# Patient Record
Sex: MALE | Race: WHITE | NOT HISPANIC OR LATINO | Employment: OTHER | URBAN - METROPOLITAN AREA
[De-identification: names, ages, dates, MRNs, and addresses within clinical notes are randomized per-mention and may not be internally consistent; named-entity substitution may affect disease eponyms.]

---

## 2017-04-04 ENCOUNTER — ALLSCRIPTS OFFICE VISIT (OUTPATIENT)
Dept: OTHER | Facility: OTHER | Age: 77
End: 2017-04-04

## 2017-04-05 ENCOUNTER — ALLSCRIPTS OFFICE VISIT (OUTPATIENT)
Dept: OTHER | Facility: OTHER | Age: 77
End: 2017-04-05

## 2017-04-10 ENCOUNTER — LAB (OUTPATIENT)
Dept: LAB | Facility: CLINIC | Age: 77
End: 2017-04-10
Payer: COMMERCIAL

## 2017-04-10 ENCOUNTER — TRANSCRIBE ORDERS (OUTPATIENT)
Dept: LAB | Facility: CLINIC | Age: 77
End: 2017-04-10

## 2017-04-10 DIAGNOSIS — J44.9 OBSTRUCTIVE CHRONIC BRONCHITIS WITHOUT EXACERBATION (HCC): ICD-10-CM

## 2017-04-10 DIAGNOSIS — I25.10 UNSPECIFIED CARDIOVASCULAR DISEASE: ICD-10-CM

## 2017-04-10 DIAGNOSIS — Z79.01 LONG TERM (CURRENT) USE OF ANTICOAGULANTS: Primary | ICD-10-CM

## 2017-04-10 DIAGNOSIS — Z87.891 PERSONAL HISTORY OF TOBACCO USE, PRESENTING HAZARDS TO HEALTH: ICD-10-CM

## 2017-04-10 DIAGNOSIS — E09.9 DRUG OR CHEMICAL INDUCED DIABETES MELLITUS: ICD-10-CM

## 2017-04-10 DIAGNOSIS — I48.91 ATRIAL FIBRILLATION, UNSPECIFIED TYPE (HCC): ICD-10-CM

## 2017-04-10 DIAGNOSIS — I10 ESSENTIAL HYPERTENSION, BENIGN: ICD-10-CM

## 2017-04-10 DIAGNOSIS — J43.9 EMPHYSEMATOUS BLEB (HCC): ICD-10-CM

## 2017-04-10 LAB
ALBUMIN SERPL BCP-MCNC: 3.5 G/DL (ref 3.5–5)
ALP SERPL-CCNC: 63 U/L (ref 46–116)
ALT SERPL W P-5'-P-CCNC: 18 U/L (ref 12–78)
ANION GAP SERPL CALCULATED.3IONS-SCNC: 5 MMOL/L (ref 4–13)
AST SERPL W P-5'-P-CCNC: 11 U/L (ref 5–45)
BASOPHILS # BLD AUTO: 0.05 THOUSANDS/ΜL (ref 0–0.1)
BASOPHILS NFR BLD AUTO: 1 % (ref 0–1)
BILIRUB SERPL-MCNC: 0.6 MG/DL (ref 0.2–1)
BUN SERPL-MCNC: 19 MG/DL (ref 5–25)
CALCIUM SERPL-MCNC: 8.2 MG/DL (ref 8.3–10.1)
CHLORIDE SERPL-SCNC: 100 MMOL/L (ref 100–108)
CHOLEST SERPL-MCNC: 100 MG/DL (ref 50–200)
CO2 SERPL-SCNC: 32 MMOL/L (ref 21–32)
CREAT SERPL-MCNC: 0.9 MG/DL (ref 0.6–1.3)
CREAT UR-MCNC: 119 MG/DL
EOSINOPHIL # BLD AUTO: 0.13 THOUSAND/ΜL (ref 0–0.61)
EOSINOPHIL NFR BLD AUTO: 2 % (ref 0–6)
ERYTHROCYTE [DISTWIDTH] IN BLOOD BY AUTOMATED COUNT: 13.8 % (ref 11.6–15.1)
EST. AVERAGE GLUCOSE BLD GHB EST-MCNC: 134 MG/DL
GFR SERPL CREATININE-BSD FRML MDRD: >60 ML/MIN/1.73SQ M
GLUCOSE P FAST SERPL-MCNC: 108 MG/DL (ref 65–99)
HBA1C MFR BLD: 6.3 % (ref 4.2–6.3)
HCT VFR BLD AUTO: 42.7 % (ref 36.5–49.3)
HDLC SERPL-MCNC: 48 MG/DL (ref 40–60)
HGB BLD-MCNC: 13.8 G/DL (ref 12–17)
LDLC SERPL CALC-MCNC: 32 MG/DL (ref 0–100)
LYMPHOCYTES # BLD AUTO: 2.29 THOUSANDS/ΜL (ref 0.6–4.47)
LYMPHOCYTES NFR BLD AUTO: 29 % (ref 14–44)
MCH RBC QN AUTO: 30.6 PG (ref 26.8–34.3)
MCHC RBC AUTO-ENTMCNC: 32.3 G/DL (ref 31.4–37.4)
MCV RBC AUTO: 95 FL (ref 82–98)
MICROALBUMIN UR-MCNC: 16.2 MG/L (ref 0–20)
MICROALBUMIN/CREAT 24H UR: 14 MG/G CREATININE (ref 0–30)
MONOCYTES # BLD AUTO: 0.67 THOUSAND/ΜL (ref 0.17–1.22)
MONOCYTES NFR BLD AUTO: 9 % (ref 4–12)
NEUTROPHILS # BLD AUTO: 4.71 THOUSANDS/ΜL (ref 1.85–7.62)
NEUTS SEG NFR BLD AUTO: 59 % (ref 43–75)
NRBC BLD AUTO-RTO: 0 /100 WBCS
PLATELET # BLD AUTO: 240 THOUSANDS/UL (ref 149–390)
PMV BLD AUTO: 11.4 FL (ref 8.9–12.7)
POTASSIUM SERPL-SCNC: 3.7 MMOL/L (ref 3.5–5.3)
PROT SERPL-MCNC: 6.9 G/DL (ref 6.4–8.2)
PSA SERPL-MCNC: 1.7 NG/ML (ref 0–4)
RBC # BLD AUTO: 4.51 MILLION/UL (ref 3.88–5.62)
SODIUM SERPL-SCNC: 137 MMOL/L (ref 136–145)
TRIGL SERPL-MCNC: 99 MG/DL
TSH SERPL DL<=0.05 MIU/L-ACNC: 1.22 UIU/ML (ref 0.36–3.74)
WBC # BLD AUTO: 7.87 THOUSAND/UL (ref 4.31–10.16)

## 2017-04-10 PROCEDURE — 82570 ASSAY OF URINE CREATININE: CPT

## 2017-04-10 PROCEDURE — 82043 UR ALBUMIN QUANTITATIVE: CPT

## 2017-04-10 PROCEDURE — 80061 LIPID PANEL: CPT

## 2017-04-10 PROCEDURE — G0103 PSA SCREENING: HCPCS

## 2017-04-10 PROCEDURE — 80053 COMPREHEN METABOLIC PANEL: CPT

## 2017-04-10 PROCEDURE — 85025 COMPLETE CBC W/AUTO DIFF WBC: CPT

## 2017-04-10 PROCEDURE — 84443 ASSAY THYROID STIM HORMONE: CPT

## 2017-04-10 PROCEDURE — 83036 HEMOGLOBIN GLYCOSYLATED A1C: CPT

## 2017-04-10 PROCEDURE — 36415 COLL VENOUS BLD VENIPUNCTURE: CPT

## 2017-04-11 ENCOUNTER — GENERIC CONVERSION - ENCOUNTER (OUTPATIENT)
Dept: OTHER | Facility: OTHER | Age: 77
End: 2017-04-11

## 2017-04-20 ENCOUNTER — ALLSCRIPTS OFFICE VISIT (OUTPATIENT)
Dept: OTHER | Facility: OTHER | Age: 77
End: 2017-04-20

## 2017-04-24 ENCOUNTER — ALLSCRIPTS OFFICE VISIT (OUTPATIENT)
Dept: OTHER | Facility: OTHER | Age: 77
End: 2017-04-24

## 2017-05-01 ENCOUNTER — ALLSCRIPTS OFFICE VISIT (OUTPATIENT)
Dept: OTHER | Facility: OTHER | Age: 77
End: 2017-05-01

## 2017-05-16 ENCOUNTER — GENERIC CONVERSION - ENCOUNTER (OUTPATIENT)
Dept: OTHER | Facility: OTHER | Age: 77
End: 2017-05-16

## 2017-05-30 ENCOUNTER — GENERIC CONVERSION - ENCOUNTER (OUTPATIENT)
Dept: OTHER | Facility: OTHER | Age: 77
End: 2017-05-30

## 2017-05-31 DIAGNOSIS — J18.1 LOBAR PNEUMONIA (HCC): ICD-10-CM

## 2017-05-31 DIAGNOSIS — R91.1 SOLITARY PULMONARY NODULE: ICD-10-CM

## 2017-06-07 ENCOUNTER — GENERIC CONVERSION - ENCOUNTER (OUTPATIENT)
Dept: OTHER | Facility: OTHER | Age: 77
End: 2017-06-07

## 2017-06-08 ENCOUNTER — HOSPITAL ENCOUNTER (INPATIENT)
Facility: HOSPITAL | Age: 77
LOS: 6 days | Discharge: HOME | DRG: 194 | End: 2017-06-14
Attending: EMERGENCY MEDICINE | Admitting: INTERNAL MEDICINE
Payer: COMMERCIAL

## 2017-06-08 ENCOUNTER — HOSPITAL ENCOUNTER (OUTPATIENT)
Dept: RADIOLOGY | Facility: HOSPITAL | Age: 77
Discharge: HOME | DRG: 194 | End: 2017-06-08
Attending: INTERNAL MEDICINE
Payer: COMMERCIAL

## 2017-06-08 ENCOUNTER — HOSPITAL ENCOUNTER (OUTPATIENT)
Dept: RADIOLOGY | Facility: HOSPITAL | Age: 77
DRG: 194 | End: 2017-06-08
Attending: INTERNAL MEDICINE
Payer: COMMERCIAL

## 2017-06-08 DIAGNOSIS — J18.9 PNEUMONIA: Primary | ICD-10-CM

## 2017-06-08 DIAGNOSIS — R91.1 LUNG NODULE: ICD-10-CM

## 2017-06-08 PROBLEM — I50.22 CHRONIC SYSTOLIC CONGESTIVE HEART FAILURE (HCC): Status: ACTIVE | Noted: 2017-06-08

## 2017-06-08 LAB
ALBUMIN SERPL BCP-MCNC: 3.3 G/DL (ref 3.5–5)
ALP SERPL-CCNC: 70 U/L (ref 46–116)
ALT SERPL W P-5'-P-CCNC: 23 U/L (ref 12–78)
ANION GAP SERPL CALCULATED.3IONS-SCNC: 8 MMOL/L (ref 4–13)
APTT PPP: 62 SECONDS (ref 23–35)
AST SERPL W P-5'-P-CCNC: 14 U/L (ref 5–45)
BACTERIA UR QL AUTO: ABNORMAL /HPF
BASOPHILS # BLD AUTO: 0 THOUSANDS/ΜL (ref 0–0.1)
BASOPHILS NFR BLD AUTO: 0 % (ref 0–1)
BILIRUB SERPL-MCNC: 0.5 MG/DL (ref 0.2–1)
BILIRUB UR QL STRIP: NEGATIVE
BUN SERPL-MCNC: 18 MG/DL (ref 5–25)
CALCIUM SERPL-MCNC: 9.3 MG/DL (ref 8.3–10.1)
CHLORIDE SERPL-SCNC: 99 MMOL/L (ref 100–108)
CLARITY UR: CLEAR
CO2 SERPL-SCNC: 31 MMOL/L (ref 21–32)
COLOR UR: ABNORMAL
CREAT SERPL-MCNC: 1.12 MG/DL (ref 0.6–1.3)
DOHLE BOD BLD QL SMEAR: PRESENT
EOSINOPHIL # BLD AUTO: 0.1 THOUSAND/ΜL (ref 0–0.61)
EOSINOPHIL NFR BLD AUTO: 0 % (ref 0–6)
ERYTHROCYTE [DISTWIDTH] IN BLOOD BY AUTOMATED COUNT: 14.4 % (ref 11.6–15.1)
GFR SERPL CREATININE-BSD FRML MDRD: >60 ML/MIN/1.73SQ M
GLUCOSE SERPL-MCNC: 160 MG/DL (ref 65–140)
GLUCOSE UR STRIP-MCNC: NEGATIVE MG/DL
HCT VFR BLD AUTO: 45.3 % (ref 42–52)
HGB BLD-MCNC: 14.6 G/DL (ref 14–18)
HGB UR QL STRIP.AUTO: ABNORMAL
HYALINE CASTS #/AREA URNS LPF: ABNORMAL /LPF
INR PPP: 3.06 (ref 0.86–1.16)
KETONES UR STRIP-MCNC: NEGATIVE MG/DL
L PNEUMO1 AG UR QL IA.RAPID: NEGATIVE
LACTATE SERPL-SCNC: 1.5 MMOL/L (ref 0.5–2)
LEUKOCYTE ESTERASE UR QL STRIP: NEGATIVE
LYMPHOCYTES # BLD AUTO: 0.8 THOUSANDS/ΜL (ref 0.6–4.47)
LYMPHOCYTES NFR BLD AUTO: 5 % (ref 14–44)
MACROCYTES BLD QL AUTO: PRESENT
MCH RBC QN AUTO: 30.3 PG (ref 27–31)
MCHC RBC AUTO-ENTMCNC: 32.1 G/DL (ref 31.4–37.4)
MCV RBC AUTO: 94 FL (ref 82–98)
MONOCYTES # BLD AUTO: 1.7 THOUSAND/ΜL (ref 0.17–1.22)
MONOCYTES NFR BLD AUTO: 9 % (ref 4–12)
NEUTROPHILS # BLD AUTO: 15.5 THOUSANDS/ΜL (ref 1.85–7.62)
NEUTS SEG NFR BLD AUTO: 86 % (ref 43–75)
NITRITE UR QL STRIP: NEGATIVE
NON-SQ EPI CELLS URNS QL MICRO: ABNORMAL /HPF
NRBC BLD AUTO-RTO: 0 /100 WBCS
PH UR STRIP.AUTO: 6 [PH] (ref 5–9)
PLATELET # BLD AUTO: 205 THOUSANDS/UL (ref 130–400)
PLATELET BLD QL SMEAR: ADEQUATE
PMV BLD AUTO: 8.7 FL (ref 8.9–12.7)
POTASSIUM SERPL-SCNC: 3.7 MMOL/L (ref 3.5–5.3)
PROT SERPL-MCNC: 7.6 G/DL (ref 6.4–8.2)
PROT UR STRIP-MCNC: ABNORMAL MG/DL
PROTHROMBIN TIME: 33.3 SECONDS (ref 9.4–11.7)
RBC # BLD AUTO: 4.8 MILLION/UL (ref 4.7–6.1)
RBC #/AREA URNS AUTO: ABNORMAL /HPF
S PNEUM AG UR QL: NEGATIVE
SODIUM SERPL-SCNC: 138 MMOL/L (ref 136–145)
SP GR UR STRIP.AUTO: 1.01 (ref 1–1.03)
UROBILINOGEN UR QL STRIP.AUTO: 0.2 E.U./DL
WBC # BLD AUTO: 18.1 THOUSAND/UL (ref 4.8–10.8)
WBC #/AREA URNS AUTO: ABNORMAL /HPF

## 2017-06-08 PROCEDURE — 94640 AIRWAY INHALATION TREATMENT: CPT

## 2017-06-08 PROCEDURE — 71250 CT THORAX DX C-: CPT

## 2017-06-08 PROCEDURE — 87081 CULTURE SCREEN ONLY: CPT | Performed by: FAMILY MEDICINE

## 2017-06-08 PROCEDURE — 80053 COMPREHEN METABOLIC PANEL: CPT | Performed by: EMERGENCY MEDICINE

## 2017-06-08 PROCEDURE — 83605 ASSAY OF LACTIC ACID: CPT | Performed by: EMERGENCY MEDICINE

## 2017-06-08 PROCEDURE — 85610 PROTHROMBIN TIME: CPT | Performed by: EMERGENCY MEDICINE

## 2017-06-08 PROCEDURE — 81001 URINALYSIS AUTO W/SCOPE: CPT | Performed by: EMERGENCY MEDICINE

## 2017-06-08 PROCEDURE — 85025 COMPLETE CBC W/AUTO DIFF WBC: CPT | Performed by: EMERGENCY MEDICINE

## 2017-06-08 PROCEDURE — 96365 THER/PROPH/DIAG IV INF INIT: CPT

## 2017-06-08 PROCEDURE — 94664 DEMO&/EVAL PT USE INHALER: CPT

## 2017-06-08 PROCEDURE — 87070 CULTURE OTHR SPECIMN AEROBIC: CPT | Performed by: FAMILY MEDICINE

## 2017-06-08 PROCEDURE — 85730 THROMBOPLASTIN TIME PARTIAL: CPT | Performed by: EMERGENCY MEDICINE

## 2017-06-08 PROCEDURE — 36415 COLL VENOUS BLD VENIPUNCTURE: CPT | Performed by: EMERGENCY MEDICINE

## 2017-06-08 PROCEDURE — 94760 N-INVAS EAR/PLS OXIMETRY 1: CPT

## 2017-06-08 PROCEDURE — 87449 NOS EACH ORGANISM AG IA: CPT | Performed by: FAMILY MEDICINE

## 2017-06-08 PROCEDURE — 96375 TX/PRO/DX INJ NEW DRUG ADDON: CPT

## 2017-06-08 PROCEDURE — 87040 BLOOD CULTURE FOR BACTERIA: CPT | Performed by: EMERGENCY MEDICINE

## 2017-06-08 PROCEDURE — 93005 ELECTROCARDIOGRAM TRACING: CPT | Performed by: EMERGENCY MEDICINE

## 2017-06-08 PROCEDURE — 99285 EMERGENCY DEPT VISIT HI MDM: CPT

## 2017-06-08 PROCEDURE — 87205 SMEAR GRAM STAIN: CPT | Performed by: FAMILY MEDICINE

## 2017-06-08 RX ORDER — WARFARIN SODIUM 1 MG/1
1 TABLET ORAL DAILY
COMMUNITY
Start: 2013-10-21 | End: 2018-04-27 | Stop reason: SDUPTHER

## 2017-06-08 RX ORDER — FOSINOPRIL SODIUM 20 MG/1
20 TABLET ORAL DAILY
COMMUNITY
End: 2018-04-02 | Stop reason: SDUPTHER

## 2017-06-08 RX ORDER — CARVEDILOL 6.25 MG/1
6.25 TABLET ORAL 2 TIMES DAILY
COMMUNITY
End: 2018-09-13 | Stop reason: SDUPTHER

## 2017-06-08 RX ORDER — ATORVASTATIN CALCIUM 10 MG/1
10 TABLET, FILM COATED ORAL
COMMUNITY
Start: 2017-04-04 | End: 2018-07-16

## 2017-06-08 RX ORDER — FUROSEMIDE 40 MG/1
4 TABLET ORAL DAILY
COMMUNITY
End: 2018-04-02 | Stop reason: SDUPTHER

## 2017-06-08 RX ORDER — ATORVASTATIN CALCIUM 10 MG/1
10 TABLET, FILM COATED ORAL
Status: DISCONTINUED | OUTPATIENT
Start: 2017-06-08 | End: 2017-06-14 | Stop reason: HOSPADM

## 2017-06-08 RX ORDER — IPRATROPIUM BROMIDE AND ALBUTEROL SULFATE 2.5; .5 MG/3ML; MG/3ML
3 SOLUTION RESPIRATORY (INHALATION)
Status: DISCONTINUED | OUTPATIENT
Start: 2017-06-08 | End: 2017-06-09

## 2017-06-08 RX ORDER — ACETAMINOPHEN 325 MG/1
650 TABLET ORAL EVERY 6 HOURS PRN
Status: DISCONTINUED | OUTPATIENT
Start: 2017-06-08 | End: 2017-06-14 | Stop reason: HOSPADM

## 2017-06-08 RX ORDER — IPRATROPIUM BROMIDE AND ALBUTEROL SULFATE 2.5; .5 MG/3ML; MG/3ML
3 SOLUTION RESPIRATORY (INHALATION) ONCE
Status: COMPLETED | OUTPATIENT
Start: 2017-06-08 | End: 2017-06-08

## 2017-06-08 RX ORDER — ASPIRIN 81 MG/1
81 TABLET, CHEWABLE ORAL DAILY
Status: DISCONTINUED | OUTPATIENT
Start: 2017-06-08 | End: 2017-06-14 | Stop reason: HOSPADM

## 2017-06-08 RX ORDER — WARFARIN SODIUM 5 MG/1
5 TABLET ORAL DAILY
COMMUNITY
End: 2018-03-17 | Stop reason: SDUPTHER

## 2017-06-08 RX ORDER — FOSINOPRIL SODIUM 20 MG/1
20 TABLET ORAL DAILY
Status: DISCONTINUED | OUTPATIENT
Start: 2017-06-08 | End: 2017-06-14 | Stop reason: HOSPADM

## 2017-06-08 RX ORDER — CARVEDILOL 6.25 MG/1
6.25 TABLET ORAL 2 TIMES DAILY
Status: DISCONTINUED | OUTPATIENT
Start: 2017-06-08 | End: 2017-06-14 | Stop reason: HOSPADM

## 2017-06-08 RX ORDER — IPRATROPIUM BROMIDE AND ALBUTEROL SULFATE 2.5; .5 MG/3ML; MG/3ML
3 SOLUTION RESPIRATORY (INHALATION) EVERY 4 HOURS PRN
Status: DISCONTINUED | OUTPATIENT
Start: 2017-06-08 | End: 2017-06-13

## 2017-06-08 RX ORDER — METHYLPREDNISOLONE SODIUM SUCCINATE 125 MG/2ML
125 INJECTION, POWDER, LYOPHILIZED, FOR SOLUTION INTRAMUSCULAR; INTRAVENOUS ONCE
Status: COMPLETED | OUTPATIENT
Start: 2017-06-08 | End: 2017-06-08

## 2017-06-08 RX ADMIN — IPRATROPIUM BROMIDE AND ALBUTEROL SULFATE 3 ML: .5; 3 SOLUTION RESPIRATORY (INHALATION) at 12:44

## 2017-06-08 RX ADMIN — IPRATROPIUM BROMIDE AND ALBUTEROL SULFATE 3 ML: .5; 3 SOLUTION RESPIRATORY (INHALATION) at 23:31

## 2017-06-08 RX ADMIN — IPRATROPIUM BROMIDE AND ALBUTEROL SULFATE 3 ML: .5; 3 SOLUTION RESPIRATORY (INHALATION) at 19:58

## 2017-06-08 RX ADMIN — METFORMIN HYDROCHLORIDE 1000 MG: 500 TABLET, FILM COATED ORAL at 17:24

## 2017-06-08 RX ADMIN — IPRATROPIUM BROMIDE AND ALBUTEROL SULFATE 3 ML: .5; 3 SOLUTION RESPIRATORY (INHALATION) at 09:03

## 2017-06-08 RX ADMIN — CARVEDILOL 6.25 MG: 6.25 TABLET, FILM COATED ORAL at 17:24

## 2017-06-08 RX ADMIN — ATORVASTATIN CALCIUM 10 MG: 10 TABLET, FILM COATED ORAL at 21:45

## 2017-06-08 RX ADMIN — IPRATROPIUM BROMIDE AND ALBUTEROL SULFATE 3 ML: .5; 3 SOLUTION RESPIRATORY (INHALATION) at 15:47

## 2017-06-08 RX ADMIN — METHYLPREDNISOLONE SODIUM SUCCINATE 125 MG: 125 INJECTION, POWDER, FOR SOLUTION INTRAMUSCULAR; INTRAVENOUS at 09:02

## 2017-06-08 RX ADMIN — AZITHROMYCIN MONOHYDRATE 500 MG: 500 INJECTION, POWDER, LYOPHILIZED, FOR SOLUTION INTRAVENOUS at 09:48

## 2017-06-08 RX ADMIN — CEFTRIAXONE 1000 MG: 1 INJECTION, SOLUTION INTRAVENOUS at 09:02

## 2017-06-09 ENCOUNTER — GENERIC CONVERSION - ENCOUNTER (OUTPATIENT)
Dept: OTHER | Facility: OTHER | Age: 77
End: 2017-06-09

## 2017-06-09 LAB
ANION GAP SERPL CALCULATED.3IONS-SCNC: 8 MMOL/L (ref 4–13)
BUN SERPL-MCNC: 21 MG/DL (ref 5–25)
CALCIUM SERPL-MCNC: 8.5 MG/DL (ref 8.3–10.1)
CHLORIDE SERPL-SCNC: 101 MMOL/L (ref 100–108)
CO2 SERPL-SCNC: 30 MMOL/L (ref 21–32)
CREAT SERPL-MCNC: 0.93 MG/DL (ref 0.6–1.3)
ERYTHROCYTE [DISTWIDTH] IN BLOOD BY AUTOMATED COUNT: 14.3 % (ref 11.6–15.1)
GFR SERPL CREATININE-BSD FRML MDRD: >60 ML/MIN/1.73SQ M
GLUCOSE SERPL-MCNC: 160 MG/DL (ref 65–140)
HCT VFR BLD AUTO: 38.2 % (ref 42–52)
HGB BLD-MCNC: 12.4 G/DL (ref 14–18)
INR PPP: 3.15 (ref 0.86–1.16)
MAGNESIUM SERPL-MCNC: 1.9 MG/DL (ref 1.6–2.6)
MCH RBC QN AUTO: 30.5 PG (ref 27–31)
MCHC RBC AUTO-ENTMCNC: 32.5 G/DL (ref 31.4–37.4)
MCV RBC AUTO: 94 FL (ref 82–98)
PHOSPHATE SERPL-MCNC: 3.2 MG/DL (ref 2.3–4.1)
PLATELET # BLD AUTO: 203 THOUSANDS/UL (ref 130–400)
PMV BLD AUTO: 9 FL (ref 8.9–12.7)
POTASSIUM SERPL-SCNC: 3.9 MMOL/L (ref 3.5–5.3)
PROTHROMBIN TIME: 34.3 SECONDS (ref 9.4–11.7)
RBC # BLD AUTO: 4.07 MILLION/UL (ref 4.7–6.1)
SODIUM SERPL-SCNC: 139 MMOL/L (ref 136–145)
WBC # BLD AUTO: 13.2 THOUSAND/UL (ref 4.8–10.8)

## 2017-06-09 PROCEDURE — 85027 COMPLETE CBC AUTOMATED: CPT | Performed by: FAMILY MEDICINE

## 2017-06-09 PROCEDURE — 84100 ASSAY OF PHOSPHORUS: CPT | Performed by: FAMILY MEDICINE

## 2017-06-09 PROCEDURE — G8979 MOBILITY GOAL STATUS: HCPCS

## 2017-06-09 PROCEDURE — 97165 OT EVAL LOW COMPLEX 30 MIN: CPT

## 2017-06-09 PROCEDURE — G8987 SELF CARE CURRENT STATUS: HCPCS

## 2017-06-09 PROCEDURE — 94760 N-INVAS EAR/PLS OXIMETRY 1: CPT

## 2017-06-09 PROCEDURE — 85610 PROTHROMBIN TIME: CPT | Performed by: FAMILY MEDICINE

## 2017-06-09 PROCEDURE — 94668 MNPJ CHEST WALL SBSQ: CPT

## 2017-06-09 PROCEDURE — 80048 BASIC METABOLIC PNL TOTAL CA: CPT | Performed by: FAMILY MEDICINE

## 2017-06-09 PROCEDURE — 83735 ASSAY OF MAGNESIUM: CPT | Performed by: FAMILY MEDICINE

## 2017-06-09 PROCEDURE — G8978 MOBILITY CURRENT STATUS: HCPCS

## 2017-06-09 PROCEDURE — 94640 AIRWAY INHALATION TREATMENT: CPT

## 2017-06-09 PROCEDURE — 97161 PT EVAL LOW COMPLEX 20 MIN: CPT

## 2017-06-09 PROCEDURE — G8988 SELF CARE GOAL STATUS: HCPCS

## 2017-06-09 RX ORDER — FUROSEMIDE 40 MG/1
40 TABLET ORAL DAILY
Status: DISCONTINUED | OUTPATIENT
Start: 2017-06-10 | End: 2017-06-14 | Stop reason: HOSPADM

## 2017-06-09 RX ORDER — GUAIFENESIN 600 MG
600 TABLET, EXTENDED RELEASE 12 HR ORAL EVERY 12 HOURS SCHEDULED
Status: DISCONTINUED | OUTPATIENT
Start: 2017-06-09 | End: 2017-06-14 | Stop reason: HOSPADM

## 2017-06-09 RX ORDER — ACETYLCYSTEINE 200 MG/ML
2 SOLUTION ORAL; RESPIRATORY (INHALATION)
Status: DISCONTINUED | OUTPATIENT
Start: 2017-06-09 | End: 2017-06-10

## 2017-06-09 RX ORDER — IPRATROPIUM BROMIDE AND ALBUTEROL SULFATE 2.5; .5 MG/3ML; MG/3ML
3 SOLUTION RESPIRATORY (INHALATION)
Status: DISCONTINUED | OUTPATIENT
Start: 2017-06-09 | End: 2017-06-13

## 2017-06-09 RX ADMIN — METFORMIN HYDROCHLORIDE 1000 MG: 500 TABLET, FILM COATED ORAL at 08:44

## 2017-06-09 RX ADMIN — CARVEDILOL 6.25 MG: 6.25 TABLET, FILM COATED ORAL at 08:44

## 2017-06-09 RX ADMIN — ACETYLCYSTEINE 400 MG: 200 SOLUTION ORAL; RESPIRATORY (INHALATION) at 20:12

## 2017-06-09 RX ADMIN — GUAIFENESIN 600 MG: 600 TABLET, EXTENDED RELEASE ORAL at 20:23

## 2017-06-09 RX ADMIN — CARVEDILOL 6.25 MG: 6.25 TABLET, FILM COATED ORAL at 17:19

## 2017-06-09 RX ADMIN — IPRATROPIUM BROMIDE AND ALBUTEROL SULFATE 3 ML: .5; 3 SOLUTION RESPIRATORY (INHALATION) at 03:20

## 2017-06-09 RX ADMIN — IPRATROPIUM BROMIDE AND ALBUTEROL SULFATE 3 ML: .5; 3 SOLUTION RESPIRATORY (INHALATION) at 20:11

## 2017-06-09 RX ADMIN — ATORVASTATIN CALCIUM 10 MG: 10 TABLET, FILM COATED ORAL at 22:14

## 2017-06-09 RX ADMIN — METFORMIN HYDROCHLORIDE 1000 MG: 500 TABLET, FILM COATED ORAL at 17:19

## 2017-06-09 RX ADMIN — CEFTRIAXONE 1000 MG: 1 INJECTION, POWDER, FOR SOLUTION INTRAMUSCULAR; INTRAVENOUS at 08:47

## 2017-06-09 RX ADMIN — IPRATROPIUM BROMIDE AND ALBUTEROL SULFATE 3 ML: .5; 3 SOLUTION RESPIRATORY (INHALATION) at 08:54

## 2017-06-09 RX ADMIN — IPRATROPIUM BROMIDE AND ALBUTEROL SULFATE 3 ML: .5; 3 SOLUTION RESPIRATORY (INHALATION) at 15:39

## 2017-06-09 RX ADMIN — IPRATROPIUM BROMIDE AND ALBUTEROL SULFATE 3 ML: .5; 3 SOLUTION RESPIRATORY (INHALATION) at 11:41

## 2017-06-09 RX ADMIN — FOSINOPRIL 20 MG: 20 TABLET ORAL at 08:44

## 2017-06-09 RX ADMIN — ASPIRIN 81 MG 81 MG: 81 TABLET ORAL at 08:44

## 2017-06-09 RX ADMIN — AZITHROMYCIN MONOHYDRATE 500 MG: 500 INJECTION, POWDER, LYOPHILIZED, FOR SOLUTION INTRAVENOUS at 10:10

## 2017-06-10 LAB
ANION GAP SERPL CALCULATED.3IONS-SCNC: 7 MMOL/L (ref 4–13)
BACTERIA SPT RESP CULT: NORMAL
BUN SERPL-MCNC: 20 MG/DL (ref 5–25)
CALCIUM SERPL-MCNC: 8.4 MG/DL (ref 8.3–10.1)
CHLORIDE SERPL-SCNC: 104 MMOL/L (ref 100–108)
CO2 SERPL-SCNC: 28 MMOL/L (ref 21–32)
CREAT SERPL-MCNC: 0.86 MG/DL (ref 0.6–1.3)
ERYTHROCYTE [DISTWIDTH] IN BLOOD BY AUTOMATED COUNT: 14.6 % (ref 11.6–15.1)
GFR SERPL CREATININE-BSD FRML MDRD: >60 ML/MIN/1.73SQ M
GLUCOSE SERPL-MCNC: 117 MG/DL (ref 65–140)
GRAM STN SPEC: NORMAL
HCT VFR BLD AUTO: 37.9 % (ref 42–52)
HGB BLD-MCNC: 12.4 G/DL (ref 14–18)
INR PPP: 2.66 (ref 0.86–1.16)
MCH RBC QN AUTO: 30.6 PG (ref 27–31)
MCHC RBC AUTO-ENTMCNC: 32.8 G/DL (ref 31.4–37.4)
MCV RBC AUTO: 94 FL (ref 82–98)
MRSA NOSE QL CULT: NORMAL
PLATELET # BLD AUTO: 207 THOUSANDS/UL (ref 130–400)
PMV BLD AUTO: 8.6 FL (ref 8.9–12.7)
POTASSIUM SERPL-SCNC: 4.1 MMOL/L (ref 3.5–5.3)
PROTHROMBIN TIME: 28.8 SECONDS (ref 9.4–11.7)
RBC # BLD AUTO: 4.05 MILLION/UL (ref 4.7–6.1)
SODIUM SERPL-SCNC: 139 MMOL/L (ref 136–145)
WBC # BLD AUTO: 12.5 THOUSAND/UL (ref 4.8–10.8)

## 2017-06-10 PROCEDURE — 85610 PROTHROMBIN TIME: CPT | Performed by: FAMILY MEDICINE

## 2017-06-10 PROCEDURE — 85027 COMPLETE CBC AUTOMATED: CPT | Performed by: FAMILY MEDICINE

## 2017-06-10 PROCEDURE — 94640 AIRWAY INHALATION TREATMENT: CPT

## 2017-06-10 PROCEDURE — 80048 BASIC METABOLIC PNL TOTAL CA: CPT | Performed by: FAMILY MEDICINE

## 2017-06-10 PROCEDURE — 94760 N-INVAS EAR/PLS OXIMETRY 1: CPT

## 2017-06-10 PROCEDURE — 94668 MNPJ CHEST WALL SBSQ: CPT

## 2017-06-10 RX ORDER — WARFARIN SODIUM 4 MG/1
4 TABLET ORAL
Status: COMPLETED | OUTPATIENT
Start: 2017-06-10 | End: 2017-06-10

## 2017-06-10 RX ORDER — GUAIFENESIN 600 MG
600 TABLET, EXTENDED RELEASE 12 HR ORAL EVERY 12 HOURS SCHEDULED
Status: DISCONTINUED | OUTPATIENT
Start: 2017-06-10 | End: 2017-06-10 | Stop reason: SDUPTHER

## 2017-06-10 RX ADMIN — CARVEDILOL 6.25 MG: 6.25 TABLET, FILM COATED ORAL at 17:34

## 2017-06-10 RX ADMIN — CEFTRIAXONE 1000 MG: 1 INJECTION, POWDER, FOR SOLUTION INTRAMUSCULAR; INTRAVENOUS at 09:46

## 2017-06-10 RX ADMIN — IPRATROPIUM BROMIDE AND ALBUTEROL SULFATE 3 ML: .5; 3 SOLUTION RESPIRATORY (INHALATION) at 02:37

## 2017-06-10 RX ADMIN — ACETYLCYSTEINE 400 MG: 200 SOLUTION ORAL; RESPIRATORY (INHALATION) at 02:37

## 2017-06-10 RX ADMIN — ASPIRIN 81 MG 81 MG: 81 TABLET ORAL at 08:18

## 2017-06-10 RX ADMIN — IPRATROPIUM BROMIDE AND ALBUTEROL SULFATE 3 ML: .5; 3 SOLUTION RESPIRATORY (INHALATION) at 13:50

## 2017-06-10 RX ADMIN — IPRATROPIUM BROMIDE AND ALBUTEROL SULFATE 3 ML: .5; 3 SOLUTION RESPIRATORY (INHALATION) at 09:09

## 2017-06-10 RX ADMIN — WARFARIN SODIUM 4 MG: 4 TABLET ORAL at 17:34

## 2017-06-10 RX ADMIN — GUAIFENESIN 600 MG: 600 TABLET, EXTENDED RELEASE ORAL at 08:19

## 2017-06-10 RX ADMIN — METFORMIN HYDROCHLORIDE 1000 MG: 500 TABLET, FILM COATED ORAL at 17:34

## 2017-06-10 RX ADMIN — CARVEDILOL 6.25 MG: 6.25 TABLET, FILM COATED ORAL at 08:19

## 2017-06-10 RX ADMIN — AZITHROMYCIN MONOHYDRATE 500 MG: 500 INJECTION, POWDER, LYOPHILIZED, FOR SOLUTION INTRAVENOUS at 11:30

## 2017-06-10 RX ADMIN — METFORMIN HYDROCHLORIDE 1000 MG: 500 TABLET, FILM COATED ORAL at 08:19

## 2017-06-10 RX ADMIN — ATORVASTATIN CALCIUM 10 MG: 10 TABLET, FILM COATED ORAL at 21:19

## 2017-06-10 RX ADMIN — GUAIFENESIN 600 MG: 600 TABLET, EXTENDED RELEASE ORAL at 21:19

## 2017-06-10 RX ADMIN — FOSINOPRIL 20 MG: 20 TABLET ORAL at 08:18

## 2017-06-10 RX ADMIN — IPRATROPIUM BROMIDE AND ALBUTEROL SULFATE 3 ML: .5; 3 SOLUTION RESPIRATORY (INHALATION) at 20:22

## 2017-06-10 RX ADMIN — FUROSEMIDE 40 MG: 40 TABLET ORAL at 08:21

## 2017-06-11 LAB
ERYTHROCYTE [DISTWIDTH] IN BLOOD BY AUTOMATED COUNT: 14.3 % (ref 11.6–15.1)
HCT VFR BLD AUTO: 39.3 % (ref 42–52)
HGB BLD-MCNC: 12.8 G/DL (ref 14–18)
INR PPP: 2.22 (ref 0.86–1.16)
MCH RBC QN AUTO: 30.4 PG (ref 27–31)
MCHC RBC AUTO-ENTMCNC: 32.6 G/DL (ref 31.4–37.4)
MCV RBC AUTO: 93 FL (ref 82–98)
PLATELET # BLD AUTO: 226 THOUSANDS/UL (ref 130–400)
PMV BLD AUTO: 8.7 FL (ref 8.9–12.7)
PROTHROMBIN TIME: 23.9 SECONDS (ref 9.4–11.7)
RBC # BLD AUTO: 4.21 MILLION/UL (ref 4.7–6.1)
WBC # BLD AUTO: 10.8 THOUSAND/UL (ref 4.8–10.8)

## 2017-06-11 PROCEDURE — 94760 N-INVAS EAR/PLS OXIMETRY 1: CPT

## 2017-06-11 PROCEDURE — 94668 MNPJ CHEST WALL SBSQ: CPT

## 2017-06-11 PROCEDURE — 94640 AIRWAY INHALATION TREATMENT: CPT

## 2017-06-11 PROCEDURE — 85610 PROTHROMBIN TIME: CPT | Performed by: FAMILY MEDICINE

## 2017-06-11 PROCEDURE — 85027 COMPLETE CBC AUTOMATED: CPT | Performed by: FAMILY MEDICINE

## 2017-06-11 RX ADMIN — CEFTRIAXONE 1000 MG: 1 INJECTION, POWDER, FOR SOLUTION INTRAMUSCULAR; INTRAVENOUS at 09:20

## 2017-06-11 RX ADMIN — METFORMIN HYDROCHLORIDE 1000 MG: 500 TABLET, FILM COATED ORAL at 07:39

## 2017-06-11 RX ADMIN — IPRATROPIUM BROMIDE AND ALBUTEROL SULFATE 3 ML: .5; 3 SOLUTION RESPIRATORY (INHALATION) at 08:28

## 2017-06-11 RX ADMIN — ATORVASTATIN CALCIUM 10 MG: 10 TABLET, FILM COATED ORAL at 21:07

## 2017-06-11 RX ADMIN — GUAIFENESIN 600 MG: 600 TABLET, EXTENDED RELEASE ORAL at 21:07

## 2017-06-11 RX ADMIN — IPRATROPIUM BROMIDE AND ALBUTEROL SULFATE 3 ML: .5; 3 SOLUTION RESPIRATORY (INHALATION) at 14:45

## 2017-06-11 RX ADMIN — GUAIFENESIN 600 MG: 600 TABLET, EXTENDED RELEASE ORAL at 09:21

## 2017-06-11 RX ADMIN — CARVEDILOL 6.25 MG: 6.25 TABLET, FILM COATED ORAL at 09:20

## 2017-06-11 RX ADMIN — AZITHROMYCIN MONOHYDRATE 500 MG: 500 INJECTION, POWDER, LYOPHILIZED, FOR SOLUTION INTRAVENOUS at 10:01

## 2017-06-11 RX ADMIN — FOSINOPRIL 20 MG: 20 TABLET ORAL at 09:21

## 2017-06-11 RX ADMIN — METFORMIN HYDROCHLORIDE 1000 MG: 500 TABLET, FILM COATED ORAL at 17:24

## 2017-06-11 RX ADMIN — CARVEDILOL 6.25 MG: 6.25 TABLET, FILM COATED ORAL at 17:24

## 2017-06-11 RX ADMIN — WARFARIN SODIUM 5.5 MG: 3 TABLET ORAL at 17:24

## 2017-06-11 RX ADMIN — FUROSEMIDE 40 MG: 40 TABLET ORAL at 09:21

## 2017-06-11 RX ADMIN — IPRATROPIUM BROMIDE AND ALBUTEROL SULFATE 3 ML: .5; 3 SOLUTION RESPIRATORY (INHALATION) at 02:38

## 2017-06-11 RX ADMIN — IPRATROPIUM BROMIDE AND ALBUTEROL SULFATE 3 ML: .5; 3 SOLUTION RESPIRATORY (INHALATION) at 20:51

## 2017-06-11 RX ADMIN — ASPIRIN 81 MG 81 MG: 81 TABLET ORAL at 09:22

## 2017-06-12 LAB
ANION GAP SERPL CALCULATED.3IONS-SCNC: 10 MMOL/L (ref 4–13)
BUN SERPL-MCNC: 12 MG/DL (ref 5–25)
CALCIUM SERPL-MCNC: 8.9 MG/DL (ref 8.3–10.1)
CHLORIDE SERPL-SCNC: 99 MMOL/L (ref 100–108)
CO2 SERPL-SCNC: 32 MMOL/L (ref 21–32)
CREAT SERPL-MCNC: 0.92 MG/DL (ref 0.6–1.3)
ERYTHROCYTE [DISTWIDTH] IN BLOOD BY AUTOMATED COUNT: 14.4 % (ref 11.6–15.1)
GFR SERPL CREATININE-BSD FRML MDRD: >60 ML/MIN/1.73SQ M
GLUCOSE SERPL-MCNC: 122 MG/DL (ref 65–140)
HCT VFR BLD AUTO: 42.2 % (ref 42–52)
HGB BLD-MCNC: 13.7 G/DL (ref 14–18)
INR PPP: 2.37 (ref 0.86–1.16)
MCH RBC QN AUTO: 30.3 PG (ref 27–31)
MCHC RBC AUTO-ENTMCNC: 32.4 G/DL (ref 31.4–37.4)
MCV RBC AUTO: 93 FL (ref 82–98)
PLATELET # BLD AUTO: 255 THOUSANDS/UL (ref 130–400)
PMV BLD AUTO: 8.3 FL (ref 8.9–12.7)
POTASSIUM SERPL-SCNC: 3.4 MMOL/L (ref 3.5–5.3)
PROTHROMBIN TIME: 25.6 SECONDS (ref 9.4–11.7)
RBC # BLD AUTO: 4.52 MILLION/UL (ref 4.7–6.1)
SODIUM SERPL-SCNC: 141 MMOL/L (ref 136–145)
WBC # BLD AUTO: 11.3 THOUSAND/UL (ref 4.8–10.8)

## 2017-06-12 PROCEDURE — 94668 MNPJ CHEST WALL SBSQ: CPT

## 2017-06-12 PROCEDURE — 80048 BASIC METABOLIC PNL TOTAL CA: CPT | Performed by: FAMILY MEDICINE

## 2017-06-12 PROCEDURE — 85610 PROTHROMBIN TIME: CPT | Performed by: FAMILY MEDICINE

## 2017-06-12 PROCEDURE — 85027 COMPLETE CBC AUTOMATED: CPT | Performed by: FAMILY MEDICINE

## 2017-06-12 PROCEDURE — 94640 AIRWAY INHALATION TREATMENT: CPT

## 2017-06-12 PROCEDURE — 94760 N-INVAS EAR/PLS OXIMETRY 1: CPT

## 2017-06-12 RX ORDER — POTASSIUM CHLORIDE 20 MEQ/1
40 TABLET, EXTENDED RELEASE ORAL ONCE
Status: COMPLETED | OUTPATIENT
Start: 2017-06-12 | End: 2017-06-12

## 2017-06-12 RX ORDER — FLUTICASONE PROPIONATE 110 UG/1
1 AEROSOL, METERED RESPIRATORY (INHALATION) EVERY 12 HOURS SCHEDULED
Status: DISCONTINUED | OUTPATIENT
Start: 2017-06-12 | End: 2017-06-14 | Stop reason: HOSPADM

## 2017-06-12 RX ADMIN — ASPIRIN 81 MG 81 MG: 81 TABLET ORAL at 09:00

## 2017-06-12 RX ADMIN — CARVEDILOL 6.25 MG: 6.25 TABLET, FILM COATED ORAL at 08:59

## 2017-06-12 RX ADMIN — METFORMIN HYDROCHLORIDE 1000 MG: 500 TABLET, FILM COATED ORAL at 17:33

## 2017-06-12 RX ADMIN — WARFARIN SODIUM 5.5 MG: 3 TABLET ORAL at 17:32

## 2017-06-12 RX ADMIN — FUROSEMIDE 40 MG: 40 TABLET ORAL at 09:00

## 2017-06-12 RX ADMIN — AZITHROMYCIN MONOHYDRATE 500 MG: 500 INJECTION, POWDER, LYOPHILIZED, FOR SOLUTION INTRAVENOUS at 10:12

## 2017-06-12 RX ADMIN — IPRATROPIUM BROMIDE AND ALBUTEROL SULFATE 3 ML: .5; 3 SOLUTION RESPIRATORY (INHALATION) at 19:49

## 2017-06-12 RX ADMIN — IPRATROPIUM BROMIDE AND ALBUTEROL SULFATE 3 ML: .5; 3 SOLUTION RESPIRATORY (INHALATION) at 07:48

## 2017-06-12 RX ADMIN — CEFTRIAXONE 1000 MG: 1 INJECTION, POWDER, FOR SOLUTION INTRAMUSCULAR; INTRAVENOUS at 09:10

## 2017-06-12 RX ADMIN — IPRATROPIUM BROMIDE AND ALBUTEROL SULFATE 3 ML: .5; 3 SOLUTION RESPIRATORY (INHALATION) at 14:04

## 2017-06-12 RX ADMIN — GUAIFENESIN 600 MG: 600 TABLET, EXTENDED RELEASE ORAL at 21:24

## 2017-06-12 RX ADMIN — CARVEDILOL 6.25 MG: 6.25 TABLET, FILM COATED ORAL at 17:33

## 2017-06-12 RX ADMIN — ATORVASTATIN CALCIUM 10 MG: 10 TABLET, FILM COATED ORAL at 21:24

## 2017-06-12 RX ADMIN — FOSINOPRIL 20 MG: 20 TABLET ORAL at 08:59

## 2017-06-12 RX ADMIN — METFORMIN HYDROCHLORIDE 1000 MG: 500 TABLET, FILM COATED ORAL at 08:59

## 2017-06-12 RX ADMIN — GUAIFENESIN 600 MG: 600 TABLET, EXTENDED RELEASE ORAL at 08:59

## 2017-06-12 RX ADMIN — POTASSIUM CHLORIDE 40 MEQ: 1500 TABLET, EXTENDED RELEASE ORAL at 10:21

## 2017-06-13 ENCOUNTER — APPOINTMENT (INPATIENT)
Dept: RADIOLOGY | Facility: HOSPITAL | Age: 77
DRG: 194 | End: 2017-06-13
Payer: COMMERCIAL

## 2017-06-13 LAB
ANION GAP SERPL CALCULATED.3IONS-SCNC: 9 MMOL/L (ref 4–13)
BACTERIA BLD CULT: NORMAL
BACTERIA BLD CULT: NORMAL
BUN SERPL-MCNC: 16 MG/DL (ref 5–25)
CALCIUM SERPL-MCNC: 8.7 MG/DL (ref 8.3–10.1)
CHLORIDE SERPL-SCNC: 101 MMOL/L (ref 100–108)
CO2 SERPL-SCNC: 33 MMOL/L (ref 21–32)
CREAT SERPL-MCNC: 0.88 MG/DL (ref 0.6–1.3)
ERYTHROCYTE [DISTWIDTH] IN BLOOD BY AUTOMATED COUNT: 14.3 % (ref 11.6–15.1)
GFR SERPL CREATININE-BSD FRML MDRD: >60 ML/MIN/1.73SQ M
GLUCOSE SERPL-MCNC: 117 MG/DL (ref 65–140)
HCT VFR BLD AUTO: 39.3 % (ref 42–52)
HGB BLD-MCNC: 12.7 G/DL (ref 14–18)
INR PPP: 2.55 (ref 0.86–1.16)
MCH RBC QN AUTO: 30.7 PG (ref 27–31)
MCHC RBC AUTO-ENTMCNC: 32.3 G/DL (ref 31.4–37.4)
MCV RBC AUTO: 95 FL (ref 82–98)
PLATELET # BLD AUTO: 260 THOUSANDS/UL (ref 130–400)
PMV BLD AUTO: 8.2 FL (ref 8.9–12.7)
POTASSIUM SERPL-SCNC: 3.7 MMOL/L (ref 3.5–5.3)
PROTHROMBIN TIME: 27.6 SECONDS (ref 9.4–11.7)
RBC # BLD AUTO: 4.13 MILLION/UL (ref 4.7–6.1)
SODIUM SERPL-SCNC: 143 MMOL/L (ref 136–145)
WBC # BLD AUTO: 9.6 THOUSAND/UL (ref 4.8–10.8)

## 2017-06-13 PROCEDURE — 80048 BASIC METABOLIC PNL TOTAL CA: CPT | Performed by: FAMILY MEDICINE

## 2017-06-13 PROCEDURE — 71020 HB CHEST X-RAY 2VW FRONTAL&LATL: CPT

## 2017-06-13 PROCEDURE — 85610 PROTHROMBIN TIME: CPT | Performed by: FAMILY MEDICINE

## 2017-06-13 PROCEDURE — 94640 AIRWAY INHALATION TREATMENT: CPT

## 2017-06-13 PROCEDURE — 94668 MNPJ CHEST WALL SBSQ: CPT

## 2017-06-13 PROCEDURE — 85027 COMPLETE CBC AUTOMATED: CPT | Performed by: FAMILY MEDICINE

## 2017-06-13 PROCEDURE — 94760 N-INVAS EAR/PLS OXIMETRY 1: CPT

## 2017-06-13 RX ORDER — IPRATROPIUM BROMIDE AND ALBUTEROL SULFATE 2.5; .5 MG/3ML; MG/3ML
3 SOLUTION RESPIRATORY (INHALATION) EVERY 2 HOUR PRN
Status: DISCONTINUED | OUTPATIENT
Start: 2017-06-13 | End: 2017-06-14 | Stop reason: HOSPADM

## 2017-06-13 RX ORDER — IPRATROPIUM BROMIDE AND ALBUTEROL SULFATE 2.5; .5 MG/3ML; MG/3ML
3 SOLUTION RESPIRATORY (INHALATION)
Status: DISCONTINUED | OUTPATIENT
Start: 2017-06-13 | End: 2017-06-14 | Stop reason: HOSPADM

## 2017-06-13 RX ADMIN — METFORMIN HYDROCHLORIDE 1000 MG: 500 TABLET, FILM COATED ORAL at 08:27

## 2017-06-13 RX ADMIN — IPRATROPIUM BROMIDE AND ALBUTEROL SULFATE 3 ML: .5; 3 SOLUTION RESPIRATORY (INHALATION) at 19:18

## 2017-06-13 RX ADMIN — ATORVASTATIN CALCIUM 10 MG: 10 TABLET, FILM COATED ORAL at 21:46

## 2017-06-13 RX ADMIN — CEFTRIAXONE 1000 MG: 1 INJECTION, POWDER, FOR SOLUTION INTRAMUSCULAR; INTRAVENOUS at 09:36

## 2017-06-13 RX ADMIN — WARFARIN SODIUM 5.5 MG: 3 TABLET ORAL at 17:36

## 2017-06-13 RX ADMIN — GUAIFENESIN 600 MG: 600 TABLET, EXTENDED RELEASE ORAL at 20:33

## 2017-06-13 RX ADMIN — IPRATROPIUM BROMIDE AND ALBUTEROL SULFATE 3 ML: .5; 3 SOLUTION RESPIRATORY (INHALATION) at 04:06

## 2017-06-13 RX ADMIN — GUAIFENESIN 600 MG: 600 TABLET, EXTENDED RELEASE ORAL at 08:27

## 2017-06-13 RX ADMIN — FUROSEMIDE 40 MG: 40 TABLET ORAL at 08:27

## 2017-06-13 RX ADMIN — IPRATROPIUM BROMIDE AND ALBUTEROL SULFATE 3 ML: .5; 3 SOLUTION RESPIRATORY (INHALATION) at 16:40

## 2017-06-13 RX ADMIN — IPRATROPIUM BROMIDE AND ALBUTEROL SULFATE 3 ML: .5; 3 SOLUTION RESPIRATORY (INHALATION) at 11:59

## 2017-06-13 RX ADMIN — IPRATROPIUM BROMIDE AND ALBUTEROL SULFATE 3 ML: .5; 3 SOLUTION RESPIRATORY (INHALATION) at 00:06

## 2017-06-13 RX ADMIN — IPRATROPIUM BROMIDE AND ALBUTEROL SULFATE 3 ML: .5; 3 SOLUTION RESPIRATORY (INHALATION) at 08:35

## 2017-06-13 RX ADMIN — FOSINOPRIL 20 MG: 20 TABLET ORAL at 08:27

## 2017-06-13 RX ADMIN — AZITHROMYCIN MONOHYDRATE 500 MG: 500 INJECTION, POWDER, LYOPHILIZED, FOR SOLUTION INTRAVENOUS at 10:42

## 2017-06-13 RX ADMIN — FLUTICASONE PROPIONATE 1 PUFF: 110 AEROSOL, METERED RESPIRATORY (INHALATION) at 08:27

## 2017-06-13 RX ADMIN — CARVEDILOL 6.25 MG: 6.25 TABLET, FILM COATED ORAL at 17:38

## 2017-06-13 RX ADMIN — FLUTICASONE PROPIONATE 1 PUFF: 110 AEROSOL, METERED RESPIRATORY (INHALATION) at 20:34

## 2017-06-13 RX ADMIN — ASPIRIN 81 MG 81 MG: 81 TABLET ORAL at 08:27

## 2017-06-13 RX ADMIN — CARVEDILOL 6.25 MG: 6.25 TABLET, FILM COATED ORAL at 08:27

## 2017-06-14 ENCOUNTER — GENERIC CONVERSION - ENCOUNTER (OUTPATIENT)
Dept: OTHER | Facility: OTHER | Age: 77
End: 2017-06-14

## 2017-06-14 VITALS
SYSTOLIC BLOOD PRESSURE: 134 MMHG | WEIGHT: 148.6 LBS | BODY MASS INDEX: 20.8 KG/M2 | RESPIRATION RATE: 18 BRPM | DIASTOLIC BLOOD PRESSURE: 69 MMHG | HEART RATE: 113 BPM | TEMPERATURE: 97.6 F | OXYGEN SATURATION: 94 % | HEIGHT: 71 IN

## 2017-06-14 LAB
INR PPP: 2.95 (ref 0.86–1.16)
PROTHROMBIN TIME: 32 SECONDS (ref 9.4–11.7)

## 2017-06-14 PROCEDURE — 94760 N-INVAS EAR/PLS OXIMETRY 1: CPT

## 2017-06-14 PROCEDURE — 94668 MNPJ CHEST WALL SBSQ: CPT

## 2017-06-14 PROCEDURE — 94620 HB PULMONARY STRESS TEST/SIMPLE: CPT

## 2017-06-14 PROCEDURE — 94640 AIRWAY INHALATION TREATMENT: CPT

## 2017-06-14 PROCEDURE — 85610 PROTHROMBIN TIME: CPT | Performed by: FAMILY MEDICINE

## 2017-06-14 RX ORDER — GUAIFENESIN 600 MG
600 TABLET, EXTENDED RELEASE 12 HR ORAL EVERY 12 HOURS SCHEDULED
Qty: 30 TABLET | Refills: 0 | Status: SHIPPED | OUTPATIENT
Start: 2017-06-14 | End: 2018-08-30 | Stop reason: ALTCHOICE

## 2017-06-14 RX ORDER — CEFPODOXIME PROXETIL 200 MG/1
200 TABLET, FILM COATED ORAL 2 TIMES DAILY
Qty: 8 TABLET | Refills: 0 | Status: SHIPPED | OUTPATIENT
Start: 2017-06-15 | End: 2017-06-19

## 2017-06-14 RX ORDER — IPRATROPIUM BROMIDE AND ALBUTEROL SULFATE 2.5; .5 MG/3ML; MG/3ML
3 SOLUTION RESPIRATORY (INHALATION)
Qty: 3 ML | Refills: 0 | Status: SHIPPED | OUTPATIENT
Start: 2017-06-14 | End: 2019-10-08 | Stop reason: ALTCHOICE

## 2017-06-14 RX ADMIN — FUROSEMIDE 40 MG: 40 TABLET ORAL at 09:09

## 2017-06-14 RX ADMIN — IPRATROPIUM BROMIDE AND ALBUTEROL SULFATE 3 ML: .5; 3 SOLUTION RESPIRATORY (INHALATION) at 08:08

## 2017-06-14 RX ADMIN — CARVEDILOL 6.25 MG: 6.25 TABLET, FILM COATED ORAL at 09:10

## 2017-06-14 RX ADMIN — FLUTICASONE PROPIONATE 1 PUFF: 110 AEROSOL, METERED RESPIRATORY (INHALATION) at 09:10

## 2017-06-14 RX ADMIN — AZITHROMYCIN MONOHYDRATE 500 MG: 500 INJECTION, POWDER, LYOPHILIZED, FOR SOLUTION INTRAVENOUS at 09:54

## 2017-06-14 RX ADMIN — IPRATROPIUM BROMIDE AND ALBUTEROL SULFATE 3 ML: .5; 3 SOLUTION RESPIRATORY (INHALATION) at 04:09

## 2017-06-14 RX ADMIN — METFORMIN HYDROCHLORIDE 1000 MG: 500 TABLET, FILM COATED ORAL at 09:09

## 2017-06-14 RX ADMIN — GUAIFENESIN 600 MG: 600 TABLET, EXTENDED RELEASE ORAL at 09:10

## 2017-06-14 RX ADMIN — ASPIRIN 81 MG 81 MG: 81 TABLET ORAL at 09:09

## 2017-06-14 RX ADMIN — FOSINOPRIL 20 MG: 20 TABLET ORAL at 09:09

## 2017-06-14 RX ADMIN — CEFTRIAXONE 1000 MG: 1 INJECTION, POWDER, FOR SOLUTION INTRAMUSCULAR; INTRAVENOUS at 09:08

## 2017-06-14 RX ADMIN — IPRATROPIUM BROMIDE AND ALBUTEROL SULFATE 3 ML: .5; 3 SOLUTION RESPIRATORY (INHALATION) at 11:25

## 2017-06-14 RX ADMIN — IPRATROPIUM BROMIDE AND ALBUTEROL SULFATE 3 ML: .5; 3 SOLUTION RESPIRATORY (INHALATION) at 00:26

## 2017-06-15 LAB
ATRIAL RATE: 109 BPM
P AXIS: 70 DEGREES
PR INTERVAL: 160 MS
QRS AXIS: -6 DEGREES
QRSD INTERVAL: 74 MS
QT INTERVAL: 330 MS
QTC INTERVAL: 444 MS
T WAVE AXIS: 60 DEGREES
VENTRICULAR RATE: 109 BPM

## 2017-06-16 ENCOUNTER — GENERIC CONVERSION - ENCOUNTER (OUTPATIENT)
Dept: OTHER | Facility: OTHER | Age: 77
End: 2017-06-16

## 2017-06-21 ENCOUNTER — GENERIC CONVERSION - ENCOUNTER (OUTPATIENT)
Dept: OTHER | Facility: OTHER | Age: 77
End: 2017-06-21

## 2017-06-23 ENCOUNTER — ALLSCRIPTS OFFICE VISIT (OUTPATIENT)
Dept: OTHER | Facility: OTHER | Age: 77
End: 2017-06-23

## 2017-07-05 ENCOUNTER — GENERIC CONVERSION - ENCOUNTER (OUTPATIENT)
Dept: OTHER | Facility: OTHER | Age: 77
End: 2017-07-05

## 2017-07-18 ENCOUNTER — GENERIC CONVERSION - ENCOUNTER (OUTPATIENT)
Dept: OTHER | Facility: OTHER | Age: 77
End: 2017-07-18

## 2017-07-24 ENCOUNTER — ALLSCRIPTS OFFICE VISIT (OUTPATIENT)
Dept: OTHER | Facility: OTHER | Age: 77
End: 2017-07-24

## 2017-08-01 ENCOUNTER — ALLSCRIPTS OFFICE VISIT (OUTPATIENT)
Dept: OTHER | Facility: OTHER | Age: 77
End: 2017-08-01

## 2017-08-01 ENCOUNTER — TRANSCRIBE ORDERS (OUTPATIENT)
Dept: ADMINISTRATIVE | Facility: HOSPITAL | Age: 77
End: 2017-08-01

## 2017-08-01 DIAGNOSIS — R06.02 SHORTNESS OF BREATH: Primary | ICD-10-CM

## 2017-08-01 DIAGNOSIS — I70.209 ATHEROSCLEROSIS OF NATIVE ARTERY OF EXTREMITY (HCC): ICD-10-CM

## 2017-08-01 DIAGNOSIS — I25.10 ATHEROSCLEROTIC HEART DISEASE OF NATIVE CORONARY ARTERY WITHOUT ANGINA PECTORIS: ICD-10-CM

## 2017-08-03 ENCOUNTER — GENERIC CONVERSION - ENCOUNTER (OUTPATIENT)
Dept: OTHER | Facility: OTHER | Age: 77
End: 2017-08-03

## 2017-08-04 LAB
LEFT EYE DIABETIC RETINOPATHY: NORMAL
RIGHT EYE DIABETIC RETINOPATHY: NORMAL

## 2017-08-07 ENCOUNTER — GENERIC CONVERSION - ENCOUNTER (OUTPATIENT)
Dept: OTHER | Facility: OTHER | Age: 77
End: 2017-08-07

## 2017-08-10 ENCOUNTER — GENERIC CONVERSION - ENCOUNTER (OUTPATIENT)
Dept: OTHER | Facility: OTHER | Age: 77
End: 2017-08-10

## 2017-08-15 ENCOUNTER — GENERIC CONVERSION - ENCOUNTER (OUTPATIENT)
Dept: OTHER | Facility: OTHER | Age: 77
End: 2017-08-15

## 2017-08-28 ENCOUNTER — ALLSCRIPTS OFFICE VISIT (OUTPATIENT)
Dept: OTHER | Facility: OTHER | Age: 77
End: 2017-08-28

## 2017-08-29 ENCOUNTER — GENERIC CONVERSION - ENCOUNTER (OUTPATIENT)
Dept: OTHER | Facility: OTHER | Age: 77
End: 2017-08-29

## 2017-09-01 ENCOUNTER — APPOINTMENT (OUTPATIENT)
Dept: LAB | Facility: HOSPITAL | Age: 77
End: 2017-09-01
Attending: FAMILY MEDICINE
Payer: COMMERCIAL

## 2017-09-01 ENCOUNTER — TRANSCRIBE ORDERS (OUTPATIENT)
Dept: ADMINISTRATIVE | Facility: HOSPITAL | Age: 77
End: 2017-09-01

## 2017-09-01 DIAGNOSIS — Z79.01 LONG TERM (CURRENT) USE OF ANTICOAGULANTS: Primary | ICD-10-CM

## 2017-09-01 DIAGNOSIS — I25.119 ATHEROSCLEROSIS OF NATIVE CORONARY ARTERY WITH ANGINA PECTORIS, UNSPECIFIED WHETHER NATIVE OR TRANSPLANTED HEART (HCC): ICD-10-CM

## 2017-09-01 DIAGNOSIS — I48.91 ATRIAL FIBRILLATION, UNSPECIFIED TYPE (HCC): ICD-10-CM

## 2017-09-01 DIAGNOSIS — I10 ESSENTIAL HYPERTENSION, MALIGNANT: ICD-10-CM

## 2017-09-01 DIAGNOSIS — E10.8 TYPE 1 DIABETES MELLITUS WITH COMPLICATION (HCC): ICD-10-CM

## 2017-09-01 DIAGNOSIS — Z79.01 LONG TERM (CURRENT) USE OF ANTICOAGULANTS: ICD-10-CM

## 2017-09-01 LAB
ALBUMIN SERPL BCP-MCNC: 3.7 G/DL (ref 3.5–5)
ALP SERPL-CCNC: 71 U/L (ref 46–116)
ALT SERPL W P-5'-P-CCNC: 21 U/L (ref 12–78)
ANION GAP SERPL CALCULATED.3IONS-SCNC: 6 MMOL/L (ref 4–13)
AST SERPL W P-5'-P-CCNC: 16 U/L (ref 5–45)
BILIRUB SERPL-MCNC: 0.3 MG/DL (ref 0.2–1)
BUN SERPL-MCNC: 24 MG/DL (ref 5–25)
CALCIUM SERPL-MCNC: 9 MG/DL (ref 8.3–10.1)
CHLORIDE SERPL-SCNC: 102 MMOL/L (ref 100–108)
CO2 SERPL-SCNC: 32 MMOL/L (ref 21–32)
CREAT SERPL-MCNC: 0.97 MG/DL (ref 0.6–1.3)
EST. AVERAGE GLUCOSE BLD GHB EST-MCNC: 123 MG/DL
GFR SERPL CREATININE-BSD FRML MDRD: 74 ML/MIN/1.73SQ M
GLUCOSE P FAST SERPL-MCNC: 114 MG/DL (ref 65–99)
HBA1C MFR BLD: 5.9 % (ref 4.2–6.3)
POTASSIUM SERPL-SCNC: 4.1 MMOL/L (ref 3.5–5.3)
PROT SERPL-MCNC: 6.9 G/DL (ref 6.4–8.2)
SODIUM SERPL-SCNC: 140 MMOL/L (ref 136–145)

## 2017-09-01 PROCEDURE — 83036 HEMOGLOBIN GLYCOSYLATED A1C: CPT

## 2017-09-01 PROCEDURE — 80053 COMPREHEN METABOLIC PANEL: CPT

## 2017-09-01 PROCEDURE — 36415 COLL VENOUS BLD VENIPUNCTURE: CPT

## 2017-09-05 ENCOUNTER — GENERIC CONVERSION - ENCOUNTER (OUTPATIENT)
Dept: OTHER | Facility: OTHER | Age: 77
End: 2017-09-05

## 2017-09-07 ENCOUNTER — ALLSCRIPTS OFFICE VISIT (OUTPATIENT)
Dept: OTHER | Facility: OTHER | Age: 77
End: 2017-09-07

## 2017-09-12 ENCOUNTER — GENERIC CONVERSION - ENCOUNTER (OUTPATIENT)
Dept: OTHER | Facility: OTHER | Age: 77
End: 2017-09-12

## 2017-09-18 ENCOUNTER — GENERIC CONVERSION - ENCOUNTER (OUTPATIENT)
Dept: OTHER | Facility: OTHER | Age: 77
End: 2017-09-18

## 2017-10-03 ENCOUNTER — GENERIC CONVERSION - ENCOUNTER (OUTPATIENT)
Dept: OTHER | Facility: OTHER | Age: 77
End: 2017-10-03

## 2017-10-17 ENCOUNTER — GENERIC CONVERSION - ENCOUNTER (OUTPATIENT)
Dept: OTHER | Facility: OTHER | Age: 77
End: 2017-10-17

## 2017-10-20 ENCOUNTER — GENERIC CONVERSION - ENCOUNTER (OUTPATIENT)
Dept: OTHER | Facility: OTHER | Age: 77
End: 2017-10-20

## 2017-11-03 ENCOUNTER — GENERIC CONVERSION - ENCOUNTER (OUTPATIENT)
Dept: OTHER | Facility: OTHER | Age: 77
End: 2017-11-03

## 2017-11-07 ENCOUNTER — GENERIC CONVERSION - ENCOUNTER (OUTPATIENT)
Dept: OTHER | Facility: OTHER | Age: 77
End: 2017-11-07

## 2017-11-09 ENCOUNTER — GENERIC CONVERSION - ENCOUNTER (OUTPATIENT)
Dept: OTHER | Facility: OTHER | Age: 77
End: 2017-11-09

## 2017-11-10 ENCOUNTER — HOSPITAL ENCOUNTER (OUTPATIENT)
Dept: RADIOLOGY | Facility: HOSPITAL | Age: 77
Discharge: HOME | End: 2017-11-10
Attending: INTERNAL MEDICINE
Payer: COMMERCIAL

## 2017-11-10 ENCOUNTER — ALLSCRIPTS OFFICE VISIT (OUTPATIENT)
Dept: OTHER | Facility: OTHER | Age: 77
End: 2017-11-10

## 2017-11-10 ENCOUNTER — TRANSCRIBE ORDERS (OUTPATIENT)
Dept: ADMINISTRATIVE | Facility: HOSPITAL | Age: 77
End: 2017-11-10

## 2017-11-10 DIAGNOSIS — R91.1 SOLITARY PULMONARY NODULE: ICD-10-CM

## 2017-11-10 DIAGNOSIS — R91.1 LUNG NODULE: ICD-10-CM

## 2017-11-10 DIAGNOSIS — R06.09 OTHER FORMS OF DYSPNEA: ICD-10-CM

## 2017-11-10 DIAGNOSIS — R06.02 SHORTNESS OF BREATH: ICD-10-CM

## 2017-11-10 DIAGNOSIS — R09.02 HYPOXEMIA: ICD-10-CM

## 2017-11-10 DIAGNOSIS — R91.1 LUNG NODULE: Primary | ICD-10-CM

## 2017-11-10 PROCEDURE — 71250 CT THORAX DX C-: CPT

## 2017-11-11 NOTE — PROGRESS NOTES
Assessment    1  Dyspnea on exertion (786 09) (R06 09)   2  Lung nodule (793 11) (R91 1)   3  Centrilobular emphysema (492 8) (J43 2)   4  COPD with exacerbation (491 21) (J44 1)   5  Chronic hypoxemic respiratory failure (518 83,799 02) (J96 11)    Plan  Dyspnea on exertion, Hypoxia, Lung nodule    · * CT CHEST WO CONTRAST; Status:Need Information - Financial Authorization; Requested for:10Nov2017;    Perform:[facility]; Order Comments:DO STATPATIENT WITH INCREASED DYSPNEA AND PRIOR CT SCAN SHOWED A EXTENSIVE RIGHT LOWER LOBE INFILTRATE AND SMALL LEFT LOWER LOBE NODULE; Due:10Nov2018; Ordered; For:Dyspnea on exertion, Hypoxia, Lung nodule; Ordered By:Gutierrez Wheeler;  Lung nodule    · PredniSONE 20 MG Oral Tablet; start with 3 tabs daily and taper as directed   Rx By: Jodie Singh; Dispense: 10 Days ; #:30 Tablet; Refill: 0;Lung nodule; GISELLE = N; Verified Transmission to [facility] #816; Last Updated By: System, SureScripts; 11/10/2017 9:40:32 AM    Results/Data  Results Free Text Form [facility]:   Results  Other Oxygen saturation on 2 L at rest is 94% and after ambulating 250 feet it decreased down to 82%  On 4 L of oxygen O2 saturation was 98% and after ambulating 100 feet the lowest O2 saturation was 92%  Discussion/Summary  Discussion Summary:   Patient with increased exertional dyspnea and also oxygen desaturation with ambulation on his 2 L of oxygen  He states he has had worsening shortness of breath for least 2 weeks  He had no significant improvement with a course of prednisone therapy prescribed October 22nd starting at 40 mg per day  Examination today reveals decreased breath sounds bilaterally without any wheezing and there is no leg edema  He has very severe emphysema and baseline FEV1 is 0 57 L or only 19% predicted  He also has a severe cardiomyopathy and left ventricular ejection fraction is only 20-25%  This is likely also contributing factors exertional dyspnea      I had ordered a CT scan of the chest to be done stat today to see if there are any infiltrates or other abnormalities that could be attributing to his significant oxygen desaturation with walking even with 2 L of oxygen  Prior CT scan June 8, 2017 showed extensive infiltrate throughout the right lower lobe but at that time he did have evidence of pneumonia and was treated as an inpatient for this  he also has a small left upper lobe lung nodule that measures about 7 mm and is in the anterior segment of the left lower lobe abutting the oblique fissure  At the time of this dictation his CT scan of the chest was ready completed and I reviewed CT images and discussed with the patient by telephone  He does have severe bullous emphysema  The left lower lobe nodule remains unchanged about 7 mm in size  Also there has been was complete resolution of the right lower lobe infiltrate consolidation seen June 8, 2017  No new abnormalities  Final report from radiologist is pending  I advised Willy Gonzales to increase his oxygen to 4-5 L with activity and he can continue 2 L at rest  He will continue to use Advair 115 mcg 2 puffs twice a day and Combivent at least 4 times a day  I also will empirically try him back on prednisone 60 mg for 4 days with a gradual taper  He will call my office on Monday November 13 for further tapering instructions  He is on chronic warfarin therapy and his INR has been therapeutic  Hemoglobin done in June of this year was 12 7  Counseling Documentation With Imm: The was counseled regarding diagnostic results,-- instructions for management,-- impressions  total time of encounter was 28 minutes-- and-- 18 minutes was spent counseling  Active Problems    1  Acquired ankle/foot deformity (736 70) (M21 969)   2  Anticoagulant long-term use (V58 61) (Z79 01)   3  Arteriosclerosis of arteries of extremities (440 20) (I70 209)   4  Arteriosclerosis of coronary artery (414 00) (I25 10)   5   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)   6  Atrial fibrillation (427 31) (I48 91)   7  Bilateral carotid bruits (785 9) (R09 89)   8  Chronic obstructive pulmonary disease (496) (J44 9)   9  Contusion of right lung (861 21) (S27 321A)   10  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)   11  COPD with exacerbation (491 21) (J44 1)   12  Diabetes mellitus type 2, noninsulin dependent (250 00) (E11 9)   13  Dyspnea on exertion (786 09) (R06 09)   14  Eczema (692 9) (L30 9)   15  Fatigue (780 79) (R53 83)   16  Follow up (V67 9) (Z09)   17  Foot pain, bilateral (729 5) (M79 671,M79 672)   18  Heart failure, left, 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% (428 1) (I50 1)   19  History of tobacco use (V15 82) (Z87 891)   20  Hypertension (401 9) (I10)   21  Hypoxia (799 02) (R09 02)   22  Lung nodule (793 11) (R91 1)   23  Multiple fractures of ribs of right side (807 09) (S22 41XA)   24  Need for influenza vaccination (V04 81) (Z23)   25  Need for vaccination (V05 9) (Z23)   26  On angiotensin-converting enzyme (ACE) inhibitors (V07 52) (Z79 899)   27  Onychomycosis (110 1) (B35 1)   28  Pain, chest wall (786 52) (R07 89)   29  Pes planus, congenital (754 61) (Q66 50)   30  Pneumonia of right lower lobe due to infectious organism (486) (J18 1)   31  Pulmonary emphysema (492 8) (J43 9)   32  Rib fractures (807 00) (S22 39XA)   33  Screening for cardiovascular condition (V81 2) (Z13 6)   34  Screening for genitourinary condition (V81 6) (Z13 89)   35  Screening for thyroid disorder (V77 0) (Z13 29)   36  Screening PSA (prostate specific antigen) (V76 44) (Z12 5)   37  Severe left ventricular systolic dysfunction (585 4) (I51 9)   38  Shortness of breath (786 05) (R06 02)   39  Tinea pedis (110 4) (B35 3)   40  Type 2 diabetes mellitus with diabetic polyneuropathy, without long-term current use of  insulin (250 60,357 2) (E11 42)    Chief Complaint  Chief Complaint Free Text Note Form: James Cooper is here today with c/o breathing difficulty   he states he is sob on slight exertion, increasing over the last 2 weeks      History of Present Illness  HPI: Ho Pompa complains of increased dyspnea with walking or any activity over the last 2 weeks  He is not have any chest pain, fever or chills  He also does not have any leg edema  He states he only has periodic nonproductive cough  I had prescribed prednisone 40 mg for 5 days followed by 20 mg for 5 days October 12th for his dyspnea but this did not help  He is not have any chest pain or hemoptysis  He does have severe centrilobular emphysema with an FEV1 of only 0 57 L or 19% of predicted by spirometry testing done April of 2017  He is using Advair  mcg 2 puffs twice a day and using his Combivent 1 puff at least 4 times a day  He uses oxygen 2 liters/minute with his concentrator and when he leaves the house he uses his new pulse dose portable oxygen concentrator 2 liters/minute  He does get out of breath walking only  feet  Ho Pompa also has severe cardiomyopathy and his left ventricular ejection fraction is 20-25%  Recent interrogation of his AICD device in September did not show any significant arrhythmia  He does occasionally have some shortness of breath at night  Review of Systems  Complete-Male Pulm:  Constitutional: No fever or chills, feels well, no tiredness, no recent weight gain or weight loss  Eyes: no complaints of vision problems  ENT: no rhinitis, no PND, no epistaxis  Cardiovascular: no palpitations, no chest pain  Respiratory: as noted in HPI  Gastrointestinal: no complaints of esophageal reflux, no abdominal pain  Genitourinary: no urinary retention  Musculoskeletal: no arthralgias, no joint swelling, no myalgias  Integumentary: no rash, no lesions  Neurological: no headache, no fainting, no weakness  Psychiatric: no anxiety, no depression  Hematologic/Lymphatic: no complaints of swollen glands  ROS Reviewed:   ROS reviewed  Past Medical History  1   History of Acute bronchitis (466 0) (J20 9)   2  History of Acute Non-Q-wave Myocardial Infarction (410 70)   3  History of Acute respiratory distress (518 82) (R06 03)   4  History of Cardiac arrhythmia (427 9) (I49 9)   5  History of Cardiac Catheterization  (Diagnostic)   6  History of Congestive heart failure (428 0) (I50 9)   7  History of Cough (786 2) (R05)   8  History of CXR Solitary Pulmonary Nodule Left Lung Lower Lobe   9  History of Diverticulosis (562 10) (K57 90)   10  History of emphysema (V12 69) (Z87 09)   11  History of stroke (V12 54) (Z86 73)   12  History of Hyperinflation of lungs (786 9) (R09 89)   13  History of Ischemic cardiomyopathy (414 8) (I25 5)   14  Personal history of cardiomyopathy (V12 59) (Z86 79)   15  Personal history of ventricular tachycardia (V12 59) (Z86 79)   16  History of Pneumonia (V12 61)   17  History of Spontaneous Pneumothorax (512 89)    Surgical History  1  History of Cardioverter-defibrillator Pulse Generator Replacement   2  History of Chest Tube Insertion   3  History of Colonoscopy   4  History of Femoral Hernia Repair Unilateral   5  History of Implantable Cardioverter-Defibrillator  Surgical History Reviewed: The surgical history was reviewed and updated today  Family History  Mother    1  Family history of cerebrovascular accident (CVA) (V17 1) (Z82 3)   2  Family history of Stroke Syndrome (V17 1)  Son    3  Family history of diabetes mellitus (V18 0) (Z83 3)  Brother    4  Family history of malignant neoplasm (V16 9) (Z80 9)  Family History Reviewed: The family history was reviewed and updated today  Social History     · Former smoker (V15 82) (M23 988)   · SMOKED MOST OF HIS ADULT LIFE SINCE AGE 13 OR 13 ONE PACK PER DAY QUIT IN    DECEMBER 2002   · Former smoker (A71 94) (M92 208)   · History of tobacco use (V15 82) (Z87 891)   ·    · Occupation:   · SECURITY  Social History Reviewed: The social history was reviewed and updated today   The social history was reviewed and is unchanged  Current Meds   1  Advair -21 MCG/ACT Inhalation Aerosol; INHALE 2 PUFFS,  BY MOUTH, TWICE DAILY; Therapy: 62Esz0679 to (Lilian Lubin)  Requested for: 89WGH6663; Last Rx:05Oct2017; Status: ACTIVE - Retrospective By Protocol Authorization Ordered   2  Aspirin Low Dose 81 MG TABS; TAKE 1 TABLET DAILY; Therapy: (Recorded:14Dcr6902) to Recorded   3  Carvedilol 6 25 MG Oral Tablet; 1 two times a day  Requested for: 43Bjm7548; Last BR:98YQK4534 Ordered   4  Clotrimazole-Betamethasone 1-0 05 % External Cream; APPLY SPARINGLY TO THE AFFECTED AREA(S) TWICE DAILY; Therapy: 94Bnw3272 to (Evaluate:55Ffi2844)  Requested for: 23Pfd2816; Last Rx:63Cmy3094 Ordered   5  Combivent Respimat  MCG/ACT Inhalation Aerosol Solution; ONE INHALATION 4 TIMES DAILY (MAX OF 6 INHALATIONS PER 24 HOURS); Therapy: 25AAT6252 to ([phone])  Requested for: 25XBJ7657; Last Rx:76Rqm8036 Ordered   6  Eplerenone 25 MG Oral Tablet; TAKE 1 TABLET DAILY  Requested for: 16TQD8954; Last HI:86SSZ3203 Ordered   7  Eucrisa 2 % External Ointment; Applied to bilateral leg twice daily for 2 weeks; Therapy: 62Usa1799 to (Evaluate:91Pwm6968)  Requested for: 56Smu4985; Last Rx:69Kxp7000 Ordered   8  Fosinopril Sodium 20 MG Oral Tablet; TAKE 1 TABLET DAILY  Requested for: 84Omy4366; Last JN:29WZV0455 Ordered   9  Furosemide 40 MG Oral Tablet; take 1 tablet every day; Therapy: 59OOR0194 to (HRIRYRHN:75MVH5517)  Requested for: 23Oct2017; Last VO:44DIC3129 Ordered   10  Ipratropium-Albuterol 0 5-2 5 (3) MG/3ML Inhalation Solution; USE 1 UNIT DOSE IN  NEBULIZER 4 TIMES DAILY; Therapy: 96NHC5882 to (Evaluate:17Xmu1049)  Requested for: 27Pye4585; Last  Rx:28Nov2014 Ordered   11  Ketoconazole 2 % External Cream; APPLY A THIN LAYER TO AFFECTED AREA(S) TWICE  DAILY; Therapy: 27PFP5425 to [phone]  Requested for: 39TTM9003; Last  Rx:16Nzm1705 Ordered   12  MetFORMIN HCl - 1000 MG Oral Tablet;  Take 1 tablet twice daily; Therapy: 57LJW7821 to (Evaluate:00Bcu2334)  Requested for: 40Pdo1207; Last  Rx:94Tyk0213 Ordered   13  PredniSONE 20 MG Oral Tablet; 2 TABLETS DAILY FOR 5 DAYS, THEN 1 TABLET DAILY  FOR 5 DAYS  CALL OFFICE WHEN COMPLETE; Therapy: 78QMS5863 to [phone])  Requested for: 69APG5620; Last  Rx:48Lfj3394; Status: ACTIVE - Retrospective By Protocol Authorization Ordered   14  Prodigy No Coding Blood Gluc In Vitro Strip; USE  1  STRIP TWICE DAILY; Therapy: 17VRJ9829 to (Evaluate:40Lpi3940)  Requested for: 26Ecx3212; Last  Rx:72Dev5320 Ordered   15  Prodigy Twist Top Lancets 28G Miscellaneous; Therapy: 48ZRE7040 to Recorded   16  Simvastatin 10 MG Oral Tablet; take 1 tablet every day; Therapy: 67Gsr4704 to (Evaluate:93Syg8015)  Requested for: 85Udt5327; Last  Rx:72Omg1391 Ordered   17  Terbinafine HCl - 250 MG Oral Tablet; one tab po qod; Therapy: 11KMQ1320 to (Evaluate:28Cll1585)  Requested for: 17BLQ8344; Last  Rx:64Ctt9073 Ordered   18  Vitamin C 1000 MG Oral Tablet; Therapy: (Catrina Billy) to Recorded   19  Warfarin Sodium 1 MG Oral Tablet; TAKE 1 TABLET Daily OR AS DIRECTED BY  PHYSICIAN AFTER INR BLOOD DRAW;  Therapy: 61HBU2912 to (Evaluate:21Eum9410)  Requested for: 27Bjs4299; Last  Rx:53Zjr2601 Ordered   20  Warfarin Sodium 5 MG Oral Tablet; TAKE 1 TABLET DAILY OR AS DIRECTED BY  PHYSICIAN AFTER INR BLOOD DRAW  Requested for: 21Kbl7235; Last CB:89HCT7653  Ordered  Medication List Reviewed: The medication list was reviewed and updated today  Allergies  1   No Known Drug Allergies    Vitals  Vital Signs    Recorded: 36CKT9735 08:58AM   Temperature 97 7 F, Oral   Heart Rate 78   Pulse Quality Normal   Respiration Quality Normal   Respiration 18   Systolic 722, RUE, Sitting   Diastolic 82, RUE, Sitting   Height 5 ft 11 in   Weight 149 lb    BMI Calculated 20 78   BSA Calculated 1 86   O2 Saturation 79, RA   FiO2 2L/min, RA       Physical Exam   Constitutional  General appearance: No acute distress, well appearing and well nourished  Eyes  Examination of pupil and irises: Anicteric, pupils reactive  Ears, Nose, Mouth, and Throat  External inspection of ears and nose: Normal    Nasal mucosa, septum, and turbinates: Normal without edema or erythema  Lips, teeth, and gums: Normal, good dentition  Oropharynx: Normal with no erythema, edema, exudate or lesions  Neck  Neck: Supple, symmetric, trachea midline, no masses  Jugular veins: Normal    Pulmonary  Chest: Normal    Respiratory effort: No increased work of breathing or signs of respiratory distress  Auscultation of lungs: Abnormal   Auscultation of the lungs revealed decreased breath sounds diffusely  no rales or crackles were heard bilaterally  no rhonchi  no wheezing  no bronchial breath sounds  Cardiovascular  Auscultation of heart: Normal rate and rhythm, normal S1 and S2, no murmurs  Examination of extremities for edema and/or varicosities: Normal    Abdomen  Abdomen: Soft, non-tender  Lymphatic  Palpation of lymph nodes in neck: No lymphadenopathy  Musculoskeletal  Gait and station: Normal    Digits and nails: Normal without clubbing or cyanosis  Neurologic  Mental Status: Normal  Not confused, no evidence of dementia, good comprehension, good concentration  Skin  Skin and subcutaneous tissue: Limited exam shows no rash  Psychiatric  Orientation to person, place and time: Normal    Mood and affect: Normal        Future Appointments    Date/Time Provider Specialty Site   12/15/2017 09:30 AM [facility], [address]   11/14/2017 09:45 AM Jose Antonio Virk DPM Podiatry [facility]   03/20/2018 09:00 AM [address]   11/21/2017 08:30 AM RAMIRO Hilario  Pulmonary Medicine [facility] [place]   01/02/2018 01:00 PM RAMIRO Hilario   Pulmonary [address] [place] PULMONARY [facility]OC [facility] 04/03/2018 08:30 AM RAMIRO Mcguire   Pulmonary Medicine [address] [facility] PULMONARY [facility] DIAGNOSTIC [facility]       Signatures   Electronically signed by : RAMIRO Puga ; Nov 10 2017 11:06AM EST                       (Author)    Electronically signed by : RAMIRO Puga ; Nov 13 2017 11:50AM EST                       (Author)

## 2017-11-21 ENCOUNTER — ALLSCRIPTS OFFICE VISIT (OUTPATIENT)
Dept: OTHER | Facility: OTHER | Age: 77
End: 2017-11-21

## 2017-11-21 ENCOUNTER — GENERIC CONVERSION - ENCOUNTER (OUTPATIENT)
Dept: OTHER | Facility: OTHER | Age: 77
End: 2017-11-21

## 2017-12-01 ENCOUNTER — HOSPITAL ENCOUNTER (OUTPATIENT)
Dept: RADIOLOGY | Facility: HOSPITAL | Age: 77
Discharge: HOME | End: 2017-12-01
Attending: INTERNAL MEDICINE
Payer: COMMERCIAL

## 2017-12-01 ENCOUNTER — HOSPITAL ENCOUNTER (OUTPATIENT)
Dept: NON INVASIVE DIAGNOSTICS | Facility: HOSPITAL | Age: 77
Discharge: HOME | End: 2017-12-01
Attending: INTERNAL MEDICINE
Payer: COMMERCIAL

## 2017-12-01 ENCOUNTER — GENERIC CONVERSION - ENCOUNTER (OUTPATIENT)
Dept: CARDIOLOGY CLINIC | Facility: CLINIC | Age: 77
End: 2017-12-01

## 2017-12-01 ENCOUNTER — GENERIC CONVERSION - ENCOUNTER (OUTPATIENT)
Dept: OTHER | Facility: OTHER | Age: 77
End: 2017-12-01

## 2017-12-01 DIAGNOSIS — I25.10 ATHEROSCLEROTIC HEART DISEASE OF NATIVE CORONARY ARTERY WITHOUT ANGINA PECTORIS: ICD-10-CM

## 2017-12-01 DIAGNOSIS — R06.02 SHORTNESS OF BREATH: ICD-10-CM

## 2017-12-01 DIAGNOSIS — I70.209 ATHEROSCLEROSIS OF NATIVE ARTERY OF EXTREMITY (HCC): ICD-10-CM

## 2017-12-01 PROCEDURE — 93306 TTE W/DOPPLER COMPLETE: CPT

## 2017-12-01 PROCEDURE — 93880 EXTRACRANIAL BILAT STUDY: CPT

## 2017-12-06 ENCOUNTER — LAB CONVERSION - ENCOUNTER (OUTPATIENT)
Dept: OTHER | Facility: OTHER | Age: 77
End: 2017-12-06

## 2017-12-07 ENCOUNTER — GENERIC CONVERSION - ENCOUNTER (OUTPATIENT)
Dept: OTHER | Facility: OTHER | Age: 77
End: 2017-12-07

## 2017-12-07 ENCOUNTER — LAB CONVERSION - ENCOUNTER (OUTPATIENT)
Dept: OTHER | Facility: OTHER | Age: 77
End: 2017-12-07

## 2017-12-12 ENCOUNTER — GENERIC CONVERSION - ENCOUNTER (OUTPATIENT)
Dept: OTHER | Facility: OTHER | Age: 77
End: 2017-12-12

## 2017-12-12 ENCOUNTER — LAB CONVERSION - ENCOUNTER (OUTPATIENT)
Dept: OTHER | Facility: OTHER | Age: 77
End: 2017-12-12

## 2017-12-15 ENCOUNTER — GENERIC CONVERSION - ENCOUNTER (OUTPATIENT)
Dept: OTHER | Facility: OTHER | Age: 77
End: 2017-12-15

## 2017-12-15 ENCOUNTER — ALLSCRIPTS OFFICE VISIT (OUTPATIENT)
Dept: OTHER | Facility: OTHER | Age: 77
End: 2017-12-15

## 2017-12-26 ENCOUNTER — LAB CONVERSION - ENCOUNTER (OUTPATIENT)
Dept: OTHER | Facility: OTHER | Age: 77
End: 2017-12-26

## 2017-12-26 ENCOUNTER — GENERIC CONVERSION - ENCOUNTER (OUTPATIENT)
Dept: OTHER | Facility: OTHER | Age: 77
End: 2017-12-26

## 2018-01-05 ENCOUNTER — GENERIC CONVERSION - ENCOUNTER (OUTPATIENT)
Dept: OTHER | Facility: OTHER | Age: 78
End: 2018-01-05

## 2018-01-09 NOTE — RESULT NOTES
Verified Results  Coumadin Flow Sheet 43CGH5596 10:56AM Susi Adkins     Test Name Result Flag Reference   Diagnosis A-fib     Managing Provider INR Goal Range 2-3     INR 2 5     Current Dose 5 5 mg daily

## 2018-01-09 NOTE — RESULT NOTES
Message   continue current dose and repeat INR in 1 month  rl     Verified Results  Coumadin Flow Sheet 27Qvz7530 12:00AM Ravenswood Lipscomb     Test Name Result Flag Reference   Diagnosis A-Fib     Managing Provider INR Goal Range 2 0-3 0     INR 2 2     Current Dose 5mg daily

## 2018-01-10 NOTE — RESULT NOTES
Verified Results  Coumadin Flow Sheet [id number] 10:35AM Mine Mercado     Test Name Result Flag Reference   Diagnosis A-fib     Managing Provider INR Goal Range 2-3     Current Dose      5 mg alt with 6 mg daily   Comments      on prednisone decreasing dose

## 2018-01-10 NOTE — RESULT NOTES
Verified Results  Coumadin Flow Sheet [id number] 11:39AM Jose Enrique Anthony     Test Name Result Flag Reference   Diagnosis A-fib     Managing Provider INR Goal Range 2-3     INR 2 0     Current Dose      5 5mg alt with 6mg daily  sp/cma

## 2018-01-10 NOTE — RESULT NOTES
Verified Results  Coumadin Flow Sheet [id number] 03:48PM Francie Click     Test Name Result Flag Reference   Diagnosis A-fib     Managing Provider INR Goal Range 2-3     INR 2 3

## 2018-01-10 NOTE — RESULT NOTES
Verified Results  Coumadin Flow Sheet [id number] 11:21AM Nataly Slider     Test Name Result Flag Reference   Diagnosis A-fib     Managing Provider INR Goal Range 2-3     INR 2 2     Current Dose      5 5mg alt with 6mg daily  sp/cma

## 2018-01-11 NOTE — RESULT NOTES
Verified Results  Coumadin Flow Sheet [id number] 01:37PM Mitra      Test Name Result Flag Reference   Diagnosis A-Fib     Managing Provider INR Goal Range 2 0-3 0     INR 1 9 A    Current Dose      6mg (Mon,Tue,Wed,Fri,Sun) 5 5mg (Thurs,Sat)

## 2018-01-11 NOTE — RESULT NOTES
Verified Results  Coumadin Flow Sheet [id number] 09:45AM Alexandra Morfin     Test Name Result Flag Reference   Diagnosis A-fib     Managing Provider INR Goal Range 2-3     INR 2 2     Current Dose      5mg alter with 6 mg daily

## 2018-01-11 NOTE — RESULT NOTES
Verified Results  Coumadin Flow Sheet [id number] 06:09PM Tiff Amaral     Test Name Result Flag Reference   Diagnosis A-FIB     Managing Provider INR Goal Range 2-3     INR 2 3     Current Dose      5 mg alt with 6 mg daily   ac/cma

## 2018-01-11 NOTE — MISCELLANEOUS
Message   Recorded as Task   Date: 06/14/2017 04:04 PM, Created By: System   Task Name: Hospital [facility]NA   Assigned To: Gee Lui   Regarding Patient: Liv Culver, Status: Active   Comment:    System - 14 Jun 2017 4:04 PM     Patient discharged from hospital   Patient Name: Betty Kirk  Patient YOB: 1940  Discharge Date: 6/14/2017  Facility: [facility] - 14 Jun 2017 4:16 PM     TASK REASSIGNED: Previously Assigned To Nicolás Munoz - 14 Jun 2017 4:16 PM     TASK REASSIGNED: Previously Assigned To Ismael Aguilar - 15 Dion 2017 10:24 AM     TASK EDITED  LM TO CB JMoyle NATALIIAN   Gee Lui - 16 Jun 2017 10:54 AM     TASK EDITED  I left another message for him to call me back Shaniqua Valentine 73 - 19 Jun 2017 10:42 AM     TASK EDITED  Lucero Raffyjason [facility] call    [phone]   Spoke with patient on 6/19/17 after his Hospital discharge on 6/14/17  Patient was upset that I called him to go over his Hospital stay, review medications and schedule a TCM appt   states four people from [address] [place] [address] called him today and he can't be doing the same thing over and over with all of us  He said he will change PCP to another NOT [facility]'s network  Patient did said he was feeling better and his breathing has improved since he left the Hospital  he refused to schedule a Hospital follow up appt and asked please NOT to call him again  I did advised him to call us any time if he has any questions or concerns and to go to the ER if he gets severe cough, fever, dyspnea, dizziness, vomiting    etc  er/cma  Active Problems    1  Anticoagulant long-term use (V58 61) (Z79 01)   2  Arteriosclerosis of coronary artery (414 00) (I25 10)   3  Atrial fibrillation (427 31) (I48 91)   4  Chronic obstructive pulmonary disease (496) (J44 9)   5  Contusion of right lung (861 21) (S27 321A)   6  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)   7   COPD with exacerbation (491 21) (J44 1) 8  Diabetes mellitus type 2, noninsulin dependent (250 00) (E11 9)   9  Dyspnea on exertion (786 09) (R06 09)   10  Fatigue (780 79) (R53 83)   11  Follow up (V67 9) (Z09)   12  Hypertension (401 9) (I10)   13  Hypoxia (799 02) (R09 02)   14  Lung nodule (793 11) (R91 1)   15  Multiple fractures of ribs of right side (807 09) (S22 41XA)   16  Need for influenza vaccination (V04 81) (Z23)   17  Pain, chest wall (786 52) (R07 89)   18  Pulmonary emphysema (492 8) (J43 9)   19  Rib fractures (807 00) (S22 39XA)   20  Screening for cardiovascular condition (V81 2) (Z13 6)   21  Screening for genitourinary condition (V81 6) (Z13 89)   22  Screening for thyroid disorder (V77 0) (Z13 29)   23  Screening PSA (prostate specific antigen) (V76 44) (Z12 5)   24  Severe left ventricular systolic dysfunction (554 1) (I51 9)   25  Shortness of breath (786 05) (R06 02)    Current Meds   1  Advair -21 MCG/ACT Inhalation Aerosol; INHALE 2 PUFFS,  BY MOUTH, TWICE   DAILY; Therapy: 11Ege9354 to (Evaluate:2017)  Requested for: 84UHQ9961; Last   Rx:2016 Ordered   2  Aspirin Low Dose 81 MG TABS; TAKE 1 TABLET DAILY; Therapy: (Recorded:26Vhg7857) to Recorded   3  Carvedilol 6 25 MG Oral Tablet; 1 two times a day  Requested for: 62CGB1526; Last   I86LTQ0058 Ordered   4  Combivent Respimat  MCG/ACT Inhalation Aerosol Solution; ONE INHALATION   4 TIMES DAILY (MAX OF 6 INHALATIONS PER 24 HOURS); Therapy: 44WIW2694 to (Natalie Gens)  Requested for: 76JUQ3423; Last   Rx:45Xym3516 Ordered   5  Eplerenone 25 MG Oral Tablet; TAKE 1 TABLET DAILY  Requested for: 99IHG6253; Last   IU:42VKZ3808 Ordered   6  Fosinopril Sodium 20 MG Oral Tablet; TAKE 1 TABLET DAILY  Requested for:   64BBI5432; Last UB:36CKJ8102 Ordered   7  Furosemide 40 MG Oral Tablet; TAKE 1 TABLET DAILY  Requested for: 32RMR2023; Last   WN:32WAQ3968 Ordered   8   Ipratropium-Albuterol 0 5-2 5 (3) MG/3ML Inhalation Solution; USE 1 UNIT DOSE IN NEBULIZER 4 TIMES DAILY; Therapy: 82QTL6973 to (Evaluate:26Brb2912)  Requested for: 39Fsv2761; Last   Rx:60Dqq8426 Ordered   9  MetFORMIN HCl - 1000 MG Oral Tablet; Take 1 tablet twice daily; Therapy: 17JMR2798 to (Evaluate:01Aug2017)  Requested for: 51ZGG0617; Last   Rx:03May2017 Ordered   10  Prodigy No Coding Blood Gluc In Vitro Strip; USE  1  STRIP TWICE DAILY; Therapy: 77XQO2952 to (Evaluate:66Sgq4258)  Requested for: 80Vuc9808; Last    Rx:20Iln3037 Ordered   11  Simvastatin 10 MG Oral Tablet; take one tablet by mouth one time daily; Therapy: 98Jsf2356 to (Evaluate:02Jun2017)  Requested for: 26YLI4267; Last    Rx:03May2017 Ordered   12  Vitamin C 1000 MG Oral Tablet; Therapy: (Letty Zarate to Recorded   13  Warfarin Sodium 1 MG Oral Tablet; TAKE 1 TABLET Daily OR AS DIRECTED BY    PHYSICIAN AFTER INR BLOOD DRAW;    Therapy: 82OJT7938 to (Evaluate:01Aug2017)  Requested for: 63IDI4531; Last    Rx:03Xbk4860 Ordered   14  Warfarin Sodium 5 MG Oral Tablet; TAKE 1 TABLET DAILY OR AS DIRECTED BY    PHYSICIAN AFTER INR BLOOD DRAW  Requested for: 29NAO4916; Last    NH:14POC0864 Ordered    Allergies    1   No Known Drug Allergies    Signatures   Electronically signed by : Festus Heart DO; Jun 22 2017  7:17AM EST                       (Author)

## 2018-01-12 ENCOUNTER — GENERIC CONVERSION - ENCOUNTER (OUTPATIENT)
Dept: OTHER | Facility: OTHER | Age: 78
End: 2018-01-12

## 2018-01-12 ENCOUNTER — LAB CONVERSION - ENCOUNTER (OUTPATIENT)
Dept: OTHER | Facility: OTHER | Age: 78
End: 2018-01-12

## 2018-01-12 VITALS
HEART RATE: 87 BPM | HEIGHT: 71 IN | TEMPERATURE: 97.5 F | OXYGEN SATURATION: 98 % | BODY MASS INDEX: 21.84 KG/M2 | DIASTOLIC BLOOD PRESSURE: 88 MMHG | RESPIRATION RATE: 18 BRPM | SYSTOLIC BLOOD PRESSURE: 128 MMHG | WEIGHT: 156 LBS

## 2018-01-12 VITALS
HEART RATE: 84 BPM | WEIGHT: 145 LBS | BODY MASS INDEX: 20.22 KG/M2 | SYSTOLIC BLOOD PRESSURE: 126 MMHG | DIASTOLIC BLOOD PRESSURE: 80 MMHG | RESPIRATION RATE: 20 BRPM | TEMPERATURE: 97.5 F

## 2018-01-12 VITALS
HEIGHT: 71 IN | BODY MASS INDEX: 21.57 KG/M2 | HEART RATE: 90 BPM | WEIGHT: 154.06 LBS | DIASTOLIC BLOOD PRESSURE: 90 MMHG | OXYGEN SATURATION: 90 % | SYSTOLIC BLOOD PRESSURE: 135 MMHG

## 2018-01-12 NOTE — MISCELLANEOUS
Message  PET/CT will be done in 3 weeks  Dr Mariam Monahan reviewed CT and thinks this could be inflammatory  We spoke to Shy Desai, he is agreeable  Plan  Lung nodule    · * NM PET CT SKULL BASE TO MID THIGH; Status:Need Information - Financial  Authorization;  Requested for:39Ahs4419;     Signatures   Electronically signed by : ROSALINE Pereira; Jul 7 2016  5:50PM EST                       (Author)

## 2018-01-12 NOTE — RESULT NOTES
Verified Results  Coumadin Flow Sheet [id number] 02:46PM Charles Maldonado     Test Name Result Flag Reference   Diagnosis A-Fib     Managing Provider INR Goal Range 2-3     INR 2 2     Current Dose      5 mg alt with 6 mg daily   ac/cma

## 2018-01-12 NOTE — RESULT NOTES
Verified Results  Coumadin Flow Sheet [id number] 01:43PM Ronit Mandujano     Test Name Result Flag Reference   Diagnosis A-Fib     Managing Provider INR Goal Range 2-3     INR 2 0     Current Dose      5 mg alt with 6 mg daily   ac/cma

## 2018-01-12 NOTE — RESULT NOTES
Verified Results  Coumadin Flow Sheet [id number] 10:51AM Lui Adkins     Test Name Result Flag Reference   Diagnosis A-fib     Managing Provider INR Goal Range 2-3     INR 2 2     Current Dose      5 5mg alt with 6mg daily  sp/cma

## 2018-01-12 NOTE — RESULT NOTES
Verified Results  Coumadin Flow Sheet [id number] 06:04PM Bailee Chatterjee     Test Name Result Flag Reference   Diagnosis A-Fib     Managing Provider INR Goal Range 2-3     INR 2 4     Current Dose      5 5 mg alt with 6 mg daily   ac/cma

## 2018-01-13 VITALS
HEIGHT: 71 IN | BODY MASS INDEX: 20.37 KG/M2 | OXYGEN SATURATION: 96 % | SYSTOLIC BLOOD PRESSURE: 130 MMHG | WEIGHT: 145.5 LBS | HEART RATE: 79 BPM | DIASTOLIC BLOOD PRESSURE: 80 MMHG

## 2018-01-13 NOTE — RESULT NOTES
Verified Results  Coumadin Flow Sheet [id number] 10:39AM Maxwell Segovia     Test Name Result Flag Reference   Diagnosis A-fib     Managing Provider INR Goal Range 2-3     INR 2 0     Current Dose      5 5mg alt with 6mg daily  sp/cma

## 2018-01-13 NOTE — RESULT NOTES
Verified Results  Coumadin Flow Sheet [id number] 10:36AM Suzette Panchalo     Test Name Result Flag Reference   Diagnosis A-fib     Managing Provider INR Goal Range 2-3     INR 2 5     Current Dose      5 5mg alt with 6mg daily  sp/cma

## 2018-01-13 NOTE — RESULT NOTES
Verified Results  Coumadin Flow Sheet [id number] 12:00AM Du Lyn     Test Name Result Flag Reference   Diagnosis A-Fib     Managing Provider INR Goal Range 2 0-3 0     INR 1 7 A    Current Dose 5mg daily

## 2018-01-13 NOTE — MISCELLANEOUS
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)   7  COPD with exacerbation (491 21) (J44 1)   8  Diabetes mellitus type 2, noninsulin dependent (250 00) (E11 9)   9  Dyspnea on exertion (786 09) (R06 09)   10  Fatigue (780 79) (R53 83)   11  Follow up (V67 9) (Z09)   12  Hypertension (401 9) (I10)   13  Hypoxia (799 02) (R09 02)   14  Lung nodule (793 11) (R91 1)   15  Multiple fractures of ribs of right side (807 09) (S22 41XA)   16  Need for influenza vaccination (V04 81) (Z23)   17  Pain, chest wall (786 52) (R07 89)   18  Pulmonary emphysema (492 8) (J43 9)   19  Rib fractures (807 00) (S22 39XA)   20  Screening for cardiovascular condition (V81 2) (Z13 6)   21  Screening for genitourinary condition (V81 6) (Z13 89)   22  Screening for thyroid disorder (V77 0) (Z13 29)   23  Screening PSA (prostate specific antigen) (V76 44) (Z12 5)   24  Severe left ventricular systolic dysfunction (228 4) (I51 9)   25  Shortness of breath (786 05) (R06 02)    Current Meds   1  Advair -21 MCG/ACT Inhalation Aerosol; INHALE 2 PUFFS,  BY MOUTH, TWICE   DAILY; Therapy: 57Gdh2706 to (Evaluate:08Jun2017)  Requested for: 33LMI5581; Last   Rx:13Jun2016 Ordered   2  Aspirin Low Dose 81 MG TABS; TAKE 1 TABLET DAILY; Therapy: (Recorded:01May2017) to Recorded   3  Carvedilol 6 25 MG Oral Tablet; 1 two times a day  Requested for: 66OJU1505; Last   TT:83IWR6718 Ordered   4   Combivent Respimat  MCG/ACT Inhalation Aerosol Solution; ONE INHALATION   4 TIMES DAILY (MAX OF 6 INHALATIONS PER 24 HOURS); Therapy: 91NVJ8234 to (Pecola Pipe)  Requested for: 94ZSB3131; Last   Rx:69Vjb6444 Ordered   5  Eplerenone 25 MG Oral Tablet; TAKE 1 TABLET DAILY  Requested for: 59TKC5944; Last   ZV:43JFM1844 Ordered   6  Fosinopril Sodium 20 MG Oral Tablet; TAKE 1 TABLET DAILY  Requested for:   69ECX2204; Last VX:91XCP7217 Ordered   7  Furosemide 40 MG Oral Tablet; TAKE 1 TABLET DAILY  Requested for: 57GAR4700; Last   KRISTI:42QLU2218 Ordered   8  Ipratropium-Albuterol 0 5-2 5 (3) MG/3ML Inhalation Solution; USE 1 UNIT DOSE IN   NEBULIZER 4 TIMES DAILY; Therapy: 62CZY8242 to (Evaluate:93Ylo5214)  Requested for: 32Soq7758; Last   Rx:82Mhg8822 Ordered   9  MetFORMIN HCl - 1000 MG Oral Tablet; Take 1 tablet twice daily; Therapy: 01KOJ0920 to (Evaluate:70Hkn0462)  Requested for: 45REZ8830; Last   Rx:98Mej2886 Ordered   10  Prodigy No Coding Blood Gluc In Vitro Strip; USE  1  STRIP TWICE DAILY; Therapy: 95ZUF0559 to (Evaluate:97Wbn4734)  Requested for: 62Aur2354; Last    Rx:14Knb2730 Ordered   11  Simvastatin 10 MG Oral Tablet; take one tablet by mouth one time daily; Therapy: 86Swi5539 to (Evaluate:02Jun2017)  Requested for: 71IFS0798; Last    Rx:70Bmc0257 Ordered   12  Vitamin C 1000 MG Oral Tablet; Therapy: (Gandara Burner) to Recorded   13  Warfarin Sodium 1 MG Oral Tablet; TAKE 1 TABLET Daily OR AS DIRECTED BY    PHYSICIAN AFTER INR BLOOD DRAW;    Therapy: 65ONA8994 to (Evaluate:88Ggw5996)  Requested for: 23MZD7190; Last    Rx:54Kwt6307 Ordered   14  Warfarin Sodium 5 MG Oral Tablet; TAKE 1 TABLET DAILY OR AS DIRECTED BY    PHYSICIAN AFTER INR BLOOD DRAW  Requested for: 51GJV1605; Last    IW:05UUX3733 Ordered    Allergies    1  No Known Drug Allergies    Signatures   Electronically signed by :  Darrin Goldmann, ; Jun 7 2017  3:20PM EST                       (Author)

## 2018-01-14 VITALS
WEIGHT: 149 LBS | TEMPERATURE: 97.7 F | HEART RATE: 78 BPM | HEIGHT: 71 IN | OXYGEN SATURATION: 79 % | RESPIRATION RATE: 18 BRPM | SYSTOLIC BLOOD PRESSURE: 118 MMHG | DIASTOLIC BLOOD PRESSURE: 82 MMHG | BODY MASS INDEX: 20.86 KG/M2

## 2018-01-14 VITALS
BODY MASS INDEX: 21.14 KG/M2 | RESPIRATION RATE: 12 BRPM | HEART RATE: 92 BPM | WEIGHT: 151 LBS | SYSTOLIC BLOOD PRESSURE: 122 MMHG | TEMPERATURE: 97.8 F | HEIGHT: 71 IN | OXYGEN SATURATION: 90 % | DIASTOLIC BLOOD PRESSURE: 82 MMHG

## 2018-01-14 VITALS
HEART RATE: 80 BPM | TEMPERATURE: 97.1 F | DIASTOLIC BLOOD PRESSURE: 80 MMHG | SYSTOLIC BLOOD PRESSURE: 122 MMHG | RESPIRATION RATE: 18 BRPM | BODY MASS INDEX: 21.84 KG/M2 | WEIGHT: 156 LBS | HEIGHT: 71 IN

## 2018-01-14 VITALS
HEIGHT: 71 IN | WEIGHT: 145 LBS | DIASTOLIC BLOOD PRESSURE: 81 MMHG | SYSTOLIC BLOOD PRESSURE: 138 MMHG | BODY MASS INDEX: 20.3 KG/M2 | RESPIRATION RATE: 16 BRPM | HEART RATE: 78 BPM

## 2018-01-14 VITALS
SYSTOLIC BLOOD PRESSURE: 128 MMHG | BODY MASS INDEX: 21.14 KG/M2 | DIASTOLIC BLOOD PRESSURE: 88 MMHG | HEART RATE: 76 BPM | WEIGHT: 151 LBS | HEIGHT: 71 IN | RESPIRATION RATE: 15 BRPM

## 2018-01-15 VITALS
TEMPERATURE: 97.2 F | DIASTOLIC BLOOD PRESSURE: 80 MMHG | HEIGHT: 71 IN | HEART RATE: 76 BPM | BODY MASS INDEX: 21.84 KG/M2 | RESPIRATION RATE: 18 BRPM | WEIGHT: 156 LBS | SYSTOLIC BLOOD PRESSURE: 132 MMHG

## 2018-01-15 NOTE — MISCELLANEOUS
History of Present Illness  COPD Hospital Discharge Initial Follow-Up Call: The patient is being contacted for follow-up after hospitalization  The date of discharge is 6/141/7  I spoke with patient  Patient was identified as medium risk for readmission per risk stratification  The patient was discharged to home  The FEV1 is 19 %  The patient is experiencing the following symptoms: no wheezing, no cough, no dyspnea, no fever and not coughing up sputum  Zone tool status is yellow  The following topics were reviewed:  physician follow-ups and medication compliance  Narrative Summary:    Spoke briefly with patient after discharge from [facility]  He has appt with Pulmonologist next week and taking medication as prescribed  He does not want further calls, and will call his PCP if needed  Current Meds    1  Warfarin Sodium 5 MG Oral Tablet; TAKE 1 TABLET DAILY OR AS DIRECTED BY   PHYSICIAN AFTER INR BLOOD DRAW  Requested for: 57BYC1276; Last UF:98XPG7978   Ordered    2  Warfarin Sodium 1 MG Oral Tablet; TAKE 1 TABLET Daily OR AS DIRECTED BY   PHYSICIAN AFTER INR BLOOD DRAW;   Therapy: 82XRH6380 to (Evaluate:01Aug2017)  Requested for: 68ZUH3652; Last   QJ:46HEM7163 Ordered    3  Eplerenone 25 MG Oral Tablet; TAKE 1 TABLET DAILY  Requested for: 12DSL3099; Last   CK:85UAF7737 Ordered   4  Simvastatin 10 MG Oral Tablet; take one tablet by mouth one time daily; Therapy: 91Zin8180 to (Evaluate:02Jun2017)  Requested for: 60OVJ4332; Last   Rx:39Uqm1453 Ordered    5  Carvedilol 6 25 MG Oral Tablet; 1 two times a day  Requested for: 93LFQ2575; Last   GQ:00JHK9410 Ordered    6  Advair -21 MCG/ACT Inhalation Aerosol; INHALE 2 PUFFS,  BY MOUTH, TWICE   DAILY; Therapy: 70Zfm7343 to (Evaluate:08Jun2017)  Requested for: 10JJQ5272; Last   Rx:13Jun2016 Ordered   7  Combivent Respimat  MCG/ACT Inhalation Aerosol Solution; ONE INHALATION 4   TIMES DAILY (MAX OF 6 INHALATIONS PER 24 HOURS);    Therapy: 63GTP2077 to (Radhamaria eugenia Ortega)  Requested for: 93MCF4435; Last   Rx:49Rmc1369 Ordered   8  Ipratropium-Albuterol 0 5-2 5 (3) MG/3ML Inhalation Solution; USE 1 UNIT DOSE IN   NEBULIZER 4 TIMES DAILY; Therapy: 47JRL4667 to (Evaluate:90Ehy1628)  Requested for: 04Rhz6888; Last   Rx:38Cno6586 Ordered    9  MetFORMIN HCl - 1000 MG Oral Tablet; Take 1 tablet twice daily; Therapy: 09RZL1737 to (Evaluate:36Zuc8181)  Requested for: 73XCQ6018; Last   Rx:53Aqq9157 Ordered    10  Fosinopril Sodium 20 MG Oral Tablet; TAKE 1 TABLET DAILY  Requested for: 50YQO9877;    Last D70AYE2820 Ordered   11  Furosemide 40 MG Oral Tablet; TAKE 1 TABLET DAILY  Requested for: 28DDD6161; Last    ZL:79MZN7454 Ordered    12  Prodigy No Coding Blood Gluc In Vitro Strip; USE  1  STRIP TWICE DAILY; Therapy: 08KRN2599 to (Evaluate:70Dso5173)  Requested for: 52Wei3332; Last    Rx:20Xhb2850 Ordered    13  Aspirin Low Dose 81 MG TABS; TAKE 1 TABLET DAILY; Therapy: (Recorded:56Qri1210) to Recorded   14  Vitamin C 1000 MG Oral Tablet; Therapy: (Recorded:55Oxi8958) to Recorded    Allergies    1  No Known Drug Allergies    Future Appointments    Date/Time Provider Specialty Site   2017 10:00 AM [facility] [address] [facility] [place]   2017 09:30 AM [facility], [address]   2018 09:30 AM [facility], [address]   2017 08:45 AM Satish Juan[address]   2018 08:30 AM RAMIRO Watkins  Pulmonary Medicine [facility] [place]   2017 08:40 AM OUSMANE Stevenson Junior  Cardiology [facility] [place]   2017 08:15 AM OUSMANE Figueredo  Pulmonary Medicine [facility] PULMON[facility] Lele Marr   Electronically signed by :  Luis Manuel Keller RT; 2017  4:35PM EST                       (Author)

## 2018-01-15 NOTE — RESULT NOTES
Verified Results  Coumadin Flow Sheet [id number] 04:09PM Ida Klein     Test Name Result Flag Reference   Diagnosis A-Fib     Managing Provider INR Goal Range 2 0-3 0     INR 2 0     Current Dose 5mg daily

## 2018-01-15 NOTE — MISCELLANEOUS
Message  chest xray  Two nodules seen; severe bullous emphysema  2 nodular densities seen in left lung CT of chest suggested  Mr May agreeable  Plan  Lung nodule    · (1) BASIC METABOLIC PROFILE; Status:Active; Requested for:22Jun2016;    · CT CHEST W CONTRAST; Status:Need Information - Financial Authorization;  Requested  for:22Jun2016;     Signatures   Electronically signed by : ROSALINE Avalos; Jun 22 2016  9:06AM EST                       (Author)

## 2018-01-15 NOTE — RESULT NOTES
Discussion/Summary   labs stable     Verified Results  (1) MICROALBUMIN CREATININE RATIO, RANDOM URINE 10Apr2017 07:28AM Noel Blind     Test Name Result Flag Reference   MICROALBUMIN/ CREAT R 14 mg/g creatinine  0-30   MICROALBUMIN,URINE 16 2 mg/L  0 0-20 0   CREATININE URINE 119 0 mg/dL       (1) CBC/PLT/DIFF 10Apr2017 07:23AM Noel Blind     Test Name Result Flag Reference   WBC COUNT 7 87 Thousand/uL  4 31-10 16   RBC COUNT 4 51 Million/uL  3 88-5 62   HEMOGLOBIN 13 8 g/dL  12 0-17 0   HEMATOCRIT 42 7 %  36 5-49 3   MCV 95 fL  82-98   MCH 30 6 pg  26 8-34 3   MCHC 32 3 g/dL  31 4-37 4   RDW 13 8 %  11 6-15 1   MPV 11 4 fL  8 9-12 7   PLATELET COUNT 191 Thousands/uL  149-390   nRBC AUTOMATED 0 /100 WBCs     NEUTROPHILS RELATIVE PERCENT 59 %  43-75   LYMPHOCYTES RELATIVE PERCENT 29 %  14-44   MONOCYTES RELATIVE PERCENT 9 %  4-12   EOSINOPHILS RELATIVE PERCENT 2 %  0-6   BASOPHILS RELATIVE PERCENT 1 %  0-1   NEUTROPHILS ABSOLUTE COUNT 4 71 Thousands/? ??L  1 85-7 62   LYMPHOCYTES ABSOLUTE COUNT 2 29 Thousands/? ??L  0 60-4 47   MONOCYTES ABSOLUTE COUNT 0 67 Thousand/? ??L  0 17-1 22   EOSINOPHILS ABSOLUTE COUNT 0 13 Thousand/? ??L  0 00-0 61   BASOPHILS ABSOLUTE COUNT 0 05 Thousands/? ??L  0 00-0 10   This bloodwork is non-fasting  Please drink two glasses of water morning of  bloodwork  This bloodwork is non-fasting  Please drink two glasses of water morning ofbloodwork  (1) LIPID PANEL, FASTING 10Apr2017 07:23AM Noel Blind     Test Name Result Flag Reference   CHOLESTEROL 100 mg/dL     HDL,DIRECT 48 mg/dL  40-60   Specimen collection should occur prior to Metamizole administration due to the potential for falsely depressed results     LDL CHOLESTEROL CALCULATED 32 mg/dL  0-100   This is a fasting blood test  Water,black tea or black  coffee only after 9:00pm the night before test  Drink 2 glasses of water the morning of test       This is a fasting blood test  Water,black tea or black coffee only after 9:00pm the night before test  Drink 2 glasses of water the morning of test This bloodwork is non-fasting  Please drink two glasses of water morning ofbloodwork  Triglyceride:         Normal              <150 mg/dl       Borderline High    150-199 mg/dl       High               200-499 mg/dl       Very High          >499 mg/dl  Cholesterol:         Desirable        <200 mg/dl      Borderline High  200-239 mg/dl      High             >239 mg/dl  HDL Cholesterol:        High    >59 mg/dL      Low     <41 mg/dL  LDL CALCULATED:    This screening LDL is a calculated result  It does not have the accuracy of the Direct Measured LDL in the monitoring of patients with hyperlipidemia and/or statin therapy  Direct Measure LDL (UOZ450) must be ordered separately in these patients  TRIGLYCERIDES 99 mg/dL  <=150   Specimen collection should occur prior to N-Acetylcysteine or Metamizole administration due to the potential for falsely depressed results  (1) COMPREHENSIVE METABOLIC PANEL 83NAL5455 22:81AA Sonam Bone     Test Name Result Flag Reference   SODIUM 137 mmol/L  136-145   POTASSIUM 3 7 mmol/L  3 5-5 3   CHLORIDE 100 mmol/L  100-108   CARBON DIOXIDE 32 mmol/L  21-32   ANION GAP (CALC) 5 mmol/L  4-13   BLOOD UREA NITROGEN 19 mg/dL  5-25   CREATININE 0 90 mg/dL  0 60-1 30   Standardized to IDMS reference method   CALCIUM 8 2 mg/dL L 8 3-10 1   BILI, TOTAL 0 60 mg/dL  0 20-1 00   ALK PHOSPHATAS 63 U/L     ALT (SGPT) 18 U/L  12-78   AST(SGOT) 11 U/L  5-45   ALBUMIN 3 5 g/dL  3 5-5 0   TOTAL PROTEIN 6 9 g/dL  6 4-8 2   eGFR Non-African American      >60 0 ml/min/1 73sq m   This is a fasting blood test  Water,black tea or black  coffee only after 9:00pm the night before test  Drink 2 glasses of water the morning of test This bloodwork is non-fasting  Please drink two glasses of water morning ofbloodwork    National Kidney Disease Education Program recommendations are as follows:  GFR calculation is accurate only with a steady state creatinine  Chronic Kidney disease less than 60 ml/min/1 73 sq  meters  Kidney failure less than 15 ml/min/1 73 sq  meters  GLUCOSE FASTING 108 mg/dL H 65-99     (1) TSH 10Apr2017 07:23AM Nathan Villafana     Test Name Result Flag Reference   TSH 1 220 uIU/mL  0 358-3 740   This bloodwork is non-fasting  Please drink two glasses of water morning of  bloodwork  This is a fasting blood test  Water,black tea or black  coffee only after 9:00pm the night before test  Drink 2 glasses of water the morning of test This bloodwork is non-fasting  Please drink two glasses of water morning ofbloodwork  Patients undergoing fluorescein dye angiography may retain small amounts of fluorescein in the body for 48-72 hours post procedure  Samples containing fluorescein can produce falsely depressed TSH values  If the patient had this procedure,a specimen should be resubmitted post fluorescein clearance  (1) PSA (SCREEN) (Dx V76 44 Screen for Prostate Cancer) 10Apr2017 07:23AM Nathan Villafana     Test Name Result Flag Reference   PROSTATE SPECIFIC ANTIGEN 1 7 ng/mL  0 0-4 0   American Urological Association Guidelines define biochemical recurrence of prostate cancer as a detectable or rising PSA value post-radical prostatectomy that is greater than or equal to 0 2 ng/mL with a second confirmatory level of greater than or equal to 0 2 ng/mL  This bloodwork is non-fasting  Please drink two glasses of water morning of  bloodwork  This bloodwork is non-fasting  Please drink two glasses of water morning ofbloodwork  (1) HEMOGLOBIN A1C 10Apr2017 07:23AM Nathan Villafana     Test Name Result Flag Reference   HEMOGLOBIN A1C 6 3 %  4 2-6 3   EST  AVG   GLUCOSE 134 mg/dl

## 2018-01-15 NOTE — RESULT NOTES
Verified Results  Coumadin Flow Sheet [id number] 04:55PM Lenard Valiente     Test Name Result Flag Reference   Diagnosis A FIB     Managing Provider INR Goal Range 2-3     INR 2 0     Current Dose      5 5mg alt with 6 mg daily

## 2018-01-16 NOTE — MISCELLANEOUS
Message   Recorded as Task   Date: 06/07/2017 03:49 PM, Created By: Elgin Weathers   Task Name: Follow Up   Assigned To: Edu Pratt   Regarding Patient: Celia Vanessa, Status: Active   CommentFranco Spark - 07 Jun 2017 3:49 PM     TASK CREATED  Caller: Self; [phone] (Home)  A message was sent to the Dr Aravind Luke having a CT scan on 6/8  Patient is not feeling well and is having trouble breathing  He wanted to make you aware  His wife wanted him to go to the emergency room but he refused  [facility] came to the office Thursday 6/8/17, let us know that patient was admitted  Active Problems    1  Anticoagulant long-term use (V58 61) (Z79 01)   2  Arteriosclerosis of coronary artery (414 00) (I25 10)   3  Atrial fibrillation (427 31) (I48 91)   4  Chronic obstructive pulmonary disease (496) (J44 9)   5  Contusion of right lung (861 21) (S27 321A)   6  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)   7  COPD with exacerbation (491 21) (J44 1)   8  Diabetes mellitus type 2, noninsulin dependent (250 00) (E11 9)   9  Dyspnea on exertion (786 09) (R06 09)   10  Fatigue (780 79) (R53 83)   11  Follow up (V67 9) (Z09)   12  Hypertension (401 9) (I10)   13  Hypoxia (799 02) (R09 02)   14  Lung nodule (793 11) (R91 1)   15  Multiple fractures of ribs of right side (807 09) (S22 41XA)   16  Need for influenza vaccination (V04 81) (Z23)   17  Pain, chest wall (786 52) (R07 89)   18  Pulmonary emphysema (492 8) (J43 9)   19  Rib fractures (807 00) (S22 39XA)   20  Screening for cardiovascular condition (V81 2) (Z13 6)   21  Screening for genitourinary condition (V81 6) (Z13 89)   22  Screening for thyroid disorder (V77 0) (Z13 29)   23  Screening PSA (prostate specific antigen) (V76 44) (Z12 5)   24  Severe left ventricular systolic dysfunction (391 9) (I51 9)   25  Shortness of breath (786 05) (R06 02)    Current Meds   1   Advair -21 MCG/ACT Inhalation Aerosol; INHALE 2 PUFFS,  BY MOUTH, TWICE DAILY; Therapy: 32Knx5521 to (Evaluate:2017)  Requested for: 57NQP7580; Last   Rx:2016 Ordered   2  Aspirin Low Dose 81 MG TABS; TAKE 1 TABLET DAILY; Therapy: (Recorded:2017) to Recorded   3  Carvedilol 6 25 MG Oral Tablet; 1 two times a day  Requested for: 60VQC3624; Last   AR:75LDS8211 Ordered   4  Combivent Respimat  MCG/ACT Inhalation Aerosol Solution; ONE INHALATION   4 TIMES DAILY (MAX OF 6 INHALATIONS PER 24 HOURS); Therapy: 31KNK6210 to (115 256 616)  Requested for: 33DOJ3255; Last   Rx:45Wak4535 Ordered   5  Eplerenone 25 MG Oral Tablet; TAKE 1 TABLET DAILY  Requested for: 06EHS7795; Last   ZS:60NVG7542 Ordered   6  Fosinopril Sodium 20 MG Oral Tablet; TAKE 1 TABLET DAILY  Requested for:   79QPQ2153; Last HALEY:51NUO3864 Ordered   7  Furosemide 40 MG Oral Tablet; TAKE 1 TABLET DAILY  Requested for: 82NWY7702; Last   H79MHA0607 Ordered   8  Ipratropium-Albuterol 0 5-2 5 (3) MG/3ML Inhalation Solution; USE 1 UNIT DOSE IN   NEBULIZER 4 TIMES DAILY; Therapy: 06NVW8877 to (Evaluate:78Anb0675)  Requested for: 29Mmg3046; Last   Rx:2014 Ordered   9  MetFORMIN HCl - 1000 MG Oral Tablet; Take 1 tablet twice daily; Therapy: 11JYC4134 to (Evaluate:2017)  Requested for: 88LVV2241; Last   Rx:2017 Ordered   10  Prodigy No Coding Blood Gluc In Vitro Strip; USE  1  STRIP TWICE DAILY; Therapy: 99SBV3679 to (Evaluate:12Wbe4132)  Requested for: 81Itl2282; Last    Rx:2015 Ordered   11  Simvastatin 10 MG Oral Tablet; take one tablet by mouth one time daily; Therapy: 46Vhk7176 to (Evaluate:2017)  Requested for: 99FWD1668; Last    Rx:2017 Ordered   12  Vitamin C 1000 MG Oral Tablet; Therapy: (Roya Larson) to Recorded   13  Warfarin Sodium 1 MG Oral Tablet; TAKE 1 TABLET Daily OR AS DIRECTED BY    PHYSICIAN AFTER INR BLOOD DRAW;    Therapy: 65DFA0472 to (Evaluate:64Yiy0519)  Requested for: 79UFU8955; Last    Rx:2017 Ordered   14   Warfarin Sodium 5 MG Oral Tablet; TAKE 1 TABLET DAILY OR AS DIRECTED BY    PHYSICIAN AFTER INR BLOOD DRAW  Requested for: 56OMZ9579; Last    KO:81QSA1048 Ordered    Allergies    1  No Known Drug Allergies    Signatures   Electronically signed by :  August Juarez, ; Jun 12 2017  9:33AM EST                       (Author)

## 2018-01-16 NOTE — RESULT NOTES
Verified Results  Coumadin Flow Sheet [id number] 02:05PM Ida Klein     Test Name Result Flag Reference   Diagnosis A-Fib     Managing Provider INR Goal Range 2 0-3 0     INR 2 2     Current Dose 5mg daily

## 2018-01-17 NOTE — RESULT NOTES
Verified Results  Coumadin Flow Sheet [id number] 11:54AM Edger Poor     Test Name Result Flag Reference   Diagnosis A Fib     Managing Provider INR Goal Range 2-3     INR 3 2     Current Dose      5 mg alternating with 6 mg

## 2018-01-18 NOTE — RESULT NOTES
Discussion/Summary   will discuss labs at follow up appt     Verified Results  (1) COMPREHENSIVE METABOLIC PANEL 10TOC5166 75:52AK Sherly Llanos     Test Name Result Flag Reference   SODIUM 140 mmol/L  136-145   POTASSIUM 4 1 mmol/L  3 5-5 3   CHLORIDE 102 mmol/L  100-108   CARBON DIOXIDE 32 mmol/L  21-32   ANION GAP (CALC) 6 mmol/L  4-13   BLOOD UREA NITROGEN 24 mg/dL  5-25   CREATININE 0 97 mg/dL  0 60-1 30   Standardized to IDMS reference method   CALCIUM 9 0 mg/dL  8 3-10 1   BILI, TOTAL 0 30 mg/dL  0 20-1 00   ALK PHOSPHATAS 71 U/L     ALT (SGPT) 21 U/L  12-78   Specimen collection should occur prior to Sulfasalazine administration due to the potential for falsely depressed results  AST(SGOT) 16 U/L  5-45   Specimen collection should occur prior to Sulfasalazine administration due to the potential for falsely depressed results  ALBUMIN 3 7 g/dL  3 5-5 0   TOTAL PROTEIN 6 9 g/dL  6 4-8 2   eGFR 74 ml/min/1 73sq m     [facility] Disease Education Program recommendations are as follows:  GFR calculation is accurate only with a steady state creatinine  Chronic Kidney disease less than 60 ml/min/1 73 sq  meters  Kidney failure less than 15 ml/min/1 73 sq  meters  GLUCOSE FASTING 114 mg/dL H 65-99   Specimen collection should occur prior to Sulfasalazine administration due to the potential for falsely depressed results  Specimen collection should occur prior to Sulfapyridine administration due to the potential for falsely elevated results  (1) HEMOGLOBIN A1C 79Kie2267 07:33AM Sherly Llanos     Test Name Result Flag Reference   HEMOGLOBIN A1C 5 9 %  4 2-6 3   EST  AVG   GLUCOSE 123 mg/dl

## 2018-01-18 NOTE — RESULT NOTES
Verified Results  Coumadin Flow Sheet [id number] 10:17AM David Pandya     Test Name Result Flag Reference   Diagnosis A-fib     Managing Provider INR Goal Range 2-3     INR 2 2     Current Dose      5 5mg alt with 6mg daily  sp/cma

## 2018-01-22 VITALS
HEART RATE: 84 BPM | RESPIRATION RATE: 20 BRPM | OXYGEN SATURATION: 84 % | BODY MASS INDEX: 21.28 KG/M2 | TEMPERATURE: 98 F | HEIGHT: 71 IN | WEIGHT: 152 LBS

## 2018-01-22 VITALS
WEIGHT: 145 LBS | SYSTOLIC BLOOD PRESSURE: 122 MMHG | RESPIRATION RATE: 17 BRPM | BODY MASS INDEX: 20.3 KG/M2 | HEART RATE: 76 BPM | HEIGHT: 71 IN | DIASTOLIC BLOOD PRESSURE: 80 MMHG

## 2018-01-23 ENCOUNTER — GENERIC CONVERSION - ENCOUNTER (OUTPATIENT)
Dept: OTHER | Facility: OTHER | Age: 78
End: 2018-01-23

## 2018-01-23 ENCOUNTER — ALLSCRIPTS OFFICE VISIT (OUTPATIENT)
Dept: OTHER | Facility: OTHER | Age: 78
End: 2018-01-23

## 2018-01-23 LAB — INR PPP: 2.4 (ref 0.86–1.16)

## 2018-01-23 NOTE — RESULT NOTES
Verified Results  Coumadin Flow Sheet [id number] 12:41PM Elvin Medrano     Test Name Result Flag Reference   Diagnosis A-Fib     Managing Provider INR Goal Range 2-3     INR 2 6     Current Dose 5 mg daily

## 2018-01-23 NOTE — RESULT NOTES
Verified Results  Coumadin Flow Sheet [id number] 01:15PM Baylee Canavan     Test Name Result Flag Reference   Diagnosis A-Fib     Managing Provider INR Goal Range 2-3     INR 2 8     Current Dose holding   ac/cma

## 2018-01-23 NOTE — RESULT NOTES
Verified Results  Coumadin Flow Sheet [id number] 03:16PM Gianlucakatarina Caota     Test Name Result Flag Reference   Diagnosis A-fib     Managing Provider INR Goal Range 2-3     INR 2 1

## 2018-01-23 NOTE — MISCELLANEOUS
Message   Recorded as Task   Date: 12/26/2017 12:43 PM, Created By: Napoleon Aaron   Task Name: Call Patient with results   Assigned To: Jeremie Valerio   Regarding Patient: Beth Schmidt, Status: Active   Comment:    Jeremie Vaelrio - 26 Dec 2017 12:43 PM     Patient Phone: [phone]    same meds, INR 1w   JayroThais - 26 Dec 2017 1:32 PM     TASK EDITED  Calling now  Elaine Madrid - 26 Dec 2017 1:41 PM     TASK EDITED  Pt was very loud and aggitated  wanting to tell this Office and Dr Cotl Russo he will only check his INR every 2 weeks and the dose has always been 5 5mg daily  Pt did review he takes 1 tab of 5mg and a 1/2 tab of 1mg daily    Dr Colt Russo this is an Sammi Field - 26 Dec 2017 1:44 PM     TASK REASSIGNED: Previously Assigned To Isabella Stovall - 26 Dec 2017 1:45 PM     TASK EDITED  noted        Signatures   Electronically signed by : Carley Alonzo DO; Dec 26 2017  1:45PM EST                       (Author)

## 2018-01-24 NOTE — RESULT NOTES
Verified Results  Coumadin Flow Sheet 84GPM6113 11:55AM Deedee Workman     Test Name Result Flag Reference   Diagnosis A-fib     Managing Provider INR Goal Range 2-3     INR 2 4

## 2018-01-24 NOTE — PROGRESS NOTES
Assessment    1  Arteriosclerosis of arteries of extremities (440 20) (I70 209)   2  Foot pain, bilateral (729 5) (M79 671,M79 672)   3  Diabetes mellitus type 2, noninsulin dependent (250 00) (E11 9)   4  Onychomycosis (110 1) (B35 1)    Plan    · *VB - Foot Exam; Status:Complete;   Done: 50GSO1166 09:30AM   · Follow-up visit in 3 months Evaluation and Treatment  Follow-up  Status: Hold For -  Scheduling  Requested for: 07ZOF9947   · Diabetes Foot Exam Performed; Status:Complete;   Done: 23KUV2603 09:30AM   · Inspect your feet daily ; Status:Complete;   Done: 02VQL9471 09:30AM   · It is important to take good care of your feet if you have diabetes ; Status:Complete;    Done: 07WXX0869 09:30AM    Discussion/Summary  The patient was counseled regarding diagnostic results, instructions for management, prognosis, patient and family education, risks and benefits of treatment options  Patient is able to Self-Care  Possible side effects of new medications were reviewed with the patient/guardian today  The treatment plan was reviewed with the patient/guardian  The patient/guardian understands and agrees with the treatment plan      Chief Complaint  2 week follow up      History of Present Illness  HPI: Patient was unable to take Eucrisa as directed  His insurance will not pay for medication  He took the prescribe medication  He is doing much better  Review of Systems    Constitutional: no fever, not feeling poorly, no recent weight gain and no chills  Eyes: eyesight problems and wears glasses  ENT: no sore throat, no nasal discharge and no hoarseness  Cardiovascular: no chest pain, no palpitations and no extremity edema  Respiratory: as noted in HPI and no wheezing  Gastrointestinal: no abdominal pain, no nausea, no vomiting and no diarrhea  Genitourinary: no dysuria  Musculoskeletal: no arthralgias  Integumentary: no rashes and no skin lesions     Neurological: no headache, no numbness and no dizziness  Psychiatric: no anxiety and no depression  Endocrine: no muscle weakness  Hematologic/Lymphatic: no swollen glands  Active Problems    1  Acquired ankle/foot deformity (736 70) (M21 969)   2  Anticoagulant long-term use (V58 61) (Z79 01)   3  Arteriosclerosis of arteries of extremities (440 20) (I70 209)   4  Arteriosclerosis of coronary artery (414 00) (I25 10)   5  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)   6  Atrial fibrillation (427 31) (I48 91)   7  Bilateral carotid bruits (785 9) (R09 89)   8  Centrilobular emphysema (492 8) (J43 2)   9  Chronic hypoxemic respiratory failure (518 83,799 02) (J96 11)   10  Chronic obstructive pulmonary disease (496) (J44 9)   11  Contusion of right lung (861 21) (S27 321A)   12  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)   13  COPD with exacerbation (491 21) (J44 1)   14  Diabetes mellitus type 2, noninsulin dependent (250 00) (E11 9)   15  Dyspnea on exertion (786 09) (R06 09)   16  Eczema (692 9) (L30 9)   17  Fatigue (780 79) (R53 83)   18  Follow up (V67 9) (Z09)   19  Foot pain, bilateral (729 5) (M79 671,M79 672)   20  Heart failure, left, 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% (428 1) (I50 1)   21  History of tobacco use (V15 82) (Z87 891)   22  Hypertension (401 9) (I10)   23  Hypoxia (799 02) (R09 02)   24  Lung nodule (793 11) (R91 1)   25  Multiple fractures of ribs of right side (807 09) (S22 41XA)   26  Need for influenza vaccination (V04 81) (Z23)   27  Need for vaccination (V05 9) (Z23)   28  On angiotensin-converting enzyme (ACE) inhibitors (V07 52) (Z79 899)   29  Onychomycosis (110 1) (B35 1)   30  Pain, chest wall (786 52) (R07 89)   31  Pes planus, congenital (754 61) (Q66 50)   32  Pneumonia of right lower lobe due to infectious organism (486) (J18 1)   33  Pulmonary emphysema (492 8) (J43 9)   34  Rib fractures (807 00) (S22 39XA)   35  Screening for cardiovascular condition (V81 2) (Z13 6)   36  Screening for genitourinary condition (V81 6) (Z13 89)   37  Screening for thyroid disorder (V77 0) (Z13 29)   38  Screening PSA (prostate specific antigen) (V76 44) (Z12 5)   39  Severe left ventricular systolic dysfunction (710 9) (I51 9)   40  Shortness of breath (786 05) (R06 02)   41  Tinea pedis (110 4) (B35 3)   42  Type 2 diabetes mellitus with diabetic polyneuropathy, without long-term current use of    insulin (250 60,357 2) (E11 42)    Past Medical History    · History of Acute bronchitis (466 0) (J20 9)   · History of Acute Non-Q-wave Myocardial Infarction (410 70)   · History of Acute respiratory distress (518 82) (R06 03)   · History of Cardiac arrhythmia (427 9) (I49 9)   · History of Cardiac Catheterization  (Diagnostic)   · History of Congestive heart failure (428 0) (I50 9)   · History of Cough (786 2) (R05)   · History of CXR Solitary Pulmonary Nodule Left Lung Lower Lobe   · History of Diverticulosis (562 10) (K57 90)   · History of emphysema (V12 69) (Z87 09)   · History of stroke (V12 54) (Z86 73)   · History of Hyperinflation of lungs (786 9) (R09 89)   · History of Ischemic cardiomyopathy (414 8) (I25 5)   · Personal history of cardiomyopathy (V12 59) (Z86 79)   · Personal history of ventricular tachycardia (V12 59) (Z86 79)   · History of Pneumonia (V12 61)   · History of Spontaneous Pneumothorax (512 89)    Surgical History    · History of Cardioverter-defibrillator Pulse Generator Replacement   · History of Chest Tube Insertion   · History of Colonoscopy   · History of Femoral Hernia Repair Unilateral   · History of Implantable Cardioverter-Defibrillator    The surgical history was reviewed and updated today  Family History  Mother    · Family history of cerebrovascular accident (CVA) (V17 1) (Z82 3)   · Family history of Stroke Syndrome (V17 1)  Son    · Family history of diabetes mellitus (V18 0) (Z83 3)  Brother    · Family history of malignant neoplasm (V16 9) (Z80 9)    The family history was reviewed and updated today         Social History    · Former smoker (V1 82) (D14 042)   · SMOKED MOST OF HIS ADULT LIFE SINCE AGE 13 OR 13 ONE PACK PER DAY QUIT IN      2002   · Former smoker (R55 36) (V67 986)   · History of tobacco use (V1 82) (Z87 891)   ·    · Occupation:   · SECURITY  The social history was reviewed and updated today  The social history was reviewed and is unchanged  Current Meds   1  Advair -21 MCG/ACT Inhalation Aerosol; INHALE 2 PUFFS,  BY MOUTH, TWICE   DAILY; Therapy: 15Kna6405 to (Moni Escort)  Requested for: 76LHQ3075; Last   Rx:2017; Status: ACTIVE - Retrospective By Protocol Authorization Ordered   2  Aspirin Low Dose 81 MG TABS; TAKE 1 TABLET DAILY; Therapy: (Recorded:2017) to Recorded   3  Carvedilol 6 25 MG Oral Tablet; 1 two times a day  Requested for: 06Exj7973; Last   U64MBD8840 Ordered   4  Clotrimazole-Betamethasone 1-0 05 % External Cream; APPLY SPARINGLY TO THE   AFFECTED AREA(S) TWICE DAILY; Therapy: 74Vcr3624 to (Evaluate:55Iqp8016)  Requested for: 63Hic0721; Last   Rx:66Hau9334 Ordered   5  Combivent Respimat  MCG/ACT Inhalation Aerosol Solution; ONE INHALATION   4 TIMES DAILY (MAX OF 6 INHALATIONS PER 24 HOURS); Therapy: 97SXV4964 to (Evaluate:77Ahe6065)  Requested for: 83KJU9611; Last   KT:25BRI9130 Ordered   6  Eucrisa 2 % External Ointment; Applied to bilateral leg twice daily for 2 weeks; Therapy: 63Fti8263 to (Evaluate:53Gbu2906)  Requested for: 13Ime4740; Last   Rx:69Wyp3901 Ordered   7  Fosinopril Sodium 20 MG Oral Tablet; TAKE 1 TABLET DAILY  Requested for:   07Bfz8588; Last DO:34GVO0099 Ordered   8  Furosemide 40 MG Oral Tablet; take 1 tablet every day; Therapy: 92DKP0034 to (Mohit Bidding)  Requested for: 03QKP8978; Last   Rx:2018 Ordered   9  Ipratropium-Albuterol 0 5-2 5 (3) MG/3ML Inhalation Solution; USE 1 UNIT DOSE IN   NEBULIZER 4 TIMES DAILY;    Therapy: 94EFK9387 to (Evaluate:22Ija1327)  Requested for: 15Pmb2467; Last Rx:28Nov2014 Ordered   10  Ketoconazole 2 % External Cream; APPLY A THIN LAYER TO AFFECTED AREA(S)    TWICE DAILY; Therapy: 01EBZ6044 to (96 [id number])  Requested for: 98SWZ2481; Last    Rx:32Qef8654 Ordered   11  MetFORMIN HCl - 1000 MG Oral Tablet; Take 1 tablet twice daily; Therapy: 09XMJ3876 to (Evaluate:77Xvd2861)  Requested for: 07PUH3079; Last    Rx:27Nov2017 Ordered   12  PredniSONE 20 MG Oral Tablet; 2 TABLETS DAILY FOR 5 DAYS, THEN 1 TABLET    DAILY FOR 5 DAYS  CALL OFFICE WHEN COMPLETE; Therapy: 97FNT1936 to (96 [id number])  Requested for: 82AHF4809; Last    Rx:12Oct2017; Status: ACTIVE - Retrospective By Protocol Authorization Ordered   13  PredniSONE 20 MG Oral Tablet; start with 3 tabs daily and taper as directed; Therapy: 45SVF7514 to (Evaluate:20Nov2017)  Requested for: 18MTQ4241; Last    Rx:10Nov2017 Ordered   14  Prodigy No Coding Blood Gluc In Vitro Strip; USE  1  STRIP TWICE DAILY; Therapy: 29LQU1149 to (Evaluate:71Osf8993)  Requested for: 52Wdc7384; Last    Rx:27Apr2015 Ordered   15  Prodigy Twist Top Lancets 28G Miscellaneous; Therapy: 64QCL9604 to Recorded   16  Simvastatin 10 MG Oral Tablet; take 1 tablet every day; Therapy: 20Apr2017 to (Evaluate:97Dqh6793)  Requested for: 89TIH2806; Last    Rx:10Nov2017 Ordered   17  Spironolactone 25 MG Oral Tablet; TAKE 1 TABLET DAILY; Therapy: 76HUB2803 to (Evaluate:40Bgd8273)  Requested for: 10BPB5005; Last    Rx:85Yet1386 Ordered   18  Terbinafine HCl - 250 MG Oral Tablet; one tab po qod; Therapy: 03UOS2245 to (Evaluate:41Epa6400)  Requested for: 17QMR7280; Last    Rx:54Ddk2514 Ordered   19  Vitamin C 1000 MG Oral Tablet; Therapy: (Saúl Rousseau) to Recorded   20  Warfarin Sodium 1 MG Oral Tablet; TAKE 1 TABLET Daily OR AS DIRECTED BY    PHYSICIAN AFTER INR BLOOD DRAW;    Therapy: 89QAU6846 to (Evaluate:02Apr2018)  Requested for: 30VOI8594; Last    Rx:02Jan2018 Ordered   21   Warfarin Sodium 5 MG Oral Tablet; TAKE 1 TABLET DAILY OR AS DIRECTED BY    PHYSICIAN AFTER INR BLOOD DRAW;    Therapy: 19MDV8312 to (Evaluate:22Nns8221)  Requested for: 27Nov2017; Last    Rx:27Nov2017 Ordered    The medication list was reviewed and updated today  Allergies    1  No Known Drug Allergies    Vitals   Recorded: 51OZB4083 09:21AM   Heart Rate 84   Respiration 18   Systolic 202   Diastolic 82   Height 5 ft 11 in   Weight 152 lb    BMI Calculated 21 2   BSA Calculated 1 88     Physical Exam  Left Foot: Appearance: Normal except as noted: excessive pronation and pes planus  Great toe deformities include a bunion  Right Foot: Appearance: Normal except as noted: excessive pronation and pes planus  Left Ankle: ROM: limited ROM in all planes Motor: Normal    Right Ankle: ROM: limited ROM in all planes Motor: Normal    Neurological Exam: performed  Light touch was intact bilaterally  Vibratory sensation was absent bilaterally  Deep tendon reflexes: achilles reflex absent bilateraly  Vascular Exam: performed Dorsalis pedis pulses were diminished bilaterally  Posterior tibial pulses were diminished bilaterally  Elevation Pallor: present bilaterally  Dependence rubor was present bilaterally  Capillary refill time was Q9 findings bilateral  Negative digital hair noted  Positive abnormal cooling bilateral, but between 1-3 seconds bilaterally  Edema: mild bilaterally  Toenails: All of the toenails were elongated, hypertrophied, discolored and Dystrophic with mycosis  Both first toenails were Mycotic with early onychogryphosis  Socks and shoes removed, Right Foot Findings: erythematous and dry  The sensory exam showed diminished vibratory sensation at the level of the toes  Diminished tactile sensation with monofilament testing throughout the right foot  Socks and shoes removed, Left Foot Findings: erythematous and dry  The sensory exam showed diminished vibratory sensation at the level of the toes   Diminished tactile sensation with monofilament testing throughout the left foot  Shoe Gear Evaluation: performed ()  Recommendation(s): SAS style  Feet      Procedure    All mycotic nails debrided without pain or complication      Future Appointments    Date/Time Provider Specialty Site   03/20/2018 09:00 AM Octavio Layton, [address] [place] [address]   02/27/2018 08:15 AM RAMIRO Ellis  Pulmonary Medicine [facility] [place]   04/16/2018 08:30 AM RAMIRO Ellis  Pulmonary Medicine [facility]   01/31/2018 01:00 PM OUSMANE Hardin   Cardiology  [facility]     Signatures   Electronically signed by : Norris Francisco DPM; Jan 23 2018  9:31AM EST                       (Author)

## 2018-01-25 ENCOUNTER — ANTICOAG VISIT (OUTPATIENT)
Dept: FAMILY MEDICINE CLINIC | Facility: CLINIC | Age: 78
End: 2018-01-25

## 2018-01-26 RX ORDER — EPLERENONE 25 MG/1
1 TABLET, FILM COATED ORAL DAILY
COMMUNITY
End: 2018-01-31

## 2018-01-26 RX ORDER — MULTIVIT WITH MINERALS/LUTEIN
1000 TABLET ORAL DAILY
COMMUNITY

## 2018-01-26 RX ORDER — CLOTRIMAZOLE AND BETAMETHASONE DIPROPIONATE 10; .64 MG/G; MG/G
CREAM TOPICAL 2 TIMES DAILY
COMMUNITY
Start: 2017-08-28 | End: 2018-08-30 | Stop reason: ALTCHOICE

## 2018-01-26 RX ORDER — KETOCONAZOLE 20 MG/G
1 CREAM TOPICAL 2 TIMES DAILY
COMMUNITY
Start: 2017-07-24 | End: 2018-08-23

## 2018-01-26 RX ORDER — SIMVASTATIN 10 MG
1 TABLET ORAL DAILY
COMMUNITY
Start: 2017-04-20 | End: 2018-03-26 | Stop reason: SDUPTHER

## 2018-01-26 RX ORDER — SPIRONOLACTONE 25 MG/1
1 TABLET ORAL DAILY
COMMUNITY
Start: 2017-12-08 | End: 2018-08-20 | Stop reason: SDUPTHER

## 2018-01-26 RX ORDER — TERBINAFINE HYDROCHLORIDE 250 MG/1
1 TABLET ORAL EVERY OTHER DAY
COMMUNITY
Start: 2017-07-24 | End: 2018-08-30 | Stop reason: ALTCHOICE

## 2018-01-26 RX ORDER — LANCETS 28 GAUGE
EACH MISCELLANEOUS
COMMUNITY
Start: 2017-02-07

## 2018-01-31 ENCOUNTER — OFFICE VISIT (OUTPATIENT)
Dept: CARDIOLOGY CLINIC | Facility: CLINIC | Age: 78
End: 2018-01-31
Payer: COMMERCIAL

## 2018-01-31 VITALS
BODY MASS INDEX: 20.3 KG/M2 | SYSTOLIC BLOOD PRESSURE: 104 MMHG | OXYGEN SATURATION: 95 % | WEIGHT: 145 LBS | DIASTOLIC BLOOD PRESSURE: 62 MMHG | HEIGHT: 71 IN | HEART RATE: 87 BPM

## 2018-01-31 DIAGNOSIS — J44.9 CHRONIC OBSTRUCTIVE PULMONARY DISEASE, UNSPECIFIED COPD TYPE (HCC): ICD-10-CM

## 2018-01-31 DIAGNOSIS — E11.59 TYPE 2 DIABETES MELLITUS WITH OTHER CIRCULATORY COMPLICATION, WITHOUT LONG-TERM CURRENT USE OF INSULIN (HCC): ICD-10-CM

## 2018-01-31 DIAGNOSIS — R91.1 NODULE OF LEFT LUNG: ICD-10-CM

## 2018-01-31 DIAGNOSIS — I50.22 CHRONIC SYSTOLIC CONGESTIVE HEART FAILURE (HCC): ICD-10-CM

## 2018-01-31 DIAGNOSIS — I25.10 ARTERIOSCLEROSIS OF CORONARY ARTERY: Primary | ICD-10-CM

## 2018-01-31 PROCEDURE — 99214 OFFICE O/P EST MOD 30 MIN: CPT | Performed by: INTERNAL MEDICINE
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
## 2018-02-06 LAB — INR PPP: 2.7 (ref 0.86–1.16)

## 2018-02-16 DIAGNOSIS — E11.59 TYPE 2 DIABETES MELLITUS WITH OTHER CIRCULATORY COMPLICATION, WITHOUT LONG-TERM CURRENT USE OF INSULIN (HCC): Primary | ICD-10-CM

## 2018-02-20 ENCOUNTER — ANTICOAG VISIT (OUTPATIENT)
Dept: FAMILY MEDICINE CLINIC | Facility: CLINIC | Age: 78
End: 2018-02-20

## 2018-02-20 LAB — INR PPP: 2.2 (ref 0.86–1.16)

## 2018-02-20 NOTE — PROGRESS NOTES
Spoke with pt aware to take  Same dose and repeat in one month    Pt insist on repeating every 2 weeks  af/rma

## 2018-02-23 ENCOUNTER — TELEPHONE (OUTPATIENT)
Dept: FAMILY MEDICINE CLINIC | Facility: CLINIC | Age: 78
End: 2018-02-23

## 2018-02-23 DIAGNOSIS — E11.59 TYPE 2 DIABETES MELLITUS WITH OTHER CIRCULATORY COMPLICATION, WITHOUT LONG-TERM CURRENT USE OF INSULIN (HCC): ICD-10-CM

## 2018-02-23 NOTE — TELEPHONE ENCOUNTER
METFORMIN 3 REFILLS he needs  He doesn't want to keep calling and ordering medication  Can we give him refills  He wanted to speak with a nurse but I told him I would let Dr Guzman Confer know

## 2018-02-26 NOTE — RESULT NOTES
Verified Results  Coumadin Flow Sheet [id number] 12:00AM Derick Stuart     Test Name Result Flag Reference   Diagnosis A-Fib     Managing Provider INR Goal Range 2-3     INR 2 6     Current Dose 5 5 mg daily   ac/cma

## 2018-03-06 ENCOUNTER — LAB (OUTPATIENT)
Dept: LAB | Facility: CLINIC | Age: 78
End: 2018-03-06
Payer: COMMERCIAL

## 2018-03-06 DIAGNOSIS — I70.209 ATHEROSCLEROSIS OF NATIVE ARTERY OF EXTREMITY (HCC): ICD-10-CM

## 2018-03-06 DIAGNOSIS — I25.10 ATHEROSCLEROTIC HEART DISEASE OF NATIVE CORONARY ARTERY WITHOUT ANGINA PECTORIS: ICD-10-CM

## 2018-03-06 DIAGNOSIS — Z79.01 LONG TERM CURRENT USE OF ANTICOAGULANT: ICD-10-CM

## 2018-03-06 DIAGNOSIS — I10 ESSENTIAL (PRIMARY) HYPERTENSION: ICD-10-CM

## 2018-03-06 DIAGNOSIS — E11.9 TYPE 2 DIABETES MELLITUS WITHOUT COMPLICATIONS (HCC): ICD-10-CM

## 2018-03-06 DIAGNOSIS — Z23 ENCOUNTER FOR IMMUNIZATION: ICD-10-CM

## 2018-03-06 DIAGNOSIS — I48.91 ATRIAL FIBRILLATION (HCC): ICD-10-CM

## 2018-03-06 DIAGNOSIS — Z79.899 OTHER LONG TERM (CURRENT) DRUG THERAPY: ICD-10-CM

## 2018-03-06 DIAGNOSIS — I50.1 LEFT VENTRICULAR FAILURE (HCC): ICD-10-CM

## 2018-03-06 DIAGNOSIS — J44.9 CHRONIC OBSTRUCTIVE PULMONARY DISEASE (HCC): ICD-10-CM

## 2018-03-06 LAB
ALBUMIN SERPL BCP-MCNC: 3.8 G/DL (ref 3.5–5)
ALP SERPL-CCNC: 71 U/L (ref 46–116)
ALT SERPL W P-5'-P-CCNC: 18 U/L (ref 12–78)
ANION GAP SERPL CALCULATED.3IONS-SCNC: 5 MMOL/L (ref 4–13)
AST SERPL W P-5'-P-CCNC: 15 U/L (ref 5–45)
BASOPHILS # BLD AUTO: 0.05 THOUSANDS/ΜL (ref 0–0.1)
BASOPHILS NFR BLD AUTO: 1 % (ref 0–1)
BILIRUB SERPL-MCNC: 0.39 MG/DL (ref 0.2–1)
BUN SERPL-MCNC: 25 MG/DL (ref 5–25)
CALCIUM SERPL-MCNC: 8.2 MG/DL (ref 8.3–10.1)
CHLORIDE SERPL-SCNC: 102 MMOL/L (ref 100–108)
CHOLEST SERPL-MCNC: 111 MG/DL (ref 50–200)
CO2 SERPL-SCNC: 31 MMOL/L (ref 21–32)
CREAT SERPL-MCNC: 1 MG/DL (ref 0.6–1.3)
EOSINOPHIL # BLD AUTO: 0.1 THOUSAND/ΜL (ref 0–0.61)
EOSINOPHIL NFR BLD AUTO: 1 % (ref 0–6)
ERYTHROCYTE [DISTWIDTH] IN BLOOD BY AUTOMATED COUNT: 13.9 % (ref 11.6–15.1)
EST. AVERAGE GLUCOSE BLD GHB EST-MCNC: 131 MG/DL
GFR SERPL CREATININE-BSD FRML MDRD: 71 ML/MIN/1.73SQ M
GLUCOSE P FAST SERPL-MCNC: 108 MG/DL (ref 65–99)
HBA1C MFR BLD: 6.2 % (ref 4.2–6.3)
HCT VFR BLD AUTO: 42.7 % (ref 36.5–49.3)
HDLC SERPL-MCNC: 43 MG/DL (ref 40–60)
HGB BLD-MCNC: 14.2 G/DL (ref 12–17)
LDLC SERPL CALC-MCNC: 48 MG/DL (ref 0–100)
LYMPHOCYTES # BLD AUTO: 3.45 THOUSANDS/ΜL (ref 0.6–4.47)
LYMPHOCYTES NFR BLD AUTO: 39 % (ref 14–44)
MCH RBC QN AUTO: 31.2 PG (ref 26.8–34.3)
MCHC RBC AUTO-ENTMCNC: 33.3 G/DL (ref 31.4–37.4)
MCV RBC AUTO: 94 FL (ref 82–98)
MONOCYTES # BLD AUTO: 0.86 THOUSAND/ΜL (ref 0.17–1.22)
MONOCYTES NFR BLD AUTO: 10 % (ref 4–12)
NEUTROPHILS # BLD AUTO: 4.36 THOUSANDS/ΜL (ref 1.85–7.62)
NEUTS SEG NFR BLD AUTO: 49 % (ref 43–75)
NRBC BLD AUTO-RTO: 0 /100 WBCS
PLATELET # BLD AUTO: 229 THOUSANDS/UL (ref 149–390)
PMV BLD AUTO: 11.2 FL (ref 8.9–12.7)
POTASSIUM SERPL-SCNC: 4 MMOL/L (ref 3.5–5.3)
PROT SERPL-MCNC: 7.1 G/DL (ref 6.4–8.2)
RBC # BLD AUTO: 4.55 MILLION/UL (ref 3.88–5.62)
SODIUM SERPL-SCNC: 138 MMOL/L (ref 136–145)
TRIGL SERPL-MCNC: 99 MG/DL
TSH SERPL DL<=0.05 MIU/L-ACNC: 1.42 UIU/ML (ref 0.36–3.74)
WBC # BLD AUTO: 8.84 THOUSAND/UL (ref 4.31–10.16)

## 2018-03-06 PROCEDURE — 83036 HEMOGLOBIN GLYCOSYLATED A1C: CPT

## 2018-03-06 PROCEDURE — 80061 LIPID PANEL: CPT

## 2018-03-06 PROCEDURE — 85025 COMPLETE CBC W/AUTO DIFF WBC: CPT

## 2018-03-06 PROCEDURE — 80053 COMPREHEN METABOLIC PANEL: CPT

## 2018-03-06 PROCEDURE — 36415 COLL VENOUS BLD VENIPUNCTURE: CPT

## 2018-03-06 PROCEDURE — 84443 ASSAY THYROID STIM HORMONE: CPT

## 2018-03-07 DIAGNOSIS — Z79.01 LONG TERM CURRENT USE OF ANTICOAGULANT: ICD-10-CM

## 2018-03-07 DIAGNOSIS — I70.209 ATHEROSCLEROSIS OF NATIVE ARTERY OF EXTREMITY (HCC): ICD-10-CM

## 2018-03-07 DIAGNOSIS — Z23 ENCOUNTER FOR IMMUNIZATION: ICD-10-CM

## 2018-03-07 DIAGNOSIS — E11.9 TYPE 2 DIABETES MELLITUS WITHOUT COMPLICATIONS (HCC): ICD-10-CM

## 2018-03-07 DIAGNOSIS — J44.9 CHRONIC OBSTRUCTIVE PULMONARY DISEASE (HCC): ICD-10-CM

## 2018-03-07 DIAGNOSIS — I48.91 ATRIAL FIBRILLATION (HCC): ICD-10-CM

## 2018-03-07 DIAGNOSIS — Z79.899 OTHER LONG TERM (CURRENT) DRUG THERAPY: ICD-10-CM

## 2018-03-07 DIAGNOSIS — I50.1 LEFT VENTRICULAR FAILURE (HCC): ICD-10-CM

## 2018-03-07 DIAGNOSIS — I10 ESSENTIAL (PRIMARY) HYPERTENSION: ICD-10-CM

## 2018-03-07 DIAGNOSIS — I25.10 ATHEROSCLEROTIC HEART DISEASE OF NATIVE CORONARY ARTERY WITHOUT ANGINA PECTORIS: ICD-10-CM

## 2018-03-07 NOTE — PROGRESS NOTES
Discussion/Summary  Normal device function      Signatures   Electronically signed by : Andrea Sarah, ; Apr 24 2017  2:38PM EST                       (Author)    Electronically signed by : OUSMANE Aquino ; Jul 26 2017  6:55PM EST                       (Author)

## 2018-03-07 NOTE — PROGRESS NOTES
Discussion/Summary  Normal device function      Results/Data   15 Dec 2017 6:23 AM    Cardiac Device Remote        MISCELLANEOUS COMMENT         Cardiac Electrophysiology Report  Faisal Bradford [employer] [id number]        DEVICE TYPE ICD   15 Dec 2017 6:23 AM    Cardiac Electrophysiology Report        Cardiac Electrophysiology Report  Faisal Bradford [employer] [id number]    Signatures   Electronically signed by : Lashay Salvador, ; Dec 15 2017  3:10PM EST                       (Author)    Electronically signed by : OUSMANE Rodrigez ; Jan 22 2018 10:54PM EST                       (Author)

## 2018-03-09 ENCOUNTER — ANTICOAG VISIT (OUTPATIENT)
Dept: FAMILY MEDICINE CLINIC | Facility: CLINIC | Age: 78
End: 2018-03-09

## 2018-03-09 LAB — INR PPP: 2.5 (ref 0.86–1.16)

## 2018-03-13 ENCOUNTER — TRANSCRIBE ORDERS (OUTPATIENT)
Dept: PULMONOLOGY | Facility: MEDICAL CENTER | Age: 78
End: 2018-03-13

## 2018-03-13 DIAGNOSIS — J44.9 CHRONIC OBSTRUCTIVE PULMONARY DISEASE, UNSPECIFIED COPD TYPE (HCC): Primary | ICD-10-CM

## 2018-03-17 DIAGNOSIS — I48.20 CHRONIC ATRIAL FIBRILLATION (HCC): Primary | ICD-10-CM

## 2018-03-18 RX ORDER — WARFARIN SODIUM 5 MG/1
TABLET ORAL
Qty: 90 TABLET | Refills: 0 | Status: SHIPPED | OUTPATIENT
Start: 2018-03-18 | End: 2018-03-26 | Stop reason: SDUPTHER

## 2018-03-20 ENCOUNTER — OFFICE VISIT (OUTPATIENT)
Dept: FAMILY MEDICINE CLINIC | Facility: CLINIC | Age: 78
End: 2018-03-20
Payer: COMMERCIAL

## 2018-03-20 VITALS
WEIGHT: 146 LBS | HEIGHT: 71 IN | RESPIRATION RATE: 22 BRPM | BODY MASS INDEX: 20.44 KG/M2 | DIASTOLIC BLOOD PRESSURE: 74 MMHG | TEMPERATURE: 98.2 F | HEART RATE: 86 BPM | SYSTOLIC BLOOD PRESSURE: 124 MMHG

## 2018-03-20 DIAGNOSIS — I10 ESSENTIAL HYPERTENSION: ICD-10-CM

## 2018-03-20 DIAGNOSIS — Z12.5 SCREENING FOR PROSTATE CANCER: ICD-10-CM

## 2018-03-20 DIAGNOSIS — I50.22 CHRONIC SYSTOLIC CONGESTIVE HEART FAILURE (HCC): ICD-10-CM

## 2018-03-20 DIAGNOSIS — E11.42 TYPE 2 DIABETES MELLITUS WITH DIABETIC POLYNEUROPATHY, WITHOUT LONG-TERM CURRENT USE OF INSULIN (HCC): Primary | ICD-10-CM

## 2018-03-20 DIAGNOSIS — I25.10 ARTERIOSCLEROSIS OF CORONARY ARTERY: ICD-10-CM

## 2018-03-20 DIAGNOSIS — I50.1 HEART FAILURE, LEFT, WITH LVEF <=30% (HCC): ICD-10-CM

## 2018-03-20 PROBLEM — J18.9 RIGHT LOWER LOBE PNEUMONIA: Status: RESOLVED | Noted: 2017-06-08 | Resolved: 2018-03-20

## 2018-03-20 PROBLEM — J96.11 CHRONIC HYPOXEMIC RESPIRATORY FAILURE (HCC): Status: ACTIVE | Noted: 2017-11-10

## 2018-03-20 PROBLEM — I51.9 SEVERE LEFT VENTRICULAR SYSTOLIC DYSFUNCTION: Status: ACTIVE | Noted: 2017-04-20

## 2018-03-20 PROBLEM — J43.2 CENTRILOBULAR EMPHYSEMA (HCC): Status: ACTIVE | Noted: 2017-11-10

## 2018-03-20 PROBLEM — R09.89 BILATERAL CAROTID BRUITS: Status: ACTIVE | Noted: 2017-08-01

## 2018-03-20 PROCEDURE — 1101F PT FALLS ASSESS-DOCD LE1/YR: CPT | Performed by: FAMILY MEDICINE

## 2018-03-20 PROCEDURE — 99214 OFFICE O/P EST MOD 30 MIN: CPT | Performed by: FAMILY MEDICINE

## 2018-03-20 PROCEDURE — 3725F SCREEN DEPRESSION PERFORMED: CPT | Performed by: FAMILY MEDICINE

## 2018-03-20 NOTE — PROGRESS NOTES
Assessment/Plan:    Problem List Items Addressed This Visit     Type 2 diabetes mellitus with diabetic polyneuropathy, without long-term current use of insulin ([facility] [address] ) - Primary    Relevant Orders    CBC and differential    Comprehensive metabolic panel    HEMOGLOBIN A1C W/ EAG ESTIMATION    Chronic systolic congestive heart failure (HCC)    Relevant Orders    CBC and differential    Comprehensive metabolic panel    HEMOGLOBIN A1C W/ EAG ESTIMATION    Arteriosclerosis of coronary artery     Pt states if he had an issuew ith his carotid he would not let us operate on them anyway, maybe a stent though         Relevant Orders    CBC and differential    Comprehensive metabolic panel    HEMOGLOBIN A1C W/ EAG ESTIMATION    Heart failure, left, 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)    Relevant Orders    CBC and differential    Comprehensive metabolic panel    HEMOGLOBIN A1C W/ EAG ESTIMATION    Essential hypertension    Relevant Orders    CBC and differential    Comprehensive metabolic panel    HEMOGLOBIN A1C W/ EAG ESTIMATION      Other Visit Diagnoses     Screening for prostate cancer        Relevant Orders    CBC and differential    Comprehensive metabolic panel    HEMOGLOBIN A1C W/ EAG ESTIMATION    PSA, ultrasensitive          Patient Instructions     Chronic Hypertension   WHAT YOU NEED TO KNOW:   Hypertension is high blood pressure (BP)  Your BP is the force of your blood moving against the walls of your arteries  Normal BP is less than 120/80  Prehypertension is between 120/80 and 139/89  Hypertension is 140/90 or higher  Hypertension causes your BP to get so high that your heart has to work much harder than normal  This can damage your heart  Chronic hypertension is a long-term condition that you can control with a healthy lifestyle or medicines  A controlled blood pressure helps protect your organs, such as your heart, lungs, brain, and kidneys     DISCHARGE INSTRUCTIONS:   Call 911 for any of the following:   · You have discomfort in your chest that feels like squeezing, pressure, fullness, or pain  · You become confused or have difficulty speaking  · You suddenly feel lightheaded or have trouble breathing  · You have pain or discomfort in your back, neck, jaw, stomach, or arm  Return to the emergency department if:   · You have a severe headache or vision loss  · You have weakness in an arm or leg  Contact your healthcare provider if:   · You feel faint, dizzy, confused, or drowsy  · You have been taking your BP medicine and your BP is still higher than your healthcare provider says it should be  · You have questions or concerns about your condition or care  Medicines: You may need any of the following:  · Medicine  may be used to help lower your BP  You may need more than one type of medicine  Take the medicine exactly as directed  · Diuretics  help decrease extra fluid that collects in your body  This will help lower your BP  You may urinate more often while you take this medicine  · Cholesterol medicine  helps lower your cholesterol level  A low cholesterol level helps prevent heart disease and makes it easier to control your blood pressure  · Take your medicine as directed  Contact your healthcare provider if you think your medicine is not helping or if you have side effects  Tell him or her if you are allergic to any medicine  Keep a list of the medicines, vitamins, and herbs you take  Include the amounts, and when and why you take them  Bring the list or the pill bottles to follow-up visits  Carry your medicine list with you in case of an emergency  Follow up with your healthcare provider as directed: You will need to return to have your blood pressure checked and to have other lab tests done  Write down your questions so you remember to ask them during your visits    Manage chronic hypertension:  Talk with your healthcare provider about these and other ways to manage hypertension:  · Take your BP at home   Sit and rest for 5 minutes before you take your BP  Extend your arm and support it on a flat surface  Your arm should be at the same level as your heart  Follow the directions that came with your BP monitor  If possible, take at least 2 BP readings each time  Take your BP at least twice a day at the same times each day, such as morning and evening  Keep a record of your BP readings and bring it to your follow-up visits  Ask your healthcare provider what your blood pressure should be  · Limit sodium (salt) as directed  Too much sodium can affect your fluid balance  Check labels to find low-sodium or no-salt-added foods  Some low-sodium foods use potassium salts for flavor  Too much potassium can also cause health problems  Your healthcare provider will tell you how much sodium and potassium are safe for you to have in a day  He or she may recommend that you limit sodium to 2,300 mg a day  · Follow the meal plan recommended by your healthcare provider  A dietitian or your provider can give you more information on low-sodium plans or the DASH (Dietary Approaches to Stop Hypertension) eating plan  The DASH plan is low in sodium, unhealthy fats, and total fat  It is high in potassium, calcium, and fiber  · Exercise to maintain a healthy weight  Exercise at least 30 minutes per day, on most days of the week  This will help decrease your blood pressure  Ask about the best exercise plan for you  · Decrease stress  This may help lower your BP  Learn ways to relax, such as deep breathing or listening to music  · Limit alcohol  Women should limit alcohol to 1 drink a day  Men should limit alcohol to 2 drinks a day  A drink of alcohol is 12 ounces of beer, 5 ounces of wine, or 1½ ounces of liquor  · Do not smoke  Nicotine and other chemicals in cigarettes and cigars can increase your BP and also cause lung damage   Ask your healthcare provider for information if you currently smoke and need help to quit  E-cigarettes or smokeless tobacco still contain nicotine  Talk to your healthcare provider before you use these products  © 2017 [address] [employer] Information is for End User's use only and may not be sold, redistributed or otherwise used for commercial purposes  All illustrations and images included in CareNotes® are the copyrighted property of [employer]  or [employer]  The above information is an  only  It is not intended as medical advice for individual conditions or treatments  Talk to your doctor, nurse or pharmacist before following any medical regimen to see if it is safe and effective for you  Return in about 6 months (around 9/20/2018) for Recheck  Subjective:      Patient ID: Aldo Shannon May is a 76 y o  male  Chief Complaint   Patient presents with    Follow-up     lab work  and medication refill  kl       Pt is here for follow up of chronic conditions  Pt is asking why he has to have studies done -   Pt is complaining that he had a carotid study done and it cost 6000 dollars and now he has to pay 150 dollars a day if he is in the hospital    Pt denies Cp, no sob per say but he does have emphysema and is on oxygen  Does monitor his O2 sat at home daily          The following portions of the patient's history were reviewed and updated as appropriate: allergies, current medications, past family history, past medical history, past social history, past surgical history and problem list     Review of Systems   Constitutional: Negative for activity change, appetite change, chills, diaphoresis, fatigue, fever and unexpected weight change  HENT: Negative for congestion, dental problem, ear pain, sinus pain and tinnitus  Eyes: Negative for photophobia, pain, discharge, redness, itching and visual disturbance  Respiratory: Negative for cough, chest tightness and shortness of breath      Cardiovascular: Negative for chest pain, palpitations and leg swelling  Gastrointestinal: Negative for abdominal pain  Endocrine: Negative for cold intolerance and heat intolerance  Neurological: Negative for dizziness, syncope, light-headedness and headaches  Psychiatric/Behavioral: Negative for behavioral problems           Current Outpatient Prescriptions   Medication Sig Dispense Refill    fosinopril (MONOPRIL) 20 mg tablet Take 20 mg by mouth daily      furosemide (LASIX) 40 mg tablet Take 4 mg by mouth daily      metFORMIN (GLUCOPHAGE) 1000 MG tablet TAKE 1 TABLET TWICE DAILY 180 tablet 0    simvastatin (ZOCOR) 10 mg tablet Take 1 tablet by mouth daily      spironolactone (ALDACTONE) 25 mg tablet Take 1 tablet by mouth daily      Ascorbic Acid (VITAMIN C) 1000 MG tablet Take 1,000 mg by mouth daily      aspirin (ASPIRIN LOW DOSE) 81 MG tablet Take 81 mg by mouth daily      atorvastatin (LIPITOR) 10 mg tablet Take 10 mg by mouth daily at bedtime      carvedilol (COREG) 6 25 mg tablet Take 6 25 mg by mouth 2 (two) times a day      clotrimazole-betamethasone (LOTRISONE) 1-0 05 % cream Apply topically 2 (two) times a day      Crisaborole (EUCRISA) 2 % OINT Apply topically      fluticasone-salmeterol (ADVAIR HFA) 115-21 MCG/ACT inhaler Inhale 2 puffs 2 (two) times a day      glucose blood (PRODIGY NO CODING BLOOD GLUC) test strip by In Vitro route      guaiFENesin (MUCINEX) 600 mg 12 hr tablet Take 1 tablet by mouth every 12 (twelve) hours 30 tablet 0    ipratropium-albuterol (COMBIVENT RESPIMAT) inhaler Inhale 1 puff 4 (four) times a day as needed      ipratropium-albuterol (DUO-NEB) 0 5-2 5 mg/mL Take 3 mL by nebulization every 4 (four) hours while awake 3 mL 0    ketoconazole (NIZORAL) 2 % cream Apply 1 application topically 2 (two) times a day      PRODIGY TWIST TOP LANCETS 28G MISC by Does not apply route      terbinafine (LamISIL) 250 mg tablet Take 1 tablet by mouth every other day      warfarin (COUMADIN) 1 mg tablet Take 1 mg by mouth daily      warfarin (COUMADIN) 5 mg tablet TAKE 1 TABLET DAILY OR AS DIRECTED BY PHYSICIAN AFTER INR BLOOD DRAW  90 tablet 0     No current facility-administered medications for this visit  Objective:    /74   Pulse 86   Temp 98 2 °F (36 8 °C)   Resp 22   Ht 5' 11" (1 803 m)   Wt 66 2 kg (146 lb)   BMI 20 36 kg/m²        Physical Exam   Constitutional: He appears well-developed and well-nourished  HENT:   Head: Normocephalic and atraumatic  Eyes: Conjunctivae and EOM are normal  Pupils are equal, round, and reactive to light  Neck: Normal range of motion  Cardiovascular: Normal rate, regular rhythm, normal heart sounds and intact distal pulses  No murmur heard  Slight B/L carotid murmer   Pulmonary/Chest: Effort normal and breath sounds normal    Abdominal: Soft  Bowel sounds are normal    Musculoskeletal: Normal range of motion  Neurological: He is alert  Nursing note and vitals reviewed  Study Result        [facility] REPORT  CLINICAL:  Indications:  Bruit [R09 89]  I70 209 Unspecified atherosclerosis of native  arteries of extremities, Patient presents for a general health evaluation  secondary to other cardiovascular symptoms  Patient is asymptomatic from a  cerebral vascular standpoint  Risk Factors  The patient has history of HTN, Diabetes (Yes), CAD and previous smoking (quit  >10yrs ago)  Clinical  Right Pressure:  104/70 mm Hg, Left Pressure:  110/70 mm Hg  FINDINGS:     Right        Impression  PSV  EDV (cm/s)  Direction of Flow  Ratio    Dist  ICA                 64          20                      0 70    Mid  ICA                  64          24                      0 70    Prox   ICA    1 - 49%      59          24                      0 64    Dist CCA                  87                                          Mid CCA                   92                                  0 78    Prox CCA                 118 2 04    Ext Carotid              101                                  1 10    Prox Vert                 48              Antegrade                   Subclavian                68                                          Innominate                58                                             Left         Impression  PSV  EDV (cm/s)  Direction of Flow  Ratio    Dist  ICA                 81          33                      1 17    Mid  ICA                  78          30                      1 13    Prox  ICA    1 - 49%      52          25                      0 75    Dist CCA                  60                                          Mid CCA                   69                                  0 87    Prox CCA                  79                                          Ext Carotid               94                                  1 36    Prox Vert                 40              Antegrade                   Subclavian                80                                                   CONCLUSION:     Impression  RIGHT:  There is <50% stenosis noted in the internal carotid artery  Plaque is  heterogenous and smooth  Vertebral artery flow is antegrade  There is no significant subclavian artery  disease  LEFT:  There is <50% stenosis noted in the internal carotid artery  Plaque is  heterogenous and smooth  Vertebral artery flow is antegrade  There is no significant subclavian artery  disease  No prior studies available for comparison  Internal carotid artery stenosis determination by consensus criteria from:  Ilene Kothari , et al  Carotid Artery Stenosis: Gray-Scale and Doppler US Diagnosis  - Society of [employer]ologists in [address] [place] [address], Radiology 2003;  [phone]       SIGNATURE:  Electronically Signed by: Wiley Kasper on 2017-12-01 04:40:10 PM        Recent Results (from the past 504 hour(s))   Comprehensive metabolic panel    Collection Time: 03/06/18  7:24 AM Result Value Ref Range    Sodium 138 136 - 145 mmol/L    Potassium 4 0 3 5 - 5 3 mmol/L    Chloride 102 100 - 108 mmol/L    CO2 31 21 - 32 mmol/L    Anion Gap 5 4 - 13 mmol/L    BUN 25 5 - 25 mg/dL    Creatinine 1 00 0 60 - 1 30 mg/dL    Glucose, Fasting 108 (H) 65 - 99 mg/dL    Calcium 8 2 (L) 8 3 - 10 1 mg/dL    AST 15 5 - 45 U/L    ALT 18 12 - 78 U/L    Alkaline Phosphatase 71 46 - 116 U/L    Total Protein 7 1 6 4 - 8 2 g/dL    Albumin 3 8 3 5 - 5 0 g/dL    Total Bilirubin 0 39 0 20 - 1 00 mg/dL    eGFR 71 ml/min/1 73sq m   Lipid panel    Collection Time: 03/06/18  7:24 AM   Result Value Ref Range    Cholesterol 111 50 - 200 mg/dL    Triglycerides 99 <=150 mg/dL    HDL, Direct 43 40 - 60 mg/dL    LDL Calculated 48 0 - 100 mg/dL   CBC and differential    Collection Time: 03/06/18  7:24 AM   Result Value Ref Range    WBC 8 84 4 31 - 10 16 Thousand/uL    RBC 4 55 3 88 - 5 62 Million/uL    Hemoglobin 14 2 12 0 - 17 0 g/dL    Hematocrit 42 7 36 5 - 49 3 %    MCV 94 82 - 98 fL    MCH 31 2 26 8 - 34 3 pg    MCHC 33 3 31 4 - 37 4 g/dL    RDW 13 9 11 6 - 15 1 %    MPV 11 2 8 9 - 12 7 fL    Platelets 798 977 - 262 Thousands/uL    nRBC 0 /100 WBCs    Neutrophils Relative 49 43 - 75 %    Lymphocytes Relative 39 14 - 44 %    Monocytes Relative 10 4 - 12 %    Eosinophils Relative 1 0 - 6 %    Basophils Relative 1 0 - 1 %    Neutrophils Absolute 4 36 1 85 - 7 62 Thousands/µL    Lymphocytes Absolute 3 45 0 60 - 4 47 Thousands/µL    Monocytes Absolute 0 86 0 17 - 1 22 Thousand/µL    Eosinophils Absolute 0 10 0 00 - 0 61 Thousand/µL    Basophils Absolute 0 05 0 00 - 0 10 Thousands/µL   TSH, 3rd generation    Collection Time: 03/06/18  7:24 AM   Result Value Ref Range    TSH 3RD GENERATON 1 420 0 358 - 3 740 uIU/mL   Hemoglobin A1c    Collection Time: 03/06/18  7:24 AM   Result Value Ref Range    Hemoglobin A1C 6 2 4 2 - 6 3 %     mg/dl   Protime-INR    Collection Time: 03/09/18 12:00 AM   Result Value Ref Range    INR 2 50 (A) 0 86 - 1 16         Hannah Alyost, DO

## 2018-03-20 NOTE — PATIENT INSTRUCTIONS

## 2018-03-20 NOTE — ASSESSMENT & PLAN NOTE
Pt states if he had an issuew ith his carotid he would not let us operate on them anyway, maybe a stent though

## 2018-03-23 ENCOUNTER — ANTICOAG VISIT (OUTPATIENT)
Dept: FAMILY MEDICINE CLINIC | Facility: CLINIC | Age: 78
End: 2018-03-23

## 2018-03-23 LAB
INR PPP: 2.6 (ref 0.86–1.16)
INR PPP: 2.6 (ref 0.86–1.16)

## 2018-03-26 DIAGNOSIS — I25.10 ARTERIOSCLEROSIS OF CORONARY ARTERY: ICD-10-CM

## 2018-03-26 DIAGNOSIS — R09.89 BILATERAL CAROTID BRUITS: ICD-10-CM

## 2018-03-26 DIAGNOSIS — I48.20 CHRONIC ATRIAL FIBRILLATION (HCC): ICD-10-CM

## 2018-03-26 DIAGNOSIS — E11.42 TYPE 2 DIABETES MELLITUS WITH DIABETIC POLYNEUROPATHY, WITHOUT LONG-TERM CURRENT USE OF INSULIN (HCC): Primary | ICD-10-CM

## 2018-03-26 RX ORDER — FOSINOPRIL SODIUM 20 MG/1
TABLET ORAL
Qty: 90 TABLET | Refills: 2 | Status: CANCELLED | OUTPATIENT
Start: 2018-03-26

## 2018-03-26 RX ORDER — SIMVASTATIN 10 MG
TABLET ORAL
Qty: 90 TABLET | Refills: 0 | Status: SHIPPED | OUTPATIENT
Start: 2018-03-26 | End: 2018-06-22 | Stop reason: SDUPTHER

## 2018-03-26 RX ORDER — WARFARIN SODIUM 5 MG/1
TABLET ORAL
Qty: 90 TABLET | Refills: 0 | Status: SHIPPED | OUTPATIENT
Start: 2018-03-26 | End: 2018-08-01 | Stop reason: SDUPTHER

## 2018-04-02 DIAGNOSIS — J44.1 CHRONIC OBSTRUCTIVE PULMONARY DISEASE WITH ACUTE EXACERBATION (HCC): Primary | ICD-10-CM

## 2018-04-02 DIAGNOSIS — I50.9 HEART FAILURE, UNSPECIFIED HEART FAILURE CHRONICITY, UNSPECIFIED HEART FAILURE TYPE: ICD-10-CM

## 2018-04-02 DIAGNOSIS — I25.10 ARTERIOSCLEROSIS OF CORONARY ARTERY: Primary | ICD-10-CM

## 2018-04-02 RX ORDER — FUROSEMIDE 40 MG/1
40 TABLET ORAL DAILY
Qty: 90 TABLET | Refills: 3 | Status: SHIPPED | OUTPATIENT
Start: 2018-04-02 | End: 2019-04-17 | Stop reason: SDUPTHER

## 2018-04-02 RX ORDER — FOSINOPRIL SODIUM 20 MG/1
20 TABLET ORAL DAILY
Qty: 90 TABLET | Refills: 3 | Status: SHIPPED | OUTPATIENT
Start: 2018-04-02 | End: 2019-04-17 | Stop reason: SDUPTHER

## 2018-04-06 ENCOUNTER — ANTICOAG VISIT (OUTPATIENT)
Dept: FAMILY MEDICINE CLINIC | Facility: CLINIC | Age: 78
End: 2018-04-06

## 2018-04-06 LAB — INR PPP: 2.1 (ref 0.86–1.16)

## 2018-04-16 ENCOUNTER — OFFICE VISIT (OUTPATIENT)
Dept: PULMONOLOGY | Facility: MEDICAL CENTER | Age: 78
End: 2018-04-16
Payer: COMMERCIAL

## 2018-04-16 VITALS
DIASTOLIC BLOOD PRESSURE: 76 MMHG | HEIGHT: 71 IN | TEMPERATURE: 97.5 F | BODY MASS INDEX: 21 KG/M2 | OXYGEN SATURATION: 97 % | RESPIRATION RATE: 16 BRPM | HEART RATE: 96 BPM | WEIGHT: 150 LBS | SYSTOLIC BLOOD PRESSURE: 126 MMHG

## 2018-04-16 DIAGNOSIS — I50.22 CHRONIC SYSTOLIC CONGESTIVE HEART FAILURE (HCC): ICD-10-CM

## 2018-04-16 DIAGNOSIS — R91.1 NODULE OF LEFT LUNG: ICD-10-CM

## 2018-04-16 DIAGNOSIS — J96.11 CHRONIC HYPOXEMIC RESPIRATORY FAILURE (HCC): ICD-10-CM

## 2018-04-16 DIAGNOSIS — J43.2 CENTRILOBULAR EMPHYSEMA (HCC): Primary | ICD-10-CM

## 2018-04-16 DIAGNOSIS — I48.20 CHRONIC ATRIAL FIBRILLATION (HCC): ICD-10-CM

## 2018-04-16 PROCEDURE — 99213 OFFICE O/P EST LOW 20 MIN: CPT | Performed by: INTERNAL MEDICINE

## 2018-04-16 NOTE — PROGRESS NOTES
Assessment/Plan:     Problem List Items Addressed This Visit        Respiratory    Centrilobular emphysema ([address] ) - Primary     Ho Pompa has severe emphysema  He is doing fairly well considering his lung disease  Does have some chronic exertional dyspnea but this is stable  He will continue on his regimen of Advair 115 mcg 2 puffs twice a day and uses Combivent inhaler 1 puff 4 times a day    Spirometry done November 2017 showed an FEV1 of 0 6 L or 20% of predicted         Chronic hypoxemic respiratory failure (HCC)     Chronic hypoxemic respiratory failure stable  Ho Pompa is on 4 L pulse dose oxygen with a battery operated concentrator and uses this with activity  It is medically necessary for him to have a small portable battery operated concentrator a due to the fact that portable oxygen tanks are causing additional adverse medical outcomes in the patient's breathing  Tanks also do not allow for adequate ambulation and are restricting the patient's ambulation and activity level  The patient is benefitting and using the the portable oxygen concentrator  He will continue with portable battery operated concentrator at a flow rate of 4 l/m pulsed dose with activity  Cardiovascular and Mediastinum    Chronic systolic congestive heart failure ([address] )     Clay Ruff had echocardiogram done December 2017 which showed his left ventricular ejection fraction to be approximately 40%  He is not having any leg edema or nocturnal dyspnea         Chronic atrial fibrillation ([address] )     Patient is on chronic warfarin therapy for his atrial fibrillation and last INR was therapeutic at 2 1            Other    Nodule of left lung     Patient had follow-up CT scan of his chest done November 10, 2017  This showed a stable 7 mm left lower lobe nodule shows not change in several years and presumed benign    There is severe emphysematous changes bilaterally specially on the left side                 Return in about 1 year (around 4/16/2019)  All questions are answered to the patient's satisfaction and understanding  He verbalizes understanding  He is encouraged to call with any further questions or concerns  Portions of the record may have been created with voice recognition software  Occasional wrong word or "sound a like" substitutions may have occurred due to the inherent limitations of voice recognition software  Read the chart carefully and recognize, using context, where substitutions have occurred  ______________________________________________________________________    Chief Complaint:   Chief Complaint   Patient presents with    Follow-up     pt has no cough    Shortness of Breath     has been okay       Patient ID: Shy Desai is a 76 y o  y o  male has a past medical history of Arteriosclerosis of arteries of extremities ([facility] [address] ); Arteriosclerosis of coronary artery; Atrial fibrillation ([address] ); Bilateral carotid bruits; Cardiac arrhythmia; Cardiac disease; Cardiomyopathy ([address] ); Congestive heart failure (CHF) ([address] ); COPD (chronic obstructive pulmonary disease) ([address] ); Diabetes mellitus ([facility] [address] ); Diverticulosis; History of emphysema; History of pneumonia; Left heart failure with left ejection fraction less than or equal to 30 percent ([facility] [address] ); Non-Q wave myocardial infarction [facility]); Pneumothorax (2003); Rib fractures (03/2015); Solitary pulmonary nodule; Stroke [facility]); and Ventricular tachycardia ([facility] [address] )  4/16/2018  REGINA Mata has severe emphysema and his FEV1 is only 0 6 L or 20% predicted  Overall despite this he has been doing fairly well  His exertional shortness of breath is stable  He has not had any recent respiratory tract infections  He denies any cough or hemoptysis  He does use a portable battery operated concentrator during the day and keeps it usually set at 4 liters/minute  This does maintain his O2 saturation greater than 90%    At rest he does not need the oxygen and nor does he use it at bedtime as he has no nocturnal dyspnea  He does have a history of chronic atrial fibrillation and is on warfarin therapy  He also has history cardiomyopathy and echocardiogram done December 2017 showed his left ventricular ejection fraction to be about 40%  He is not having leg edema or chest pain  Review of Systems   Constitutional: Negative for diaphoresis and fever  Overall doing well  No recent infections  No wheezing, and no cough or nocturnal dyspnea   HENT: Negative for congestion  Eyes: Negative for redness and itching  Respiratory: Negative for chest tightness  He does have shortness of breath with activity but this is stable   Cardiovascular: Negative for chest pain and leg swelling  Gastrointestinal: Negative for abdominal pain and diarrhea  Endocrine: Negative for polydipsia and polyphagia  Genitourinary: Negative for dysuria  Musculoskeletal: Negative for myalgias  Neurological: Negative for light-headedness  Psychiatric/Behavioral: Negative for confusion  Smoking history: He reports that he quit smoking about 16 years ago  His smoking use included Cigarettes  He has a 60 00 pack-year smoking history   He has never used smokeless tobacco     The following portions of the patient's history were reviewed and updated as appropriate: allergies, current medications, past family history, past medical history, past social history, past surgical history and problem list     Immunization History   Administered Date(s) Administered    Influenza Split High Dose Preservative Free IM 09/04/2014, 09/07/2017    Influenza TIV (IM) 10/16/2003    Pneumococcal Polysaccharide PPV23 01/01/2001, 06/15/2006     Current Outpatient Prescriptions   Medication Sig Dispense Refill    Ascorbic Acid (VITAMIN C) 1000 MG tablet Take 1,000 mg by mouth daily      aspirin (ASPIRIN LOW DOSE) 81 MG tablet Take 81 mg by mouth daily      atorvastatin (LIPITOR) 10 mg tablet Take 10 mg by mouth daily at bedtime      carvedilol (COREG) 6 25 mg tablet Take 6 25 mg by mouth 2 (two) times a day      clotrimazole-betamethasone (LOTRISONE) 1-0 05 % cream Apply topically 2 (two) times a day      Crisaborole (EUCRISA) 2 % OINT Apply topically      fluticasone-salmeterol (ADVAIR HFA) 115-21 MCG/ACT inhaler Inhale 2 puffs 2 (two) times a day      fosinopril (MONOPRIL) 20 mg tablet Take 1 tablet (20 mg total) by mouth daily 90 tablet 3    furosemide (LASIX) 40 mg tablet Take 1 tablet (40 mg total) by mouth daily 90 tablet 3    glucose blood (PRODIGY NO CODING BLOOD GLUC) test strip by In Vitro route      guaiFENesin (MUCINEX) 600 mg 12 hr tablet Take 1 tablet by mouth every 12 (twelve) hours 30 tablet 0    ipratropium-albuterol (COMBIVENT RESPIMAT) inhaler Inhale 1 puff 4 (four) times a day 3 Inhaler 3    ipratropium-albuterol (DUO-NEB) 0 5-2 5 mg/mL Take 3 mL by nebulization every 4 (four) hours while awake 3 mL 0    ketoconazole (NIZORAL) 2 % cream Apply 1 application topically 2 (two) times a day      metFORMIN (GLUCOPHAGE) 1000 MG tablet TAKE 1 TABLET TWICE DAILY 180 tablet 0    PRODIGY TWIST TOP LANCETS 28G MISC by Does not apply route      simvastatin (ZOCOR) 10 mg tablet TAKE 1 TABLET EVERY DAY 90 tablet 0    spironolactone (ALDACTONE) 25 mg tablet Take 1 tablet by mouth daily      terbinafine (LamISIL) 250 mg tablet Take 1 tablet by mouth every other day      warfarin (COUMADIN) 1 mg tablet Take 1 mg by mouth daily      warfarin (COUMADIN) 5 mg tablet TAKE 1 TABLET DAILY OR AS DIRECTED BY PHYSICIAN AFTER INR BLOOD DRAW  90 tablet 0     No current facility-administered medications for this visit  Allergies: Patient has no known allergies      Objective:  Vitals:    04/16/18 0817 04/16/18 0820   BP: 126/76    BP Location: Right arm    Patient Position: Sitting    Cuff Size: Adult    Pulse: 96    Resp: 16    Temp: 97 5 °F (36 4 °C)    TempSrc: Oral    SpO2: 97% 97%   Weight: 68 kg (150 lb) Height: 5' 11" (1 803 m)    Oxygen Therapy  SpO2: 97 %  Oxygen Therapy: Supplemental oxygen  O2 Delivery Method: Nasal cannula  O2 Flow Rate (L/min): 3 L/min    Wt Readings from Last 3 Encounters:   04/16/18 68 kg (150 lb)   03/20/18 66 2 kg (146 lb)   01/31/18 65 8 kg (145 lb)     Body mass index is 20 92 kg/m²  Physical Exam   Constitutional: He is oriented to person, place, and time  He appears well-developed  No distress  Awake, alert, no distress  No conversational dyspnea  On 3 L pulse dose oxygen oxygen saturation is 97% and on room air at rest was 93-94%   HENT:   Head: Normocephalic  Nose: Nose normal    Mouth/Throat: Oropharynx is clear and moist  No oropharyngeal exudate  Eyes: Conjunctivae are normal    Neck: Neck supple  No JVD present  Cardiovascular: Normal rate, regular rhythm and normal heart sounds  Pulmonary/Chest: Effort normal    Lung sounds are decreased but clear bilaterally   Abdominal: Soft  He exhibits no distension  There is no tenderness  There is no guarding  Musculoskeletal: He exhibits no edema  Lymphadenopathy:     He has no cervical adenopathy  Neurological: He is alert and oriented to person, place, and time  Skin: Skin is warm  He is not diaphoretic  No erythema  Psychiatric: He has a normal mood and affect   His behavior is normal  Thought content normal

## 2018-04-16 NOTE — ASSESSMENT & PLAN NOTE
Jorge Montenegro had echocardiogram done December 2017 which showed his left ventricular ejection fraction to be approximately 40%    He is not having any leg edema or nocturnal dyspnea

## 2018-04-16 NOTE — ASSESSMENT & PLAN NOTE
Patsy Rod has severe emphysema  He is doing fairly well considering his lung disease  Does have some chronic exertional dyspnea but this is stable    He will continue on his regimen of Advair 115 mcg 2 puffs twice a day and uses Combivent inhaler 1 puff 4 times a day    Spirometry done November 2017 showed an FEV1 of 0 6 L or 20% of predicted

## 2018-04-16 NOTE — ASSESSMENT & PLAN NOTE
Patient is on chronic warfarin therapy for his atrial fibrillation and last INR was therapeutic at 2 1

## 2018-04-16 NOTE — ASSESSMENT & PLAN NOTE
Patient had follow-up CT scan of his chest done November 10, 2017  This showed a stable 7 mm left lower lobe nodule shows not change in several years and presumed benign    There is severe emphysematous changes bilaterally specially on the left side

## 2018-04-16 NOTE — ASSESSMENT & PLAN NOTE
Chronic hypoxemic respiratory failure stable  Suzanne Telles is on 4 L pulse dose oxygen with a battery operated concentrator and uses this with activity  It is medically necessary for him to have a small portable battery operated concentrator a due to the fact that portable oxygen tanks are causing additional adverse medical outcomes in the patient's breathing  Tanks also do not allow for adequate ambulation and are restricting the patient's ambulation and activity level  The patient is benefitting and using the the portable oxygen concentrator  He will continue with portable battery operated concentrator at a flow rate of 4 l/m pulsed dose with activity

## 2018-04-17 ENCOUNTER — OFFICE VISIT (OUTPATIENT)
Dept: PODIATRY | Facility: CLINIC | Age: 78
End: 2018-04-17
Payer: COMMERCIAL

## 2018-04-17 VITALS
BODY MASS INDEX: 21 KG/M2 | HEIGHT: 71 IN | HEART RATE: 86 BPM | DIASTOLIC BLOOD PRESSURE: 76 MMHG | RESPIRATION RATE: 17 BRPM | WEIGHT: 150 LBS | SYSTOLIC BLOOD PRESSURE: 126 MMHG

## 2018-04-17 DIAGNOSIS — M79.672 PAIN IN BOTH FEET: ICD-10-CM

## 2018-04-17 DIAGNOSIS — I70.209 PERIPHERAL ARTERIOSCLEROSIS (HCC): ICD-10-CM

## 2018-04-17 DIAGNOSIS — E11.42 TYPE 2 DIABETES MELLITUS WITH DIABETIC POLYNEUROPATHY, WITHOUT LONG-TERM CURRENT USE OF INSULIN (HCC): Primary | ICD-10-CM

## 2018-04-17 DIAGNOSIS — B35.1 ONYCHOMYCOSIS: ICD-10-CM

## 2018-04-17 DIAGNOSIS — M79.671 PAIN IN BOTH FEET: ICD-10-CM

## 2018-04-17 PROCEDURE — 11721 DEBRIDE NAIL 6 OR MORE: CPT | Performed by: PODIATRIST

## 2018-04-17 NOTE — PROGRESS NOTES
Assessment/Plan:    No problem-specific Assessment & Plan notes found for this encounter  Assessment    1  Arteriosclerosis of arteries of extremities (440 20) (I70 209)   2  Foot pain, bilateral (729 5) (M79 671,M79 672)   3  Diabetes mellitus type 2, noninsulin dependent (250 00) (E11 9)   4  Onychomycosis (110 1) (B35 1)     Plan    · *VB - Foot Exam; Status:Complete;   Done: 28RGH9350 09:30AM   · Follow-up visit in 3 months Evaluation and Treatment  Follow-up  Status: Hold For -  Scheduling  Requested for: 19YVD2556   · Diabetes Foot Exam Performed; Status:Complete;   Done: 87XFD1446 09:30AM   · Inspect your feet daily ; Status:Complete;   Done: 08JTH6093 09:30AM   · It is important to take good care of your feet if you have diabetes ; Status:Complete;    Done: 14FYK7064 09:30AM     Discussion/Summary  The patient was counseled regarding diagnostic results, instructions for management, prognosis, patient and family education, risks and benefits of treatment options  Patient is able to Self-Care  Possible side effects of new medications were reviewed with the patient/guardian today  The treatment plan was reviewed with the patient/guardian  The patient/guardian understands and agrees with the treatment plan      Chief Complaint  2 week follow up      History of Present Illness  HPI: Patient was unable to take Eucrisa as directed  His insurance will not pay for medication  He took the prescribe medication  He is doing much better  Review of Systems     Constitutional: no fever, not feeling poorly, no recent weight gain and no chills  Eyes: eyesight problems and wears glasses  ENT: no sore throat, no nasal discharge and no hoarseness  Cardiovascular: no chest pain, no palpitations and no extremity edema  Respiratory: as noted in HPI and no wheezing  Gastrointestinal: no abdominal pain, no nausea, no vomiting and no diarrhea  Genitourinary: no dysuria  Musculoskeletal: no arthralgias  Integumentary: no rashes and no skin lesions  Neurological: no headache, no numbness and no dizziness  Psychiatric: no anxiety and no depression  Endocrine: no muscle weakness  Hematologic/Lymphatic: no swollen glands  Active Problems    1  Acquired ankle/foot deformity (736 70) (M21 969)   2  Anticoagulant long-term use (V58 61) (Z79 01)   3  Arteriosclerosis of arteries of extremities (440 20) (I70 209)   4  Arteriosclerosis of coronary artery (414 00) (I25 10)   5  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)   6  Atrial fibrillation (427 31) (I48 91)   7  Bilateral carotid bruits (785 9) (R09 89)   8  Centrilobular emphysema (492 8) (J43 2)   9  Chronic hypoxemic respiratory failure (518 83,799 02) (J96 11)   10  Chronic obstructive pulmonary disease (496) (J44 9)   11  Contusion of right lung (861 21) (S27 321A)   12  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)   13  COPD with exacerbation (491 21) (J44 1)   14  Diabetes mellitus type 2, noninsulin dependent (250 00) (E11 9)   15  Dyspnea on exertion (786 09) (R06 09)   16  Eczema (692 9) (L30 9)   17  Fatigue (780 79) (R53 83)   18  Follow up (V67 9) (Z09)   19  Foot pain, bilateral (729 5) (M79 671,M79 672)   20  Heart failure, left, 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% (428 1) (I50 1)   21  History of tobacco use (V15 82) (Z87 891)   22  Hypertension (401 9) (I10)   23  Hypoxia (799 02) (R09 02)   24  Lung nodule (793 11) (R91 1)   25  Multiple fractures of ribs of right side (807 09) (S22 41XA)   26  Need for influenza vaccination (V04 81) (Z23)   27  Need for vaccination (V05 9) (Z23)   28  On angiotensin-converting enzyme (ACE) inhibitors (V07 52) (Z79 899)   29  Onychomycosis (110 1) (B35 1)   30  Pain, chest wall (786 52) (R07 89)   31  Pes planus, congenital (754 61) (Q66 50)   32  Pneumonia of right lower lobe due to infectious organism (486) (J18 1)   33  Pulmonary emphysema (492 8) (J43 9)   34  Rib fractures (807 00) (S22 39XA)   35   Screening for cardiovascular condition (V81 2) (Z13 6)   36  Screening for genitourinary condition (V81 6) (Z13 89)   37  Screening for thyroid disorder (V77 0) (Z13 29)   38  Screening PSA (prostate specific antigen) (V76 44) (Z12 5)   39  Severe left ventricular systolic dysfunction (557 8) (I51 9)   40  Shortness of breath (786 05) (R06 02)   41  Tinea pedis (110 4) (B35 3)   42  Type 2 diabetes mellitus with diabetic polyneuropathy, without long-term current use of    insulin (250 60,357 2) (E11 42)     Past Medical History    · History of Acute bronchitis (466 0) (J20 9)   · History of Acute Non-Q-wave Myocardial Infarction (410 70)   · History of Acute respiratory distress (518 82) (R06 03)   · History of Cardiac arrhythmia (427 9) (I49 9)   · History of Cardiac Catheterization  (Diagnostic)   · History of Congestive heart failure (428 0) (I50 9)   · History of Cough (786 2) (R05)   · History of CXR Solitary Pulmonary Nodule Left Lung Lower Lobe   · History of Diverticulosis (562 10) (K57 90)   · History of emphysema (V12 69) (Z87 09)   · History of stroke (V12 54) (Z86 73)   · History of Hyperinflation of lungs (786 9) (R09 89)   · History of Ischemic cardiomyopathy (414 8) (I25 5)   · Personal history of cardiomyopathy (V12 59) (Z86 79)   · Personal history of ventricular tachycardia (V12 59) (Z86 79)   · History of Pneumonia (V12 61)   · History of Spontaneous Pneumothorax (512 89)     Surgical History    · History of Cardioverter-defibrillator Pulse Generator Replacement   · History of Chest Tube Insertion   · History of Colonoscopy   · History of Femoral Hernia Repair Unilateral   · History of Implantable Cardioverter-Defibrillator     The surgical history was reviewed and updated today         Family History  Mother    · Family history of cerebrovascular accident (CVA) (V17 1) (Z82 3)   · Family history of Stroke Syndrome (V17 1)  Son    · Family history of diabetes mellitus (V18 0) (Z83 3)  Brother    · Family history of malignant neoplasm (V16 9) (Z80 9)     The family history was reviewed and updated today  Social History    · Former smoker (V15 82) (H66 383)   · SMOKED MOST OF HIS ADULT LIFE SINCE AGE 13 OR 13 ONE PACK PER DAY QUIT IN      DECEMBER 2002   · Former smoker (K09 52) (I74 879)   · History of tobacco use (V15 82) (Z87 891)   ·    · Occupation:   · SECURITY  The social history was reviewed and updated today  The social history was reviewed and is unchanged  Current Meds   1  Advair -21 MCG/ACT Inhalation Aerosol; INHALE 2 PUFFS,  BY MOUTH, TWICE   DAILY; Therapy: 93Uoy2751 to (Mayito Barajas)  Requested for: 83KZZ2061; Last   Rx:05Oct2017; Status: ACTIVE - Retrospective By Protocol Authorization Ordered   2  Aspirin Low Dose 81 MG TABS; TAKE 1 TABLET DAILY; Therapy: (Recorded:07Sep2017) to Recorded   3  Carvedilol 6 25 MG Oral Tablet; 1 two times a day  Requested for: 07Sep2017; Last   SQ:42GDL3002 Ordered   4  Clotrimazole-Betamethasone 1-0 05 % External Cream; APPLY SPARINGLY TO THE   AFFECTED AREA(S) TWICE DAILY; Therapy: 33Fau5878 to (Evaluate:54Yyj1075)  Requested for: 30Kur8127; Last   Rx:94Swh2978 Ordered   5  Combivent Respimat  MCG/ACT Inhalation Aerosol Solution; ONE INHALATION   4 TIMES DAILY (MAX OF 6 INHALATIONS PER 24 HOURS); Therapy: 53GIY0461 to (Evaluate:38Lod8674)  Requested for: 23KAO4979; Last   BS:49MQY5848 Ordered   6  Eucrisa 2 % External Ointment; Applied to bilateral leg twice daily for 2 weeks; Therapy: 86Zvs7737 to (Evaluate:77Hfy3899)  Requested for: 60Qip4582; Last   Rx:35Ykb1542 Ordered   7  Fosinopril Sodium 20 MG Oral Tablet; TAKE 1 TABLET DAILY  Requested for:   51Xwv6546; Last RU:64FZS7414 Ordered   8  Furosemide 40 MG Oral Tablet; take 1 tablet every day; Therapy: 39FEH0883 to (Saurav Martin)  Requested for: 17RXS4162; Last   Rx:02Jan2018 Ordered   9   Ipratropium-Albuterol 0 5-2 5 (3) MG/3ML Inhalation Solution; USE 1 UNIT DOSE IN   NEBULIZER 4 TIMES DAILY; Therapy: 17LYR0933 to (Evaluate:68Hgz2967)  Requested for: 48Pxa4688; Last   Rx:28Nov2014 Ordered   10  Ketoconazole 2 % External Cream; APPLY A THIN LAYER TO AFFECTED AREA(S)    TWICE DAILY; Therapy: 95PDG6127 to ([phone] [id number])  Requested for: 22SQR2181; Last    Rx:89Yrm0088 Ordered   11  MetFORMIN HCl - 1000 MG Oral Tablet; Take 1 tablet twice daily; Therapy: 83QJA1013 to (Evaluate:36Gla9266)  Requested for: 88TLE9675; Last    Rx:27Nov2017 Ordered   12  PredniSONE 20 MG Oral Tablet; 2 TABLETS DAILY FOR 5 DAYS, THEN 1 TABLET    DAILY FOR 5 DAYS  CALL OFFICE WHEN COMPLETE; Therapy: 82EIY3091 to ([phone])  Requested for: 52HZD6926; Last    Rx:12Oct2017; Status: ACTIVE - Retrospective By Protocol Authorization Ordered   13  PredniSONE 20 MG Oral Tablet; start with 3 tabs daily and taper as directed; Therapy: 95PKB0835 to (Evaluate:20Nov2017)  Requested for: 36WKZ2760; Last    Rx:10Nov2017 Ordered   14  Prodigy No Coding Blood Gluc In Vitro Strip; USE  1  STRIP TWICE DAILY; Therapy: 83USC8182 to (Evaluate:23Nuc9374)  Requested for: 18Fis0053; Last    Rx:27Apr2015 Ordered   15  Prodigy Twist Top Lancets 28G Miscellaneous; Therapy: 31FPG6493 to Recorded   16  Simvastatin 10 MG Oral Tablet; take 1 tablet every day; Therapy: 67Feu4567 to (Evaluate:25Hkp3880)  Requested for: 87PJP7418; Last    Rx:10Nov2017 Ordered   17  Spironolactone 25 MG Oral Tablet; TAKE 1 TABLET DAILY; Therapy: 31ABP0631 to (Evaluate:98Kyo1197)  Requested for: 63UDK6196; Last    Rx:25Kvm2942 Ordered   18  Terbinafine HCl - 250 MG Oral Tablet; one tab po qod; Therapy: 70MRP2166 to (Evaluate:47Ttx1339)  Requested for: 22IPN9765; Last    Rx:84Nha0775 Ordered   19  Vitamin C 1000 MG Oral Tablet; Therapy: (Kaiivan Guero) to Recorded   20   Warfarin Sodium 1 MG Oral Tablet; TAKE 1 TABLET Daily OR AS DIRECTED BY    PHYSICIAN AFTER INR BLOOD DRAW;    Therapy: 23BEF2494 to (Evaluate:02Apr2018) Requested for: 02Jan2018; Last    Rx:02Jan2018 Ordered   21  Warfarin Sodium 5 MG Oral Tablet; TAKE 1 TABLET DAILY OR AS DIRECTED BY    PHYSICIAN AFTER INR BLOOD DRAW;    Therapy: 45RSQ6170 to (Evaluate:57Ktv5697)  Requested for: 27Nov2017; Last    Rx:27Nov2017 Ordered     The medication list was reviewed and updated today  Allergies    1  No Known Drug Allergies     Vitals    Recorded: 26OTK1178 09:21AM   Heart Rate 84   Respiration 18   Systolic 463   Diastolic 82   Height 5 ft 11 in   Weight 152 lb    BMI Calculated 21 2   BSA Calculated 1 88      Physical Exam  Left Foot: Appearance: Normal except as noted: excessive pronation and pes planus  Great toe deformities include a bunion  Right Foot: Appearance: Normal except as noted: excessive pronation and pes planus  Left Ankle: ROM: limited ROM in all planes Motor: Normal    Right Ankle: ROM: limited ROM in all planes Motor: Normal    Neurological Exam: performed  Light touch was intact bilaterally  Vibratory sensation was absent bilaterally  Deep tendon reflexes: achilles reflex absent bilateraly  Vascular Exam: performed Dorsalis pedis pulses were diminished bilaterally  Posterior tibial pulses were diminished bilaterally  Elevation Pallor: present bilaterally  Dependence rubor was present bilaterally  Capillary refill time was Q9 findings bilateral  Negative digital hair noted  Positive abnormal cooling bilateral, but between 1-3 seconds bilaterally  Edema: mild bilaterally  Toenails: All of the toenails were elongated, hypertrophied, discolored and Dystrophic with mycosis  Both first toenails were Mycotic with early onychogryphosis       Socks and shoes removed, Right Foot Findings: erythematous and dry  The sensory exam showed diminished vibratory sensation at the level of the toes  Diminished tactile sensation with monofilament testing throughout the right foot  Socks and shoes removed, Left Foot Findings: erythematous and dry     The sensory exam showed diminished vibratory sensation at the level of the toes  Diminished tactile sensation with monofilament testing throughout the left foot  There are no diagnoses linked to this encounter  Subjective:      Patient ID: Micah Nicholson is a 76 y o  male  HPI    The following portions of the patient's history were reviewed and updated as appropriate: allergies, current medications, past family history, past medical history, past social history, past surgical history and problem list     Review of Systems      Objective: Foot Exam    Right Foot/Ankle     Inspection and Palpation  Skin Exam: callus and dry skin;     Neurovascular  Dorsalis pedis: 0  Posterior tibial: 1+      Left Foot/Ankle      Inspection and Palpation  Skin Exam: callus and dry skin;     Neurovascular  Dorsalis pedis: 0  Posterior tibial: 1+        Physical Exam   Cardiovascular: Pulses are weak pulses  Pulses:       Dorsalis pedis pulses are 0 on the right side, and 0 on the left side  Posterior tibial pulses are 1+ on the right side, and 1+ on the left side  Feet:   Right Foot:   Skin Integrity: Positive for callus and dry skin  Left Foot:   Skin Integrity: Positive for callus and dry skin  Patient's shoes and socks removed  Right Foot/Ankle   Right Foot Inspection  Skin Exam: dry skin, callus and callus                            Sensory   Vibration: diminished  Proprioception: diminished   Monofilament testing: diminished  Vascular  Capillary refills: elevated  The right DP pulse is 0  The right PT pulse is 1+  Left Foot/Ankle  Left Foot Inspection  Skin Exam: dry skin and callus                                         Sensory   Vibration: diminished  Proprioception: diminished  Monofilament: diminished  Vascular  Capillary refills: elevated  The left DP pulse is 0  The left PT pulse is 1+  Assign Risk Category:  Deformity present;  Loss of protective sensation; Weak pulses       Risk: 2

## 2018-04-20 LAB — INR PPP: 2 (ref 0.86–1.16)

## 2018-04-21 ENCOUNTER — ANTICOAG VISIT (OUTPATIENT)
Dept: FAMILY MEDICINE CLINIC | Facility: CLINIC | Age: 78
End: 2018-04-21

## 2018-04-22 ENCOUNTER — TELEPHONE (OUTPATIENT)
Dept: FAMILY MEDICINE CLINIC | Facility: CLINIC | Age: 78
End: 2018-04-22

## 2018-04-27 ENCOUNTER — ANTICOAG VISIT (OUTPATIENT)
Dept: FAMILY MEDICINE CLINIC | Facility: CLINIC | Age: 78
End: 2018-04-27

## 2018-04-27 DIAGNOSIS — I48.20 CHRONIC ATRIAL FIBRILLATION (HCC): Primary | ICD-10-CM

## 2018-04-27 LAB — INR PPP: 2 (ref 0.86–1.16)

## 2018-04-30 RX ORDER — WARFARIN SODIUM 1 MG/1
TABLET ORAL
Qty: 90 TABLET | Refills: 0 | Status: SHIPPED | OUTPATIENT
Start: 2018-04-30 | End: 2018-08-01 | Stop reason: SDUPTHER

## 2018-05-04 ENCOUNTER — ANTICOAG VISIT (OUTPATIENT)
Dept: FAMILY MEDICINE CLINIC | Facility: CLINIC | Age: 78
End: 2018-05-04

## 2018-05-04 LAB — INR PPP: 2.2 (ref 0.86–1.16)

## 2018-05-04 NOTE — PROGRESS NOTES
Talked with pt  No questions  Will recheck next week as per policy of home INR, even though Dr Sherren Hey stated 1 month check  Same dose    Pt does not need any refills  rmklpn

## 2018-05-18 ENCOUNTER — TELEPHONE (OUTPATIENT)
Dept: FAMILY MEDICINE CLINIC | Facility: CLINIC | Age: 78
End: 2018-05-18

## 2018-05-18 ENCOUNTER — ANTICOAG VISIT (OUTPATIENT)
Dept: FAMILY MEDICINE CLINIC | Facility: CLINIC | Age: 78
End: 2018-05-18

## 2018-05-18 LAB — INR PPP: 1.9 (ref 0.86–1.17)

## 2018-05-18 NOTE — TELEPHONE ENCOUNTER
Called pt asked where the change of dose came from due to our records showing something else  pt response was no one  advised him to change dose he adjust the dose himself , has been doing this for over ten years  af/rma

## 2018-05-18 NOTE — TELEPHONE ENCOUNTER
Spoke with pt states  Taking 6mg Tuesday and Friday and 5 5mg all other days  Spoke with Dr Lita Morocho made aware  This dose was different than the dose annotated in the [address] [facility] calendar  Received verbal to continue with dose change to 6mg and redraw on Monday  Made pt aware to take 6mg daily and recheck Monday  af/rma

## 2018-05-21 ENCOUNTER — TELEPHONE (OUTPATIENT)
Dept: FAMILY MEDICINE CLINIC | Facility: CLINIC | Age: 78
End: 2018-05-21

## 2018-05-21 ENCOUNTER — ANTICOAG VISIT (OUTPATIENT)
Dept: FAMILY MEDICINE CLINIC | Facility: CLINIC | Age: 78
End: 2018-05-21

## 2018-05-21 LAB
INR PPP: 2.1 (ref 0.86–1.17)
INR PPP: 2.1 (ref 0.86–1.17)

## 2018-05-21 NOTE — TELEPHONE ENCOUNTER
Spoke to pt-told to take 6mg once  A day for two weeks  & have inr recked in two weeks--pt refused to take this dose -when questioned he was not sure what he was going to take --will have inr recked in two weeks-he was very adement about not taking prescribed dose--lj

## 2018-05-28 LAB — INR PPP: 2.3 (ref 0.86–1.17)

## 2018-05-29 ENCOUNTER — ANTICOAG VISIT (OUTPATIENT)
Dept: FAMILY MEDICINE CLINIC | Facility: CLINIC | Age: 78
End: 2018-05-29

## 2018-06-11 ENCOUNTER — ANTICOAG VISIT (OUTPATIENT)
Dept: FAMILY MEDICINE CLINIC | Facility: CLINIC | Age: 78
End: 2018-06-11

## 2018-06-11 ENCOUNTER — TELEPHONE (OUTPATIENT)
Dept: FAMILY MEDICINE CLINIC | Facility: CLINIC | Age: 78
End: 2018-06-11

## 2018-06-11 LAB — INR PPP: 2.4 (ref 0.86–1.17)

## 2018-06-11 NOTE — TELEPHONE ENCOUNTER
Talked with pt  No questions No refill needed  Will recheck INR in 2 weeks  Pt aware Dr Juanell Mortimer stated 1 month    According to Home INR he stated 2 weeks is the longest he can wait   rmklpn

## 2018-06-18 ENCOUNTER — OFFICE VISIT (OUTPATIENT)
Dept: PODIATRY | Facility: CLINIC | Age: 78
End: 2018-06-18
Payer: COMMERCIAL

## 2018-06-18 VITALS — BODY MASS INDEX: 21 KG/M2 | HEIGHT: 71 IN | WEIGHT: 150 LBS

## 2018-06-18 DIAGNOSIS — B35.1 ONYCHOMYCOSIS: ICD-10-CM

## 2018-06-18 DIAGNOSIS — E11.42 TYPE 2 DIABETES MELLITUS WITH DIABETIC POLYNEUROPATHY, WITHOUT LONG-TERM CURRENT USE OF INSULIN (HCC): ICD-10-CM

## 2018-06-18 DIAGNOSIS — I70.209 PERIPHERAL ARTERIOSCLEROSIS (HCC): ICD-10-CM

## 2018-06-18 DIAGNOSIS — M79.672 PAIN IN BOTH FEET: Primary | ICD-10-CM

## 2018-06-18 DIAGNOSIS — M79.671 PAIN IN BOTH FEET: Primary | ICD-10-CM

## 2018-06-18 DIAGNOSIS — B35.3 TINEA PEDIS OF BOTH FEET: ICD-10-CM

## 2018-06-18 PROCEDURE — 99212 OFFICE O/P EST SF 10 MIN: CPT | Performed by: PODIATRIST

## 2018-06-18 RX ORDER — TERBINAFINE HYDROCHLORIDE 250 MG/1
TABLET ORAL
Qty: 15 TABLET | Refills: 0 | Status: SHIPPED | OUTPATIENT
Start: 2018-06-18 | End: 2018-07-18

## 2018-06-18 RX ORDER — KETOCONAZOLE 20 MG/G
CREAM TOPICAL DAILY
Qty: 60 G | Refills: 0 | Status: SHIPPED | OUTPATIENT
Start: 2018-06-18 | End: 2018-07-05 | Stop reason: SDUPTHER

## 2018-06-18 NOTE — PROGRESS NOTES
Procedures patient has itchy feet  He has been diagnosed with vesicular tinea pedis  We will add topical and oral antifungals  Foot Exam          Review of Systems     Constitutional: no fever, not feeling poorly, no recent weight gain and no chills  Eyes: eyesight problems and wears glasses  ENT: no sore throat, no nasal discharge and no hoarseness  Cardiovascular: no chest pain, no palpitations and no extremity edema  Respiratory: as noted in HPI and no wheezing  Gastrointestinal: no abdominal pain, no nausea, no vomiting and no diarrhea  Genitourinary: no dysuria  Musculoskeletal: no arthralgias  Integumentary: no rashes and no skin lesions  Neurological: no headache, no numbness and no dizziness  Psychiatric: no anxiety and no depression  Endocrine: no muscle weakness     Hematologic/Lymphatic: no swollen glands       Active Problems    1  Acquired ankle/foot deformity (736 70) (M21 969)   2  Anticoagulant long-term use (V58 61) (Z79 01)   3  Arteriosclerosis of arteries of extremities (440 20) (I70 209)   4  Arteriosclerosis of coronary artery (414 00) (I25 10)   5  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)   6  Atrial fibrillation (427 31) (I48 91)   7  Bilateral carotid bruits (785 9) (R09 89)   8  Centrilobular emphysema (492 8) (J43 2)   9  Chronic hypoxemic respiratory failure (440 08,630 64) (J96 11)   10  Chronic obstructive pulmonary disease (496) (J44 9)   11  Contusion of right lung (861 21) (S27 321A)   12  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)   13  COPD with exacerbation (491 21) (J44 1)   14  Diabetes mellitus type 2, noninsulin dependent (250 00) (E11 9)   15  Dyspnea on exertion (786 09) (R06 09)   16  Eczema (692 9) (L30 9)   17  Fatigue (780 79) (R53 83)   18  Follow up (V67 9) (Z09)   19  Foot pain, bilateral (729 5) (M79 671,M79 672)   20  Heart failure, left, 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% (428 1) (I50 1)   21  History of tobacco use (V15 82) (Z87 891)   22  Hypertension (401 9) (I10)   23  Hypoxia (799 02) (R09 02)   24  Lung nodule (793 11) (R91 1)   25  Multiple fractures of ribs of right side (807 09) (S22 41XA)   26  Need for influenza vaccination (V04 81) (Z23)   27  Need for vaccination (V05 9) (Z23)   28  On angiotensin-converting enzyme (ACE) inhibitors (V07 52) (Z79 899)   29  Onychomycosis (110 1) (B35 1)   30  Pain, chest wall (786 52) (R07 89)   31  Pes planus, congenital (754 61) (Q66 50)   32  Pneumonia of right lower lobe due to infectious organism (486) (J18 1)   33  Pulmonary emphysema (492 8) (J43 9)   34  Rib fractures (807 00) (S22 39XA)   35  Screening for cardiovascular condition (V81 2) (Z13 6)   36  Screening for genitourinary condition (V81 6) (Z13 89)   37  Screening for thyroid disorder (V77 0) (Z13 29)   38  Screening PSA (prostate specific antigen) (V76 44) (Z12 5)   39  Severe left ventricular systolic dysfunction (825 2) (I51 9)   40  Shortness of breath (786 05) (R06 02)   41  Tinea pedis (110 4) (B35 3)   42  Type 2 diabetes mellitus with diabetic polyneuropathy, without long-term current use of    insulin (250 60,357 2) (E11 42)     Past Medical History    · History of Acute bronchitis (466 0) (J20 9)   · History of Acute Non-Q-wave Myocardial Infarction (410 70)   · History of Acute respiratory distress (518 82) (R06 03)   · History of Cardiac arrhythmia (427 9) (I49 9)   · History of Cardiac Catheterization  (Diagnostic)   · History of Congestive heart failure (428 0) (I50 9)   · History of Cough (786 2) (R05)   · History of CXR Solitary Pulmonary Nodule Left Lung Lower Lobe   · History of Diverticulosis (562 10) (K57 90)   · History of emphysema (V12 69) (Z87 09)   · History of stroke (V12 54) (Z86 73)   · History of Hyperinflation of lungs (786 9) (R09 89)   · History of Ischemic cardiomyopathy (414 8) (I25 5)   · Personal history of cardiomyopathy (V12 59) (Z86 79)   · Personal history of ventricular tachycardia (V12 59) (Z86 79)   · History of Pneumonia (V12 61)   · History of Spontaneous Pneumothorax (512 89)     Surgical History    · History of Cardioverter-defibrillator Pulse Generator Replacement   · History of Chest Tube Insertion   · History of Colonoscopy   · History of Femoral Hernia Repair Unilateral   · History of Implantable Cardioverter-Defibrillator     The surgical history was reviewed and updated today        Family History  Mother    · Family history of cerebrovascular accident (CVA) (V17 1) (Z82 3)   · Family history of Stroke Syndrome (V17 1)  Son    · Family history of diabetes mellitus (V18 0) (Z83 3)  Brother    · Family history of malignant neoplasm (V16 9) (Z80 9)     The family history was reviewed and updated today        Social History    · Former smoker (V15 82) (Z87 891)   · SMOKED MOST OF HIS ADULT LIFE SINCE AGE 12 OR 13 ONE PACK PER DAY QUIT IN      DECEMBER 2002   · Former smoker (V15 82) (Z87 891)   · History of tobacco use (V15 82) (Z87 891)   ·    · Occupation:   · SECURITY  The social history was reviewed and updated today   The social history was reviewed and is unchanged       Current Meds   1  Advair -21 MCG/ACT Inhalation Aerosol; INHALE 2 PUFFS,  BY MOUTH, TWICE   DAILY;   Therapy: 67WVM7138 to (Lou Martínez)  Requested for: 12ECR3781; Last   Rx:05Oct2017; Status: ACTIVE - Retrospective By Protocol Authorization Ordered   2  Aspirin Low Dose 81 MG TABS; TAKE 1 TABLET DAILY;   Therapy: (Recorded:38Yba7008) to Recorded   3  Carvedilol 6 25 MG Oral Tablet; 1 two times a day  Requested for: 51Elu5348; Last   Rx:81Vxw4830 Ordered   4  Clotrimazole-Betamethasone 1-0 05 % External Cream; APPLY SPARINGLY TO THE   AFFECTED AREA(S) TWICE DAILY;   Therapy: 44ZLI0278 to (Evaluate:12Mob1308)  Requested for: 09Zry3798; Last   Rx:67Tad0583 Ordered   5  Combivent Respimat  MCG/ACT Inhalation Aerosol Solution; ONE INHALATION   4 TIMES DAILY (MAX OF 6 INHALATIONS PER 24 HOURS);   Therapy: 09GUB9288 to (Evaluate:49Twv6955)  Requested for: 86EUH3973; Last   Rx:2018 Ordered   6  Eucrisa 2 % External Ointment; Applied to bilateral leg twice daily for 2 weeks;   Therapy: 00Gvp4023 to (Evaluate:79Tju0649)  Requested for: 39Vvn7220; Last   Rx:28Ath1283 Ordered   7  Fosinopril Sodium 20 MG Oral Tablet; TAKE 1 TABLET DAILY  Requested for:   37Yag4003; Last Rx:2017 Ordered   8  Furosemide 40 MG Oral Tablet; take 1 tablet every day;  Goff Kameron: 26RQO2377 to (Hunter Morales)  Requested for: 34IRE4654; Last   Rx:2018 Ordered   9  Ipratropium-Albuterol 0 5-2 5 (3) MG/3ML Inhalation Solution; USE 1 UNIT DOSE IN   NEBULIZER 4 TIMES DAILY;   Therapy: 57WKM6913 to (Evaluate:85Wpw8667)  Requested for: 88Utu6833; Last   Rx:2014 Ordered   10  Ketoconazole 2 % External Cream; APPLY A THIN LAYER TO AFFECTED AREA(S)    TWICE DAILY;    Therapy: 98JSS0333 to (Evaluate:2017)  Requested for: 37GTU6406; Last    Rx:16Yfh6815 Ordered   11  MetFORMIN HCl - 1000 MG Oral Tablet;  Take 1 tablet twice daily;    Therapy: 83HKJ9583 to (Prema Sandra)  Requested for: 26MYM0775; Last    Rx:2017 Ordered   12  PredniSONE 20 MG Oral Tablet; 2 TABLETS DAILY FOR 5 DAYS, THEN 1 TABLET    DAILY FOR 5 DAYS  CALL OFFICE WHEN COMPLETE;    Therapy: 15SQZ7155 to (Evaluate:2017)  Requested for: 17EBE0027; Last    Rx:2017; Status: ACTIVE - Retrospective By Protocol Authorization Ordered   13  PredniSONE 20 MG Oral Tablet; start with 3 tabs daily and taper as directed;    Therapy: 66GVE9143 to (Evaluate:2017)  Requested for: 66PMM4460; Last    Rx:2017 Ordered   14  Prodigy No Coding Blood Gluc In Vitro Strip; USE  1  STRIP TWICE DAILY;    Therapy: 05JAL1161 to (Evaluate:13Wvh2330)  Requested for: 18Xli6924; Last    Rx:2015 Ordered   15  Prodigy Twist Top Lancets 28G Miscellaneous;    Therapy: 07VYI6293 to Recorded   16  Simvastatin 10 MG Oral Tablet; take 1 tablet every day;    Therapy: 2017 to (Evaluate:50Npb4133)  Requested for: 42NWR7749; Last    Rx:10Nov2017 Ordered   17  Spironolactone 25 MG Oral Tablet; TAKE 1 TABLET DAILY;    Therapy: 61ACI5794 to (Evaluate:05Ybm8202)  Requested for: 78UNV2950; Last    Rx:24Dws2460 Ordered   18  Terbinafine HCl - 250 MG Oral Tablet; one tab po qod;    Therapy: 68PAY9629 to (Sweetie Castelan)  Requested for: 23Nvw2940; Last    Rx:74Mcw0635 Ordered   19  Vitamin C 1000 MG Oral Tablet;    Therapy: (Recorded:29Xoo1871) to Recorded   20  Warfarin Sodium 1 MG Oral Tablet; TAKE 1 TABLET Daily OR AS DIRECTED BY    PHYSICIAN AFTER INR BLOOD DRAW;    Therapy: 31UAQ1424 to (Evaluate:02Apr2018)  Requested for: 91COV7311; Last    Rx:02Jan2018 Ordered   21  Warfarin Sodium 5 MG Oral Tablet; TAKE 1 TABLET DAILY OR AS DIRECTED BY    PHYSICIAN AFTER INR BLOOD DRAW;  Marcia Jimenez: 60LPD2200 to (Evaluate:32Mbf2161)  Requested for: 15DSU1973; Last    Rx:27Nov2017 Ordered     The medication list was reviewed and updated today        Allergies    1  No Known Drug Allergies     Vitals       Heart Rate 84   Respiration 18   Systolic 684   Diastolic 82   Height 5 ft 11 in   Weight 152 lb    BMI Calculated 21 2   BSA Calculated 1 88      Physical Exam  Left Foot: Appearance: Normal except as noted: excessive pronation and pes planus  Great toe deformities include a bunion  Right Foot: Appearance: Normal except as noted: excessive pronation and pes planus  Left Ankle: ROM: limited ROM in all planes Motor: Normal    Right Ankle: ROM: limited ROM in all planes Motor: Normal    Neurological Exam: performed  Light touch was intact bilaterally  Vibratory sensation was absent bilaterally  Deep tendon reflexes: achilles reflex absent bilateraly  Vascular Exam: performed Dorsalis pedis pulses were diminished bilaterally  Posterior tibial pulses were diminished bilaterally  Elevation Pallor: present bilaterally  Dependence rubor was present bilaterally   Capillary refill time was Q9 findings bilateral  Negative digital hair noted  Positive abnormal cooling bilateral, but between 1-3 seconds bilaterally  Edema: mild bilaterally  Toenails: All of the toenails were elongated, hypertrophied, discolored and Dystrophic with mycosis  Both first toenails were Mycotic with early onychogryphosis       Socks and shoes removed, Right Foot Findings: erythematous and dry  The sensory exam showed diminished vibratory sensation at the level of the toes  Diminished tactile sensation with monofilament testing throughout the right foot    Socks and shoes removed, Left Foot Findings: erythematous and dry  The sensory exam showed diminished vibratory sensation at the level of the toes   Diminished tactile sensation with monofilament testing throughout the left foot        Plantar aspect foot bilateral demonstrates vesicular tinea pedis bilateral  Positive mycotic toenail as well

## 2018-06-22 ENCOUNTER — TELEPHONE (OUTPATIENT)
Dept: FAMILY MEDICINE CLINIC | Facility: CLINIC | Age: 78
End: 2018-06-22

## 2018-06-22 DIAGNOSIS — I25.10 ARTERIOSCLEROSIS OF CORONARY ARTERY: ICD-10-CM

## 2018-06-22 DIAGNOSIS — R09.89 BILATERAL CAROTID BRUITS: ICD-10-CM

## 2018-06-22 DIAGNOSIS — E11.42 TYPE 2 DIABETES MELLITUS WITH DIABETIC POLYNEUROPATHY, WITHOUT LONG-TERM CURRENT USE OF INSULIN (HCC): Primary | ICD-10-CM

## 2018-06-22 RX ORDER — SIMVASTATIN 10 MG
10 TABLET ORAL DAILY
Qty: 90 TABLET | Refills: 0 | Status: SHIPPED | OUTPATIENT
Start: 2018-06-22 | End: 2018-06-26 | Stop reason: SDUPTHER

## 2018-06-25 LAB — INR PPP: 1.8 (ref 0.86–1.17)

## 2018-06-26 RX ORDER — SIMVASTATIN 10 MG
10 TABLET ORAL DAILY
Qty: 90 TABLET | Refills: 3 | Status: SHIPPED | OUTPATIENT
Start: 2018-06-26 | End: 2019-06-21 | Stop reason: SDUPTHER

## 2018-06-26 NOTE — TELEPHONE ENCOUNTER
[facility]  90 DAYS WITH 3 REFILLS  HE CALLED TO SAY THAT IT WAS SENT TO [facility]

## 2018-06-27 ENCOUNTER — ANTICOAG VISIT (OUTPATIENT)
Dept: FAMILY MEDICINE CLINIC | Facility: CLINIC | Age: 78
End: 2018-06-27

## 2018-07-02 ENCOUNTER — TELEPHONE (OUTPATIENT)
Dept: FAMILY MEDICINE CLINIC | Facility: CLINIC | Age: 78
End: 2018-07-02

## 2018-07-02 ENCOUNTER — ANTICOAG VISIT (OUTPATIENT)
Dept: FAMILY MEDICINE CLINIC | Facility: CLINIC | Age: 78
End: 2018-07-02

## 2018-07-02 LAB
INR PPP: 2 (ref 0.86–1.17)
INR PPP: 2 (ref 0.86–1.17)

## 2018-07-05 DIAGNOSIS — B35.3 TINEA PEDIS OF BOTH FEET: ICD-10-CM

## 2018-07-05 DIAGNOSIS — B35.1 ONYCHOMYCOSIS: ICD-10-CM

## 2018-07-05 RX ORDER — KETOCONAZOLE 20 MG/G
CREAM TOPICAL DAILY
Qty: 60 G | Refills: 0 | Status: SHIPPED | OUTPATIENT
Start: 2018-07-05 | End: 2018-08-23

## 2018-07-15 LAB — INR PPP: 2.4 (ref 0.86–1.17)

## 2018-07-16 ENCOUNTER — OFFICE VISIT (OUTPATIENT)
Dept: CARDIOLOGY CLINIC | Facility: CLINIC | Age: 78
End: 2018-07-16
Payer: COMMERCIAL

## 2018-07-16 VITALS
WEIGHT: 149 LBS | BODY MASS INDEX: 20.86 KG/M2 | HEART RATE: 87 BPM | OXYGEN SATURATION: 92 % | HEIGHT: 71 IN | DIASTOLIC BLOOD PRESSURE: 70 MMHG | SYSTOLIC BLOOD PRESSURE: 110 MMHG

## 2018-07-16 DIAGNOSIS — I50.22 CHRONIC SYSTOLIC (CONGESTIVE) HEART FAILURE (HCC): Primary | ICD-10-CM

## 2018-07-16 DIAGNOSIS — I25.10 ARTERIOSCLEROSIS OF CORONARY ARTERY: ICD-10-CM

## 2018-07-16 PROCEDURE — 93000 ELECTROCARDIOGRAM COMPLETE: CPT | Performed by: INTERNAL MEDICINE

## 2018-07-16 PROCEDURE — 99214 OFFICE O/P EST MOD 30 MIN: CPT | Performed by: INTERNAL MEDICINE
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
## 2018-07-17 ENCOUNTER — OFFICE VISIT (OUTPATIENT)
Dept: PODIATRY | Facility: CLINIC | Age: 78
End: 2018-07-17
Payer: COMMERCIAL

## 2018-07-17 VITALS
RESPIRATION RATE: 16 BRPM | HEIGHT: 71 IN | BODY MASS INDEX: 20.86 KG/M2 | HEART RATE: 87 BPM | WEIGHT: 149 LBS | SYSTOLIC BLOOD PRESSURE: 129 MMHG | DIASTOLIC BLOOD PRESSURE: 64 MMHG

## 2018-07-17 DIAGNOSIS — B35.1 ONYCHOMYCOSIS: ICD-10-CM

## 2018-07-17 DIAGNOSIS — I25.10 ARTERIOSCLEROSIS OF CORONARY ARTERY: ICD-10-CM

## 2018-07-17 DIAGNOSIS — E11.42 TYPE 2 DIABETES MELLITUS WITH DIABETIC POLYNEUROPATHY, WITHOUT LONG-TERM CURRENT USE OF INSULIN (HCC): Primary | ICD-10-CM

## 2018-07-17 DIAGNOSIS — M79.672 PAIN IN BOTH FEET: ICD-10-CM

## 2018-07-17 DIAGNOSIS — M79.671 PAIN IN BOTH FEET: ICD-10-CM

## 2018-07-17 PROCEDURE — 11721 DEBRIDE NAIL 6 OR MORE: CPT | Performed by: PODIATRIST

## 2018-07-17 NOTE — PROGRESS NOTES
Procedures   Foot Exam     Patient complains of pain in his feet with ambulation  He has pain with shoe wear  No history of trauma        Review of Systems     Constitutional: no fever, not feeling poorly, no recent weight gain and no chills  Eyes: eyesight problems and wears glasses  ENT: no sore throat, no nasal discharge and no hoarseness  Cardiovascular: no chest pain, no palpitations and no extremity edema  Respiratory: as noted in HPI and no wheezing  Gastrointestinal: no abdominal pain, no nausea, no vomiting and no diarrhea  Genitourinary: no dysuria  Musculoskeletal: no arthralgias  Integumentary: no rashes and no skin lesions  Neurological: no headache, no numbness and no dizziness  Psychiatric: no anxiety and no depression  Endocrine: no muscle weakness     Hematologic/Lymphatic: no swollen glands       Active Problems    1  Acquired ankle/foot deformity (736 70) (M21 969)   2  Anticoagulant long-term use (V58 61) (Z79 01)   3  Arteriosclerosis of arteries of extremities (440 20) (I70 209)   4  Arteriosclerosis of coronary artery (414 00) (I25 10)   5  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)   6  Atrial fibrillation (427 31) (I48 91)   7  Bilateral carotid bruits (785 9) (R09 89)   8  Centrilobular emphysema (492 8) (J43 2)   9  Chronic hypoxemic respiratory failure (825 76,064 27) (J96 11)   10  Chronic obstructive pulmonary disease (496) (J44 9)   11  Contusion of right lung (861 21) (S27 321A)   12  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)   13  COPD with exacerbation (491 21) (J44 1)   14  Diabetes mellitus type 2, noninsulin dependent (250 00) (E11 9)   15  Dyspnea on exertion (786 09) (R06 09)   16  Eczema (692 9) (L30 9)   17  Fatigue (780 79) (R53 83)   18  Follow up (V67 9) (Z09)   19  Foot pain, bilateral (729 5) (M79 671,M79 672)   20  Heart failure, left, 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% (428 1) (I50 1)   21  History of tobacco use (V15 82) (Z87 891)   22  Hypertension (401 9) Thyroid is normal , continue current synthroid dose (I10)   23  Hypoxia (799 02) (R09 02)   24  Lung nodule (793 11) (R91 1)   25  Multiple fractures of ribs of right side (807 09) (S22 41XA)   26  Need for influenza vaccination (V04 81) (Z23)   27  Need for vaccination (V05 9) (Z23)   28  On angiotensin-converting enzyme (ACE) inhibitors (V07 52) (Z79 899)   29  Onychomycosis (110 1) (B35 1)   30  Pain, chest wall (786 52) (R07 89)   31  Pes planus, congenital (754 61) (Q66 50)   32  Pneumonia of right lower lobe due to infectious organism (486) (J18 1)   33  Pulmonary emphysema (492 8) (J43 9)   34  Rib fractures (807 00) (S22 39XA)   35  Screening for cardiovascular condition (V81 2) (Z13 6)   36  Screening for genitourinary condition (V81 6) (Z13 89)   37  Screening for thyroid disorder (V77 0) (Z13 29)   38  Screening PSA (prostate specific antigen) (V76 44) (Z12 5)   39  Severe left ventricular systolic dysfunction (836 3) (I51 9)   40  Shortness of breath (786 05) (R06 02)   41  Tinea pedis (110 4) (B35 3)   42  Type 2 diabetes mellitus with diabetic polyneuropathy, without long-term current use of    insulin (250 60,357 2) (E11 42)     Past Medical History    · History of Acute bronchitis (466 0) (J20 9)   · History of Acute Non-Q-wave Myocardial Infarction (410 70)   · History of Acute respiratory distress (518 82) (R06 03)   · History of Cardiac arrhythmia (427 9) (I49 9)   · History of Cardiac Catheterization  (Diagnostic)   · History of Congestive heart failure (428 0) (I50 9)   · History of Cough (786 2) (R05)   · History of CXR Solitary Pulmonary Nodule Left Lung Lower Lobe   · History of Diverticulosis (562 10) (K57 90)   · History of emphysema (V12 69) (Z87 09)   · History of stroke (V12 54) (Z86 73)   · History of Hyperinflation of lungs (786 9) (R09 89)   · History of Ischemic cardiomyopathy (414 8) (I25 5)   · Personal history of cardiomyopathy (V12 59) (Z86 79)   · Personal history of ventricular tachycardia (V12 59) (Z86 79)   · History of Pneumonia (V12 61)   · History of Spontaneous Pneumothorax (512 89)     Surgical History    · History of Cardioverter-defibrillator Pulse Generator Replacement   · History of Chest Tube Insertion   · History of Colonoscopy   · History of Femoral Hernia Repair Unilateral   · History of Implantable Cardioverter-Defibrillator     The surgical history was reviewed and updated today        Family History  Mother    · Family history of cerebrovascular accident (CVA) (V17 1) (Z82 3)   · Family history of Stroke Syndrome (V17 1)  Son    · Family history of diabetes mellitus (V18 0) (Z83 3)  Brother    · Family history of malignant neoplasm (V16 9) (Z80 9)     The family history was reviewed and updated today        Social History    · Former smoker (V15 82) (Z87 891)   · SMOKED MOST OF HIS ADULT LIFE SINCE AGE 12 OR 13 ONE PACK PER DAY QUIT IN      DECEMBER 2002   · Former smoker (V15 82) (Z87 891)   · History of tobacco use (V15 82) (Z87 891)   ·    · Occupation:   · SECURITY  The social history was reviewed and updated today   The social history was reviewed and is unchanged       Current Meds   1  Advair -21 MCG/ACT Inhalation Aerosol; INHALE 2 PUFFS,  BY MOUTH, TWICE   DAILY;   Therapy: 08YKL6971 to (Humza [place])  Requested for: 91XVW8229; Last   Rx:05Oct2017; Status: ACTIVE - Retrospective By Protocol Authorization Ordered   2  Aspirin Low Dose 81 MG TABS; TAKE 1 TABLET DAILY;   Therapy: (Recorded:89Cja4328) to Recorded   3  Carvedilol 6 25 MG Oral Tablet; 1 two times a day  Requested for: 90Wkn6781; Last   Rx:49Dfb8133 Ordered   4  Clotrimazole-Betamethasone 1-0 05 % External Cream; APPLY SPARINGLY TO THE   AFFECTED AREA(S) TWICE DAILY;   Therapy: 58WFS7374 to (Evaluate:60Zvq8375)  Requested for: 71Ilc5013; Last   Rx:99Aae5815 Ordered   5  Combivent Respimat  MCG/ACT Inhalation Aerosol Solution; ONE INHALATION   4 TIMES DAILY (MAX OF 6 INHALATIONS PER 24 HOURS);   Therapy: 69BNU5423 to (Evaluate:25Mlk3858)  Requested for: 39XSQ1724; Last   Rx:03Jan2018 Ordered   6  Eucrisa 2 % External Ointment; Applied to bilateral leg twice daily for 2 weeks;   Therapy: 64Xjw4538 to (Evaluate:26Aoz3451)  Requested for: 59Dtt1195; Last   Rx:98Ory6434 Ordered   7  Fosinopril Sodium 20 MG Oral Tablet; TAKE 1 TABLET DAILY  Requested for:   58Yrc7966; Last Rx:18Oiv9445 Ordered   8  Furosemide 40 MG Oral Tablet; take 1 tablet every day;  Ferd Plank: 35ZYJ1437 to (Syble Sleeper)  Requested for: 11PHY9616; Last   Rx:02Jan2018 Ordered   9  Ipratropium-Albuterol 0 5-2 5 (3) MG/3ML Inhalation Solution; USE 1 UNIT DOSE IN   NEBULIZER 4 TIMES DAILY;   Therapy: 56CGI0053 to (Evaluate:82Wkd0559)  Requested for: 15Mhu0727; Last   Rx:28Nov2014 Ordered   10  Ketoconazole 2 % External Cream; APPLY A THIN LAYER TO AFFECTED AREA(S)    TWICE DAILY;    Therapy: 11QYT1363 to (Evaluate:22Oct2017)  Requested for: 71HLU5866; Last    Rx:38Fmn2956 Ordered   11  MetFORMIN HCl - 1000 MG Oral Tablet;  Take 1 tablet twice daily;    Therapy: 37YWJ2795 to (Laura Ramon)  Requested for: 83JVX5819; Last    Rx:27Nov2017 Ordered   12  PredniSONE 20 MG Oral Tablet; 2 TABLETS DAILY FOR 5 DAYS, THEN 1 TABLET    DAILY FOR 5 DAYS  CALL OFFICE WHEN COMPLETE;    Therapy: 62EBB9586 to (Evaluate:22Oct2017)  Requested for: 49AAE5575; Last    Rx:12Oct2017; Status: ACTIVE - Retrospective By Protocol Authorization Ordered   13  PredniSONE 20 MG Oral Tablet; start with 3 tabs daily and taper as directed;    Therapy: 55XBH1409 to (Evaluate:20Nov2017)  Requested for: 00COS7742; Last    Rx:10Nov2017 Ordered   14  Prodigy No Coding Blood Gluc In Vitro Strip; USE  1  STRIP TWICE DAILY;    Therapy: 52FXZ0568 to (Evaluate:76Bql5896)  Requested for: 21Agg7890; Last    Rx:27Apr2015 Ordered   15  Prodigy Twist Top Lancets 28G Miscellaneous;    Therapy: 91EGR7048 to Recorded   16  Simvastatin 10 MG Oral Tablet; take 1 tablet every day;    Therapy: 20Apr2017 to (Evaluate:58Uva8990)  Requested for: 69HSN1108; Last    Rx:10Nov2017 Ordered   17  Spironolactone 25 MG Oral Tablet; TAKE 1 TABLET DAILY;    Therapy: 76NMG0184 to (Evaluate:02Qnh8867)  Requested for: 14GIO7167; Last    Rx:84Rbi0377 Ordered   18  Terbinafine HCl - 250 MG Oral Tablet; one tab po qod;    Therapy: 90KEG4629 to (Satish Severe)  Requested for: 47Fyb5040; Last    Rx:02Gwo5689 Ordered   19  Vitamin C 1000 MG Oral Tablet;    Therapy: (Recorded:39Pdo0358) to Recorded   20  Warfarin Sodium 1 MG Oral Tablet; TAKE 1 TABLET Daily OR AS DIRECTED BY    PHYSICIAN AFTER INR BLOOD DRAW;    Therapy: 27UWZ8800 to (Evaluate:02Apr2018)  Requested for: 83EXV6196; Last    Rx:02Jan2018 Ordered   21  Warfarin Sodium 5 MG Oral Tablet; TAKE 1 TABLET DAILY OR AS DIRECTED BY    PHYSICIAN AFTER INR BLOOD DRAW;  Mahamed Suzie: 84RGA7719 to (Evaluate:30Lsd3365)  Requested for: 76AGI6213; Last    Rx:27Nov2017 Ordered     The medication list was reviewed and updated today        Allergies    1  No Known Drug Allergies     Vitals        Heart Rate 84   Respiration 18   Systolic 393   Diastolic 82   Height 5 ft 11 in   Weight 152 lb    BMI Calculated 21 2   BSA Calculated 1 88      Physical Exam  Left Foot: Appearance: Normal except as noted: excessive pronation and pes planus  Great toe deformities include a bunion  Right Foot: Appearance: Normal except as noted: excessive pronation and pes planus  Left Ankle: ROM: limited ROM in all planes Motor: Normal    Right Ankle: ROM: limited ROM in all planes Motor: Normal    Neurological Exam: performed  Light touch was intact bilaterally  Vibratory sensation was absent bilaterally  Deep tendon reflexes: achilles reflex absent bilateraly  Vascular Exam: performed Dorsalis pedis pulses were diminished bilaterally  Posterior tibial pulses were diminished bilaterally  Elevation Pallor: present bilaterally  Dependence rubor was present bilaterally   Capillary refill time was Q9 findings bilateral  Negative digital hair noted  Positive abnormal cooling bilateral, but between 1-3 seconds bilaterally  Edema: mild bilaterally  Toenails: All of the toenails were elongated, hypertrophied, discolored and Dystrophic with mycosis  Both first toenails were Mycotic with early onychogryphosis       Socks and shoes removed, Right Foot Findings: erythematous and dry  The sensory exam showed diminished vibratory sensation at the level of the toes  Diminished tactile sensation with monofilament testing throughout the right foot    Socks and shoes removed, Left Foot Findings: erythematous and dry  The sensory exam showed diminished vibratory sensation at the level of the toes  Diminished tactile sensation with monofilament testing throughout the left foot            Foot Exam     Right Foot/Ankle      Inspection and Palpation  Skin Exam: callus and dry skin;      Neurovascular  Dorsalis pedis: 0  Posterior tibial: 1+        Left Foot/Ankle       Inspection and Palpation  Skin Exam: callus and dry skin;      Neurovascular  Dorsalis pedis: 0  Posterior tibial: 1+        Physical Exam   Cardiovascular: Pulses are weak pulses  Pulses:       Dorsalis pedis pulses are 0 on the right side, and 0 on the left side  Posterior tibial pulses are 1+ on the right side, and 1+ on the left side  Feet:   Right Foot:   Skin Integrity: Positive for callus and dry skin  Left Foot:   Skin Integrity: Positive for callus and dry skin  Patient's shoes and socks removed  Right Foot/Ankle   Right Foot Inspection  Skin Exam: dry skin, callus and callus                              Sensory   Vibration: diminished  Proprioception: diminished   Monofilament testing: diminished  Vascular  Capillary refills: elevated  The right DP pulse is 0   The right PT pulse is 1+       Left Foot/Ankle  Left Foot Inspection  Skin Exam: dry skin and callus                                          Sensory   Vibration: diminished  Proprioception: diminished  Monofilament: diminished  Vascular  Capillary refills: elevated  The left DP pulse is 0  The left PT pulse is 1+  Assign Risk Category:  Deformity present;  Loss of protective sensation; Weak pulses       Risk: 2

## 2018-07-18 ENCOUNTER — TELEPHONE (OUTPATIENT)
Dept: FAMILY MEDICINE CLINIC | Facility: CLINIC | Age: 78
End: 2018-07-18

## 2018-07-18 ENCOUNTER — ANTICOAG VISIT (OUTPATIENT)
Dept: FAMILY MEDICINE CLINIC | Facility: CLINIC | Age: 78
End: 2018-07-18

## 2018-07-30 LAB — INR PPP: 2.4 (ref 0.86–1.17)

## 2018-07-31 ENCOUNTER — TELEPHONE (OUTPATIENT)
Dept: FAMILY MEDICINE CLINIC | Facility: CLINIC | Age: 78
End: 2018-07-31

## 2018-07-31 ENCOUNTER — ANTICOAG VISIT (OUTPATIENT)
Dept: FAMILY MEDICINE CLINIC | Facility: CLINIC | Age: 78
End: 2018-07-31

## 2018-07-31 NOTE — PROGRESS NOTES
Spoke with patient, current dosage reviewed gave instructions from Dr PARSONS same dose redraw in one month  Patient states he is required through the company he is using he needs to redraw every two weeks   kong

## 2018-08-01 DIAGNOSIS — E11.59 TYPE 2 DIABETES MELLITUS WITH OTHER CIRCULATORY COMPLICATION, WITHOUT LONG-TERM CURRENT USE OF INSULIN (HCC): ICD-10-CM

## 2018-08-01 DIAGNOSIS — I48.20 CHRONIC ATRIAL FIBRILLATION (HCC): ICD-10-CM

## 2018-08-01 RX ORDER — WARFARIN SODIUM 1 MG/1
TABLET ORAL
Qty: 90 TABLET | Refills: 3 | Status: SHIPPED | OUTPATIENT
Start: 2018-08-01 | End: 2020-01-23 | Stop reason: SDUPTHER

## 2018-08-01 RX ORDER — WARFARIN SODIUM 5 MG/1
TABLET ORAL
Qty: 90 TABLET | Refills: 3 | Status: SHIPPED | OUTPATIENT
Start: 2018-08-01 | End: 2019-01-07 | Stop reason: SDUPTHER

## 2018-08-01 NOTE — TELEPHONE ENCOUNTER
Pt  Called very upset wants you to know that  [facility] faxed a refill request on 7/23/18

## 2018-08-06 ENCOUNTER — IN-CLINIC DEVICE VISIT (OUTPATIENT)
Dept: CARDIOLOGY CLINIC | Facility: CLINIC | Age: 78
End: 2018-08-06
Payer: COMMERCIAL

## 2018-08-06 DIAGNOSIS — I50.22 CHRONIC SYSTOLIC CONGESTIVE HEART FAILURE (HCC): ICD-10-CM

## 2018-08-06 DIAGNOSIS — I48.20 CHRONIC ATRIAL FIBRILLATION (HCC): Primary | ICD-10-CM

## 2018-08-06 DIAGNOSIS — Z95.810 PRESENCE OF IMPLANTABLE CARDIOVERTER-DEFIBRILLATOR (ICD): ICD-10-CM

## 2018-08-06 PROCEDURE — 93282 PRGRMG EVAL IMPLANTABLE DFB: CPT | Performed by: INTERNAL MEDICINE

## 2018-08-06 NOTE — PROGRESS NOTES
MDT SINGLE ICD  DEVICE INTERROGATED IN THE [facility] OFFICE:  BATTERY VOLTAGE ADEQUATE (3 03V/RRT=2 63V)   ALL LEAD PARAMETERS WITHIN NORMAL LIMITS   2 NEW VT -NS EPISODES WITH EGMS SHOWING NSVT (9 @ 214 BPM, 7 @ 261 BPM)   NO PROGRAMMING CHANGES MADE TO DEVICE PARAMETERS   OPTI-VOL WITHIN NORMAL LIMITS   NORMAL DEVICE FUNCTION   RG

## 2018-08-09 ENCOUNTER — OFFICE VISIT (OUTPATIENT)
Dept: FAMILY MEDICINE CLINIC | Facility: CLINIC | Age: 78
End: 2018-08-09
Payer: COMMERCIAL

## 2018-08-09 VITALS
HEIGHT: 71 IN | DIASTOLIC BLOOD PRESSURE: 62 MMHG | RESPIRATION RATE: 16 BRPM | SYSTOLIC BLOOD PRESSURE: 106 MMHG | TEMPERATURE: 97.2 F | HEART RATE: 72 BPM | WEIGHT: 146 LBS | BODY MASS INDEX: 20.44 KG/M2

## 2018-08-09 DIAGNOSIS — R49.0 HOARSENESS OF VOICE: Primary | ICD-10-CM

## 2018-08-09 PROCEDURE — 99213 OFFICE O/P EST LOW 20 MIN: CPT | Performed by: NURSE PRACTITIONER

## 2018-08-09 NOTE — PATIENT INSTRUCTIONS
Gargle with warm salt water for 5 minutes every 4 hours  Drink plenty of fluids at least 6 glasses of water a day  Can use some honey lemon tea  Call or follow up if symptoms are not better in 7 days  Supportive care discussed and advised  Follow up for no improvement and worsening of conditions  Patient advised and educated when to see immediate medical care

## 2018-08-09 NOTE — PROGRESS NOTES
Assessment/Plan:         Diagnoses and all orders for this visit:    Hoarseness of voice  Gargle with warm salt water for 5 minutes every 4 hours  Drink plenty of fluids at least 6 glasses of water a day  Can use some honey lemon tea  Call or follow up if symptoms are not better in 7 days  Supportive care discussed and advised  Follow up for no improvement and worsening of conditions  Patient advised and educated when to see immediate medical care  BMI 20 0-20 9, adult          Return if symptoms worsen or fail to improve  Subjective:      Patient ID: Dada Varela May is a 76 y o  male  Chief Complaint   Patient presents with    Voice  change      pt states  has been gopign for a day and a half, beleves he has soemthing in his throat  making his voice change  af/rma        HPI   Patient stated that started with voice change yesterday  Denies fever, chills, sob, sore throat, cough and congestion  Not taking any OTC  On chronic oxygen    The following portions of the patient's history were reviewed and updated as appropriate: allergies, current medications, past family history, past medical history, past social history, past surgical history and problem list       Review of Systems   Constitutional: Negative for chills, fatigue and fever  HENT: Positive for voice change  Negative for congestion, ear discharge, ear pain, facial swelling, hearing loss, mouth sores, nosebleeds, postnasal drip, rhinorrhea, sinus pain, sinus pressure, sneezing, sore throat and trouble swallowing  Respiratory: Negative for cough, chest tightness, shortness of breath and wheezing  Cardiovascular: Negative  Gastrointestinal: Negative for abdominal pain, constipation, diarrhea and nausea  Neurological: Negative for dizziness, weakness, light-headedness and headaches           Objective:    History   Smoking Status    Former Smoker    Packs/day: 1 00    Years: 60 00    Types: Cigarettes    Quit date: 1/8/2002 Smokeless Tobacco    Never Used     Comment: smoked since age 13 or 11       Allergies: No Known Allergies    Vitals:  /62   Pulse 72   Temp (!) 97 2 °F (36 2 °C)   Resp 16   Ht 5' 11" (1 803 m)   Wt 66 2 kg (146 lb)   BMI 20 36 kg/m²     Current Outpatient Prescriptions   Medication Sig Dispense Refill    Ascorbic Acid (VITAMIN C) 1000 MG tablet Take 1,000 mg by mouth daily      aspirin (ASPIRIN LOW DOSE) 81 MG tablet Take 81 mg by mouth daily      carvedilol (COREG) 6 25 mg tablet Take 6 25 mg by mouth 2 (two) times a day      clotrimazole-betamethasone (LOTRISONE) 1-0 05 % cream Apply topically 2 (two) times a day      Crisaborole (EUCRISA) 2 % OINT Apply topically      fluticasone-salmeterol (ADVAIR HFA) 115-21 MCG/ACT inhaler Inhale 2 puffs 2 (two) times a day      fosinopril (MONOPRIL) 20 mg tablet Take 1 tablet (20 mg total) by mouth daily 90 tablet 3    furosemide (LASIX) 40 mg tablet Take 1 tablet (40 mg total) by mouth daily 90 tablet 3    glucose blood (PRODIGY NO CODING BLOOD GLUC) test strip by In Vitro route      guaiFENesin (MUCINEX) 600 mg 12 hr tablet Take 1 tablet by mouth every 12 (twelve) hours 30 tablet 0    ipratropium-albuterol (COMBIVENT RESPIMAT) inhaler Inhale 1 puff 4 (four) times a day 3 Inhaler 3    ipratropium-albuterol (DUO-NEB) 0 5-2 5 mg/mL Take 3 mL by nebulization every 4 (four) hours while awake 3 mL 0    ketoconazole (NIZORAL) 2 % cream Apply 1 application topically 2 (two) times a day      ketoconazole (NIZORAL) 2 % cream Apply topically daily for 30 days 60 g 0    metFORMIN (GLUCOPHAGE) 1000 MG tablet Take 1 tablet (1,000 mg total) by mouth 2 (two) times a day 180 tablet 3    PRODIGY TWIST TOP LANCETS 28G MISC by Does not apply route      simvastatin (ZOCOR) 10 mg tablet Take 1 tablet (10 mg total) by mouth daily 90 tablet 3    spironolactone (ALDACTONE) 25 mg tablet Take 1 tablet by mouth daily      terbinafine (LamISIL) 250 mg tablet Take 1 tablet by mouth every other day      warfarin (COUMADIN) 1 mg tablet Take as directed by provider 90 tablet 3    warfarin (COUMADIN) 5 mg tablet Take as directed by provider 90 tablet 3     No current facility-administered medications for this visit  Physical Exam   Constitutional: He is oriented to person, place, and time  He appears well-developed and well-nourished  HENT:   Head: Normocephalic  Right Ear: Tympanic membrane, external ear and ear canal normal    Left Ear: Tympanic membrane, external ear and ear canal normal    Nose: Nose normal  Right sinus exhibits no maxillary sinus tenderness and no frontal sinus tenderness  Left sinus exhibits no maxillary sinus tenderness and no frontal sinus tenderness  Mouth/Throat: Mucous membranes are normal    Oropharynx with post nasal drip  Neck: Neck supple  Cardiovascular: Normal rate, regular rhythm and normal heart sounds  Pulmonary/Chest: Effort normal and breath sounds normal    Abdominal: Normal appearance and bowel sounds are normal  There is no hepatosplenomegaly  There is no tenderness  There is no rebound  Musculoskeletal: Normal range of motion  Lymphadenopathy:        Right cervical: No superficial cervical and no posterior cervical adenopathy present  Left cervical: No superficial cervical and no posterior cervical adenopathy present  Neurological: He is alert and oriented to person, place, and time  Skin: Skin is warm and dry  Psychiatric: He has a normal mood and affect  His behavior is normal  Judgment and thought content normal    Vitals reviewed          RD Mcclelland

## 2018-08-13 ENCOUNTER — TELEPHONE (OUTPATIENT)
Dept: FAMILY MEDICINE CLINIC | Facility: CLINIC | Age: 78
End: 2018-08-13

## 2018-08-13 ENCOUNTER — ANTICOAG VISIT (OUTPATIENT)
Dept: FAMILY MEDICINE CLINIC | Facility: CLINIC | Age: 78
End: 2018-08-13

## 2018-08-13 LAB
INR PPP: 2.1 (ref 0.86–1.17)
INR PPP: 3.1 (ref 0.86–1.17)

## 2018-08-13 NOTE — TELEPHONE ENCOUNTER
Pt called stating his INR was 2 1  He is taking 5 1/2 mg Tu and Th and 6mg all other days   Please advise  apoorvapn

## 2018-08-13 NOTE — TELEPHONE ENCOUNTER
Called pt back    No questions  Will continue with 5 1/2mg Tu and Th and 6mg daily   INR will be redrawn in 1 month  klpn

## 2018-08-14 ENCOUNTER — TELEPHONE (OUTPATIENT)
Dept: FAMILY MEDICINE CLINIC | Facility: CLINIC | Age: 78
End: 2018-08-14

## 2018-08-14 ENCOUNTER — ANTICOAG VISIT (OUTPATIENT)
Dept: FAMILY MEDICINE CLINIC | Facility: CLINIC | Age: 78
End: 2018-08-14

## 2018-08-14 LAB — INTERNATIONAL NORMALIZATION RATIO, POC: 3.1

## 2018-08-14 NOTE — PROGRESS NOTES
Spoke with patient after previous messages dose verified, gave patient instructions same dose and redraw in one week   kong

## 2018-08-14 NOTE — TELEPHONE ENCOUNTER
Spoke with patient verified current dosage, gave instructions same dose and to redraw in one week  No further actions required   drantoniapn

## 2018-08-14 NOTE — TELEPHONE ENCOUNTER
Same dose redraw in a week    ----- Message from [address] sent at 8/14/2018  9:25 AM EDT -----  Pt called yesterday with a verbal INR of 2 1 pt told to continue dose for one month and redraw in 1 month     Today called pt to clarify INR results  Pt and wife had disagreement with results  Lab stated 3 1 from yesterday's draw 08/13/2018  jorge

## 2018-08-16 DIAGNOSIS — J44.1 CHRONIC OBSTRUCTIVE PULMONARY DISEASE WITH ACUTE EXACERBATION (HCC): Primary | ICD-10-CM

## 2018-08-20 DIAGNOSIS — I10 HTN (HYPERTENSION), MALIGNANT: Primary | ICD-10-CM

## 2018-08-21 ENCOUNTER — TELEPHONE (OUTPATIENT)
Dept: FAMILY MEDICINE CLINIC | Facility: CLINIC | Age: 78
End: 2018-08-21

## 2018-08-21 ENCOUNTER — ANTICOAG VISIT (OUTPATIENT)
Dept: FAMILY MEDICINE CLINIC | Facility: CLINIC | Age: 78
End: 2018-08-21

## 2018-08-21 LAB — INR PPP: 2.7 (ref 0.86–1.17)

## 2018-08-21 NOTE — TELEPHONE ENCOUNTER
Patient aware, verified current dose gave pt instructions same dose redraw in one month    Task complete daliapn

## 2018-08-23 ENCOUNTER — OFFICE VISIT (OUTPATIENT)
Dept: FAMILY MEDICINE CLINIC | Facility: CLINIC | Age: 78
End: 2018-08-23
Payer: COMMERCIAL

## 2018-08-23 VITALS
RESPIRATION RATE: 22 BRPM | HEART RATE: 88 BPM | HEIGHT: 71 IN | DIASTOLIC BLOOD PRESSURE: 66 MMHG | BODY MASS INDEX: 20.72 KG/M2 | TEMPERATURE: 97.6 F | WEIGHT: 148 LBS | SYSTOLIC BLOOD PRESSURE: 106 MMHG

## 2018-08-23 DIAGNOSIS — I10 ESSENTIAL HYPERTENSION: ICD-10-CM

## 2018-08-23 DIAGNOSIS — K40.90 NON-RECURRENT UNILATERAL INGUINAL HERNIA WITHOUT OBSTRUCTION OR GANGRENE: Primary | ICD-10-CM

## 2018-08-23 PROCEDURE — 99213 OFFICE O/P EST LOW 20 MIN: CPT | Performed by: FAMILY MEDICINE

## 2018-08-23 PROCEDURE — 3074F SYST BP LT 130 MM HG: CPT | Performed by: FAMILY MEDICINE

## 2018-08-23 PROCEDURE — 3078F DIAST BP <80 MM HG: CPT | Performed by: FAMILY MEDICINE

## 2018-08-23 NOTE — PROGRESS NOTES
Assessment/Plan:    Problem List Items Addressed This Visit     Essential hypertension    Non-recurrent unilateral inguinal hernia without obstruction or gangrene - Primary    Relevant Orders    Ambulatory referral to General Surgery          There are no Patient Instructions on file for this visit  No Follow-up on file  Subjective:      Patient ID: Ho Nicholson is a 76 y o  male  Chief Complaint   Patient presents with    possible L groin hernia     seen today  [id number]       Pt thinks he has a hernia  Popped up this am   No pain , left groin    Pt has pending labs at home that he will have drawn    No diarrhea no constipation        The following portions of the patient's history were reviewed and updated as appropriate: allergies, current medications, past family history, past medical history, past social history, past surgical history and problem list     Review of Systems   Constitutional: Negative for activity change, appetite change, chills, diaphoresis, fatigue, fever and unexpected weight change  HENT: Negative for congestion, dental problem, ear pain, mouth sores, sinus pain, sinus pressure, sore throat and trouble swallowing  Eyes: Negative for photophobia, discharge and itching  Respiratory: Negative for apnea, chest tightness and shortness of breath  Cardiovascular: Negative for chest pain, palpitations and leg swelling  Gastrointestinal: Negative for abdominal distention, abdominal pain, blood in stool, nausea and vomiting  Endocrine: Negative for cold intolerance, heat intolerance, polydipsia, polyphagia and polyuria  Genitourinary: Negative for difficulty urinating  Bulge in left inguinal area   Musculoskeletal: Negative for arthralgias  Skin: Negative for color change and wound  Neurological: Negative for dizziness, syncope, speech difficulty and headaches  Hematological: Negative for adenopathy     Psychiatric/Behavioral: Negative for agitation and behavioral problems  Current Outpatient Prescriptions   Medication Sig Dispense Refill    Ascorbic Acid (VITAMIN C) 1000 MG tablet Take 1,000 mg by mouth daily      aspirin (ASPIRIN LOW DOSE) 81 MG tablet Take 81 mg by mouth daily      carvedilol (COREG) 6 25 mg tablet Take 6 25 mg by mouth 2 (two) times a day      fluticasone-salmeterol (ADVAIR HFA) 115-21 MCG/ACT inhaler Inhale 2 puffs 2 (two) times a day 1 Inhaler 0    fosinopril (MONOPRIL) 20 mg tablet Take 1 tablet (20 mg total) by mouth daily 90 tablet 3    furosemide (LASIX) 40 mg tablet Take 1 tablet (40 mg total) by mouth daily 90 tablet 3    glucose blood (PRODIGY NO CODING BLOOD GLUC) test strip by In Vitro route      guaiFENesin (MUCINEX) 600 mg 12 hr tablet Take 1 tablet by mouth every 12 (twelve) hours 30 tablet 0    ipratropium-albuterol (COMBIVENT RESPIMAT) inhaler Inhale 1 puff 4 (four) times a day 3 Inhaler 3    ipratropium-albuterol (DUO-NEB) 0 5-2 5 mg/mL Take 3 mL by nebulization every 4 (four) hours while awake 3 mL 0    metFORMIN (GLUCOPHAGE) 1000 MG tablet Take 1 tablet (1,000 mg total) by mouth 2 (two) times a day 180 tablet 3    PRODIGY TWIST TOP LANCETS 28G MISC by Does not apply route      simvastatin (ZOCOR) 10 mg tablet Take 1 tablet (10 mg total) by mouth daily 90 tablet 3    spironolactone (ALDACTONE) 25 mg tablet Take 1 tablet by mouth daily      warfarin (COUMADIN) 1 mg tablet Take as directed by provider 90 tablet 3    warfarin (COUMADIN) 5 mg tablet Take as directed by provider 90 tablet 3    clotrimazole-betamethasone (LOTRISONE) 1-0 05 % cream Apply topically 2 (two) times a day      Crisaborole (EUCRISA) 2 % OINT Apply topically      terbinafine (LamISIL) 250 mg tablet Take 1 tablet by mouth every other day       No current facility-administered medications for this visit          Objective:    /66   Pulse 88   Temp 97 6 °F (36 4 °C)   Resp 22   Ht 5' 11" (1 803 m)   Wt 67 1 kg (148 lb)   BMI 20 64 kg/m²        Physical Exam   Constitutional: He appears well-developed and well-nourished  No distress  HENT:   Head: Normocephalic and atraumatic  Right Ear: External ear normal    Left Ear: External ear normal    Nose: Nose normal    Mouth/Throat: Oropharynx is clear and moist  No oropharyngeal exudate  Eyes: EOM are normal  Pupils are equal, round, and reactive to light  Right eye exhibits no discharge  Left eye exhibits no discharge  No scleral icterus  Neck: No thyromegaly present  Cardiovascular: Normal rate and normal heart sounds  No murmur heard  Pulmonary/Chest: Effort normal and breath sounds normal  No respiratory distress  He has no wheezes  Abdominal: Soft  Bowel sounds are normal  He exhibits no distension and no mass  There is no tenderness  There is no rebound and no guarding  A hernia is present  Hernia confirmed positive in the left inguinal area  Musculoskeletal: Normal range of motion  Neurological: He is alert  He displays normal reflexes  Coordination normal    Skin: Skin is warm and dry  No rash noted  He is not diaphoretic  No erythema  Psychiatric: He has a normal mood and affect  His behavior is normal    Nursing note and vitals reviewed               Shavon Galvan DO

## 2018-08-27 ENCOUNTER — ANTICOAG VISIT (OUTPATIENT)
Dept: FAMILY MEDICINE CLINIC | Facility: CLINIC | Age: 78
End: 2018-08-27

## 2018-08-27 ENCOUNTER — TELEPHONE (OUTPATIENT)
Dept: FAMILY MEDICINE CLINIC | Facility: CLINIC | Age: 78
End: 2018-08-27

## 2018-08-27 LAB — INR PPP: 2.6 (ref 0.86–1.17)

## 2018-08-27 RX ORDER — SPIRONOLACTONE 25 MG/1
TABLET ORAL
Qty: 90 TABLET | Refills: 3 | Status: SHIPPED | OUTPATIENT
Start: 2018-08-27 | End: 2019-09-21 | Stop reason: SDUPTHER

## 2018-08-29 ENCOUNTER — TELEPHONE (OUTPATIENT)
Dept: CARDIOLOGY CLINIC | Facility: CLINIC | Age: 78
End: 2018-08-29

## 2018-08-29 NOTE — LETTER
Cardiology Pre Operative Clearance      PRE OPERATIVE CARDIAC RISK ASSESSMENT    08/30/18    Renetta Erps [birth year]  1940  [id number]    Date of Surgery: September 7, 2018    Type of Surgery: Left Inguinal Hernia    Surgeon: Dr Sana Staley    No Cardiac Contraindication for Planned Surgical Procedures   Patient understands he is high cardiac risk for anesthesia/GI surgery  He is at risk post-op for heart failure and given afib with RVR  Recommend close hemo-dynamic monitoring post-op    Anticoagulation: Yes: Warfarin   Plan to hold five days prior    Physician Comment:     Electronically Signed:

## 2018-08-30 ENCOUNTER — OFFICE VISIT (OUTPATIENT)
Dept: SURGERY | Facility: CLINIC | Age: 78
End: 2018-08-30
Payer: COMMERCIAL

## 2018-08-30 VITALS
SYSTOLIC BLOOD PRESSURE: 130 MMHG | HEIGHT: 71 IN | DIASTOLIC BLOOD PRESSURE: 78 MMHG | TEMPERATURE: 98.2 F | BODY MASS INDEX: 20.16 KG/M2 | WEIGHT: 144 LBS | HEART RATE: 93 BPM

## 2018-08-30 DIAGNOSIS — K40.90 NON-RECURRENT UNILATERAL INGUINAL HERNIA WITHOUT OBSTRUCTION OR GANGRENE: ICD-10-CM

## 2018-08-30 DIAGNOSIS — K40.90 LEFT INGUINAL HERNIA: Primary | ICD-10-CM

## 2018-08-30 PROCEDURE — 99204 OFFICE O/P NEW MOD 45 MIN: CPT | Performed by: SPECIALIST
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
## 2018-08-31 PROBLEM — K40.90 LEFT INGUINAL HERNIA: Status: ACTIVE | Noted: 2018-08-31

## 2018-09-10 ENCOUNTER — OFFICE VISIT (OUTPATIENT)
Dept: PULMONOLOGY | Facility: MEDICAL CENTER | Age: 78
End: 2018-09-10
Payer: COMMERCIAL

## 2018-09-10 ENCOUNTER — ANTICOAG VISIT (OUTPATIENT)
Dept: FAMILY MEDICINE CLINIC | Facility: CLINIC | Age: 78
End: 2018-09-10

## 2018-09-10 VITALS
DIASTOLIC BLOOD PRESSURE: 68 MMHG | TEMPERATURE: 97.8 F | HEART RATE: 87 BPM | OXYGEN SATURATION: 96 % | BODY MASS INDEX: 20.16 KG/M2 | SYSTOLIC BLOOD PRESSURE: 110 MMHG | RESPIRATION RATE: 16 BRPM | HEIGHT: 71 IN | WEIGHT: 144 LBS

## 2018-09-10 DIAGNOSIS — J96.11 CHRONIC HYPOXEMIC RESPIRATORY FAILURE (HCC): ICD-10-CM

## 2018-09-10 DIAGNOSIS — R91.1 NODULE OF LEFT LUNG: ICD-10-CM

## 2018-09-10 DIAGNOSIS — I50.22 CHRONIC SYSTOLIC CONGESTIVE HEART FAILURE (HCC): ICD-10-CM

## 2018-09-10 DIAGNOSIS — J43.2 CENTRILOBULAR EMPHYSEMA (HCC): Primary | ICD-10-CM

## 2018-09-10 LAB
INR PPP: 1.7 (ref 0.86–1.17)
INR PPP: 1.7 (ref 0.86–1.17)

## 2018-09-10 PROCEDURE — 99214 OFFICE O/P EST MOD 30 MIN: CPT | Performed by: INTERNAL MEDICINE

## 2018-09-11 ENCOUNTER — ANTICOAG VISIT (OUTPATIENT)
Dept: FAMILY MEDICINE CLINIC | Facility: CLINIC | Age: 78
End: 2018-09-11

## 2018-09-12 ENCOUNTER — APPOINTMENT (OUTPATIENT)
Dept: LAB | Facility: CLINIC | Age: 78
End: 2018-09-12
Payer: COMMERCIAL

## 2018-09-12 ENCOUNTER — TRANSCRIBE ORDERS (OUTPATIENT)
Dept: LAB | Facility: CLINIC | Age: 78
End: 2018-09-12

## 2018-09-12 DIAGNOSIS — I25.10 ARTERIOSCLEROSIS OF CORONARY ARTERY: ICD-10-CM

## 2018-09-12 DIAGNOSIS — Z12.5 SCREENING FOR PROSTATE CANCER: ICD-10-CM

## 2018-09-12 DIAGNOSIS — I50.22 CHRONIC SYSTOLIC CONGESTIVE HEART FAILURE (HCC): ICD-10-CM

## 2018-09-12 DIAGNOSIS — E11.42 TYPE 2 DIABETES MELLITUS WITH DIABETIC POLYNEUROPATHY, WITHOUT LONG-TERM CURRENT USE OF INSULIN (HCC): ICD-10-CM

## 2018-09-12 DIAGNOSIS — I10 ESSENTIAL HYPERTENSION: ICD-10-CM

## 2018-09-12 DIAGNOSIS — I50.1 HEART FAILURE, LEFT, WITH LVEF <=30% (HCC): ICD-10-CM

## 2018-09-12 LAB
ALBUMIN SERPL BCP-MCNC: 3.7 G/DL (ref 3.5–5)
ALP SERPL-CCNC: 58 U/L (ref 46–116)
ALT SERPL W P-5'-P-CCNC: 19 U/L (ref 12–78)
ANION GAP SERPL CALCULATED.3IONS-SCNC: 4 MMOL/L (ref 4–13)
AST SERPL W P-5'-P-CCNC: 12 U/L (ref 5–45)
BASOPHILS # BLD AUTO: 0.06 THOUSANDS/ΜL (ref 0–0.1)
BASOPHILS NFR BLD AUTO: 1 % (ref 0–1)
BILIRUB SERPL-MCNC: 0.57 MG/DL (ref 0.2–1)
BUN SERPL-MCNC: 16 MG/DL (ref 5–25)
CALCIUM SERPL-MCNC: 9.1 MG/DL (ref 8.3–10.1)
CHLORIDE SERPL-SCNC: 100 MMOL/L (ref 100–108)
CO2 SERPL-SCNC: 33 MMOL/L (ref 21–32)
CREAT SERPL-MCNC: 0.99 MG/DL (ref 0.6–1.3)
EOSINOPHIL # BLD AUTO: 0.1 THOUSAND/ΜL (ref 0–0.61)
EOSINOPHIL NFR BLD AUTO: 2 % (ref 0–6)
ERYTHROCYTE [DISTWIDTH] IN BLOOD BY AUTOMATED COUNT: 13.2 % (ref 11.6–15.1)
EST. AVERAGE GLUCOSE BLD GHB EST-MCNC: 126 MG/DL
GFR SERPL CREATININE-BSD FRML MDRD: 72 ML/MIN/1.73SQ M
GLUCOSE P FAST SERPL-MCNC: 95 MG/DL (ref 65–99)
HBA1C MFR BLD: 6 % (ref 4.2–6.3)
HCT VFR BLD AUTO: 40.5 % (ref 36.5–49.3)
HGB BLD-MCNC: 12.9 G/DL (ref 12–17)
IMM GRANULOCYTES # BLD AUTO: 0.03 THOUSAND/UL (ref 0–0.2)
IMM GRANULOCYTES NFR BLD AUTO: 0 % (ref 0–2)
LYMPHOCYTES # BLD AUTO: 1.84 THOUSANDS/ΜL (ref 0.6–4.47)
LYMPHOCYTES NFR BLD AUTO: 28 % (ref 14–44)
MCH RBC QN AUTO: 30.6 PG (ref 26.8–34.3)
MCHC RBC AUTO-ENTMCNC: 31.9 G/DL (ref 31.4–37.4)
MCV RBC AUTO: 96 FL (ref 82–98)
MONOCYTES # BLD AUTO: 0.66 THOUSAND/ΜL (ref 0.17–1.22)
MONOCYTES NFR BLD AUTO: 10 % (ref 4–12)
NEUTROPHILS # BLD AUTO: 4 THOUSANDS/ΜL (ref 1.85–7.62)
NEUTS SEG NFR BLD AUTO: 59 % (ref 43–75)
NRBC BLD AUTO-RTO: 0 /100 WBCS
PLATELET # BLD AUTO: 233 THOUSANDS/UL (ref 149–390)
PMV BLD AUTO: 10.8 FL (ref 8.9–12.7)
POTASSIUM SERPL-SCNC: 4 MMOL/L (ref 3.5–5.3)
PROT SERPL-MCNC: 7.4 G/DL (ref 6.4–8.2)
RBC # BLD AUTO: 4.21 MILLION/UL (ref 3.88–5.62)
SODIUM SERPL-SCNC: 137 MMOL/L (ref 136–145)
WBC # BLD AUTO: 6.69 THOUSAND/UL (ref 4.31–10.16)

## 2018-09-12 PROCEDURE — 36415 COLL VENOUS BLD VENIPUNCTURE: CPT

## 2018-09-12 PROCEDURE — 83036 HEMOGLOBIN GLYCOSYLATED A1C: CPT

## 2018-09-12 PROCEDURE — 84153 ASSAY OF PSA TOTAL: CPT

## 2018-09-12 PROCEDURE — 85025 COMPLETE CBC W/AUTO DIFF WBC: CPT

## 2018-09-12 PROCEDURE — 80053 COMPREHEN METABOLIC PANEL: CPT

## 2018-09-13 DIAGNOSIS — I42.9 CARDIOMYOPATHY, UNSPECIFIED TYPE (HCC): Primary | ICD-10-CM

## 2018-09-13 LAB — PSA SERPL DL<=0.01 NG/ML-MCNC: 2.44 NG/ML (ref 0–4)

## 2018-09-13 RX ORDER — CARVEDILOL 6.25 MG/1
TABLET ORAL
Qty: 180 TABLET | Refills: 3 | Status: SHIPPED | OUTPATIENT
Start: 2018-09-13 | End: 2019-08-09 | Stop reason: SDUPTHER

## 2018-09-18 ENCOUNTER — TELEPHONE (OUTPATIENT)
Dept: PULMONOLOGY | Facility: MEDICAL CENTER | Age: 78
End: 2018-09-18

## 2018-09-18 NOTE — ASSESSMENT & PLAN NOTE
Dada Varela has severe bullous emphysema  Largest amount of bullous disease in his left lower lobe  Has measured FEV1 is only 0 62 L or 20% of predicted as per spirometry done in November  He will continue on his regimen of Advair 115 mcg MDI 2 puffs twice a day  He will uses Combivent Respimat inhaler 1 puff 4 times a day or his nebulizer with DuoNeb as needed  Despite his severe COPD I think Dada Varela should be able to tolerate the proposed left inguinal herniorrhaphy surgery planned by Dr Bethany Kenney

## 2018-09-18 NOTE — PROGRESS NOTES
Assessment/Plan:     Problem List Items Addressed This Visit        Respiratory    Centrilobular emphysema ([address] ) - Primary     Ho Pompa has severe bullous emphysema  Largest amount of bullous disease in his left lower lobe  Has measured FEV1 is only 0 62 L or 20% of predicted as per spirometry done in November  He will continue on his regimen of Advair 115 mcg MDI 2 puffs twice a day  He will uses Combivent Respimat inhaler 1 puff 4 times a day or his nebulizer with DuoNeb as needed  Chronic hypoxemic respiratory failure (HCC)     He uses oxygen throughout the day  Will continue at a flow rate of 3-4 l/m of oxygen            Cardiovascular and Mediastinum    Chronic systolic congestive heart failure ([address] )     He does have cardiomyopathy and left ventricular ejection fraction is 35-40%  He does have an AICD device  Other    Nodule of left lung     Ho Pompa does have a calcified granuloma at 7 mm an oblong in shape and is in anterior segment of left lower lobe near the oblique fissure  Last CT scan of the chest November 2017 showed no change in his nodule  There appears to be an area of scarring in the posterior aspect of the right upper lobe  Previously was here a patchy infiltrate there and now there is a 1 3 x 0 4 cm irregular density lesion in that area  No Follow-up on file  All questions are answered to the patient's satisfaction and understanding  He verbalizes understanding  He is encouraged to call with any further questions or concerns  Portions of the record may have been created with voice recognition software  Occasional wrong word or "sound a like" substitutions may have occurred due to the inherent limitations of voice recognition software  Read the chart carefully and recognize, using context, where substitutions have occurred      Electronically Signed by Angelito Bradley DO    ______________________________________________________________________    Chief Complaint:   Chief Complaint   Patient presents with    Follow-up       Patient ID: Rebeca Blanton is a 76 y o  y o  male has a past medical history of Arteriosclerosis of arteries of extremities ([place] [address] ); Arteriosclerosis of coronary artery; Atrial fibrillation ([place] [address] ); Bilateral carotid bruits; Cardiac arrhythmia; Cardiac disease; Cardiomyopathy ([place] [address] ); Congestive heart failure (CHF) ([place] [address] ); COPD (chronic obstructive pulmonary disease) ([place] [address] ); Diabetes mellitus ([place] [address] ); Diverticulosis; History of emphysema; History of pneumonia; Left heart failure with left ejection fraction less than or equal to 30 percent ([place] [address] ); Non-Q wave myocardial infarction [facility]); Pneumothorax (2003); Rib fractures (03/2015); Solitary pulmonary nodule; Stroke [facility]); and Ventricular tachycardia ([place] [address] )  9/10/2018  HPI     Rebeca Blanton presents for a follow-up visit for his severe bullous emphysema  Last spirometry done in November 2017 showed his FEV1 to be 0 62 L or 20% of predicted  He does use Advair 115 mcg MDI 2 puffs twice a day and Combivent Respimat inhaler 1 puff 4 times a day  He is on oxygen continuously at a rate of 3-4 l/m  He does have a battery operated concentrator he keeps at 4 liters/minute pulse dose when he leaves the house  He is not have any hemoptysis  He does get short of breath with exertion but this has remained stable  Rebeca Blanton also has a history of cardiomyopathy with echocardiogram done December 2017 left ventricular ejection fraction of 35-40% and estimated pulmonary artery pressure of 38 mm Hg  He also has history of stroke  He does have an AICD device  He sees cardiologist Dr Zach Harden  He is on chronic anticoagulation therapy with warfarin  He does have diabetes mellitus type 2  Rebeca Blanton  was seen recently by surgeon Dr Ely Griffiths for a left inguinal hernia    Rebeca Blanton should be able to tolerate the proposed hernia surgery from a pulmonary perspective  Of course because of his cardiomyopathy and severe COPD he would have some increased risk with the anesthesia but should be able to tolerate the procedure        Review of Systems   Constitutional: Negative for chills, fatigue, fever and unexpected weight change  HENT: Negative for congestion, rhinorrhea and sore throat  Eyes: Negative for discharge and redness  Respiratory: Positive for shortness of breath  Negative for chest tightness  Cardiovascular: Negative for chest pain, palpitations and leg swelling  Gastrointestinal: Negative for abdominal distention, abdominal pain and nausea  Endocrine: Negative for polydipsia and polyphagia  Genitourinary:        Has a left inguinal hernia but is not having any pain from it   Musculoskeletal: Negative for joint swelling and myalgias  Skin: Negative for rash  Neurological: Negative for light-headedness  Smoking history: He reports that he quit smoking about 16 years ago  His smoking use included Cigarettes  He has a 60 00 pack-year smoking history   He has never used smokeless tobacco     The following portions of the patient's history were reviewed and updated as appropriate: allergies, current medications, past family history, past medical history, past social history, past surgical history and problem list     Immunization History   Administered Date(s) Administered    Influenza Split High Dose Preservative Free IM 09/04/2014, 09/07/2017    Influenza TIV (IM) 10/16/2003    Pneumococcal Polysaccharide PPV23 01/01/2001, 06/15/2006     Current Outpatient Prescriptions   Medication Sig Dispense Refill    Ascorbic Acid (VITAMIN C) 1000 MG tablet Take 1,000 mg by mouth daily      aspirin (ASPIRIN LOW DOSE) 81 MG tablet Take 81 mg by mouth daily      fluticasone-salmeterol (ADVAIR HFA) 115-21 MCG/ACT inhaler Inhale 2 puffs 2 (two) times a day 1 Inhaler 0    furosemide (LASIX) 40 mg tablet Take 1 tablet (40 mg total) by mouth daily 90 tablet 3    glucose blood (PRODIGY NO CODING BLOOD GLUC) test strip by In Vitro route      ipratropium-albuterol (COMBIVENT RESPIMAT) inhaler Inhale 1 puff 4 (four) times a day 3 Inhaler 3    ipratropium-albuterol (DUO-NEB) 0 5-2 5 mg/mL Take 3 mL by nebulization every 4 (four) hours while awake 3 mL 0    metFORMIN (GLUCOPHAGE) 1000 MG tablet Take 1 tablet (1,000 mg total) by mouth 2 (two) times a day 180 tablet 3    PRODIGY TWIST TOP LANCETS 28G MISC by Does not apply route      simvastatin (ZOCOR) 10 mg tablet Take 1 tablet (10 mg total) by mouth daily 90 tablet 3    spironolactone (ALDACTONE) 25 mg tablet TAKE 1 TABLET EVERY DAY 90 tablet 3    warfarin (COUMADIN) 1 mg tablet Take as directed by provider 90 tablet 3    warfarin (COUMADIN) 5 mg tablet Take as directed by provider 90 tablet 3    carvedilol (COREG) 6 25 mg tablet TAKE 1 TABLET TWICE DAILY 180 tablet 3    fosinopril (MONOPRIL) 20 mg tablet Take 1 tablet (20 mg total) by mouth daily (Patient not taking: Reported on 9/10/2018 ) 90 tablet 3     No current facility-administered medications for this visit  Allergies: Patient has no known allergies  Objective:  Vitals:    09/10/18 1612   BP: 110/68   BP Location: Left arm   Patient Position: Sitting   Cuff Size: Standard   Pulse: 87   Resp: 16   Temp: 97 8 °F (36 6 °C)   TempSrc: Tympanic   SpO2: 96%   Weight: 65 3 kg (144 lb)   Height: 5' 11" (1 803 m)   Oxygen Therapy  SpO2: 96 %    Wt Readings from Last 3 Encounters:   09/10/18 65 3 kg (144 lb)   08/30/18 65 3 kg (144 lb)   08/23/18 67 1 kg (148 lb)     Body mass index is 20 08 kg/m²  Physical Exam   Constitutional: He is oriented to person, place, and time  He appears well-developed and well-nourished  No distress  No conversational dyspnea  He is thin   HENT:   Head: Normocephalic  Nose: Nose normal    Mouth/Throat: Oropharynx is clear and moist  No oropharyngeal exudate     Eyes: Conjunctivae are normal  Pupils are equal, round, and reactive to light  Neck: Neck supple  No JVD present  No tracheal deviation present  Cardiovascular: Normal rate, regular rhythm and normal heart sounds  Pulmonary/Chest: Effort normal  He has no wheezes  He has no rales  Lung sounds are decreased but clear   Abdominal: Soft  He exhibits no distension  There is no tenderness  There is no guarding  Musculoskeletal: He exhibits no edema  Lymphadenopathy:     He has no cervical adenopathy  Neurological: He is alert and oriented to person, place, and time  Skin: Skin is warm and dry  No rash noted  Psychiatric: He has a normal mood and affect  His behavior is normal  Thought content normal        Lab Review:   Lab Results   Component Value Date     09/12/2018    K 4 0 09/12/2018     09/12/2018    CO2 33 (H) 09/12/2018    BUN 16 09/12/2018    CREATININE 0 99 09/12/2018    CALCIUM 9 1 09/12/2018     Lab Results   Component Value Date    WBC 6 69 09/12/2018    HGB 12 9 09/12/2018    HCT 40 5 09/12/2018    MCV 96 09/12/2018     09/12/2018       Diagnostics:  I have personally reviewed pertinent films in PACS  CT of chest performed on 11/10/17 without contrast revealed a stable 1 3 by 0 4 cm irregular density lesion in the posterior aspect of the right upper lobe which appears to be scarring and a stable calcified granuloma in the left lower lobe  Overall no significant changes compared to CT scan done June 2017  Patient has bullous emphysema and largest bullous disease in left lower lobe  There is a stable 7 mm of lung calcified granuloma in the anterior segment of the left lower lobe near the oblique fissure

## 2018-09-18 NOTE — ASSESSMENT & PLAN NOTE
Marshal Martinez does have a calcified granuloma at 7 mm an oblong in shape and is in anterior segment of left lower lobe near the oblique fissure  Last CT scan of the chest November 2017 showed no change in his nodule  There appears to be an area of scarring in the posterior aspect of the right upper lobe  Previously was here a patchy infiltrate there and now there is a 1 3 x 0 4 cm irregular density lesion in that area

## 2018-09-20 NOTE — PRE-PROCEDURE INSTRUCTIONS
My Surgical Experience    The following information was developed to assist you to prepare for your operation  What do I need to do before coming to the hospital?   Arrange for a responsible person to drive you to and from the hospital    Arrange care for your children at home  Children are not allowed in the recovery areas of the hospital   Plan to wear clothing that is easy to put on and take off  If you are having shoulder surgery, wear a shirt that buttons or zippers in the front  Bathing  o Shower the evening before and the morning of your surgery with an antibacterial soap  Please refer to the Pre Op Showering Instructions for Surgery Patients Sheet   o Remove nail polish and all body piercing jewelry  o Do not shave any body part for at least 24 hours before surgery-this includes face, arms, legs and upper body  Food  o Nothing to eat or drink after midnight the night before your surgery  This includes candy and chewing gum  o Exception: If your surgery is after 12:00pm (noon), you may have clear liquids such as 7-Up®, ginger ale, apple or cranberry juice, Jell-O®, water, or clear broth until 8:00 am  o Do not drink milk or juice with pulp on the morning before surgery  o Do not drink alcohol 24 hours before surgery  Medicine  o Follow instructions you received from your surgeon about which medicines you may take on the day of surgery  o If instructed to take medicine on the morning of surgery, take pills with just a small sip of water  Call your prescribing doctor for specific infroamtion on what to do if you take insulin    What should I bring to the hospital?    Bring:  Nonnie Senters or a walker, if you have them, for foot or knee surgery   A list of the daily medicines, vitamins, minerals, herbals and nutritional supplements you take   Include the dosages of medicines and the time you take them each day   Glasses, dentures or hearing aids   Minimal clothing; you will be wearing hospital sleepwear   Photo ID; required to verify your identity   If you have a Living Will or Power of , bring a copy of the documents   If you have an ostomy, bring an extra pouch and any supplies you use    Do not bring   Medicines or inhalers   Money, valuables or jewelry    What other information should I know about the day of surgery?  Notify your surgeons if you develop a cold, sore throat, cough, fever, rash or any other illness   Report to the Ambulatory Surgical/Same Day Surgery Unit   You will be instructed to stop at Registration only if you have not been pre-registered   Inform your  fi they do not stay that they will be asked by the staff to leave a phone number where they can be reached   Be available to be reached before surgery  In the event the operating room schedule changes, you may be asked to come in earlier or later than expected    *It is important to tell your doctor and others involved in your health care if you are taking or have been taking any non-prescription drugs, vitamins, minerals, herbals or other nutritional supplements  Any of these may interact with some food or medicines and cause a reaction      Pre-Surgery Instructions:   Medication Instructions    Ascorbic Acid (VITAMIN C) 1000 MG tablet Instructed patient per Anesthesia Guidelines   aspirin (ASPIRIN LOW DOSE) 81 MG tablet Instructed patient per Anesthesia Guidelines   carvedilol (COREG) 6 25 mg tablet Instructed patient per Anesthesia Guidelines   fluticasone-salmeterol (ADVAIR HFA) 115-21 MCG/ACT inhaler Instructed patient per Anesthesia Guidelines   fosinopril (MONOPRIL) 20 mg tablet Instructed patient per Anesthesia Guidelines   furosemide (LASIX) 40 mg tablet Instructed patient per Anesthesia Guidelines   glucose blood (PRODIGY NO CODING BLOOD GLUC) test strip Instructed patient per Anesthesia Guidelines      ipratropium-albuterol (COMBIVENT RESPIMAT) inhaler Instructed patient per Anesthesia Guidelines   ipratropium-albuterol (DUO-NEB) 0 5-2 5 mg/mL Instructed patient per Anesthesia Guidelines   metFORMIN (GLUCOPHAGE) 1000 MG tablet Instructed patient per Anesthesia Guidelines   OXYGEN-HELIUM IN Instructed patient per Anesthesia Guidelines   PRODIGY TWIST TOP LANCETS 28G MISC Instructed patient per Anesthesia Guidelines   simvastatin (ZOCOR) 10 mg tablet Instructed patient per Anesthesia Guidelines   spironolactone (ALDACTONE) 25 mg tablet Instructed patient per Anesthesia Guidelines   warfarin (COUMADIN) 1 mg tablet Instructed patient per Anesthesia Guidelines   warfarin (COUMADIN) 5 mg tablet Instructed patient per Anesthesia Guidelines  To take creg, fosinopril, advair and combivent inhaler a m   Of surgery

## 2018-09-25 ENCOUNTER — ANESTHESIA EVENT (OUTPATIENT)
Dept: PERIOP | Facility: HOSPITAL | Age: 78
End: 2018-09-25
Payer: COMMERCIAL

## 2018-09-25 NOTE — ANESTHESIA PREPROCEDURE EVALUATION
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 ,        GI/Hepatic            Endo/Other  Diabetes ,      GYN       Hematology    Coagulation disorder (warfarin therapy, last dose was last thursday) currently taking oral anticoagulants,    Musculoskeletal       Neurology    CVA , no residual symptoms,    Psychology           Physical Exam    Airway    Mallampati score: II  TM Distance: >3 FB  Neck ROM: full     Dental       Cardiovascular  Rhythm: regular, Rate: normal,     Pulmonary  Comment: Distant breath sounds,     Other Findings        Anesthesia Plan  ASA Score- 4     Anesthesia Type- general with ASA Monitors  Additional Monitors:   Airway Plan: LMA  Plan Factors-    Induction- intravenous  Postoperative Plan- Plan for postoperative opioid use  Planned trial extubation    Informed Consent- Anesthetic plan and risks discussed with patient

## 2018-09-26 ENCOUNTER — ANESTHESIA (OUTPATIENT)
Dept: PERIOP | Facility: HOSPITAL | Age: 78
End: 2018-09-26
Payer: COMMERCIAL

## 2018-09-26 ENCOUNTER — HOSPITAL ENCOUNTER (OUTPATIENT)
Facility: HOSPITAL | Age: 78
Setting detail: OUTPATIENT SURGERY
Discharge: HOME | End: 2018-09-26
Attending: SPECIALIST | Admitting: SPECIALIST
Payer: COMMERCIAL

## 2018-09-26 VITALS
OXYGEN SATURATION: 98 % | TEMPERATURE: 98.4 F | RESPIRATION RATE: 18 BRPM | HEART RATE: 88 BPM | DIASTOLIC BLOOD PRESSURE: 87 MMHG | SYSTOLIC BLOOD PRESSURE: 122 MMHG

## 2018-09-26 DIAGNOSIS — K40.90 LEFT INGUINAL HERNIA: Primary | ICD-10-CM

## 2018-09-26 LAB
ATRIAL RATE: 95 BPM
GLUCOSE SERPL-MCNC: 134 MG/DL (ref 65–140)
P AXIS: 96 DEGREES
PR INTERVAL: 184 MS
QRS AXIS: 221 DEGREES
QRSD INTERVAL: 70 MS
QT INTERVAL: 338 MS
QTC INTERVAL: 424 MS
T WAVE AXIS: 115 DEGREES
VENTRICULAR RATE: 95 BPM

## 2018-09-26 PROCEDURE — 93010 ELECTROCARDIOGRAM REPORT: CPT | Performed by: INTERNAL MEDICINE

## 2018-09-26 PROCEDURE — 82948 REAGENT STRIP/BLOOD GLUCOSE: CPT

## 2018-09-26 PROCEDURE — 93005 ELECTROCARDIOGRAM TRACING: CPT

## 2018-09-26 PROCEDURE — 49505 PRP I/HERN INIT REDUC >5 YR: CPT | Performed by: SPECIALIST

## 2018-09-26 PROCEDURE — C1781 MESH (IMPLANTABLE): HCPCS | Performed by: SPECIALIST

## 2018-09-26 DEVICE — POLYPROPYLENE NONABSORBABLE SYNTHETIC SURGICAL MESH
Type: IMPLANTABLE DEVICE | Site: INGUINAL | Status: FUNCTIONAL
Brand: PROLENE

## 2018-09-26 RX ORDER — BUPIVACAINE HYDROCHLORIDE 5 MG/ML
INJECTION, SOLUTION PERINEURAL AS NEEDED
Status: DISCONTINUED | OUTPATIENT
Start: 2018-09-26 | End: 2018-09-26 | Stop reason: HOSPADM

## 2018-09-26 RX ORDER — FENTANYL CITRATE 50 UG/ML
INJECTION, SOLUTION INTRAMUSCULAR; INTRAVENOUS AS NEEDED
Status: DISCONTINUED | OUTPATIENT
Start: 2018-09-26 | End: 2018-09-26 | Stop reason: SURG

## 2018-09-26 RX ORDER — HEPARIN SODIUM 5000 [USP'U]/ML
5000 INJECTION, SOLUTION INTRAVENOUS; SUBCUTANEOUS
Status: DISCONTINUED | OUTPATIENT
Start: 2018-09-26 | End: 2018-09-26 | Stop reason: HOSPADM

## 2018-09-26 RX ORDER — MAGNESIUM HYDROXIDE 1200 MG/15ML
LIQUID ORAL AS NEEDED
Status: DISCONTINUED | OUTPATIENT
Start: 2018-09-26 | End: 2018-09-26 | Stop reason: HOSPADM

## 2018-09-26 RX ORDER — PROPOFOL 10 MG/ML
INJECTION, EMULSION INTRAVENOUS AS NEEDED
Status: DISCONTINUED | OUTPATIENT
Start: 2018-09-26 | End: 2018-09-26 | Stop reason: SURG

## 2018-09-26 RX ORDER — OXYCODONE HYDROCHLORIDE AND ACETAMINOPHEN 5; 325 MG/1; MG/1
1 TABLET ORAL EVERY 4 HOURS PRN
Qty: 25 TABLET | Refills: 0 | Status: SHIPPED | OUTPATIENT
Start: 2018-09-26 | End: 2018-10-01

## 2018-09-26 RX ORDER — SODIUM CHLORIDE 9 MG/ML
75 INJECTION, SOLUTION INTRAVENOUS CONTINUOUS
Status: DISCONTINUED | OUTPATIENT
Start: 2018-09-26 | End: 2018-09-26 | Stop reason: HOSPADM

## 2018-09-26 RX ORDER — ONDANSETRON 2 MG/ML
4 INJECTION INTRAMUSCULAR; INTRAVENOUS ONCE AS NEEDED
Status: DISCONTINUED | OUTPATIENT
Start: 2018-09-26 | End: 2018-09-26 | Stop reason: HOSPADM

## 2018-09-26 RX ORDER — LIDOCAINE HYDROCHLORIDE 10 MG/ML
INJECTION, SOLUTION INFILTRATION; PERINEURAL AS NEEDED
Status: DISCONTINUED | OUTPATIENT
Start: 2018-09-26 | End: 2018-09-26 | Stop reason: SURG

## 2018-09-26 RX ORDER — ONDANSETRON 2 MG/ML
INJECTION INTRAMUSCULAR; INTRAVENOUS AS NEEDED
Status: DISCONTINUED | OUTPATIENT
Start: 2018-09-26 | End: 2018-09-26 | Stop reason: SURG

## 2018-09-26 RX ORDER — FENTANYL CITRATE/PF 50 MCG/ML
25 SYRINGE (ML) INJECTION
Status: DISCONTINUED | OUTPATIENT
Start: 2018-09-26 | End: 2018-09-26 | Stop reason: HOSPADM

## 2018-09-26 RX ADMIN — PROPOFOL 50 MG: 10 INJECTION, EMULSION INTRAVENOUS at 09:24

## 2018-09-26 RX ADMIN — CEFAZOLIN SODIUM 1000 MG: 1 SOLUTION INTRAVENOUS at 09:23

## 2018-09-26 RX ADMIN — LIDOCAINE HYDROCHLORIDE 50 MG: 10 INJECTION, SOLUTION INFILTRATION; PERINEURAL at 09:23

## 2018-09-26 RX ADMIN — FENTANYL CITRATE 25 MCG: 50 INJECTION, SOLUTION INTRAMUSCULAR; INTRAVENOUS at 09:23

## 2018-09-26 RX ADMIN — FENTANYL CITRATE 25 MCG: 50 INJECTION, SOLUTION INTRAMUSCULAR; INTRAVENOUS at 09:41

## 2018-09-26 RX ADMIN — FENTANYL CITRATE 25 MCG: 50 INJECTION, SOLUTION INTRAMUSCULAR; INTRAVENOUS at 09:56

## 2018-09-26 RX ADMIN — ONDANSETRON 4 MG: 2 INJECTION INTRAMUSCULAR; INTRAVENOUS at 09:50

## 2018-09-26 RX ADMIN — HEPARIN SODIUM 5000 UNITS: 5000 INJECTION, SOLUTION INTRAVENOUS; SUBCUTANEOUS at 09:04

## 2018-09-26 RX ADMIN — SODIUM CHLORIDE 75 ML/HR: 0.9 INJECTION, SOLUTION INTRAVENOUS at 08:41

## 2018-09-26 RX ADMIN — FENTANYL CITRATE 25 MCG: 50 INJECTION, SOLUTION INTRAMUSCULAR; INTRAVENOUS at 10:08

## 2018-09-26 RX ADMIN — PROPOFOL 50 MG: 10 INJECTION, EMULSION INTRAVENOUS at 09:23

## 2018-09-26 NOTE — OP NOTE
OPERATIVE REPORT  PATIENT NAME: Beata Nicholson    :  1940  MRN: [id number]  Pt Location: [facility] OR ROOM 01    SURGERY DATE: 2018    Surgeon(s) and Role:     * Daniel Iraheta MD - Primary    Preop Diagnosis:  Left inguinal hernia [K40 90]    Post-Op Diagnosis Codes:     * Left inguinal hernia [K40 90]    Procedure(s) (LRB):  REPAIR LEFT INGUINAL HERNIA WITH MESH (Left)    Specimen(s):  * No specimens in log *    Estimated Blood Loss:   Minimal    Drains:       Anesthesia Type:   General    Operative Indications:  Left inguinal hernia [K40 90]      Operative Findings:  Large left direct inguinal hernia large left direct inguinal hernia  Small left indirect hernia    Complications:   None    Procedure and Technique:  The patient was taken to the operating minus they were time-out was called identifying Beata Nicholson repair of left inguinal hernia  A yes was marked in the holding area  The time-out included the patient's name, date of birth, procedure to be done, site of procedure, area marked, prepped and a dry, consent obtained, Ancef given, heparin given, recent beta-blocker care taken, allergies none, anesthesia risk of four and fire risk of three  This was agreed to by the anesthesiologist, scrub and circulating nurses  Patient had clearance by pulmonary and cardiac for this procedure  Once adequate general anesthesia was obtained the areas prepped and draped in normal fashion  Incision was made in the left groin  Bleeders were clamped and coagulated  Others were clamped and tied with ties of two 0 chromic  The external oblique aponeurosis was opened course of its fibers extending through the external ring after 0 5% Sensorcaine was infiltrated  Hernia contents and cord structures were dissected in region of the pubic tubercle and surrounded with use of a Penrose drain  Cord structures were explored  The ileoinguinal nerve was identified and retracted    A large direct hernia was found occupying the of the entire floor  from the cord structures and reduced  A small indirect hernia was found freed from its attachment to the cord structures and reduced at this time  A Ray-Silva sponge was inserted in the 4th agreed space for the Prolene hernia system  The large [id number]  USE BY 07/31/2022 The transversalis fascia was reapproximated IN  itsSTRENGTH IN PORTION  Once the underlay of the mesh had been deployed  The overlay of the mesh was next applied  Sutures of two 0 Vicryl were used  The 1st combine the mesh to the pubic tubercle  Interrupted sutures combine the mesh to the conjoined tendon  The indirect hernia had been reduced as well on the superior mesh had been deployed  A slit was made in the mesh at the cord structures by recreating the internal ring by suturing the mesh to the shelving edge of Poupart's ligament back to the mesh at this time  A clamp was inserted to ensure space for the cord structures to ensure viability of the cord structures which was accomplished  Final sutures combined the mesh to the shelving edge of Poupart's ligament intermediate between the pubic tubercle and internal ring and superior to the internal ring  Cord structures and nerves were placed in the normal position once the hernia had been repaired  The external oblique aponeurosis was closed with a continuous suture of two 0 PDS once cord structures and nerves had been placed in the normal position  Betadine was applied to the subcutaneous tissue and irrigated with saline solution  Yoli's fascia and subcutaneous tissue was closed with interrupted sutures of three 0 plain in layers  The skin was closed with a subcuticular suture of 4-0 PDS  Steri-Strips were applied 0 5% Sensorcaine was infiltrated one fingerbreadth medial to the anterior superior iliac spine followed next by the course of the incision  Cyst sterile dressings were applied  Sponge and count were correct    Patient tolerated procedure well returned to PACU   I was present for the entire procedure    Patient Disposition:  PACU     SIGNATURE: Christopher Nassar MD  DATE: September 26, 2018  TIME: 10:14 AM

## 2018-09-26 NOTE — DISCHARGE INSTRUCTIONS
Dr Kirstin Kiser  Discharge Instructions      · No driving or operating machinery for 24 hours or while taking pain medication  No driving until seen by Dr Kirstin Kiser following any abdominal surgery  · No alcoholic beverages for 24 hours or while taking pain medication  · DO NOT make any important personal or business decisions for 24 hours  Diet:  · Begin with liquids and light food and progress to your normal diet unless you are nauseated or otherwise instructed by your physician  Medication:  · Begin taking pain medication as directed and remember that narcotic medications may be constipating  Consider starting over the counter stool softeners  If constipation persists begin taking over the counter laxative  Dressing:  · Remove dressing in 2 days and may shower  · After first shower you may leave incision open to air or reapply a small dressing or Band-Aid       · May apply ice to incision unless otherwise directed      Activity  · No sex, strenuous exercise, or heavy lifting after any abdominal surgery    Make an appointment to see Dr Kirstin Kiser in 7 days
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
## 2018-10-04 ENCOUNTER — OFFICE VISIT (OUTPATIENT)
Dept: SURGERY | Facility: CLINIC | Age: 78
End: 2018-10-04

## 2018-10-04 VITALS
WEIGHT: 144 LBS | DIASTOLIC BLOOD PRESSURE: 60 MMHG | BODY MASS INDEX: 20.16 KG/M2 | HEART RATE: 105 BPM | HEIGHT: 71 IN | TEMPERATURE: 98 F | SYSTOLIC BLOOD PRESSURE: 106 MMHG

## 2018-10-04 DIAGNOSIS — Z09 STATUS POST INGUINAL HERNIA REPAIR, FOLLOW-UP EXAM: ICD-10-CM

## 2018-10-04 DIAGNOSIS — Z48.02 ENCOUNTER FOR REMOVAL OF SUTURES: Primary | ICD-10-CM

## 2018-10-04 PROCEDURE — 99024 POSTOP FOLLOW-UP VISIT: CPT | Performed by: SPECIALIST

## 2018-10-04 NOTE — PROGRESS NOTES
Assessment/Plan:   Aldo Shannon comes today for suture removal   He had a large left inguinal hernia repaired with mesh  There is moderate swelling and ecchymosis  He is doing well  I will check him in one month  I want to put some warm compresses on this during the month  I do not one on the left more than 20 lb until I see him on his next visit   Diagnoses and all orders for this visit:    Encounter for removal of sutures    Status post inguinal hernia repair, follow-up exam          Subjective:     Patient ID: Aldo Shannon May is a 76 y o  male  Aldo Shannon comes today for suture removal he had a large left inguinal hernia repaired with mesh  He is doing well  He had some pain in the beginning but is stopped taking pain medication  He noted some significant bruising which seems to be improving  He appears to be doing very well he remains on his oxygen        Review of Systems    status post repair left inguinal hernia    Objective:     Physical Exam   Abdominal:   Moderate swelling and ecchymosis  Wound clean healing well    Sutures removed with hemostats and scissors as this was a subcuticular suture

## 2018-10-09 ENCOUNTER — TELEPHONE (OUTPATIENT)
Dept: FAMILY MEDICINE CLINIC | Facility: CLINIC | Age: 78
End: 2018-10-09

## 2018-10-09 ENCOUNTER — ANTICOAG VISIT (OUTPATIENT)
Dept: FAMILY MEDICINE CLINIC | Facility: CLINIC | Age: 78
End: 2018-10-09

## 2018-10-09 LAB — INR PPP: 2.4 (ref 0.86–1.17)

## 2018-10-17 DIAGNOSIS — J44.1 CHRONIC OBSTRUCTIVE PULMONARY DISEASE WITH ACUTE EXACERBATION (HCC): ICD-10-CM

## 2018-10-23 ENCOUNTER — ANTICOAG VISIT (OUTPATIENT)
Dept: FAMILY MEDICINE CLINIC | Facility: CLINIC | Age: 78
End: 2018-10-23

## 2018-10-23 ENCOUNTER — TELEPHONE (OUTPATIENT)
Dept: FAMILY MEDICINE CLINIC | Facility: CLINIC | Age: 78
End: 2018-10-23

## 2018-10-23 LAB — INR PPP: 3 (ref 0.86–1.17)

## 2018-11-01 ENCOUNTER — OFFICE VISIT (OUTPATIENT)
Dept: SURGERY | Facility: CLINIC | Age: 78
End: 2018-11-01

## 2018-11-01 VITALS
HEIGHT: 71 IN | BODY MASS INDEX: 20.16 KG/M2 | WEIGHT: 144 LBS | TEMPERATURE: 98.1 F | DIASTOLIC BLOOD PRESSURE: 70 MMHG | HEART RATE: 80 BPM | SYSTOLIC BLOOD PRESSURE: 122 MMHG

## 2018-11-01 DIAGNOSIS — Z09 STATUS POST LEFT INGUINAL HERNIA REPAIR, FOLLOW-UP EXAM: Primary | ICD-10-CM

## 2018-11-01 PROCEDURE — 99024 POSTOP FOLLOW-UP VISIT: CPT | Performed by: SPECIALIST

## 2018-11-01 NOTE — PROGRESS NOTES
Assessment/Plan:  Demond Dupont comes today for recheck of his left inguinal hernia surgery  This was done on September 26  The hernia was very large  He is doing very well  The ecchymosis is gone  The swelling is much improved  I will check him in six weeks for final check  He is no longer taking pain medicine  He is eating okay and the bowels are okay  He will call sooner for any problems  Diagnoses and all orders for this visit:    Status post left inguinal hernia repair, follow-up exam          Subjective:     Patient ID: Demond Nicholson is a 76 y o  male  Demond Dupont comes today to recheck his large left inguinal hernia repair  He was seen after the surgery and had a large amount of swelling and ecchymosis  He feels he is doing perfect at this time he remains on oxygen for 24 hours a day  He is eating okay  Bowels are okay        Review of Systems   All other systems reviewed and are negative  Objective:     Physical Exam   Abdominal:   Abdomen soft nontender  Ecchymosis gone  Swelling much improved    No evidence of recurrent hernia

## 2018-11-07 ENCOUNTER — ANTICOAG VISIT (OUTPATIENT)
Dept: FAMILY MEDICINE CLINIC | Facility: CLINIC | Age: 78
End: 2018-11-07

## 2018-11-07 ENCOUNTER — TELEPHONE (OUTPATIENT)
Dept: FAMILY MEDICINE CLINIC | Facility: CLINIC | Age: 78
End: 2018-11-07

## 2018-11-07 LAB — INR PPP: 3 (ref 0.86–1.17)

## 2018-11-15 ENCOUNTER — ANTICOAG VISIT (OUTPATIENT)
Dept: FAMILY MEDICINE CLINIC | Facility: CLINIC | Age: 78
End: 2018-11-15

## 2018-11-15 ENCOUNTER — TELEPHONE (OUTPATIENT)
Dept: FAMILY MEDICINE CLINIC | Facility: CLINIC | Age: 78
End: 2018-11-15

## 2018-11-15 LAB — INR PPP: 2.9 (ref 0.86–1.17)

## 2018-11-15 NOTE — TELEPHONE ENCOUNTER
Spoke with patient, reviewed current dosage updated anticoag anher gave instructions to continue same dosage and recheck INR I 1 month     Patient had no questions verbalized understanding  No further actions required  Coralee Councilman, LPN

## 2018-11-15 NOTE — TELEPHONE ENCOUNTER
Pt  Cell Voice mail is full   House phone is no longer in service  Will try again later  INR 2 9  Per Dr Liya Nolan same dose redraw in one month    Roxanne Grissom MA

## 2018-11-30 ENCOUNTER — ANTICOAG VISIT (OUTPATIENT)
Dept: FAMILY MEDICINE CLINIC | Facility: CLINIC | Age: 78
End: 2018-11-30

## 2018-11-30 ENCOUNTER — TELEPHONE (OUTPATIENT)
Dept: FAMILY MEDICINE CLINIC | Facility: CLINIC | Age: 78
End: 2018-11-30

## 2018-11-30 LAB — INR PPP: 2.7 (ref 0.86–1.17)

## 2018-12-13 ENCOUNTER — OFFICE VISIT (OUTPATIENT)
Dept: SURGERY | Facility: CLINIC | Age: 78
End: 2018-12-13

## 2018-12-13 VITALS
BODY MASS INDEX: 20.16 KG/M2 | HEART RATE: 50 BPM | HEIGHT: 71 IN | TEMPERATURE: 97.7 F | DIASTOLIC BLOOD PRESSURE: 62 MMHG | WEIGHT: 144 LBS | SYSTOLIC BLOOD PRESSURE: 122 MMHG

## 2018-12-13 DIAGNOSIS — Z09 STATUS POST LEFT INGUINAL HERNIA REPAIR, FOLLOW-UP EXAM: Primary | ICD-10-CM

## 2018-12-13 PROCEDURE — 4040F PNEUMOC VAC/ADMIN/RCVD: CPT | Performed by: SPECIALIST

## 2018-12-13 PROCEDURE — 99024 POSTOP FOLLOW-UP VISIT: CPT | Performed by: SPECIALIST

## 2018-12-13 NOTE — PROGRESS NOTES
Assessment/Plan:  Chivo Valenzuela comes today for postoperative check  He had his left inguinal hernia fixed in September 26, 2018  His examination is totally normal   He is back to normal   He has had an excellent postoperative cover E  He looks terrific  He is discharged  He will call for any problems  Diagnoses and all orders for this visit:    Status post left inguinal hernia repair, follow-up exam          Subjective:     Patient ID: Chivo Nicholson is a 76 y o  male  Chivo Valenzuela comes today for final check  Had hernia surgery on the left side on September 26  The hernia was very large  He is doing very well  He has no complaints  He is back to normal   He comes today for final check  The swelling is gone  Everything is fine  He is eating okay and the bowels are okay  Review of Systems   All other systems reviewed and are negative  Objective:     Physical Exam   Abdominal:   Wound clean  Well healed  No evidence of recurrent hernia

## 2018-12-21 ENCOUNTER — ANTICOAG VISIT (OUTPATIENT)
Dept: FAMILY MEDICINE CLINIC | Facility: CLINIC | Age: 78
End: 2018-12-21

## 2018-12-21 ENCOUNTER — TELEPHONE (OUTPATIENT)
Dept: FAMILY MEDICINE CLINIC | Facility: CLINIC | Age: 78
End: 2018-12-21

## 2018-12-21 LAB — INR PPP: 2.7 (ref 0.86–1.17)

## 2019-01-07 DIAGNOSIS — I48.20 CHRONIC ATRIAL FIBRILLATION (HCC): ICD-10-CM

## 2019-01-07 RX ORDER — WARFARIN SODIUM 5 MG/1
TABLET ORAL
Qty: 90 TABLET | Refills: 1 | Status: SHIPPED | OUTPATIENT
Start: 2019-01-07 | End: 2019-07-05 | Stop reason: SDUPTHER

## 2019-01-22 ENCOUNTER — ANTICOAG VISIT (OUTPATIENT)
Dept: FAMILY MEDICINE CLINIC | Facility: CLINIC | Age: 79
End: 2019-01-22

## 2019-01-22 ENCOUNTER — TELEPHONE (OUTPATIENT)
Dept: FAMILY MEDICINE CLINIC | Facility: CLINIC | Age: 79
End: 2019-01-22

## 2019-01-22 LAB — INR PPP: 4.4 (ref 0.86–1.17)

## 2019-01-22 NOTE — TELEPHONE ENCOUNTER
Pt informed of INR 4 4   Will take 5 5mg today and 5 5mg tomorrow and redraw Thursday  [facility] lpn

## 2019-01-22 NOTE — TELEPHONE ENCOUNTER
He can redraw it if he likes and see where he stands  Dehydration does not play a large role in where he stands with his INR    AT 4 4 his risk of bleeding is quite high and if he fell he would have a major problem

## 2019-01-22 NOTE — TELEPHONE ENCOUNTER
Spoke with patient, he states he had a lot of trouble with getting his INR this week, he was dehydrated and had the do his blood twice  He does not believe that is a true reading and does not want to make any changes at this time    Please advise  [facility] efrain

## 2019-01-23 ENCOUNTER — OFFICE VISIT (OUTPATIENT)
Dept: PULMONOLOGY | Facility: MEDICAL CENTER | Age: 79
End: 2019-01-23
Payer: COMMERCIAL

## 2019-01-23 VITALS
RESPIRATION RATE: 18 BRPM | HEIGHT: 70 IN | OXYGEN SATURATION: 94 % | WEIGHT: 142 LBS | BODY MASS INDEX: 20.33 KG/M2 | DIASTOLIC BLOOD PRESSURE: 60 MMHG | HEART RATE: 106 BPM | TEMPERATURE: 99.2 F | SYSTOLIC BLOOD PRESSURE: 110 MMHG

## 2019-01-23 DIAGNOSIS — J44.1 COPD WITH ACUTE EXACERBATION (HCC): Primary | ICD-10-CM

## 2019-01-23 DIAGNOSIS — R09.02 HYPOXIA: ICD-10-CM

## 2019-01-23 DIAGNOSIS — R09.82 POST-NASAL DRIP: ICD-10-CM

## 2019-01-23 PROCEDURE — 99214 OFFICE O/P EST MOD 30 MIN: CPT | Performed by: INTERNAL MEDICINE

## 2019-01-23 PROCEDURE — 94640 AIRWAY INHALATION TREATMENT: CPT | Performed by: INTERNAL MEDICINE

## 2019-01-23 RX ORDER — IPRATROPIUM BROMIDE AND ALBUTEROL SULFATE 2.5; .5 MG/3ML; MG/3ML
3 SOLUTION RESPIRATORY (INHALATION) 3 TIMES DAILY
Qty: 100 VIAL | Refills: 0 | Status: SHIPPED | OUTPATIENT
Start: 2019-01-23 | End: 2019-10-08 | Stop reason: ALTCHOICE

## 2019-01-23 RX ORDER — IPRATROPIUM BROMIDE AND ALBUTEROL SULFATE 2.5; .5 MG/3ML; MG/3ML
3 SOLUTION RESPIRATORY (INHALATION) ONCE
Status: COMPLETED | OUTPATIENT
Start: 2019-01-23 | End: 2019-01-23

## 2019-01-23 RX ORDER — PREDNISONE 20 MG/1
TABLET ORAL
Qty: 30 TABLET | Refills: 0 | Status: SHIPPED | OUTPATIENT
Start: 2019-01-23 | End: 2019-02-28

## 2019-01-23 RX ORDER — AZELASTINE 1 MG/ML
1 SPRAY, METERED NASAL 2 TIMES DAILY
Qty: 1 BOTTLE | Refills: 0 | Status: SHIPPED | OUTPATIENT
Start: 2019-01-23 | End: 2019-10-08 | Stop reason: ALTCHOICE

## 2019-01-23 RX ADMIN — IPRATROPIUM BROMIDE AND ALBUTEROL SULFATE 3 ML: 2.5; .5 SOLUTION RESPIRATORY (INHALATION) at 11:21

## 2019-01-23 NOTE — LETTER
January 23, 2019     Karina Kwong DO  [address]  [facility] [id number]    Patient: Tim Mckoy   YOB: 1940   Date of Visit: 1/23/2019       Dear Dr Chely Covarrubias:    Thank you for referring Som Nicholson to me for evaluation  Below are my notes for this consultation  If you have questions, please do not hesitate to call me  I look forward to following your patient along with you  Sincerely,        Valentina Becerra MD        CC: No Recipients  Valentina Becerra MD  1/23/2019 12:51 PM  Sign at close encounter  Assessment/Plan:     Problem List Items Addressed This Visit        Respiratory    Hypoxia    COPD with acute exacerbation ([facility] [address] ) - Primary     Patient appears to have acute COPD exacerbation with shortness of breath  No clear bacterial etiology for exacerbation  Possibly viral/environmental    Discussed prednisone use and patient is agreeable to low dose taper  He is started on 20 mg daily and tapered  He is recommended to use his nebulizer 3-4 times per day to help with bronchospasm  No antibiotics are prescribed today  Increase use of Advair to twice daily         Relevant Medications    ipratropium-albuterol (DUO-NEB) 0 5-2 5 mg/3 mL inhalation solution 3 mL (Completed)    azelastine (ASTELIN) 0 1 % nasal spray    ipratropium-albuterol (DUO-NEB) 0 5-2 5 mg/3 mL nebulizer solution    predniSONE 20 mg tablet       Other    Post-nasal drip     Patient has constant post nasal drip  Start Astelin nasal spray         Relevant Medications    azelastine (ASTELIN) 0 1 % nasal spray          Patient Instructions   Increase Advair to 2 puffs twice daily  Rinse and spit after use  Start prednisone as directed  Use nebulizer 3-4 times daily  Do not use Combivent if you are using the nebulizer  Call if your symptoms get worse or you develop a fever  Use nasal spray twice daily  Follow-up as scheduled or sooner if needed    All questions are answered to the patient's satisfaction and understanding  He verbalizes understanding  He is encouraged to call with any further questions or concerns  Portions of the record may have been created with voice recognition software  Occasional wrong word or "sound a like" substitutions may have occurred due to the inherent limitations of voice recognition software  Read the chart carefully and recognize, using context, where substitutions have occurred  Electronically Signed by Dillon Sawyer MD    ______________________________________________________________________    Chief Complaint:   Chief Complaint   Patient presents with    Follow-up     Having difficulty breathing       Patient ID: Kasie Woo is a [de-identified] y o  y o  male has a past medical history of Arteriosclerosis of arteries of extremities ([facility] [address] ); Arteriosclerosis of coronary artery; Atrial fibrillation ([facility] [address] ); Bilateral carotid bruits; Cardiac arrhythmia; Cardiac disease; Cardiomyopathy ([facility] [address] ); Congestive heart failure (CHF) ([facility] [address] ); COPD (chronic obstructive pulmonary disease) ([facility] ); Diabetes mellitus ([facility] ); Diverticulosis; History of emphysema; History of pneumonia; Left heart failure with left ejection fraction less than or equal to 30 percent ([facility] ); Non-Q wave myocardial infarction [facility]); Pneumothorax (2003); Rib fractures (03/2015); Solitary pulmonary nodule; Stroke [facility]); and Ventricular tachycardia ([facility] )  1/23/2019  Patient presents today for follow-up visit  Has been having worsening shortness of breath  Feels he is sneezing a lot  No cough or wheezing  No chest tightness  Hypoxemic now- this started 2 days ago  Noticed it  He uses 4 lt POC  No appetite- this has been going on for a year  No fever  No sore throat  He feels like his nose runs  - this has been going on for a long time  He does not use any nasal sprays- he is afraid to use anything that Dr Rosalva Hoffman does not tell him to use  Clear drainage  No sinus pain or tenderness     He is using Advair- using it twice day one puff  Combivent he is using 1 puff 4 times a day  He does not like prednisone- it does not help and causes out of sync  He is on Coumadin for atrial fibrillation- it was 4 4 yesterday  This is monitored by his GP  And he has held the coumadin  No new medications  He is not using his nebulizer  HPI    Review of Systems   All other systems reviewed and are negative  Smoking history: He reports that he quit smoking about 17 years ago  His smoking use included Cigarettes  He has a 60 00 pack-year smoking history   He has never used smokeless tobacco       Immunization History   Administered Date(s) Administered    Influenza Split High Dose Preservative Free IM 09/04/2014, 09/07/2017    Influenza TIV (IM) 10/16/2003    Pneumococcal Polysaccharide PPV23 01/01/2001, 06/15/2006     Current Outpatient Prescriptions   Medication Sig Dispense Refill    Ascorbic Acid (VITAMIN C) 1000 MG tablet Take 1,000 mg by mouth daily      aspirin (ASPIRIN LOW DOSE) 81 MG tablet Take 81 mg by mouth daily      carvedilol (COREG) 6 25 mg tablet TAKE 1 TABLET TWICE DAILY 180 tablet 3    fluticasone-salmeterol (ADVAIR HFA) 115-21 MCG/ACT inhaler Inhale 2 puffs 2 (two) times a day 3 Inhaler 3    fosinopril (MONOPRIL) 20 mg tablet Take 1 tablet (20 mg total) by mouth daily 90 tablet 3    furosemide (LASIX) 40 mg tablet Take 1 tablet (40 mg total) by mouth daily 90 tablet 3    glucose blood (PRODIGY NO CODING BLOOD GLUC) test strip by In Vitro route      metFORMIN (GLUCOPHAGE) 1000 MG tablet Take 1 tablet (1,000 mg total) by mouth 2 (two) times a day 180 tablet 3    OXYGEN-HELIUM IN Inhale 3 L as needed      PRODIGY TWIST TOP LANCETS 28G MISC by Does not apply route      simvastatin (ZOCOR) 10 mg tablet Take 1 tablet (10 mg total) by mouth daily (Patient taking differently: Take 10 mg by mouth daily at bedtime  ) 90 tablet 3    spironolactone (ALDACTONE) 25 mg tablet TAKE 1 TABLET EVERY DAY 90 tablet 3    warfarin (COUMADIN) 1 mg tablet Take as directed by provider 90 tablet 3    warfarin (COUMADIN) 5 mg tablet TAKE 1 TABLET DAILY OR AS DIRECTED BY PHYSICIAN AFTER INR BLOOD DRAW  90 tablet 1    ipratropium-albuterol (COMBIVENT RESPIMAT) inhaler Inhale 1 puff 4 (four) times a day (Patient not taking: Reported on 1/23/2019 ) 3 Inhaler 3    ipratropium-albuterol (DUO-NEB) 0 5-2 5 mg/mL Take 3 mL by nebulization every 4 (four) hours while awake (Patient not taking: Reported on 1/23/2019 ) 3 mL 0     No current facility-administered medications for this visit  Allergies: Patient has no known allergies  Objective:  Vitals:    01/23/19 1013 01/23/19 1015 01/23/19 1016   BP: 110/60     BP Location: Right arm     Patient Position: Sitting     Cuff Size: Standard     Pulse: (!) 106     Resp: 18     Temp: 99 2 °F (37 3 °C)     TempSrc: Tympanic     SpO2: (!) 69% (!) 74% 94%   Weight: 64 4 kg (142 lb)     Height: 5' 10" (1 778 m)     Oxygen Therapy  SpO2: 94 % (4L continuous- at rest)    Wt Readings from Last 3 Encounters:   01/23/19 64 4 kg (142 lb)   12/13/18 65 3 kg (144 lb)   11/01/18 65 3 kg (144 lb)     Body mass index is 20 37 kg/m²  Physical Exam   Constitutional: He is oriented to person, place, and time  He appears well-developed and well-nourished  No distress  HENT:   Head: Normocephalic and atraumatic  Mouth/Throat: Oropharynx is clear and moist  No oropharyngeal exudate  Eyes: Pupils are equal, round, and reactive to light  EOM are normal    Neck: Normal range of motion  Neck supple  Cardiovascular: Normal rate and regular rhythm  No murmur heard  Pulmonary/Chest: Effort normal  No respiratory distress  He has no wheezes  He has no rales  He exhibits no tenderness  Very diminished air entry bilaterally  Abdominal: Soft  Bowel sounds are normal  He exhibits no distension  There is no tenderness  Musculoskeletal: Normal range of motion  He exhibits no edema  Neurological: He is alert and oriented to person, place, and time  No cranial nerve deficit  Skin: Skin is warm and dry  He is not diaphoretic  Psychiatric: He has a normal mood and affect  His behavior is normal    Vitals reviewed  Lab Review:   Reviewed     Diagnostics:  I have personally reviewed pertinent reports          Mini neb  Performed by: Amy Gomez  Authorized by: Amy Gomez     Number of treatments:  1  Treatment 1:   Pre-Procedure     Symptoms:  Labored breathing, difficulty breathing and shortness of breath    Medication Administered:  Duoneb - Albuterol 2 5 mg/Atrovent 0 5 mg  Post-Procedure     Symptoms:  Wheezing    Lung sounds:  Upper lobe wheezing now appreciated on end expiration  Nebulizer Comments:  Patient with improvement in shortness of breath post nebulizer

## 2019-01-23 NOTE — PROGRESS NOTES
Assessment/Plan:     Problem List Items Addressed This Visit        Respiratory    Hypoxia    COPD with acute exacerbation ([facility] [address] ) - Primary     Patient appears to have acute COPD exacerbation with shortness of breath  No clear bacterial etiology for exacerbation  Possibly viral/environmental    Discussed prednisone use and patient is agreeable to low dose taper  He is started on 20 mg daily and tapered  He is recommended to use his nebulizer 3-4 times per day to help with bronchospasm  No antibiotics are prescribed today  Increase use of Advair to twice daily         Relevant Medications    ipratropium-albuterol (DUO-NEB) 0 5-2 5 mg/3 mL inhalation solution 3 mL (Completed)    azelastine (ASTELIN) 0 1 % nasal spray    ipratropium-albuterol (DUO-NEB) 0 5-2 5 mg/3 mL nebulizer solution    predniSONE 20 mg tablet       Other    Post-nasal drip     Patient has constant post nasal drip  Start Astelin nasal spray         Relevant Medications    azelastine (ASTELIN) 0 1 % nasal spray          Patient Instructions   Increase Advair to 2 puffs twice daily  Rinse and spit after use  Start prednisone as directed  Use nebulizer 3-4 times daily  Do not use Combivent if you are using the nebulizer  Call if your symptoms get worse or you develop a fever  Use nasal spray twice daily  Follow-up as scheduled or sooner if needed  All questions are answered to the patient's satisfaction and understanding  He verbalizes understanding  He is encouraged to call with any further questions or concerns  Portions of the record may have been created with voice recognition software  Occasional wrong word or "sound a like" substitutions may have occurred due to the inherent limitations of voice recognition software  Read the chart carefully and recognize, using context, where substitutions have occurred      Electronically Signed by Jerri Herron MD    ______________________________________________________________________    Chief Complaint:   Chief Complaint   Patient presents with    Follow-up     Having difficulty breathing       Patient ID: Aldo Shannon is a [de-identified] y o  y o  male has a past medical history of Arteriosclerosis of arteries of extremities ([facility] ); Arteriosclerosis of coronary artery; Atrial fibrillation ([place] [address] ); Bilateral carotid bruits; Cardiac arrhythmia; Cardiac disease; Cardiomyopathy ([place] [address] ); Congestive heart failure (CHF) ([place] [address] ); COPD (chronic obstructive pulmonary disease) ([place] [address] ); Diabetes mellitus ([place] [address] ); Diverticulosis; History of emphysema; History of pneumonia; Left heart failure with left ejection fraction less than or equal to 30 percent ([place] [address] ); Non-Q wave myocardial infarction [facility]); Pneumothorax (2003); Rib fractures (03/2015); Solitary pulmonary nodule; Stroke [facility]); and Ventricular tachycardia ([place] [address] )  1/23/2019  Patient presents today for follow-up visit  Has been having worsening shortness of breath  Feels he is sneezing a lot  No cough or wheezing  No chest tightness  Hypoxemic now- this started 2 days ago  Noticed it  He uses 4 lt POC  No appetite- this has been going on for a year  No fever  No sore throat  He feels like his nose runs  - this has been going on for a long time  He does not use any nasal sprays- he is afraid to use anything that Dr Nallely Persaud does not tell him to use  Clear drainage  No sinus pain or tenderness  He is using Advair- using it twice day one puff  Combivent he is using 1 puff 4 times a day  He does not like prednisone- it does not help and causes out of sync  He is on Coumadin for atrial fibrillation- it was 4 4 yesterday  This is monitored by his GP  And he has held the coumadin  No new medications  He is not using his nebulizer  HPI    Review of Systems   All other systems reviewed and are negative  Smoking history: He reports that he quit smoking about 17 years ago   His smoking use included Cigarettes  He has a 60 00 pack-year smoking history  He has never used smokeless tobacco       Immunization History   Administered Date(s) Administered    Influenza Split High Dose Preservative Free IM 09/04/2014, 09/07/2017    Influenza TIV (IM) 10/16/2003    Pneumococcal Polysaccharide PPV23 01/01/2001, 06/15/2006     Current Outpatient Prescriptions   Medication Sig Dispense Refill    Ascorbic Acid (VITAMIN C) 1000 MG tablet Take 1,000 mg by mouth daily      aspirin (ASPIRIN LOW DOSE) 81 MG tablet Take 81 mg by mouth daily      carvedilol (COREG) 6 25 mg tablet TAKE 1 TABLET TWICE DAILY 180 tablet 3    fluticasone-salmeterol (ADVAIR HFA) 115-21 MCG/ACT inhaler Inhale 2 puffs 2 (two) times a day 3 Inhaler 3    fosinopril (MONOPRIL) 20 mg tablet Take 1 tablet (20 mg total) by mouth daily 90 tablet 3    furosemide (LASIX) 40 mg tablet Take 1 tablet (40 mg total) by mouth daily 90 tablet 3    glucose blood (PRODIGY NO CODING BLOOD GLUC) test strip by In Vitro route      metFORMIN (GLUCOPHAGE) 1000 MG tablet Take 1 tablet (1,000 mg total) by mouth 2 (two) times a day 180 tablet 3    OXYGEN-HELIUM IN Inhale 3 L as needed      PRODIGY TWIST TOP LANCETS 28G MISC by Does not apply route      simvastatin (ZOCOR) 10 mg tablet Take 1 tablet (10 mg total) by mouth daily (Patient taking differently: Take 10 mg by mouth daily at bedtime  ) 90 tablet 3    spironolactone (ALDACTONE) 25 mg tablet TAKE 1 TABLET EVERY DAY 90 tablet 3    warfarin (COUMADIN) 1 mg tablet Take as directed by provider 90 tablet 3    warfarin (COUMADIN) 5 mg tablet TAKE 1 TABLET DAILY OR AS DIRECTED BY PHYSICIAN AFTER INR BLOOD DRAW   90 tablet 1    ipratropium-albuterol (COMBIVENT RESPIMAT) inhaler Inhale 1 puff 4 (four) times a day (Patient not taking: Reported on 1/23/2019 ) 3 Inhaler 3    ipratropium-albuterol (DUO-NEB) 0 5-2 5 mg/mL Take 3 mL by nebulization every 4 (four) hours while awake (Patient not taking: Reported on 1/23/2019 ) 3 mL 0     No current facility-administered medications for this visit  Allergies: Patient has no known allergies  Objective:  Vitals:    01/23/19 1013 01/23/19 1015 01/23/19 1016   BP: 110/60     BP Location: Right arm     Patient Position: Sitting     Cuff Size: Standard     Pulse: (!) 106     Resp: 18     Temp: 99 2 °F (37 3 °C)     TempSrc: Tympanic     SpO2: (!) 69% (!) 74% 94%   Weight: 64 4 kg (142 lb)     Height: 5' 10" (1 778 m)     Oxygen Therapy  SpO2: 94 % (4L continuous- at rest)    Wt Readings from Last 3 Encounters:   01/23/19 64 4 kg (142 lb)   12/13/18 65 3 kg (144 lb)   11/01/18 65 3 kg (144 lb)     Body mass index is 20 37 kg/m²  Physical Exam   Constitutional: He is oriented to person, place, and time  He appears well-developed and well-nourished  No distress  HENT:   Head: Normocephalic and atraumatic  Mouth/Throat: Oropharynx is clear and moist  No oropharyngeal exudate  Eyes: Pupils are equal, round, and reactive to light  EOM are normal    Neck: Normal range of motion  Neck supple  Cardiovascular: Normal rate and regular rhythm  No murmur heard  Pulmonary/Chest: Effort normal  No respiratory distress  He has no wheezes  He has no rales  He exhibits no tenderness  Very diminished air entry bilaterally  Abdominal: Soft  Bowel sounds are normal  He exhibits no distension  There is no tenderness  Musculoskeletal: Normal range of motion  He exhibits no edema  Neurological: He is alert and oriented to person, place, and time  No cranial nerve deficit  Skin: Skin is warm and dry  He is not diaphoretic  Psychiatric: He has a normal mood and affect  His behavior is normal    Vitals reviewed  Lab Review:   Reviewed     Diagnostics:  I have personally reviewed pertinent reports          Mini neb  Performed by: Maria Guadalupe Pfeiffer by: Elaine      Number of treatments:  1  Treatment 1:   Pre-Procedure     Symptoms:  Labored breathing, difficulty breathing and shortness of breath    Medication Administered:  Duoneb - Albuterol 2 5 mg/Atrovent 0 5 mg  Post-Procedure     Symptoms:  Wheezing    Lung sounds:  Upper lobe wheezing now appreciated on end expiration  Nebulizer Comments:  Patient with improvement in shortness of breath post nebulizer

## 2019-01-23 NOTE — PATIENT INSTRUCTIONS
Increase Advair to 2 puffs twice daily  Rinse and spit after use  Start prednisone as directed  Use nebulizer 3-4 times daily  Do not use Combivent if you are using the nebulizer  Call if your symptoms get worse or you develop a fever  Use nasal spray twice daily

## 2019-01-23 NOTE — LETTER
January 23, 2019     Pop Powell DO  [address] [address]    Patient: Jay Baca May   YOB: 1940   Date of Visit: 1/23/2019       Dear Dr Dos Santos Barrier:    Thank you for referring Ángel Garcia to me for evaluation  Below are my notes for this consultation  If you have questions, please do not hesitate to call me  I look forward to following your patient along with you  Sincerely,        Betzaida Smith MD        CC: No Recipients  Betzaida Smith MD  1/23/2019 12:51 PM  Sign at close encounter  Assessment/Plan:     Problem List Items Addressed This Visit        Respiratory    Hypoxia    COPD with acute exacerbation ([facility] [address] ) - Primary     Patient appears to have acute COPD exacerbation with shortness of breath  No clear bacterial etiology for exacerbation  Possibly viral/environmental    Discussed prednisone use and patient is agreeable to low dose taper  He is started on 20 mg daily and tapered  He is recommended to use his nebulizer 3-4 times per day to help with bronchospasm  No antibiotics are prescribed today  Increase use of Advair to twice daily         Relevant Medications    ipratropium-albuterol (DUO-NEB) 0 5-2 5 mg/3 mL inhalation solution 3 mL (Completed)    azelastine (ASTELIN) 0 1 % nasal spray    ipratropium-albuterol (DUO-NEB) 0 5-2 5 mg/3 mL nebulizer solution    predniSONE 20 mg tablet       Other    Post-nasal drip     Patient has constant post nasal drip  Start Astelin nasal spray         Relevant Medications    azelastine (ASTELIN) 0 1 % nasal spray          Patient Instructions   Increase Advair to 2 puffs twice daily  Rinse and spit after use  Start prednisone as directed  Use nebulizer 3-4 times daily  Do not use Combivent if you are using the nebulizer  Call if your symptoms get worse or you develop a fever  Use nasal spray twice daily  Follow-up as scheduled or sooner if needed    All questions are answered to the patient's satisfaction and understanding  He verbalizes understanding  He is encouraged to call with any further questions or concerns  Portions of the record may have been created with voice recognition software  Occasional wrong word or "sound a like" substitutions may have occurred due to the inherent limitations of voice recognition software  Read the chart carefully and recognize, using context, where substitutions have occurred  Electronically Signed by Brooke Oh MD    ______________________________________________________________________    Chief Complaint:   Chief Complaint   Patient presents with    Follow-up     Having difficulty breathing       Patient ID: León Deshpande is a [de-identified] y o  y o  male has a past medical history of Arteriosclerosis of arteries of extremities ([facility] [address] ); Arteriosclerosis of coronary artery; Atrial fibrillation ([facility] [address] ); Bilateral carotid bruits; Cardiac arrhythmia; Cardiac disease; Cardiomyopathy ([facility] [address] ); Congestive heart failure (CHF) ([facility] [address] ); COPD (chronic obstructive pulmonary disease) ([facility] ); Diabetes mellitus ([facility] ); Diverticulosis; History of emphysema; History of pneumonia; Left heart failure with left ejection fraction less than or equal to 30 percent ([facility] ); Non-Q wave myocardial infarction [facility]); Pneumothorax (2003); Rib fractures (03/2015); Solitary pulmonary nodule; Stroke [facility]); and Ventricular tachycardia ([facility] )  1/23/2019  Patient presents today for follow-up visit  Has been having worsening shortness of breath  Feels he is sneezing a lot  No cough or wheezing  No chest tightness  Hypoxemic now- this started 2 days ago  Noticed it  He uses 4 lt POC  No appetite- this has been going on for a year  No fever  No sore throat  He feels like his nose runs  - this has been going on for a long time  He does not use any nasal sprays- he is afraid to use anything that Dr Daniel Riley does not tell him to use  Clear drainage  No sinus pain or tenderness     He is using Advair- using it twice day one puff    Combivent he is using 1 puff 4 times a day  He does not like prednisone- it does not help and causes out of sync  He is on Coumadin for atrial fibrillation- it was 4 4 yesterday  This is monitored by his GP  And he has held the coumadin  No new medications  He is not using his nebulizer  HPI    Review of Systems   All other systems reviewed and are negative  Smoking history: He reports that he quit smoking about 17 years ago  His smoking use included Cigarettes  He has a 60 00 pack-year smoking history   He has never used smokeless tobacco       Immunization History   Administered Date(s) Administered    Influenza Split High Dose Preservative Free IM 09/04/2014, 09/07/2017    Influenza TIV (IM) 10/16/2003    Pneumococcal Polysaccharide PPV23 01/01/2001, 06/15/2006     Current Outpatient Prescriptions   Medication Sig Dispense Refill    Ascorbic Acid (VITAMIN C) 1000 MG tablet Take 1,000 mg by mouth daily      aspirin (ASPIRIN LOW DOSE) 81 MG tablet Take 81 mg by mouth daily      carvedilol (COREG) 6 25 mg tablet TAKE 1 TABLET TWICE DAILY 180 tablet 3    fluticasone-salmeterol (ADVAIR HFA) 115-21 MCG/ACT inhaler Inhale 2 puffs 2 (two) times a day 3 Inhaler 3    fosinopril (MONOPRIL) 20 mg tablet Take 1 tablet (20 mg total) by mouth daily 90 tablet 3    furosemide (LASIX) 40 mg tablet Take 1 tablet (40 mg total) by mouth daily 90 tablet 3    glucose blood (PRODIGY NO CODING BLOOD GLUC) test strip by In Vitro route      metFORMIN (GLUCOPHAGE) 1000 MG tablet Take 1 tablet (1,000 mg total) by mouth 2 (two) times a day 180 tablet 3    OXYGEN-HELIUM IN Inhale 3 L as needed      PRODIGY TWIST TOP LANCETS 28G MISC by Does not apply route      simvastatin (ZOCOR) 10 mg tablet Take 1 tablet (10 mg total) by mouth daily (Patient taking differently: Take 10 mg by mouth daily at bedtime  ) 90 tablet 3    spironolactone (ALDACTONE) 25 mg tablet TAKE 1 TABLET EVERY DAY 90 tablet 3    warfarin (COUMADIN) 1 mg tablet Take as directed by provider 90 tablet 3    warfarin (COUMADIN) 5 mg tablet TAKE 1 TABLET DAILY OR AS DIRECTED BY PHYSICIAN AFTER INR BLOOD DRAW  90 tablet 1    ipratropium-albuterol (COMBIVENT RESPIMAT) inhaler Inhale 1 puff 4 (four) times a day (Patient not taking: Reported on 1/23/2019 ) 3 Inhaler 3    ipratropium-albuterol (DUO-NEB) 0 5-2 5 mg/mL Take 3 mL by nebulization every 4 (four) hours while awake (Patient not taking: Reported on 1/23/2019 ) 3 mL 0     No current facility-administered medications for this visit  Allergies: Patient has no known allergies  Objective:  Vitals:    01/23/19 1013 01/23/19 1015 01/23/19 1016   BP: 110/60     BP Location: Right arm     Patient Position: Sitting     Cuff Size: Standard     Pulse: (!) 106     Resp: 18     Temp: 99 2 °F (37 3 °C)     TempSrc: Tympanic     SpO2: (!) 69% (!) 74% 94%   Weight: 64 4 kg (142 lb)     Height: 5' 10" (1 778 m)     Oxygen Therapy  SpO2: 94 % (4L continuous- at rest)    Wt Readings from Last 3 Encounters:   01/23/19 64 4 kg (142 lb)   12/13/18 65 3 kg (144 lb)   11/01/18 65 3 kg (144 lb)     Body mass index is 20 37 kg/m²  Physical Exam   Constitutional: He is oriented to person, place, and time  He appears well-developed and well-nourished  No distress  HENT:   Head: Normocephalic and atraumatic  Mouth/Throat: Oropharynx is clear and moist  No oropharyngeal exudate  Eyes: Pupils are equal, round, and reactive to light  EOM are normal    Neck: Normal range of motion  Neck supple  Cardiovascular: Normal rate and regular rhythm  No murmur heard  Pulmonary/Chest: Effort normal  No respiratory distress  He has no wheezes  He has no rales  He exhibits no tenderness  Very diminished air entry bilaterally  Abdominal: Soft  Bowel sounds are normal  He exhibits no distension  There is no tenderness  Musculoskeletal: Normal range of motion  He exhibits no edema     Neurological: He is alert and oriented to person, place, and time  No cranial nerve deficit  Skin: Skin is warm and dry  He is not diaphoretic  Psychiatric: He has a normal mood and affect  His behavior is normal    Vitals reviewed  Lab Review:   Reviewed     Diagnostics:  I have personally reviewed pertinent reports          Mini neb  Performed by: Liseth Velasquez  Authorized by: Liseth Velasquez     Number of treatments:  1  Treatment 1:   Pre-Procedure     Symptoms:  Labored breathing, difficulty breathing and shortness of breath    Medication Administered:  Duoneb - Albuterol 2 5 mg/Atrovent 0 5 mg  Post-Procedure     Symptoms:  Wheezing    Lung sounds:  Upper lobe wheezing now appreciated on end expiration  Nebulizer Comments:  Patient with improvement in shortness of breath post nebulizer

## 2019-01-23 NOTE — ASSESSMENT & PLAN NOTE
Patient appears to have acute COPD exacerbation with shortness of breath  No clear bacterial etiology for exacerbation  Possibly viral/environmental    Discussed prednisone use and patient is agreeable to low dose taper  He is started on 20 mg daily and tapered  He is recommended to use his nebulizer 3-4 times per day to help with bronchospasm  No antibiotics are prescribed today     Increase use of Advair to twice daily

## 2019-01-25 ENCOUNTER — ANTICOAG VISIT (OUTPATIENT)
Dept: FAMILY MEDICINE CLINIC | Facility: CLINIC | Age: 79
End: 2019-01-25

## 2019-01-25 ENCOUNTER — TELEPHONE (OUTPATIENT)
Dept: FAMILY MEDICINE CLINIC | Facility: CLINIC | Age: 79
End: 2019-01-25

## 2019-01-25 LAB — INR PPP: 5.5 (ref 0.86–1.17)

## 2019-01-25 NOTE — TELEPHONE ENCOUNTER
S/w patient reviewed current dosage, 5 5mg daily instructed to decrease to 4 5mg daily and recheck INR Monday 1/28 AM  Repeated twice dosage instructions  (Wife?) could be herd in back ground asking what he will be taking  Updated calender     No further actions required  Shu Duenas LPN

## 2019-01-25 NOTE — TELEPHONE ENCOUNTER
So his INR is higher than last draw  My instructions were to be on 5 5 daily last time but looking at the calender that does not look like what is on the calender  So the number is higher so I suppose the question is what is he taking so I can adjust his INR is he taking the 5 5 as directed  And it was just recorded incorrectly?

## 2019-01-28 LAB — INR PPP: 2.8 (ref 0.86–1.17)

## 2019-01-29 ENCOUNTER — ANTICOAG VISIT (OUTPATIENT)
Dept: FAMILY MEDICINE CLINIC | Facility: CLINIC | Age: 79
End: 2019-01-29

## 2019-01-29 ENCOUNTER — TELEPHONE (OUTPATIENT)
Dept: FAMILY MEDICINE CLINIC | Facility: CLINIC | Age: 79
End: 2019-01-29

## 2019-01-29 NOTE — TELEPHONE ENCOUNTER
S/w patient  Reviewed current dosage, instructed to continue same dosage recheck INR in two weeks  Patient agreed no questions  Updated calender  No further actions required  John Holt LPN

## 2019-02-11 ENCOUNTER — TELEPHONE (OUTPATIENT)
Dept: FAMILY MEDICINE CLINIC | Facility: CLINIC | Age: 79
End: 2019-02-11

## 2019-02-11 ENCOUNTER — ANTICOAG VISIT (OUTPATIENT)
Dept: FAMILY MEDICINE CLINIC | Facility: CLINIC | Age: 79
End: 2019-02-11

## 2019-02-11 LAB — INR PPP: 3.7 (ref 0.86–1.17)

## 2019-02-14 ENCOUNTER — OFFICE VISIT (OUTPATIENT)
Dept: CARDIOLOGY CLINIC | Facility: CLINIC | Age: 79
End: 2019-02-14
Payer: COMMERCIAL

## 2019-02-14 VITALS
BODY MASS INDEX: 20.47 KG/M2 | OXYGEN SATURATION: 91 % | HEIGHT: 70 IN | DIASTOLIC BLOOD PRESSURE: 70 MMHG | HEART RATE: 88 BPM | WEIGHT: 143 LBS | SYSTOLIC BLOOD PRESSURE: 108 MMHG

## 2019-02-14 DIAGNOSIS — I48.20 CHRONIC ATRIAL FIBRILLATION (HCC): Primary | ICD-10-CM

## 2019-02-14 DIAGNOSIS — I10 ESSENTIAL HYPERTENSION: ICD-10-CM

## 2019-02-14 DIAGNOSIS — I42.8 NONISCHEMIC CARDIOMYOPATHY (HCC): ICD-10-CM

## 2019-02-14 PROCEDURE — 99214 OFFICE O/P EST MOD 30 MIN: CPT | Performed by: INTERNAL MEDICINE

## 2019-02-14 PROCEDURE — 93000 ELECTROCARDIOGRAM COMPLETE: CPT | Performed by: INTERNAL MEDICINE
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
## 2019-02-17 LAB — INR PPP: 2.3 (ref 0.86–1.17)

## 2019-02-28 ENCOUNTER — OFFICE VISIT (OUTPATIENT)
Dept: FAMILY MEDICINE CLINIC | Facility: CLINIC | Age: 79
End: 2019-02-28
Payer: COMMERCIAL

## 2019-02-28 VITALS
WEIGHT: 141.8 LBS | TEMPERATURE: 97.7 F | DIASTOLIC BLOOD PRESSURE: 87 MMHG | HEART RATE: 82 BPM | BODY MASS INDEX: 20.3 KG/M2 | HEIGHT: 70 IN | RESPIRATION RATE: 17 BRPM | SYSTOLIC BLOOD PRESSURE: 148 MMHG

## 2019-02-28 DIAGNOSIS — I10 ESSENTIAL HYPERTENSION: ICD-10-CM

## 2019-02-28 DIAGNOSIS — R41.89 COGNITIVE DECLINE: Primary | ICD-10-CM

## 2019-02-28 DIAGNOSIS — J96.11 CHRONIC HYPOXEMIC RESPIRATORY FAILURE (HCC): ICD-10-CM

## 2019-02-28 DIAGNOSIS — J43.2 CENTRILOBULAR EMPHYSEMA (HCC): ICD-10-CM

## 2019-02-28 DIAGNOSIS — J44.1 COPD WITH ACUTE EXACERBATION (HCC): ICD-10-CM

## 2019-02-28 DIAGNOSIS — I48.20 CHRONIC ATRIAL FIBRILLATION (HCC): ICD-10-CM

## 2019-02-28 PROBLEM — R09.82 POST-NASAL DRIP: Status: RESOLVED | Noted: 2019-01-23 | Resolved: 2019-02-28

## 2019-02-28 PROBLEM — B35.3 TINEA PEDIS OF BOTH FEET: Status: RESOLVED | Noted: 2018-06-18 | Resolved: 2019-02-28

## 2019-02-28 PROBLEM — B35.1 ONYCHOMYCOSIS: Status: RESOLVED | Noted: 2018-04-17 | Resolved: 2019-02-28

## 2019-02-28 PROCEDURE — 99214 OFFICE O/P EST MOD 30 MIN: CPT | Performed by: FAMILY MEDICINE

## 2019-02-28 PROCEDURE — 1036F TOBACCO NON-USER: CPT | Performed by: FAMILY MEDICINE

## 2019-02-28 PROCEDURE — 1160F RVW MEDS BY RX/DR IN RCRD: CPT | Performed by: FAMILY MEDICINE

## 2019-02-28 RX ORDER — DONEPEZIL HYDROCHLORIDE 10 MG/1
10 TABLET, FILM COATED ORAL
Qty: 90 TABLET | Refills: 1 | Status: SHIPPED | OUTPATIENT
Start: 2019-02-28 | End: 2019-07-25

## 2019-02-28 NOTE — PROGRESS NOTES
Assessment/Plan:    Problem List Items Addressed This Visit        Respiratory    Centrilobular emphysema ([address] )    Chronic hypoxemic respiratory failure (HCC)    COPD with acute exacerbation (HCC)       Cardiovascular and Mediastinum    Chronic atrial fibrillation ([address] )    Essential hypertension       Other    Cognitive decline - Primary    Relevant Medications    donepezil (ARICEPT) 10 mg tablet    Other Relevant Orders    Ambulatory referral to Neurology      pt does not move a lot of air with his COPD, does see pulmonary,   Pt advised on the etiology and physiology of COPD also described dementia  Which I think pt is starting to suffer from  Advised on exercise 30 min 5 days a week as well as brain exercise  Call in a month to evaluate response to the medication    BMI Counseling: Body mass index is 20 35 kg/m²  Discussed the patient's BMI with him  The BMI is in the acceptable range  There are no Patient Instructions on file for this visit  No follow-ups on file  Subjective:      Patient ID: Sharri Nicholson is a [de-identified] y o  male  Chief Complaint   Patient presents with    Memory Loss     wife states pt is losing short term memory  jmcma    raspy voice     wife thinks due to medication    Coagulation Disorder     wife has questions about INR results       Pt is here with his wife who states he is getting very forgetful of things  She also feels his voice disappears he is raspy and goes to nothing      Memory has lately worse than it has been  Pt states he cannot remember anything, asks wife the same thing over and over  Cant make new memories, new memories are an issues    Pt states he needs a refil of his strips for his INR      The following portions of the patient's history were reviewed and updated as appropriate: allergies, current medications, past family history, past medical history, past social history, past surgical history and problem list     Review of Systems   Constitutional: Negative for activity change, appetite change, chills, diaphoresis, fatigue, fever and unexpected weight change  HENT: Negative for congestion, dental problem, ear pain, mouth sores, sinus pressure, sinus pain, sore throat and trouble swallowing  Eyes: Negative for photophobia, discharge and itching  Respiratory: Negative for apnea, chest tightness and shortness of breath  Cardiovascular: Negative for chest pain, palpitations and leg swelling  Gastrointestinal: Negative for abdominal distention, abdominal pain, blood in stool, nausea and vomiting  Endocrine: Negative for cold intolerance, heat intolerance, polydipsia, polyphagia and polyuria  Genitourinary: Negative for difficulty urinating  Musculoskeletal: Negative for arthralgias  Skin: Negative for color change and wound  Neurological: Negative for dizziness, syncope, speech difficulty and headaches  Poor memory   Hematological: Negative for adenopathy  Psychiatric/Behavioral: Negative for agitation, behavioral problems, confusion, decreased concentration, dysphoric mood, hallucinations and suicidal ideas  The patient is not hyperactive            Current Outpatient Medications   Medication Sig Dispense Refill    Ascorbic Acid (VITAMIN C) 1000 MG tablet Take 1,000 mg by mouth daily      aspirin (ASPIRIN LOW DOSE) 81 MG tablet Take 81 mg by mouth daily      azelastine (ASTELIN) 0 1 % nasal spray 1 spray into each nostril 2 (two) times a day Use in each nostril as directed 1 Bottle 0    carvedilol (COREG) 6 25 mg tablet TAKE 1 TABLET TWICE DAILY 180 tablet 3    fluticasone-salmeterol (ADVAIR HFA) 115-21 MCG/ACT inhaler Inhale 2 puffs 2 (two) times a day 3 Inhaler 3    fosinopril (MONOPRIL) 20 mg tablet Take 1 tablet (20 mg total) by mouth daily 90 tablet 3    furosemide (LASIX) 40 mg tablet Take 1 tablet (40 mg total) by mouth daily 90 tablet 3    glucose blood (PRODIGY NO CODING BLOOD GLUC) test strip by In Vitro route      ipratropium-albuterol (COMBIVENT RESPIMAT) inhaler Inhale 1 puff 4 (four) times a day 3 Inhaler 3    ipratropium-albuterol (DUO-NEB) 0 5-2 5 mg/3 mL nebulizer solution Take 1 vial (3 mL total) by nebulization 3 (three) times a day 100 vial 0    metFORMIN (GLUCOPHAGE) 1000 MG tablet Take 1 tablet (1,000 mg total) by mouth 2 (two) times a day 180 tablet 3    OXYGEN-HELIUM IN Inhale 3 L as needed      PRODIGY TWIST TOP LANCETS 28G MISC by Does not apply route      simvastatin (ZOCOR) 10 mg tablet Take 1 tablet (10 mg total) by mouth daily (Patient taking differently: Take 10 mg by mouth daily at bedtime  ) 90 tablet 3    spironolactone (ALDACTONE) 25 mg tablet TAKE 1 TABLET EVERY DAY 90 tablet 3    warfarin (COUMADIN) 1 mg tablet Take as directed by provider 90 tablet 3    warfarin (COUMADIN) 5 mg tablet TAKE 1 TABLET DAILY OR AS DIRECTED BY PHYSICIAN AFTER INR BLOOD DRAW  90 tablet 1    donepezil (ARICEPT) 10 mg tablet Take 1 tablet (10 mg total) by mouth daily at bedtime 90 tablet 1    ipratropium-albuterol (DUO-NEB) 0 5-2 5 mg/mL Take 3 mL by nebulization every 4 (four) hours while awake (Patient not taking: Reported on 2/28/2019) 3 mL 0     No current facility-administered medications for this visit  Objective:    /87   Pulse 82   Temp 97 7 °F (36 5 °C)   Resp 17   Ht 5' 10" (1 778 m)   Wt 64 3 kg (141 lb 12 8 oz)   BMI 20 35 kg/m²        Physical Exam   Constitutional: He appears well-developed and well-nourished  No distress  HENT:   Head: Normocephalic and atraumatic  Right Ear: External ear normal    Left Ear: External ear normal    Nose: Nose normal    Mouth/Throat: Oropharynx is clear and moist  No oropharyngeal exudate  Eyes: Pupils are equal, round, and reactive to light  EOM are normal  Right eye exhibits no discharge  Left eye exhibits no discharge  No scleral icterus  Neck: No thyromegaly present  Cardiovascular: Normal rate and normal heart sounds     No murmur heard   Pulmonary/Chest: Effort normal and breath sounds normal  No respiratory distress  He has no wheezes  Abdominal: Soft  Bowel sounds are normal  He exhibits no distension and no mass  There is no tenderness  There is no rebound and no guarding  Musculoskeletal: Normal range of motion  Neurological: He is alert  He displays normal reflexes  Coordination normal    Skin: Skin is warm and dry  No rash noted  He is not diaphoretic  No erythema  Psychiatric: He has a normal mood and affect  His mood appears not anxious  His affect is not angry, not blunt, not labile and not inappropriate  His speech is not rapid and/or pressured, not delayed, not tangential and not slurred  He is withdrawn  He is not agitated, not aggressive, not hyperactive, not slowed and not combative  Thought content is not paranoid and not delusional  He does not exhibit a depressed mood  He expresses no homicidal and no suicidal ideation  He expresses no suicidal plans and no homicidal plans  He is communicative  He exhibits abnormal recent memory  Nursing note and vitals reviewed               Leodan Cutler DO

## 2019-03-01 ENCOUNTER — ANTICOAG VISIT (OUTPATIENT)
Dept: FAMILY MEDICINE CLINIC | Facility: CLINIC | Age: 79
End: 2019-03-01

## 2019-03-01 ENCOUNTER — TELEPHONE (OUTPATIENT)
Dept: FAMILY MEDICINE CLINIC | Facility: CLINIC | Age: 79
End: 2019-03-01

## 2019-03-05 ENCOUNTER — TELEPHONE (OUTPATIENT)
Dept: FAMILY MEDICINE CLINIC | Facility: CLINIC | Age: 79
End: 2019-03-05

## 2019-03-05 ENCOUNTER — ANTICOAG VISIT (OUTPATIENT)
Dept: FAMILY MEDICINE CLINIC | Facility: CLINIC | Age: 79
End: 2019-03-05

## 2019-03-05 LAB — INR PPP: 2.8 (ref 0.86–1.17)

## 2019-03-25 ENCOUNTER — TELEPHONE (OUTPATIENT)
Dept: FAMILY MEDICINE CLINIC | Facility: CLINIC | Age: 79
End: 2019-03-25

## 2019-03-25 ENCOUNTER — ANTICOAG VISIT (OUTPATIENT)
Dept: FAMILY MEDICINE CLINIC | Facility: CLINIC | Age: 79
End: 2019-03-25

## 2019-03-25 LAB — INR PPP: 2.5 (ref 0.86–1.17)

## 2019-04-10 ENCOUNTER — OFFICE VISIT (OUTPATIENT)
Dept: PULMONOLOGY | Facility: MEDICAL CENTER | Age: 79
End: 2019-04-10
Payer: COMMERCIAL

## 2019-04-10 VITALS
RESPIRATION RATE: 16 BRPM | SYSTOLIC BLOOD PRESSURE: 96 MMHG | HEART RATE: 100 BPM | WEIGHT: 137 LBS | TEMPERATURE: 98.6 F | DIASTOLIC BLOOD PRESSURE: 60 MMHG | OXYGEN SATURATION: 97 % | BODY MASS INDEX: 19.18 KG/M2 | HEIGHT: 71 IN

## 2019-04-10 DIAGNOSIS — J43.2 CENTRILOBULAR EMPHYSEMA (HCC): Primary | ICD-10-CM

## 2019-04-10 DIAGNOSIS — R06.02 SHORTNESS OF BREATH: ICD-10-CM

## 2019-04-10 DIAGNOSIS — J96.11 CHRONIC HYPOXEMIC RESPIRATORY FAILURE (HCC): ICD-10-CM

## 2019-04-10 DIAGNOSIS — I42.0 DILATED CARDIOMYOPATHY (HCC): ICD-10-CM

## 2019-04-10 DIAGNOSIS — I48.20 CHRONIC ATRIAL FIBRILLATION (HCC): ICD-10-CM

## 2019-04-10 PROBLEM — J44.1 COPD WITH ACUTE EXACERBATION (HCC): Status: RESOLVED | Noted: 2019-01-23 | Resolved: 2019-04-10

## 2019-04-10 PROCEDURE — 99213 OFFICE O/P EST LOW 20 MIN: CPT | Performed by: INTERNAL MEDICINE

## 2019-04-10 PROCEDURE — 94010 BREATHING CAPACITY TEST: CPT | Performed by: INTERNAL MEDICINE

## 2019-04-15 DIAGNOSIS — I50.9 HEART FAILURE (HCC): ICD-10-CM

## 2019-04-15 DIAGNOSIS — I25.10 ARTERIOSCLEROSIS OF CORONARY ARTERY: ICD-10-CM

## 2019-04-16 RX ORDER — FUROSEMIDE 40 MG/1
TABLET ORAL
Qty: 90 TABLET | Refills: 3 | OUTPATIENT
Start: 2019-04-16

## 2019-04-16 RX ORDER — FOSINOPRIL SODIUM 20 MG/1
TABLET ORAL
Qty: 90 TABLET | Refills: 3 | OUTPATIENT
Start: 2019-04-16

## 2019-04-17 RX ORDER — FOSINOPRIL SODIUM 20 MG/1
20 TABLET ORAL DAILY
Qty: 90 TABLET | Refills: 3 | Status: SHIPPED | OUTPATIENT
Start: 2019-04-17 | End: 2019-12-23 | Stop reason: SDUPTHER

## 2019-04-17 RX ORDER — FUROSEMIDE 40 MG/1
40 TABLET ORAL DAILY
Qty: 90 TABLET | Refills: 3 | Status: SHIPPED | OUTPATIENT
Start: 2019-04-17 | End: 2019-12-23 | Stop reason: SDUPTHER

## 2019-04-18 ENCOUNTER — TELEPHONE (OUTPATIENT)
Dept: FAMILY MEDICINE CLINIC | Facility: CLINIC | Age: 79
End: 2019-04-18

## 2019-04-18 ENCOUNTER — ANTICOAG VISIT (OUTPATIENT)
Dept: FAMILY MEDICINE CLINIC | Facility: CLINIC | Age: 79
End: 2019-04-18

## 2019-04-18 LAB — INR PPP: 2.9 (ref 0.86–1.17)

## 2019-05-02 ENCOUNTER — OFFICE VISIT (OUTPATIENT)
Dept: NEUROLOGY | Facility: CLINIC | Age: 79
End: 2019-05-02
Payer: COMMERCIAL

## 2019-05-02 VITALS
HEIGHT: 71 IN | HEART RATE: 78 BPM | SYSTOLIC BLOOD PRESSURE: 138 MMHG | WEIGHT: 137 LBS | BODY MASS INDEX: 19.18 KG/M2 | DIASTOLIC BLOOD PRESSURE: 78 MMHG

## 2019-05-02 DIAGNOSIS — G31.84 MILD COGNITIVE IMPAIRMENT: Primary | ICD-10-CM

## 2019-05-02 DIAGNOSIS — R41.89 COGNITIVE DECLINE: ICD-10-CM

## 2019-05-02 PROCEDURE — 99204 OFFICE O/P NEW MOD 45 MIN: CPT | Performed by: PSYCHIATRY & NEUROLOGY

## 2019-05-06 ENCOUNTER — ANTICOAG VISIT (OUTPATIENT)
Dept: FAMILY MEDICINE CLINIC | Facility: CLINIC | Age: 79
End: 2019-05-06

## 2019-05-06 ENCOUNTER — TELEPHONE (OUTPATIENT)
Dept: FAMILY MEDICINE CLINIC | Facility: CLINIC | Age: 79
End: 2019-05-06

## 2019-05-06 LAB — INR PPP: 2.5 (ref 0.86–1.17)

## 2019-05-07 ENCOUNTER — HOSPITAL ENCOUNTER (OUTPATIENT)
Dept: RADIOLOGY | Facility: HOSPITAL | Age: 79
Discharge: HOME | End: 2019-05-07
Attending: PSYCHIATRY & NEUROLOGY
Payer: COMMERCIAL

## 2019-05-07 DIAGNOSIS — G31.84 MILD COGNITIVE IMPAIRMENT: ICD-10-CM

## 2019-05-07 PROCEDURE — 70450 CT HEAD/BRAIN W/O DYE: CPT

## 2019-05-08 ENCOUNTER — TELEPHONE (OUTPATIENT)
Dept: NEUROLOGY | Facility: CLINIC | Age: 79
End: 2019-05-08

## 2019-05-22 ENCOUNTER — TELEPHONE (OUTPATIENT)
Dept: FAMILY MEDICINE CLINIC | Facility: CLINIC | Age: 79
End: 2019-05-22

## 2019-05-22 ENCOUNTER — ANTICOAG VISIT (OUTPATIENT)
Dept: FAMILY MEDICINE CLINIC | Facility: CLINIC | Age: 79
End: 2019-05-22

## 2019-05-22 LAB — INR PPP: 2.8 (ref 0.86–1.17)

## 2019-06-07 LAB — INR PPP: 2.7 (ref 0.86–1.17)

## 2019-06-08 ENCOUNTER — TELEPHONE (OUTPATIENT)
Dept: OTHER | Facility: OTHER | Age: 79
End: 2019-06-08

## 2019-06-08 ENCOUNTER — ANTICOAG VISIT (OUTPATIENT)
Dept: FAMILY MEDICINE CLINIC | Facility: CLINIC | Age: 79
End: 2019-06-08

## 2019-06-13 ENCOUNTER — REMOTE DEVICE CLINIC VISIT (OUTPATIENT)
Dept: CARDIOLOGY CLINIC | Facility: CLINIC | Age: 79
End: 2019-06-13
Payer: COMMERCIAL

## 2019-06-13 DIAGNOSIS — Z95.810 PRESENCE OF AUTOMATIC CARDIOVERTER/DEFIBRILLATOR (AICD): Primary | ICD-10-CM

## 2019-06-13 PROCEDURE — 93296 REM INTERROG EVL PM/IDS: CPT | Performed by: INTERNAL MEDICINE

## 2019-06-13 PROCEDURE — 93297 REM INTERROG DEV EVAL ICPMS: CPT | Performed by: INTERNAL MEDICINE

## 2019-06-13 PROCEDURE — 93295 DEV INTERROG REMOTE 1/2/MLT: CPT | Performed by: INTERNAL MEDICINE

## 2019-06-21 DIAGNOSIS — I25.10 ARTERIOSCLEROSIS OF CORONARY ARTERY: ICD-10-CM

## 2019-06-23 LAB — INR PPP: 2.7 (ref 0.84–1.19)

## 2019-06-24 ENCOUNTER — TELEPHONE (OUTPATIENT)
Dept: FAMILY MEDICINE CLINIC | Facility: CLINIC | Age: 79
End: 2019-06-24

## 2019-06-24 ENCOUNTER — ANTICOAG VISIT (OUTPATIENT)
Dept: FAMILY MEDICINE CLINIC | Facility: CLINIC | Age: 79
End: 2019-06-24

## 2019-06-24 RX ORDER — SIMVASTATIN 10 MG
10 TABLET ORAL
Qty: 90 TABLET | Refills: 3 | Status: SHIPPED | OUTPATIENT
Start: 2019-06-24 | End: 2020-07-13

## 2019-07-05 ENCOUNTER — TELEPHONE (OUTPATIENT)
Dept: FAMILY MEDICINE CLINIC | Facility: CLINIC | Age: 79
End: 2019-07-05

## 2019-07-05 DIAGNOSIS — I48.20 CHRONIC ATRIAL FIBRILLATION (HCC): ICD-10-CM

## 2019-07-05 RX ORDER — WARFARIN SODIUM 5 MG/1
5 TABLET ORAL
Qty: 90 TABLET | Refills: 1 | Status: SHIPPED | OUTPATIENT
Start: 2019-07-05 | End: 2020-06-19 | Stop reason: HOSPADM

## 2019-07-09 ENCOUNTER — TELEPHONE (OUTPATIENT)
Dept: FAMILY MEDICINE CLINIC | Facility: CLINIC | Age: 79
End: 2019-07-09

## 2019-07-09 ENCOUNTER — ANTICOAG VISIT (OUTPATIENT)
Dept: FAMILY MEDICINE CLINIC | Facility: CLINIC | Age: 79
End: 2019-07-09

## 2019-07-09 LAB — INR PPP: 2.3 (ref 0.84–1.19)

## 2019-07-25 ENCOUNTER — TELEPHONE (OUTPATIENT)
Dept: FAMILY MEDICINE CLINIC | Facility: CLINIC | Age: 79
End: 2019-07-25

## 2019-07-25 ENCOUNTER — OFFICE VISIT (OUTPATIENT)
Dept: FAMILY MEDICINE CLINIC | Facility: CLINIC | Age: 79
End: 2019-07-25
Payer: COMMERCIAL

## 2019-07-25 VITALS
SYSTOLIC BLOOD PRESSURE: 110 MMHG | TEMPERATURE: 98 F | WEIGHT: 127 LBS | RESPIRATION RATE: 16 BRPM | BODY MASS INDEX: 17.78 KG/M2 | HEIGHT: 71 IN | DIASTOLIC BLOOD PRESSURE: 70 MMHG | HEART RATE: 64 BPM

## 2019-07-25 DIAGNOSIS — R41.89 COGNITIVE DECLINE: ICD-10-CM

## 2019-07-25 DIAGNOSIS — J43.2 CENTRILOBULAR EMPHYSEMA (HCC): ICD-10-CM

## 2019-07-25 DIAGNOSIS — I10 ESSENTIAL HYPERTENSION: ICD-10-CM

## 2019-07-25 DIAGNOSIS — E11.42 TYPE 2 DIABETES MELLITUS WITH DIABETIC POLYNEUROPATHY, WITHOUT LONG-TERM CURRENT USE OF INSULIN (HCC): Primary | ICD-10-CM

## 2019-07-25 DIAGNOSIS — I25.10 ARTERIOSCLEROSIS OF CORONARY ARTERY: ICD-10-CM

## 2019-07-25 DIAGNOSIS — R63.4 WEIGHT LOSS: ICD-10-CM

## 2019-07-25 LAB
LEFT EYE DIABETIC RETINOPATHY: NORMAL
LEFT EYE IMAGE QUALITY: NORMAL
LEFT EYE MACULAR EDEMA: NORMAL
LEFT EYE OTHER RETINOPATHY: NORMAL
RIGHT EYE DIABETIC RETINOPATHY: NORMAL
RIGHT EYE IMAGE QUALITY: NORMAL
RIGHT EYE MACULAR EDEMA: NORMAL
RIGHT EYE OTHER RETINOPATHY: NORMAL
SEVERITY (EYE EXAM): NORMAL

## 2019-07-25 PROCEDURE — 99214 OFFICE O/P EST MOD 30 MIN: CPT | Performed by: FAMILY MEDICINE

## 2019-07-25 PROCEDURE — 1101F PT FALLS ASSESS-DOCD LE1/YR: CPT | Performed by: FAMILY MEDICINE

## 2019-07-25 PROCEDURE — 3725F SCREEN DEPRESSION PERFORMED: CPT | Performed by: FAMILY MEDICINE

## 2019-07-25 PROCEDURE — 92250 FUNDUS PHOTOGRAPHY W/I&R: CPT | Performed by: FAMILY MEDICINE

## 2019-07-25 PROCEDURE — 1160F RVW MEDS BY RX/DR IN RCRD: CPT | Performed by: FAMILY MEDICINE

## 2019-07-25 PROCEDURE — 3074F SYST BP LT 130 MM HG: CPT | Performed by: FAMILY MEDICINE

## 2019-07-25 RX ORDER — MEMANTINE HYDROCHLORIDE 5 MG/1
5 TABLET ORAL 2 TIMES DAILY
Qty: 180 TABLET | Refills: 1 | Status: SHIPPED | OUTPATIENT
Start: 2019-07-25 | End: 2019-07-25 | Stop reason: SDUPTHER

## 2019-07-25 RX ORDER — MEMANTINE HYDROCHLORIDE 5 MG/1
5 TABLET ORAL 2 TIMES DAILY
Qty: 60 TABLET | Refills: 0 | Status: SHIPPED | OUTPATIENT
Start: 2019-07-25 | End: 2019-10-08 | Stop reason: SDUPTHER

## 2019-07-25 RX ORDER — MEMANTINE HYDROCHLORIDE 5 MG/1
5 TABLET ORAL 2 TIMES DAILY
Qty: 60 TABLET | Refills: 5 | Status: SHIPPED | OUTPATIENT
Start: 2019-07-25 | End: 2019-07-25 | Stop reason: SDUPTHER

## 2019-07-25 NOTE — TELEPHONE ENCOUNTER
Please call pt looks like his eye exam was poor, it was not useable    Please call let him know, he should be seen by optometry for a diabetic eye exam

## 2019-07-25 NOTE — PROGRESS NOTES
Assessment/Plan:    Problem List Items Addressed This Visit        Endocrine    Type 2 diabetes mellitus with diabetic polyneuropathy, without long-term current use of insulin ([facility] [address] ) - Primary    Relevant Orders    Comprehensive metabolic panel    Lipid Panel with Direct LDL reflex    TSH, 3rd generation    CBC    Hemoglobin A1C    Microalbumin / creatinine urine ratio    Sedimentation rate, automated    IRIS Diabetic eye exam       Respiratory    Centrilobular emphysema (HCC)    Relevant Orders    Comprehensive metabolic panel    Lipid Panel with Direct LDL reflex    TSH, 3rd generation    CBC    Hemoglobin A1C    Microalbumin / creatinine urine ratio    Sedimentation rate, automated       Cardiovascular and Mediastinum    Arteriosclerosis of coronary artery    Relevant Orders    Comprehensive metabolic panel    Lipid Panel with Direct LDL reflex    TSH, 3rd generation    CBC    Hemoglobin A1C    Microalbumin / creatinine urine ratio    Sedimentation rate, automated    Essential hypertension    Relevant Orders    Comprehensive metabolic panel    Lipid Panel with Direct LDL reflex    TSH, 3rd generation    CBC    Hemoglobin A1C    Microalbumin / creatinine urine ratio    Sedimentation rate, automated       Other    Cognitive decline    Relevant Medications    memantine (NAMENDA) 5 mg tablet    Other Relevant Orders    Comprehensive metabolic panel    Lipid Panel with Direct LDL reflex    TSH, 3rd generation    CBC    Hemoglobin A1C    Microalbumin / creatinine urine ratio    Sedimentation rate, automated      Other Visit Diagnoses     Weight loss        Relevant Orders    CT abdomen pelvis wo contrast    Comprehensive metabolic panel    Lipid Panel with Direct LDL reflex    TSH, 3rd generation    CBC    Hemoglobin A1C    Microalbumin / creatinine urine ratio    Sedimentation rate, automated          BMI Counseling: Body mass index is 17 71 kg/m²  Discussed the patient's BMI with him   The BMI is acceptable      There are no Patient Instructions on file for this visit  No follow-ups on file  Subjective:      Patient ID: Elio Nicholson is a [de-identified] y o  male  Chief Complaint   Patient presents with    Weight Check     wife states pt is not eating /prcma       Pt is here with wife, states he has lots tremendous weight  She feels its a med that we gave him for dementia  Wife just noticed it a week ago how thin he was  She states he is not eating whic is something they saw on the med that is a problem  Pt does see neurology   They have not been back since noting the weight loss  No blood in stool      The following portions of the patient's history were reviewed and updated as appropriate: allergies, current medications, past family history, past medical history, past social history, past surgical history and problem list     Review of Systems      Current Outpatient Medications   Medication Sig Dispense Refill    Ascorbic Acid (VITAMIN C) 1000 MG tablet Take 1,000 mg by mouth daily      aspirin (ASPIRIN LOW DOSE) 81 MG tablet Take 81 mg by mouth daily      azelastine (ASTELIN) 0 1 % nasal spray 1 spray into each nostril 2 (two) times a day Use in each nostril as directed 1 Bottle 0    carvedilol (COREG) 6 25 mg tablet TAKE 1 TABLET TWICE DAILY 180 tablet 3    fluticasone-salmeterol (ADVAIR HFA) 115-21 MCG/ACT inhaler Inhale 2 puffs 2 (two) times a day 3 Inhaler 3    fosinopril (MONOPRIL) 20 mg tablet Take 1 tablet (20 mg total) by mouth daily 90 tablet 3    furosemide (LASIX) 40 mg tablet Take 1 tablet (40 mg total) by mouth daily 90 tablet 3    glucose blood (PRODIGY NO CODING BLOOD GLUC) test strip by In Vitro route      ipratropium-albuterol (COMBIVENT RESPIMAT) inhaler Inhale 1 puff 4 (four) times a day 3 Inhaler 3    metFORMIN (GLUCOPHAGE) 1000 MG tablet Take 1 tablet (1,000 mg total) by mouth 2 (two) times a day 180 tablet 3    OXYGEN-HELIUM IN Inhale 3 L as needed      PRODIGY TWIST TOP LANCETS 28G MISC by Does not apply route      simvastatin (ZOCOR) 10 mg tablet Take 1 tablet (10 mg total) by mouth daily at bedtime 90 tablet 3    spironolactone (ALDACTONE) 25 mg tablet TAKE 1 TABLET EVERY DAY 90 tablet 3    warfarin (COUMADIN) 1 mg tablet Take as directed by provider 90 tablet 3    warfarin (COUMADIN) 5 mg tablet Take 1 tablet (5 mg total) by mouth once for 1 dose Or as directed 90 tablet 1    ipratropium-albuterol (DUO-NEB) 0 5-2 5 mg/3 mL nebulizer solution Take 1 vial (3 mL total) by nebulization 3 (three) times a day (Patient not taking: Reported on 5/2/2019) 100 vial 0    ipratropium-albuterol (DUO-NEB) 0 5-2 5 mg/mL Take 3 mL by nebulization every 4 (four) hours while awake (Patient not taking: Reported on 2/28/2019) 3 mL 0    memantine (NAMENDA) 5 mg tablet Take 1 tablet (5 mg total) by mouth 2 (two) times a day 60 tablet 0     No current facility-administered medications for this visit  Objective:    /70   Pulse 64   Temp 98 °F (36 7 °C)   Resp 16   Ht 5' 11" (1 803 m)   Wt 57 6 kg (127 lb)   BMI 17 71 kg/m²        Physical Exam   Cardiovascular: Pulses are no weak pulses  Pulses:       Dorsalis pedis pulses are 2+ on the right side, and 2+ on the left side  Posterior tibial pulses are 2+ on the right side, and 2+ on the left side  Feet:   Right Foot:   Skin Integrity: Negative for ulcer, skin breakdown, erythema, warmth, callus or dry skin  Left Foot:   Skin Integrity: Negative for ulcer, skin breakdown, erythema, warmth, callus or dry skin  Diabetic Foot Exam    Patient's shoes and socks removed  Right Foot/Ankle   Right Foot Inspection  Skin Exam: skin normal skin not intact, no dry skin, no warmth, no callus, no erythema, no maceration, no abnormal color, no pre-ulcer, no ulcer and no callus                          Toe Exam: ROM and strength within normal limits  Sensory   Vibration: intact  Proprioception: intact   Monofilament testing: intact  Vascular  Capillary refills: < 3 seconds  The right DP pulse is 2+  The right PT pulse is 2+  Left Foot/Ankle  Left Foot Inspection  Skin Exam: skin normalskin not intact, no dry skin, no warmth, no erythema, no maceration, normal color, no pre-ulcer, no ulcer and no callus                         Toe Exam: ROM and strength within normal limits                   Sensory   Vibration: intact  Proprioception: intact  Monofilament: intact  Vascular  Capillary refills: < 3 seconds  The left DP pulse is 2+  The left PT pulse is 2+  Assign Risk Category:  No deformity present; No loss of protective sensation;  No weak pulses       Risk: 0    Alma Duenas DO

## 2019-07-26 ENCOUNTER — APPOINTMENT (OUTPATIENT)
Dept: LAB | Facility: HOSPITAL | Age: 79
End: 2019-07-26
Attending: FAMILY MEDICINE
Payer: COMMERCIAL

## 2019-07-26 ENCOUNTER — TRANSCRIBE ORDERS (OUTPATIENT)
Dept: ADMINISTRATIVE | Facility: HOSPITAL | Age: 79
End: 2019-07-26

## 2019-07-26 DIAGNOSIS — R41.89 COGNITIVE DECLINE: ICD-10-CM

## 2019-07-26 DIAGNOSIS — I10 ESSENTIAL HYPERTENSION: ICD-10-CM

## 2019-07-26 DIAGNOSIS — R63.4 WEIGHT LOSS: ICD-10-CM

## 2019-07-26 DIAGNOSIS — J43.2 CENTRILOBULAR EMPHYSEMA (HCC): ICD-10-CM

## 2019-07-26 DIAGNOSIS — I25.10 ARTERIOSCLEROSIS OF CORONARY ARTERY: ICD-10-CM

## 2019-07-26 DIAGNOSIS — E11.42 TYPE 2 DIABETES MELLITUS WITH DIABETIC POLYNEUROPATHY, WITHOUT LONG-TERM CURRENT USE OF INSULIN (HCC): ICD-10-CM

## 2019-07-26 DIAGNOSIS — G31.84 MILD COGNITIVE IMPAIRMENT: ICD-10-CM

## 2019-07-26 LAB
ALBUMIN SERPL BCP-MCNC: 3.8 G/DL (ref 3.5–5)
ALP SERPL-CCNC: 61 U/L (ref 46–116)
ALT SERPL W P-5'-P-CCNC: 18 U/L (ref 12–78)
ANION GAP SERPL CALCULATED.3IONS-SCNC: 3 MMOL/L (ref 4–13)
AST SERPL W P-5'-P-CCNC: 13 U/L (ref 5–45)
BILIRUB SERPL-MCNC: 0.5 MG/DL (ref 0.2–1)
BUN SERPL-MCNC: 24 MG/DL (ref 5–25)
CALCIUM SERPL-MCNC: 8.8 MG/DL (ref 8.3–10.1)
CHLORIDE SERPL-SCNC: 98 MMOL/L (ref 100–108)
CHOLEST SERPL-MCNC: 105 MG/DL (ref 50–200)
CO2 SERPL-SCNC: 37 MMOL/L (ref 21–32)
CREAT SERPL-MCNC: 1.05 MG/DL (ref 0.6–1.3)
CREAT UR-MCNC: 92.3 MG/DL
ERYTHROCYTE [DISTWIDTH] IN BLOOD BY AUTOMATED COUNT: 12.6 % (ref 11.6–15.1)
ERYTHROCYTE [SEDIMENTATION RATE] IN BLOOD: 23 MM/HOUR (ref 2–10)
EST. AVERAGE GLUCOSE BLD GHB EST-MCNC: 128 MG/DL
GFR SERPL CREATININE-BSD FRML MDRD: 67 ML/MIN/1.73SQ M
GLUCOSE P FAST SERPL-MCNC: 111 MG/DL (ref 65–99)
HBA1C MFR BLD: 6.1 % (ref 4.2–6.3)
HCT VFR BLD AUTO: 36.9 % (ref 36.5–49.3)
HDLC SERPL-MCNC: 45 MG/DL (ref 40–60)
HGB BLD-MCNC: 11.4 G/DL (ref 12–17)
LDLC SERPL CALC-MCNC: 42 MG/DL (ref 0–100)
MCH RBC QN AUTO: 30.3 PG (ref 26.8–34.3)
MCHC RBC AUTO-ENTMCNC: 30.9 G/DL (ref 31.4–37.4)
MCV RBC AUTO: 98 FL (ref 82–98)
MICROALBUMIN UR-MCNC: 10.2 MG/L (ref 0–20)
MICROALBUMIN/CREAT 24H UR: 11 MG/G CREATININE (ref 0–30)
PLATELET # BLD AUTO: 253 THOUSANDS/UL (ref 149–390)
PMV BLD AUTO: 11.1 FL (ref 8.9–12.7)
POTASSIUM SERPL-SCNC: 3.8 MMOL/L (ref 3.5–5.3)
PROT SERPL-MCNC: 7.3 G/DL (ref 6.4–8.2)
RBC # BLD AUTO: 3.76 MILLION/UL (ref 3.88–5.62)
SODIUM SERPL-SCNC: 138 MMOL/L (ref 136–145)
TRIGL SERPL-MCNC: 88 MG/DL
TSH SERPL DL<=0.05 MIU/L-ACNC: 1.26 UIU/ML (ref 0.36–3.74)
WBC # BLD AUTO: 8.04 THOUSAND/UL (ref 4.31–10.16)

## 2019-07-26 PROCEDURE — 83036 HEMOGLOBIN GLYCOSYLATED A1C: CPT

## 2019-07-26 PROCEDURE — 85027 COMPLETE CBC AUTOMATED: CPT

## 2019-07-26 PROCEDURE — 84443 ASSAY THYROID STIM HORMONE: CPT

## 2019-07-26 PROCEDURE — 80053 COMPREHEN METABOLIC PANEL: CPT

## 2019-07-26 PROCEDURE — 85652 RBC SED RATE AUTOMATED: CPT

## 2019-07-26 PROCEDURE — 82570 ASSAY OF URINE CREATININE: CPT

## 2019-07-26 PROCEDURE — 36415 COLL VENOUS BLD VENIPUNCTURE: CPT

## 2019-07-26 PROCEDURE — 80061 LIPID PANEL: CPT

## 2019-07-26 PROCEDURE — 82043 UR ALBUMIN QUANTITATIVE: CPT

## 2019-07-27 LAB — INR PPP: 2.8 (ref 0.84–1.19)

## 2019-07-29 ENCOUNTER — ANTICOAG VISIT (OUTPATIENT)
Dept: FAMILY MEDICINE CLINIC | Facility: CLINIC | Age: 79
End: 2019-07-29

## 2019-07-31 ENCOUNTER — HOSPITAL ENCOUNTER (OUTPATIENT)
Dept: RADIOLOGY | Facility: HOSPITAL | Age: 79
Discharge: HOME | End: 2019-07-31
Attending: FAMILY MEDICINE
Payer: COMMERCIAL

## 2019-07-31 DIAGNOSIS — R63.4 WEIGHT LOSS: ICD-10-CM

## 2019-07-31 PROCEDURE — 74176 CT ABD & PELVIS W/O CONTRAST: CPT

## 2019-08-08 ENCOUNTER — TELEPHONE (OUTPATIENT)
Dept: FAMILY MEDICINE CLINIC | Facility: CLINIC | Age: 79
End: 2019-08-08

## 2019-08-08 PROBLEM — K80.20 CHOLELITHIASES: Status: ACTIVE | Noted: 2019-08-08

## 2019-08-08 PROBLEM — K57.90 DIVERTICULOSIS: Status: ACTIVE | Noted: 2019-08-08

## 2019-08-08 PROBLEM — N20.0 NEPHROLITHIASIS: Status: ACTIVE | Noted: 2019-08-08

## 2019-08-08 NOTE — TELEPHONE ENCOUNTER
Called pt spoke with him about results of CT scan was done for some weight l;oss    CT scan really did not show acute pathology, pt states his weight loss has stabilized

## 2019-08-09 DIAGNOSIS — E11.59 TYPE 2 DIABETES MELLITUS WITH OTHER CIRCULATORY COMPLICATION, WITHOUT LONG-TERM CURRENT USE OF INSULIN (HCC): ICD-10-CM

## 2019-08-09 DIAGNOSIS — I42.9 CARDIOMYOPATHY, UNSPECIFIED TYPE (HCC): ICD-10-CM

## 2019-08-11 RX ORDER — CARVEDILOL 6.25 MG/1
TABLET ORAL
Qty: 180 TABLET | Refills: 3 | Status: SHIPPED | OUTPATIENT
Start: 2019-08-11 | End: 2019-12-23 | Stop reason: SDUPTHER

## 2019-08-12 ENCOUNTER — ANTICOAG VISIT (OUTPATIENT)
Dept: FAMILY MEDICINE CLINIC | Facility: CLINIC | Age: 79
End: 2019-08-12

## 2019-08-12 ENCOUNTER — TELEPHONE (OUTPATIENT)
Dept: FAMILY MEDICINE CLINIC | Facility: CLINIC | Age: 79
End: 2019-08-12

## 2019-08-12 LAB — INR PPP: 2.4 (ref 0.84–1.19)

## 2019-08-22 ENCOUNTER — OFFICE VISIT (OUTPATIENT)
Dept: CARDIOLOGY CLINIC | Facility: CLINIC | Age: 79
End: 2019-08-22
Payer: COMMERCIAL

## 2019-08-22 VITALS
HEART RATE: 119 BPM | HEIGHT: 71 IN | SYSTOLIC BLOOD PRESSURE: 124 MMHG | WEIGHT: 130 LBS | DIASTOLIC BLOOD PRESSURE: 68 MMHG | BODY MASS INDEX: 18.2 KG/M2 | OXYGEN SATURATION: 81 %

## 2019-08-22 DIAGNOSIS — I10 ESSENTIAL HYPERTENSION: ICD-10-CM

## 2019-08-22 DIAGNOSIS — R09.02 HYPOXIA: ICD-10-CM

## 2019-08-22 DIAGNOSIS — R06.00 EXERTIONAL DYSPNEA: ICD-10-CM

## 2019-08-22 DIAGNOSIS — I42.8 NONISCHEMIC CARDIOMYOPATHY (HCC): ICD-10-CM

## 2019-08-22 DIAGNOSIS — J43.2 CENTRILOBULAR EMPHYSEMA (HCC): ICD-10-CM

## 2019-08-22 DIAGNOSIS — I48.20 CHRONIC ATRIAL FIBRILLATION (HCC): Primary | ICD-10-CM

## 2019-08-22 DIAGNOSIS — J96.11 CHRONIC HYPOXEMIC RESPIRATORY FAILURE (HCC): ICD-10-CM

## 2019-08-22 PROCEDURE — 93000 ELECTROCARDIOGRAM COMPLETE: CPT | Performed by: INTERNAL MEDICINE

## 2019-08-22 PROCEDURE — 99214 OFFICE O/P EST MOD 30 MIN: CPT | Performed by: INTERNAL MEDICINE
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
## 2019-08-27 ENCOUNTER — ANTICOAG VISIT (OUTPATIENT)
Dept: FAMILY MEDICINE CLINIC | Facility: CLINIC | Age: 79
End: 2019-08-27

## 2019-08-27 LAB — INR PPP: 1.9 (ref 0.84–1.19)

## 2019-09-03 ENCOUNTER — ANTICOAG VISIT (OUTPATIENT)
Dept: FAMILY MEDICINE CLINIC | Facility: CLINIC | Age: 79
End: 2019-09-03

## 2019-09-03 LAB — INR PPP: 2.5 (ref 0.84–1.19)

## 2019-09-11 ENCOUNTER — ANTICOAG VISIT (OUTPATIENT)
Dept: FAMILY MEDICINE CLINIC | Facility: CLINIC | Age: 79
End: 2019-09-11

## 2019-09-11 ENCOUNTER — TELEPHONE (OUTPATIENT)
Dept: FAMILY MEDICINE CLINIC | Facility: CLINIC | Age: 79
End: 2019-09-11

## 2019-09-11 LAB — INR PPP: 2.7 (ref 0.84–1.19)

## 2019-09-21 DIAGNOSIS — I10 HTN (HYPERTENSION), MALIGNANT: ICD-10-CM

## 2019-09-22 RX ORDER — SPIRONOLACTONE 25 MG/1
TABLET ORAL
Qty: 90 TABLET | Refills: 3 | Status: SHIPPED | OUTPATIENT
Start: 2019-09-22 | End: 2019-12-23 | Stop reason: SDUPTHER

## 2019-09-23 ENCOUNTER — IN-CLINIC DEVICE VISIT (OUTPATIENT)
Dept: CARDIOLOGY CLINIC | Facility: CLINIC | Age: 79
End: 2019-09-23
Payer: COMMERCIAL

## 2019-09-23 ENCOUNTER — OFFICE VISIT (OUTPATIENT)
Dept: CARDIOLOGY CLINIC | Facility: CLINIC | Age: 79
End: 2019-09-23
Payer: COMMERCIAL

## 2019-09-23 VITALS
HEIGHT: 71 IN | SYSTOLIC BLOOD PRESSURE: 124 MMHG | BODY MASS INDEX: 17.78 KG/M2 | WEIGHT: 127 LBS | OXYGEN SATURATION: 88 % | DIASTOLIC BLOOD PRESSURE: 70 MMHG | HEART RATE: 103 BPM

## 2019-09-23 DIAGNOSIS — I48.20 CHRONIC ATRIAL FIBRILLATION (HCC): Primary | ICD-10-CM

## 2019-09-23 DIAGNOSIS — Z95.810 PRESENCE OF IMPLANTABLE CARDIOVERTER-DEFIBRILLATOR (ICD): ICD-10-CM

## 2019-09-23 DIAGNOSIS — I50.22 CHRONIC SYSTOLIC CONGESTIVE HEART FAILURE (HCC): ICD-10-CM

## 2019-09-23 DIAGNOSIS — I50.1 HEART FAILURE, LEFT, WITH LVEF <=30% (HCC): ICD-10-CM

## 2019-09-23 DIAGNOSIS — I50.1 HEART FAILURE, LEFT, WITH LVEF 31-40% (HCC): ICD-10-CM

## 2019-09-23 PROCEDURE — 99214 OFFICE O/P EST MOD 30 MIN: CPT | Performed by: INTERNAL MEDICINE

## 2019-09-23 PROCEDURE — 93282 PRGRMG EVAL IMPLANTABLE DFB: CPT | Performed by: INTERNAL MEDICINE

## 2019-09-23 PROCEDURE — 93000 ELECTROCARDIOGRAM COMPLETE: CPT | Performed by: INTERNAL MEDICINE

## 2019-09-23 RX ORDER — DIGOXIN 125 MCG
TABLET ORAL
Qty: 90 TABLET | Refills: 1 | Status: SHIPPED | OUTPATIENT
Start: 2019-09-23 | End: 2020-02-20 | Stop reason: SDUPTHER
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
## 2019-09-24 NOTE — PROGRESS NOTES
MDT SINGLE ICD  DEVICE INTERROGATED IN THE [place] OFFICE:  BATTERY VOLTAGE ADEQUATE (2 93V/RRT =2 63V)    3 1%    ALL LEAD PARAMETERS WITHIN NORMAL LIMITS   4 VT-NS, AND 1 SVT-WAVELET EPISODE SINCE 6/13/19, WITH 1 EGM SHOWING NSVT (16 @ 205 BPM), AND REMAINING EGMS SHOWING AF/RVR @ 179 - 188 BPM   HX OF AF & ON WARFARIN, DIGOXIN, AND CARVEDILOL   DR ALCANTARA AWARE   REMOTE FOR HR/EPISODE CHECK IN 2 WEEKS   NO PROGRAMMING CHANGES MADE TO DEVICE PARAMETERS   OPTI-VOL WITHIN NORMAL LIMITS   NORMAL DEVICE FUNCTION   RG

## 2019-10-01 DIAGNOSIS — I25.10 ARTERIOSCLEROSIS OF CORONARY ARTERY: Primary | ICD-10-CM

## 2019-10-07 ENCOUNTER — ANTICOAG VISIT (OUTPATIENT)
Dept: FAMILY MEDICINE CLINIC | Facility: CLINIC | Age: 79
End: 2019-10-07

## 2019-10-07 ENCOUNTER — APPOINTMENT (OUTPATIENT)
Dept: LAB | Facility: CLINIC | Age: 79
End: 2019-10-07
Payer: COMMERCIAL

## 2019-10-07 DIAGNOSIS — I48.20 CHRONIC ATRIAL FIBRILLATION (HCC): ICD-10-CM

## 2019-10-07 LAB
DIGOXIN SERPL-MCNC: 0.9 NG/ML (ref 0.8–2)
INR PPP: 2.39 (ref 0.84–1.19)
INR PPP: 2.39 (ref 0.84–1.19)
PROTHROMBIN TIME: 25.6 SECONDS (ref 11.6–14.5)

## 2019-10-07 PROCEDURE — 85610 PROTHROMBIN TIME: CPT

## 2019-10-07 PROCEDURE — 36415 COLL VENOUS BLD VENIPUNCTURE: CPT

## 2019-10-07 PROCEDURE — 80162 ASSAY OF DIGOXIN TOTAL: CPT

## 2019-10-08 ENCOUNTER — OFFICE VISIT (OUTPATIENT)
Dept: NEUROLOGY | Facility: CLINIC | Age: 79
End: 2019-10-08
Payer: COMMERCIAL

## 2019-10-08 VITALS
HEART RATE: 96 BPM | DIASTOLIC BLOOD PRESSURE: 64 MMHG | BODY MASS INDEX: 17.64 KG/M2 | WEIGHT: 126 LBS | HEIGHT: 71 IN | SYSTOLIC BLOOD PRESSURE: 124 MMHG

## 2019-10-08 DIAGNOSIS — F02.80 ALZHEIMER'S DEMENTIA WITHOUT BEHAVIORAL DISTURBANCE, UNSPECIFIED TIMING OF DEMENTIA ONSET (HCC): Primary | ICD-10-CM

## 2019-10-08 DIAGNOSIS — R41.89 COGNITIVE DECLINE: ICD-10-CM

## 2019-10-08 DIAGNOSIS — G30.9 ALZHEIMER'S DEMENTIA WITHOUT BEHAVIORAL DISTURBANCE, UNSPECIFIED TIMING OF DEMENTIA ONSET (HCC): Primary | ICD-10-CM

## 2019-10-08 PROCEDURE — 99214 OFFICE O/P EST MOD 30 MIN: CPT | Performed by: PSYCHIATRY & NEUROLOGY

## 2019-10-08 RX ORDER — MEMANTINE HYDROCHLORIDE 10 MG/1
10 TABLET ORAL DAILY
Qty: 30 TABLET | Refills: 4 | Status: SHIPPED | OUTPATIENT
Start: 2019-10-08 | End: 2020-04-06 | Stop reason: SDUPTHER
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
## 2019-10-10 ENCOUNTER — OFFICE VISIT (OUTPATIENT)
Dept: PULMONOLOGY | Facility: MEDICAL CENTER | Age: 79
End: 2019-10-10
Payer: COMMERCIAL

## 2019-10-10 VITALS
HEIGHT: 71 IN | HEART RATE: 76 BPM | OXYGEN SATURATION: 99 % | WEIGHT: 130 LBS | RESPIRATION RATE: 12 BRPM | SYSTOLIC BLOOD PRESSURE: 142 MMHG | BODY MASS INDEX: 18.2 KG/M2 | TEMPERATURE: 97.8 F | DIASTOLIC BLOOD PRESSURE: 86 MMHG

## 2019-10-10 DIAGNOSIS — J43.2 CENTRILOBULAR EMPHYSEMA (HCC): Primary | ICD-10-CM

## 2019-10-10 DIAGNOSIS — R41.89 COGNITIVE DECLINE: ICD-10-CM

## 2019-10-10 DIAGNOSIS — J96.11 CHRONIC HYPOXEMIC RESPIRATORY FAILURE (HCC): ICD-10-CM

## 2019-10-10 PROCEDURE — 99214 OFFICE O/P EST MOD 30 MIN: CPT | Performed by: INTERNAL MEDICINE

## 2019-10-10 RX ORDER — WARFARIN SODIUM 5 MG/1
TABLET ORAL
COMMUNITY
Start: 2019-09-23 | End: 2020-01-23 | Stop reason: SDUPTHER

## 2019-10-10 NOTE — ASSESSMENT & PLAN NOTE
I have no nodule for several years  Previously was always a very sharp, strong personality  He is less interactive and is unsure of himself at times  He looks his wife for reassurance when asked questions  He is being seen by Dr Moise Crain and appears to have some Alzheimer's dementia with memory impairment  He has been started on Namenda

## 2019-10-10 NOTE — ASSESSMENT & PLAN NOTE
Continue oxygen 3 liters/minute at rest and 4 L with activity  I did recheck his oxygen saturations with activity restarted 5 from his oxygen  I will request new supplies from his medical supply [address] so    He has not had any service from them in over 2 years    Pulse oximetry testing:    Room air O2 saturation at rest was 91% and ambulating 80 feet was 85%    On 3 L of oxygen at rest O2 saturation was 97% and ambulating 80 feet was 94%

## 2019-10-10 NOTE — ASSESSMENT & PLAN NOTE
Elif Kwon has very severe bullous emphysema particularly affecting left lower lobe  His FEV1 is only 0 66 L or 22% of predicted  He is using Advair 150 mcg 2 puffs twice a day but has not been using his Combivent inhaler  Will thus adding and anticholinergic bronchodilator  I gave him for samples of Spiriva 2 5 mcg Respimat he will use 2 puffs once a day after using his Advair in the morning  In the future I told his wife that it may be easier for him to use an inhaler such as Trelegy 100 mcg 1 puff daily in the morning  This may be true also patient's memory continues to worsen  He did get the influenza vaccine in September
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
## 2019-10-10 NOTE — PATIENT INSTRUCTIONS
Continue Advair 150 mcg 2 puffs twice a day    Try Spiriva Respimat 2 5 mcg 2 puffs once a day in the morning right after using her Advair you      In the future if you want to simplify medication I could write prescription for Trelegy 100 mcg 1 puff daily and this would replace Advair and Spiriva

## 2019-10-11 ENCOUNTER — REMOTE DEVICE CLINIC VISIT (OUTPATIENT)
Dept: CARDIOLOGY CLINIC | Facility: CLINIC | Age: 79
End: 2019-10-11

## 2019-10-11 DIAGNOSIS — Z95.810 PRESENCE OF AUTOMATIC CARDIOVERTER/DEFIBRILLATOR (AICD): Primary | ICD-10-CM

## 2019-10-11 PROCEDURE — RECHECK: Performed by: INTERNAL MEDICINE

## 2019-10-14 ENCOUNTER — TELEPHONE (OUTPATIENT)
Dept: CARDIOLOGY CLINIC | Facility: CLINIC | Age: 79
End: 2019-10-14

## 2019-10-14 NOTE — PROGRESS NOTES
Results for orders placed or performed in visit on 10/11/19   Cardiac EP device report    Narrative    MDT SINGLE ICD  CARELINK TRANSMISSION:NB-----Coty Daly [address] [place]---NO SIGNIFICANT HIGH RATE EPISODES  ---ALMENDAREZ

## 2019-10-14 NOTE — TELEPHONE ENCOUNTER
----- Message from Shaniqua Solis MD sent at 10/14/2019  1:48 PM EDT -----  Please let his know his heart rate is better controlled with the digoxin   He need to follow back on his routine 6 month follow-up

## 2019-10-14 NOTE — TELEPHONE ENCOUNTER
Patient made aware of device interrogation and medication recommendation  Pt agreed to keep follow up visits

## 2019-10-31 ENCOUNTER — APPOINTMENT (OUTPATIENT)
Dept: LAB | Facility: CLINIC | Age: 79
End: 2019-10-31
Payer: COMMERCIAL

## 2019-10-31 ENCOUNTER — ANTICOAG VISIT (OUTPATIENT)
Dept: FAMILY MEDICINE CLINIC | Facility: CLINIC | Age: 79
End: 2019-10-31

## 2019-11-22 ENCOUNTER — APPOINTMENT (OUTPATIENT)
Dept: LAB | Facility: CLINIC | Age: 79
End: 2019-11-22
Payer: COMMERCIAL

## 2019-11-22 ENCOUNTER — ANTICOAG VISIT (OUTPATIENT)
Dept: FAMILY MEDICINE CLINIC | Facility: CLINIC | Age: 79
End: 2019-11-22

## 2019-12-20 ENCOUNTER — APPOINTMENT (OUTPATIENT)
Dept: LAB | Facility: CLINIC | Age: 79
End: 2019-12-20
Payer: COMMERCIAL

## 2019-12-23 ENCOUNTER — ANTICOAG VISIT (OUTPATIENT)
Dept: FAMILY MEDICINE CLINIC | Facility: CLINIC | Age: 79
End: 2019-12-23

## 2019-12-23 ENCOUNTER — OFFICE VISIT (OUTPATIENT)
Dept: CARDIOLOGY CLINIC | Facility: CLINIC | Age: 79
End: 2019-12-23
Payer: COMMERCIAL

## 2019-12-23 VITALS
WEIGHT: 130 LBS | BODY MASS INDEX: 18.2 KG/M2 | HEART RATE: 94 BPM | DIASTOLIC BLOOD PRESSURE: 68 MMHG | SYSTOLIC BLOOD PRESSURE: 130 MMHG | HEIGHT: 71 IN | OXYGEN SATURATION: 93 %

## 2019-12-23 DIAGNOSIS — I50.1 HEART FAILURE, LEFT, WITH LVEF 31-40% (HCC): ICD-10-CM

## 2019-12-23 DIAGNOSIS — J43.2 CENTRILOBULAR EMPHYSEMA (HCC): ICD-10-CM

## 2019-12-23 DIAGNOSIS — Z95.810 PRESENCE OF AUTOMATIC CARDIOVERTER/DEFIBRILLATOR (AICD): ICD-10-CM

## 2019-12-23 DIAGNOSIS — I42.9 CARDIOMYOPATHY, UNSPECIFIED TYPE (HCC): ICD-10-CM

## 2019-12-23 DIAGNOSIS — I50.1 HEART FAILURE, LEFT, WITH LVEF <=30% (HCC): ICD-10-CM

## 2019-12-23 DIAGNOSIS — I48.20 CHRONIC ATRIAL FIBRILLATION (HCC): Primary | ICD-10-CM

## 2019-12-23 DIAGNOSIS — I50.22 CHRONIC SYSTOLIC CONGESTIVE HEART FAILURE (HCC): ICD-10-CM

## 2019-12-23 DIAGNOSIS — I42.8 NONISCHEMIC CARDIOMYOPATHY (HCC): ICD-10-CM

## 2019-12-23 DIAGNOSIS — E11.59 TYPE 2 DIABETES MELLITUS WITH OTHER CIRCULATORY COMPLICATION, WITHOUT LONG-TERM CURRENT USE OF INSULIN (HCC): ICD-10-CM

## 2019-12-23 DIAGNOSIS — I50.9 HEART FAILURE (HCC): ICD-10-CM

## 2019-12-23 DIAGNOSIS — I10 ESSENTIAL HYPERTENSION: ICD-10-CM

## 2019-12-23 DIAGNOSIS — R91.1 NODULE OF LEFT LUNG: ICD-10-CM

## 2019-12-23 DIAGNOSIS — I10 HTN (HYPERTENSION), MALIGNANT: ICD-10-CM

## 2019-12-23 DIAGNOSIS — I25.10 ARTERIOSCLEROSIS OF CORONARY ARTERY: ICD-10-CM

## 2019-12-23 LAB — INR PPP: 2.63 (ref 0.84–1.19)

## 2019-12-23 PROCEDURE — 99214 OFFICE O/P EST MOD 30 MIN: CPT | Performed by: INTERNAL MEDICINE

## 2019-12-23 PROCEDURE — 3078F DIAST BP <80 MM HG: CPT | Performed by: INTERNAL MEDICINE

## 2019-12-23 PROCEDURE — 93000 ELECTROCARDIOGRAM COMPLETE: CPT | Performed by: INTERNAL MEDICINE

## 2019-12-23 PROCEDURE — 3075F SYST BP GE 130 - 139MM HG: CPT | Performed by: INTERNAL MEDICINE

## 2019-12-23 RX ORDER — SPIRONOLACTONE 25 MG/1
25 TABLET ORAL DAILY
Qty: 90 TABLET | Refills: 3 | Status: SHIPPED | OUTPATIENT
Start: 2019-12-23 | End: 2020-07-30 | Stop reason: SDUPTHER

## 2019-12-23 RX ORDER — FUROSEMIDE 40 MG/1
40 TABLET ORAL DAILY
Qty: 90 TABLET | Refills: 3 | Status: SHIPPED | OUTPATIENT
Start: 2019-12-23 | End: 2020-07-30 | Stop reason: SDUPTHER

## 2019-12-23 RX ORDER — FOSINOPRIL SODIUM 20 MG/1
20 TABLET ORAL DAILY
Qty: 90 TABLET | Refills: 3 | Status: ON HOLD | OUTPATIENT
Start: 2019-12-23 | End: 2020-06-19 | Stop reason: SDUPTHER

## 2019-12-23 RX ORDER — CARVEDILOL 6.25 MG/1
6.25 TABLET ORAL 2 TIMES DAILY
Qty: 180 TABLET | Refills: 3 | Status: SHIPPED | OUTPATIENT
Start: 2019-12-23 | End: 2020-07-30 | Stop reason: SDUPTHER
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
## 2019-12-26 ENCOUNTER — REMOTE DEVICE CLINIC VISIT (OUTPATIENT)
Dept: CARDIOLOGY CLINIC | Facility: CLINIC | Age: 79
End: 2019-12-26
Payer: COMMERCIAL

## 2019-12-26 DIAGNOSIS — Z95.810 PRESENCE OF AUTOMATIC CARDIOVERTER/DEFIBRILLATOR (AICD): Primary | ICD-10-CM

## 2019-12-26 PROCEDURE — 93295 DEV INTERROG REMOTE 1/2/MLT: CPT | Performed by: INTERNAL MEDICINE

## 2019-12-26 PROCEDURE — 93297 REM INTERROG DEV EVAL ICPMS: CPT | Performed by: INTERNAL MEDICINE

## 2019-12-26 PROCEDURE — 93296 REM INTERROG EVL PM/IDS: CPT | Performed by: INTERNAL MEDICINE

## 2019-12-26 NOTE — PROGRESS NOTES
Results for orders placed or performed in visit on 12/26/19   Cardiac EP device report    Narrative    MDT SINGLE ICD  CARELINK TRANSMISSION: BATTERY VOLTAGE ADEQUATE   7% ALL AVAILABLE LEAD PARAMETERS WITHIN NORMAL LIMITS  1 NSVT EPISODE DETECTED 6 BEATS @ 270ms  NO THERAPIES GIVEN  OPTI-VOL WITHIN NORMAL LIMITS  NORMAL DEVICE FUNCTION  ---ALMENDAREZ

## 2020-01-23 ENCOUNTER — ANTICOAG VISIT (OUTPATIENT)
Dept: FAMILY MEDICINE CLINIC | Facility: CLINIC | Age: 80
End: 2020-01-23

## 2020-01-23 ENCOUNTER — APPOINTMENT (OUTPATIENT)
Dept: LAB | Facility: CLINIC | Age: 80
End: 2020-01-23
Payer: COMMERCIAL

## 2020-01-23 DIAGNOSIS — I50.1 HEART FAILURE, LEFT, WITH LVEF 31-40% (HCC): ICD-10-CM

## 2020-01-23 DIAGNOSIS — I48.20 CHRONIC ATRIAL FIBRILLATION (HCC): ICD-10-CM

## 2020-01-23 DIAGNOSIS — I10 HTN (HYPERTENSION), MALIGNANT: ICD-10-CM

## 2020-01-23 DIAGNOSIS — I42.8 NONISCHEMIC CARDIOMYOPATHY (HCC): ICD-10-CM

## 2020-01-23 DIAGNOSIS — I48.20 CHRONIC ATRIAL FIBRILLATION (HCC): Primary | ICD-10-CM

## 2020-01-23 DIAGNOSIS — I50.1 HEART FAILURE, LEFT, WITH LVEF <=30% (HCC): ICD-10-CM

## 2020-01-23 RX ORDER — WARFARIN SODIUM 5 MG/1
TABLET ORAL
Qty: 90 TABLET | Refills: 3 | Status: SHIPPED | OUTPATIENT
Start: 2020-01-23 | End: 2020-06-19 | Stop reason: HOSPADM

## 2020-01-23 RX ORDER — WARFARIN SODIUM 5 MG/1
TABLET ORAL
Qty: 90 TABLET | Refills: 3 | Status: ON HOLD | OUTPATIENT
Start: 2020-01-23 | End: 2020-06-19 | Stop reason: SDUPTHER

## 2020-01-23 RX ORDER — WARFARIN SODIUM 1 MG/1
TABLET ORAL
Qty: 90 TABLET | Refills: 3 | Status: SHIPPED | OUTPATIENT
Start: 2020-01-23 | End: 2020-06-19 | Stop reason: HOSPADM

## 2020-02-20 DIAGNOSIS — I48.20 CHRONIC ATRIAL FIBRILLATION (HCC): ICD-10-CM

## 2020-02-20 RX ORDER — DIGOXIN 125 MCG
TABLET ORAL
Qty: 90 TABLET | Refills: 3 | Status: SHIPPED | OUTPATIENT
Start: 2020-02-20 | End: 2020-02-24

## 2020-02-24 DIAGNOSIS — I48.20 CHRONIC ATRIAL FIBRILLATION (HCC): ICD-10-CM

## 2020-02-24 RX ORDER — DIGOXIN 125 MCG
TABLET ORAL
Qty: 90 TABLET | Refills: 1 | Status: SHIPPED | OUTPATIENT
Start: 2020-02-24 | End: 2020-07-30 | Stop reason: SDUPTHER

## 2020-02-27 ENCOUNTER — TELEPHONE (OUTPATIENT)
Dept: FAMILY MEDICINE CLINIC | Facility: CLINIC | Age: 80
End: 2020-02-27

## 2020-02-27 ENCOUNTER — ANTICOAG VISIT (OUTPATIENT)
Dept: FAMILY MEDICINE CLINIC | Facility: CLINIC | Age: 80
End: 2020-02-27

## 2020-02-27 ENCOUNTER — APPOINTMENT (OUTPATIENT)
Dept: LAB | Facility: CLINIC | Age: 80
End: 2020-02-27
Payer: COMMERCIAL

## 2020-02-27 NOTE — TELEPHONE ENCOUNTER
I personally called Mrs Nicholson and apologized  Pt did write down 5 5 every day but nothing more  All instructions given again  No further action needed    Task Complete  [id number]

## 2020-02-27 NOTE — TELEPHONE ENCOUNTER
May left a message on the test result line  She did not get the results but she wants to let the staff know that she wants us to speak with HER regarding his results and not him   Orquidea Gold

## 2020-02-27 NOTE — TELEPHONE ENCOUNTER
Spoke to wife and explained Dr Santos Parent directions  She stated that he  has dementia and She needs to be informed of the INR information   No further action  Meryle Broaden, [place]

## 2020-03-02 ENCOUNTER — APPOINTMENT (OUTPATIENT)
Dept: LAB | Facility: CLINIC | Age: 80
End: 2020-03-02
Payer: COMMERCIAL

## 2020-03-02 ENCOUNTER — ANTICOAG VISIT (OUTPATIENT)
Dept: FAMILY MEDICINE CLINIC | Facility: CLINIC | Age: 80
End: 2020-03-02

## 2020-04-02 ENCOUNTER — TELEPHONE (OUTPATIENT)
Dept: NEUROLOGY | Facility: CLINIC | Age: 80
End: 2020-04-02

## 2020-04-06 ENCOUNTER — TELEMEDICINE (OUTPATIENT)
Dept: NEUROLOGY | Facility: CLINIC | Age: 80
End: 2020-04-06
Payer: COMMERCIAL

## 2020-04-06 DIAGNOSIS — F02.80 ALZHEIMER'S DEMENTIA WITHOUT BEHAVIORAL DISTURBANCE, UNSPECIFIED TIMING OF DEMENTIA ONSET (HCC): ICD-10-CM

## 2020-04-06 DIAGNOSIS — R41.89 COGNITIVE DECLINE: ICD-10-CM

## 2020-04-06 DIAGNOSIS — G30.9 ALZHEIMER'S DEMENTIA WITHOUT BEHAVIORAL DISTURBANCE, UNSPECIFIED TIMING OF DEMENTIA ONSET (HCC): ICD-10-CM

## 2020-04-06 PROCEDURE — 99215 OFFICE O/P EST HI 40 MIN: CPT | Performed by: NURSE PRACTITIONER

## 2020-04-06 PROCEDURE — 4040F PNEUMOC VAC/ADMIN/RCVD: CPT | Performed by: NURSE PRACTITIONER

## 2020-04-06 PROCEDURE — 1160F RVW MEDS BY RX/DR IN RCRD: CPT | Performed by: NURSE PRACTITIONER

## 2020-04-06 RX ORDER — MEMANTINE HYDROCHLORIDE 10 MG/1
10 TABLET ORAL DAILY
Qty: 90 TABLET | Refills: 1 | Status: SHIPPED | OUTPATIENT
Start: 2020-04-06 | End: 2020-08-06 | Stop reason: SDUPTHER

## 2020-04-21 ENCOUNTER — TELEPHONE (OUTPATIENT)
Dept: FAMILY MEDICINE CLINIC | Facility: CLINIC | Age: 80
End: 2020-04-21

## 2020-04-23 ENCOUNTER — TELEPHONE (OUTPATIENT)
Dept: FAMILY MEDICINE CLINIC | Facility: CLINIC | Age: 80
End: 2020-04-23

## 2020-04-23 ENCOUNTER — TRANSCRIBE ORDERS (OUTPATIENT)
Dept: LAB | Facility: CLINIC | Age: 80
End: 2020-04-23

## 2020-04-23 ENCOUNTER — APPOINTMENT (OUTPATIENT)
Dept: LAB | Facility: CLINIC | Age: 80
End: 2020-04-23
Payer: COMMERCIAL

## 2020-04-23 DIAGNOSIS — I25.10 ARTERIOSCLEROSIS OF CORONARY ARTERY: ICD-10-CM

## 2020-04-23 DIAGNOSIS — I48.20 CHRONIC ATRIAL FIBRILLATION (HCC): Primary | ICD-10-CM

## 2020-04-23 DIAGNOSIS — I50.1 LEFT HEART FAILURE (HCC): ICD-10-CM

## 2020-04-23 LAB
ANION GAP SERPL CALCULATED.3IONS-SCNC: 1 MMOL/L (ref 4–13)
BUN SERPL-MCNC: 22 MG/DL (ref 5–25)
CALCIUM SERPL-MCNC: 9.3 MG/DL (ref 8.3–10.1)
CHLORIDE SERPL-SCNC: 100 MMOL/L (ref 100–108)
CO2 SERPL-SCNC: 36 MMOL/L (ref 21–32)
CREAT SERPL-MCNC: 1.26 MG/DL (ref 0.6–1.3)
GFR SERPL CREATININE-BSD FRML MDRD: 53 ML/MIN/1.73SQ M
GLUCOSE P FAST SERPL-MCNC: 201 MG/DL (ref 65–99)
INR PPP: 2.07 (ref 0.84–1.19)
POTASSIUM SERPL-SCNC: 4.6 MMOL/L (ref 3.5–5.3)
PROTHROMBIN TIME: 22.8 SECONDS (ref 11.6–14.5)
SODIUM SERPL-SCNC: 137 MMOL/L (ref 136–145)

## 2020-04-23 PROCEDURE — 36415 COLL VENOUS BLD VENIPUNCTURE: CPT

## 2020-04-23 PROCEDURE — 85610 PROTHROMBIN TIME: CPT

## 2020-04-23 PROCEDURE — 80048 BASIC METABOLIC PNL TOTAL CA: CPT

## 2020-04-24 ENCOUNTER — ANTICOAG VISIT (OUTPATIENT)
Dept: FAMILY MEDICINE CLINIC | Facility: CLINIC | Age: 80
End: 2020-04-24

## 2020-05-28 ENCOUNTER — APPOINTMENT (OUTPATIENT)
Dept: LAB | Facility: CLINIC | Age: 80
DRG: 871 | End: 2020-05-28
Payer: COMMERCIAL

## 2020-05-28 ENCOUNTER — TELEPHONE (OUTPATIENT)
Dept: PULMONOLOGY | Facility: MEDICAL CENTER | Age: 80
End: 2020-05-28

## 2020-05-28 ENCOUNTER — ANTICOAG VISIT (OUTPATIENT)
Dept: FAMILY MEDICINE CLINIC | Facility: CLINIC | Age: 80
End: 2020-05-28

## 2020-05-28 ENCOUNTER — TRANSCRIBE ORDERS (OUTPATIENT)
Dept: LAB | Facility: CLINIC | Age: 80
End: 2020-05-28

## 2020-05-28 DIAGNOSIS — I42.9 PRIMARY CARDIOMYOPATHY (HCC): ICD-10-CM

## 2020-05-28 DIAGNOSIS — I10 ESSENTIAL HYPERTENSION, MALIGNANT: ICD-10-CM

## 2020-05-28 DIAGNOSIS — J43.2 CENTRILOBULAR EMPHYSEMA (HCC): Primary | ICD-10-CM

## 2020-05-28 DIAGNOSIS — J44.1 CHRONIC OBSTRUCTIVE PULMONARY DISEASE WITH ACUTE EXACERBATION (HCC): ICD-10-CM

## 2020-05-28 DIAGNOSIS — I50.1 LEFT HEART FAILURE (HCC): ICD-10-CM

## 2020-05-28 DIAGNOSIS — I50.1 LEFT HEART FAILURE (HCC): Primary | ICD-10-CM

## 2020-05-28 LAB
ALBUMIN SERPL BCP-MCNC: 3.8 G/DL (ref 3.5–5)
ALP SERPL-CCNC: 57 U/L (ref 46–116)
ALT SERPL W P-5'-P-CCNC: 16 U/L (ref 12–78)
ANION GAP SERPL CALCULATED.3IONS-SCNC: 5 MMOL/L (ref 4–13)
AST SERPL W P-5'-P-CCNC: 12 U/L (ref 5–45)
BILIRUB SERPL-MCNC: 0.44 MG/DL (ref 0.2–1)
BUN SERPL-MCNC: 30 MG/DL (ref 5–25)
CALCIUM SERPL-MCNC: 9.1 MG/DL (ref 8.3–10.1)
CHLORIDE SERPL-SCNC: 97 MMOL/L (ref 100–108)
CO2 SERPL-SCNC: 33 MMOL/L (ref 21–32)
CREAT SERPL-MCNC: 1.19 MG/DL (ref 0.6–1.3)
DIGOXIN SERPL-MCNC: 0.7 NG/ML (ref 0.8–2)
GFR SERPL CREATININE-BSD FRML MDRD: 57 ML/MIN/1.73SQ M
GLUCOSE P FAST SERPL-MCNC: 170 MG/DL (ref 65–99)
POTASSIUM SERPL-SCNC: 4.1 MMOL/L (ref 3.5–5.3)
PROT SERPL-MCNC: 7.2 G/DL (ref 6.4–8.2)
SODIUM SERPL-SCNC: 135 MMOL/L (ref 136–145)

## 2020-05-28 PROCEDURE — 80162 ASSAY OF DIGOXIN TOTAL: CPT

## 2020-05-28 PROCEDURE — 80053 COMPREHEN METABOLIC PANEL: CPT

## 2020-05-30 ENCOUNTER — APPOINTMENT (EMERGENCY)
Dept: RADIOLOGY | Facility: HOSPITAL | Age: 80
DRG: 871 | End: 2020-05-30
Payer: COMMERCIAL

## 2020-05-30 ENCOUNTER — HOSPITAL ENCOUNTER (INPATIENT)
Facility: HOSPITAL | Age: 80
LOS: 20 days | Discharge: HOME HEALTH | DRG: 871 | End: 2020-06-19
Attending: EMERGENCY MEDICINE | Admitting: INTERNAL MEDICINE
Payer: COMMERCIAL

## 2020-05-30 DIAGNOSIS — I42.0 DILATED CARDIOMYOPATHY (HCC): ICD-10-CM

## 2020-05-30 DIAGNOSIS — K92.2 GASTROINTESTINAL HEMORRHAGE, UNSPECIFIED GASTROINTESTINAL HEMORRHAGE TYPE: ICD-10-CM

## 2020-05-30 DIAGNOSIS — K59.00 CONSTIPATION: ICD-10-CM

## 2020-05-30 DIAGNOSIS — R04.2 HEMOPTYSIS: ICD-10-CM

## 2020-05-30 DIAGNOSIS — J96.22 ACUTE ON CHRONIC RESPIRATORY FAILURE WITH HYPOXIA AND HYPERCAPNIA (HCC): ICD-10-CM

## 2020-05-30 DIAGNOSIS — I25.10 ARTERIOSCLEROSIS OF CORONARY ARTERY: ICD-10-CM

## 2020-05-30 DIAGNOSIS — M79.671 PAIN IN BOTH FEET: ICD-10-CM

## 2020-05-30 DIAGNOSIS — D64.9 ACUTE ON CHRONIC ANEMIA: ICD-10-CM

## 2020-05-30 DIAGNOSIS — J96.21 ACUTE ON CHRONIC RESPIRATORY FAILURE WITH HYPOXIA AND HYPERCAPNIA (HCC): ICD-10-CM

## 2020-05-30 DIAGNOSIS — M79.672 PAIN IN BOTH FEET: ICD-10-CM

## 2020-05-30 DIAGNOSIS — D64.9 ANEMIA: ICD-10-CM

## 2020-05-30 DIAGNOSIS — I48.20 CHRONIC ATRIAL FIBRILLATION (HCC): ICD-10-CM

## 2020-05-30 DIAGNOSIS — J44.1 COPD EXACERBATION (HCC): ICD-10-CM

## 2020-05-30 DIAGNOSIS — E43 SEVERE PROTEIN-CALORIE MALNUTRITION (HCC): ICD-10-CM

## 2020-05-30 DIAGNOSIS — K29.70 GASTRITIS: ICD-10-CM

## 2020-05-30 DIAGNOSIS — J85.1 ABSCESS OF LOWER LOBE OF LEFT LUNG WITH PNEUMONIA (HCC): Primary | ICD-10-CM

## 2020-05-30 DIAGNOSIS — J18.9 PNEUMONIA: ICD-10-CM

## 2020-05-30 DIAGNOSIS — J18.9 PNEUMONIA OF LEFT LOWER LOBE DUE TO INFECTIOUS ORGANISM: ICD-10-CM

## 2020-05-30 DIAGNOSIS — I50.1 HEART FAILURE, LEFT, WITH LVEF 31-40% (HCC): ICD-10-CM

## 2020-05-30 DIAGNOSIS — I48.0 PAF (PAROXYSMAL ATRIAL FIBRILLATION) (HCC): ICD-10-CM

## 2020-05-30 PROBLEM — A41.9 SEVERE SEPSIS (HCC): Status: ACTIVE | Noted: 2020-05-30

## 2020-05-30 PROBLEM — R65.20 SEVERE SEPSIS (HCC): Status: ACTIVE | Noted: 2020-05-30

## 2020-05-30 LAB
ABO GROUP BLD: NORMAL
ABO GROUP BLD: NORMAL
ALBUMIN SERPL BCP-MCNC: 3.4 G/DL (ref 3.5–5)
ALP SERPL-CCNC: 49 U/L (ref 46–116)
ALT SERPL W P-5'-P-CCNC: 20 U/L (ref 12–78)
ANION GAP SERPL CALCULATED.3IONS-SCNC: 6 MMOL/L (ref 4–13)
APTT PPP: 37 SECONDS (ref 23–37)
ARTERIAL PATENCY WRIST A: YES
AST SERPL W P-5'-P-CCNC: 18 U/L (ref 5–45)
BACTERIA UR QL AUTO: ABNORMAL /HPF
BASE EXCESS BLDA CALC-SCNC: 1.6 MMOL/L
BASOPHILS # BLD AUTO: 0.08 THOUSANDS/ΜL (ref 0–0.1)
BASOPHILS NFR BLD AUTO: 1 % (ref 0–1)
BILIRUB SERPL-MCNC: 0.3 MG/DL (ref 0.2–1)
BILIRUB UR QL STRIP: NEGATIVE
BLD GP AB SCN SERPL QL: NEGATIVE
BODY TEMPERATURE: 97.8 DEGREES FEHRENHEIT
BUN SERPL-MCNC: 27 MG/DL (ref 5–25)
CALCIUM SERPL-MCNC: 8.7 MG/DL (ref 8.3–10.1)
CHLORIDE SERPL-SCNC: 96 MMOL/L (ref 100–108)
CLARITY UR: CLEAR
CO2 SERPL-SCNC: 32 MMOL/L (ref 21–32)
COLOR UR: ABNORMAL
CREAT SERPL-MCNC: 1.46 MG/DL (ref 0.6–1.3)
EOSINOPHIL # BLD AUTO: 0.02 THOUSAND/ΜL (ref 0–0.61)
EOSINOPHIL NFR BLD AUTO: 0 % (ref 0–6)
ERYTHROCYTE [DISTWIDTH] IN BLOOD BY AUTOMATED COUNT: 13 % (ref 11.6–15.1)
GFR SERPL CREATININE-BSD FRML MDRD: 44 ML/MIN/1.73SQ M
GLUCOSE SERPL-MCNC: 117 MG/DL (ref 65–140)
GLUCOSE SERPL-MCNC: 154 MG/DL (ref 65–140)
GLUCOSE SERPL-MCNC: 170 MG/DL (ref 65–140)
GLUCOSE UR STRIP-MCNC: NEGATIVE MG/DL
HCO3 BLDA-SCNC: 27.6 MMOL/L (ref 22–28)
HCT VFR BLD AUTO: 21.5 % (ref 36.5–49.3)
HCT VFR BLD AUTO: 24.3 % (ref 36.5–49.3)
HGB BLD-MCNC: 6.6 G/DL (ref 12–17)
HGB BLD-MCNC: 7 G/DL (ref 12–17)
HGB BLD-MCNC: 7.9 G/DL (ref 12–17)
HGB UR QL STRIP.AUTO: ABNORMAL
HYALINE CASTS #/AREA URNS LPF: ABNORMAL /LPF
IMM GRANULOCYTES # BLD AUTO: 0.12 THOUSAND/UL (ref 0–0.2)
IMM GRANULOCYTES NFR BLD AUTO: 1 % (ref 0–2)
INR PPP: 2.9 (ref 0.84–1.19)
IPAP: 14
KETONES UR STRIP-MCNC: NEGATIVE MG/DL
L PNEUMO1 AG UR QL IA.RAPID: NEGATIVE
LACTATE SERPL-SCNC: 1.8 MMOL/L (ref 0.5–2)
LACTATE SERPL-SCNC: 3 MMOL/L (ref 0.5–2)
LACTATE SERPL-SCNC: 3 MMOL/L (ref 0.5–2)
LEUKOCYTE ESTERASE UR QL STRIP: NEGATIVE
LYMPHOCYTES # BLD AUTO: 3.59 THOUSANDS/ΜL (ref 0.6–4.47)
LYMPHOCYTES NFR BLD AUTO: 21 % (ref 14–44)
MAGNESIUM SERPL-MCNC: 1.8 MG/DL (ref 1.6–2.6)
MCH RBC QN AUTO: 31.9 PG (ref 26.8–34.3)
MCHC RBC AUTO-ENTMCNC: 32.5 G/DL (ref 31.4–37.4)
MCV RBC AUTO: 98 FL (ref 82–98)
MONOCYTES # BLD AUTO: 1.07 THOUSAND/ΜL (ref 0.17–1.22)
MONOCYTES NFR BLD AUTO: 6 % (ref 4–12)
MUCOUS THREADS UR QL AUTO: ABNORMAL
NEUTROPHILS # BLD AUTO: 12.15 THOUSANDS/ΜL (ref 1.85–7.62)
NEUTS SEG NFR BLD AUTO: 71 % (ref 43–75)
NITRITE UR QL STRIP: NEGATIVE
NON VENT- BIPAP: ABNORMAL
NON-SQ EPI CELLS URNS QL MICRO: ABNORMAL /HPF
NRBC BLD AUTO-RTO: 0 /100 WBCS
NT-PROBNP SERPL-MCNC: 266 PG/ML
O2 CT BLDA-SCNC: 11.6 ML/DL (ref 16–23)
OXYHGB MFR BLDA: 98.3 % (ref 94–97)
PCO2 BLDA: 51.3 MM HG (ref 36–44)
PCO2 TEMP ADJ BLDA: 50.4 MM HG (ref 36–44)
PEEP MAX SETTING VENT: 6 CM[H2O]
PH BLD: 7.36 [PH] (ref 7.35–7.45)
PH BLDA: 7.35 [PH] (ref 7.35–7.45)
PH UR STRIP.AUTO: 5.5 [PH]
PLATELET # BLD AUTO: 294 THOUSANDS/UL (ref 149–390)
PMV BLD AUTO: 11.3 FL (ref 8.9–12.7)
PO2 BLD: 132.4 MM HG (ref 75–129)
PO2 BLDA: 134.8 MM HG (ref 75–129)
POTASSIUM SERPL-SCNC: 4.1 MMOL/L (ref 3.5–5.3)
PROCALCITONIN SERPL-MCNC: 0.09 NG/ML
PROT SERPL-MCNC: 6.6 G/DL (ref 6.4–8.2)
PROT UR STRIP-MCNC: NEGATIVE MG/DL
PROTHROMBIN TIME: 31.5 SECONDS (ref 11.6–14.5)
RBC # BLD AUTO: 2.48 MILLION/UL (ref 3.88–5.62)
RBC #/AREA URNS AUTO: ABNORMAL /HPF
RH BLD: POSITIVE
RH BLD: POSITIVE
S PNEUM AG UR QL: NEGATIVE
SARS-COV-2 RNA RESP QL NAA+PROBE: NEGATIVE
SODIUM SERPL-SCNC: 134 MMOL/L (ref 136–145)
SP GR UR STRIP.AUTO: 1.02 (ref 1–1.03)
SPECIMEN EXPIRATION DATE: NORMAL
SPECIMEN SOURCE: ABNORMAL
TROPONIN I SERPL-MCNC: 0.02 NG/ML
TROPONIN I SERPL-MCNC: <0.02 NG/ML
UROBILINOGEN UR QL STRIP.AUTO: 0.2 E.U./DL
VENT BIPAP FIO2: 60 %
WBC # BLD AUTO: 17.03 THOUSAND/UL (ref 4.31–10.16)
WBC #/AREA URNS AUTO: ABNORMAL /HPF

## 2020-05-30 PROCEDURE — 84484 ASSAY OF TROPONIN QUANT: CPT | Performed by: EMERGENCY MEDICINE

## 2020-05-30 PROCEDURE — 83605 ASSAY OF LACTIC ACID: CPT | Performed by: PHYSICIAN ASSISTANT

## 2020-05-30 PROCEDURE — 87449 NOS EACH ORGANISM AG IA: CPT | Performed by: PHYSICIAN ASSISTANT

## 2020-05-30 PROCEDURE — 83036 HEMOGLOBIN GLYCOSYLATED A1C: CPT | Performed by: PHYSICIAN ASSISTANT

## 2020-05-30 PROCEDURE — 85014 HEMATOCRIT: CPT | Performed by: PHYSICIAN ASSISTANT

## 2020-05-30 PROCEDURE — 93005 ELECTROCARDIOGRAM TRACING: CPT

## 2020-05-30 PROCEDURE — 86920 COMPATIBILITY TEST SPIN: CPT

## 2020-05-30 PROCEDURE — 83880 ASSAY OF NATRIURETIC PEPTIDE: CPT | Performed by: EMERGENCY MEDICINE

## 2020-05-30 PROCEDURE — 87040 BLOOD CULTURE FOR BACTERIA: CPT | Performed by: EMERGENCY MEDICINE

## 2020-05-30 PROCEDURE — 87081 CULTURE SCREEN ONLY: CPT | Performed by: PHYSICIAN ASSISTANT

## 2020-05-30 PROCEDURE — 99223 1ST HOSP IP/OBS HIGH 75: CPT | Performed by: INTERNAL MEDICINE

## 2020-05-30 PROCEDURE — 85018 HEMOGLOBIN: CPT | Performed by: PHYSICIAN ASSISTANT

## 2020-05-30 PROCEDURE — 94002 VENT MGMT INPAT INIT DAY: CPT

## 2020-05-30 PROCEDURE — 83605 ASSAY OF LACTIC ACID: CPT | Performed by: EMERGENCY MEDICINE

## 2020-05-30 PROCEDURE — 94760 N-INVAS EAR/PLS OXIMETRY 1: CPT

## 2020-05-30 PROCEDURE — 82948 REAGENT STRIP/BLOOD GLUCOSE: CPT

## 2020-05-30 PROCEDURE — 82805 BLOOD GASES W/O2 SATURATION: CPT | Performed by: EMERGENCY MEDICINE

## 2020-05-30 PROCEDURE — 87147 CULTURE TYPE IMMUNOLOGIC: CPT | Performed by: EMERGENCY MEDICINE

## 2020-05-30 PROCEDURE — 85610 PROTHROMBIN TIME: CPT | Performed by: EMERGENCY MEDICINE

## 2020-05-30 PROCEDURE — 86850 RBC ANTIBODY SCREEN: CPT | Performed by: EMERGENCY MEDICINE

## 2020-05-30 PROCEDURE — 87086 URINE CULTURE/COLONY COUNT: CPT | Performed by: EMERGENCY MEDICINE

## 2020-05-30 PROCEDURE — 85730 THROMBOPLASTIN TIME PARTIAL: CPT | Performed by: EMERGENCY MEDICINE

## 2020-05-30 PROCEDURE — 36415 COLL VENOUS BLD VENIPUNCTURE: CPT | Performed by: EMERGENCY MEDICINE

## 2020-05-30 PROCEDURE — 86901 BLOOD TYPING SEROLOGIC RH(D): CPT | Performed by: EMERGENCY MEDICINE

## 2020-05-30 PROCEDURE — 85025 COMPLETE CBC W/AUTO DIFF WBC: CPT | Performed by: EMERGENCY MEDICINE

## 2020-05-30 PROCEDURE — 96365 THER/PROPH/DIAG IV INF INIT: CPT

## 2020-05-30 PROCEDURE — 96375 TX/PRO/DX INJ NEW DRUG ADDON: CPT

## 2020-05-30 PROCEDURE — 81001 URINALYSIS AUTO W/SCOPE: CPT | Performed by: EMERGENCY MEDICINE

## 2020-05-30 PROCEDURE — 94668 MNPJ CHEST WALL SBSQ: CPT

## 2020-05-30 PROCEDURE — 82728 ASSAY OF FERRITIN: CPT | Performed by: PHYSICIAN ASSISTANT

## 2020-05-30 PROCEDURE — 82607 VITAMIN B-12: CPT | Performed by: PHYSICIAN ASSISTANT

## 2020-05-30 PROCEDURE — 99285 EMERGENCY DEPT VISIT HI MDM: CPT

## 2020-05-30 PROCEDURE — 83550 IRON BINDING TEST: CPT | Performed by: PHYSICIAN ASSISTANT

## 2020-05-30 PROCEDURE — 87635 SARS-COV-2 COVID-19 AMP PRB: CPT | Performed by: EMERGENCY MEDICINE

## 2020-05-30 PROCEDURE — 83540 ASSAY OF IRON: CPT | Performed by: PHYSICIAN ASSISTANT

## 2020-05-30 PROCEDURE — 99291 CRITICAL CARE FIRST HOUR: CPT | Performed by: EMERGENCY MEDICINE

## 2020-05-30 PROCEDURE — 71045 X-RAY EXAM CHEST 1 VIEW: CPT

## 2020-05-30 PROCEDURE — 86900 BLOOD TYPING SEROLOGIC ABO: CPT | Performed by: EMERGENCY MEDICINE

## 2020-05-30 PROCEDURE — 84145 PROCALCITONIN (PCT): CPT | Performed by: PHYSICIAN ASSISTANT

## 2020-05-30 PROCEDURE — 84484 ASSAY OF TROPONIN QUANT: CPT | Performed by: PHYSICIAN ASSISTANT

## 2020-05-30 PROCEDURE — 94664 DEMO&/EVAL PT USE INHALER: CPT

## 2020-05-30 PROCEDURE — 94640 AIRWAY INHALATION TREATMENT: CPT

## 2020-05-30 PROCEDURE — 94660 CPAP INITIATION&MGMT: CPT

## 2020-05-30 PROCEDURE — 83735 ASSAY OF MAGNESIUM: CPT | Performed by: EMERGENCY MEDICINE

## 2020-05-30 PROCEDURE — 80053 COMPREHEN METABOLIC PANEL: CPT | Performed by: EMERGENCY MEDICINE

## 2020-05-30 PROCEDURE — 82746 ASSAY OF FOLIC ACID SERUM: CPT | Performed by: PHYSICIAN ASSISTANT

## 2020-05-30 RX ORDER — CEFTRIAXONE 1 G/50ML
1000 INJECTION, SOLUTION INTRAVENOUS EVERY 24 HOURS
Status: DISCONTINUED | OUTPATIENT
Start: 2020-05-30 | End: 2020-05-30

## 2020-05-30 RX ORDER — METHYLPREDNISOLONE SODIUM SUCCINATE 125 MG/2ML
80 INJECTION, POWDER, LYOPHILIZED, FOR SOLUTION INTRAMUSCULAR; INTRAVENOUS ONCE
Status: COMPLETED | OUTPATIENT
Start: 2020-05-30 | End: 2020-05-30

## 2020-05-30 RX ORDER — MAGNESIUM HYDROXIDE/ALUMINUM HYDROXICE/SIMETHICONE 120; 1200; 1200 MG/30ML; MG/30ML; MG/30ML
30 SUSPENSION ORAL EVERY 6 HOURS PRN
Status: DISCONTINUED | OUTPATIENT
Start: 2020-05-30 | End: 2020-06-19 | Stop reason: HOSPADM

## 2020-05-30 RX ORDER — PRAVASTATIN SODIUM 20 MG
20 TABLET ORAL
Status: DISCONTINUED | OUTPATIENT
Start: 2020-05-30 | End: 2020-06-19 | Stop reason: HOSPADM

## 2020-05-30 RX ORDER — AZITHROMYCIN 250 MG/1
500 TABLET, FILM COATED ORAL EVERY 24 HOURS
Status: DISCONTINUED | OUTPATIENT
Start: 2020-05-30 | End: 2020-05-30

## 2020-05-30 RX ORDER — SPIRONOLACTONE 25 MG/1
25 TABLET ORAL DAILY
Status: DISCONTINUED | OUTPATIENT
Start: 2020-05-30 | End: 2020-05-31

## 2020-05-30 RX ORDER — IPRATROPIUM BROMIDE AND ALBUTEROL SULFATE 2.5; .5 MG/3ML; MG/3ML
3 SOLUTION RESPIRATORY (INHALATION)
Status: DISCONTINUED | OUTPATIENT
Start: 2020-05-30 | End: 2020-06-15

## 2020-05-30 RX ORDER — HEPARIN SODIUM 5000 [USP'U]/ML
5000 INJECTION, SOLUTION INTRAVENOUS; SUBCUTANEOUS EVERY 8 HOURS SCHEDULED
Status: DISCONTINUED | OUTPATIENT
Start: 2020-05-30 | End: 2020-05-30

## 2020-05-30 RX ORDER — ONDANSETRON 2 MG/ML
4 INJECTION INTRAMUSCULAR; INTRAVENOUS EVERY 6 HOURS PRN
Status: DISCONTINUED | OUTPATIENT
Start: 2020-05-30 | End: 2020-06-19 | Stop reason: HOSPADM

## 2020-05-30 RX ORDER — ASCORBIC ACID 500 MG
1000 TABLET ORAL DAILY
Status: DISCONTINUED | OUTPATIENT
Start: 2020-05-30 | End: 2020-06-19 | Stop reason: HOSPADM

## 2020-05-30 RX ORDER — WARFARIN SODIUM 5 MG/1
5 TABLET ORAL
Status: DISCONTINUED | OUTPATIENT
Start: 2020-05-30 | End: 2020-05-31

## 2020-05-30 RX ORDER — CHLORHEXIDINE GLUCONATE 0.12 MG/ML
15 RINSE ORAL EVERY 12 HOURS SCHEDULED
Status: DISCONTINUED | OUTPATIENT
Start: 2020-05-30 | End: 2020-05-30

## 2020-05-30 RX ORDER — AZITHROMYCIN 250 MG/1
500 TABLET, FILM COATED ORAL EVERY 24 HOURS
Status: DISCONTINUED | OUTPATIENT
Start: 2020-05-31 | End: 2020-06-01

## 2020-05-30 RX ORDER — POLYETHYLENE GLYCOL 3350 17 G/17G
17 POWDER, FOR SOLUTION ORAL DAILY PRN
Status: DISCONTINUED | OUTPATIENT
Start: 2020-05-30 | End: 2020-06-19 | Stop reason: HOSPADM

## 2020-05-30 RX ORDER — ASPIRIN 81 MG/1
81 TABLET, CHEWABLE ORAL DAILY
Status: DISCONTINUED | OUTPATIENT
Start: 2020-05-30 | End: 2020-06-03

## 2020-05-30 RX ORDER — CARVEDILOL 6.25 MG/1
6.25 TABLET ORAL 2 TIMES DAILY
Status: DISCONTINUED | OUTPATIENT
Start: 2020-05-30 | End: 2020-06-19 | Stop reason: HOSPADM

## 2020-05-30 RX ORDER — ACETAMINOPHEN 325 MG/1
650 TABLET ORAL EVERY 6 HOURS PRN
Status: DISCONTINUED | OUTPATIENT
Start: 2020-05-30 | End: 2020-06-19 | Stop reason: HOSPADM

## 2020-05-30 RX ORDER — DIGOXIN 125 MCG
125 TABLET ORAL DAILY
Status: DISCONTINUED | OUTPATIENT
Start: 2020-05-30 | End: 2020-06-19 | Stop reason: HOSPADM

## 2020-05-30 RX ORDER — CEFTRIAXONE 1 G/50ML
1000 INJECTION, SOLUTION INTRAVENOUS ONCE
Status: COMPLETED | OUTPATIENT
Start: 2020-05-30 | End: 2020-05-30

## 2020-05-30 RX ORDER — MAGNESIUM SULFATE HEPTAHYDRATE 40 MG/ML
2 INJECTION, SOLUTION INTRAVENOUS ONCE
Status: COMPLETED | OUTPATIENT
Start: 2020-05-30 | End: 2020-05-30

## 2020-05-30 RX ORDER — MEMANTINE HYDROCHLORIDE 10 MG/1
10 TABLET ORAL DAILY
Status: DISCONTINUED | OUTPATIENT
Start: 2020-05-30 | End: 2020-06-19 | Stop reason: HOSPADM

## 2020-05-30 RX ORDER — CEFTRIAXONE 1 G/50ML
1000 INJECTION, SOLUTION INTRAVENOUS EVERY 24 HOURS
Status: DISCONTINUED | OUTPATIENT
Start: 2020-05-31 | End: 2020-06-02

## 2020-05-30 RX ADMIN — ASPIRIN 81 MG 81 MG: 81 TABLET ORAL at 13:43

## 2020-05-30 RX ADMIN — IPRATROPIUM BROMIDE AND ALBUTEROL SULFATE 3 ML: 2.5; .5 SOLUTION RESPIRATORY (INHALATION) at 14:34

## 2020-05-30 RX ADMIN — CHLORHEXIDINE GLUCONATE 0.12% ORAL RINSE 15 ML: 1.2 LIQUID ORAL at 13:42

## 2020-05-30 RX ADMIN — SPIRONOLACTONE 25 MG: 25 TABLET ORAL at 13:43

## 2020-05-30 RX ADMIN — SODIUM CHLORIDE 500 ML: 0.9 INJECTION, SOLUTION INTRAVENOUS at 10:10

## 2020-05-30 RX ADMIN — INSULIN LISPRO 1 UNITS: 100 INJECTION, SOLUTION INTRAVENOUS; SUBCUTANEOUS at 21:23

## 2020-05-30 RX ADMIN — WARFARIN SODIUM 5 MG: 5 TABLET ORAL at 17:51

## 2020-05-30 RX ADMIN — METHYLPREDNISOLONE SODIUM SUCCINATE 80 MG: 125 INJECTION, POWDER, FOR SOLUTION INTRAMUSCULAR; INTRAVENOUS at 09:07

## 2020-05-30 RX ADMIN — INSULIN LISPRO 2 UNITS: 100 INJECTION, SOLUTION INTRAVENOUS; SUBCUTANEOUS at 17:20

## 2020-05-30 RX ADMIN — DIGOXIN 125 MCG: 125 TABLET ORAL at 13:43

## 2020-05-30 RX ADMIN — MEMANTINE 10 MG: 10 TABLET ORAL at 13:43

## 2020-05-30 RX ADMIN — CEFTRIAXONE 1000 MG: 1 INJECTION, SOLUTION INTRAVENOUS at 09:27

## 2020-05-30 RX ADMIN — SODIUM CHLORIDE, SODIUM LACTATE, POTASSIUM CHLORIDE, AND CALCIUM CHLORIDE 500 ML: .6; .31; .03; .02 INJECTION, SOLUTION INTRAVENOUS at 12:47

## 2020-05-30 RX ADMIN — PRAVASTATIN SODIUM 20 MG: 20 TABLET ORAL at 17:29

## 2020-05-30 RX ADMIN — GUAIFENESIN 200 MG: 100 SOLUTION ORAL at 17:51

## 2020-05-30 RX ADMIN — IPRATROPIUM BROMIDE AND ALBUTEROL SULFATE 3 ML: 2.5; .5 SOLUTION RESPIRATORY (INHALATION) at 19:44

## 2020-05-30 RX ADMIN — OXYCODONE HYDROCHLORIDE AND ACETAMINOPHEN 1000 MG: 500 TABLET ORAL at 13:43

## 2020-05-30 RX ADMIN — CARVEDILOL 6.25 MG: 6.25 TABLET, FILM COATED ORAL at 13:43

## 2020-05-30 RX ADMIN — MAGNESIUM SULFATE HEPTAHYDRATE 2 G: 40 INJECTION, SOLUTION INTRAVENOUS at 15:50

## 2020-05-30 RX ADMIN — AZITHROMYCIN MONOHYDRATE 500 MG: 500 INJECTION, POWDER, LYOPHILIZED, FOR SOLUTION INTRAVENOUS at 09:47

## 2020-05-31 ENCOUNTER — APPOINTMENT (INPATIENT)
Dept: RADIOLOGY | Facility: HOSPITAL | Age: 80
DRG: 871 | End: 2020-05-31
Payer: COMMERCIAL

## 2020-05-31 PROBLEM — D64.9 ANEMIA: Status: ACTIVE | Noted: 2020-05-31

## 2020-05-31 LAB
ALBUMIN SERPL BCP-MCNC: 3.2 G/DL (ref 3.5–5)
ALP SERPL-CCNC: 43 U/L (ref 46–116)
ALT SERPL W P-5'-P-CCNC: 25 U/L (ref 12–78)
ANION GAP SERPL CALCULATED.3IONS-SCNC: 8 MMOL/L (ref 4–13)
ANION GAP SERPL CALCULATED.3IONS-SCNC: 8 MMOL/L (ref 4–13)
AST SERPL W P-5'-P-CCNC: 25 U/L (ref 5–45)
BACTERIA UR CULT: ABNORMAL
BASOPHILS # BLD AUTO: 0.04 THOUSANDS/ΜL (ref 0–0.1)
BASOPHILS NFR BLD AUTO: 0 % (ref 0–1)
BILIRUB SERPL-MCNC: 0.3 MG/DL (ref 0.2–1)
BUN SERPL-MCNC: 23 MG/DL (ref 5–25)
BUN SERPL-MCNC: 26 MG/DL (ref 5–25)
CA-I BLD-SCNC: 1.06 MMOL/L (ref 1.12–1.32)
CALCIUM SERPL-MCNC: 8.3 MG/DL (ref 8.3–10.1)
CALCIUM SERPL-MCNC: 8.5 MG/DL (ref 8.3–10.1)
CHLORIDE SERPL-SCNC: 97 MMOL/L (ref 100–108)
CHLORIDE SERPL-SCNC: 98 MMOL/L (ref 100–108)
CO2 SERPL-SCNC: 30 MMOL/L (ref 21–32)
CO2 SERPL-SCNC: 31 MMOL/L (ref 21–32)
CREAT SERPL-MCNC: 1.45 MG/DL (ref 0.6–1.3)
CREAT SERPL-MCNC: 1.45 MG/DL (ref 0.6–1.3)
EOSINOPHIL # BLD AUTO: 0.01 THOUSAND/ΜL (ref 0–0.61)
EOSINOPHIL NFR BLD AUTO: 0 % (ref 0–6)
ERYTHROCYTE [DISTWIDTH] IN BLOOD BY AUTOMATED COUNT: 13 % (ref 11.6–15.1)
EST. AVERAGE GLUCOSE BLD GHB EST-MCNC: 123 MG/DL
FERRITIN SERPL-MCNC: 36 NG/ML (ref 8–388)
FOLATE SERPL-MCNC: >20 NG/ML (ref 3.1–17.5)
GFR SERPL CREATININE-BSD FRML MDRD: 45 ML/MIN/1.73SQ M
GFR SERPL CREATININE-BSD FRML MDRD: 45 ML/MIN/1.73SQ M
GLUCOSE SERPL-MCNC: 105 MG/DL (ref 65–140)
GLUCOSE SERPL-MCNC: 117 MG/DL (ref 65–140)
GLUCOSE SERPL-MCNC: 136 MG/DL (ref 65–140)
GLUCOSE SERPL-MCNC: 167 MG/DL (ref 65–140)
GLUCOSE SERPL-MCNC: 236 MG/DL (ref 65–140)
HBA1C MFR BLD: 5.9 %
HCT VFR BLD AUTO: 19.4 % (ref 36.5–49.3)
HGB BLD-MCNC: 6.4 G/DL (ref 12–17)
HGB BLD-MCNC: 8.1 G/DL (ref 12–17)
IMM GRANULOCYTES # BLD AUTO: 0.09 THOUSAND/UL (ref 0–0.2)
IMM GRANULOCYTES NFR BLD AUTO: 1 % (ref 0–2)
INR PPP: 4.05 (ref 0.84–1.19)
IRON SATN MFR SERPL: 31 %
IRON SERPL-MCNC: 83 UG/DL (ref 65–175)
LYMPHOCYTES # BLD AUTO: 2.08 THOUSANDS/ΜL (ref 0.6–4.47)
LYMPHOCYTES NFR BLD AUTO: 17 % (ref 14–44)
MAGNESIUM SERPL-MCNC: 1.9 MG/DL (ref 1.6–2.6)
MCH RBC QN AUTO: 31.5 PG (ref 26.8–34.3)
MCHC RBC AUTO-ENTMCNC: 33 G/DL (ref 31.4–37.4)
MCV RBC AUTO: 96 FL (ref 82–98)
MONOCYTES # BLD AUTO: 1.39 THOUSAND/ΜL (ref 0.17–1.22)
MONOCYTES NFR BLD AUTO: 11 % (ref 4–12)
MRSA NOSE QL CULT: NORMAL
NEUTROPHILS # BLD AUTO: 8.72 THOUSANDS/ΜL (ref 1.85–7.62)
NEUTS SEG NFR BLD AUTO: 71 % (ref 43–75)
NRBC BLD AUTO-RTO: 0 /100 WBCS
PHOSPHATE SERPL-MCNC: 2.6 MG/DL (ref 2.3–4.1)
PLATELET # BLD AUTO: 236 THOUSANDS/UL (ref 149–390)
PMV BLD AUTO: 10.9 FL (ref 8.9–12.7)
POTASSIUM SERPL-SCNC: 3.7 MMOL/L (ref 3.5–5.3)
POTASSIUM SERPL-SCNC: 4 MMOL/L (ref 3.5–5.3)
PROCALCITONIN SERPL-MCNC: 0.12 NG/ML
PROT SERPL-MCNC: 6.2 G/DL (ref 6.4–8.2)
PROTHROMBIN TIME: 40.9 SECONDS (ref 11.6–14.5)
RBC # BLD AUTO: 2.03 MILLION/UL (ref 3.88–5.62)
SODIUM SERPL-SCNC: 136 MMOL/L (ref 136–145)
SODIUM SERPL-SCNC: 136 MMOL/L (ref 136–145)
TIBC SERPL-MCNC: 272 UG/DL (ref 250–450)
VIT B12 SERPL-MCNC: 125 PG/ML (ref 100–900)
WBC # BLD AUTO: 12.33 THOUSAND/UL (ref 4.31–10.16)

## 2020-05-31 PROCEDURE — 94640 AIRWAY INHALATION TREATMENT: CPT

## 2020-05-31 PROCEDURE — 99233 SBSQ HOSP IP/OBS HIGH 50: CPT | Performed by: PHYSICIAN ASSISTANT

## 2020-05-31 PROCEDURE — 84145 PROCALCITONIN (PCT): CPT | Performed by: PHYSICIAN ASSISTANT

## 2020-05-31 PROCEDURE — 80048 BASIC METABOLIC PNL TOTAL CA: CPT | Performed by: PHYSICIAN ASSISTANT

## 2020-05-31 PROCEDURE — 85610 PROTHROMBIN TIME: CPT | Performed by: PHYSICIAN ASSISTANT

## 2020-05-31 PROCEDURE — 94668 MNPJ CHEST WALL SBSQ: CPT

## 2020-05-31 PROCEDURE — 94002 VENT MGMT INPAT INIT DAY: CPT

## 2020-05-31 PROCEDURE — 84100 ASSAY OF PHOSPHORUS: CPT | Performed by: PHYSICIAN ASSISTANT

## 2020-05-31 PROCEDURE — 71045 X-RAY EXAM CHEST 1 VIEW: CPT

## 2020-05-31 PROCEDURE — 82330 ASSAY OF CALCIUM: CPT | Performed by: PHYSICIAN ASSISTANT

## 2020-05-31 PROCEDURE — 30233N1 TRANSFUSION OF NONAUTOLOGOUS RED BLOOD CELLS INTO PERIPHERAL VEIN, PERCUTANEOUS APPROACH: ICD-10-PCS | Performed by: INTERNAL MEDICINE

## 2020-05-31 PROCEDURE — 82948 REAGENT STRIP/BLOOD GLUCOSE: CPT

## 2020-05-31 PROCEDURE — 80053 COMPREHEN METABOLIC PANEL: CPT | Performed by: PHYSICIAN ASSISTANT

## 2020-05-31 PROCEDURE — 85025 COMPLETE CBC W/AUTO DIFF WBC: CPT | Performed by: PHYSICIAN ASSISTANT

## 2020-05-31 PROCEDURE — 85018 HEMOGLOBIN: CPT | Performed by: PHYSICIAN ASSISTANT

## 2020-05-31 PROCEDURE — 94760 N-INVAS EAR/PLS OXIMETRY 1: CPT

## 2020-05-31 PROCEDURE — 94003 VENT MGMT INPAT SUBQ DAY: CPT

## 2020-05-31 PROCEDURE — P9016 RBC LEUKOCYTES REDUCED: HCPCS

## 2020-05-31 PROCEDURE — 94664 DEMO&/EVAL PT USE INHALER: CPT

## 2020-05-31 PROCEDURE — 83735 ASSAY OF MAGNESIUM: CPT | Performed by: PHYSICIAN ASSISTANT

## 2020-05-31 RX ORDER — FUROSEMIDE 10 MG/ML
20 INJECTION INTRAMUSCULAR; INTRAVENOUS ONCE
Status: COMPLETED | OUTPATIENT
Start: 2020-05-31 | End: 2020-05-31

## 2020-05-31 RX ORDER — SODIUM CHLORIDE, SODIUM GLUCONATE, SODIUM ACETATE, POTASSIUM CHLORIDE, MAGNESIUM CHLORIDE, SODIUM PHOSPHATE, DIBASIC, AND POTASSIUM PHOSPHATE .53; .5; .37; .037; .03; .012; .00082 G/100ML; G/100ML; G/100ML; G/100ML; G/100ML; G/100ML; G/100ML
50 INJECTION, SOLUTION INTRAVENOUS CONTINUOUS
Status: DISCONTINUED | OUTPATIENT
Start: 2020-05-31 | End: 2020-05-31

## 2020-05-31 RX ORDER — PANTOPRAZOLE SODIUM 40 MG/1
40 TABLET, DELAYED RELEASE ORAL
Status: DISCONTINUED | OUTPATIENT
Start: 2020-06-01 | End: 2020-06-02

## 2020-05-31 RX ORDER — FUROSEMIDE 10 MG/ML
40 INJECTION INTRAMUSCULAR; INTRAVENOUS ONCE
Status: COMPLETED | OUTPATIENT
Start: 2020-05-31 | End: 2020-05-31

## 2020-05-31 RX ORDER — PHYTONADIONE 5 MG/1
5 TABLET ORAL ONCE
Status: COMPLETED | OUTPATIENT
Start: 2020-05-31 | End: 2020-05-31

## 2020-05-31 RX ADMIN — CARVEDILOL 6.25 MG: 6.25 TABLET, FILM COATED ORAL at 08:09

## 2020-05-31 RX ADMIN — FUROSEMIDE 40 MG: 10 INJECTION, SOLUTION INTRAMUSCULAR; INTRAVENOUS at 18:24

## 2020-05-31 RX ADMIN — GUAIFENESIN 200 MG: 100 SOLUTION ORAL at 19:19

## 2020-05-31 RX ADMIN — AZITHROMYCIN 500 MG: 250 TABLET, FILM COATED ORAL at 08:21

## 2020-05-31 RX ADMIN — CARVEDILOL 6.25 MG: 6.25 TABLET, FILM COATED ORAL at 18:24

## 2020-05-31 RX ADMIN — SPIRONOLACTONE 25 MG: 25 TABLET ORAL at 08:09

## 2020-05-31 RX ADMIN — IPRATROPIUM BROMIDE AND ALBUTEROL SULFATE 3 ML: 2.5; .5 SOLUTION RESPIRATORY (INHALATION) at 07:48

## 2020-05-31 RX ADMIN — PHYTONADIONE 5 MG: 5 TABLET ORAL at 10:59

## 2020-05-31 RX ADMIN — Medication 2 TABLET: at 18:32

## 2020-05-31 RX ADMIN — FUROSEMIDE 20 MG: 10 INJECTION, SOLUTION INTRAMUSCULAR; INTRAVENOUS at 10:59

## 2020-05-31 RX ADMIN — IPRATROPIUM BROMIDE AND ALBUTEROL SULFATE 3 ML: 2.5; .5 SOLUTION RESPIRATORY (INHALATION) at 14:51

## 2020-05-31 RX ADMIN — OXYCODONE HYDROCHLORIDE AND ACETAMINOPHEN 1000 MG: 500 TABLET ORAL at 08:09

## 2020-05-31 RX ADMIN — Medication 2 TABLET: at 21:41

## 2020-05-31 RX ADMIN — PRAVASTATIN SODIUM 20 MG: 20 TABLET ORAL at 16:43

## 2020-05-31 RX ADMIN — CEFTRIAXONE 1000 MG: 1 INJECTION, SOLUTION INTRAVENOUS at 09:54

## 2020-05-31 RX ADMIN — ASPIRIN 81 MG 81 MG: 81 TABLET ORAL at 08:08

## 2020-05-31 RX ADMIN — DIGOXIN 125 MCG: 125 TABLET ORAL at 08:09

## 2020-05-31 RX ADMIN — IPRATROPIUM BROMIDE AND ALBUTEROL SULFATE 3 ML: 2.5; .5 SOLUTION RESPIRATORY (INHALATION) at 01:09

## 2020-05-31 RX ADMIN — FUROSEMIDE 20 MG: 10 INJECTION, SOLUTION INTRAMUSCULAR; INTRAVENOUS at 13:22

## 2020-05-31 RX ADMIN — GUAIFENESIN 200 MG: 100 SOLUTION ORAL at 05:25

## 2020-05-31 RX ADMIN — MEMANTINE 10 MG: 10 TABLET ORAL at 08:08

## 2020-05-31 RX ADMIN — IPRATROPIUM BROMIDE AND ALBUTEROL SULFATE 3 ML: 2.5; .5 SOLUTION RESPIRATORY (INHALATION) at 19:38

## 2020-06-01 ENCOUNTER — APPOINTMENT (INPATIENT)
Dept: NON INVASIVE DIAGNOSTICS | Facility: HOSPITAL | Age: 80
DRG: 871 | End: 2020-06-01
Payer: COMMERCIAL

## 2020-06-01 ENCOUNTER — APPOINTMENT (INPATIENT)
Dept: RADIOLOGY | Facility: HOSPITAL | Age: 80
DRG: 871 | End: 2020-06-01
Payer: COMMERCIAL

## 2020-06-01 PROBLEM — R65.20 SEVERE SEPSIS (HCC): Status: RESOLVED | Noted: 2020-05-30 | Resolved: 2020-06-01

## 2020-06-01 PROBLEM — A41.9 SEVERE SEPSIS (HCC): Status: RESOLVED | Noted: 2020-05-30 | Resolved: 2020-06-01

## 2020-06-01 LAB
ABO GROUP BLD BPU: NORMAL
ANION GAP SERPL CALCULATED.3IONS-SCNC: 7 MMOL/L (ref 4–13)
BACTERIA SPT RESP CULT: ABNORMAL
BASOPHILS # BLD AUTO: 0.07 THOUSANDS/ΜL (ref 0–0.1)
BASOPHILS NFR BLD AUTO: 1 % (ref 0–1)
BPU ID: NORMAL
BUN SERPL-MCNC: 21 MG/DL (ref 5–25)
CALCIUM SERPL-MCNC: 8 MG/DL (ref 8.3–10.1)
CHLORIDE SERPL-SCNC: 98 MMOL/L (ref 100–108)
CO2 SERPL-SCNC: 35 MMOL/L (ref 21–32)
CREAT SERPL-MCNC: 1.31 MG/DL (ref 0.6–1.3)
CROSSMATCH: NORMAL
EOSINOPHIL # BLD AUTO: 0.14 THOUSAND/ΜL (ref 0–0.61)
EOSINOPHIL NFR BLD AUTO: 1 % (ref 0–6)
ERYTHROCYTE [DISTWIDTH] IN BLOOD BY AUTOMATED COUNT: 15.6 % (ref 11.6–15.1)
GFR SERPL CREATININE-BSD FRML MDRD: 51 ML/MIN/1.73SQ M
GLUCOSE SERPL-MCNC: 108 MG/DL (ref 65–140)
GLUCOSE SERPL-MCNC: 122 MG/DL (ref 65–140)
GLUCOSE SERPL-MCNC: 133 MG/DL (ref 65–140)
GLUCOSE SERPL-MCNC: 136 MG/DL (ref 65–140)
GLUCOSE SERPL-MCNC: 142 MG/DL (ref 65–140)
GLUCOSE SERPL-MCNC: 56 MG/DL (ref 65–140)
GRAM STN SPEC: ABNORMAL
HCT VFR BLD AUTO: 24.3 % (ref 36.5–49.3)
HGB BLD-MCNC: 8 G/DL (ref 12–17)
IMM GRANULOCYTES # BLD AUTO: 0.05 THOUSAND/UL (ref 0–0.2)
IMM GRANULOCYTES NFR BLD AUTO: 1 % (ref 0–2)
INR PPP: 1.33 (ref 0.84–1.19)
LYMPHOCYTES # BLD AUTO: 2.25 THOUSANDS/ΜL (ref 0.6–4.47)
LYMPHOCYTES NFR BLD AUTO: 22 % (ref 14–44)
MAGNESIUM SERPL-MCNC: 1.8 MG/DL (ref 1.6–2.6)
MCH RBC QN AUTO: 30.1 PG (ref 26.8–34.3)
MCHC RBC AUTO-ENTMCNC: 32.9 G/DL (ref 31.4–37.4)
MCV RBC AUTO: 91 FL (ref 82–98)
MONOCYTES # BLD AUTO: 1.24 THOUSAND/ΜL (ref 0.17–1.22)
MONOCYTES NFR BLD AUTO: 12 % (ref 4–12)
NEUTROPHILS # BLD AUTO: 6.65 THOUSANDS/ΜL (ref 1.85–7.62)
NEUTS SEG NFR BLD AUTO: 63 % (ref 43–75)
NRBC BLD AUTO-RTO: 0 /100 WBCS
PHOSPHATE SERPL-MCNC: 5.5 MG/DL (ref 2.3–4.1)
PLATELET # BLD AUTO: 242 THOUSANDS/UL (ref 149–390)
PMV BLD AUTO: 10.3 FL (ref 8.9–12.7)
POTASSIUM SERPL-SCNC: 3.4 MMOL/L (ref 3.5–5.3)
PROTHROMBIN TIME: 17 SECONDS (ref 11.6–14.5)
RBC # BLD AUTO: 2.66 MILLION/UL (ref 3.88–5.62)
SODIUM SERPL-SCNC: 140 MMOL/L (ref 136–145)
UNIT DISPENSE STATUS: NORMAL
UNIT PRODUCT CODE: NORMAL
UNIT RH: NORMAL
WBC # BLD AUTO: 10.4 THOUSAND/UL (ref 4.31–10.16)

## 2020-06-01 PROCEDURE — 94640 AIRWAY INHALATION TREATMENT: CPT

## 2020-06-01 PROCEDURE — 97163 PT EVAL HIGH COMPLEX 45 MIN: CPT

## 2020-06-01 PROCEDURE — 71250 CT THORAX DX C-: CPT

## 2020-06-01 PROCEDURE — 94002 VENT MGMT INPAT INIT DAY: CPT

## 2020-06-01 PROCEDURE — 94668 MNPJ CHEST WALL SBSQ: CPT

## 2020-06-01 PROCEDURE — 94664 DEMO&/EVAL PT USE INHALER: CPT

## 2020-06-01 PROCEDURE — 97110 THERAPEUTIC EXERCISES: CPT

## 2020-06-01 PROCEDURE — 97167 OT EVAL HIGH COMPLEX 60 MIN: CPT

## 2020-06-01 PROCEDURE — 94760 N-INVAS EAR/PLS OXIMETRY 1: CPT

## 2020-06-01 PROCEDURE — 80048 BASIC METABOLIC PNL TOTAL CA: CPT | Performed by: PHYSICIAN ASSISTANT

## 2020-06-01 PROCEDURE — 74176 CT ABD & PELVIS W/O CONTRAST: CPT

## 2020-06-01 PROCEDURE — 99291 CRITICAL CARE FIRST HOUR: CPT | Performed by: ANESTHESIOLOGY

## 2020-06-01 PROCEDURE — 84100 ASSAY OF PHOSPHORUS: CPT | Performed by: PHYSICIAN ASSISTANT

## 2020-06-01 PROCEDURE — 82948 REAGENT STRIP/BLOOD GLUCOSE: CPT

## 2020-06-01 PROCEDURE — 87205 SMEAR GRAM STAIN: CPT | Performed by: PHYSICIAN ASSISTANT

## 2020-06-01 PROCEDURE — 83735 ASSAY OF MAGNESIUM: CPT | Performed by: PHYSICIAN ASSISTANT

## 2020-06-01 PROCEDURE — C8929 TTE W OR WO FOL WCON,DOPPLER: HCPCS

## 2020-06-01 PROCEDURE — 85610 PROTHROMBIN TIME: CPT | Performed by: PHYSICIAN ASSISTANT

## 2020-06-01 PROCEDURE — 85025 COMPLETE CBC W/AUTO DIFF WBC: CPT | Performed by: PHYSICIAN ASSISTANT

## 2020-06-01 RX ORDER — FUROSEMIDE 10 MG/ML
40 INJECTION INTRAMUSCULAR; INTRAVENOUS ONCE
Status: COMPLETED | OUTPATIENT
Start: 2020-06-01 | End: 2020-06-01

## 2020-06-01 RX ORDER — POTASSIUM CHLORIDE 20 MEQ/1
40 TABLET, EXTENDED RELEASE ORAL ONCE
Status: COMPLETED | OUTPATIENT
Start: 2020-06-01 | End: 2020-06-01

## 2020-06-01 RX ORDER — SPIRONOLACTONE 25 MG/1
25 TABLET ORAL DAILY
Status: DISCONTINUED | OUTPATIENT
Start: 2020-06-01 | End: 2020-06-10

## 2020-06-01 RX ORDER — FUROSEMIDE 40 MG/1
40 TABLET ORAL DAILY
Status: DISCONTINUED | OUTPATIENT
Start: 2020-06-01 | End: 2020-06-10

## 2020-06-01 RX ADMIN — CARVEDILOL 6.25 MG: 6.25 TABLET, FILM COATED ORAL at 18:04

## 2020-06-01 RX ADMIN — IPRATROPIUM BROMIDE AND ALBUTEROL SULFATE 3 ML: 2.5; .5 SOLUTION RESPIRATORY (INHALATION) at 01:12

## 2020-06-01 RX ADMIN — POTASSIUM CHLORIDE 40 MEQ: 1500 TABLET, EXTENDED RELEASE ORAL at 11:11

## 2020-06-01 RX ADMIN — IPRATROPIUM BROMIDE AND ALBUTEROL SULFATE 3 ML: 2.5; .5 SOLUTION RESPIRATORY (INHALATION) at 20:53

## 2020-06-01 RX ADMIN — MEMANTINE 10 MG: 10 TABLET ORAL at 08:31

## 2020-06-01 RX ADMIN — AZITHROMYCIN 500 MG: 250 TABLET, FILM COATED ORAL at 08:31

## 2020-06-01 RX ADMIN — POTASSIUM CHLORIDE 40 MEQ: 1500 TABLET, EXTENDED RELEASE ORAL at 08:47

## 2020-06-01 RX ADMIN — IPRATROPIUM BROMIDE AND ALBUTEROL SULFATE 3 ML: 2.5; .5 SOLUTION RESPIRATORY (INHALATION) at 13:13

## 2020-06-01 RX ADMIN — PERFLUTREN 0.3 ML/MIN: 6.52 INJECTION, SUSPENSION INTRAVENOUS at 12:40

## 2020-06-01 RX ADMIN — PRAVASTATIN SODIUM 20 MG: 20 TABLET ORAL at 16:38

## 2020-06-01 RX ADMIN — MAGNESIUM OXIDE TAB 400 MG (241.3 MG ELEMENTAL MG) 800 MG: 400 (241.3 MG) TAB at 08:47

## 2020-06-01 RX ADMIN — DIGOXIN 125 MCG: 125 TABLET ORAL at 08:31

## 2020-06-01 RX ADMIN — OXYCODONE HYDROCHLORIDE AND ACETAMINOPHEN 1000 MG: 500 TABLET ORAL at 08:31

## 2020-06-01 RX ADMIN — CEFTRIAXONE 1000 MG: 1 INJECTION, SOLUTION INTRAVENOUS at 08:44

## 2020-06-01 RX ADMIN — FUROSEMIDE 40 MG: 40 TABLET ORAL at 11:11

## 2020-06-01 RX ADMIN — METRONIDAZOLE 500 MG: 500 INJECTION, SOLUTION INTRAVENOUS at 16:38

## 2020-06-01 RX ADMIN — CARVEDILOL 6.25 MG: 6.25 TABLET, FILM COATED ORAL at 08:31

## 2020-06-01 RX ADMIN — GUAIFENESIN 200 MG: 100 SOLUTION ORAL at 05:45

## 2020-06-01 RX ADMIN — PANTOPRAZOLE SODIUM 40 MG: 40 TABLET, DELAYED RELEASE ORAL at 05:39

## 2020-06-01 RX ADMIN — SPIRONOLACTONE 25 MG: 25 TABLET ORAL at 11:11

## 2020-06-01 RX ADMIN — FUROSEMIDE 40 MG: 10 INJECTION, SOLUTION INTRAMUSCULAR; INTRAVENOUS at 13:00

## 2020-06-01 RX ADMIN — ASPIRIN 81 MG 81 MG: 81 TABLET ORAL at 08:31

## 2020-06-01 RX ADMIN — IPRATROPIUM BROMIDE AND ALBUTEROL SULFATE 3 ML: 2.5; .5 SOLUTION RESPIRATORY (INHALATION) at 07:50

## 2020-06-02 LAB
ANION GAP SERPL CALCULATED.3IONS-SCNC: 5 MMOL/L (ref 4–13)
ATRIAL RATE: 129 BPM
BASOPHILS # BLD AUTO: 0.07 THOUSANDS/ΜL (ref 0–0.1)
BASOPHILS NFR BLD AUTO: 1 % (ref 0–1)
BUN SERPL-MCNC: 20 MG/DL (ref 5–25)
CA-I BLD-SCNC: 1.06 MMOL/L (ref 1.12–1.32)
CALCIUM SERPL-MCNC: 8.5 MG/DL (ref 8.3–10.1)
CHLORIDE SERPL-SCNC: 97 MMOL/L (ref 100–108)
CO2 SERPL-SCNC: 34 MMOL/L (ref 21–32)
CREAT SERPL-MCNC: 1.39 MG/DL (ref 0.6–1.3)
EOSINOPHIL # BLD AUTO: 0.16 THOUSAND/ΜL (ref 0–0.61)
EOSINOPHIL NFR BLD AUTO: 2 % (ref 0–6)
ERYTHROCYTE [DISTWIDTH] IN BLOOD BY AUTOMATED COUNT: 15.4 % (ref 11.6–15.1)
GFR SERPL CREATININE-BSD FRML MDRD: 47 ML/MIN/1.73SQ M
GLUCOSE SERPL-MCNC: 118 MG/DL (ref 65–140)
GLUCOSE SERPL-MCNC: 120 MG/DL (ref 65–140)
GLUCOSE SERPL-MCNC: 133 MG/DL (ref 65–140)
GLUCOSE SERPL-MCNC: 144 MG/DL (ref 65–140)
GLUCOSE SERPL-MCNC: 147 MG/DL (ref 65–140)
HCT VFR BLD AUTO: 24.7 % (ref 36.5–49.3)
HGB BLD-MCNC: 8 G/DL (ref 12–17)
IMM GRANULOCYTES # BLD AUTO: 0.04 THOUSAND/UL (ref 0–0.2)
IMM GRANULOCYTES NFR BLD AUTO: 0 % (ref 0–2)
INR PPP: 1.14 (ref 0.84–1.19)
LYMPHOCYTES # BLD AUTO: 1.94 THOUSANDS/ΜL (ref 0.6–4.47)
LYMPHOCYTES NFR BLD AUTO: 19 % (ref 14–44)
MAGNESIUM SERPL-MCNC: 1.9 MG/DL (ref 1.6–2.6)
MCH RBC QN AUTO: 30.7 PG (ref 26.8–34.3)
MCHC RBC AUTO-ENTMCNC: 32.4 G/DL (ref 31.4–37.4)
MCV RBC AUTO: 95 FL (ref 82–98)
MONOCYTES # BLD AUTO: 1.35 THOUSAND/ΜL (ref 0.17–1.22)
MONOCYTES NFR BLD AUTO: 13 % (ref 4–12)
NEUTROPHILS # BLD AUTO: 6.92 THOUSANDS/ΜL (ref 1.85–7.62)
NEUTS SEG NFR BLD AUTO: 65 % (ref 43–75)
NRBC BLD AUTO-RTO: 0 /100 WBCS
P AXIS: 80 DEGREES
PHOSPHATE SERPL-MCNC: 3.4 MG/DL (ref 2.3–4.1)
PLATELET # BLD AUTO: 267 THOUSANDS/UL (ref 149–390)
PMV BLD AUTO: 10 FL (ref 8.9–12.7)
POTASSIUM SERPL-SCNC: 4 MMOL/L (ref 3.5–5.3)
PR INTERVAL: 158 MS
PROTHROMBIN TIME: 14.9 SECONDS (ref 11.6–14.5)
QRS AXIS: -7 DEGREES
QRSD INTERVAL: 70 MS
QT INTERVAL: 242 MS
QTC INTERVAL: 351 MS
RBC # BLD AUTO: 2.61 MILLION/UL (ref 3.88–5.62)
SODIUM SERPL-SCNC: 136 MMOL/L (ref 136–145)
T WAVE AXIS: 137 DEGREES
VENTRICULAR RATE: 127 BPM
WBC # BLD AUTO: 10.48 THOUSAND/UL (ref 4.31–10.16)

## 2020-06-02 PROCEDURE — 99223 1ST HOSP IP/OBS HIGH 75: CPT | Performed by: INTERNAL MEDICINE

## 2020-06-02 PROCEDURE — 80048 BASIC METABOLIC PNL TOTAL CA: CPT | Performed by: PHYSICIAN ASSISTANT

## 2020-06-02 PROCEDURE — 85025 COMPLETE CBC W/AUTO DIFF WBC: CPT | Performed by: PHYSICIAN ASSISTANT

## 2020-06-02 PROCEDURE — 82330 ASSAY OF CALCIUM: CPT | Performed by: PHYSICIAN ASSISTANT

## 2020-06-02 PROCEDURE — 93306 TTE W/DOPPLER COMPLETE: CPT | Performed by: INTERNAL MEDICINE

## 2020-06-02 PROCEDURE — 94640 AIRWAY INHALATION TREATMENT: CPT

## 2020-06-02 PROCEDURE — 84100 ASSAY OF PHOSPHORUS: CPT | Performed by: PHYSICIAN ASSISTANT

## 2020-06-02 PROCEDURE — 83735 ASSAY OF MAGNESIUM: CPT | Performed by: PHYSICIAN ASSISTANT

## 2020-06-02 PROCEDURE — 99233 SBSQ HOSP IP/OBS HIGH 50: CPT | Performed by: ANESTHESIOLOGY

## 2020-06-02 PROCEDURE — 82948 REAGENT STRIP/BLOOD GLUCOSE: CPT

## 2020-06-02 PROCEDURE — 94660 CPAP INITIATION&MGMT: CPT

## 2020-06-02 PROCEDURE — 87040 BLOOD CULTURE FOR BACTERIA: CPT | Performed by: INTERNAL MEDICINE

## 2020-06-02 PROCEDURE — 85610 PROTHROMBIN TIME: CPT | Performed by: PHYSICIAN ASSISTANT

## 2020-06-02 PROCEDURE — 93010 ELECTROCARDIOGRAM REPORT: CPT | Performed by: INTERNAL MEDICINE

## 2020-06-02 PROCEDURE — 94760 N-INVAS EAR/PLS OXIMETRY 1: CPT

## 2020-06-02 PROCEDURE — 94668 MNPJ CHEST WALL SBSQ: CPT

## 2020-06-02 RX ORDER — AMOXICILLIN 250 MG
1 CAPSULE ORAL 2 TIMES DAILY
Status: DISCONTINUED | OUTPATIENT
Start: 2020-06-02 | End: 2020-06-19 | Stop reason: HOSPADM

## 2020-06-02 RX ORDER — CALCIUM GLUCONATE 20 MG/ML
2 INJECTION, SOLUTION INTRAVENOUS ONCE
Status: COMPLETED | OUTPATIENT
Start: 2020-06-02 | End: 2020-06-02

## 2020-06-02 RX ORDER — SENNOSIDES 8.6 MG
1 TABLET ORAL ONCE
Status: COMPLETED | OUTPATIENT
Start: 2020-06-02 | End: 2020-06-02

## 2020-06-02 RX ORDER — MAGNESIUM SULFATE HEPTAHYDRATE 40 MG/ML
2 INJECTION, SOLUTION INTRAVENOUS ONCE
Status: COMPLETED | OUTPATIENT
Start: 2020-06-02 | End: 2020-06-02

## 2020-06-02 RX ORDER — HEPARIN SODIUM 5000 [USP'U]/ML
5000 INJECTION, SOLUTION INTRAVENOUS; SUBCUTANEOUS EVERY 8 HOURS SCHEDULED
Status: DISCONTINUED | OUTPATIENT
Start: 2020-06-02 | End: 2020-06-03

## 2020-06-02 RX ADMIN — IPRATROPIUM BROMIDE AND ALBUTEROL SULFATE 3 ML: 2.5; .5 SOLUTION RESPIRATORY (INHALATION) at 01:37

## 2020-06-02 RX ADMIN — IPRATROPIUM BROMIDE AND ALBUTEROL SULFATE 3 ML: 2.5; .5 SOLUTION RESPIRATORY (INHALATION) at 07:20

## 2020-06-02 RX ADMIN — IPRATROPIUM BROMIDE AND ALBUTEROL SULFATE 3 ML: 2.5; .5 SOLUTION RESPIRATORY (INHALATION) at 20:02

## 2020-06-02 RX ADMIN — HEPARIN SODIUM 5000 UNITS: 5000 INJECTION INTRAVENOUS; SUBCUTANEOUS at 22:33

## 2020-06-02 RX ADMIN — DIGOXIN 125 MCG: 125 TABLET ORAL at 08:29

## 2020-06-02 RX ADMIN — SENNOSIDES 8.6 MG: 8.6 TABLET, FILM COATED ORAL at 11:42

## 2020-06-02 RX ADMIN — METRONIDAZOLE 500 MG: 500 INJECTION, SOLUTION INTRAVENOUS at 08:17

## 2020-06-02 RX ADMIN — ASPIRIN 81 MG 81 MG: 81 TABLET ORAL at 08:29

## 2020-06-02 RX ADMIN — PANTOPRAZOLE SODIUM 40 MG: 40 TABLET, DELAYED RELEASE ORAL at 06:30

## 2020-06-02 RX ADMIN — CARVEDILOL 6.25 MG: 6.25 TABLET, FILM COATED ORAL at 17:47

## 2020-06-02 RX ADMIN — CARVEDILOL 6.25 MG: 6.25 TABLET, FILM COATED ORAL at 08:29

## 2020-06-02 RX ADMIN — CALCIUM GLUCONATE 2 G: 20 INJECTION, SOLUTION INTRAVENOUS at 11:32

## 2020-06-02 RX ADMIN — MAGNESIUM SULFATE HEPTAHYDRATE 2 G: 40 INJECTION, SOLUTION INTRAVENOUS at 11:34

## 2020-06-02 RX ADMIN — CEFTRIAXONE 1000 MG: 1 INJECTION, SOLUTION INTRAVENOUS at 09:15

## 2020-06-02 RX ADMIN — MEMANTINE 10 MG: 10 TABLET ORAL at 08:29

## 2020-06-02 RX ADMIN — PRAVASTATIN SODIUM 20 MG: 20 TABLET ORAL at 16:04

## 2020-06-02 RX ADMIN — IPRATROPIUM BROMIDE AND ALBUTEROL SULFATE 3 ML: 2.5; .5 SOLUTION RESPIRATORY (INHALATION) at 13:42

## 2020-06-02 RX ADMIN — SPIRONOLACTONE 25 MG: 25 TABLET ORAL at 08:29

## 2020-06-02 RX ADMIN — METRONIDAZOLE 500 MG: 500 INJECTION, SOLUTION INTRAVENOUS at 16:05

## 2020-06-02 RX ADMIN — FUROSEMIDE 40 MG: 40 TABLET ORAL at 08:29

## 2020-06-02 RX ADMIN — METRONIDAZOLE 500 MG: 500 INJECTION, SOLUTION INTRAVENOUS at 00:30

## 2020-06-02 RX ADMIN — OXYCODONE HYDROCHLORIDE AND ACETAMINOPHEN 1000 MG: 500 TABLET ORAL at 08:29

## 2020-06-02 RX ADMIN — SENNOSIDES AND DOCUSATE SODIUM 1 TABLET: 8.6; 5 TABLET ORAL at 17:47

## 2020-06-02 RX ADMIN — AMPICILLIN AND SULBACTAM 3 G: 2; 1 INJECTION, POWDER, FOR SOLUTION INTRAMUSCULAR; INTRAVENOUS at 20:43

## 2020-06-03 LAB
ALBUMIN SERPL BCP-MCNC: 3.2 G/DL (ref 3.5–5)
ALP SERPL-CCNC: 44 U/L (ref 46–116)
ALT SERPL W P-5'-P-CCNC: 23 U/L (ref 12–78)
ANION GAP SERPL CALCULATED.3IONS-SCNC: 8 MMOL/L (ref 4–13)
AST SERPL W P-5'-P-CCNC: 22 U/L (ref 5–45)
BASOPHILS # BLD AUTO: 0.06 THOUSANDS/ΜL (ref 0–0.1)
BASOPHILS NFR BLD AUTO: 1 % (ref 0–1)
BILIRUB SERPL-MCNC: 0.5 MG/DL (ref 0.2–1)
BUN SERPL-MCNC: 23 MG/DL (ref 5–25)
CA-I BLD-SCNC: 1.07 MMOL/L (ref 1.12–1.32)
CALCIUM SERPL-MCNC: 8.4 MG/DL (ref 8.3–10.1)
CHLORIDE SERPL-SCNC: 98 MMOL/L (ref 100–108)
CO2 SERPL-SCNC: 33 MMOL/L (ref 21–32)
CREAT SERPL-MCNC: 1.28 MG/DL (ref 0.6–1.3)
EOSINOPHIL # BLD AUTO: 0.2 THOUSAND/ΜL (ref 0–0.61)
EOSINOPHIL NFR BLD AUTO: 3 % (ref 0–6)
ERYTHROCYTE [DISTWIDTH] IN BLOOD BY AUTOMATED COUNT: 15.4 % (ref 11.6–15.1)
GFR SERPL CREATININE-BSD FRML MDRD: 52 ML/MIN/1.73SQ M
GLUCOSE SERPL-MCNC: 118 MG/DL (ref 65–140)
GLUCOSE SERPL-MCNC: 122 MG/DL (ref 65–140)
GLUCOSE SERPL-MCNC: 159 MG/DL (ref 65–140)
GLUCOSE SERPL-MCNC: 95 MG/DL (ref 65–140)
GLUCOSE SERPL-MCNC: 99 MG/DL (ref 65–140)
HCT VFR BLD AUTO: 25.7 % (ref 36.5–49.3)
HGB BLD-MCNC: 8.2 G/DL (ref 12–17)
IMM GRANULOCYTES # BLD AUTO: 0.02 THOUSAND/UL (ref 0–0.2)
IMM GRANULOCYTES NFR BLD AUTO: 0 % (ref 0–2)
INR PPP: 1.14 (ref 0.84–1.19)
LYMPHOCYTES # BLD AUTO: 1.56 THOUSANDS/ΜL (ref 0.6–4.47)
LYMPHOCYTES NFR BLD AUTO: 20 % (ref 14–44)
MAGNESIUM SERPL-MCNC: 2.1 MG/DL (ref 1.6–2.6)
MCH RBC QN AUTO: 29.9 PG (ref 26.8–34.3)
MCHC RBC AUTO-ENTMCNC: 31.9 G/DL (ref 31.4–37.4)
MCV RBC AUTO: 94 FL (ref 82–98)
MONOCYTES # BLD AUTO: 0.72 THOUSAND/ΜL (ref 0.17–1.22)
MONOCYTES NFR BLD AUTO: 9 % (ref 4–12)
NEUTROPHILS # BLD AUTO: 5.08 THOUSANDS/ΜL (ref 1.85–7.62)
NEUTS SEG NFR BLD AUTO: 67 % (ref 43–75)
NRBC BLD AUTO-RTO: 0 /100 WBCS
PHOSPHATE SERPL-MCNC: 3.4 MG/DL (ref 2.3–4.1)
PLATELET # BLD AUTO: 277 THOUSANDS/UL (ref 149–390)
PMV BLD AUTO: 10.3 FL (ref 8.9–12.7)
POTASSIUM SERPL-SCNC: 3.6 MMOL/L (ref 3.5–5.3)
PROT SERPL-MCNC: 5.9 G/DL (ref 6.4–8.2)
PROTHROMBIN TIME: 15 SECONDS (ref 11.6–14.5)
RBC # BLD AUTO: 2.74 MILLION/UL (ref 3.88–5.62)
SODIUM SERPL-SCNC: 139 MMOL/L (ref 136–145)
WBC # BLD AUTO: 7.64 THOUSAND/UL (ref 4.31–10.16)

## 2020-06-03 PROCEDURE — 99232 SBSQ HOSP IP/OBS MODERATE 35: CPT | Performed by: ANESTHESIOLOGY

## 2020-06-03 PROCEDURE — 94760 N-INVAS EAR/PLS OXIMETRY 1: CPT

## 2020-06-03 PROCEDURE — 82948 REAGENT STRIP/BLOOD GLUCOSE: CPT

## 2020-06-03 PROCEDURE — 94668 MNPJ CHEST WALL SBSQ: CPT

## 2020-06-03 PROCEDURE — 94640 AIRWAY INHALATION TREATMENT: CPT

## 2020-06-03 PROCEDURE — 85025 COMPLETE CBC W/AUTO DIFF WBC: CPT | Performed by: PHYSICIAN ASSISTANT

## 2020-06-03 PROCEDURE — 80053 COMPREHEN METABOLIC PANEL: CPT | Performed by: INTERNAL MEDICINE

## 2020-06-03 PROCEDURE — 83735 ASSAY OF MAGNESIUM: CPT | Performed by: PHYSICIAN ASSISTANT

## 2020-06-03 PROCEDURE — 84100 ASSAY OF PHOSPHORUS: CPT | Performed by: PHYSICIAN ASSISTANT

## 2020-06-03 PROCEDURE — 85610 PROTHROMBIN TIME: CPT | Performed by: PHYSICIAN ASSISTANT

## 2020-06-03 PROCEDURE — 99233 SBSQ HOSP IP/OBS HIGH 50: CPT | Performed by: INTERNAL MEDICINE

## 2020-06-03 PROCEDURE — 82330 ASSAY OF CALCIUM: CPT | Performed by: PHYSICIAN ASSISTANT

## 2020-06-03 RX ORDER — POTASSIUM CHLORIDE 20 MEQ/1
40 TABLET, EXTENDED RELEASE ORAL ONCE
Status: COMPLETED | OUTPATIENT
Start: 2020-06-03 | End: 2020-06-03

## 2020-06-03 RX ADMIN — GUAIFENESIN 200 MG: 100 SOLUTION ORAL at 14:07

## 2020-06-03 RX ADMIN — ASPIRIN 81 MG 81 MG: 81 TABLET ORAL at 08:05

## 2020-06-03 RX ADMIN — IPRATROPIUM BROMIDE AND ALBUTEROL SULFATE 3 ML: 2.5; .5 SOLUTION RESPIRATORY (INHALATION) at 13:45

## 2020-06-03 RX ADMIN — HEPARIN SODIUM 5000 UNITS: 5000 INJECTION INTRAVENOUS; SUBCUTANEOUS at 13:56

## 2020-06-03 RX ADMIN — DIGOXIN 125 MCG: 125 TABLET ORAL at 08:05

## 2020-06-03 RX ADMIN — MEMANTINE 10 MG: 10 TABLET ORAL at 08:05

## 2020-06-03 RX ADMIN — AMPICILLIN AND SULBACTAM 3 G: 2; 1 INJECTION, POWDER, FOR SOLUTION INTRAMUSCULAR; INTRAVENOUS at 07:49

## 2020-06-03 RX ADMIN — POTASSIUM CHLORIDE 40 MEQ: 1500 TABLET, EXTENDED RELEASE ORAL at 07:49

## 2020-06-03 RX ADMIN — CARVEDILOL 6.25 MG: 6.25 TABLET, FILM COATED ORAL at 17:35

## 2020-06-03 RX ADMIN — INSULIN LISPRO 1 UNITS: 100 INJECTION, SOLUTION INTRAVENOUS; SUBCUTANEOUS at 12:12

## 2020-06-03 RX ADMIN — IPRATROPIUM BROMIDE AND ALBUTEROL SULFATE 3 ML: 2.5; .5 SOLUTION RESPIRATORY (INHALATION) at 07:22

## 2020-06-03 RX ADMIN — AMPICILLIN AND SULBACTAM 3 G: 2; 1 INJECTION, POWDER, FOR SOLUTION INTRAMUSCULAR; INTRAVENOUS at 20:22

## 2020-06-03 RX ADMIN — PRAVASTATIN SODIUM 20 MG: 20 TABLET ORAL at 15:39

## 2020-06-03 RX ADMIN — OXYCODONE HYDROCHLORIDE AND ACETAMINOPHEN 1000 MG: 500 TABLET ORAL at 08:05

## 2020-06-03 RX ADMIN — SPIRONOLACTONE 25 MG: 25 TABLET ORAL at 08:05

## 2020-06-03 RX ADMIN — AMPICILLIN AND SULBACTAM 3 G: 2; 1 INJECTION, POWDER, FOR SOLUTION INTRAMUSCULAR; INTRAVENOUS at 02:16

## 2020-06-03 RX ADMIN — AMPICILLIN AND SULBACTAM 3 G: 2; 1 INJECTION, POWDER, FOR SOLUTION INTRAMUSCULAR; INTRAVENOUS at 13:56

## 2020-06-03 RX ADMIN — SENNOSIDES AND DOCUSATE SODIUM 1 TABLET: 8.6; 5 TABLET ORAL at 17:35

## 2020-06-03 RX ADMIN — IPRATROPIUM BROMIDE AND ALBUTEROL SULFATE 3 ML: 2.5; .5 SOLUTION RESPIRATORY (INHALATION) at 01:15

## 2020-06-03 RX ADMIN — CARVEDILOL 6.25 MG: 6.25 TABLET, FILM COATED ORAL at 08:05

## 2020-06-03 RX ADMIN — POLYETHYLENE GLYCOL 3350 17 G: 17 POWDER, FOR SOLUTION ORAL at 15:39

## 2020-06-03 RX ADMIN — FUROSEMIDE 40 MG: 40 TABLET ORAL at 08:05

## 2020-06-03 RX ADMIN — HEPARIN SODIUM 5000 UNITS: 5000 INJECTION INTRAVENOUS; SUBCUTANEOUS at 05:58

## 2020-06-03 RX ADMIN — SENNOSIDES AND DOCUSATE SODIUM 1 TABLET: 8.6; 5 TABLET ORAL at 08:05

## 2020-06-03 RX ADMIN — IPRATROPIUM BROMIDE AND ALBUTEROL SULFATE 3 ML: 2.5; .5 SOLUTION RESPIRATORY (INHALATION) at 19:38

## 2020-06-04 ENCOUNTER — APPOINTMENT (INPATIENT)
Dept: RADIOLOGY | Facility: HOSPITAL | Age: 80
DRG: 871 | End: 2020-06-04
Payer: COMMERCIAL

## 2020-06-04 PROBLEM — R78.81 POSITIVE BLOOD CULTURE: Status: ACTIVE | Noted: 2020-06-04

## 2020-06-04 PROBLEM — K92.2 GI BLEED: Status: ACTIVE | Noted: 2020-06-04

## 2020-06-04 PROBLEM — D68.9 COAGULOPATHY (HCC): Status: ACTIVE | Noted: 2020-06-04

## 2020-06-04 PROBLEM — R53.81 PHYSICAL DECONDITIONING: Status: ACTIVE | Noted: 2020-06-04

## 2020-06-04 LAB
ABO GROUP BLD: NORMAL
ALBUMIN SERPL BCP-MCNC: 2.8 G/DL (ref 3.5–5)
ALP SERPL-CCNC: 39 U/L (ref 46–116)
ALT SERPL W P-5'-P-CCNC: 22 U/L (ref 12–78)
ANION GAP SERPL CALCULATED.3IONS-SCNC: 5 MMOL/L (ref 4–13)
AST SERPL W P-5'-P-CCNC: 20 U/L (ref 5–45)
BACTERIA BLD CULT: ABNORMAL
BACTERIA BLD CULT: NORMAL
BILIRUB SERPL-MCNC: 0.4 MG/DL (ref 0.2–1)
BLD GP AB SCN SERPL QL: NEGATIVE
BUN SERPL-MCNC: 20 MG/DL (ref 5–25)
CALCIUM SERPL-MCNC: 8.1 MG/DL (ref 8.3–10.1)
CHLORIDE SERPL-SCNC: 103 MMOL/L (ref 100–108)
CO2 SERPL-SCNC: 34 MMOL/L (ref 21–32)
CREAT SERPL-MCNC: 1.31 MG/DL (ref 0.6–1.3)
ERYTHROCYTE [DISTWIDTH] IN BLOOD BY AUTOMATED COUNT: 16.1 % (ref 11.6–15.1)
GFR SERPL CREATININE-BSD FRML MDRD: 51 ML/MIN/1.73SQ M
GLUCOSE SERPL-MCNC: 110 MG/DL (ref 65–140)
GLUCOSE SERPL-MCNC: 121 MG/DL (ref 65–140)
GLUCOSE SERPL-MCNC: 134 MG/DL (ref 65–140)
GLUCOSE SERPL-MCNC: 140 MG/DL (ref 65–140)
GLUCOSE SERPL-MCNC: 140 MG/DL (ref 65–140)
GRAM STN SPEC: ABNORMAL
HCT VFR BLD AUTO: 22 % (ref 36.5–49.3)
HCT VFR BLD AUTO: 27.2 % (ref 36.5–49.3)
HCT VFR BLD AUTO: 28.1 % (ref 36.5–49.3)
HGB BLD-MCNC: 6.8 G/DL (ref 12–17)
HGB BLD-MCNC: 8.7 G/DL (ref 12–17)
HGB BLD-MCNC: 9 G/DL (ref 12–17)
MCH RBC QN AUTO: 30 PG (ref 26.8–34.3)
MCHC RBC AUTO-ENTMCNC: 30.9 G/DL (ref 31.4–37.4)
MCV RBC AUTO: 97 FL (ref 82–98)
PLATELET # BLD AUTO: 291 THOUSANDS/UL (ref 149–390)
PMV BLD AUTO: 10.5 FL (ref 8.9–12.7)
POTASSIUM SERPL-SCNC: 3.8 MMOL/L (ref 3.5–5.3)
PROT SERPL-MCNC: 5.5 G/DL (ref 6.4–8.2)
RBC # BLD AUTO: 2.27 MILLION/UL (ref 3.88–5.62)
RH BLD: POSITIVE
SODIUM SERPL-SCNC: 142 MMOL/L (ref 136–145)
SPECIMEN EXPIRATION DATE: NORMAL
WBC # BLD AUTO: 6.62 THOUSAND/UL (ref 4.31–10.16)

## 2020-06-04 PROCEDURE — 94640 AIRWAY INHALATION TREATMENT: CPT

## 2020-06-04 PROCEDURE — 85027 COMPLETE CBC AUTOMATED: CPT | Performed by: INTERNAL MEDICINE

## 2020-06-04 PROCEDURE — 80053 COMPREHEN METABOLIC PANEL: CPT | Performed by: INTERNAL MEDICINE

## 2020-06-04 PROCEDURE — 86901 BLOOD TYPING SEROLOGIC RH(D): CPT | Performed by: NURSE PRACTITIONER

## 2020-06-04 PROCEDURE — 86850 RBC ANTIBODY SCREEN: CPT | Performed by: NURSE PRACTITIONER

## 2020-06-04 PROCEDURE — 97110 THERAPEUTIC EXERCISES: CPT

## 2020-06-04 PROCEDURE — 99232 SBSQ HOSP IP/OBS MODERATE 35: CPT | Performed by: INTERNAL MEDICINE

## 2020-06-04 PROCEDURE — 94668 MNPJ CHEST WALL SBSQ: CPT

## 2020-06-04 PROCEDURE — 97535 SELF CARE MNGMENT TRAINING: CPT

## 2020-06-04 PROCEDURE — 94760 N-INVAS EAR/PLS OXIMETRY 1: CPT

## 2020-06-04 PROCEDURE — 97530 THERAPEUTIC ACTIVITIES: CPT

## 2020-06-04 PROCEDURE — 99233 SBSQ HOSP IP/OBS HIGH 50: CPT | Performed by: INTERNAL MEDICINE

## 2020-06-04 PROCEDURE — 86920 COMPATIBILITY TEST SPIN: CPT

## 2020-06-04 PROCEDURE — P9016 RBC LEUKOCYTES REDUCED: HCPCS

## 2020-06-04 PROCEDURE — 85018 HEMOGLOBIN: CPT | Performed by: INTERNAL MEDICINE

## 2020-06-04 PROCEDURE — 82272 OCCULT BLD FECES 1-3 TESTS: CPT | Performed by: NURSE PRACTITIONER

## 2020-06-04 PROCEDURE — 86900 BLOOD TYPING SEROLOGIC ABO: CPT | Performed by: NURSE PRACTITIONER

## 2020-06-04 PROCEDURE — 85014 HEMATOCRIT: CPT | Performed by: INTERNAL MEDICINE

## 2020-06-04 PROCEDURE — 71045 X-RAY EXAM CHEST 1 VIEW: CPT

## 2020-06-04 PROCEDURE — 99223 1ST HOSP IP/OBS HIGH 75: CPT | Performed by: INTERNAL MEDICINE

## 2020-06-04 PROCEDURE — 97116 GAIT TRAINING THERAPY: CPT

## 2020-06-04 PROCEDURE — 82948 REAGENT STRIP/BLOOD GLUCOSE: CPT

## 2020-06-04 RX ORDER — FLUTICASONE FUROATE AND VILANTEROL 200; 25 UG/1; UG/1
1 POWDER RESPIRATORY (INHALATION) DAILY
Status: DISCONTINUED | OUTPATIENT
Start: 2020-06-04 | End: 2020-06-19 | Stop reason: HOSPADM

## 2020-06-04 RX ORDER — PANTOPRAZOLE SODIUM 40 MG/1
40 TABLET, DELAYED RELEASE ORAL
Status: DISCONTINUED | OUTPATIENT
Start: 2020-06-04 | End: 2020-06-19 | Stop reason: HOSPADM

## 2020-06-04 RX ORDER — DIPHENHYDRAMINE HCL 25 MG
12.5 TABLET ORAL ONCE
Status: COMPLETED | OUTPATIENT
Start: 2020-06-04 | End: 2020-06-04

## 2020-06-04 RX ORDER — PANTOPRAZOLE SODIUM 40 MG/1
40 TABLET, DELAYED RELEASE ORAL
Status: DISCONTINUED | OUTPATIENT
Start: 2020-06-04 | End: 2020-06-04

## 2020-06-04 RX ORDER — GUAIFENESIN 600 MG
600 TABLET, EXTENDED RELEASE 12 HR ORAL EVERY 12 HOURS SCHEDULED
Status: DISCONTINUED | OUTPATIENT
Start: 2020-06-04 | End: 2020-06-19 | Stop reason: HOSPADM

## 2020-06-04 RX ORDER — ACETAMINOPHEN 325 MG/1
650 TABLET ORAL ONCE
Status: COMPLETED | OUTPATIENT
Start: 2020-06-04 | End: 2020-06-04

## 2020-06-04 RX ORDER — AMOXICILLIN AND CLAVULANATE POTASSIUM 875; 125 MG/1; MG/1
1 TABLET, FILM COATED ORAL EVERY 12 HOURS SCHEDULED
Status: DISCONTINUED | OUTPATIENT
Start: 2020-06-04 | End: 2020-06-19 | Stop reason: HOSPADM

## 2020-06-04 RX ADMIN — IPRATROPIUM BROMIDE AND ALBUTEROL SULFATE 3 ML: 2.5; .5 SOLUTION RESPIRATORY (INHALATION) at 19:34

## 2020-06-04 RX ADMIN — PANTOPRAZOLE SODIUM 40 MG: 40 TABLET, DELAYED RELEASE ORAL at 17:35

## 2020-06-04 RX ADMIN — SENNOSIDES AND DOCUSATE SODIUM 1 TABLET: 8.6; 5 TABLET ORAL at 08:46

## 2020-06-04 RX ADMIN — GUAIFENESIN 600 MG: 600 TABLET, EXTENDED RELEASE ORAL at 10:49

## 2020-06-04 RX ADMIN — AMPICILLIN AND SULBACTAM 3 G: 2; 1 INJECTION, POWDER, FOR SOLUTION INTRAMUSCULAR; INTRAVENOUS at 02:06

## 2020-06-04 RX ADMIN — IPRATROPIUM BROMIDE AND ALBUTEROL SULFATE 3 ML: 2.5; .5 SOLUTION RESPIRATORY (INHALATION) at 14:16

## 2020-06-04 RX ADMIN — DIGOXIN 125 MCG: 125 TABLET ORAL at 08:46

## 2020-06-04 RX ADMIN — SPIRONOLACTONE 25 MG: 25 TABLET ORAL at 08:46

## 2020-06-04 RX ADMIN — OXYCODONE HYDROCHLORIDE AND ACETAMINOPHEN 1000 MG: 500 TABLET ORAL at 08:46

## 2020-06-04 RX ADMIN — IPRATROPIUM BROMIDE AND ALBUTEROL SULFATE 3 ML: 2.5; .5 SOLUTION RESPIRATORY (INHALATION) at 07:31

## 2020-06-04 RX ADMIN — CARVEDILOL 6.25 MG: 6.25 TABLET, FILM COATED ORAL at 17:35

## 2020-06-04 RX ADMIN — CARVEDILOL 6.25 MG: 6.25 TABLET, FILM COATED ORAL at 08:46

## 2020-06-04 RX ADMIN — AMOXICILLIN AND CLAVULANATE POTASSIUM 1 TABLET: 875; 125 TABLET, FILM COATED ORAL at 14:12

## 2020-06-04 RX ADMIN — PANTOPRAZOLE SODIUM 40 MG: 40 TABLET, DELAYED RELEASE ORAL at 10:49

## 2020-06-04 RX ADMIN — FLUTICASONE FUROATE AND VILANTEROL TRIFENATATE 1 PUFF: 200; 25 POWDER RESPIRATORY (INHALATION) at 08:47

## 2020-06-04 RX ADMIN — MEMANTINE 10 MG: 10 TABLET ORAL at 08:46

## 2020-06-04 RX ADMIN — DIPHENHYDRAMINE HCL 12.5 MG: 25 TABLET, COATED ORAL at 11:09

## 2020-06-04 RX ADMIN — B-COMPLEX W/ C & FOLIC ACID TAB 1 TABLET: TAB at 08:46

## 2020-06-04 RX ADMIN — FUROSEMIDE 40 MG: 40 TABLET ORAL at 08:46

## 2020-06-04 RX ADMIN — AMPICILLIN AND SULBACTAM 3 G: 2; 1 INJECTION, POWDER, FOR SOLUTION INTRAMUSCULAR; INTRAVENOUS at 07:47

## 2020-06-04 RX ADMIN — ACETAMINOPHEN 650 MG: 325 TABLET ORAL at 11:09

## 2020-06-04 RX ADMIN — GUAIFENESIN 600 MG: 600 TABLET, EXTENDED RELEASE ORAL at 20:45

## 2020-06-04 RX ADMIN — PRAVASTATIN SODIUM 20 MG: 20 TABLET ORAL at 17:35

## 2020-06-05 ENCOUNTER — ANESTHESIA EVENT (INPATIENT)
Dept: PERIOP | Facility: HOSPITAL | Age: 80
DRG: 871 | End: 2020-06-05
Payer: COMMERCIAL

## 2020-06-05 ENCOUNTER — APPOINTMENT (INPATIENT)
Dept: PERIOP | Facility: HOSPITAL | Age: 80
DRG: 871 | End: 2020-06-05
Payer: COMMERCIAL

## 2020-06-05 ENCOUNTER — ANESTHESIA (INPATIENT)
Dept: PERIOP | Facility: HOSPITAL | Age: 80
DRG: 871 | End: 2020-06-05
Payer: COMMERCIAL

## 2020-06-05 DIAGNOSIS — R78.81 POSITIVE BLOOD CULTURE: ICD-10-CM

## 2020-06-05 DIAGNOSIS — R91.1 NODULE OF LEFT LUNG: Primary | ICD-10-CM

## 2020-06-05 LAB
ABO GROUP BLD BPU: NORMAL
ALBUMIN SERPL BCP-MCNC: 3.1 G/DL (ref 3.5–5)
ALP SERPL-CCNC: 44 U/L (ref 46–116)
ALT SERPL W P-5'-P-CCNC: 21 U/L (ref 12–78)
ANION GAP SERPL CALCULATED.3IONS-SCNC: 6 MMOL/L (ref 4–13)
AST SERPL W P-5'-P-CCNC: 20 U/L (ref 5–45)
BILIRUB SERPL-MCNC: 0.6 MG/DL (ref 0.2–1)
BPU ID: NORMAL
BUN SERPL-MCNC: 17 MG/DL (ref 5–25)
CALCIUM SERPL-MCNC: 8.6 MG/DL (ref 8.3–10.1)
CHLORIDE SERPL-SCNC: 102 MMOL/L (ref 100–108)
CO2 SERPL-SCNC: 34 MMOL/L (ref 21–32)
CREAT SERPL-MCNC: 1.15 MG/DL (ref 0.6–1.3)
CROSSMATCH: NORMAL
ERYTHROCYTE [DISTWIDTH] IN BLOOD BY AUTOMATED COUNT: 16.3 % (ref 11.6–15.1)
GFR SERPL CREATININE-BSD FRML MDRD: 59 ML/MIN/1.73SQ M
GLUCOSE SERPL-MCNC: 116 MG/DL (ref 65–140)
GLUCOSE SERPL-MCNC: 117 MG/DL (ref 65–140)
GLUCOSE SERPL-MCNC: 122 MG/DL (ref 65–140)
GLUCOSE SERPL-MCNC: 124 MG/DL (ref 65–140)
GLUCOSE SERPL-MCNC: 194 MG/DL (ref 65–140)
HCT VFR BLD AUTO: 28.6 % (ref 36.5–49.3)
HGB BLD-MCNC: 9.2 G/DL (ref 12–17)
MCH RBC QN AUTO: 30.8 PG (ref 26.8–34.3)
MCHC RBC AUTO-ENTMCNC: 32.2 G/DL (ref 31.4–37.4)
MCV RBC AUTO: 96 FL (ref 82–98)
PLATELET # BLD AUTO: 307 THOUSANDS/UL (ref 149–390)
PMV BLD AUTO: 9.5 FL (ref 8.9–12.7)
POTASSIUM SERPL-SCNC: 3.8 MMOL/L (ref 3.5–5.3)
PROT SERPL-MCNC: 6.1 G/DL (ref 6.4–8.2)
RBC # BLD AUTO: 2.99 MILLION/UL (ref 3.88–5.62)
SODIUM SERPL-SCNC: 142 MMOL/L (ref 136–145)
UNIT DISPENSE STATUS: NORMAL
UNIT PRODUCT CODE: NORMAL
UNIT RH: NORMAL
WBC # BLD AUTO: 8.25 THOUSAND/UL (ref 4.31–10.16)

## 2020-06-05 PROCEDURE — 99232 SBSQ HOSP IP/OBS MODERATE 35: CPT | Performed by: INTERNAL MEDICINE

## 2020-06-05 PROCEDURE — 94640 AIRWAY INHALATION TREATMENT: CPT

## 2020-06-05 PROCEDURE — 43235 EGD DIAGNOSTIC BRUSH WASH: CPT | Performed by: INTERNAL MEDICINE

## 2020-06-05 PROCEDURE — 94668 MNPJ CHEST WALL SBSQ: CPT

## 2020-06-05 PROCEDURE — 94760 N-INVAS EAR/PLS OXIMETRY 1: CPT

## 2020-06-05 PROCEDURE — 82948 REAGENT STRIP/BLOOD GLUCOSE: CPT

## 2020-06-05 PROCEDURE — 80053 COMPREHEN METABOLIC PANEL: CPT | Performed by: INTERNAL MEDICINE

## 2020-06-05 PROCEDURE — 97530 THERAPEUTIC ACTIVITIES: CPT

## 2020-06-05 PROCEDURE — 85027 COMPLETE CBC AUTOMATED: CPT | Performed by: INTERNAL MEDICINE

## 2020-06-05 PROCEDURE — 0DJ08ZZ INSPECTION OF UPPER INTESTINAL TRACT, VIA NATURAL OR ARTIFICIAL OPENING ENDOSCOPIC: ICD-10-PCS | Performed by: INTERNAL MEDICINE

## 2020-06-05 PROCEDURE — 97110 THERAPEUTIC EXERCISES: CPT

## 2020-06-05 RX ORDER — PROPOFOL 10 MG/ML
INJECTION, EMULSION INTRAVENOUS AS NEEDED
Status: DISCONTINUED | OUTPATIENT
Start: 2020-06-05 | End: 2020-06-05 | Stop reason: SURG

## 2020-06-05 RX ORDER — LIDOCAINE HYDROCHLORIDE 10 MG/ML
INJECTION, SOLUTION EPIDURAL; INFILTRATION; INTRACAUDAL; PERINEURAL AS NEEDED
Status: DISCONTINUED | OUTPATIENT
Start: 2020-06-05 | End: 2020-06-05 | Stop reason: SURG

## 2020-06-05 RX ADMIN — FUROSEMIDE 40 MG: 40 TABLET ORAL at 14:50

## 2020-06-05 RX ADMIN — IPRATROPIUM BROMIDE AND ALBUTEROL SULFATE 3 ML: 2.5; .5 SOLUTION RESPIRATORY (INHALATION) at 15:28

## 2020-06-05 RX ADMIN — MEMANTINE 10 MG: 10 TABLET ORAL at 08:20

## 2020-06-05 RX ADMIN — IPRATROPIUM BROMIDE AND ALBUTEROL SULFATE 3 ML: 2.5; .5 SOLUTION RESPIRATORY (INHALATION) at 02:02

## 2020-06-05 RX ADMIN — IPRATROPIUM BROMIDE AND ALBUTEROL SULFATE 3 ML: 2.5; .5 SOLUTION RESPIRATORY (INHALATION) at 07:27

## 2020-06-05 RX ADMIN — SENNOSIDES AND DOCUSATE SODIUM 1 TABLET: 8.6; 5 TABLET ORAL at 17:00

## 2020-06-05 RX ADMIN — AMOXICILLIN AND CLAVULANATE POTASSIUM 1 TABLET: 875; 125 TABLET, FILM COATED ORAL at 14:49

## 2020-06-05 RX ADMIN — FLUTICASONE FUROATE AND VILANTEROL TRIFENATATE 1 PUFF: 200; 25 POWDER RESPIRATORY (INHALATION) at 08:20

## 2020-06-05 RX ADMIN — PANTOPRAZOLE SODIUM 40 MG: 40 TABLET, DELAYED RELEASE ORAL at 06:07

## 2020-06-05 RX ADMIN — PROPOFOL 20 MG: 10 INJECTION, EMULSION INTRAVENOUS at 13:15

## 2020-06-05 RX ADMIN — GUAIFENESIN 600 MG: 600 TABLET, EXTENDED RELEASE ORAL at 22:00

## 2020-06-05 RX ADMIN — AMOXICILLIN AND CLAVULANATE POTASSIUM 1 TABLET: 875; 125 TABLET, FILM COATED ORAL at 00:23

## 2020-06-05 RX ADMIN — PROPOFOL 80 MG: 10 INJECTION, EMULSION INTRAVENOUS at 13:14

## 2020-06-05 RX ADMIN — DIGOXIN 125 MCG: 125 TABLET ORAL at 08:20

## 2020-06-05 RX ADMIN — PRAVASTATIN SODIUM 20 MG: 20 TABLET ORAL at 16:56

## 2020-06-05 RX ADMIN — PROPOFOL 20 MG: 10 INJECTION, EMULSION INTRAVENOUS at 13:18

## 2020-06-05 RX ADMIN — OXYCODONE HYDROCHLORIDE AND ACETAMINOPHEN 1000 MG: 500 TABLET ORAL at 08:20

## 2020-06-05 RX ADMIN — SPIRONOLACTONE 25 MG: 25 TABLET ORAL at 14:50

## 2020-06-05 RX ADMIN — CARVEDILOL 6.25 MG: 6.25 TABLET, FILM COATED ORAL at 08:20

## 2020-06-05 RX ADMIN — IPRATROPIUM BROMIDE AND ALBUTEROL SULFATE 3 ML: 2.5; .5 SOLUTION RESPIRATORY (INHALATION) at 19:16

## 2020-06-05 RX ADMIN — PANTOPRAZOLE SODIUM 40 MG: 40 TABLET, DELAYED RELEASE ORAL at 16:57

## 2020-06-05 RX ADMIN — CARVEDILOL 6.25 MG: 6.25 TABLET, FILM COATED ORAL at 17:00

## 2020-06-05 RX ADMIN — INSULIN LISPRO 1 UNITS: 100 INJECTION, SOLUTION INTRAVENOUS; SUBCUTANEOUS at 16:56

## 2020-06-05 RX ADMIN — GUAIFENESIN 600 MG: 600 TABLET, EXTENDED RELEASE ORAL at 08:20

## 2020-06-05 RX ADMIN — B-COMPLEX W/ C & FOLIC ACID TAB 1 TABLET: TAB at 14:49

## 2020-06-05 RX ADMIN — AMOXICILLIN AND CLAVULANATE POTASSIUM 1 TABLET: 875; 125 TABLET, FILM COATED ORAL at 23:18

## 2020-06-05 RX ADMIN — LIDOCAINE HYDROCHLORIDE 50 MG: 10 INJECTION, SOLUTION EPIDURAL; INFILTRATION; INTRACAUDAL; PERINEURAL at 13:14

## 2020-06-06 PROBLEM — D68.9 COAGULOPATHY (HCC): Status: RESOLVED | Noted: 2020-06-04 | Resolved: 2020-06-06

## 2020-06-06 LAB
ANION GAP SERPL CALCULATED.3IONS-SCNC: 4 MMOL/L (ref 4–13)
BASOPHILS # BLD AUTO: 0.09 THOUSANDS/ΜL (ref 0–0.1)
BASOPHILS NFR BLD AUTO: 1 % (ref 0–1)
BUN SERPL-MCNC: 13 MG/DL (ref 5–25)
CALCIUM SERPL-MCNC: 8.9 MG/DL (ref 8.3–10.1)
CHLORIDE SERPL-SCNC: 102 MMOL/L (ref 100–108)
CO2 SERPL-SCNC: 33 MMOL/L (ref 21–32)
CREAT SERPL-MCNC: 1.34 MG/DL (ref 0.6–1.3)
EOSINOPHIL # BLD AUTO: 0.24 THOUSAND/ΜL (ref 0–0.61)
EOSINOPHIL NFR BLD AUTO: 3 % (ref 0–6)
ERYTHROCYTE [DISTWIDTH] IN BLOOD BY AUTOMATED COUNT: 16.1 % (ref 11.6–15.1)
GFR SERPL CREATININE-BSD FRML MDRD: 49 ML/MIN/1.73SQ M
GLUCOSE SERPL-MCNC: 102 MG/DL (ref 65–140)
GLUCOSE SERPL-MCNC: 116 MG/DL (ref 65–140)
GLUCOSE SERPL-MCNC: 119 MG/DL (ref 65–140)
GLUCOSE SERPL-MCNC: 126 MG/DL (ref 65–140)
GLUCOSE SERPL-MCNC: 158 MG/DL (ref 65–140)
HCT VFR BLD AUTO: 32.2 % (ref 36.5–49.3)
HGB BLD-MCNC: 9.9 G/DL (ref 12–17)
IMM GRANULOCYTES # BLD AUTO: 0.04 THOUSAND/UL (ref 0–0.2)
IMM GRANULOCYTES NFR BLD AUTO: 1 % (ref 0–2)
LYMPHOCYTES # BLD AUTO: 1.82 THOUSANDS/ΜL (ref 0.6–4.47)
LYMPHOCYTES NFR BLD AUTO: 21 % (ref 14–44)
MCH RBC QN AUTO: 29.9 PG (ref 26.8–34.3)
MCHC RBC AUTO-ENTMCNC: 30.7 G/DL (ref 31.4–37.4)
MCV RBC AUTO: 97 FL (ref 82–98)
MONOCYTES # BLD AUTO: 1.09 THOUSAND/ΜL (ref 0.17–1.22)
MONOCYTES NFR BLD AUTO: 13 % (ref 4–12)
NEUTROPHILS # BLD AUTO: 5.23 THOUSANDS/ΜL (ref 1.85–7.62)
NEUTS SEG NFR BLD AUTO: 61 % (ref 43–75)
NRBC BLD AUTO-RTO: 0 /100 WBCS
PLATELET # BLD AUTO: 330 THOUSANDS/UL (ref 149–390)
PMV BLD AUTO: 9.9 FL (ref 8.9–12.7)
POTASSIUM SERPL-SCNC: 3.8 MMOL/L (ref 3.5–5.3)
RBC # BLD AUTO: 3.31 MILLION/UL (ref 3.88–5.62)
SODIUM SERPL-SCNC: 139 MMOL/L (ref 136–145)
WBC # BLD AUTO: 8.51 THOUSAND/UL (ref 4.31–10.16)

## 2020-06-06 PROCEDURE — 85025 COMPLETE CBC W/AUTO DIFF WBC: CPT | Performed by: INTERNAL MEDICINE

## 2020-06-06 PROCEDURE — 80048 BASIC METABOLIC PNL TOTAL CA: CPT | Performed by: INTERNAL MEDICINE

## 2020-06-06 PROCEDURE — 94668 MNPJ CHEST WALL SBSQ: CPT

## 2020-06-06 PROCEDURE — 94640 AIRWAY INHALATION TREATMENT: CPT

## 2020-06-06 PROCEDURE — 94760 N-INVAS EAR/PLS OXIMETRY 1: CPT

## 2020-06-06 PROCEDURE — 99232 SBSQ HOSP IP/OBS MODERATE 35: CPT | Performed by: INTERNAL MEDICINE

## 2020-06-06 PROCEDURE — 82948 REAGENT STRIP/BLOOD GLUCOSE: CPT

## 2020-06-06 RX ORDER — ASPIRIN 81 MG/1
81 TABLET ORAL DAILY
Status: DISCONTINUED | OUTPATIENT
Start: 2020-06-06 | End: 2020-06-11

## 2020-06-06 RX ADMIN — DIGOXIN 125 MCG: 125 TABLET ORAL at 09:29

## 2020-06-06 RX ADMIN — IPRATROPIUM BROMIDE AND ALBUTEROL SULFATE 3 ML: 2.5; .5 SOLUTION RESPIRATORY (INHALATION) at 19:26

## 2020-06-06 RX ADMIN — GUAIFENESIN 600 MG: 600 TABLET, EXTENDED RELEASE ORAL at 09:29

## 2020-06-06 RX ADMIN — PRAVASTATIN SODIUM 20 MG: 20 TABLET ORAL at 16:55

## 2020-06-06 RX ADMIN — IPRATROPIUM BROMIDE AND ALBUTEROL SULFATE 3 ML: 2.5; .5 SOLUTION RESPIRATORY (INHALATION) at 07:40

## 2020-06-06 RX ADMIN — AMOXICILLIN AND CLAVULANATE POTASSIUM 1 TABLET: 875; 125 TABLET, FILM COATED ORAL at 12:27

## 2020-06-06 RX ADMIN — IPRATROPIUM BROMIDE AND ALBUTEROL SULFATE 3 ML: 2.5; .5 SOLUTION RESPIRATORY (INHALATION) at 14:19

## 2020-06-06 RX ADMIN — OXYCODONE HYDROCHLORIDE AND ACETAMINOPHEN 1000 MG: 500 TABLET ORAL at 09:29

## 2020-06-06 RX ADMIN — CARVEDILOL 6.25 MG: 6.25 TABLET, FILM COATED ORAL at 09:29

## 2020-06-06 RX ADMIN — SPIRONOLACTONE 25 MG: 25 TABLET ORAL at 09:29

## 2020-06-06 RX ADMIN — FUROSEMIDE 40 MG: 40 TABLET ORAL at 09:29

## 2020-06-06 RX ADMIN — IPRATROPIUM BROMIDE AND ALBUTEROL SULFATE 3 ML: 2.5; .5 SOLUTION RESPIRATORY (INHALATION) at 01:36

## 2020-06-06 RX ADMIN — FLUTICASONE FUROATE AND VILANTEROL TRIFENATATE 1 PUFF: 200; 25 POWDER RESPIRATORY (INHALATION) at 09:29

## 2020-06-06 RX ADMIN — B-COMPLEX W/ C & FOLIC ACID TAB 1 TABLET: TAB at 09:29

## 2020-06-06 RX ADMIN — AMOXICILLIN AND CLAVULANATE POTASSIUM 1 TABLET: 875; 125 TABLET, FILM COATED ORAL at 21:43

## 2020-06-06 RX ADMIN — GUAIFENESIN 600 MG: 600 TABLET, EXTENDED RELEASE ORAL at 21:43

## 2020-06-06 RX ADMIN — PANTOPRAZOLE SODIUM 40 MG: 40 TABLET, DELAYED RELEASE ORAL at 06:19

## 2020-06-06 RX ADMIN — CARVEDILOL 6.25 MG: 6.25 TABLET, FILM COATED ORAL at 18:20

## 2020-06-06 RX ADMIN — PANTOPRAZOLE SODIUM 40 MG: 40 TABLET, DELAYED RELEASE ORAL at 16:55

## 2020-06-06 RX ADMIN — MEMANTINE 10 MG: 10 TABLET ORAL at 09:29

## 2020-06-06 RX ADMIN — SENNOSIDES AND DOCUSATE SODIUM 1 TABLET: 8.6; 5 TABLET ORAL at 09:29

## 2020-06-06 RX ADMIN — SENNOSIDES AND DOCUSATE SODIUM 1 TABLET: 8.6; 5 TABLET ORAL at 18:20

## 2020-06-06 RX ADMIN — ASPIRIN 81 MG: 81 TABLET, COATED ORAL at 14:40

## 2020-06-06 RX ADMIN — INSULIN LISPRO 1 UNITS: 100 INJECTION, SOLUTION INTRAVENOUS; SUBCUTANEOUS at 16:55

## 2020-06-07 LAB
ANION GAP SERPL CALCULATED.3IONS-SCNC: 6 MMOL/L (ref 4–13)
BACTERIA BLD CULT: NORMAL
BASOPHILS # BLD AUTO: 0.09 THOUSANDS/ΜL (ref 0–0.1)
BASOPHILS NFR BLD AUTO: 1 % (ref 0–1)
BUN SERPL-MCNC: 18 MG/DL (ref 5–25)
CALCIUM SERPL-MCNC: 8.8 MG/DL (ref 8.3–10.1)
CHLORIDE SERPL-SCNC: 102 MMOL/L (ref 100–108)
CO2 SERPL-SCNC: 32 MMOL/L (ref 21–32)
CREAT SERPL-MCNC: 1.35 MG/DL (ref 0.6–1.3)
EOSINOPHIL # BLD AUTO: 0.23 THOUSAND/ΜL (ref 0–0.61)
EOSINOPHIL NFR BLD AUTO: 3 % (ref 0–6)
ERYTHROCYTE [DISTWIDTH] IN BLOOD BY AUTOMATED COUNT: 16.4 % (ref 11.6–15.1)
GFR SERPL CREATININE-BSD FRML MDRD: 49 ML/MIN/1.73SQ M
GLUCOSE SERPL-MCNC: 111 MG/DL (ref 65–140)
GLUCOSE SERPL-MCNC: 120 MG/DL (ref 65–140)
GLUCOSE SERPL-MCNC: 125 MG/DL (ref 65–140)
GLUCOSE SERPL-MCNC: 133 MG/DL (ref 65–140)
GLUCOSE SERPL-MCNC: 143 MG/DL (ref 65–140)
HCT VFR BLD AUTO: 30.8 % (ref 36.5–49.3)
HCT VFR BLD AUTO: 31.5 % (ref 36.5–49.3)
HEMOCCULT STL QL: POSITIVE
HEMOCCULT STL QL: POSITIVE
HGB BLD-MCNC: 9.8 G/DL (ref 12–17)
HGB BLD-MCNC: 9.9 G/DL (ref 12–17)
IMM GRANULOCYTES # BLD AUTO: 0.06 THOUSAND/UL (ref 0–0.2)
IMM GRANULOCYTES NFR BLD AUTO: 1 % (ref 0–2)
LYMPHOCYTES # BLD AUTO: 1.31 THOUSANDS/ΜL (ref 0.6–4.47)
LYMPHOCYTES NFR BLD AUTO: 15 % (ref 14–44)
MCH RBC QN AUTO: 30.3 PG (ref 26.8–34.3)
MCHC RBC AUTO-ENTMCNC: 31.4 G/DL (ref 31.4–37.4)
MCV RBC AUTO: 96 FL (ref 82–98)
MONOCYTES # BLD AUTO: 1.17 THOUSAND/ΜL (ref 0.17–1.22)
MONOCYTES NFR BLD AUTO: 13 % (ref 4–12)
NEUTROPHILS # BLD AUTO: 6.02 THOUSANDS/ΜL (ref 1.85–7.62)
NEUTS SEG NFR BLD AUTO: 67 % (ref 43–75)
NRBC BLD AUTO-RTO: 0 /100 WBCS
PLATELET # BLD AUTO: 323 THOUSANDS/UL (ref 149–390)
PMV BLD AUTO: 9.8 FL (ref 8.9–12.7)
POTASSIUM SERPL-SCNC: 3.9 MMOL/L (ref 3.5–5.3)
RBC # BLD AUTO: 3.27 MILLION/UL (ref 3.88–5.62)
SODIUM SERPL-SCNC: 140 MMOL/L (ref 136–145)
WBC # BLD AUTO: 8.88 THOUSAND/UL (ref 4.31–10.16)

## 2020-06-07 PROCEDURE — 97110 THERAPEUTIC EXERCISES: CPT

## 2020-06-07 PROCEDURE — 80048 BASIC METABOLIC PNL TOTAL CA: CPT | Performed by: INTERNAL MEDICINE

## 2020-06-07 PROCEDURE — 85014 HEMATOCRIT: CPT | Performed by: INTERNAL MEDICINE

## 2020-06-07 PROCEDURE — 94640 AIRWAY INHALATION TREATMENT: CPT

## 2020-06-07 PROCEDURE — 85018 HEMOGLOBIN: CPT | Performed by: INTERNAL MEDICINE

## 2020-06-07 PROCEDURE — 85025 COMPLETE CBC W/AUTO DIFF WBC: CPT | Performed by: INTERNAL MEDICINE

## 2020-06-07 PROCEDURE — 99232 SBSQ HOSP IP/OBS MODERATE 35: CPT | Performed by: INTERNAL MEDICINE

## 2020-06-07 PROCEDURE — 99223 1ST HOSP IP/OBS HIGH 75: CPT | Performed by: INTERNAL MEDICINE

## 2020-06-07 PROCEDURE — 94760 N-INVAS EAR/PLS OXIMETRY 1: CPT

## 2020-06-07 PROCEDURE — 82948 REAGENT STRIP/BLOOD GLUCOSE: CPT

## 2020-06-07 PROCEDURE — 94668 MNPJ CHEST WALL SBSQ: CPT

## 2020-06-07 RX ADMIN — AMOXICILLIN AND CLAVULANATE POTASSIUM 1 TABLET: 875; 125 TABLET, FILM COATED ORAL at 21:25

## 2020-06-07 RX ADMIN — PANTOPRAZOLE SODIUM 40 MG: 40 TABLET, DELAYED RELEASE ORAL at 06:30

## 2020-06-07 RX ADMIN — ASPIRIN 81 MG: 81 TABLET, COATED ORAL at 09:22

## 2020-06-07 RX ADMIN — AMOXICILLIN AND CLAVULANATE POTASSIUM 1 TABLET: 875; 125 TABLET, FILM COATED ORAL at 09:22

## 2020-06-07 RX ADMIN — CARVEDILOL 6.25 MG: 6.25 TABLET, FILM COATED ORAL at 09:22

## 2020-06-07 RX ADMIN — PRAVASTATIN SODIUM 20 MG: 20 TABLET ORAL at 18:02

## 2020-06-07 RX ADMIN — IPRATROPIUM BROMIDE AND ALBUTEROL SULFATE 3 ML: 2.5; .5 SOLUTION RESPIRATORY (INHALATION) at 07:51

## 2020-06-07 RX ADMIN — IPRATROPIUM BROMIDE AND ALBUTEROL SULFATE 3 ML: 2.5; .5 SOLUTION RESPIRATORY (INHALATION) at 02:24

## 2020-06-07 RX ADMIN — GUAIFENESIN 600 MG: 600 TABLET, EXTENDED RELEASE ORAL at 21:25

## 2020-06-07 RX ADMIN — OXYCODONE HYDROCHLORIDE AND ACETAMINOPHEN 1000 MG: 500 TABLET ORAL at 09:22

## 2020-06-07 RX ADMIN — SENNOSIDES AND DOCUSATE SODIUM 1 TABLET: 8.6; 5 TABLET ORAL at 09:22

## 2020-06-07 RX ADMIN — IPRATROPIUM BROMIDE AND ALBUTEROL SULFATE 3 ML: 2.5; .5 SOLUTION RESPIRATORY (INHALATION) at 19:47

## 2020-06-07 RX ADMIN — IPRATROPIUM BROMIDE AND ALBUTEROL SULFATE 3 ML: 2.5; .5 SOLUTION RESPIRATORY (INHALATION) at 13:10

## 2020-06-07 RX ADMIN — FLUTICASONE FUROATE AND VILANTEROL TRIFENATATE 1 PUFF: 200; 25 POWDER RESPIRATORY (INHALATION) at 09:22

## 2020-06-07 RX ADMIN — PANTOPRAZOLE SODIUM 40 MG: 40 TABLET, DELAYED RELEASE ORAL at 18:02

## 2020-06-07 RX ADMIN — FUROSEMIDE 40 MG: 40 TABLET ORAL at 09:22

## 2020-06-07 RX ADMIN — SENNOSIDES AND DOCUSATE SODIUM 1 TABLET: 8.6; 5 TABLET ORAL at 18:02

## 2020-06-07 RX ADMIN — CARVEDILOL 6.25 MG: 6.25 TABLET, FILM COATED ORAL at 18:02

## 2020-06-07 RX ADMIN — B-COMPLEX W/ C & FOLIC ACID TAB 1 TABLET: TAB at 09:22

## 2020-06-07 RX ADMIN — SPIRONOLACTONE 25 MG: 25 TABLET ORAL at 09:22

## 2020-06-07 RX ADMIN — GUAIFENESIN 600 MG: 600 TABLET, EXTENDED RELEASE ORAL at 09:22

## 2020-06-07 RX ADMIN — DIGOXIN 125 MCG: 125 TABLET ORAL at 09:22

## 2020-06-07 RX ADMIN — MEMANTINE 10 MG: 10 TABLET ORAL at 09:22

## 2020-06-08 ENCOUNTER — ANESTHESIA EVENT (INPATIENT)
Dept: PERIOP | Facility: HOSPITAL | Age: 80
DRG: 871 | End: 2020-06-08
Payer: COMMERCIAL

## 2020-06-08 ENCOUNTER — APPOINTMENT (INPATIENT)
Dept: PERIOP | Facility: HOSPITAL | Age: 80
DRG: 871 | End: 2020-06-08
Payer: COMMERCIAL

## 2020-06-08 ENCOUNTER — ANESTHESIA (INPATIENT)
Dept: PERIOP | Facility: HOSPITAL | Age: 80
DRG: 871 | End: 2020-06-08
Payer: COMMERCIAL

## 2020-06-08 PROBLEM — R04.2 HEMOPTYSIS: Status: ACTIVE | Noted: 2020-06-08

## 2020-06-08 LAB
ANION GAP SERPL CALCULATED.3IONS-SCNC: 5 MMOL/L (ref 4–13)
BACTERIA BLD CULT: NORMAL
BUN SERPL-MCNC: 23 MG/DL (ref 5–25)
CALCIUM SERPL-MCNC: 8.3 MG/DL (ref 8.3–10.1)
CHLORIDE SERPL-SCNC: 104 MMOL/L (ref 100–108)
CO2 SERPL-SCNC: 28 MMOL/L (ref 21–32)
CREAT SERPL-MCNC: 1.27 MG/DL (ref 0.6–1.3)
GFR SERPL CREATININE-BSD FRML MDRD: 53 ML/MIN/1.73SQ M
GLUCOSE SERPL-MCNC: 109 MG/DL (ref 65–140)
GLUCOSE SERPL-MCNC: 109 MG/DL (ref 65–140)
GLUCOSE SERPL-MCNC: 113 MG/DL (ref 65–140)
GLUCOSE SERPL-MCNC: 115 MG/DL (ref 65–140)
GLUCOSE SERPL-MCNC: 116 MG/DL (ref 65–140)
HCT VFR BLD AUTO: 28.5 % (ref 36.5–49.3)
HGB BLD-MCNC: 9 G/DL (ref 12–17)
POTASSIUM SERPL-SCNC: 3.6 MMOL/L (ref 3.5–5.3)
SODIUM SERPL-SCNC: 137 MMOL/L (ref 136–145)

## 2020-06-08 PROCEDURE — 94760 N-INVAS EAR/PLS OXIMETRY 1: CPT

## 2020-06-08 PROCEDURE — 43239 EGD BIOPSY SINGLE/MULTIPLE: CPT | Performed by: INTERNAL MEDICINE

## 2020-06-08 PROCEDURE — 94668 MNPJ CHEST WALL SBSQ: CPT

## 2020-06-08 PROCEDURE — 80048 BASIC METABOLIC PNL TOTAL CA: CPT | Performed by: INTERNAL MEDICINE

## 2020-06-08 PROCEDURE — 0DB78ZX EXCISION OF STOMACH, PYLORUS, VIA NATURAL OR ARTIFICIAL OPENING ENDOSCOPIC, DIAGNOSTIC: ICD-10-PCS | Performed by: INTERNAL MEDICINE

## 2020-06-08 PROCEDURE — 94640 AIRWAY INHALATION TREATMENT: CPT

## 2020-06-08 PROCEDURE — 85014 HEMATOCRIT: CPT | Performed by: INTERNAL MEDICINE

## 2020-06-08 PROCEDURE — 99233 SBSQ HOSP IP/OBS HIGH 50: CPT | Performed by: INTERNAL MEDICINE

## 2020-06-08 PROCEDURE — 88305 TISSUE EXAM BY PATHOLOGIST: CPT | Performed by: PATHOLOGY

## 2020-06-08 PROCEDURE — 99232 SBSQ HOSP IP/OBS MODERATE 35: CPT | Performed by: INTERNAL MEDICINE

## 2020-06-08 PROCEDURE — 85018 HEMOGLOBIN: CPT | Performed by: INTERNAL MEDICINE

## 2020-06-08 PROCEDURE — 82948 REAGENT STRIP/BLOOD GLUCOSE: CPT

## 2020-06-08 PROCEDURE — 0DB58ZX EXCISION OF ESOPHAGUS, VIA NATURAL OR ARTIFICIAL OPENING ENDOSCOPIC, DIAGNOSTIC: ICD-10-PCS | Performed by: INTERNAL MEDICINE

## 2020-06-08 RX ORDER — PROPOFOL 10 MG/ML
INJECTION, EMULSION INTRAVENOUS AS NEEDED
Status: DISCONTINUED | OUTPATIENT
Start: 2020-06-08 | End: 2020-06-08 | Stop reason: SURG

## 2020-06-08 RX ORDER — SODIUM CHLORIDE, SODIUM LACTATE, POTASSIUM CHLORIDE, CALCIUM CHLORIDE 600; 310; 30; 20 MG/100ML; MG/100ML; MG/100ML; MG/100ML
INJECTION, SOLUTION INTRAVENOUS CONTINUOUS PRN
Status: DISCONTINUED | OUTPATIENT
Start: 2020-06-08 | End: 2020-06-08 | Stop reason: SURG

## 2020-06-08 RX ORDER — METOCLOPRAMIDE HYDROCHLORIDE 5 MG/ML
10 INJECTION INTRAMUSCULAR; INTRAVENOUS ONCE
Status: COMPLETED | OUTPATIENT
Start: 2020-06-08 | End: 2020-06-08

## 2020-06-08 RX ORDER — LIDOCAINE HYDROCHLORIDE 10 MG/ML
INJECTION, SOLUTION EPIDURAL; INFILTRATION; INTRACAUDAL; PERINEURAL AS NEEDED
Status: DISCONTINUED | OUTPATIENT
Start: 2020-06-08 | End: 2020-06-08 | Stop reason: SURG

## 2020-06-08 RX ORDER — ONDANSETRON 2 MG/ML
4 INJECTION INTRAMUSCULAR; INTRAVENOUS ONCE AS NEEDED
Status: DISCONTINUED | OUTPATIENT
Start: 2020-06-08 | End: 2020-06-08

## 2020-06-08 RX ADMIN — PANTOPRAZOLE SODIUM 40 MG: 40 TABLET, DELAYED RELEASE ORAL at 06:10

## 2020-06-08 RX ADMIN — METOCLOPRAMIDE 10 MG: 5 INJECTION, SOLUTION INTRAMUSCULAR; INTRAVENOUS at 12:20

## 2020-06-08 RX ADMIN — PHENYLEPHRINE HYDROCHLORIDE 100 MCG: 10 INJECTION INTRAVENOUS at 15:45

## 2020-06-08 RX ADMIN — GUAIFENESIN 600 MG: 600 TABLET, EXTENDED RELEASE ORAL at 08:50

## 2020-06-08 RX ADMIN — LIDOCAINE HYDROCHLORIDE 100 MG: 10 INJECTION, SOLUTION EPIDURAL; INFILTRATION; INTRACAUDAL; PERINEURAL at 15:42

## 2020-06-08 RX ADMIN — IPRATROPIUM BROMIDE AND ALBUTEROL SULFATE 3 ML: 2.5; .5 SOLUTION RESPIRATORY (INHALATION) at 07:06

## 2020-06-08 RX ADMIN — MEMANTINE 10 MG: 10 TABLET ORAL at 08:50

## 2020-06-08 RX ADMIN — FUROSEMIDE 40 MG: 40 TABLET ORAL at 08:50

## 2020-06-08 RX ADMIN — CARVEDILOL 6.25 MG: 6.25 TABLET, FILM COATED ORAL at 08:50

## 2020-06-08 RX ADMIN — FLUTICASONE FUROATE AND VILANTEROL TRIFENATATE 1 PUFF: 200; 25 POWDER RESPIRATORY (INHALATION) at 08:50

## 2020-06-08 RX ADMIN — AMOXICILLIN AND CLAVULANATE POTASSIUM 1 TABLET: 875; 125 TABLET, FILM COATED ORAL at 20:18

## 2020-06-08 RX ADMIN — AMOXICILLIN AND CLAVULANATE POTASSIUM 1 TABLET: 875; 125 TABLET, FILM COATED ORAL at 08:50

## 2020-06-08 RX ADMIN — PRAVASTATIN SODIUM 20 MG: 20 TABLET ORAL at 17:15

## 2020-06-08 RX ADMIN — DIGOXIN 125 MCG: 125 TABLET ORAL at 08:50

## 2020-06-08 RX ADMIN — PROPOFOL 50 MG: 10 INJECTION, EMULSION INTRAVENOUS at 15:42

## 2020-06-08 RX ADMIN — PANTOPRAZOLE SODIUM 40 MG: 40 TABLET, DELAYED RELEASE ORAL at 17:15

## 2020-06-08 RX ADMIN — IPRATROPIUM BROMIDE AND ALBUTEROL SULFATE 3 ML: 2.5; .5 SOLUTION RESPIRATORY (INHALATION) at 13:49

## 2020-06-08 RX ADMIN — CARVEDILOL 6.25 MG: 6.25 TABLET, FILM COATED ORAL at 17:15

## 2020-06-08 RX ADMIN — GUAIFENESIN 600 MG: 600 TABLET, EXTENDED RELEASE ORAL at 20:18

## 2020-06-08 RX ADMIN — SODIUM CHLORIDE, SODIUM LACTATE, POTASSIUM CHLORIDE, AND CALCIUM CHLORIDE: .6; .31; .03; .02 INJECTION, SOLUTION INTRAVENOUS at 15:36

## 2020-06-08 RX ADMIN — SENNOSIDES AND DOCUSATE SODIUM 1 TABLET: 8.6; 5 TABLET ORAL at 17:15

## 2020-06-08 RX ADMIN — IPRATROPIUM BROMIDE AND ALBUTEROL SULFATE 3 ML: 2.5; .5 SOLUTION RESPIRATORY (INHALATION) at 01:41

## 2020-06-08 RX ADMIN — PROPOFOL 25 MG: 10 INJECTION, EMULSION INTRAVENOUS at 15:47

## 2020-06-08 RX ADMIN — SPIRONOLACTONE 25 MG: 25 TABLET ORAL at 08:50

## 2020-06-08 RX ADMIN — IPRATROPIUM BROMIDE AND ALBUTEROL SULFATE 3 ML: 2.5; .5 SOLUTION RESPIRATORY (INHALATION) at 19:23

## 2020-06-09 ENCOUNTER — APPOINTMENT (INPATIENT)
Dept: RADIOLOGY | Facility: HOSPITAL | Age: 80
DRG: 871 | End: 2020-06-09
Payer: COMMERCIAL

## 2020-06-09 DIAGNOSIS — R78.81 POSITIVE BLOOD CULTURE: ICD-10-CM

## 2020-06-09 DIAGNOSIS — R91.1 NODULE OF LEFT LUNG: ICD-10-CM

## 2020-06-09 LAB
ALBUMIN SERPL BCP-MCNC: 3.1 G/DL (ref 3.5–5)
ALP SERPL-CCNC: 47 U/L (ref 46–116)
ALT SERPL W P-5'-P-CCNC: 26 U/L (ref 12–78)
ANION GAP SERPL CALCULATED.3IONS-SCNC: 5 MMOL/L (ref 4–13)
AST SERPL W P-5'-P-CCNC: 18 U/L (ref 5–45)
BILIRUB SERPL-MCNC: 0.7 MG/DL (ref 0.2–1)
BUN SERPL-MCNC: 19 MG/DL (ref 5–25)
CALCIUM SERPL-MCNC: 8.8 MG/DL (ref 8.3–10.1)
CHLORIDE SERPL-SCNC: 101 MMOL/L (ref 100–108)
CO2 SERPL-SCNC: 33 MMOL/L (ref 21–32)
CREAT SERPL-MCNC: 1.26 MG/DL (ref 0.6–1.3)
ERYTHROCYTE [DISTWIDTH] IN BLOOD BY AUTOMATED COUNT: 16.9 % (ref 11.6–15.1)
GFR SERPL CREATININE-BSD FRML MDRD: 53 ML/MIN/1.73SQ M
GLUCOSE SERPL-MCNC: 101 MG/DL (ref 65–140)
GLUCOSE SERPL-MCNC: 103 MG/DL (ref 65–140)
GLUCOSE SERPL-MCNC: 111 MG/DL (ref 65–140)
GLUCOSE SERPL-MCNC: 147 MG/DL (ref 65–140)
GLUCOSE SERPL-MCNC: 89 MG/DL (ref 65–140)
HCT VFR BLD AUTO: 30.4 % (ref 36.5–49.3)
HGB BLD-MCNC: 9.7 G/DL (ref 12–17)
MCH RBC QN AUTO: 30.9 PG (ref 26.8–34.3)
MCHC RBC AUTO-ENTMCNC: 31.9 G/DL (ref 31.4–37.4)
MCV RBC AUTO: 97 FL (ref 82–98)
PLATELET # BLD AUTO: 318 THOUSANDS/UL (ref 149–390)
PMV BLD AUTO: 10 FL (ref 8.9–12.7)
POTASSIUM SERPL-SCNC: 3.4 MMOL/L (ref 3.5–5.3)
PROT SERPL-MCNC: 6.3 G/DL (ref 6.4–8.2)
RBC # BLD AUTO: 3.14 MILLION/UL (ref 3.88–5.62)
SODIUM SERPL-SCNC: 139 MMOL/L (ref 136–145)
WBC # BLD AUTO: 8.42 THOUSAND/UL (ref 4.31–10.16)

## 2020-06-09 PROCEDURE — 31575 DIAGNOSTIC LARYNGOSCOPY: CPT | Performed by: OTOLARYNGOLOGY

## 2020-06-09 PROCEDURE — 82948 REAGENT STRIP/BLOOD GLUCOSE: CPT

## 2020-06-09 PROCEDURE — 94640 AIRWAY INHALATION TREATMENT: CPT

## 2020-06-09 PROCEDURE — 71260 CT THORAX DX C+: CPT

## 2020-06-09 PROCEDURE — 94760 N-INVAS EAR/PLS OXIMETRY 1: CPT

## 2020-06-09 PROCEDURE — 99232 SBSQ HOSP IP/OBS MODERATE 35: CPT | Performed by: STUDENT IN AN ORGANIZED HEALTH CARE EDUCATION/TRAINING PROGRAM

## 2020-06-09 PROCEDURE — 99232 SBSQ HOSP IP/OBS MODERATE 35: CPT | Performed by: INTERNAL MEDICINE

## 2020-06-09 PROCEDURE — 99221 1ST HOSP IP/OBS SF/LOW 40: CPT | Performed by: OTOLARYNGOLOGY

## 2020-06-09 PROCEDURE — 85027 COMPLETE CBC AUTOMATED: CPT | Performed by: NURSE PRACTITIONER

## 2020-06-09 PROCEDURE — 80053 COMPREHEN METABOLIC PANEL: CPT | Performed by: INTERNAL MEDICINE

## 2020-06-09 PROCEDURE — 99232 SBSQ HOSP IP/OBS MODERATE 35: CPT | Performed by: PHYSICIAN ASSISTANT

## 2020-06-09 PROCEDURE — 94668 MNPJ CHEST WALL SBSQ: CPT

## 2020-06-09 RX ADMIN — GUAIFENESIN 600 MG: 600 TABLET, EXTENDED RELEASE ORAL at 09:15

## 2020-06-09 RX ADMIN — IPRATROPIUM BROMIDE AND ALBUTEROL SULFATE 3 ML: 2.5; .5 SOLUTION RESPIRATORY (INHALATION) at 07:20

## 2020-06-09 RX ADMIN — ASPIRIN 81 MG: 81 TABLET, COATED ORAL at 09:15

## 2020-06-09 RX ADMIN — MEMANTINE 10 MG: 10 TABLET ORAL at 09:15

## 2020-06-09 RX ADMIN — DIGOXIN 125 MCG: 125 TABLET ORAL at 09:15

## 2020-06-09 RX ADMIN — PRAVASTATIN SODIUM 20 MG: 20 TABLET ORAL at 17:25

## 2020-06-09 RX ADMIN — IOHEXOL 80 ML: 350 INJECTION, SOLUTION INTRAVENOUS at 08:40

## 2020-06-09 RX ADMIN — FUROSEMIDE 40 MG: 40 TABLET ORAL at 09:15

## 2020-06-09 RX ADMIN — CARVEDILOL 6.25 MG: 6.25 TABLET, FILM COATED ORAL at 17:25

## 2020-06-09 RX ADMIN — PANTOPRAZOLE SODIUM 40 MG: 40 TABLET, DELAYED RELEASE ORAL at 17:25

## 2020-06-09 RX ADMIN — B-COMPLEX W/ C & FOLIC ACID TAB 1 TABLET: TAB at 09:15

## 2020-06-09 RX ADMIN — SENNOSIDES AND DOCUSATE SODIUM 1 TABLET: 8.6; 5 TABLET ORAL at 09:15

## 2020-06-09 RX ADMIN — IPRATROPIUM BROMIDE AND ALBUTEROL SULFATE 3 ML: 2.5; .5 SOLUTION RESPIRATORY (INHALATION) at 13:05

## 2020-06-09 RX ADMIN — SPIRONOLACTONE 25 MG: 25 TABLET ORAL at 09:15

## 2020-06-09 RX ADMIN — PANTOPRAZOLE SODIUM 40 MG: 40 TABLET, DELAYED RELEASE ORAL at 06:01

## 2020-06-09 RX ADMIN — AMOXICILLIN AND CLAVULANATE POTASSIUM 1 TABLET: 875; 125 TABLET, FILM COATED ORAL at 21:47

## 2020-06-09 RX ADMIN — CARVEDILOL 6.25 MG: 6.25 TABLET, FILM COATED ORAL at 09:15

## 2020-06-09 RX ADMIN — OXYCODONE HYDROCHLORIDE AND ACETAMINOPHEN 1000 MG: 500 TABLET ORAL at 09:15

## 2020-06-09 RX ADMIN — AMOXICILLIN AND CLAVULANATE POTASSIUM 1 TABLET: 875; 125 TABLET, FILM COATED ORAL at 09:15

## 2020-06-09 RX ADMIN — IPRATROPIUM BROMIDE AND ALBUTEROL SULFATE 3 ML: 2.5; .5 SOLUTION RESPIRATORY (INHALATION) at 19:42

## 2020-06-09 RX ADMIN — FLUTICASONE FUROATE AND VILANTEROL TRIFENATATE 1 PUFF: 200; 25 POWDER RESPIRATORY (INHALATION) at 09:15

## 2020-06-09 RX ADMIN — IPRATROPIUM BROMIDE AND ALBUTEROL SULFATE 3 ML: 2.5; .5 SOLUTION RESPIRATORY (INHALATION) at 02:24

## 2020-06-09 RX ADMIN — GUAIFENESIN 600 MG: 600 TABLET, EXTENDED RELEASE ORAL at 21:47

## 2020-06-10 PROBLEM — R58 ACUTE BLEEDING: Status: ACTIVE | Noted: 2020-06-04

## 2020-06-10 PROBLEM — J85.1 ABSCESS OF LOWER LOBE OF LEFT LUNG WITH PNEUMONIA (HCC): Status: ACTIVE | Noted: 2020-05-30

## 2020-06-10 LAB
ANION GAP SERPL CALCULATED.3IONS-SCNC: 7 MMOL/L (ref 4–13)
APTT PPP: 27 SECONDS (ref 23–37)
BUN SERPL-MCNC: 23 MG/DL (ref 5–25)
CALCIUM SERPL-MCNC: 8.8 MG/DL (ref 8.3–10.1)
CHLORIDE SERPL-SCNC: 99 MMOL/L (ref 100–108)
CO2 SERPL-SCNC: 32 MMOL/L (ref 21–32)
CREAT SERPL-MCNC: 1.31 MG/DL (ref 0.6–1.3)
ERYTHROCYTE [DISTWIDTH] IN BLOOD BY AUTOMATED COUNT: 16.8 % (ref 11.6–15.1)
GFR SERPL CREATININE-BSD FRML MDRD: 51 ML/MIN/1.73SQ M
GLUCOSE SERPL-MCNC: 109 MG/DL (ref 65–140)
GLUCOSE SERPL-MCNC: 109 MG/DL (ref 65–140)
GLUCOSE SERPL-MCNC: 110 MG/DL (ref 65–140)
GLUCOSE SERPL-MCNC: 135 MG/DL (ref 65–140)
GLUCOSE SERPL-MCNC: 164 MG/DL (ref 65–140)
HCT VFR BLD AUTO: 31.6 % (ref 36.5–49.3)
HCT VFR BLD AUTO: 33.9 % (ref 36.5–49.3)
HGB BLD-MCNC: 10.5 G/DL (ref 12–17)
HGB BLD-MCNC: 9.9 G/DL (ref 12–17)
MAGNESIUM SERPL-MCNC: 2 MG/DL (ref 1.6–2.6)
MCH RBC QN AUTO: 30 PG (ref 26.8–34.3)
MCHC RBC AUTO-ENTMCNC: 31 G/DL (ref 31.4–37.4)
MCV RBC AUTO: 97 FL (ref 82–98)
PLATELET # BLD AUTO: 337 THOUSANDS/UL (ref 149–390)
PMV BLD AUTO: 10.3 FL (ref 8.9–12.7)
POTASSIUM SERPL-SCNC: 3.3 MMOL/L (ref 3.5–5.3)
RBC # BLD AUTO: 3.5 MILLION/UL (ref 3.88–5.62)
SODIUM SERPL-SCNC: 138 MMOL/L (ref 136–145)
WBC # BLD AUTO: 8.78 THOUSAND/UL (ref 4.31–10.16)

## 2020-06-10 PROCEDURE — 97110 THERAPEUTIC EXERCISES: CPT

## 2020-06-10 PROCEDURE — 99233 SBSQ HOSP IP/OBS HIGH 50: CPT | Performed by: INTERNAL MEDICINE

## 2020-06-10 PROCEDURE — 99232 SBSQ HOSP IP/OBS MODERATE 35: CPT | Performed by: STUDENT IN AN ORGANIZED HEALTH CARE EDUCATION/TRAINING PROGRAM

## 2020-06-10 PROCEDURE — 99232 SBSQ HOSP IP/OBS MODERATE 35: CPT | Performed by: INTERNAL MEDICINE

## 2020-06-10 PROCEDURE — 80048 BASIC METABOLIC PNL TOTAL CA: CPT | Performed by: STUDENT IN AN ORGANIZED HEALTH CARE EDUCATION/TRAINING PROGRAM

## 2020-06-10 PROCEDURE — 85014 HEMATOCRIT: CPT | Performed by: NURSE PRACTITIONER

## 2020-06-10 PROCEDURE — 94760 N-INVAS EAR/PLS OXIMETRY 1: CPT

## 2020-06-10 PROCEDURE — 85018 HEMOGLOBIN: CPT | Performed by: NURSE PRACTITIONER

## 2020-06-10 PROCEDURE — 94640 AIRWAY INHALATION TREATMENT: CPT

## 2020-06-10 PROCEDURE — 94668 MNPJ CHEST WALL SBSQ: CPT

## 2020-06-10 PROCEDURE — 85027 COMPLETE CBC AUTOMATED: CPT | Performed by: STUDENT IN AN ORGANIZED HEALTH CARE EDUCATION/TRAINING PROGRAM

## 2020-06-10 PROCEDURE — 82948 REAGENT STRIP/BLOOD GLUCOSE: CPT

## 2020-06-10 PROCEDURE — 85730 THROMBOPLASTIN TIME PARTIAL: CPT | Performed by: NURSE PRACTITIONER

## 2020-06-10 PROCEDURE — 83735 ASSAY OF MAGNESIUM: CPT | Performed by: STUDENT IN AN ORGANIZED HEALTH CARE EDUCATION/TRAINING PROGRAM

## 2020-06-10 RX ORDER — SODIUM CHLORIDE 9 MG/ML
50 INJECTION, SOLUTION INTRAVENOUS CONTINUOUS
Status: DISCONTINUED | OUTPATIENT
Start: 2020-06-10 | End: 2020-06-11

## 2020-06-10 RX ORDER — POTASSIUM CHLORIDE 20 MEQ/1
20 TABLET, EXTENDED RELEASE ORAL ONCE
Status: COMPLETED | OUTPATIENT
Start: 2020-06-10 | End: 2020-06-10

## 2020-06-10 RX ORDER — HEPARIN SODIUM 10000 [USP'U]/100ML
3-20 INJECTION, SOLUTION INTRAVENOUS
Status: DISCONTINUED | OUTPATIENT
Start: 2020-06-10 | End: 2020-06-16

## 2020-06-10 RX ADMIN — MEMANTINE 10 MG: 10 TABLET ORAL at 08:16

## 2020-06-10 RX ADMIN — CARVEDILOL 6.25 MG: 6.25 TABLET, FILM COATED ORAL at 08:14

## 2020-06-10 RX ADMIN — SENNOSIDES AND DOCUSATE SODIUM 1 TABLET: 8.6; 5 TABLET ORAL at 08:14

## 2020-06-10 RX ADMIN — AMOXICILLIN AND CLAVULANATE POTASSIUM 1 TABLET: 875; 125 TABLET, FILM COATED ORAL at 08:16

## 2020-06-10 RX ADMIN — SENNOSIDES AND DOCUSATE SODIUM 1 TABLET: 8.6; 5 TABLET ORAL at 18:35

## 2020-06-10 RX ADMIN — B-COMPLEX W/ C & FOLIC ACID TAB 1 TABLET: TAB at 08:14

## 2020-06-10 RX ADMIN — DIGOXIN 125 MCG: 125 TABLET ORAL at 08:14

## 2020-06-10 RX ADMIN — ASPIRIN 81 MG: 81 TABLET, COATED ORAL at 08:16

## 2020-06-10 RX ADMIN — PANTOPRAZOLE SODIUM 40 MG: 40 TABLET, DELAYED RELEASE ORAL at 06:18

## 2020-06-10 RX ADMIN — PANTOPRAZOLE SODIUM 40 MG: 40 TABLET, DELAYED RELEASE ORAL at 18:35

## 2020-06-10 RX ADMIN — CARVEDILOL 6.25 MG: 6.25 TABLET, FILM COATED ORAL at 18:35

## 2020-06-10 RX ADMIN — POTASSIUM CHLORIDE 20 MEQ: 1500 TABLET, EXTENDED RELEASE ORAL at 11:11

## 2020-06-10 RX ADMIN — SPIRONOLACTONE 25 MG: 25 TABLET ORAL at 08:14

## 2020-06-10 RX ADMIN — IPRATROPIUM BROMIDE AND ALBUTEROL SULFATE 3 ML: 2.5; .5 SOLUTION RESPIRATORY (INHALATION) at 07:20

## 2020-06-10 RX ADMIN — HEPARIN SODIUM AND DEXTROSE 12 UNITS/KG/HR: 10000; 5 INJECTION INTRAVENOUS at 14:00

## 2020-06-10 RX ADMIN — IPRATROPIUM BROMIDE AND ALBUTEROL SULFATE 3 ML: 2.5; .5 SOLUTION RESPIRATORY (INHALATION) at 19:24

## 2020-06-10 RX ADMIN — FUROSEMIDE 40 MG: 40 TABLET ORAL at 08:14

## 2020-06-10 RX ADMIN — FLUTICASONE FUROATE AND VILANTEROL TRIFENATATE 1 PUFF: 200; 25 POWDER RESPIRATORY (INHALATION) at 11:11

## 2020-06-10 RX ADMIN — SODIUM CHLORIDE 50 ML/HR: 0.9 INJECTION, SOLUTION INTRAVENOUS at 11:08

## 2020-06-10 RX ADMIN — INSULIN LISPRO 1 UNITS: 100 INJECTION, SOLUTION INTRAVENOUS; SUBCUTANEOUS at 13:07

## 2020-06-10 RX ADMIN — OXYCODONE HYDROCHLORIDE AND ACETAMINOPHEN 1000 MG: 500 TABLET ORAL at 08:14

## 2020-06-10 RX ADMIN — PRAVASTATIN SODIUM 20 MG: 20 TABLET ORAL at 18:35

## 2020-06-10 RX ADMIN — GUAIFENESIN 600 MG: 600 TABLET, EXTENDED RELEASE ORAL at 20:56

## 2020-06-10 RX ADMIN — GUAIFENESIN 600 MG: 600 TABLET, EXTENDED RELEASE ORAL at 08:14

## 2020-06-10 RX ADMIN — AMOXICILLIN AND CLAVULANATE POTASSIUM 1 TABLET: 875; 125 TABLET, FILM COATED ORAL at 20:56

## 2020-06-11 LAB
ANION GAP SERPL CALCULATED.3IONS-SCNC: 5 MMOL/L (ref 4–13)
APTT PPP: 35 SECONDS (ref 23–37)
APTT PPP: 42 SECONDS (ref 23–37)
APTT PPP: 61 SECONDS (ref 23–37)
BUN SERPL-MCNC: 21 MG/DL (ref 5–25)
CALCIUM SERPL-MCNC: 8.7 MG/DL (ref 8.3–10.1)
CHLORIDE SERPL-SCNC: 102 MMOL/L (ref 100–108)
CO2 SERPL-SCNC: 31 MMOL/L (ref 21–32)
CREAT SERPL-MCNC: 1.24 MG/DL (ref 0.6–1.3)
ERYTHROCYTE [DISTWIDTH] IN BLOOD BY AUTOMATED COUNT: 16.7 % (ref 11.6–15.1)
GFR SERPL CREATININE-BSD FRML MDRD: 54 ML/MIN/1.73SQ M
GLUCOSE SERPL-MCNC: 107 MG/DL (ref 65–140)
GLUCOSE SERPL-MCNC: 110 MG/DL (ref 65–140)
GLUCOSE SERPL-MCNC: 111 MG/DL (ref 65–140)
GLUCOSE SERPL-MCNC: 111 MG/DL (ref 65–140)
GLUCOSE SERPL-MCNC: 153 MG/DL (ref 65–140)
HCT VFR BLD AUTO: 30.7 % (ref 36.5–49.3)
HGB BLD-MCNC: 9.6 G/DL (ref 12–17)
INR PPP: 1.03 (ref 0.84–1.19)
MCH RBC QN AUTO: 30.9 PG (ref 26.8–34.3)
MCHC RBC AUTO-ENTMCNC: 31.3 G/DL (ref 31.4–37.4)
MCV RBC AUTO: 99 FL (ref 82–98)
PLATELET # BLD AUTO: 287 THOUSANDS/UL (ref 149–390)
PMV BLD AUTO: 10.2 FL (ref 8.9–12.7)
POTASSIUM SERPL-SCNC: 3.1 MMOL/L (ref 3.5–5.3)
PROTHROMBIN TIME: 13.8 SECONDS (ref 11.6–14.5)
RBC # BLD AUTO: 3.11 MILLION/UL (ref 3.88–5.62)
SODIUM SERPL-SCNC: 138 MMOL/L (ref 136–145)
WBC # BLD AUTO: 7.97 THOUSAND/UL (ref 4.31–10.16)

## 2020-06-11 PROCEDURE — 94760 N-INVAS EAR/PLS OXIMETRY 1: CPT

## 2020-06-11 PROCEDURE — 85730 THROMBOPLASTIN TIME PARTIAL: CPT | Performed by: STUDENT IN AN ORGANIZED HEALTH CARE EDUCATION/TRAINING PROGRAM

## 2020-06-11 PROCEDURE — 99233 SBSQ HOSP IP/OBS HIGH 50: CPT | Performed by: INTERNAL MEDICINE

## 2020-06-11 PROCEDURE — 94640 AIRWAY INHALATION TREATMENT: CPT

## 2020-06-11 PROCEDURE — 82948 REAGENT STRIP/BLOOD GLUCOSE: CPT

## 2020-06-11 PROCEDURE — 97110 THERAPEUTIC EXERCISES: CPT

## 2020-06-11 PROCEDURE — 85610 PROTHROMBIN TIME: CPT | Performed by: NURSE PRACTITIONER

## 2020-06-11 PROCEDURE — 99232 SBSQ HOSP IP/OBS MODERATE 35: CPT | Performed by: INTERNAL MEDICINE

## 2020-06-11 PROCEDURE — 99232 SBSQ HOSP IP/OBS MODERATE 35: CPT | Performed by: STUDENT IN AN ORGANIZED HEALTH CARE EDUCATION/TRAINING PROGRAM

## 2020-06-11 PROCEDURE — 85027 COMPLETE CBC AUTOMATED: CPT | Performed by: NURSE PRACTITIONER

## 2020-06-11 PROCEDURE — 80048 BASIC METABOLIC PNL TOTAL CA: CPT | Performed by: STUDENT IN AN ORGANIZED HEALTH CARE EDUCATION/TRAINING PROGRAM

## 2020-06-11 PROCEDURE — 94668 MNPJ CHEST WALL SBSQ: CPT

## 2020-06-11 RX ORDER — WARFARIN SODIUM 5 MG/1
5 TABLET ORAL
Status: COMPLETED | OUTPATIENT
Start: 2020-06-11 | End: 2020-06-11

## 2020-06-11 RX ORDER — POTASSIUM CHLORIDE 20 MEQ/1
40 TABLET, EXTENDED RELEASE ORAL 2 TIMES DAILY
Status: DISCONTINUED | OUTPATIENT
Start: 2020-06-11 | End: 2020-06-16

## 2020-06-11 RX ADMIN — IPRATROPIUM BROMIDE AND ALBUTEROL SULFATE 3 ML: 2.5; .5 SOLUTION RESPIRATORY (INHALATION) at 15:04

## 2020-06-11 RX ADMIN — CARVEDILOL 6.25 MG: 6.25 TABLET, FILM COATED ORAL at 09:54

## 2020-06-11 RX ADMIN — PRAVASTATIN SODIUM 20 MG: 20 TABLET ORAL at 17:06

## 2020-06-11 RX ADMIN — WARFARIN SODIUM 5 MG: 5 TABLET ORAL at 17:06

## 2020-06-11 RX ADMIN — MEMANTINE 10 MG: 10 TABLET ORAL at 09:54

## 2020-06-11 RX ADMIN — POTASSIUM CHLORIDE 40 MEQ: 1500 TABLET, EXTENDED RELEASE ORAL at 17:06

## 2020-06-11 RX ADMIN — FLUTICASONE FUROATE AND VILANTEROL TRIFENATATE 1 PUFF: 200; 25 POWDER RESPIRATORY (INHALATION) at 17:12

## 2020-06-11 RX ADMIN — OXYCODONE HYDROCHLORIDE AND ACETAMINOPHEN 1000 MG: 500 TABLET ORAL at 09:54

## 2020-06-11 RX ADMIN — GUAIFENESIN 600 MG: 600 TABLET, EXTENDED RELEASE ORAL at 20:35

## 2020-06-11 RX ADMIN — SENNOSIDES AND DOCUSATE SODIUM 1 TABLET: 8.6; 5 TABLET ORAL at 09:54

## 2020-06-11 RX ADMIN — IPRATROPIUM BROMIDE AND ALBUTEROL SULFATE 3 ML: 2.5; .5 SOLUTION RESPIRATORY (INHALATION) at 07:27

## 2020-06-11 RX ADMIN — SENNOSIDES AND DOCUSATE SODIUM 1 TABLET: 8.6; 5 TABLET ORAL at 17:06

## 2020-06-11 RX ADMIN — GUAIFENESIN 600 MG: 600 TABLET, EXTENDED RELEASE ORAL at 09:54

## 2020-06-11 RX ADMIN — AMOXICILLIN AND CLAVULANATE POTASSIUM 1 TABLET: 875; 125 TABLET, FILM COATED ORAL at 20:35

## 2020-06-11 RX ADMIN — B-COMPLEX W/ C & FOLIC ACID TAB 1 TABLET: TAB at 09:54

## 2020-06-11 RX ADMIN — INSULIN LISPRO 1 UNITS: 100 INJECTION, SOLUTION INTRAVENOUS; SUBCUTANEOUS at 12:51

## 2020-06-11 RX ADMIN — POTASSIUM CHLORIDE 40 MEQ: 1500 TABLET, EXTENDED RELEASE ORAL at 09:54

## 2020-06-11 RX ADMIN — AMOXICILLIN AND CLAVULANATE POTASSIUM 1 TABLET: 875; 125 TABLET, FILM COATED ORAL at 09:54

## 2020-06-11 RX ADMIN — DIGOXIN 125 MCG: 125 TABLET ORAL at 09:54

## 2020-06-11 RX ADMIN — PANTOPRAZOLE SODIUM 40 MG: 40 TABLET, DELAYED RELEASE ORAL at 17:06

## 2020-06-11 RX ADMIN — HEPARIN SODIUM AND DEXTROSE 22 UNITS/KG/HR: 10000; 5 INJECTION INTRAVENOUS at 17:09

## 2020-06-11 RX ADMIN — PANTOPRAZOLE SODIUM 40 MG: 40 TABLET, DELAYED RELEASE ORAL at 07:38

## 2020-06-11 RX ADMIN — ASPIRIN 81 MG: 81 TABLET, COATED ORAL at 09:54

## 2020-06-11 RX ADMIN — CARVEDILOL 6.25 MG: 6.25 TABLET, FILM COATED ORAL at 17:11

## 2020-06-12 PROBLEM — R58 ACUTE BLEEDING: Status: RESOLVED | Noted: 2020-06-04 | Resolved: 2020-06-12

## 2020-06-12 LAB
ANION GAP SERPL CALCULATED.3IONS-SCNC: 5 MMOL/L (ref 4–13)
APTT PPP: 63 SECONDS (ref 23–37)
BUN SERPL-MCNC: 21 MG/DL (ref 5–25)
CALCIUM SERPL-MCNC: 8.9 MG/DL (ref 8.3–10.1)
CHLORIDE SERPL-SCNC: 104 MMOL/L (ref 100–108)
CO2 SERPL-SCNC: 29 MMOL/L (ref 21–32)
CREAT SERPL-MCNC: 1.28 MG/DL (ref 0.6–1.3)
ERYTHROCYTE [DISTWIDTH] IN BLOOD BY AUTOMATED COUNT: 16.4 % (ref 11.6–15.1)
GFR SERPL CREATININE-BSD FRML MDRD: 52 ML/MIN/1.73SQ M
GLUCOSE SERPL-MCNC: 106 MG/DL (ref 65–140)
GLUCOSE SERPL-MCNC: 115 MG/DL (ref 65–140)
GLUCOSE SERPL-MCNC: 128 MG/DL (ref 65–140)
GLUCOSE SERPL-MCNC: 134 MG/DL (ref 65–140)
GLUCOSE SERPL-MCNC: 158 MG/DL (ref 65–140)
HCT VFR BLD AUTO: 31.7 % (ref 36.5–49.3)
HGB BLD-MCNC: 9.8 G/DL (ref 12–17)
INR PPP: 1.05 (ref 0.84–1.19)
MAGNESIUM SERPL-MCNC: 2 MG/DL (ref 1.6–2.6)
MCH RBC QN AUTO: 30.3 PG (ref 26.8–34.3)
MCHC RBC AUTO-ENTMCNC: 30.9 G/DL (ref 31.4–37.4)
MCV RBC AUTO: 98 FL (ref 82–98)
PLATELET # BLD AUTO: 285 THOUSANDS/UL (ref 149–390)
PMV BLD AUTO: 10.2 FL (ref 8.9–12.7)
POTASSIUM SERPL-SCNC: 4 MMOL/L (ref 3.5–5.3)
PROTHROMBIN TIME: 14 SECONDS (ref 11.6–14.5)
RBC # BLD AUTO: 3.23 MILLION/UL (ref 3.88–5.62)
SODIUM SERPL-SCNC: 138 MMOL/L (ref 136–145)
WBC # BLD AUTO: 7.58 THOUSAND/UL (ref 4.31–10.16)

## 2020-06-12 PROCEDURE — 94668 MNPJ CHEST WALL SBSQ: CPT

## 2020-06-12 PROCEDURE — 85027 COMPLETE CBC AUTOMATED: CPT | Performed by: STUDENT IN AN ORGANIZED HEALTH CARE EDUCATION/TRAINING PROGRAM

## 2020-06-12 PROCEDURE — 83735 ASSAY OF MAGNESIUM: CPT | Performed by: STUDENT IN AN ORGANIZED HEALTH CARE EDUCATION/TRAINING PROGRAM

## 2020-06-12 PROCEDURE — 85730 THROMBOPLASTIN TIME PARTIAL: CPT | Performed by: STUDENT IN AN ORGANIZED HEALTH CARE EDUCATION/TRAINING PROGRAM

## 2020-06-12 PROCEDURE — 94640 AIRWAY INHALATION TREATMENT: CPT

## 2020-06-12 PROCEDURE — 99232 SBSQ HOSP IP/OBS MODERATE 35: CPT | Performed by: STUDENT IN AN ORGANIZED HEALTH CARE EDUCATION/TRAINING PROGRAM

## 2020-06-12 PROCEDURE — 94760 N-INVAS EAR/PLS OXIMETRY 1: CPT

## 2020-06-12 PROCEDURE — 97110 THERAPEUTIC EXERCISES: CPT

## 2020-06-12 PROCEDURE — 85610 PROTHROMBIN TIME: CPT | Performed by: NURSE PRACTITIONER

## 2020-06-12 PROCEDURE — 80048 BASIC METABOLIC PNL TOTAL CA: CPT | Performed by: STUDENT IN AN ORGANIZED HEALTH CARE EDUCATION/TRAINING PROGRAM

## 2020-06-12 PROCEDURE — 99232 SBSQ HOSP IP/OBS MODERATE 35: CPT | Performed by: INTERNAL MEDICINE

## 2020-06-12 PROCEDURE — 99233 SBSQ HOSP IP/OBS HIGH 50: CPT | Performed by: INTERNAL MEDICINE

## 2020-06-12 PROCEDURE — 82948 REAGENT STRIP/BLOOD GLUCOSE: CPT

## 2020-06-12 RX ORDER — WARFARIN SODIUM 5 MG/1
5 TABLET ORAL
Status: COMPLETED | OUTPATIENT
Start: 2020-06-12 | End: 2020-06-12

## 2020-06-12 RX ADMIN — SENNOSIDES AND DOCUSATE SODIUM 1 TABLET: 8.6; 5 TABLET ORAL at 17:05

## 2020-06-12 RX ADMIN — CARVEDILOL 6.25 MG: 6.25 TABLET, FILM COATED ORAL at 17:05

## 2020-06-12 RX ADMIN — PANTOPRAZOLE SODIUM 40 MG: 40 TABLET, DELAYED RELEASE ORAL at 06:09

## 2020-06-12 RX ADMIN — PANTOPRAZOLE SODIUM 40 MG: 40 TABLET, DELAYED RELEASE ORAL at 16:38

## 2020-06-12 RX ADMIN — POTASSIUM CHLORIDE 40 MEQ: 1500 TABLET, EXTENDED RELEASE ORAL at 09:55

## 2020-06-12 RX ADMIN — OXYCODONE HYDROCHLORIDE AND ACETAMINOPHEN 1000 MG: 500 TABLET ORAL at 09:55

## 2020-06-12 RX ADMIN — PRAVASTATIN SODIUM 20 MG: 20 TABLET ORAL at 16:38

## 2020-06-12 RX ADMIN — CARVEDILOL 6.25 MG: 6.25 TABLET, FILM COATED ORAL at 09:55

## 2020-06-12 RX ADMIN — IPRATROPIUM BROMIDE AND ALBUTEROL SULFATE 3 ML: 2.5; .5 SOLUTION RESPIRATORY (INHALATION) at 15:00

## 2020-06-12 RX ADMIN — WARFARIN SODIUM 5 MG: 5 TABLET ORAL at 17:05

## 2020-06-12 RX ADMIN — FLUTICASONE FUROATE AND VILANTEROL TRIFENATATE 1 PUFF: 200; 25 POWDER RESPIRATORY (INHALATION) at 09:55

## 2020-06-12 RX ADMIN — IPRATROPIUM BROMIDE AND ALBUTEROL SULFATE 3 ML: 2.5; .5 SOLUTION RESPIRATORY (INHALATION) at 07:25

## 2020-06-12 RX ADMIN — AMOXICILLIN AND CLAVULANATE POTASSIUM 1 TABLET: 875; 125 TABLET, FILM COATED ORAL at 09:55

## 2020-06-12 RX ADMIN — AMOXICILLIN AND CLAVULANATE POTASSIUM 1 TABLET: 875; 125 TABLET, FILM COATED ORAL at 20:29

## 2020-06-12 RX ADMIN — HEPARIN SODIUM AND DEXTROSE 22 UNITS/KG/HR: 10000; 5 INJECTION INTRAVENOUS at 17:45

## 2020-06-12 RX ADMIN — MEMANTINE 10 MG: 10 TABLET ORAL at 09:55

## 2020-06-12 RX ADMIN — DIGOXIN 125 MCG: 125 TABLET ORAL at 09:55

## 2020-06-12 RX ADMIN — POTASSIUM CHLORIDE 40 MEQ: 1500 TABLET, EXTENDED RELEASE ORAL at 17:05

## 2020-06-12 RX ADMIN — GUAIFENESIN 600 MG: 600 TABLET, EXTENDED RELEASE ORAL at 20:29

## 2020-06-12 RX ADMIN — SENNOSIDES AND DOCUSATE SODIUM 1 TABLET: 8.6; 5 TABLET ORAL at 09:55

## 2020-06-12 RX ADMIN — B-COMPLEX W/ C & FOLIC ACID TAB 1 TABLET: TAB at 09:55

## 2020-06-12 RX ADMIN — INSULIN LISPRO 1 UNITS: 100 INJECTION, SOLUTION INTRAVENOUS; SUBCUTANEOUS at 12:36

## 2020-06-12 RX ADMIN — GUAIFENESIN 600 MG: 600 TABLET, EXTENDED RELEASE ORAL at 09:55

## 2020-06-12 RX ADMIN — IPRATROPIUM BROMIDE AND ALBUTEROL SULFATE 3 ML: 2.5; .5 SOLUTION RESPIRATORY (INHALATION) at 20:19

## 2020-06-13 ENCOUNTER — APPOINTMENT (INPATIENT)
Dept: RADIOLOGY | Facility: HOSPITAL | Age: 80
DRG: 871 | End: 2020-06-13
Payer: COMMERCIAL

## 2020-06-13 LAB
ALBUMIN SERPL BCP-MCNC: 2.9 G/DL (ref 3.5–5)
ALP SERPL-CCNC: 49 U/L (ref 46–116)
ALT SERPL W P-5'-P-CCNC: 19 U/L (ref 12–78)
ANION GAP SERPL CALCULATED.3IONS-SCNC: 6 MMOL/L (ref 4–13)
APTT PPP: 81 SECONDS (ref 23–37)
AST SERPL W P-5'-P-CCNC: 15 U/L (ref 5–45)
BILIRUB SERPL-MCNC: 0.5 MG/DL (ref 0.2–1)
BUN SERPL-MCNC: 21 MG/DL (ref 5–25)
CALCIUM SERPL-MCNC: 8.5 MG/DL (ref 8.3–10.1)
CHLORIDE SERPL-SCNC: 105 MMOL/L (ref 100–108)
CO2 SERPL-SCNC: 27 MMOL/L (ref 21–32)
CREAT SERPL-MCNC: 1.14 MG/DL (ref 0.6–1.3)
ERYTHROCYTE [DISTWIDTH] IN BLOOD BY AUTOMATED COUNT: 16.6 % (ref 11.6–15.1)
GFR SERPL CREATININE-BSD FRML MDRD: 60 ML/MIN/1.73SQ M
GLUCOSE SERPL-MCNC: 108 MG/DL (ref 65–140)
GLUCOSE SERPL-MCNC: 109 MG/DL (ref 65–140)
GLUCOSE SERPL-MCNC: 119 MG/DL (ref 65–140)
GLUCOSE SERPL-MCNC: 137 MG/DL (ref 65–140)
GLUCOSE SERPL-MCNC: 144 MG/DL (ref 65–140)
HCT VFR BLD AUTO: 27.6 % (ref 36.5–49.3)
HCT VFR BLD AUTO: 27.7 % (ref 36.5–49.3)
HGB BLD-MCNC: 8.7 G/DL (ref 12–17)
HGB BLD-MCNC: 8.7 G/DL (ref 12–17)
INR PPP: 1.06 (ref 0.84–1.19)
MAGNESIUM SERPL-MCNC: 2.1 MG/DL (ref 1.6–2.6)
MCH RBC QN AUTO: 31.2 PG (ref 26.8–34.3)
MCHC RBC AUTO-ENTMCNC: 31.5 G/DL (ref 31.4–37.4)
MCV RBC AUTO: 99 FL (ref 82–98)
PLATELET # BLD AUTO: 258 THOUSANDS/UL (ref 149–390)
PMV BLD AUTO: 10.2 FL (ref 8.9–12.7)
POTASSIUM SERPL-SCNC: 4.5 MMOL/L (ref 3.5–5.3)
PROT SERPL-MCNC: 5.9 G/DL (ref 6.4–8.2)
PROTHROMBIN TIME: 14.1 SECONDS (ref 11.6–14.5)
RBC # BLD AUTO: 2.79 MILLION/UL (ref 3.88–5.62)
SODIUM SERPL-SCNC: 138 MMOL/L (ref 136–145)
WBC # BLD AUTO: 7.67 THOUSAND/UL (ref 4.31–10.16)

## 2020-06-13 PROCEDURE — 82948 REAGENT STRIP/BLOOD GLUCOSE: CPT

## 2020-06-13 PROCEDURE — 99232 SBSQ HOSP IP/OBS MODERATE 35: CPT | Performed by: STUDENT IN AN ORGANIZED HEALTH CARE EDUCATION/TRAINING PROGRAM

## 2020-06-13 PROCEDURE — 99233 SBSQ HOSP IP/OBS HIGH 50: CPT | Performed by: INTERNAL MEDICINE

## 2020-06-13 PROCEDURE — 85027 COMPLETE CBC AUTOMATED: CPT | Performed by: STUDENT IN AN ORGANIZED HEALTH CARE EDUCATION/TRAINING PROGRAM

## 2020-06-13 PROCEDURE — 99232 SBSQ HOSP IP/OBS MODERATE 35: CPT | Performed by: INTERNAL MEDICINE

## 2020-06-13 PROCEDURE — 71045 X-RAY EXAM CHEST 1 VIEW: CPT

## 2020-06-13 PROCEDURE — 85610 PROTHROMBIN TIME: CPT | Performed by: NURSE PRACTITIONER

## 2020-06-13 PROCEDURE — 83735 ASSAY OF MAGNESIUM: CPT | Performed by: STUDENT IN AN ORGANIZED HEALTH CARE EDUCATION/TRAINING PROGRAM

## 2020-06-13 PROCEDURE — 85014 HEMATOCRIT: CPT | Performed by: STUDENT IN AN ORGANIZED HEALTH CARE EDUCATION/TRAINING PROGRAM

## 2020-06-13 PROCEDURE — 94760 N-INVAS EAR/PLS OXIMETRY 1: CPT

## 2020-06-13 PROCEDURE — 80053 COMPREHEN METABOLIC PANEL: CPT | Performed by: INTERNAL MEDICINE

## 2020-06-13 PROCEDURE — 85018 HEMOGLOBIN: CPT | Performed by: STUDENT IN AN ORGANIZED HEALTH CARE EDUCATION/TRAINING PROGRAM

## 2020-06-13 PROCEDURE — 94668 MNPJ CHEST WALL SBSQ: CPT

## 2020-06-13 PROCEDURE — 85730 THROMBOPLASTIN TIME PARTIAL: CPT | Performed by: STUDENT IN AN ORGANIZED HEALTH CARE EDUCATION/TRAINING PROGRAM

## 2020-06-13 PROCEDURE — 94640 AIRWAY INHALATION TREATMENT: CPT

## 2020-06-13 RX ORDER — WARFARIN SODIUM 7.5 MG/1
7.5 TABLET ORAL
Status: COMPLETED | OUTPATIENT
Start: 2020-06-13 | End: 2020-06-13

## 2020-06-13 RX ADMIN — CARVEDILOL 6.25 MG: 6.25 TABLET, FILM COATED ORAL at 08:58

## 2020-06-13 RX ADMIN — AMOXICILLIN AND CLAVULANATE POTASSIUM 1 TABLET: 875; 125 TABLET, FILM COATED ORAL at 08:58

## 2020-06-13 RX ADMIN — FLUTICASONE FUROATE AND VILANTEROL TRIFENATATE 1 PUFF: 200; 25 POWDER RESPIRATORY (INHALATION) at 08:58

## 2020-06-13 RX ADMIN — IPRATROPIUM BROMIDE AND ALBUTEROL SULFATE 3 ML: 2.5; .5 SOLUTION RESPIRATORY (INHALATION) at 20:04

## 2020-06-13 RX ADMIN — GUAIFENESIN 600 MG: 600 TABLET, EXTENDED RELEASE ORAL at 08:58

## 2020-06-13 RX ADMIN — HEPARIN SODIUM AND DEXTROSE 22 UNITS/KG/HR: 10000; 5 INJECTION INTRAVENOUS at 19:08

## 2020-06-13 RX ADMIN — IPRATROPIUM BROMIDE AND ALBUTEROL SULFATE 3 ML: 2.5; .5 SOLUTION RESPIRATORY (INHALATION) at 07:08

## 2020-06-13 RX ADMIN — POTASSIUM CHLORIDE 40 MEQ: 1500 TABLET, EXTENDED RELEASE ORAL at 08:58

## 2020-06-13 RX ADMIN — AMOXICILLIN AND CLAVULANATE POTASSIUM 1 TABLET: 875; 125 TABLET, FILM COATED ORAL at 20:36

## 2020-06-13 RX ADMIN — B-COMPLEX W/ C & FOLIC ACID TAB 1 TABLET: TAB at 08:58

## 2020-06-13 RX ADMIN — OXYCODONE HYDROCHLORIDE AND ACETAMINOPHEN 1000 MG: 500 TABLET ORAL at 08:58

## 2020-06-13 RX ADMIN — SENNOSIDES AND DOCUSATE SODIUM 1 TABLET: 8.6; 5 TABLET ORAL at 18:24

## 2020-06-13 RX ADMIN — MEMANTINE 10 MG: 10 TABLET ORAL at 08:58

## 2020-06-13 RX ADMIN — CARVEDILOL 6.25 MG: 6.25 TABLET, FILM COATED ORAL at 18:24

## 2020-06-13 RX ADMIN — SENNOSIDES AND DOCUSATE SODIUM 1 TABLET: 8.6; 5 TABLET ORAL at 08:58

## 2020-06-13 RX ADMIN — PANTOPRAZOLE SODIUM 40 MG: 40 TABLET, DELAYED RELEASE ORAL at 16:23

## 2020-06-13 RX ADMIN — PANTOPRAZOLE SODIUM 40 MG: 40 TABLET, DELAYED RELEASE ORAL at 06:29

## 2020-06-13 RX ADMIN — GUAIFENESIN 600 MG: 600 TABLET, EXTENDED RELEASE ORAL at 20:36

## 2020-06-13 RX ADMIN — DIGOXIN 125 MCG: 125 TABLET ORAL at 08:58

## 2020-06-13 RX ADMIN — PRAVASTATIN SODIUM 20 MG: 20 TABLET ORAL at 18:24

## 2020-06-13 RX ADMIN — WARFARIN SODIUM 7.5 MG: 7.5 TABLET ORAL at 20:27

## 2020-06-13 RX ADMIN — POTASSIUM CHLORIDE 40 MEQ: 1500 TABLET, EXTENDED RELEASE ORAL at 18:25

## 2020-06-13 RX ADMIN — IPRATROPIUM BROMIDE AND ALBUTEROL SULFATE 3 ML: 2.5; .5 SOLUTION RESPIRATORY (INHALATION) at 13:58

## 2020-06-14 LAB
ANION GAP SERPL CALCULATED.3IONS-SCNC: 3 MMOL/L (ref 4–13)
APTT PPP: 74 SECONDS (ref 23–37)
BUN SERPL-MCNC: 21 MG/DL (ref 5–25)
CALCIUM SERPL-MCNC: 8.7 MG/DL (ref 8.3–10.1)
CHLORIDE SERPL-SCNC: 106 MMOL/L (ref 100–108)
CO2 SERPL-SCNC: 30 MMOL/L (ref 21–32)
CREAT SERPL-MCNC: 1.21 MG/DL (ref 0.6–1.3)
ERYTHROCYTE [DISTWIDTH] IN BLOOD BY AUTOMATED COUNT: 16.6 % (ref 11.6–15.1)
GFR SERPL CREATININE-BSD FRML MDRD: 56 ML/MIN/1.73SQ M
GLUCOSE SERPL-MCNC: 108 MG/DL (ref 65–140)
GLUCOSE SERPL-MCNC: 122 MG/DL (ref 65–140)
GLUCOSE SERPL-MCNC: 128 MG/DL (ref 65–140)
GLUCOSE SERPL-MCNC: 136 MG/DL (ref 65–140)
GLUCOSE SERPL-MCNC: 221 MG/DL (ref 65–140)
HCT VFR BLD AUTO: 28.2 % (ref 36.5–49.3)
HGB BLD-MCNC: 8.7 G/DL (ref 12–17)
INR PPP: 1.19 (ref 0.84–1.19)
MCH RBC QN AUTO: 30.4 PG (ref 26.8–34.3)
MCHC RBC AUTO-ENTMCNC: 30.9 G/DL (ref 31.4–37.4)
MCV RBC AUTO: 99 FL (ref 82–98)
PLATELET # BLD AUTO: 278 THOUSANDS/UL (ref 149–390)
PMV BLD AUTO: 10.8 FL (ref 8.9–12.7)
POTASSIUM SERPL-SCNC: 4.7 MMOL/L (ref 3.5–5.3)
PROTHROMBIN TIME: 15.5 SECONDS (ref 11.6–14.5)
RBC # BLD AUTO: 2.86 MILLION/UL (ref 3.88–5.62)
SODIUM SERPL-SCNC: 139 MMOL/L (ref 136–145)
WBC # BLD AUTO: 7.74 THOUSAND/UL (ref 4.31–10.16)

## 2020-06-14 PROCEDURE — 99232 SBSQ HOSP IP/OBS MODERATE 35: CPT | Performed by: STUDENT IN AN ORGANIZED HEALTH CARE EDUCATION/TRAINING PROGRAM

## 2020-06-14 PROCEDURE — 94760 N-INVAS EAR/PLS OXIMETRY 1: CPT

## 2020-06-14 PROCEDURE — 85027 COMPLETE CBC AUTOMATED: CPT | Performed by: STUDENT IN AN ORGANIZED HEALTH CARE EDUCATION/TRAINING PROGRAM

## 2020-06-14 PROCEDURE — 94668 MNPJ CHEST WALL SBSQ: CPT

## 2020-06-14 PROCEDURE — 85610 PROTHROMBIN TIME: CPT | Performed by: STUDENT IN AN ORGANIZED HEALTH CARE EDUCATION/TRAINING PROGRAM

## 2020-06-14 PROCEDURE — 82948 REAGENT STRIP/BLOOD GLUCOSE: CPT

## 2020-06-14 PROCEDURE — 80048 BASIC METABOLIC PNL TOTAL CA: CPT | Performed by: STUDENT IN AN ORGANIZED HEALTH CARE EDUCATION/TRAINING PROGRAM

## 2020-06-14 PROCEDURE — 94640 AIRWAY INHALATION TREATMENT: CPT

## 2020-06-14 PROCEDURE — 85730 THROMBOPLASTIN TIME PARTIAL: CPT | Performed by: STUDENT IN AN ORGANIZED HEALTH CARE EDUCATION/TRAINING PROGRAM

## 2020-06-14 PROCEDURE — 99231 SBSQ HOSP IP/OBS SF/LOW 25: CPT | Performed by: INTERNAL MEDICINE

## 2020-06-14 PROCEDURE — 99233 SBSQ HOSP IP/OBS HIGH 50: CPT | Performed by: INTERNAL MEDICINE

## 2020-06-14 RX ORDER — FUROSEMIDE 10 MG/ML
40 INJECTION INTRAMUSCULAR; INTRAVENOUS ONCE
Status: COMPLETED | OUTPATIENT
Start: 2020-06-14 | End: 2020-06-14

## 2020-06-14 RX ORDER — WARFARIN SODIUM 7.5 MG/1
7.5 TABLET ORAL
Status: COMPLETED | OUTPATIENT
Start: 2020-06-14 | End: 2020-06-14

## 2020-06-14 RX ORDER — LISINOPRIL 20 MG/1
20 TABLET ORAL DAILY
Status: DISCONTINUED | OUTPATIENT
Start: 2020-06-14 | End: 2020-06-16

## 2020-06-14 RX ORDER — FUROSEMIDE 40 MG/1
40 TABLET ORAL DAILY
Status: DISCONTINUED | OUTPATIENT
Start: 2020-06-15 | End: 2020-06-15

## 2020-06-14 RX ADMIN — B-COMPLEX W/ C & FOLIC ACID TAB 1 TABLET: TAB at 08:17

## 2020-06-14 RX ADMIN — GUAIFENESIN 600 MG: 600 TABLET, EXTENDED RELEASE ORAL at 08:17

## 2020-06-14 RX ADMIN — PANTOPRAZOLE SODIUM 40 MG: 40 TABLET, DELAYED RELEASE ORAL at 06:12

## 2020-06-14 RX ADMIN — HEPARIN SODIUM AND DEXTROSE 22 UNITS/KG/HR: 10000; 5 INJECTION INTRAVENOUS at 19:34

## 2020-06-14 RX ADMIN — DIGOXIN 125 MCG: 125 TABLET ORAL at 08:12

## 2020-06-14 RX ADMIN — IPRATROPIUM BROMIDE AND ALBUTEROL SULFATE 3 ML: 2.5; .5 SOLUTION RESPIRATORY (INHALATION) at 13:45

## 2020-06-14 RX ADMIN — FLUTICASONE FUROATE AND VILANTEROL TRIFENATATE 1 PUFF: 200; 25 POWDER RESPIRATORY (INHALATION) at 08:18

## 2020-06-14 RX ADMIN — MEMANTINE 10 MG: 10 TABLET ORAL at 08:12

## 2020-06-14 RX ADMIN — FUROSEMIDE 40 MG: 10 INJECTION, SOLUTION INTRAMUSCULAR; INTRAVENOUS at 08:09

## 2020-06-14 RX ADMIN — IPRATROPIUM BROMIDE AND ALBUTEROL SULFATE 3 ML: 2.5; .5 SOLUTION RESPIRATORY (INHALATION) at 07:22

## 2020-06-14 RX ADMIN — AMOXICILLIN AND CLAVULANATE POTASSIUM 1 TABLET: 875; 125 TABLET, FILM COATED ORAL at 21:03

## 2020-06-14 RX ADMIN — CARVEDILOL 6.25 MG: 6.25 TABLET, FILM COATED ORAL at 08:12

## 2020-06-14 RX ADMIN — INSULIN LISPRO 2 UNITS: 100 INJECTION, SOLUTION INTRAVENOUS; SUBCUTANEOUS at 08:19

## 2020-06-14 RX ADMIN — LISINOPRIL 20 MG: 20 TABLET ORAL at 14:39

## 2020-06-14 RX ADMIN — AMOXICILLIN AND CLAVULANATE POTASSIUM 1 TABLET: 875; 125 TABLET, FILM COATED ORAL at 08:13

## 2020-06-14 RX ADMIN — PANTOPRAZOLE SODIUM 40 MG: 40 TABLET, DELAYED RELEASE ORAL at 16:02

## 2020-06-14 RX ADMIN — POTASSIUM CHLORIDE 40 MEQ: 1500 TABLET, EXTENDED RELEASE ORAL at 17:26

## 2020-06-14 RX ADMIN — WARFARIN SODIUM 7.5 MG: 7.5 TABLET ORAL at 17:26

## 2020-06-14 RX ADMIN — POTASSIUM CHLORIDE 40 MEQ: 1500 TABLET, EXTENDED RELEASE ORAL at 08:18

## 2020-06-14 RX ADMIN — PRAVASTATIN SODIUM 20 MG: 20 TABLET ORAL at 16:02

## 2020-06-14 RX ADMIN — OXYCODONE HYDROCHLORIDE AND ACETAMINOPHEN 1000 MG: 500 TABLET ORAL at 08:13

## 2020-06-14 RX ADMIN — GUAIFENESIN 600 MG: 600 TABLET, EXTENDED RELEASE ORAL at 21:03

## 2020-06-15 LAB
ANION GAP SERPL CALCULATED.3IONS-SCNC: 6 MMOL/L (ref 4–13)
APTT PPP: 133 SECONDS (ref 23–37)
APTT PPP: 78 SECONDS (ref 23–37)
APTT PPP: 86 SECONDS (ref 23–37)
BUN SERPL-MCNC: 28 MG/DL (ref 5–25)
CALCIUM SERPL-MCNC: 8.3 MG/DL (ref 8.3–10.1)
CHLORIDE SERPL-SCNC: 104 MMOL/L (ref 100–108)
CO2 SERPL-SCNC: 27 MMOL/L (ref 21–32)
CREAT SERPL-MCNC: 1.4 MG/DL (ref 0.6–1.3)
ERYTHROCYTE [DISTWIDTH] IN BLOOD BY AUTOMATED COUNT: 16.4 % (ref 11.6–15.1)
GFR SERPL CREATININE-BSD FRML MDRD: 47 ML/MIN/1.73SQ M
GLUCOSE SERPL-MCNC: 107 MG/DL (ref 65–140)
GLUCOSE SERPL-MCNC: 121 MG/DL (ref 65–140)
GLUCOSE SERPL-MCNC: 126 MG/DL (ref 65–140)
GLUCOSE SERPL-MCNC: 140 MG/DL (ref 65–140)
GLUCOSE SERPL-MCNC: 162 MG/DL (ref 65–140)
HCT VFR BLD AUTO: 27.1 % (ref 36.5–49.3)
HGB BLD-MCNC: 8.5 G/DL (ref 12–17)
INR PPP: 1.46 (ref 0.84–1.19)
MAGNESIUM SERPL-MCNC: 2.2 MG/DL (ref 1.6–2.6)
MCH RBC QN AUTO: 30.9 PG (ref 26.8–34.3)
MCHC RBC AUTO-ENTMCNC: 31.4 G/DL (ref 31.4–37.4)
MCV RBC AUTO: 99 FL (ref 82–98)
PLATELET # BLD AUTO: 241 THOUSANDS/UL (ref 149–390)
PMV BLD AUTO: 11.1 FL (ref 8.9–12.7)
POTASSIUM SERPL-SCNC: 4.8 MMOL/L (ref 3.5–5.3)
PROTHROMBIN TIME: 18.2 SECONDS (ref 11.6–14.5)
RBC # BLD AUTO: 2.75 MILLION/UL (ref 3.88–5.62)
SODIUM SERPL-SCNC: 137 MMOL/L (ref 136–145)
WBC # BLD AUTO: 9.76 THOUSAND/UL (ref 4.31–10.16)

## 2020-06-15 PROCEDURE — 94640 AIRWAY INHALATION TREATMENT: CPT

## 2020-06-15 PROCEDURE — 85730 THROMBOPLASTIN TIME PARTIAL: CPT | Performed by: STUDENT IN AN ORGANIZED HEALTH CARE EDUCATION/TRAINING PROGRAM

## 2020-06-15 PROCEDURE — 94760 N-INVAS EAR/PLS OXIMETRY 1: CPT

## 2020-06-15 PROCEDURE — 99232 SBSQ HOSP IP/OBS MODERATE 35: CPT | Performed by: INTERNAL MEDICINE

## 2020-06-15 PROCEDURE — 80048 BASIC METABOLIC PNL TOTAL CA: CPT | Performed by: STUDENT IN AN ORGANIZED HEALTH CARE EDUCATION/TRAINING PROGRAM

## 2020-06-15 PROCEDURE — 83735 ASSAY OF MAGNESIUM: CPT | Performed by: STUDENT IN AN ORGANIZED HEALTH CARE EDUCATION/TRAINING PROGRAM

## 2020-06-15 PROCEDURE — 99232 SBSQ HOSP IP/OBS MODERATE 35: CPT | Performed by: STUDENT IN AN ORGANIZED HEALTH CARE EDUCATION/TRAINING PROGRAM

## 2020-06-15 PROCEDURE — 85610 PROTHROMBIN TIME: CPT | Performed by: STUDENT IN AN ORGANIZED HEALTH CARE EDUCATION/TRAINING PROGRAM

## 2020-06-15 PROCEDURE — 94668 MNPJ CHEST WALL SBSQ: CPT

## 2020-06-15 PROCEDURE — 85027 COMPLETE CBC AUTOMATED: CPT | Performed by: STUDENT IN AN ORGANIZED HEALTH CARE EDUCATION/TRAINING PROGRAM

## 2020-06-15 PROCEDURE — 82948 REAGENT STRIP/BLOOD GLUCOSE: CPT

## 2020-06-15 RX ORDER — WARFARIN SODIUM 7.5 MG/1
7.5 TABLET ORAL
Status: COMPLETED | OUTPATIENT
Start: 2020-06-15 | End: 2020-06-15

## 2020-06-15 RX ORDER — IPRATROPIUM BROMIDE AND ALBUTEROL SULFATE 2.5; .5 MG/3ML; MG/3ML
3 SOLUTION RESPIRATORY (INHALATION)
Status: DISCONTINUED | OUTPATIENT
Start: 2020-06-16 | End: 2020-06-19 | Stop reason: HOSPADM

## 2020-06-15 RX ADMIN — INSULIN LISPRO 1 UNITS: 100 INJECTION, SOLUTION INTRAVENOUS; SUBCUTANEOUS at 21:22

## 2020-06-15 RX ADMIN — OXYCODONE HYDROCHLORIDE AND ACETAMINOPHEN 1000 MG: 500 TABLET ORAL at 08:36

## 2020-06-15 RX ADMIN — AMOXICILLIN AND CLAVULANATE POTASSIUM 1 TABLET: 875; 125 TABLET, FILM COATED ORAL at 21:22

## 2020-06-15 RX ADMIN — FLUTICASONE FUROATE AND VILANTEROL TRIFENATATE 1 PUFF: 200; 25 POWDER RESPIRATORY (INHALATION) at 08:37

## 2020-06-15 RX ADMIN — B-COMPLEX W/ C & FOLIC ACID TAB 1 TABLET: TAB at 08:36

## 2020-06-15 RX ADMIN — GUAIFENESIN 600 MG: 600 TABLET, EXTENDED RELEASE ORAL at 08:36

## 2020-06-15 RX ADMIN — HEPARIN SODIUM AND DEXTROSE 19 UNITS/KG/HR: 10000; 5 INJECTION INTRAVENOUS at 20:55

## 2020-06-15 RX ADMIN — PANTOPRAZOLE SODIUM 40 MG: 40 TABLET, DELAYED RELEASE ORAL at 17:04

## 2020-06-15 RX ADMIN — GUAIFENESIN 600 MG: 600 TABLET, EXTENDED RELEASE ORAL at 21:22

## 2020-06-15 RX ADMIN — IPRATROPIUM BROMIDE AND ALBUTEROL SULFATE 3 ML: 2.5; .5 SOLUTION RESPIRATORY (INHALATION) at 19:39

## 2020-06-15 RX ADMIN — IPRATROPIUM BROMIDE AND ALBUTEROL SULFATE 3 ML: 2.5; .5 SOLUTION RESPIRATORY (INHALATION) at 07:44

## 2020-06-15 RX ADMIN — MEMANTINE 10 MG: 10 TABLET ORAL at 08:36

## 2020-06-15 RX ADMIN — POTASSIUM CHLORIDE 40 MEQ: 1500 TABLET, EXTENDED RELEASE ORAL at 17:04

## 2020-06-15 RX ADMIN — SENNOSIDES AND DOCUSATE SODIUM 1 TABLET: 8.6; 5 TABLET ORAL at 08:36

## 2020-06-15 RX ADMIN — DIGOXIN 125 MCG: 125 TABLET ORAL at 08:36

## 2020-06-15 RX ADMIN — SENNOSIDES AND DOCUSATE SODIUM 1 TABLET: 8.6; 5 TABLET ORAL at 17:04

## 2020-06-15 RX ADMIN — WARFARIN SODIUM 7.5 MG: 7.5 TABLET ORAL at 17:04

## 2020-06-15 RX ADMIN — PANTOPRAZOLE SODIUM 40 MG: 40 TABLET, DELAYED RELEASE ORAL at 06:11

## 2020-06-15 RX ADMIN — AMOXICILLIN AND CLAVULANATE POTASSIUM 1 TABLET: 875; 125 TABLET, FILM COATED ORAL at 08:36

## 2020-06-15 RX ADMIN — PRAVASTATIN SODIUM 20 MG: 20 TABLET ORAL at 17:04

## 2020-06-15 RX ADMIN — IPRATROPIUM BROMIDE AND ALBUTEROL SULFATE 3 ML: 2.5; .5 SOLUTION RESPIRATORY (INHALATION) at 14:34

## 2020-06-15 RX ADMIN — POTASSIUM CHLORIDE 40 MEQ: 1500 TABLET, EXTENDED RELEASE ORAL at 08:36

## 2020-06-16 ENCOUNTER — APPOINTMENT (INPATIENT)
Dept: RADIOLOGY | Facility: HOSPITAL | Age: 80
DRG: 871 | End: 2020-06-16
Payer: COMMERCIAL

## 2020-06-16 LAB
ANION GAP SERPL CALCULATED.3IONS-SCNC: 5 MMOL/L (ref 4–13)
APTT PPP: 80 SECONDS (ref 23–37)
BASOPHILS # BLD AUTO: 0.06 THOUSANDS/ΜL (ref 0–0.1)
BASOPHILS NFR BLD AUTO: 1 % (ref 0–1)
BUN SERPL-MCNC: 31 MG/DL (ref 5–25)
CALCIUM SERPL-MCNC: 8.4 MG/DL (ref 8.3–10.1)
CHLORIDE SERPL-SCNC: 103 MMOL/L (ref 100–108)
CO2 SERPL-SCNC: 28 MMOL/L (ref 21–32)
CREAT SERPL-MCNC: 1.41 MG/DL (ref 0.6–1.3)
DIGOXIN SERPL-MCNC: 1.3 NG/ML (ref 0.8–2)
EOSINOPHIL # BLD AUTO: 0.19 THOUSAND/ΜL (ref 0–0.61)
EOSINOPHIL NFR BLD AUTO: 2 % (ref 0–6)
ERYTHROCYTE [DISTWIDTH] IN BLOOD BY AUTOMATED COUNT: 16.4 % (ref 11.6–15.1)
GFR SERPL CREATININE-BSD FRML MDRD: 46 ML/MIN/1.73SQ M
GLUCOSE SERPL-MCNC: 122 MG/DL (ref 65–140)
GLUCOSE SERPL-MCNC: 122 MG/DL (ref 65–140)
GLUCOSE SERPL-MCNC: 126 MG/DL (ref 65–140)
GLUCOSE SERPL-MCNC: 135 MG/DL (ref 65–140)
GLUCOSE SERPL-MCNC: 175 MG/DL (ref 65–140)
GLUCOSE SERPL-MCNC: 212 MG/DL (ref 65–140)
HCT VFR BLD AUTO: 25.1 % (ref 36.5–49.3)
HGB BLD-MCNC: 7.9 G/DL (ref 12–17)
IMM GRANULOCYTES # BLD AUTO: 0.04 THOUSAND/UL (ref 0–0.2)
IMM GRANULOCYTES NFR BLD AUTO: 1 % (ref 0–2)
INR PPP: 1.89 (ref 0.84–1.19)
LYMPHOCYTES # BLD AUTO: 1.69 THOUSANDS/ΜL (ref 0.6–4.47)
LYMPHOCYTES NFR BLD AUTO: 19 % (ref 14–44)
MCH RBC QN AUTO: 31 PG (ref 26.8–34.3)
MCHC RBC AUTO-ENTMCNC: 31.5 G/DL (ref 31.4–37.4)
MCV RBC AUTO: 98 FL (ref 82–98)
MONOCYTES # BLD AUTO: 1.02 THOUSAND/ΜL (ref 0.17–1.22)
MONOCYTES NFR BLD AUTO: 12 % (ref 4–12)
NEUTROPHILS # BLD AUTO: 5.87 THOUSANDS/ΜL (ref 1.85–7.62)
NEUTS SEG NFR BLD AUTO: 65 % (ref 43–75)
NRBC BLD AUTO-RTO: 0 /100 WBCS
PLATELET # BLD AUTO: 258 THOUSANDS/UL (ref 149–390)
PMV BLD AUTO: 10.7 FL (ref 8.9–12.7)
POTASSIUM SERPL-SCNC: 5.2 MMOL/L (ref 3.5–5.3)
PROTHROMBIN TIME: 22.4 SECONDS (ref 11.6–14.5)
RBC # BLD AUTO: 2.55 MILLION/UL (ref 3.88–5.62)
SODIUM SERPL-SCNC: 136 MMOL/L (ref 136–145)
WBC # BLD AUTO: 8.87 THOUSAND/UL (ref 4.31–10.16)

## 2020-06-16 PROCEDURE — 94640 AIRWAY INHALATION TREATMENT: CPT

## 2020-06-16 PROCEDURE — 94760 N-INVAS EAR/PLS OXIMETRY 1: CPT

## 2020-06-16 PROCEDURE — 80162 ASSAY OF DIGOXIN TOTAL: CPT | Performed by: INTERNAL MEDICINE

## 2020-06-16 PROCEDURE — 82948 REAGENT STRIP/BLOOD GLUCOSE: CPT

## 2020-06-16 PROCEDURE — 85610 PROTHROMBIN TIME: CPT | Performed by: STUDENT IN AN ORGANIZED HEALTH CARE EDUCATION/TRAINING PROGRAM

## 2020-06-16 PROCEDURE — 99233 SBSQ HOSP IP/OBS HIGH 50: CPT | Performed by: INTERNAL MEDICINE

## 2020-06-16 PROCEDURE — NC001 PR NO CHARGE: Performed by: INTERNAL MEDICINE

## 2020-06-16 PROCEDURE — 80048 BASIC METABOLIC PNL TOTAL CA: CPT | Performed by: STUDENT IN AN ORGANIZED HEALTH CARE EDUCATION/TRAINING PROGRAM

## 2020-06-16 PROCEDURE — 85025 COMPLETE CBC W/AUTO DIFF WBC: CPT | Performed by: INTERNAL MEDICINE

## 2020-06-16 PROCEDURE — 97110 THERAPEUTIC EXERCISES: CPT

## 2020-06-16 PROCEDURE — 71045 X-RAY EXAM CHEST 1 VIEW: CPT

## 2020-06-16 PROCEDURE — 93005 ELECTROCARDIOGRAM TRACING: CPT

## 2020-06-16 PROCEDURE — 85730 THROMBOPLASTIN TIME PARTIAL: CPT | Performed by: STUDENT IN AN ORGANIZED HEALTH CARE EDUCATION/TRAINING PROGRAM

## 2020-06-16 PROCEDURE — 94668 MNPJ CHEST WALL SBSQ: CPT

## 2020-06-16 RX ORDER — IPRATROPIUM BROMIDE AND ALBUTEROL SULFATE 2.5; .5 MG/3ML; MG/3ML
3 SOLUTION RESPIRATORY (INHALATION) EVERY 6 HOURS PRN
Status: DISCONTINUED | OUTPATIENT
Start: 2020-06-16 | End: 2020-06-19 | Stop reason: HOSPADM

## 2020-06-16 RX ORDER — METHYLPREDNISOLONE SODIUM SUCCINATE 40 MG/ML
40 INJECTION, POWDER, LYOPHILIZED, FOR SOLUTION INTRAMUSCULAR; INTRAVENOUS ONCE
Status: COMPLETED | OUTPATIENT
Start: 2020-06-16 | End: 2020-06-16

## 2020-06-16 RX ORDER — FUROSEMIDE 10 MG/ML
40 INJECTION INTRAMUSCULAR; INTRAVENOUS ONCE
Status: COMPLETED | OUTPATIENT
Start: 2020-06-16 | End: 2020-06-16

## 2020-06-16 RX ADMIN — IPRATROPIUM BROMIDE AND ALBUTEROL SULFATE 3 ML: 2.5; .5 SOLUTION RESPIRATORY (INHALATION) at 13:30

## 2020-06-16 RX ADMIN — PRAVASTATIN SODIUM 20 MG: 20 TABLET ORAL at 17:32

## 2020-06-16 RX ADMIN — GUAIFENESIN 600 MG: 600 TABLET, EXTENDED RELEASE ORAL at 21:44

## 2020-06-16 RX ADMIN — FUROSEMIDE 40 MG: 10 INJECTION, SOLUTION INTRAMUSCULAR; INTRAVENOUS at 19:01

## 2020-06-16 RX ADMIN — IPRATROPIUM BROMIDE AND ALBUTEROL SULFATE 3 ML: 2.5; .5 SOLUTION RESPIRATORY (INHALATION) at 08:29

## 2020-06-16 RX ADMIN — FLUTICASONE FUROATE AND VILANTEROL TRIFENATATE 1 PUFF: 200; 25 POWDER RESPIRATORY (INHALATION) at 09:38

## 2020-06-16 RX ADMIN — SENNOSIDES AND DOCUSATE SODIUM 1 TABLET: 8.6; 5 TABLET ORAL at 09:38

## 2020-06-16 RX ADMIN — INSULIN LISPRO 1 UNITS: 100 INJECTION, SOLUTION INTRAVENOUS; SUBCUTANEOUS at 21:44

## 2020-06-16 RX ADMIN — PANTOPRAZOLE SODIUM 40 MG: 40 TABLET, DELAYED RELEASE ORAL at 17:32

## 2020-06-16 RX ADMIN — AMOXICILLIN AND CLAVULANATE POTASSIUM 1 TABLET: 875; 125 TABLET, FILM COATED ORAL at 21:44

## 2020-06-16 RX ADMIN — DIGOXIN 125 MCG: 125 TABLET ORAL at 09:38

## 2020-06-16 RX ADMIN — AMOXICILLIN AND CLAVULANATE POTASSIUM 1 TABLET: 875; 125 TABLET, FILM COATED ORAL at 09:38

## 2020-06-16 RX ADMIN — OXYCODONE HYDROCHLORIDE AND ACETAMINOPHEN 1000 MG: 500 TABLET ORAL at 09:38

## 2020-06-16 RX ADMIN — CARVEDILOL 6.25 MG: 6.25 TABLET, FILM COATED ORAL at 09:38

## 2020-06-16 RX ADMIN — B-COMPLEX W/ C & FOLIC ACID TAB 1 TABLET: TAB at 09:38

## 2020-06-16 RX ADMIN — POTASSIUM CHLORIDE 40 MEQ: 1500 TABLET, EXTENDED RELEASE ORAL at 09:38

## 2020-06-16 RX ADMIN — MEMANTINE 10 MG: 10 TABLET ORAL at 09:38

## 2020-06-16 RX ADMIN — SENNOSIDES AND DOCUSATE SODIUM 1 TABLET: 8.6; 5 TABLET ORAL at 17:32

## 2020-06-16 RX ADMIN — PANTOPRAZOLE SODIUM 40 MG: 40 TABLET, DELAYED RELEASE ORAL at 06:18

## 2020-06-16 RX ADMIN — METHYLPREDNISOLONE SODIUM SUCCINATE 40 MG: 40 INJECTION, POWDER, FOR SOLUTION INTRAMUSCULAR; INTRAVENOUS at 19:51

## 2020-06-16 RX ADMIN — IPRATROPIUM BROMIDE AND ALBUTEROL SULFATE 3 ML: 2.5; .5 SOLUTION RESPIRATORY (INHALATION) at 18:47

## 2020-06-16 RX ADMIN — GUAIFENESIN 600 MG: 600 TABLET, EXTENDED RELEASE ORAL at 09:38

## 2020-06-17 ENCOUNTER — APPOINTMENT (INPATIENT)
Dept: PERIOP | Facility: HOSPITAL | Age: 80
DRG: 871 | End: 2020-06-17
Payer: COMMERCIAL

## 2020-06-17 ENCOUNTER — ANESTHESIA EVENT (INPATIENT)
Dept: PERIOP | Facility: HOSPITAL | Age: 80
DRG: 871 | End: 2020-06-17
Payer: COMMERCIAL

## 2020-06-17 ENCOUNTER — ANESTHESIA (INPATIENT)
Dept: PERIOP | Facility: HOSPITAL | Age: 80
DRG: 871 | End: 2020-06-17
Payer: COMMERCIAL

## 2020-06-17 LAB
ANION GAP SERPL CALCULATED.3IONS-SCNC: 9 MMOL/L (ref 4–13)
ATRIAL RATE: 115 BPM
ATRIAL RATE: 117 BPM
ATRIAL RATE: 125 BPM
BASOPHILS # BLD AUTO: 0.02 THOUSANDS/ΜL (ref 0–0.1)
BASOPHILS NFR BLD AUTO: 0 % (ref 0–1)
BUN SERPL-MCNC: 32 MG/DL (ref 5–25)
CALCIUM SERPL-MCNC: 8.9 MG/DL (ref 8.3–10.1)
CHLORIDE SERPL-SCNC: 98 MMOL/L (ref 100–108)
CO2 SERPL-SCNC: 26 MMOL/L (ref 21–32)
CREAT SERPL-MCNC: 1.41 MG/DL (ref 0.6–1.3)
EOSINOPHIL # BLD AUTO: 0 THOUSAND/ΜL (ref 0–0.61)
EOSINOPHIL NFR BLD AUTO: 0 % (ref 0–6)
ERYTHROCYTE [DISTWIDTH] IN BLOOD BY AUTOMATED COUNT: 15.9 % (ref 11.6–15.1)
GFR SERPL CREATININE-BSD FRML MDRD: 46 ML/MIN/1.73SQ M
GLUCOSE SERPL-MCNC: 117 MG/DL (ref 65–140)
GLUCOSE SERPL-MCNC: 121 MG/DL (ref 65–140)
GLUCOSE SERPL-MCNC: 141 MG/DL (ref 65–140)
GLUCOSE SERPL-MCNC: 158 MG/DL (ref 65–140)
GLUCOSE SERPL-MCNC: 207 MG/DL (ref 65–140)
HCT VFR BLD AUTO: 24.8 % (ref 36.5–49.3)
HGB BLD-MCNC: 7.9 G/DL (ref 12–17)
IMM GRANULOCYTES # BLD AUTO: 0.03 THOUSAND/UL (ref 0–0.2)
IMM GRANULOCYTES NFR BLD AUTO: 1 % (ref 0–2)
INR PPP: 1.81 (ref 0.84–1.19)
LYMPHOCYTES # BLD AUTO: 0.6 THOUSANDS/ΜL (ref 0.6–4.47)
LYMPHOCYTES NFR BLD AUTO: 10 % (ref 14–44)
MAGNESIUM SERPL-MCNC: 2.3 MG/DL (ref 1.6–2.6)
MCH RBC QN AUTO: 30.6 PG (ref 26.8–34.3)
MCHC RBC AUTO-ENTMCNC: 31.9 G/DL (ref 31.4–37.4)
MCV RBC AUTO: 96 FL (ref 82–98)
MONOCYTES # BLD AUTO: 0.32 THOUSAND/ΜL (ref 0.17–1.22)
MONOCYTES NFR BLD AUTO: 5 % (ref 4–12)
NEUTROPHILS # BLD AUTO: 4.96 THOUSANDS/ΜL (ref 1.85–7.62)
NEUTS SEG NFR BLD AUTO: 84 % (ref 43–75)
NRBC BLD AUTO-RTO: 0 /100 WBCS
P AXIS: 71 DEGREES
P AXIS: 75 DEGREES
PLATELET # BLD AUTO: 265 THOUSANDS/UL (ref 149–390)
PMV BLD AUTO: 11.3 FL (ref 8.9–12.7)
POTASSIUM SERPL-SCNC: 4.9 MMOL/L (ref 3.5–5.3)
PR INTERVAL: 166 MS
PR INTERVAL: 172 MS
PROTHROMBIN TIME: 21.7 SECONDS (ref 11.6–14.5)
QRS AXIS: 14 DEGREES
QRS AXIS: 2 DEGREES
QRS AXIS: 6 DEGREES
QRSD INTERVAL: 58 MS
QRSD INTERVAL: 70 MS
QRSD INTERVAL: 76 MS
QT INTERVAL: 282 MS
QT INTERVAL: 302 MS
QT INTERVAL: 442 MS
QTC INTERVAL: 390 MS
QTC INTERVAL: 421 MS
QTC INTERVAL: 614 MS
RBC # BLD AUTO: 2.58 MILLION/UL (ref 3.88–5.62)
SODIUM SERPL-SCNC: 133 MMOL/L (ref 136–145)
T WAVE AXIS: 103 DEGREES
T WAVE AXIS: 78 DEGREES
T WAVE AXIS: 97 DEGREES
VENTRICULAR RATE: 115 BPM
VENTRICULAR RATE: 116 BPM
VENTRICULAR RATE: 117 BPM
WBC # BLD AUTO: 5.93 THOUSAND/UL (ref 4.31–10.16)

## 2020-06-17 PROCEDURE — 93010 ELECTROCARDIOGRAM REPORT: CPT | Performed by: INTERNAL MEDICINE

## 2020-06-17 PROCEDURE — 87077 CULTURE AEROBIC IDENTIFY: CPT | Performed by: INTERNAL MEDICINE

## 2020-06-17 PROCEDURE — 94668 MNPJ CHEST WALL SBSQ: CPT

## 2020-06-17 PROCEDURE — 88112 CYTOPATH CELL ENHANCE TECH: CPT | Performed by: PATHOLOGY

## 2020-06-17 PROCEDURE — 0B9B8ZZ DRAINAGE OF LEFT LOWER LOBE BRONCHUS, VIA NATURAL OR ARTIFICIAL OPENING ENDOSCOPIC: ICD-10-PCS | Performed by: INTERNAL MEDICINE

## 2020-06-17 PROCEDURE — 80048 BASIC METABOLIC PNL TOTAL CA: CPT | Performed by: INTERNAL MEDICINE

## 2020-06-17 PROCEDURE — 88305 TISSUE EXAM BY PATHOLOGIST: CPT | Performed by: PATHOLOGY

## 2020-06-17 PROCEDURE — 99232 SBSQ HOSP IP/OBS MODERATE 35: CPT | Performed by: INTERNAL MEDICINE

## 2020-06-17 PROCEDURE — 87116 MYCOBACTERIA CULTURE: CPT | Performed by: INTERNAL MEDICINE

## 2020-06-17 PROCEDURE — 85610 PROTHROMBIN TIME: CPT | Performed by: INTERNAL MEDICINE

## 2020-06-17 PROCEDURE — 87070 CULTURE OTHR SPECIMN AEROBIC: CPT | Performed by: INTERNAL MEDICINE

## 2020-06-17 PROCEDURE — 87206 SMEAR FLUORESCENT/ACID STAI: CPT | Performed by: INTERNAL MEDICINE

## 2020-06-17 PROCEDURE — 82948 REAGENT STRIP/BLOOD GLUCOSE: CPT

## 2020-06-17 PROCEDURE — 94640 AIRWAY INHALATION TREATMENT: CPT

## 2020-06-17 PROCEDURE — 87102 FUNGUS ISOLATION CULTURE: CPT | Performed by: INTERNAL MEDICINE

## 2020-06-17 PROCEDURE — 94760 N-INVAS EAR/PLS OXIMETRY 1: CPT

## 2020-06-17 PROCEDURE — 87186 SC STD MICRODIL/AGAR DIL: CPT | Performed by: INTERNAL MEDICINE

## 2020-06-17 PROCEDURE — 31624 DX BRONCHOSCOPE/LAVAGE: CPT

## 2020-06-17 PROCEDURE — 83735 ASSAY OF MAGNESIUM: CPT | Performed by: INTERNAL MEDICINE

## 2020-06-17 PROCEDURE — 85025 COMPLETE CBC W/AUTO DIFF WBC: CPT | Performed by: INTERNAL MEDICINE

## 2020-06-17 RX ORDER — SODIUM CHLORIDE, SODIUM LACTATE, POTASSIUM CHLORIDE, CALCIUM CHLORIDE 600; 310; 30; 20 MG/100ML; MG/100ML; MG/100ML; MG/100ML
75 INJECTION, SOLUTION INTRAVENOUS CONTINUOUS
Status: DISCONTINUED | OUTPATIENT
Start: 2020-06-17 | End: 2020-06-17

## 2020-06-17 RX ORDER — SODIUM CHLORIDE 9 MG/ML
INJECTION INTRAVENOUS CODE/TRAUMA/SEDATION MEDICATION
Status: DISCONTINUED | OUTPATIENT
Start: 2020-06-17 | End: 2020-06-19 | Stop reason: HOSPADM

## 2020-06-17 RX ORDER — ONDANSETRON 2 MG/ML
4 INJECTION INTRAMUSCULAR; INTRAVENOUS ONCE AS NEEDED
Status: DISCONTINUED | OUTPATIENT
Start: 2020-06-17 | End: 2020-06-18

## 2020-06-17 RX ORDER — WARFARIN SODIUM 5 MG/1
5 TABLET ORAL
Status: DISCONTINUED | OUTPATIENT
Start: 2020-06-17 | End: 2020-06-17

## 2020-06-17 RX ORDER — PROPOFOL 10 MG/ML
INJECTION, EMULSION INTRAVENOUS AS NEEDED
Status: DISCONTINUED | OUTPATIENT
Start: 2020-06-17 | End: 2020-06-17 | Stop reason: SURG

## 2020-06-17 RX ORDER — LIDOCAINE HYDROCHLORIDE 40 MG/ML
5 INJECTION, SOLUTION RETROBULBAR; TOPICAL ONCE
Status: COMPLETED | OUTPATIENT
Start: 2020-06-17 | End: 2020-06-17

## 2020-06-17 RX ORDER — FUROSEMIDE 40 MG/1
40 TABLET ORAL DAILY
Status: DISCONTINUED | OUTPATIENT
Start: 2020-06-17 | End: 2020-06-19 | Stop reason: HOSPADM

## 2020-06-17 RX ORDER — PROPOFOL 10 MG/ML
INJECTION, EMULSION INTRAVENOUS CONTINUOUS PRN
Status: DISCONTINUED | OUTPATIENT
Start: 2020-06-17 | End: 2020-06-17 | Stop reason: SURG

## 2020-06-17 RX ORDER — MIDAZOLAM HYDROCHLORIDE 2 MG/2ML
INJECTION, SOLUTION INTRAMUSCULAR; INTRAVENOUS AS NEEDED
Status: DISCONTINUED | OUTPATIENT
Start: 2020-06-17 | End: 2020-06-17 | Stop reason: SURG

## 2020-06-17 RX ORDER — METHYLPREDNISOLONE SODIUM SUCCINATE 40 MG/ML
40 INJECTION, POWDER, LYOPHILIZED, FOR SOLUTION INTRAMUSCULAR; INTRAVENOUS ONCE
Status: COMPLETED | OUTPATIENT
Start: 2020-06-17 | End: 2020-06-17

## 2020-06-17 RX ORDER — FENTANYL CITRATE 50 UG/ML
INJECTION, SOLUTION INTRAMUSCULAR; INTRAVENOUS AS NEEDED
Status: DISCONTINUED | OUTPATIENT
Start: 2020-06-17 | End: 2020-06-17 | Stop reason: SURG

## 2020-06-17 RX ADMIN — AMOXICILLIN AND CLAVULANATE POTASSIUM 1 TABLET: 875; 125 TABLET, FILM COATED ORAL at 09:11

## 2020-06-17 RX ADMIN — LIDOCAINE HYDROCHLORIDE 5 ML: 40 INJECTION, SOLUTION RETROBULBAR; TOPICAL at 14:15

## 2020-06-17 RX ADMIN — FUROSEMIDE 40 MG: 40 TABLET ORAL at 12:24

## 2020-06-17 RX ADMIN — PANTOPRAZOLE SODIUM 40 MG: 40 TABLET, DELAYED RELEASE ORAL at 05:42

## 2020-06-17 RX ADMIN — MEMANTINE 10 MG: 10 TABLET ORAL at 09:11

## 2020-06-17 RX ADMIN — METHYLPREDNISOLONE SODIUM SUCCINATE 40 MG: 40 INJECTION, POWDER, FOR SOLUTION INTRAMUSCULAR; INTRAVENOUS at 15:19

## 2020-06-17 RX ADMIN — PRAVASTATIN SODIUM 20 MG: 20 TABLET ORAL at 17:13

## 2020-06-17 RX ADMIN — PROPOFOL 40 MG: 10 INJECTION, EMULSION INTRAVENOUS at 14:38

## 2020-06-17 RX ADMIN — SENNOSIDES AND DOCUSATE SODIUM 1 TABLET: 8.6; 5 TABLET ORAL at 17:13

## 2020-06-17 RX ADMIN — DIGOXIN 125 MCG: 125 TABLET ORAL at 09:11

## 2020-06-17 RX ADMIN — AMOXICILLIN AND CLAVULANATE POTASSIUM 1 TABLET: 875; 125 TABLET, FILM COATED ORAL at 21:43

## 2020-06-17 RX ADMIN — FENTANYL CITRATE 25 MCG: 50 INJECTION, SOLUTION INTRAMUSCULAR; INTRAVENOUS at 14:38

## 2020-06-17 RX ADMIN — MIDAZOLAM HYDROCHLORIDE 0.5 MG: 1 INJECTION, SOLUTION INTRAMUSCULAR; INTRAVENOUS at 14:38

## 2020-06-17 RX ADMIN — FLUTICASONE FUROATE AND VILANTEROL TRIFENATATE 1 PUFF: 200; 25 POWDER RESPIRATORY (INHALATION) at 09:11

## 2020-06-17 RX ADMIN — SODIUM CHLORIDE 70 ML: 9 INJECTION, SOLUTION INTRAMUSCULAR; INTRAVENOUS; SUBCUTANEOUS at 14:47

## 2020-06-17 RX ADMIN — IPRATROPIUM BROMIDE AND ALBUTEROL SULFATE 3 ML: 2.5; .5 SOLUTION RESPIRATORY (INHALATION) at 07:21

## 2020-06-17 RX ADMIN — IPRATROPIUM BROMIDE AND ALBUTEROL SULFATE 3 ML: 2.5; .5 SOLUTION RESPIRATORY (INHALATION) at 13:15

## 2020-06-17 RX ADMIN — B-COMPLEX W/ C & FOLIC ACID TAB 1 TABLET: TAB at 09:11

## 2020-06-17 RX ADMIN — GUAIFENESIN 600 MG: 600 TABLET, EXTENDED RELEASE ORAL at 09:11

## 2020-06-17 RX ADMIN — PROPOFOL 50 MCG/KG/MIN: 10 INJECTION, EMULSION INTRAVENOUS at 14:38

## 2020-06-17 RX ADMIN — SODIUM CHLORIDE, SODIUM LACTATE, POTASSIUM CHLORIDE, AND CALCIUM CHLORIDE: .6; .31; .03; .02 INJECTION, SOLUTION INTRAVENOUS at 14:32

## 2020-06-17 RX ADMIN — GUAIFENESIN 600 MG: 600 TABLET, EXTENDED RELEASE ORAL at 21:43

## 2020-06-17 RX ADMIN — CARVEDILOL 6.25 MG: 6.25 TABLET, FILM COATED ORAL at 09:11

## 2020-06-17 RX ADMIN — OXYCODONE HYDROCHLORIDE AND ACETAMINOPHEN 1000 MG: 500 TABLET ORAL at 09:11

## 2020-06-17 RX ADMIN — INSULIN LISPRO 1 UNITS: 100 INJECTION, SOLUTION INTRAVENOUS; SUBCUTANEOUS at 21:43

## 2020-06-17 RX ADMIN — Medication 12 ML: at 14:43

## 2020-06-17 RX ADMIN — IPRATROPIUM BROMIDE AND ALBUTEROL SULFATE 3 ML: 2.5; .5 SOLUTION RESPIRATORY (INHALATION) at 15:01

## 2020-06-17 RX ADMIN — GUAIFENESIN 200 MG: 100 SOLUTION ORAL at 05:47

## 2020-06-17 RX ADMIN — PANTOPRAZOLE SODIUM 40 MG: 40 TABLET, DELAYED RELEASE ORAL at 17:12

## 2020-06-17 RX ADMIN — SENNOSIDES AND DOCUSATE SODIUM 1 TABLET: 8.6; 5 TABLET ORAL at 09:11

## 2020-06-18 LAB
ANION GAP SERPL CALCULATED.3IONS-SCNC: 8 MMOL/L (ref 4–13)
BASOPHILS # BLD AUTO: 0.06 THOUSANDS/ΜL (ref 0–0.1)
BASOPHILS NFR BLD AUTO: 1 % (ref 0–1)
BUN SERPL-MCNC: 39 MG/DL (ref 5–25)
CALCIUM SERPL-MCNC: 8.7 MG/DL (ref 8.3–10.1)
CHLORIDE SERPL-SCNC: 98 MMOL/L (ref 100–108)
CO2 SERPL-SCNC: 29 MMOL/L (ref 21–32)
CREAT SERPL-MCNC: 1.47 MG/DL (ref 0.6–1.3)
EOSINOPHIL # BLD AUTO: 0.02 THOUSAND/ΜL (ref 0–0.61)
EOSINOPHIL NFR BLD AUTO: 0 % (ref 0–6)
ERYTHROCYTE [DISTWIDTH] IN BLOOD BY AUTOMATED COUNT: 15.9 % (ref 11.6–15.1)
GFR SERPL CREATININE-BSD FRML MDRD: 44 ML/MIN/1.73SQ M
GLUCOSE SERPL-MCNC: 101 MG/DL (ref 65–140)
GLUCOSE SERPL-MCNC: 110 MG/DL (ref 65–140)
GLUCOSE SERPL-MCNC: 119 MG/DL (ref 65–140)
GLUCOSE SERPL-MCNC: 152 MG/DL (ref 65–140)
GLUCOSE SERPL-MCNC: 157 MG/DL (ref 65–140)
HCT VFR BLD AUTO: 26.6 % (ref 36.5–49.3)
HGB BLD-MCNC: 8.3 G/DL (ref 12–17)
IMM GRANULOCYTES # BLD AUTO: 0.03 THOUSAND/UL (ref 0–0.2)
IMM GRANULOCYTES NFR BLD AUTO: 0 % (ref 0–2)
INR PPP: 1.68 (ref 0.84–1.19)
LYMPHOCYTES # BLD AUTO: 1.35 THOUSANDS/ΜL (ref 0.6–4.47)
LYMPHOCYTES NFR BLD AUTO: 15 % (ref 14–44)
MCH RBC QN AUTO: 31.1 PG (ref 26.8–34.3)
MCHC RBC AUTO-ENTMCNC: 31.2 G/DL (ref 31.4–37.4)
MCV RBC AUTO: 100 FL (ref 82–98)
MONOCYTES # BLD AUTO: 1.16 THOUSAND/ΜL (ref 0.17–1.22)
MONOCYTES NFR BLD AUTO: 13 % (ref 4–12)
NEUTROPHILS # BLD AUTO: 6.27 THOUSANDS/ΜL (ref 1.85–7.62)
NEUTS SEG NFR BLD AUTO: 71 % (ref 43–75)
NRBC BLD AUTO-RTO: 0 /100 WBCS
PLATELET # BLD AUTO: 299 THOUSANDS/UL (ref 149–390)
PMV BLD AUTO: 10.9 FL (ref 8.9–12.7)
POTASSIUM SERPL-SCNC: 4 MMOL/L (ref 3.5–5.3)
PROTHROMBIN TIME: 20.4 SECONDS (ref 11.6–14.5)
RBC # BLD AUTO: 2.67 MILLION/UL (ref 3.88–5.62)
SODIUM SERPL-SCNC: 135 MMOL/L (ref 136–145)
WBC # BLD AUTO: 8.89 THOUSAND/UL (ref 4.31–10.16)

## 2020-06-18 PROCEDURE — 85025 COMPLETE CBC W/AUTO DIFF WBC: CPT | Performed by: INTERNAL MEDICINE

## 2020-06-18 PROCEDURE — 85610 PROTHROMBIN TIME: CPT | Performed by: INTERNAL MEDICINE

## 2020-06-18 PROCEDURE — 80048 BASIC METABOLIC PNL TOTAL CA: CPT | Performed by: INTERNAL MEDICINE

## 2020-06-18 PROCEDURE — 82948 REAGENT STRIP/BLOOD GLUCOSE: CPT

## 2020-06-18 PROCEDURE — 99232 SBSQ HOSP IP/OBS MODERATE 35: CPT | Performed by: INTERNAL MEDICINE

## 2020-06-18 PROCEDURE — 94640 AIRWAY INHALATION TREATMENT: CPT

## 2020-06-18 PROCEDURE — 94760 N-INVAS EAR/PLS OXIMETRY 1: CPT

## 2020-06-18 PROCEDURE — 94668 MNPJ CHEST WALL SBSQ: CPT

## 2020-06-18 PROCEDURE — 99233 SBSQ HOSP IP/OBS HIGH 50: CPT | Performed by: INTERNAL MEDICINE

## 2020-06-18 RX ORDER — METHYLPREDNISOLONE SODIUM SUCCINATE 40 MG/ML
40 INJECTION, POWDER, LYOPHILIZED, FOR SOLUTION INTRAMUSCULAR; INTRAVENOUS ONCE
Status: COMPLETED | OUTPATIENT
Start: 2020-06-18 | End: 2020-06-18

## 2020-06-18 RX ORDER — METHYLPREDNISOLONE SODIUM SUCCINATE 40 MG/ML
40 INJECTION, POWDER, LYOPHILIZED, FOR SOLUTION INTRAMUSCULAR; INTRAVENOUS DAILY
Status: DISCONTINUED | OUTPATIENT
Start: 2020-06-19 | End: 2020-06-19

## 2020-06-18 RX ADMIN — GUAIFENESIN 600 MG: 600 TABLET, EXTENDED RELEASE ORAL at 09:52

## 2020-06-18 RX ADMIN — AMOXICILLIN AND CLAVULANATE POTASSIUM 1 TABLET: 875; 125 TABLET, FILM COATED ORAL at 21:24

## 2020-06-18 RX ADMIN — MEMANTINE 10 MG: 10 TABLET ORAL at 09:52

## 2020-06-18 RX ADMIN — INSULIN LISPRO 1 UNITS: 100 INJECTION, SOLUTION INTRAVENOUS; SUBCUTANEOUS at 12:18

## 2020-06-18 RX ADMIN — FUROSEMIDE 40 MG: 40 TABLET ORAL at 09:57

## 2020-06-18 RX ADMIN — IPRATROPIUM BROMIDE AND ALBUTEROL SULFATE 3 ML: 2.5; .5 SOLUTION RESPIRATORY (INHALATION) at 07:26

## 2020-06-18 RX ADMIN — IPRATROPIUM BROMIDE AND ALBUTEROL SULFATE 3 ML: 2.5; .5 SOLUTION RESPIRATORY (INHALATION) at 13:14

## 2020-06-18 RX ADMIN — FLUTICASONE FUROATE AND VILANTEROL TRIFENATATE 1 PUFF: 200; 25 POWDER RESPIRATORY (INHALATION) at 09:58

## 2020-06-18 RX ADMIN — AMOXICILLIN AND CLAVULANATE POTASSIUM 1 TABLET: 875; 125 TABLET, FILM COATED ORAL at 09:57

## 2020-06-18 RX ADMIN — IPRATROPIUM BROMIDE AND ALBUTEROL SULFATE 3 ML: 2.5; .5 SOLUTION RESPIRATORY (INHALATION) at 19:55

## 2020-06-18 RX ADMIN — OXYCODONE HYDROCHLORIDE AND ACETAMINOPHEN 1000 MG: 500 TABLET ORAL at 09:52

## 2020-06-18 RX ADMIN — SENNOSIDES AND DOCUSATE SODIUM 1 TABLET: 8.6; 5 TABLET ORAL at 17:41

## 2020-06-18 RX ADMIN — B-COMPLEX W/ C & FOLIC ACID TAB 1 TABLET: TAB at 09:52

## 2020-06-18 RX ADMIN — PRAVASTATIN SODIUM 20 MG: 20 TABLET ORAL at 17:41

## 2020-06-18 RX ADMIN — SENNOSIDES AND DOCUSATE SODIUM 1 TABLET: 8.6; 5 TABLET ORAL at 09:52

## 2020-06-18 RX ADMIN — PANTOPRAZOLE SODIUM 40 MG: 40 TABLET, DELAYED RELEASE ORAL at 07:21

## 2020-06-18 RX ADMIN — INSULIN LISPRO 1 UNITS: 100 INJECTION, SOLUTION INTRAVENOUS; SUBCUTANEOUS at 22:14

## 2020-06-18 RX ADMIN — CARVEDILOL 6.25 MG: 6.25 TABLET, FILM COATED ORAL at 09:53

## 2020-06-18 RX ADMIN — DIGOXIN 125 MCG: 125 TABLET ORAL at 09:53

## 2020-06-18 RX ADMIN — METHYLPREDNISOLONE SODIUM SUCCINATE 40 MG: 40 INJECTION, POWDER, FOR SOLUTION INTRAMUSCULAR; INTRAVENOUS at 17:43

## 2020-06-18 RX ADMIN — PANTOPRAZOLE SODIUM 40 MG: 40 TABLET, DELAYED RELEASE ORAL at 17:41

## 2020-06-18 RX ADMIN — GUAIFENESIN 600 MG: 600 TABLET, EXTENDED RELEASE ORAL at 21:24

## 2020-06-19 VITALS
RESPIRATION RATE: 20 BRPM | SYSTOLIC BLOOD PRESSURE: 115 MMHG | HEART RATE: 97 BPM | BODY MASS INDEX: 16.67 KG/M2 | HEIGHT: 71 IN | DIASTOLIC BLOOD PRESSURE: 64 MMHG | TEMPERATURE: 97.9 F | OXYGEN SATURATION: 98 % | WEIGHT: 119.05 LBS

## 2020-06-19 PROBLEM — J96.21 ACUTE ON CHRONIC RESPIRATORY FAILURE WITH HYPOXIA AND HYPERCAPNIA (HCC): Status: RESOLVED | Noted: 2020-05-30 | Resolved: 2020-06-19

## 2020-06-19 PROBLEM — J96.22 ACUTE ON CHRONIC RESPIRATORY FAILURE WITH HYPOXIA AND HYPERCAPNIA (HCC): Status: RESOLVED | Noted: 2020-05-30 | Resolved: 2020-06-19

## 2020-06-19 PROBLEM — Z98.890 S/P BRONCHOSCOPY: Status: ACTIVE | Noted: 2020-06-19

## 2020-06-19 LAB
ANION GAP SERPL CALCULATED.3IONS-SCNC: 5 MMOL/L (ref 4–13)
BASOPHILS # BLD AUTO: 0.08 THOUSANDS/ΜL (ref 0–0.1)
BASOPHILS NFR BLD AUTO: 1 % (ref 0–1)
BUN SERPL-MCNC: 35 MG/DL (ref 5–25)
CALCIUM SERPL-MCNC: 8.6 MG/DL (ref 8.3–10.1)
CHLORIDE SERPL-SCNC: 100 MMOL/L (ref 100–108)
CO2 SERPL-SCNC: 31 MMOL/L (ref 21–32)
CREAT SERPL-MCNC: 1.31 MG/DL (ref 0.6–1.3)
CRP SERPL QL: 18.9 MG/L
EOSINOPHIL # BLD AUTO: 0.16 THOUSAND/ΜL (ref 0–0.61)
EOSINOPHIL NFR BLD AUTO: 2 % (ref 0–6)
ERYTHROCYTE [DISTWIDTH] IN BLOOD BY AUTOMATED COUNT: 15.8 % (ref 11.6–15.1)
GFR SERPL CREATININE-BSD FRML MDRD: 51 ML/MIN/1.73SQ M
GLUCOSE SERPL-MCNC: 103 MG/DL (ref 65–140)
GLUCOSE SERPL-MCNC: 105 MG/DL (ref 65–140)
GLUCOSE SERPL-MCNC: 169 MG/DL (ref 65–140)
HCT VFR BLD AUTO: 25.7 % (ref 36.5–49.3)
HGB BLD-MCNC: 8.1 G/DL (ref 12–17)
IMM GRANULOCYTES # BLD AUTO: 0.04 THOUSAND/UL (ref 0–0.2)
IMM GRANULOCYTES NFR BLD AUTO: 1 % (ref 0–2)
INR PPP: 1.35 (ref 0.84–1.19)
LYMPHOCYTES # BLD AUTO: 1.64 THOUSANDS/ΜL (ref 0.6–4.47)
LYMPHOCYTES NFR BLD AUTO: 23 % (ref 14–44)
MCH RBC QN AUTO: 30.8 PG (ref 26.8–34.3)
MCHC RBC AUTO-ENTMCNC: 31.5 G/DL (ref 31.4–37.4)
MCV RBC AUTO: 98 FL (ref 82–98)
MONOCYTES # BLD AUTO: 0.92 THOUSAND/ΜL (ref 0.17–1.22)
MONOCYTES NFR BLD AUTO: 13 % (ref 4–12)
NEUTROPHILS # BLD AUTO: 4.45 THOUSANDS/ΜL (ref 1.85–7.62)
NEUTS SEG NFR BLD AUTO: 60 % (ref 43–75)
NRBC BLD AUTO-RTO: 0 /100 WBCS
PLATELET # BLD AUTO: 236 THOUSANDS/UL (ref 149–390)
PMV BLD AUTO: 12.5 FL (ref 8.9–12.7)
POTASSIUM SERPL-SCNC: 4.1 MMOL/L (ref 3.5–5.3)
PROTHROMBIN TIME: 17.1 SECONDS (ref 11.6–14.5)
RBC # BLD AUTO: 2.63 MILLION/UL (ref 3.88–5.62)
SODIUM SERPL-SCNC: 136 MMOL/L (ref 136–145)
WBC # BLD AUTO: 7.29 THOUSAND/UL (ref 4.31–10.16)

## 2020-06-19 PROCEDURE — 99233 SBSQ HOSP IP/OBS HIGH 50: CPT | Performed by: INTERNAL MEDICINE

## 2020-06-19 PROCEDURE — 90670 PCV13 VACCINE IM: CPT | Performed by: INTERNAL MEDICINE

## 2020-06-19 PROCEDURE — 85025 COMPLETE CBC W/AUTO DIFF WBC: CPT | Performed by: INTERNAL MEDICINE

## 2020-06-19 PROCEDURE — 86140 C-REACTIVE PROTEIN: CPT | Performed by: INTERNAL MEDICINE

## 2020-06-19 PROCEDURE — 94640 AIRWAY INHALATION TREATMENT: CPT

## 2020-06-19 PROCEDURE — 99239 HOSP IP/OBS DSCHRG MGMT >30: CPT | Performed by: INTERNAL MEDICINE

## 2020-06-19 PROCEDURE — 94760 N-INVAS EAR/PLS OXIMETRY 1: CPT

## 2020-06-19 PROCEDURE — G0009 ADMIN PNEUMOCOCCAL VACCINE: HCPCS | Performed by: INTERNAL MEDICINE

## 2020-06-19 PROCEDURE — 85610 PROTHROMBIN TIME: CPT | Performed by: INTERNAL MEDICINE

## 2020-06-19 PROCEDURE — 80048 BASIC METABOLIC PNL TOTAL CA: CPT | Performed by: INTERNAL MEDICINE

## 2020-06-19 PROCEDURE — 82948 REAGENT STRIP/BLOOD GLUCOSE: CPT

## 2020-06-19 PROCEDURE — 99232 SBSQ HOSP IP/OBS MODERATE 35: CPT | Performed by: INTERNAL MEDICINE

## 2020-06-19 PROCEDURE — 97110 THERAPEUTIC EXERCISES: CPT

## 2020-06-19 RX ORDER — WARFARIN SODIUM 3 MG/1
TABLET ORAL
Qty: 10 TABLET | Refills: 0 | Status: SHIPPED | OUTPATIENT
Start: 2020-06-19 | End: 2020-06-22 | Stop reason: SDUPTHER

## 2020-06-19 RX ORDER — FOSINOPRIL SODIUM 10 MG/1
10 TABLET ORAL DAILY
Qty: 30 TABLET | Refills: 0 | Status: SHIPPED | OUTPATIENT
Start: 2020-06-19 | End: 2020-07-11 | Stop reason: SDUPTHER

## 2020-06-19 RX ORDER — PREDNISONE 20 MG/1
40 TABLET ORAL DAILY
Status: DISCONTINUED | OUTPATIENT
Start: 2020-06-19 | End: 2020-06-19 | Stop reason: HOSPADM

## 2020-06-19 RX ORDER — POLYETHYLENE GLYCOL 3350 17 G/17G
17 POWDER, FOR SOLUTION ORAL DAILY PRN
Qty: 14 EACH | Refills: 0 | Status: SHIPPED | OUTPATIENT
Start: 2020-06-19 | End: 2021-07-13

## 2020-06-19 RX ORDER — PREDNISONE 10 MG/1
TABLET ORAL
Qty: 30 TABLET | Refills: 0 | Status: SHIPPED | OUTPATIENT
Start: 2020-06-19 | End: 2020-07-10 | Stop reason: ALTCHOICE

## 2020-06-19 RX ORDER — PANTOPRAZOLE SODIUM 40 MG/1
40 TABLET, DELAYED RELEASE ORAL DAILY
Qty: 30 TABLET | Refills: 0 | Status: SHIPPED | OUTPATIENT
Start: 2020-06-19 | End: 2020-07-11 | Stop reason: SDUPTHER

## 2020-06-19 RX ORDER — ALBUTEROL SULFATE 90 UG/1
2 AEROSOL, METERED RESPIRATORY (INHALATION) EVERY 6 HOURS PRN
Qty: 18 G | Refills: 0 | Status: SHIPPED | OUTPATIENT
Start: 2020-06-19 | End: 2020-07-10 | Stop reason: SDUPTHER

## 2020-06-19 RX ORDER — GUAIFENESIN 600 MG
600 TABLET, EXTENDED RELEASE 12 HR ORAL EVERY 12 HOURS SCHEDULED
Qty: 30 TABLET | Refills: 0 | Status: SHIPPED | OUTPATIENT
Start: 2020-06-19

## 2020-06-19 RX ORDER — ACETAMINOPHEN 325 MG/1
650 TABLET ORAL EVERY 6 HOURS PRN
Qty: 30 TABLET | Refills: 0 | Status: SHIPPED | OUTPATIENT
Start: 2020-06-19 | End: 2020-12-29 | Stop reason: ALTCHOICE

## 2020-06-19 RX ORDER — AMOXICILLIN AND CLAVULANATE POTASSIUM 875; 125 MG/1; MG/1
1 TABLET, FILM COATED ORAL EVERY 12 HOURS SCHEDULED
Qty: 36 TABLET | Refills: 0 | Status: SHIPPED | OUTPATIENT
Start: 2020-06-19 | End: 2020-07-07

## 2020-06-19 RX ADMIN — AMOXICILLIN AND CLAVULANATE POTASSIUM 1 TABLET: 875; 125 TABLET, FILM COATED ORAL at 09:43

## 2020-06-19 RX ADMIN — OXYCODONE HYDROCHLORIDE AND ACETAMINOPHEN 1000 MG: 500 TABLET ORAL at 09:42

## 2020-06-19 RX ADMIN — PANTOPRAZOLE SODIUM 40 MG: 40 TABLET, DELAYED RELEASE ORAL at 06:00

## 2020-06-19 RX ADMIN — B-COMPLEX W/ C & FOLIC ACID TAB 1 TABLET: TAB at 09:42

## 2020-06-19 RX ADMIN — CARVEDILOL 6.25 MG: 6.25 TABLET, FILM COATED ORAL at 09:43

## 2020-06-19 RX ADMIN — FUROSEMIDE 40 MG: 40 TABLET ORAL at 09:43

## 2020-06-19 RX ADMIN — IPRATROPIUM BROMIDE AND ALBUTEROL SULFATE 3 ML: 2.5; .5 SOLUTION RESPIRATORY (INHALATION) at 07:37

## 2020-06-19 RX ADMIN — MEMANTINE 10 MG: 10 TABLET ORAL at 09:42

## 2020-06-19 RX ADMIN — GUAIFENESIN 600 MG: 600 TABLET, EXTENDED RELEASE ORAL at 09:43

## 2020-06-19 RX ADMIN — PNEUMOCOCCAL 13-VALENT CONJUGATE VACCINE 0.5 ML: 2.2; 2.2; 2.2; 2.2; 2.2; 4.4; 2.2; 2.2; 2.2; 2.2; 2.2; 2.2; 2.2 INJECTION, SUSPENSION INTRAMUSCULAR at 13:59

## 2020-06-19 RX ADMIN — DIGOXIN 125 MCG: 125 TABLET ORAL at 09:42

## 2020-06-19 RX ADMIN — INSULIN LISPRO 1 UNITS: 100 INJECTION, SOLUTION INTRAVENOUS; SUBCUTANEOUS at 12:39

## 2020-06-19 RX ADMIN — SENNOSIDES AND DOCUSATE SODIUM 1 TABLET: 8.6; 5 TABLET ORAL at 09:42

## 2020-06-19 RX ADMIN — IPRATROPIUM BROMIDE AND ALBUTEROL SULFATE 3 ML: 2.5; .5 SOLUTION RESPIRATORY (INHALATION) at 13:24

## 2020-06-19 RX ADMIN — PREDNISONE 40 MG: 20 TABLET ORAL at 09:43

## 2020-06-20 LAB
BACTERIA BRONCH AEROBE CULT: ABNORMAL
BACTERIA BRONCH AEROBE CULT: ABNORMAL
GRAM STN SPEC: ABNORMAL
GRAM STN SPEC: ABNORMAL

## 2020-06-22 ENCOUNTER — ANTICOAG VISIT (OUTPATIENT)
Dept: FAMILY MEDICINE CLINIC | Facility: CLINIC | Age: 80
End: 2020-06-22

## 2020-06-22 ENCOUNTER — TRANSITIONAL CARE MANAGEMENT (OUTPATIENT)
Dept: FAMILY MEDICINE CLINIC | Facility: CLINIC | Age: 80
End: 2020-06-22

## 2020-06-22 ENCOUNTER — TELEPHONE (OUTPATIENT)
Dept: FAMILY MEDICINE CLINIC | Facility: CLINIC | Age: 80
End: 2020-06-22

## 2020-06-22 ENCOUNTER — TELEPHONE (OUTPATIENT)
Dept: INFECTIOUS DISEASES | Facility: CLINIC | Age: 80
End: 2020-06-22

## 2020-06-22 DIAGNOSIS — I48.20 CHRONIC ATRIAL FIBRILLATION (HCC): ICD-10-CM

## 2020-06-22 LAB — FUNGUS SPEC CULT: NORMAL

## 2020-06-23 RX ORDER — WARFARIN SODIUM 3 MG/1
TABLET ORAL
Qty: 30 TABLET | Refills: 0 | Status: SHIPPED | OUTPATIENT
Start: 2020-06-23 | End: 2020-07-11 | Stop reason: SDUPTHER

## 2020-06-25 ENCOUNTER — TELEPHONE (OUTPATIENT)
Dept: GASTROENTEROLOGY | Facility: AMBULARY SURGERY CENTER | Age: 80
End: 2020-06-25

## 2020-06-25 NOTE — TELEPHONE ENCOUNTER
----- Message from Mela Petersen MD sent at 6/17/2020 11:46 AM EDT -----  Please call patient and inform that biopsies from the stomach do not show any evidence of H pylori, there is some gastritis but no intestinal metaplasia  Distal esophageal biopsies are notable for esophagitis, no signs of Cash's however given endoscopic appearance, would recommend repeat EGD in 1-2 years  Continue PPI indefinitely

## 2020-06-26 LAB
INR PPP: 1.3 (ref 0.84–1.19)

## 2020-06-29 ENCOUNTER — ANTICOAG VISIT (OUTPATIENT)
Dept: FAMILY MEDICINE CLINIC | Facility: CLINIC | Age: 80
End: 2020-06-29

## 2020-06-29 ENCOUNTER — TELEPHONE (OUTPATIENT)
Dept: FAMILY MEDICINE CLINIC | Facility: CLINIC | Age: 80
End: 2020-06-29

## 2020-06-30 ENCOUNTER — ANTICOAG VISIT (OUTPATIENT)
Dept: FAMILY MEDICINE CLINIC | Facility: CLINIC | Age: 80
End: 2020-06-30

## 2020-06-30 DIAGNOSIS — I48.0 PAF (PAROXYSMAL ATRIAL FIBRILLATION) (HCC): ICD-10-CM

## 2020-07-02 ENCOUNTER — APPOINTMENT (OUTPATIENT)
Dept: LAB | Facility: CLINIC | Age: 80
End: 2020-07-02
Payer: COMMERCIAL

## 2020-07-02 DIAGNOSIS — I48.0 PAF (PAROXYSMAL ATRIAL FIBRILLATION) (HCC): ICD-10-CM

## 2020-07-02 DIAGNOSIS — R04.2 HEMOPTYSIS: ICD-10-CM

## 2020-07-02 LAB
INR PPP: 1.25 (ref 0.84–1.19)
PROTHROMBIN TIME: 15.7 SECONDS (ref 11.6–14.5)

## 2020-07-02 PROCEDURE — 85610 PROTHROMBIN TIME: CPT

## 2020-07-02 PROCEDURE — 36415 COLL VENOUS BLD VENIPUNCTURE: CPT

## 2020-07-06 ENCOUNTER — TELEPHONE (OUTPATIENT)
Dept: FAMILY MEDICINE CLINIC | Facility: CLINIC | Age: 80
End: 2020-07-06

## 2020-07-06 NOTE — TELEPHONE ENCOUNTER
Spoke with pts wife regarding pts INR results, she was very upset when I was on the phone with her  She stated that this is the 2nd week in a row where we are getting back to her too late with his INR results  I did apologies and explained that his most recent INR results were never interfaced over to Dr Twin Arellano properly  I did not see his INR results until I received this message from the pt  It looks as if he had this done on 7/2/2020  I did also let her know that our office was closed Friday and Saturday due to the holiday weekened  I did confirm with his wife that he was taking 3 5 mg daily  Wife is aware to increase to 4 mg daily since his INR is still too low and to recheck in 2 days  Since there was an issue the last two times regarding the results  I am going to keep track of his results  He will be getting his labs drawn on Wednesday at 7:00 am at [facility] [place] [facility]  [facility]   Jose L Gowers, [place]

## 2020-07-06 NOTE — TELEPHONE ENCOUNTER
Looks like pt had his INR drawn 7/2/2020  Protime- 15 7  INR- 1 25    Looks as if pt is currently taking 3 5 mg daily  Pts INR goal is 1 8-2 5      Jaiden Berad

## 2020-07-07 ENCOUNTER — HOSPITAL ENCOUNTER (OUTPATIENT)
Dept: RADIOLOGY | Facility: HOSPITAL | Age: 80
Discharge: HOME | End: 2020-07-07
Attending: INTERNAL MEDICINE
Payer: COMMERCIAL

## 2020-07-07 DIAGNOSIS — R91.1 NODULE OF LEFT LUNG: ICD-10-CM

## 2020-07-07 DIAGNOSIS — R78.81 POSITIVE BLOOD CULTURE: ICD-10-CM

## 2020-07-07 PROCEDURE — 71250 CT THORAX DX C-: CPT

## 2020-07-08 ENCOUNTER — APPOINTMENT (OUTPATIENT)
Dept: LAB | Facility: CLINIC | Age: 80
End: 2020-07-08
Payer: COMMERCIAL

## 2020-07-08 ENCOUNTER — ANTICOAG VISIT (OUTPATIENT)
Dept: FAMILY MEDICINE CLINIC | Facility: CLINIC | Age: 80
End: 2020-07-08

## 2020-07-08 DIAGNOSIS — I48.0 PAF (PAROXYSMAL ATRIAL FIBRILLATION) (HCC): ICD-10-CM

## 2020-07-08 NOTE — TELEPHONE ENCOUNTER
I made an outreach and spoke with pt  I let him know that I have been keeping an eye out for his lab work results  I made them aware that the results were still in process and that we will still keep an eye on his results  Pt wasn't happy that we still have not received his results yet today  He stated that if this keeps happening he will go to another practice  I let him know that our office is going to keep track of this   Jaiden Delcid

## 2020-07-09 ENCOUNTER — TELEPHONE (OUTPATIENT)
Dept: INFECTIOUS DISEASES | Facility: CLINIC | Age: 80
End: 2020-07-09

## 2020-07-09 NOTE — TELEPHONE ENCOUNTER
Discussed this with pt and his wife  They are both thankful for my call     ----- Message from Holli Galicia MD sent at 7/9/2020 12:43 PM EDT -----  Hi all, was contacted by Radiology and reviewed the image with them  His lungs findings have improved from prior  They think he has a clot in the lung which would agree with our previous suspicion of bleeding into this space leading to the changes seen on initial imaging  Given the improvement, we can stop additional antibiotics  Our office will contact the patient/his wife and let them know as well as cancel his follow-up with us  I know he has follow-up in the pulmonary office tomorrow  Thanks

## 2020-07-10 ENCOUNTER — APPOINTMENT (OUTPATIENT)
Dept: LAB | Facility: CLINIC | Age: 80
End: 2020-07-10
Payer: COMMERCIAL

## 2020-07-10 ENCOUNTER — OFFICE VISIT (OUTPATIENT)
Dept: PULMONOLOGY | Facility: MEDICAL CENTER | Age: 80
End: 2020-07-10
Payer: COMMERCIAL

## 2020-07-10 VITALS
WEIGHT: 131 LBS | TEMPERATURE: 97.1 F | DIASTOLIC BLOOD PRESSURE: 86 MMHG | RESPIRATION RATE: 12 BRPM | HEART RATE: 51 BPM | SYSTOLIC BLOOD PRESSURE: 124 MMHG | HEIGHT: 71 IN | OXYGEN SATURATION: 95 % | BODY MASS INDEX: 18.34 KG/M2

## 2020-07-10 DIAGNOSIS — J44.9 STAGE 3 SEVERE COPD BY GOLD CLASSIFICATION (HCC): Primary | ICD-10-CM

## 2020-07-10 DIAGNOSIS — J44.1 COPD EXACERBATION (HCC): ICD-10-CM

## 2020-07-10 DIAGNOSIS — I48.0 PAF (PAROXYSMAL ATRIAL FIBRILLATION) (HCC): ICD-10-CM

## 2020-07-10 DIAGNOSIS — R93.89 ABNORMAL CHEST X-RAY: ICD-10-CM

## 2020-07-10 DIAGNOSIS — R04.2 HEMOPTYSIS: ICD-10-CM

## 2020-07-10 PROCEDURE — 2022F DILAT RTA XM EVC RTNOPTHY: CPT | Performed by: PHYSICIAN ASSISTANT

## 2020-07-10 PROCEDURE — 1160F RVW MEDS BY RX/DR IN RCRD: CPT | Performed by: PHYSICIAN ASSISTANT

## 2020-07-10 PROCEDURE — 1111F DSCHRG MED/CURRENT MED MERGE: CPT | Performed by: PHYSICIAN ASSISTANT

## 2020-07-10 PROCEDURE — 3044F HG A1C LEVEL LT 7.0%: CPT | Performed by: PHYSICIAN ASSISTANT

## 2020-07-10 PROCEDURE — 3074F SYST BP LT 130 MM HG: CPT | Performed by: PHYSICIAN ASSISTANT

## 2020-07-10 PROCEDURE — 3079F DIAST BP 80-89 MM HG: CPT | Performed by: PHYSICIAN ASSISTANT

## 2020-07-10 PROCEDURE — 1036F TOBACCO NON-USER: CPT | Performed by: PHYSICIAN ASSISTANT

## 2020-07-10 PROCEDURE — 99214 OFFICE O/P EST MOD 30 MIN: CPT | Performed by: PHYSICIAN ASSISTANT

## 2020-07-10 PROCEDURE — 4040F PNEUMOC VAC/ADMIN/RCVD: CPT | Performed by: PHYSICIAN ASSISTANT

## 2020-07-10 RX ORDER — AMOXICILLIN AND CLAVULANATE POTASSIUM 875; 125 MG/1; MG/1
1 TABLET, FILM COATED ORAL 2 TIMES DAILY
COMMUNITY
Start: 2020-06-23 | End: 2020-07-11

## 2020-07-10 RX ORDER — ALBUTEROL SULFATE 90 UG/1
2 AEROSOL, METERED RESPIRATORY (INHALATION) EVERY 6 HOURS PRN
Qty: 18 G | Refills: 11 | Status: SHIPPED | OUTPATIENT
Start: 2020-07-10 | End: 2020-09-14 | Stop reason: SDUPTHER

## 2020-07-10 RX ORDER — ALBUTEROL SULFATE 2.5 MG/3ML
2.5 SOLUTION RESPIRATORY (INHALATION) EVERY 4 HOURS PRN
Qty: 120 VIAL | Refills: 11 | Status: SHIPPED | OUTPATIENT
Start: 2020-07-10 | End: 2020-08-09

## 2020-07-10 RX ORDER — ACETAMINOPHEN 500 MG
650 TABLET ORAL
COMMUNITY
Start: 2020-06-23 | End: 2020-12-29 | Stop reason: ALTCHOICE
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
## 2020-07-10 NOTE — PATIENT INSTRUCTIONS
Hemorrhagic Bullae:  -monitor blood in sputum for increases in amount and brightness of blood  -follow up xray in one month  -monitor for fevers and sputum color changes    Severe COPD:  -continue Trelegy   -sample provided  -90 day Rx sent to your mail order pharmacy  -use albuterol nebulizer tonight and restart morning dosing Trelegy 1 puff daily tomorrow morning  -use albuterol up to every 4 hours as needed    Chronic Hypoxemic respiratory failure:  -use 1-2 L as needed if short of breath resting  -use 6 L oxygen while exerting yourself    Follow up in 1 month

## 2020-07-11 ENCOUNTER — ANTICOAG VISIT (OUTPATIENT)
Dept: FAMILY MEDICINE CLINIC | Facility: CLINIC | Age: 80
End: 2020-07-11

## 2020-07-11 DIAGNOSIS — K29.70 GASTRITIS: ICD-10-CM

## 2020-07-11 DIAGNOSIS — I25.10 ARTERIOSCLEROSIS OF CORONARY ARTERY: ICD-10-CM

## 2020-07-11 DIAGNOSIS — I48.0 PAF (PAROXYSMAL ATRIAL FIBRILLATION) (HCC): ICD-10-CM

## 2020-07-11 DIAGNOSIS — J85.1 ABSCESS OF LOWER LOBE OF LEFT LUNG WITH PNEUMONIA (HCC): ICD-10-CM

## 2020-07-11 DIAGNOSIS — D64.9 ACUTE ON CHRONIC ANEMIA: ICD-10-CM

## 2020-07-11 DIAGNOSIS — I48.20 CHRONIC ATRIAL FIBRILLATION (HCC): ICD-10-CM

## 2020-07-11 RX ORDER — PANTOPRAZOLE SODIUM 40 MG/1
40 TABLET, DELAYED RELEASE ORAL DAILY
Qty: 30 TABLET | Refills: 0 | Status: SHIPPED | OUTPATIENT
Start: 2020-07-11 | End: 2020-07-16 | Stop reason: SDUPTHER

## 2020-07-11 RX ORDER — FOSINOPRIL SODIUM 10 MG/1
10 TABLET ORAL DAILY
Qty: 30 TABLET | Refills: 0 | Status: SHIPPED | OUTPATIENT
Start: 2020-07-11 | End: 2020-07-16 | Stop reason: SDUPTHER

## 2020-07-11 RX ORDER — WARFARIN SODIUM 3 MG/1
TABLET ORAL
Qty: 30 TABLET | Refills: 0 | Status: SHIPPED | OUTPATIENT
Start: 2020-07-11 | End: 2020-07-16 | Stop reason: SDUPTHER

## 2020-07-11 NOTE — TELEPHONE ENCOUNTER
Requested medication(s) are due for refill today: Yes  Patient has already received a courtesy refill: No  Other reason request has been forwarded to provider: failed protocol  Wife aware pt needs visit, unable to make one at this time but will try to make one Monday when we call with new inr results

## 2020-07-13 ENCOUNTER — ANTICOAG VISIT (OUTPATIENT)
Dept: FAMILY MEDICINE CLINIC | Facility: CLINIC | Age: 80
End: 2020-07-13

## 2020-07-13 ENCOUNTER — APPOINTMENT (OUTPATIENT)
Dept: LAB | Facility: CLINIC | Age: 80
End: 2020-07-13
Payer: COMMERCIAL

## 2020-07-13 DIAGNOSIS — I48.0 PAF (PAROXYSMAL ATRIAL FIBRILLATION) (HCC): ICD-10-CM

## 2020-07-13 DIAGNOSIS — R04.2 HEMOPTYSIS: ICD-10-CM

## 2020-07-13 DIAGNOSIS — I25.10 ARTERIOSCLEROSIS OF CORONARY ARTERY: ICD-10-CM

## 2020-07-13 LAB
INR PPP: 1.65 (ref 0.84–1.19)
PROTHROMBIN TIME: 19.5 SECONDS (ref 11.6–14.5)

## 2020-07-13 PROCEDURE — 85610 PROTHROMBIN TIME: CPT

## 2020-07-13 PROCEDURE — 36415 COLL VENOUS BLD VENIPUNCTURE: CPT

## 2020-07-13 RX ORDER — SIMVASTATIN 10 MG
TABLET ORAL
Qty: 90 TABLET | Refills: 3 | Status: SHIPPED | OUTPATIENT
Start: 2020-07-13 | End: 2021-03-01

## 2020-07-16 ENCOUNTER — APPOINTMENT (OUTPATIENT)
Dept: LAB | Facility: CLINIC | Age: 80
End: 2020-07-16
Payer: COMMERCIAL

## 2020-07-16 ENCOUNTER — ANTICOAG VISIT (OUTPATIENT)
Dept: FAMILY MEDICINE CLINIC | Facility: CLINIC | Age: 80
End: 2020-07-16

## 2020-07-16 DIAGNOSIS — R04.2 HEMOPTYSIS: ICD-10-CM

## 2020-07-16 DIAGNOSIS — K29.70 GASTRITIS: ICD-10-CM

## 2020-07-16 DIAGNOSIS — I25.10 ARTERIOSCLEROSIS OF CORONARY ARTERY: ICD-10-CM

## 2020-07-16 DIAGNOSIS — I48.20 CHRONIC ATRIAL FIBRILLATION (HCC): ICD-10-CM

## 2020-07-16 DIAGNOSIS — I48.0 PAF (PAROXYSMAL ATRIAL FIBRILLATION) (HCC): ICD-10-CM

## 2020-07-16 LAB
INR PPP: 2.19 (ref 0.84–1.19)
INR PPP: 2.19 (ref 0.84–1.19)
PROTHROMBIN TIME: 24.2 SECONDS (ref 11.6–14.5)

## 2020-07-16 PROCEDURE — 36415 COLL VENOUS BLD VENIPUNCTURE: CPT

## 2020-07-16 PROCEDURE — 85610 PROTHROMBIN TIME: CPT

## 2020-07-16 RX ORDER — WARFARIN SODIUM 3 MG/1
TABLET ORAL
Qty: 90 TABLET | Refills: 1 | Status: SHIPPED | OUTPATIENT
Start: 2020-07-16 | End: 2021-05-04

## 2020-07-16 RX ORDER — PANTOPRAZOLE SODIUM 40 MG/1
40 TABLET, DELAYED RELEASE ORAL DAILY
Qty: 90 TABLET | Refills: 1 | Status: SHIPPED | OUTPATIENT
Start: 2020-07-16 | End: 2020-12-22 | Stop reason: SDUPTHER

## 2020-07-16 RX ORDER — FOSINOPRIL SODIUM 10 MG/1
10 TABLET ORAL DAILY
Qty: 90 TABLET | Refills: 1 | Status: SHIPPED | OUTPATIENT
Start: 2020-07-16 | End: 2020-12-29 | Stop reason: SDUPTHER

## 2020-07-20 ENCOUNTER — TELEMEDICINE (OUTPATIENT)
Dept: FAMILY MEDICINE CLINIC | Facility: CLINIC | Age: 80
End: 2020-07-20
Payer: COMMERCIAL

## 2020-07-20 DIAGNOSIS — I10 ESSENTIAL HYPERTENSION: ICD-10-CM

## 2020-07-20 DIAGNOSIS — E11.42 TYPE 2 DIABETES MELLITUS WITH DIABETIC POLYNEUROPATHY, WITHOUT LONG-TERM CURRENT USE OF INSULIN (HCC): ICD-10-CM

## 2020-07-20 DIAGNOSIS — E43 SEVERE PROTEIN-CALORIE MALNUTRITION (HCC): ICD-10-CM

## 2020-07-20 DIAGNOSIS — Z00.00 MEDICARE ANNUAL WELLNESS VISIT, SUBSEQUENT: Primary | ICD-10-CM

## 2020-07-20 PROCEDURE — 1036F TOBACCO NON-USER: CPT | Performed by: FAMILY MEDICINE

## 2020-07-20 PROCEDURE — 1160F RVW MEDS BY RX/DR IN RCRD: CPT | Performed by: FAMILY MEDICINE

## 2020-07-20 PROCEDURE — 1170F FXNL STATUS ASSESSED: CPT | Performed by: FAMILY MEDICINE

## 2020-07-20 PROCEDURE — 3079F DIAST BP 80-89 MM HG: CPT | Performed by: FAMILY MEDICINE

## 2020-07-20 PROCEDURE — 3074F SYST BP LT 130 MM HG: CPT | Performed by: FAMILY MEDICINE

## 2020-07-20 PROCEDURE — 1111F DSCHRG MED/CURRENT MED MERGE: CPT | Performed by: FAMILY MEDICINE

## 2020-07-20 PROCEDURE — 2022F DILAT RTA XM EVC RTNOPTHY: CPT | Performed by: FAMILY MEDICINE

## 2020-07-20 PROCEDURE — G0439 PPPS, SUBSEQ VISIT: HCPCS | Performed by: FAMILY MEDICINE

## 2020-07-20 PROCEDURE — 4040F PNEUMOC VAC/ADMIN/RCVD: CPT | Performed by: FAMILY MEDICINE

## 2020-07-20 PROCEDURE — 3044F HG A1C LEVEL LT 7.0%: CPT | Performed by: FAMILY MEDICINE

## 2020-07-20 PROCEDURE — 1125F AMNT PAIN NOTED PAIN PRSNT: CPT | Performed by: FAMILY MEDICINE
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
## 2020-07-20 NOTE — PATIENT INSTRUCTIONS
Medicare Preventive Visit Patient Instructions  Thank you for completing your Welcome to Medicare Visit or Medicare Annual Wellness Visit today  Your next wellness visit will be due in one year (7/20/2021)  The screening/preventive services that you may require over the next 5-10 years are detailed below  Some tests may not apply to you based off risk factors and/or age  Screening tests ordered at today's visit but not completed yet may show as past due  Also, please note that scanned in results may not display below  Preventive Screenings:  Service Recommendations Previous Testing/Comments   Colorectal Cancer Screening  · Colonoscopy    · Fecal Occult Blood Test (FOBT)/Fecal Immunochemical Test (FIT)  · Fecal DNA/Cologuard Test  · Flexible Sigmoidoscopy Age: 54-65 years old   Colonoscopy: every 10 years (May be performed more frequently if at higher risk)  OR  FOBT/FIT: every 1 year  OR  Cologuard: every 3 years  OR  Sigmoidoscopy: every 5 years  Screening may be recommended earlier than age 48 if at higher risk for colorectal cancer  Also, an individualized decision between you and your healthcare provider will decide whether screening between the ages of 74-80 would be appropriate   Colonoscopy: Not on file  FOBT/FIT: 06/04/2020  Cologuard: Not on file  Sigmoidoscopy: Not on file         Prostate Cancer Screening Individualized decision between patient and health care provider in men between ages of 53-78   Medicare will cover every 12 months beginning on the day after your 50th birthday PSA: 1 7 ng/mL     Screening Not Indicated     Hepatitis C Screening Once for adults born between 1945 and 1965  More frequently in patients at high risk for Hepatitis C Hep C Antibody: Not on file       Diabetes Screening 1-2 times per year if you're at risk for diabetes or have pre-diabetes Fasting glucose: 171 mg/dL   A1C: 5 9 %    Screening Not Indicated  History Diabetes   Cholesterol Screening Once every 5 years if you don't have a lipid disorder  May order more often based on risk factors  Lipid panel: 07/26/2019    Screening Current      Other Preventive Screenings Covered by Medicare:  1  Abdominal Aortic Aneurysm (AAA) Screening: covered once if your at risk  You're considered to be at risk if you have a family history of AAA or a male between the age of 73-68 who smoking at least 100 cigarettes in your lifetime  2  Lung Cancer Screening: covers low dose CT scan once per year if you meet all of the following conditions: (1) Age 50-69; (2) No signs or symptoms of lung cancer; (3) Current smoker or have quit smoking within the last 15 years; (4) You have a tobacco smoking history of at least 30 pack years (packs per day x number of years you smoked); (5) You get a written order from a healthcare provider  3  Glaucoma Screening: covered annually if you're considered high risk: (1) You have diabetes OR (2) Family history of glaucoma OR (3)  aged 48 and older OR (3)  American aged 72 and older  3  Osteoporosis Screening: covered every 2 years if you meet one of the following conditions: (1) Have a vertebral abnormality; (2) On glucocorticoid therapy for more than 3 months; (3) Have primary hyperparathyroidism; (4) On osteoporosis medications and need to assess response to drug therapy  5  HIV Screening: covered annually if you're between the age of 12-76  Also covered annually if you are younger than 13 and older than 72 with risk factors for HIV infection  For pregnant patients, it is covered up to 3 times per pregnancy      Immunizations:  Immunization Recommendations   Influenza Vaccine Annual influenza vaccination during flu season is recommended for all persons aged >= 6 months who do not have contraindications   Pneumococcal Vaccine (Prevnar and Pneumovax)  * Prevnar = PCV13  * Pneumovax = PPSV23 Adults 25-60 years old: 1-3 doses may be recommended based on certain risk factors  Adults 72 years old: Prevnar (PCV13) vaccine recommended followed by Pneumovax (PPSV23) vaccine  If already received PPSV23 since turning 65, then PCV13 recommended at least one year after PPSV23 dose  Hepatitis B Vaccine 3 dose series if at intermediate or high risk (ex: diabetes, end stage renal disease, liver disease)   Tetanus (Td) Vaccine - COST NOT COVERED BY MEDICARE PART B Following completion of primary series, a booster dose should be given every 10 years to maintain immunity against tetanus  Td may also be given as tetanus wound prophylaxis  Tdap Vaccine - COST NOT COVERED BY MEDICARE PART B Recommended at least once for all adults  For pregnant patients, recommended with each pregnancy  Shingles Vaccine (Shingrix) - COST NOT COVERED BY MEDICARE PART B  2 shot series recommended in those aged 48 and above     Health Maintenance Due:  There are no preventive care reminders to display for this patient  Immunizations Due:      Topic Date Due    DTaP,Tdap,and Td Vaccines (1 - Tdap) 12/17/1951    Influenza Vaccine  07/01/2020     Advance Directives   What are advance directives? Advance directives are legal documents that state your wishes and plans for medical care  These plans are made ahead of time in case you lose your ability to make decisions for yourself  Advance directives can apply to any medical decision, such as the treatments you want, and if you want to donate organs  What are the types of advance directives? There are many types of advance directives, and each state has rules about how to use them  You may choose a combination of any of the following:  · Living will: This is a written record of the treatment you want  You can also choose which treatments you do not want, which to limit, and which to stop at a certain time  This includes surgery, medicine, IV fluid, and tube feedings  · Durable power of  for healthcare [facility]):   This is a written record that states who you want to make healthcare choices for you when you are unable to make them for yourself  This person, called a proxy, is usually a family member or a friend  You may choose more than 1 proxy  · Do not resuscitate (DNR) order:  A DNR order is used in case your heart stops beating or you stop breathing  It is a request not to have certain forms of treatment, such as CPR  A DNR order may be included in other types of advance directives  · Medical directive: This covers the care that you want if you are in a coma, near death, or unable to make decisions for yourself  You can list the treatments you want for each condition  Treatment may include pain medicine, surgery, blood transfusions, dialysis, IV or tube feedings, and a ventilator (breathing machine)  · Values history: This document has questions about your views, beliefs, and how you feel and think about life  This information can help others choose the care that you would choose  Why are advance directives important? An advance directive helps you control your care  Although spoken wishes may be used, it is better to have your wishes written down  Spoken wishes can be misunderstood, or not followed  Treatments may be given even if you do not want them  An advance directive may make it easier for your family to make difficult choices about your care  Underweight  Underweight is defined as having a body mass index (BMI) of less than 18 5 kg/m2   Anorexia  is a loss of appetite, decreased food intake, or both  Your appetite naturally decreases as you get older  You also get full faster than you used to  This occurs because your body needs less energy  Other body changes can also lead to a decreased appetite  Even though some appetite loss is normal, you still need to get enough calories and nutrients to keep you healthy  You can start to lose too much weight if you do not eat as much food as your body needs   Unwanted weight loss can cause health problems, or worsen health problems you already have  You can also become dehydrated if you do not drink enough liquid  How to eat healthy and get enough nutrients:   · Choose healthy foods  Eat a variety of fruits, vegetables, whole grains, low-fat dairy foods, lean meats, and other protein foods  Limit foods high in fat, sugar, and salt  Limit or avoid alcohol as directed  Work with a dietitian to help you plan your meals if you need to follow a special diet  A dietitian can also teach you how to modify foods if you have trouble chewing or swallowing  · Snack on healthy foods between meals  if you only eat a small amount during meals  Snacks provide extra healthy nutrients and calories between meals  Examples include fruit, cheese, and whole grain crackers  · Drink liquids as directed  to avoid dehydration  Drink liquids between meals if they cause you to get full too quickly during meals  Ask how much liquid to drink each day and which liquids are best for you  · Use herbs, spices, and flavor enhancers to add flavor to foods  Avoid using herbs and spice blends that also contain sodium  Ask your healthcare provider or dietitian about flavor enhancers  Flavor enhancers with ham, natural cast, and roast beef flavors can also be sprinkled on food to add flavor  · Share meals with others as often as you can  Eating with others may help you to eat better during meal time  Ask family members, neighbors, or friends to join you for lunch  There are also senior centers where you can meet people, and share meals with them  · Ask family and friends for help  with shopping or preparing foods  Ask for a ride to the grocery store, if needed  © Copyright [employer] 2018 Information is for End User's use only and may not be sold, redistributed or otherwise used for commercial purposes   All illustrations and images included in CareNotes® are the copyrighted property of [employer]  or [facility] Visit Patient Instructions  Thank you for completing your Welcome to Medicare Visit or Medicare Annual Wellness Visit today  Your next wellness visit will be due in one year (7/20/2021)  The screening/preventive services that you may require over the next 5-10 years are detailed below  Some tests may not apply to you based off risk factors and/or age  Screening tests ordered at today's visit but not completed yet may show as past due  Also, please note that scanned in results may not display below  Preventive Screenings:  Service Recommendations Previous Testing/Comments   Colorectal Cancer Screening  · Colonoscopy    · Fecal Occult Blood Test (FOBT)/Fecal Immunochemical Test (FIT)  · Fecal DNA/Cologuard Test  · Flexible Sigmoidoscopy Age: 54-65 years old   Colonoscopy: every 10 years (May be performed more frequently if at higher risk)  OR  FOBT/FIT: every 1 year  OR  Cologuard: every 3 years  OR  Sigmoidoscopy: every 5 years  Screening may be recommended earlier than age 48 if at higher risk for colorectal cancer  Also, an individualized decision between you and your healthcare provider will decide whether screening between the ages of 74-80 would be appropriate   Colonoscopy: Not on file  FOBT/FIT: 06/04/2020  Cologuard: Not on file  Sigmoidoscopy: Not on file         Prostate Cancer Screening Individualized decision between patient and health care provider in men between ages of 53-78   Medicare will cover every 12 months beginning on the day after your 50th birthday PSA: 1 7 ng/mL     Screening Not Indicated     Hepatitis C Screening Once for adults born between 1945 and 1965  More frequently in patients at high risk for Hepatitis C Hep C Antibody: Not on file       Diabetes Screening 1-2 times per year if you're at risk for diabetes or have pre-diabetes Fasting glucose: 171 mg/dL   A1C: 5 9 %    Screening Not Indicated  History Diabetes   Cholesterol Screening Once every 5 years if you don't have a lipid disorder  May order more often based on risk factors  Lipid panel: 07/26/2019    Screening Current      Other Preventive Screenings Covered by Medicare:  6  Abdominal Aortic Aneurysm (AAA) Screening: covered once if your at risk  You're considered to be at risk if you have a family history of AAA or a male between the age of 73-68 who smoking at least 100 cigarettes in your lifetime  7  Lung Cancer Screening: covers low dose CT scan once per year if you meet all of the following conditions: (1) Age 50-69; (2) No signs or symptoms of lung cancer; (3) Current smoker or have quit smoking within the last 15 years; (4) You have a tobacco smoking history of at least 30 pack years (packs per day x number of years you smoked); (5) You get a written order from a healthcare provider  8  Glaucoma Screening: covered annually if you're considered high risk: (1) You have diabetes OR (2) Family history of glaucoma OR (3)  aged 48 and older OR (3)  American aged 72 and older  5  Osteoporosis Screening: covered every 2 years if you meet one of the following conditions: (1) Have a vertebral abnormality; (2) On glucocorticoid therapy for more than 3 months; (3) Have primary hyperparathyroidism; (4) On osteoporosis medications and need to assess response to drug therapy  10  HIV Screening: covered annually if you're between the age of 12-76  Also covered annually if you are younger than 13 and older than 72 with risk factors for HIV infection  For pregnant patients, it is covered up to 3 times per pregnancy      Immunizations:  Immunization Recommendations   Influenza Vaccine Annual influenza vaccination during flu season is recommended for all persons aged >= 6 months who do not have contraindications   Pneumococcal Vaccine (Prevnar and Pneumovax)  * Prevnar = PCV13  * Pneumovax = PPSV23 Adults 25-60 years old: 1-3 doses may be recommended based on certain risk factors  Adults 72 years old: Prevnar (PCV13) vaccine recommended followed by Pneumovax (PPSV23) vaccine  If already received PPSV23 since turning 65, then PCV13 recommended at least one year after PPSV23 dose  Hepatitis B Vaccine 3 dose series if at intermediate or high risk (ex: diabetes, end stage renal disease, liver disease)   Tetanus (Td) Vaccine - COST NOT COVERED BY MEDICARE PART B Following completion of primary series, a booster dose should be given every 10 years to maintain immunity against tetanus  Td may also be given as tetanus wound prophylaxis  Tdap Vaccine - COST NOT COVERED BY MEDICARE PART B Recommended at least once for all adults  For pregnant patients, recommended with each pregnancy  Shingles Vaccine (Shingrix) - COST NOT COVERED BY MEDICARE PART B  2 shot series recommended in those aged 48 and above     Health Maintenance Due:  There are no preventive care reminders to display for this patient  Immunizations Due:      Topic Date Due    DTaP,Tdap,and Td Vaccines (1 - Tdap) 12/17/1951    Influenza Vaccine  07/01/2020     Advance Directives   What are advance directives? Advance directives are legal documents that state your wishes and plans for medical care  These plans are made ahead of time in case you lose your ability to make decisions for yourself  Advance directives can apply to any medical decision, such as the treatments you want, and if you want to donate organs  What are the types of advance directives? There are many types of advance directives, and each state has rules about how to use them  You may choose a combination of any of the following:  · Living will: This is a written record of the treatment you want  You can also choose which treatments you do not want, which to limit, and which to stop at a certain time  This includes surgery, medicine, IV fluid, and tube feedings  · Durable power of  for healthcare [place] [facility]):   This is a written record that states who you want to make healthcare choices for you when you are unable to make them for yourself  This person, called a proxy, is usually a family member or a friend  You may choose more than 1 proxy  · Do not resuscitate (DNR) order:  A DNR order is used in case your heart stops beating or you stop breathing  It is a request not to have certain forms of treatment, such as CPR  A DNR order may be included in other types of advance directives  · Medical directive: This covers the care that you want if you are in a coma, near death, or unable to make decisions for yourself  You can list the treatments you want for each condition  Treatment may include pain medicine, surgery, blood transfusions, dialysis, IV or tube feedings, and a ventilator (breathing machine)  · Values history: This document has questions about your views, beliefs, and how you feel and think about life  This information can help others choose the care that you would choose  Why are advance directives important? An advance directive helps you control your care  Although spoken wishes may be used, it is better to have your wishes written down  Spoken wishes can be misunderstood, or not followed  Treatments may be given even if you do not want them  An advance directive may make it easier for your family to make difficult choices about your care  Underweight  Underweight is defined as having a body mass index (BMI) of less than 18 5 kg/m2   Anorexia  is a loss of appetite, decreased food intake, or both  Your appetite naturally decreases as you get older  You also get full faster than you used to  This occurs because your body needs less energy  Other body changes can also lead to a decreased appetite  Even though some appetite loss is normal, you still need to get enough calories and nutrients to keep you healthy  You can start to lose too much weight if you do not eat as much food as your body needs   Unwanted weight loss can cause health problems, or worsen health problems you already have  Gemma Peer can also become dehydrated if you do not drink enough liquid  How to eat healthy and get enough nutrients:   · Choose healthy foods  Eat a variety of fruits, vegetables, whole grains, low-fat dairy foods, lean meats, and other protein foods  Limit foods high in fat, sugar, and salt  Limit or avoid alcohol as directed  Work with a dietitian to help you plan your meals if you need to follow a special diet  A dietitian can also teach you how to modify foods if you have trouble chewing or swallowing  · Snack on healthy foods between meals  if you only eat a small amount during meals  Snacks provide extra healthy nutrients and calories between meals  Examples include fruit, cheese, and whole grain crackers  · Drink liquids as directed  to avoid dehydration  Drink liquids between meals if they cause you to get full too quickly during meals  Ask how much liquid to drink each day and which liquids are best for you  · Use herbs, spices, and flavor enhancers to add flavor to foods  Avoid using herbs and spice blends that also contain sodium  Ask your healthcare provider or dietitian about flavor enhancers  Flavor enhancers with ham, natural cast, and roast beef flavors can also be sprinkled on food to add flavor  · Share meals with others as often as you can  Eating with others may help you to eat better during meal time  Ask family members, neighbors, or friends to join you for lunch  There are also senior centers where you can meet people, and share meals with them  · Ask family and friends for help  with shopping or preparing foods  Ask for a ride to the grocery store, if needed  © Copyright [employer] 2018 Information is for End User's use only and may not be sold, redistributed or otherwise used for commercial purposes   All illustrations and images included in CareNotes® are the copyrighted property of [employer] Inc  or [facility] [place]  Calorie Counting Diet   WHAT YOU NEED TO KNOW:   What is a calorie counting diet? It is a meal plan based on counting calories each day to reach a healthy body weight  You will need to eat fewer calories if you are trying to lose weight  Weight loss may decrease your risk for certain health problems or improve your health if you have health problems  Some of these health problems include heart disease, high blood pressure, and diabetes  What foods should I avoid? Your dietitian will tell you if you need to avoid certain foods based on your body weight and health condition  You may need to avoid high-fat foods if you are at risk for or have heart disease  You may need to eat fewer foods from the breads and starches food group if you have diabetes  How many calories are in foods? The following is a list of foods and drinks with the approximate number of calories in each  Check the food label to find the exact number of calories  A dietitian can tell you how many calories you should have from each food group each day    · Carbohydrate:      ¨ ½ of a 3-inch bagel, 1 slice of bread, or ½ of a hamburger bun or hot dog bun (80)    ¨ 1 (8-inch) flour tortilla or ½ cup of cooked rice (100)    ¨ 1 (6-inch) corn tortilla (80)    ¨ 1 (6-inch) pancake or 1 cup of bran flakes cereal (110)    ¨ ½ cup of cooked cereal (80)    ¨ ½ cup of cooked pasta (85)    ¨ 1 ounce of pretzels (100)    ¨ 3 cups of air-popped popcorn without butter or oil (80)    · Dairy:      ¨ 1 cup of skim or 1% milk (90)    ¨ 1 cup of 2% milk (120)    ¨ 1 cup of whole milk (160)    ¨ 1 cup of 2% chocolate milk (220)    ¨ 1 ounce of low-fat cheese with 3 grams of fat per ounce (70)    ¨ 1 ounce of cheddar cheese (114)    ¨ ½ cup of 1% fat cottage cheese (80)    ¨ 1 cup of plain or sugar-free, fat-free yogurt (90)    · Protein foods:      ¨ 3 ounces of fish (not breaded or fried) (95)    ¨ 3 ounces of breaded, fried fish (195)    ¨ ¾ cup of tuna canned in water (105)    ¨ 3 ounces of chicken breast without skin (105)    ¨ 1 fried chicken breast with skin (350)    ¨ ¼ cup of fat free egg substitute (40)    ¨ 1 large egg (75)    ¨ 3 ounces of lean beef or pork (165)    ¨ 3 ounces of fried pork chop or ham (185)    ¨ ½ cup of cooked dried beans, such as kidney, victoria, lentils, or navy (115)    ¨ 3 ounces of bologna or lunch meat (225)    ¨ 2 links of breakfast sausage (140)    · Vegetables:      ¨ ½ cup of sliced mushrooms (10)    ¨ 1 cup of salad greens, such as lettuce, spinach, or jt (15)    ¨ ½ cup of steamed asparagus (20)    ¨ ½ cup of cooked summer squash, zucchini squash, or green or wax beans (25)    ¨ 1 cup of broccoli or cauliflower florets, or 1 medium tomato (25)    ¨ 1 large raw carrot or ½ cup of cooked carrots (40)    ¨ ? of a medium cucumber or 1 stalk of celery (5)    ¨ 1 small baked potato (160)    ¨ 1 cup of breaded, fried vegetables (230)    · Fruit:      ¨ 1 (6-inch) banana (55)     ¨ ½ of a 4-inch grapefruit (55)    ¨ 15 grapes (60)    ¨ 1 medium orange or apple (70)    ¨ 1 large peach (65)    ¨ 1 cup of fresh pineapple chunks (75)    ¨ 1 cup of melon cubes (50)    ¨ 1¼ cups of whole strawberries (45)    ¨ ½ cup of fruit canned in juice (55)    ¨ ½ cup of fruit canned in heavy syrup (110)    ¨ ?  cup of raisins (130)    ¨ ½ cup of unsweetened fruit juice (60)    ¨ ½ cup of grape, cranberry, or prune juice (90)    · Fat:      ¨ 10 peanuts or 2 teaspoons of peanut butter (55)    ¨ 2 tablespoons of avocado or 1 tablespoon of regular salad dressing (45)    ¨ 2 slices of acst (90)    ¨ 1 teaspoon of oil, such as safflower, canola, corn, or olive oil (45)    ¨ 2 teaspoons of low-fat margarine, or 1 tablespoon of low-fat mayonnaise (50)    ¨ 1 teaspoon of regular margarine (40)    ¨ 1 tablespoon of regular mayonnaise (135)    ¨ 1 tablespoon of cream cheese or 2 tablespoons of low-fat cream cheese (45)    ¨ 2 tablespoons of vegetable shortening (215)    · Dessert and sweets:      ¨ 8 animal crackers or 5 vanilla wafers (80)    ¨ 1 frozen fruit juice bar (80)    ¨ ½ cup of ice milk or low-fat frozen yogurt (90)    ¨ ½ cup of sherbet or sorbet (125)    ¨ ½ cup of sugar-free pudding or custard (60)    ¨ ½ cup of ice cream (140)    ¨ ½ cup of pudding or custard (175)    ¨ 1 (2-inch) square chocolate brownie (185)    · Combination foods:      ¨ Bean burrito made with an 8-inch tortilla, without cheese (275)    ¨ Chicken breast sandwich with lettuce and tomato (325)    ¨ 1 cup of chicken noodle soup (60)    ¨ 1 beef taco (175)    ¨ Regular hamburger with lettuce and tomato (310)    ¨ Regular cheeseburger with lettuce and tomato (410)     ¨ ¼ of a 12-inch cheese pizza (280)    ¨ Fried fish sandwich with lettuce and tomato (425)    ¨ Hot dog and bun (275)    ¨ 1½ cups of macaroni and cheese (310)    ¨ Taco salad with a fried tortilla shell (870)    · Low-calorie foods:      ¨ 1 tablespoon of ketchup or 1 tablespoon of fat free sour cream (15)    ¨ 1 teaspoon of mustard (5)    ¨ ¼ cup of salsa (20)    ¨ 1 large dill pickle (15)    ¨ 1 tablespoon of fat free salad dressing (10)    ¨ 2 teaspoons of low-sugar, light jam or jelly, or 1 tablespoon of sugar-free syrup (15)    ¨ 1 sugar-free popsicle (15)    ¨ 1 cup of club soda, seltzer water, or diet soda (0)  CARE AGREEMENT:   You have the right to help plan your care  Discuss treatment options with your caregivers to decide what care you want to receive  You always have the right to refuse treatment  The above information is an  only  It is not intended as medical advice for individual conditions or treatments  Talk to your doctor, nurse or pharmacist before following any medical regimen to see if it is safe and effective for you  © 2017 [address] Information is for End User's use only and may not be sold, redistributed or otherwise used for commercial purposes   All illustrations and images included in CareNotes® are the copyrighted property of Michelle STORY  or Carson Banda

## 2020-07-23 ENCOUNTER — ANTICOAG VISIT (OUTPATIENT)
Dept: FAMILY MEDICINE CLINIC | Facility: CLINIC | Age: 80
End: 2020-07-23

## 2020-07-23 ENCOUNTER — TELEPHONE (OUTPATIENT)
Dept: FAMILY MEDICINE CLINIC | Facility: CLINIC | Age: 80
End: 2020-07-23

## 2020-07-23 ENCOUNTER — APPOINTMENT (OUTPATIENT)
Dept: LAB | Facility: CLINIC | Age: 80
End: 2020-07-23
Payer: COMMERCIAL

## 2020-07-23 DIAGNOSIS — I48.0 PAF (PAROXYSMAL ATRIAL FIBRILLATION) (HCC): ICD-10-CM

## 2020-07-23 NOTE — TELEPHONE ENCOUNTER
----- Message from Talita Romo DO sent at 7/23/2020  5:00 PM EDT -----  Please have the pt alternate 5 mg with the 6 mg and we should redraw in a month

## 2020-07-30 ENCOUNTER — APPOINTMENT (OUTPATIENT)
Dept: LAB | Facility: CLINIC | Age: 80
End: 2020-07-30
Payer: COMMERCIAL

## 2020-07-30 ENCOUNTER — ANTICOAG VISIT (OUTPATIENT)
Dept: FAMILY MEDICINE CLINIC | Facility: CLINIC | Age: 80
End: 2020-07-30

## 2020-07-30 ENCOUNTER — OFFICE VISIT (OUTPATIENT)
Dept: CARDIOLOGY CLINIC | Facility: CLINIC | Age: 80
End: 2020-07-30
Payer: COMMERCIAL

## 2020-07-30 VITALS
WEIGHT: 129.8 LBS | BODY MASS INDEX: 18.17 KG/M2 | TEMPERATURE: 97.6 F | OXYGEN SATURATION: 94 % | HEART RATE: 72 BPM | HEIGHT: 71 IN | DIASTOLIC BLOOD PRESSURE: 66 MMHG | SYSTOLIC BLOOD PRESSURE: 134 MMHG

## 2020-07-30 DIAGNOSIS — I10 HTN (HYPERTENSION), MALIGNANT: ICD-10-CM

## 2020-07-30 DIAGNOSIS — I50.9 HEART FAILURE (HCC): ICD-10-CM

## 2020-07-30 DIAGNOSIS — I48.0 PAF (PAROXYSMAL ATRIAL FIBRILLATION) (HCC): ICD-10-CM

## 2020-07-30 DIAGNOSIS — I42.9 CARDIOMYOPATHY, UNSPECIFIED TYPE (HCC): ICD-10-CM

## 2020-07-30 DIAGNOSIS — I48.20 CHRONIC ATRIAL FIBRILLATION (HCC): Primary | ICD-10-CM

## 2020-07-30 PROCEDURE — 93000 ELECTROCARDIOGRAM COMPLETE: CPT | Performed by: INTERNAL MEDICINE

## 2020-07-30 PROCEDURE — 1170F FXNL STATUS ASSESSED: CPT | Performed by: INTERNAL MEDICINE

## 2020-07-30 PROCEDURE — 1036F TOBACCO NON-USER: CPT | Performed by: INTERNAL MEDICINE

## 2020-07-30 PROCEDURE — 3075F SYST BP GE 130 - 139MM HG: CPT | Performed by: INTERNAL MEDICINE

## 2020-07-30 PROCEDURE — 2022F DILAT RTA XM EVC RTNOPTHY: CPT | Performed by: INTERNAL MEDICINE

## 2020-07-30 PROCEDURE — 3008F BODY MASS INDEX DOCD: CPT | Performed by: INTERNAL MEDICINE

## 2020-07-30 PROCEDURE — 4040F PNEUMOC VAC/ADMIN/RCVD: CPT | Performed by: INTERNAL MEDICINE

## 2020-07-30 PROCEDURE — 3078F DIAST BP <80 MM HG: CPT | Performed by: INTERNAL MEDICINE

## 2020-07-30 PROCEDURE — 1111F DSCHRG MED/CURRENT MED MERGE: CPT | Performed by: INTERNAL MEDICINE

## 2020-07-30 PROCEDURE — 3044F HG A1C LEVEL LT 7.0%: CPT | Performed by: INTERNAL MEDICINE

## 2020-07-30 PROCEDURE — 1160F RVW MEDS BY RX/DR IN RCRD: CPT | Performed by: INTERNAL MEDICINE

## 2020-07-30 PROCEDURE — 99214 OFFICE O/P EST MOD 30 MIN: CPT | Performed by: INTERNAL MEDICINE

## 2020-07-30 RX ORDER — SPIRONOLACTONE 25 MG/1
25 TABLET ORAL DAILY
Qty: 90 TABLET | Refills: 3 | Status: SHIPPED | OUTPATIENT
Start: 2020-07-30 | End: 2021-08-28

## 2020-07-30 RX ORDER — DIGOXIN 125 MCG
TABLET ORAL
Qty: 90 TABLET | Refills: 3 | Status: SHIPPED | OUTPATIENT
Start: 2020-07-30 | End: 2021-07-08 | Stop reason: SDUPTHER

## 2020-07-30 RX ORDER — FUROSEMIDE 40 MG/1
40 TABLET ORAL DAILY
Qty: 90 TABLET | Refills: 3 | Status: SHIPPED | OUTPATIENT
Start: 2020-07-30 | End: 2021-06-18

## 2020-07-30 RX ORDER — CARVEDILOL 6.25 MG/1
6.25 TABLET ORAL 2 TIMES DAILY
Qty: 180 TABLET | Refills: 3 | Status: SHIPPED | OUTPATIENT
Start: 2020-07-30 | End: 2021-07-08 | Stop reason: SDUPTHER
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
## 2020-08-01 DIAGNOSIS — K29.70 GASTRITIS: ICD-10-CM

## 2020-08-03 ENCOUNTER — IN-CLINIC DEVICE VISIT (OUTPATIENT)
Dept: CARDIOLOGY CLINIC | Facility: CLINIC | Age: 80
End: 2020-08-03
Payer: COMMERCIAL

## 2020-08-03 DIAGNOSIS — Z95.810 PRESENCE OF IMPLANTABLE CARDIOVERTER-DEFIBRILLATOR (ICD): Primary | ICD-10-CM

## 2020-08-03 PROCEDURE — 93282 PRGRMG EVAL IMPLANTABLE DFB: CPT | Performed by: INTERNAL MEDICINE

## 2020-08-03 NOTE — PROGRESS NOTES
MDT SINGLE ICD   DEVICE INTERROGATED IN THE [place] OFFICE:  BATTERY VOLTAGE ADEQUATE (2 71V/RRT=2 63V)    8 8%   ALL LEAD PARAMETERS WITHIN NORMAL LIMITS   4 VT-NS EPISODES WITH 1 EGM SHOWING NSVT (7 @ 211 BPM), AND REMAINING EGMS SHOWING PROBABLE RVR WITH QRS SIMILAR TO INTRINSIC (10 @ 248 BPM, 6 @ 200 BPM, 10 @ 190 BPM)    TASK TO DR ALCANTARA FOR HR > 200 BPM   OPTI-VOL FLUID THRESHOLD CROSSED, AND IS ONGOING   PT TAKES WARFARIN, DIGOXIN, CARVEDILOL, AND FUROSEMIDE   NO PROGRAMMING CHANGES MADE TO DEVICE PARAMETERS   NORMAL DEVICE FUNCTION   RG

## 2020-08-04 LAB
MYCOBACTERIUM SPEC CULT: NORMAL
RHODAMINE-AURAMINE STN SPEC: NORMAL

## 2020-08-06 ENCOUNTER — TELEPHONE (OUTPATIENT)
Dept: FAMILY MEDICINE CLINIC | Facility: CLINIC | Age: 80
End: 2020-08-06

## 2020-08-06 ENCOUNTER — ANTICOAG VISIT (OUTPATIENT)
Dept: FAMILY MEDICINE CLINIC | Facility: CLINIC | Age: 80
End: 2020-08-06

## 2020-08-06 ENCOUNTER — OFFICE VISIT (OUTPATIENT)
Dept: NEUROLOGY | Facility: CLINIC | Age: 80
End: 2020-08-06
Payer: COMMERCIAL

## 2020-08-06 ENCOUNTER — APPOINTMENT (OUTPATIENT)
Dept: LAB | Facility: CLINIC | Age: 80
End: 2020-08-06
Payer: COMMERCIAL

## 2020-08-06 VITALS
WEIGHT: 132 LBS | DIASTOLIC BLOOD PRESSURE: 81 MMHG | HEART RATE: 79 BPM | HEIGHT: 71 IN | TEMPERATURE: 99.1 F | BODY MASS INDEX: 18.48 KG/M2 | SYSTOLIC BLOOD PRESSURE: 125 MMHG

## 2020-08-06 DIAGNOSIS — I48.0 PAF (PAROXYSMAL ATRIAL FIBRILLATION) (HCC): ICD-10-CM

## 2020-08-06 DIAGNOSIS — Z00.00 MEDICARE ANNUAL WELLNESS VISIT, SUBSEQUENT: ICD-10-CM

## 2020-08-06 DIAGNOSIS — E43 SEVERE PROTEIN-CALORIE MALNUTRITION (HCC): ICD-10-CM

## 2020-08-06 DIAGNOSIS — I10 ESSENTIAL HYPERTENSION: ICD-10-CM

## 2020-08-06 DIAGNOSIS — E11.42 TYPE 2 DIABETES MELLITUS WITH DIABETIC POLYNEUROPATHY, WITHOUT LONG-TERM CURRENT USE OF INSULIN (HCC): ICD-10-CM

## 2020-08-06 DIAGNOSIS — F02.80 ALZHEIMER'S DEMENTIA WITHOUT BEHAVIORAL DISTURBANCE, UNSPECIFIED TIMING OF DEMENTIA ONSET (HCC): ICD-10-CM

## 2020-08-06 DIAGNOSIS — R41.89 COGNITIVE DECLINE: Primary | ICD-10-CM

## 2020-08-06 DIAGNOSIS — G30.9 ALZHEIMER'S DEMENTIA WITHOUT BEHAVIORAL DISTURBANCE, UNSPECIFIED TIMING OF DEMENTIA ONSET (HCC): ICD-10-CM

## 2020-08-06 LAB
ALBUMIN SERPL BCP-MCNC: 3.3 G/DL (ref 3.5–5)
ALP SERPL-CCNC: 83 U/L (ref 46–116)
ALT SERPL W P-5'-P-CCNC: 17 U/L (ref 12–78)
ANION GAP SERPL CALCULATED.3IONS-SCNC: 3 MMOL/L (ref 4–13)
AST SERPL W P-5'-P-CCNC: 10 U/L (ref 5–45)
BILIRUB SERPL-MCNC: 0.27 MG/DL (ref 0.2–1)
BUN SERPL-MCNC: 24 MG/DL (ref 5–25)
CALCIUM SERPL-MCNC: 8.9 MG/DL (ref 8.3–10.1)
CHLORIDE SERPL-SCNC: 103 MMOL/L (ref 100–108)
CHOLEST SERPL-MCNC: 125 MG/DL (ref 50–200)
CO2 SERPL-SCNC: 31 MMOL/L (ref 21–32)
CREAT SERPL-MCNC: 1.26 MG/DL (ref 0.6–1.3)
ERYTHROCYTE [DISTWIDTH] IN BLOOD BY AUTOMATED COUNT: 14.2 % (ref 11.6–15.1)
EST. AVERAGE GLUCOSE BLD GHB EST-MCNC: 123 MG/DL
GFR SERPL CREATININE-BSD FRML MDRD: 53 ML/MIN/1.73SQ M
GLUCOSE P FAST SERPL-MCNC: 108 MG/DL (ref 65–99)
HBA1C MFR BLD: 5.9 %
HCT VFR BLD AUTO: 39.1 % (ref 36.5–49.3)
HDLC SERPL-MCNC: 40 MG/DL
HGB BLD-MCNC: 11.8 G/DL (ref 12–17)
LDLC SERPL CALC-MCNC: 55 MG/DL (ref 0–100)
MCH RBC QN AUTO: 28.5 PG (ref 26.8–34.3)
MCHC RBC AUTO-ENTMCNC: 30.2 G/DL (ref 31.4–37.4)
MCV RBC AUTO: 94 FL (ref 82–98)
PLATELET # BLD AUTO: 259 THOUSANDS/UL (ref 149–390)
PMV BLD AUTO: 10.8 FL (ref 8.9–12.7)
POTASSIUM SERPL-SCNC: 4 MMOL/L (ref 3.5–5.3)
PROT SERPL-MCNC: 7 G/DL (ref 6.4–8.2)
RBC # BLD AUTO: 4.14 MILLION/UL (ref 3.88–5.62)
SODIUM SERPL-SCNC: 137 MMOL/L (ref 136–145)
TRIGL SERPL-MCNC: 149 MG/DL
WBC # BLD AUTO: 9.72 THOUSAND/UL (ref 4.31–10.16)

## 2020-08-06 PROCEDURE — 80061 LIPID PANEL: CPT

## 2020-08-06 PROCEDURE — 3079F DIAST BP 80-89 MM HG: CPT | Performed by: NURSE PRACTITIONER

## 2020-08-06 PROCEDURE — 80053 COMPREHEN METABOLIC PANEL: CPT

## 2020-08-06 PROCEDURE — 3008F BODY MASS INDEX DOCD: CPT | Performed by: NURSE PRACTITIONER

## 2020-08-06 PROCEDURE — 99214 OFFICE O/P EST MOD 30 MIN: CPT | Performed by: NURSE PRACTITIONER

## 2020-08-06 PROCEDURE — 83036 HEMOGLOBIN GLYCOSYLATED A1C: CPT

## 2020-08-06 PROCEDURE — 3074F SYST BP LT 130 MM HG: CPT | Performed by: NURSE PRACTITIONER

## 2020-08-06 PROCEDURE — 1160F RVW MEDS BY RX/DR IN RCRD: CPT | Performed by: NURSE PRACTITIONER

## 2020-08-06 PROCEDURE — 1170F FXNL STATUS ASSESSED: CPT | Performed by: NURSE PRACTITIONER

## 2020-08-06 PROCEDURE — 3044F HG A1C LEVEL LT 7.0%: CPT | Performed by: NURSE PRACTITIONER

## 2020-08-06 PROCEDURE — 4040F PNEUMOC VAC/ADMIN/RCVD: CPT | Performed by: NURSE PRACTITIONER

## 2020-08-06 PROCEDURE — 1036F TOBACCO NON-USER: CPT | Performed by: NURSE PRACTITIONER

## 2020-08-06 PROCEDURE — 2022F DILAT RTA XM EVC RTNOPTHY: CPT | Performed by: NURSE PRACTITIONER

## 2020-08-06 PROCEDURE — 85027 COMPLETE CBC AUTOMATED: CPT

## 2020-08-06 PROCEDURE — 1111F DSCHRG MED/CURRENT MED MERGE: CPT | Performed by: NURSE PRACTITIONER

## 2020-08-06 RX ORDER — MEMANTINE HYDROCHLORIDE 10 MG/1
10 TABLET ORAL DAILY
Qty: 90 TABLET | Refills: 0 | Status: SHIPPED | OUTPATIENT
Start: 2020-08-06 | End: 2021-02-12
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
## 2020-08-06 NOTE — TELEPHONE ENCOUNTER
----- Message from Jake Velasquez DO sent at 8/6/2020  2:51 PM EDT -----  Change dose back down to 5mg daily and redraw on Monday am

## 2020-08-06 NOTE — TELEPHONE ENCOUNTER
Spoke to patients wife Genevieve Falls  Patient will change dosage to 5mg daily and recheck on Monday 08/10/20  Calendar updated   Radha Cheng MA

## 2020-08-10 ENCOUNTER — TRANSCRIBE ORDERS (OUTPATIENT)
Dept: ADMINISTRATIVE | Facility: HOSPITAL | Age: 80
End: 2020-08-10

## 2020-08-10 ENCOUNTER — TELEPHONE (OUTPATIENT)
Dept: FAMILY MEDICINE CLINIC | Facility: CLINIC | Age: 80
End: 2020-08-10

## 2020-08-10 ENCOUNTER — ANTICOAG VISIT (OUTPATIENT)
Dept: FAMILY MEDICINE CLINIC | Facility: CLINIC | Age: 80
End: 2020-08-10

## 2020-08-10 ENCOUNTER — TRANSCRIBE ORDERS (OUTPATIENT)
Dept: LAB | Facility: CLINIC | Age: 80
End: 2020-08-10

## 2020-08-10 ENCOUNTER — APPOINTMENT (OUTPATIENT)
Dept: LAB | Facility: CLINIC | Age: 80
End: 2020-08-10
Payer: COMMERCIAL

## 2020-08-10 ENCOUNTER — HOSPITAL ENCOUNTER (OUTPATIENT)
Dept: RADIOLOGY | Facility: HOSPITAL | Age: 80
Discharge: HOME | End: 2020-08-10
Payer: COMMERCIAL

## 2020-08-10 DIAGNOSIS — I48.0 PAF (PAROXYSMAL ATRIAL FIBRILLATION) (HCC): ICD-10-CM

## 2020-08-10 DIAGNOSIS — R93.89 ABNORMAL RADIOLOGICAL FINDINGS IN SKIN AND SUBCUTANEOUS TISSUE: Primary | ICD-10-CM

## 2020-08-10 DIAGNOSIS — R93.89 ABNORMAL CHEST X-RAY: ICD-10-CM

## 2020-08-10 PROCEDURE — 71046 X-RAY EXAM CHEST 2 VIEWS: CPT

## 2020-08-10 NOTE — TELEPHONE ENCOUNTER
Patient INR is too thin, she wants to know if it could be due to the Trilogy patient is now taking  She states she spoke to nurse with results a little while ago  PLs call her back and let her know if this could be the cause

## 2020-08-11 NOTE — TELEPHONE ENCOUNTER
Spoke with Brittani Palencia  No further questions at this time No further action needed   [facility]

## 2020-08-13 ENCOUNTER — APPOINTMENT (OUTPATIENT)
Dept: LAB | Facility: CLINIC | Age: 80
End: 2020-08-13
Payer: COMMERCIAL

## 2020-08-13 ENCOUNTER — ANTICOAG VISIT (OUTPATIENT)
Dept: FAMILY MEDICINE CLINIC | Facility: CLINIC | Age: 80
End: 2020-08-13

## 2020-08-13 DIAGNOSIS — I25.10 ARTERIOSCLEROSIS OF CORONARY ARTERY: ICD-10-CM

## 2020-08-13 DIAGNOSIS — I48.0 PAF (PAROXYSMAL ATRIAL FIBRILLATION) (HCC): ICD-10-CM

## 2020-08-13 LAB
INR PPP: 2.85 (ref 0.84–1.19)
PROTHROMBIN TIME: 29.7 SECONDS (ref 11.6–14.5)

## 2020-08-13 PROCEDURE — 85610 PROTHROMBIN TIME: CPT

## 2020-08-13 PROCEDURE — 36415 COLL VENOUS BLD VENIPUNCTURE: CPT

## 2020-08-18 DIAGNOSIS — J18.9 PNEUMONIA OF UPPER LOBE DUE TO INFECTIOUS ORGANISM, UNSPECIFIED LATERALITY: Primary | ICD-10-CM

## 2020-08-18 NOTE — PROGRESS NOTES
Toyin Schmitt May 78year-old male with severe emphysematous COPD with recent hospitalization for pneumonia/ bullous hemorrhage with resolving left-sided upper lobe hemorrhagic collection  Results discussed with the patient's wife  Patient is asymptomatic  No longer coughing up blood  Discussed follow-up as scheduled for 09/14/2020 at 09:20  Will follow-up chest x-ray prior to office visit  CHEST      INDICATION:   R93 [id number]: Abnormal findings on diagnostic imaging of other specified body structures      COMPARISON:  6/16/2020     EXAM PERFORMED/VIEWS:  XR CHEST PA & LATERAL  The frontal view was performed utilizing dual energy radiographic technique         FINDINGS:     Cardiomediastinal silhouette appears unremarkable      Left AICD and COPD again noted      Marked clearing left base with small residual infiltrate  Slight left costophrenic angle blunting  Persistent decreased posterior left upper lung consolidated infiltrate  Nabila Prows   No pneumothorax or pleural effusion      Osseous structures appear within normal limits for patient age      IMPRESSION:     Persistent decreased posterior left upper lung consolidated infiltrate      Small residual left base infiltrate      Small left effusion

## 2020-08-20 ENCOUNTER — ANTICOAG VISIT (OUTPATIENT)
Dept: FAMILY MEDICINE CLINIC | Facility: CLINIC | Age: 80
End: 2020-08-20

## 2020-08-20 ENCOUNTER — LAB (OUTPATIENT)
Dept: LAB | Facility: CLINIC | Age: 80
End: 2020-08-20
Payer: COMMERCIAL

## 2020-08-20 DIAGNOSIS — I48.0 PAF (PAROXYSMAL ATRIAL FIBRILLATION) (HCC): ICD-10-CM

## 2020-08-20 DIAGNOSIS — I25.10 ARTERIOSCLEROSIS OF CORONARY ARTERY: ICD-10-CM

## 2020-08-20 LAB
INR PPP: 2.17 (ref 0.84–1.19)
PROTHROMBIN TIME: 24.1 SECONDS (ref 11.6–14.5)

## 2020-08-20 PROCEDURE — 36415 COLL VENOUS BLD VENIPUNCTURE: CPT

## 2020-08-20 PROCEDURE — 85610 PROTHROMBIN TIME: CPT

## 2020-09-03 ENCOUNTER — ANTICOAG VISIT (OUTPATIENT)
Dept: FAMILY MEDICINE CLINIC | Facility: CLINIC | Age: 80
End: 2020-09-03

## 2020-09-03 ENCOUNTER — APPOINTMENT (OUTPATIENT)
Dept: LAB | Facility: CLINIC | Age: 80
End: 2020-09-03
Payer: COMMERCIAL

## 2020-09-03 DIAGNOSIS — I48.0 PAF (PAROXYSMAL ATRIAL FIBRILLATION) (HCC): ICD-10-CM

## 2020-09-10 ENCOUNTER — HOSPITAL ENCOUNTER (OUTPATIENT)
Dept: RADIOLOGY | Facility: HOSPITAL | Age: 80
Discharge: HOME | End: 2020-09-10
Payer: COMMERCIAL

## 2020-09-10 ENCOUNTER — TRANSCRIBE ORDERS (OUTPATIENT)
Dept: ADMINISTRATIVE | Facility: HOSPITAL | Age: 80
End: 2020-09-10

## 2020-09-10 DIAGNOSIS — J18.9 PNEUMONIA OF UPPER LOBE DUE TO INFECTIOUS ORGANISM, UNSPECIFIED LATERALITY: ICD-10-CM

## 2020-09-10 PROCEDURE — 71046 X-RAY EXAM CHEST 2 VIEWS: CPT

## 2020-09-11 ENCOUNTER — TELEPHONE (OUTPATIENT)
Dept: PULMONOLOGY | Facility: MEDICAL CENTER | Age: 80
End: 2020-09-11

## 2020-09-11 NOTE — TELEPHONE ENCOUNTER
47-year-old male with significant emphysematous COPD previously hospitalized for pneumonia/hemorrhagic bulla  Continued surveillance with serial x-ray imaging showing continued resolution of opacity  Patient has scheduled follow-up on 09/14 in the office  Discussed all results of x-ray imaging  Answered all questions  CHEST      INDICATION:   J18 [id number]: Pneumonia, unspecified organism      COMPARISON:  08/10/2020     EXAM PERFORMED/VIEWS:  XR CHEST PA & LATERAL  Images: 2     FINDINGS:     Cardiomediastinal silhouette appears unremarkable  A defibrillator enters on the left  No evidence of heart failure      The patient has severe emphysema  Left upper lung zone opacity appears smaller  The costophrenic angles are blunted  There is no pneumothorax  There are bullous changes in the left lung      Osseous structures appear within normal limits for patient age      IMPRESSION:     Diminishing opacity in the left upper lung zone    Continued surveillance until complete resolution is recommended      The study was marked in [facility] for significant notification      Workstation performed: [id number]

## 2020-09-14 ENCOUNTER — OFFICE VISIT (OUTPATIENT)
Dept: PULMONOLOGY | Facility: MEDICAL CENTER | Age: 80
End: 2020-09-14
Payer: COMMERCIAL

## 2020-09-14 VITALS
HEART RATE: 81 BPM | RESPIRATION RATE: 12 BRPM | OXYGEN SATURATION: 93 % | WEIGHT: 130 LBS | DIASTOLIC BLOOD PRESSURE: 72 MMHG | HEIGHT: 71 IN | BODY MASS INDEX: 18.2 KG/M2 | SYSTOLIC BLOOD PRESSURE: 122 MMHG | TEMPERATURE: 97.9 F

## 2020-09-14 DIAGNOSIS — R04.2 HEMOPTYSIS: ICD-10-CM

## 2020-09-14 DIAGNOSIS — J44.1 COPD EXACERBATION (HCC): ICD-10-CM

## 2020-09-14 DIAGNOSIS — J43.2 CENTRILOBULAR EMPHYSEMA (HCC): ICD-10-CM

## 2020-09-14 DIAGNOSIS — J96.11 CHRONIC HYPOXEMIC RESPIRATORY FAILURE (HCC): ICD-10-CM

## 2020-09-14 DIAGNOSIS — R93.89 ABNORMAL CHEST X-RAY: Primary | ICD-10-CM

## 2020-09-14 PROCEDURE — 99214 OFFICE O/P EST MOD 30 MIN: CPT | Performed by: PHYSICIAN ASSISTANT

## 2020-09-14 PROCEDURE — 3078F DIAST BP <80 MM HG: CPT | Performed by: PHYSICIAN ASSISTANT

## 2020-09-14 PROCEDURE — 1160F RVW MEDS BY RX/DR IN RCRD: CPT | Performed by: PHYSICIAN ASSISTANT

## 2020-09-14 PROCEDURE — 1036F TOBACCO NON-USER: CPT | Performed by: PHYSICIAN ASSISTANT

## 2020-09-14 RX ORDER — ALBUTEROL SULFATE 90 UG/1
2 AEROSOL, METERED RESPIRATORY (INHALATION) EVERY 6 HOURS PRN
Qty: 18 G | Refills: 11 | Status: SHIPPED | OUTPATIENT
Start: 2020-09-14 | End: 2021-07-13
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
## 2020-09-14 NOTE — PATIENT INSTRUCTIONS
Very Severe COPD:  -continue Trelegy ellipta once daily  -call with worsening symptoms    Chronic Hypoxemic Respiratory Failure:  -monitor your oxygen levels while walking  -call office for possible need for different oxygen delivery modality if you are experiencing frequent oxygen desaturations    Emphysematous Bullous Hemorrhage:  -Follow up xray imaging in approx 3 months    Follow Up for repeat Xrays in Approximately 3 months    Office Follow up in 1 year
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
## 2020-09-17 ENCOUNTER — ANTICOAG VISIT (OUTPATIENT)
Dept: FAMILY MEDICINE CLINIC | Facility: CLINIC | Age: 80
End: 2020-09-17

## 2020-09-17 ENCOUNTER — APPOINTMENT (OUTPATIENT)
Dept: LAB | Facility: CLINIC | Age: 80
End: 2020-09-17
Payer: COMMERCIAL

## 2020-09-17 DIAGNOSIS — I48.0 PAF (PAROXYSMAL ATRIAL FIBRILLATION) (HCC): ICD-10-CM

## 2020-09-28 ENCOUNTER — TELEPHONE (OUTPATIENT)
Dept: CARDIOLOGY CLINIC | Facility: CLINIC | Age: 80
End: 2020-09-28

## 2020-09-28 NOTE — TELEPHONE ENCOUNTER
Pre  Op  Clearance note for Dental Procedures - Cardiology    Oly Frias May   79 y o   male  1940      Dr Zaman Model;    Oly Frias May :     Patient can hold  Aspirin for  5-7 days as required for surgery  Patient can hold Eliquis/Xarelto/Pradaxa for 3 days before the procedure  Please restart after the procedure when okay from surgical point of view and advise patient to contact our office  If you have any question please do not hesitate to call us at our office of [facility]  Phone # [phone]  Dental Procedure:    no  Patient can have anesthesia for dental procedure  Severe COPD  High cardiac risk  Recommend hospital setting  yes  Patient will need to pre-medicate  yes  Patient can have epinephrine  no  Does patient have any contraindications to the treatment, if so, please indicate contraindications      Lab Results   Component Value Date    WBC 9 72 08/06/2020    HGB 11 8 (L) 08/06/2020    HCT 39 1 08/06/2020    MCV 94 08/06/2020     08/06/2020     Lab Results   Component Value Date    CREATININE 1 26 08/06/2020     Lab Results   Component Value Date    GLUF 108 (H) 08/06/2020       Joe Arguello MD  9/28/2020  12:50 PM

## 2020-09-29 NOTE — TELEPHONE ENCOUNTER
Patient made aware of Coumadin HOLD  More Alexander made aware to make sure PCP's coumadin clinic is aware regarding dental procedure and to further manage coumadin

## 2020-10-01 ENCOUNTER — APPOINTMENT (OUTPATIENT)
Dept: LAB | Facility: CLINIC | Age: 80
End: 2020-10-01
Payer: COMMERCIAL

## 2020-10-01 ENCOUNTER — ANTICOAG VISIT (OUTPATIENT)
Dept: FAMILY MEDICINE CLINIC | Facility: CLINIC | Age: 80
End: 2020-10-01

## 2020-10-01 DIAGNOSIS — I48.0 PAF (PAROXYSMAL ATRIAL FIBRILLATION) (HCC): ICD-10-CM

## 2020-10-22 ENCOUNTER — LAB (OUTPATIENT)
Dept: LAB | Facility: CLINIC | Age: 80
End: 2020-10-22
Payer: COMMERCIAL

## 2020-10-22 ENCOUNTER — TRANSCRIBE ORDERS (OUTPATIENT)
Dept: LAB | Facility: CLINIC | Age: 80
End: 2020-10-22

## 2020-10-22 ENCOUNTER — ANTICOAG VISIT (OUTPATIENT)
Dept: FAMILY MEDICINE CLINIC | Facility: CLINIC | Age: 80
End: 2020-10-22

## 2020-10-22 DIAGNOSIS — I48.0 PAF (PAROXYSMAL ATRIAL FIBRILLATION) (HCC): ICD-10-CM

## 2020-10-22 DIAGNOSIS — I25.10 ATHEROSCLEROSIS OF NATIVE CORONARY ARTERY WITHOUT ANGINA PECTORIS, UNSPECIFIED WHETHER NATIVE OR TRANSPLANTED HEART: ICD-10-CM

## 2020-10-22 DIAGNOSIS — I25.10 ATHEROSCLEROSIS OF NATIVE CORONARY ARTERY WITHOUT ANGINA PECTORIS, UNSPECIFIED WHETHER NATIVE OR TRANSPLANTED HEART: Primary | ICD-10-CM

## 2020-11-12 ENCOUNTER — TRANSCRIBE ORDERS (OUTPATIENT)
Dept: ADMINISTRATIVE | Facility: HOSPITAL | Age: 80
End: 2020-11-12

## 2020-11-12 ENCOUNTER — LAB (OUTPATIENT)
Dept: LAB | Facility: CLINIC | Age: 80
End: 2020-11-12
Payer: COMMERCIAL

## 2020-11-12 ENCOUNTER — ANTICOAG VISIT (OUTPATIENT)
Dept: FAMILY MEDICINE CLINIC | Facility: CLINIC | Age: 80
End: 2020-11-12

## 2020-11-12 DIAGNOSIS — I50.9 HEART FAILURE (HCC): ICD-10-CM

## 2020-11-12 DIAGNOSIS — I42.9 CARDIOMYOPATHY, UNSPECIFIED TYPE (HCC): ICD-10-CM

## 2020-11-12 DIAGNOSIS — I10 HTN (HYPERTENSION), MALIGNANT: ICD-10-CM

## 2020-11-12 DIAGNOSIS — J44.1 COPD EXACERBATION (HCC): ICD-10-CM

## 2020-11-12 DIAGNOSIS — I25.10 ARTERIOSCLEROSIS OF CORONARY ARTERY: ICD-10-CM

## 2020-11-12 DIAGNOSIS — I48.0 PAF (PAROXYSMAL ATRIAL FIBRILLATION) (HCC): ICD-10-CM

## 2020-11-12 DIAGNOSIS — I25.10 ARTERIOSCLEROSIS OF CORONARY ARTERY: Primary | ICD-10-CM

## 2020-11-12 LAB
DIGOXIN SERPL-MCNC: 0.9 NG/ML (ref 0.8–2)
ERYTHROCYTE [DISTWIDTH] IN BLOOD BY AUTOMATED COUNT: 16 % (ref 11.6–15.1)
HCT VFR BLD AUTO: 42.7 % (ref 36.5–49.3)
HGB BLD-MCNC: 13.5 G/DL (ref 12–17)
INR PPP: 2.6 (ref 0.84–1.19)
INR PPP: 2.6 (ref 0.84–1.19)
MCH RBC QN AUTO: 28.5 PG (ref 26.8–34.3)
MCHC RBC AUTO-ENTMCNC: 31.6 G/DL (ref 31.4–37.4)
MCV RBC AUTO: 90 FL (ref 82–98)
PLATELET # BLD AUTO: 234 THOUSANDS/UL (ref 149–390)
PMV BLD AUTO: 10.5 FL (ref 8.9–12.7)
PROTHROMBIN TIME: 27.6 SECONDS (ref 11.6–14.5)
RBC # BLD AUTO: 4.74 MILLION/UL (ref 3.88–5.62)
WBC # BLD AUTO: 9.49 THOUSAND/UL (ref 4.31–10.16)

## 2020-11-12 PROCEDURE — 80162 ASSAY OF DIGOXIN TOTAL: CPT | Performed by: INTERNAL MEDICINE

## 2020-11-12 PROCEDURE — 36415 COLL VENOUS BLD VENIPUNCTURE: CPT | Performed by: INTERNAL MEDICINE

## 2020-11-12 PROCEDURE — 85027 COMPLETE CBC AUTOMATED: CPT

## 2020-11-12 PROCEDURE — 85610 PROTHROMBIN TIME: CPT

## 2020-11-16 ENCOUNTER — IN-CLINIC DEVICE VISIT (OUTPATIENT)
Dept: CARDIOLOGY CLINIC | Facility: CLINIC | Age: 80
End: 2020-11-16
Payer: COMMERCIAL

## 2020-11-16 DIAGNOSIS — Z95.810 PRESENCE OF IMPLANTABLE CARDIOVERTER-DEFIBRILLATOR (ICD): Primary | ICD-10-CM

## 2020-11-16 PROCEDURE — 93289 INTERROG DEVICE EVAL HEART: CPT | Performed by: INTERNAL MEDICINE

## 2020-11-19 ENCOUNTER — OFFICE VISIT (OUTPATIENT)
Dept: CARDIOLOGY CLINIC | Facility: CLINIC | Age: 80
End: 2020-11-19
Payer: COMMERCIAL

## 2020-11-19 VITALS
WEIGHT: 143 LBS | HEIGHT: 71 IN | SYSTOLIC BLOOD PRESSURE: 132 MMHG | TEMPERATURE: 99.7 F | BODY MASS INDEX: 20.02 KG/M2 | HEART RATE: 86 BPM | DIASTOLIC BLOOD PRESSURE: 68 MMHG | OXYGEN SATURATION: 90 %

## 2020-11-19 DIAGNOSIS — I42.9 CARDIOMYOPATHY, UNSPECIFIED TYPE (HCC): ICD-10-CM

## 2020-11-19 DIAGNOSIS — E11.59 TYPE 2 DIABETES MELLITUS WITH OTHER CIRCULATORY COMPLICATION, WITHOUT LONG-TERM CURRENT USE OF INSULIN (HCC): ICD-10-CM

## 2020-11-19 DIAGNOSIS — I48.20 CHRONIC ATRIAL FIBRILLATION (HCC): Primary | ICD-10-CM

## 2020-11-19 DIAGNOSIS — Z95.810 PRESENCE OF IMPLANTABLE CARDIOVERTER-DEFIBRILLATOR (ICD): ICD-10-CM

## 2020-11-19 DIAGNOSIS — I50.1 HEART FAILURE, LEFT, WITH LVEF 31-40% (HCC): ICD-10-CM

## 2020-11-19 DIAGNOSIS — I10 HTN (HYPERTENSION), MALIGNANT: ICD-10-CM

## 2020-11-19 DIAGNOSIS — I50.1 HEART FAILURE, LEFT, WITH LVEF <=30% (HCC): ICD-10-CM

## 2020-11-19 DIAGNOSIS — I50.22 CHRONIC SYSTOLIC CONGESTIVE HEART FAILURE (HCC): ICD-10-CM

## 2020-11-19 DIAGNOSIS — I25.10 ARTERIOSCLEROSIS OF CORONARY ARTERY: ICD-10-CM

## 2020-11-19 PROCEDURE — 3075F SYST BP GE 130 - 139MM HG: CPT | Performed by: INTERNAL MEDICINE

## 2020-11-19 PROCEDURE — 99214 OFFICE O/P EST MOD 30 MIN: CPT | Performed by: INTERNAL MEDICINE

## 2020-11-19 PROCEDURE — 93000 ELECTROCARDIOGRAM COMPLETE: CPT | Performed by: INTERNAL MEDICINE

## 2020-11-19 PROCEDURE — 1036F TOBACCO NON-USER: CPT | Performed by: INTERNAL MEDICINE

## 2020-11-19 PROCEDURE — 3078F DIAST BP <80 MM HG: CPT | Performed by: INTERNAL MEDICINE

## 2020-11-19 PROCEDURE — 1160F RVW MEDS BY RX/DR IN RCRD: CPT | Performed by: INTERNAL MEDICINE

## 2020-11-28 DIAGNOSIS — I48.0 PAF (PAROXYSMAL ATRIAL FIBRILLATION) (HCC): Primary | ICD-10-CM

## 2020-11-28 RX ORDER — WARFARIN SODIUM 1 MG/1
1 TABLET ORAL DAILY
COMMUNITY
End: 2020-11-28 | Stop reason: SDUPTHER

## 2020-11-30 RX ORDER — WARFARIN SODIUM 1 MG/1
1 TABLET ORAL DAILY
Qty: 90 TABLET | Refills: 0 | Status: SHIPPED | OUTPATIENT
Start: 2020-11-30

## 2020-12-03 ENCOUNTER — ANTICOAG VISIT (OUTPATIENT)
Dept: FAMILY MEDICINE CLINIC | Facility: CLINIC | Age: 80
End: 2020-12-03

## 2020-12-03 ENCOUNTER — TRANSCRIBE ORDERS (OUTPATIENT)
Dept: ADMINISTRATIVE | Facility: HOSPITAL | Age: 80
End: 2020-12-03

## 2020-12-03 ENCOUNTER — LAB (OUTPATIENT)
Dept: LAB | Facility: CLINIC | Age: 80
End: 2020-12-03
Payer: COMMERCIAL

## 2020-12-03 DIAGNOSIS — I25.10 ASCVD (ARTERIOSCLEROTIC CARDIOVASCULAR DISEASE): Primary | ICD-10-CM

## 2020-12-03 DIAGNOSIS — I48.0 PAF (PAROXYSMAL ATRIAL FIBRILLATION) (HCC): ICD-10-CM

## 2020-12-03 DIAGNOSIS — I25.10 ASCVD (ARTERIOSCLEROTIC CARDIOVASCULAR DISEASE): ICD-10-CM

## 2020-12-03 LAB
INR PPP: 3.83 (ref 0.84–1.19)
PROTHROMBIN TIME: 37.4 SECONDS (ref 11.6–14.5)

## 2020-12-03 PROCEDURE — 85610 PROTHROMBIN TIME: CPT

## 2020-12-03 PROCEDURE — 36415 COLL VENOUS BLD VENIPUNCTURE: CPT

## 2020-12-07 ENCOUNTER — ANTICOAG VISIT (OUTPATIENT)
Dept: FAMILY MEDICINE CLINIC | Facility: CLINIC | Age: 80
End: 2020-12-07

## 2020-12-07 ENCOUNTER — LAB (OUTPATIENT)
Dept: LAB | Facility: CLINIC | Age: 80
End: 2020-12-07
Payer: COMMERCIAL

## 2020-12-07 DIAGNOSIS — I48.0 PAF (PAROXYSMAL ATRIAL FIBRILLATION) (HCC): ICD-10-CM

## 2020-12-07 DIAGNOSIS — I25.10 ASCVD (ARTERIOSCLEROTIC CARDIOVASCULAR DISEASE): ICD-10-CM

## 2020-12-07 LAB
INR PPP: 3.09 (ref 0.84–1.19)
PROTHROMBIN TIME: 31.7 SECONDS (ref 11.6–14.5)

## 2020-12-07 PROCEDURE — 85610 PROTHROMBIN TIME: CPT

## 2020-12-07 PROCEDURE — 36415 COLL VENOUS BLD VENIPUNCTURE: CPT

## 2020-12-10 ENCOUNTER — ANTICOAG VISIT (OUTPATIENT)
Dept: FAMILY MEDICINE CLINIC | Facility: CLINIC | Age: 80
End: 2020-12-10

## 2020-12-10 ENCOUNTER — LAB (OUTPATIENT)
Dept: LAB | Facility: CLINIC | Age: 80
End: 2020-12-10
Payer: COMMERCIAL

## 2020-12-10 DIAGNOSIS — I25.10 ASCVD (ARTERIOSCLEROTIC CARDIOVASCULAR DISEASE): ICD-10-CM

## 2020-12-10 DIAGNOSIS — I48.0 PAF (PAROXYSMAL ATRIAL FIBRILLATION) (HCC): ICD-10-CM

## 2020-12-10 LAB
INR PPP: 2.58 (ref 0.84–1.19)
PROTHROMBIN TIME: 27.5 SECONDS (ref 11.6–14.5)

## 2020-12-10 PROCEDURE — 85610 PROTHROMBIN TIME: CPT

## 2020-12-10 PROCEDURE — 36415 COLL VENOUS BLD VENIPUNCTURE: CPT

## 2020-12-14 ENCOUNTER — IN-CLINIC DEVICE VISIT (OUTPATIENT)
Dept: CARDIOLOGY CLINIC | Facility: CLINIC | Age: 80
End: 2020-12-14

## 2020-12-14 DIAGNOSIS — Z95.810 PRESENCE OF IMPLANTABLE CARDIOVERTER-DEFIBRILLATOR (ICD): Primary | ICD-10-CM

## 2020-12-14 PROCEDURE — RECHECK: Performed by: INTERNAL MEDICINE

## 2020-12-22 ENCOUNTER — ANTICOAG VISIT (OUTPATIENT)
Dept: FAMILY MEDICINE CLINIC | Facility: CLINIC | Age: 80
End: 2020-12-22

## 2020-12-22 ENCOUNTER — LAB (OUTPATIENT)
Dept: LAB | Facility: CLINIC | Age: 80
End: 2020-12-22
Payer: COMMERCIAL

## 2020-12-22 DIAGNOSIS — I48.0 PAF (PAROXYSMAL ATRIAL FIBRILLATION) (HCC): ICD-10-CM

## 2020-12-22 DIAGNOSIS — I25.10 ASCVD (ARTERIOSCLEROTIC CARDIOVASCULAR DISEASE): ICD-10-CM

## 2020-12-22 DIAGNOSIS — K29.70 GASTRITIS: ICD-10-CM

## 2020-12-22 LAB
INR PPP: 1.85 (ref 0.84–1.19)
PROTHROMBIN TIME: 21.2 SECONDS (ref 11.6–14.5)

## 2020-12-22 PROCEDURE — 36415 COLL VENOUS BLD VENIPUNCTURE: CPT

## 2020-12-22 PROCEDURE — 85610 PROTHROMBIN TIME: CPT

## 2020-12-23 RX ORDER — PANTOPRAZOLE SODIUM 40 MG/1
40 TABLET, DELAYED RELEASE ORAL DAILY
Qty: 90 TABLET | Refills: 1 | Status: SHIPPED | OUTPATIENT
Start: 2020-12-23 | End: 2021-06-10

## 2020-12-29 ENCOUNTER — OFFICE VISIT (OUTPATIENT)
Dept: FAMILY MEDICINE CLINIC | Facility: CLINIC | Age: 80
End: 2020-12-29
Payer: COMMERCIAL

## 2020-12-29 VITALS
WEIGHT: 145 LBS | RESPIRATION RATE: 16 BRPM | TEMPERATURE: 97.6 F | HEIGHT: 71 IN | SYSTOLIC BLOOD PRESSURE: 106 MMHG | HEART RATE: 80 BPM | BODY MASS INDEX: 20.3 KG/M2 | DIASTOLIC BLOOD PRESSURE: 64 MMHG

## 2020-12-29 DIAGNOSIS — E11.42 TYPE 2 DIABETES MELLITUS WITH DIABETIC POLYNEUROPATHY, WITHOUT LONG-TERM CURRENT USE OF INSULIN (HCC): Primary | ICD-10-CM

## 2020-12-29 DIAGNOSIS — I10 ESSENTIAL HYPERTENSION: ICD-10-CM

## 2020-12-29 DIAGNOSIS — I25.10 ARTERIOSCLEROSIS OF CORONARY ARTERY: ICD-10-CM

## 2020-12-29 DIAGNOSIS — J43.2 CENTRILOBULAR EMPHYSEMA (HCC): ICD-10-CM

## 2020-12-29 DIAGNOSIS — R41.89 COGNITIVE DECLINE: ICD-10-CM

## 2020-12-29 DIAGNOSIS — D64.9 ACUTE ON CHRONIC ANEMIA: ICD-10-CM

## 2020-12-29 PROBLEM — Z98.890 S/P BRONCHOSCOPY: Status: RESOLVED | Noted: 2020-06-19 | Resolved: 2020-12-29

## 2020-12-29 LAB
LEFT EYE DIABETIC RETINOPATHY: NORMAL
LEFT EYE IMAGE QUALITY: NORMAL
LEFT EYE MACULAR EDEMA: NORMAL
LEFT EYE OTHER RETINOPATHY: NORMAL
RIGHT EYE DIABETIC RETINOPATHY: NORMAL
RIGHT EYE IMAGE QUALITY: NORMAL
RIGHT EYE MACULAR EDEMA: NORMAL
RIGHT EYE OTHER RETINOPATHY: NORMAL
SEVERITY (EYE EXAM): NORMAL
SL AMB POCT HEMOGLOBIN AIC: 7.9 (ref ?–6.5)

## 2020-12-29 PROCEDURE — 3074F SYST BP LT 130 MM HG: CPT | Performed by: FAMILY MEDICINE

## 2020-12-29 PROCEDURE — 99214 OFFICE O/P EST MOD 30 MIN: CPT | Performed by: FAMILY MEDICINE

## 2020-12-29 PROCEDURE — 83036 HEMOGLOBIN GLYCOSYLATED A1C: CPT | Performed by: FAMILY MEDICINE

## 2020-12-29 PROCEDURE — 1036F TOBACCO NON-USER: CPT | Performed by: FAMILY MEDICINE

## 2020-12-29 PROCEDURE — 1160F RVW MEDS BY RX/DR IN RCRD: CPT | Performed by: FAMILY MEDICINE

## 2020-12-29 PROCEDURE — 3078F DIAST BP <80 MM HG: CPT | Performed by: FAMILY MEDICINE

## 2020-12-29 RX ORDER — FOSINOPRIL SODIUM 10 MG/1
10 TABLET ORAL DAILY
Qty: 90 TABLET | Refills: 1 | Status: SHIPPED | OUTPATIENT
Start: 2020-12-29 | End: 2021-06-10

## 2020-12-30 ENCOUNTER — TELEPHONE (OUTPATIENT)
Dept: FAMILY MEDICINE CLINIC | Facility: CLINIC | Age: 80
End: 2020-12-30

## 2020-12-30 NOTE — TELEPHONE ENCOUNTER
Please call pt left eye was not gradeable  Rt eye was ok    He will still need to have a formal eye exam and have it sent to us

## 2021-01-02 NOTE — TELEPHONE ENCOUNTER
Called patient, but he did not quite understand  Asked for me to call back when wife was home    Ronda Katz MA

## 2021-01-04 NOTE — TELEPHONE ENCOUNTER
Spoke with Mrs Nicholson and informed her of Dr Moran Breath message, she understands  No further action needed    Eric Jiménez MA

## 2021-01-14 ENCOUNTER — HOSPITAL ENCOUNTER (OUTPATIENT)
Dept: RADIOLOGY | Facility: HOSPITAL | Age: 81
Discharge: HOME | End: 2021-01-14
Payer: COMMERCIAL

## 2021-01-14 DIAGNOSIS — R93.89 ABNORMAL CHEST X-RAY: ICD-10-CM

## 2021-01-14 PROCEDURE — 71046 X-RAY EXAM CHEST 2 VIEWS: CPT

## 2021-01-22 ENCOUNTER — LAB (OUTPATIENT)
Dept: LAB | Facility: CLINIC | Age: 81
End: 2021-01-22
Payer: COMMERCIAL

## 2021-01-22 ENCOUNTER — ANTICOAG VISIT (OUTPATIENT)
Dept: FAMILY MEDICINE CLINIC | Facility: CLINIC | Age: 81
End: 2021-01-22

## 2021-01-22 DIAGNOSIS — I48.0 PAF (PAROXYSMAL ATRIAL FIBRILLATION) (HCC): ICD-10-CM

## 2021-01-22 DIAGNOSIS — I25.10 ASCVD (ARTERIOSCLEROTIC CARDIOVASCULAR DISEASE): ICD-10-CM

## 2021-01-22 LAB
INR PPP: 1.49 (ref 0.84–1.19)
PROTHROMBIN TIME: 18 SECONDS (ref 11.6–14.5)

## 2021-01-22 PROCEDURE — 85610 PROTHROMBIN TIME: CPT

## 2021-01-22 PROCEDURE — 36415 COLL VENOUS BLD VENIPUNCTURE: CPT

## 2021-01-25 ENCOUNTER — IN-CLINIC DEVICE VISIT (OUTPATIENT)
Dept: CARDIOLOGY CLINIC | Facility: CLINIC | Age: 81
End: 2021-01-25

## 2021-01-25 DIAGNOSIS — Z95.810 PRESENCE OF IMPLANTABLE CARDIOVERTER-DEFIBRILLATOR (ICD): Primary | ICD-10-CM

## 2021-01-25 PROCEDURE — RECHECK: Performed by: INTERNAL MEDICINE

## 2021-01-25 NOTE — PROGRESS NOTES
MDT SINGLE ICD   DEVICE INTERROGATED IN THE [place] OFFICE:  NO TEST   BATTERY VOLTAGE NEARING WILFREDO (2 64V/RRT=2 63V)   WILL CONTINUE MONTHLY BATTERY CHECKS    2 7%    ALL AVAILABLE LEAD PARAMETERS WITHIN NORMAL LIMITS   OPTI-VOL WITHIN NORMAL LIMITS   NO PROGRAMMING CHANGES MADE TO DEVICE PARAMETERS   NORMAL DEVICE FUNCTION  Rcok Pérez

## 2021-01-29 ENCOUNTER — LAB (OUTPATIENT)
Dept: LAB | Facility: CLINIC | Age: 81
End: 2021-01-29
Payer: COMMERCIAL

## 2021-01-29 ENCOUNTER — ANTICOAG VISIT (OUTPATIENT)
Dept: FAMILY MEDICINE CLINIC | Facility: CLINIC | Age: 81
End: 2021-01-29

## 2021-01-29 DIAGNOSIS — I48.0 PAF (PAROXYSMAL ATRIAL FIBRILLATION) (HCC): ICD-10-CM

## 2021-01-29 DIAGNOSIS — I25.10 ASCVD (ARTERIOSCLEROTIC CARDIOVASCULAR DISEASE): ICD-10-CM

## 2021-01-29 LAB
INR PPP: 1.47 (ref 0.84–1.19)
PROTHROMBIN TIME: 17.8 SECONDS (ref 11.6–14.5)

## 2021-01-29 PROCEDURE — 85610 PROTHROMBIN TIME: CPT

## 2021-01-29 PROCEDURE — 36415 COLL VENOUS BLD VENIPUNCTURE: CPT

## 2021-02-03 DIAGNOSIS — I48.0 PAF (PAROXYSMAL ATRIAL FIBRILLATION) (HCC): Primary | ICD-10-CM

## 2021-02-03 RX ORDER — WARFARIN SODIUM 5 MG/1
5 TABLET ORAL
Qty: 90 TABLET | Refills: 1 | Status: SHIPPED | OUTPATIENT
Start: 2021-02-03 | End: 2021-02-10 | Stop reason: SDUPTHER

## 2021-02-03 RX ORDER — WARFARIN SODIUM 5 MG/1
5 TABLET ORAL
COMMUNITY
End: 2021-02-03 | Stop reason: SDUPTHER

## 2021-02-04 ENCOUNTER — ANTICOAG VISIT (OUTPATIENT)
Dept: FAMILY MEDICINE CLINIC | Facility: CLINIC | Age: 81
End: 2021-02-04

## 2021-02-04 ENCOUNTER — LAB (OUTPATIENT)
Dept: LAB | Facility: CLINIC | Age: 81
End: 2021-02-04
Payer: COMMERCIAL

## 2021-02-04 DIAGNOSIS — I48.0 PAF (PAROXYSMAL ATRIAL FIBRILLATION) (HCC): ICD-10-CM

## 2021-02-04 DIAGNOSIS — I25.10 ASCVD (ARTERIOSCLEROTIC CARDIOVASCULAR DISEASE): ICD-10-CM

## 2021-02-04 LAB
INR PPP: 1.56 (ref 0.84–1.19)
PROTHROMBIN TIME: 18.6 SECONDS (ref 11.6–14.5)

## 2021-02-04 PROCEDURE — 36415 COLL VENOUS BLD VENIPUNCTURE: CPT

## 2021-02-04 PROCEDURE — 85610 PROTHROMBIN TIME: CPT

## 2021-02-10 ENCOUNTER — TELEMEDICINE (OUTPATIENT)
Dept: FAMILY MEDICINE CLINIC | Facility: CLINIC | Age: 81
End: 2021-02-10
Payer: COMMERCIAL

## 2021-02-10 DIAGNOSIS — K57.90 DIVERTICULOSIS: Primary | ICD-10-CM

## 2021-02-10 DIAGNOSIS — I48.0 PAF (PAROXYSMAL ATRIAL FIBRILLATION) (HCC): ICD-10-CM

## 2021-02-10 DIAGNOSIS — R10.32 LEFT LOWER QUADRANT ABDOMINAL PAIN: ICD-10-CM

## 2021-02-10 PROCEDURE — 99213 OFFICE O/P EST LOW 20 MIN: CPT | Performed by: NURSE PRACTITIONER

## 2021-02-10 RX ORDER — AMOXICILLIN AND CLAVULANATE POTASSIUM 875; 125 MG/1; MG/1
1 TABLET, FILM COATED ORAL EVERY 12 HOURS SCHEDULED
Qty: 20 TABLET | Refills: 0 | Status: SHIPPED | OUTPATIENT
Start: 2021-02-10 | End: 2021-02-20

## 2021-02-10 RX ORDER — WARFARIN SODIUM 5 MG/1
5 TABLET ORAL
Qty: 90 TABLET | Refills: 1 | Status: SHIPPED | OUTPATIENT
Start: 2021-02-10
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
## 2021-02-11 ENCOUNTER — IMMUNIZATIONS (OUTPATIENT)
Dept: FAMILY MEDICINE CLINIC | Facility: HOSPITAL | Age: 81
End: 2021-02-11

## 2021-02-11 DIAGNOSIS — Z23 ENCOUNTER FOR IMMUNIZATION: Primary | ICD-10-CM

## 2021-02-11 PROCEDURE — 91301 SARS-COV-2 / COVID-19 MRNA VACCINE (MODERNA) 100 MCG: CPT

## 2021-02-11 PROCEDURE — 0011A SARS-COV-2 / COVID-19 MRNA VACCINE (MODERNA) 100 MCG: CPT

## 2021-02-12 ENCOUNTER — OFFICE VISIT (OUTPATIENT)
Dept: NEUROLOGY | Facility: CLINIC | Age: 81
End: 2021-02-12
Payer: COMMERCIAL

## 2021-02-12 ENCOUNTER — ANTICOAG VISIT (OUTPATIENT)
Dept: FAMILY MEDICINE CLINIC | Facility: CLINIC | Age: 81
End: 2021-02-12

## 2021-02-12 ENCOUNTER — LAB (OUTPATIENT)
Dept: LAB | Facility: CLINIC | Age: 81
End: 2021-02-12
Payer: COMMERCIAL

## 2021-02-12 VITALS
TEMPERATURE: 97.7 F | BODY MASS INDEX: 20.3 KG/M2 | SYSTOLIC BLOOD PRESSURE: 116 MMHG | DIASTOLIC BLOOD PRESSURE: 78 MMHG | HEART RATE: 102 BPM | HEIGHT: 71 IN | WEIGHT: 145 LBS

## 2021-02-12 DIAGNOSIS — R41.89 COGNITIVE DECLINE: ICD-10-CM

## 2021-02-12 DIAGNOSIS — F02.80 ALZHEIMER'S DEMENTIA WITHOUT BEHAVIORAL DISTURBANCE, UNSPECIFIED TIMING OF DEMENTIA ONSET (HCC): ICD-10-CM

## 2021-02-12 DIAGNOSIS — I25.10 ASCVD (ARTERIOSCLEROTIC CARDIOVASCULAR DISEASE): ICD-10-CM

## 2021-02-12 DIAGNOSIS — G30.9 ALZHEIMER'S DEMENTIA WITHOUT BEHAVIORAL DISTURBANCE, UNSPECIFIED TIMING OF DEMENTIA ONSET (HCC): ICD-10-CM

## 2021-02-12 DIAGNOSIS — I48.0 PAF (PAROXYSMAL ATRIAL FIBRILLATION) (HCC): ICD-10-CM

## 2021-02-12 LAB
INR PPP: 1.87 (ref 0.84–1.19)
PROTHROMBIN TIME: 21.4 SECONDS (ref 11.6–14.5)

## 2021-02-12 PROCEDURE — 36415 COLL VENOUS BLD VENIPUNCTURE: CPT

## 2021-02-12 PROCEDURE — 99214 OFFICE O/P EST MOD 30 MIN: CPT | Performed by: NURSE PRACTITIONER

## 2021-02-12 PROCEDURE — 85610 PROTHROMBIN TIME: CPT

## 2021-02-12 RX ORDER — MEMANTINE HYDROCHLORIDE 10 MG/1
10 TABLET ORAL DAILY
Qty: 90 TABLET | Refills: 2 | Status: SHIPPED | OUTPATIENT
Start: 2021-02-12 | End: 2021-03-29 | Stop reason: SDUPTHER

## 2021-02-12 RX ORDER — MEMANTINE HYDROCHLORIDE 10 MG/1
10 TABLET ORAL DAILY
Qty: 90 TABLET | Refills: 2 | Status: SHIPPED | OUTPATIENT
Start: 2021-02-12 | End: 2021-02-12 | Stop reason: SDUPTHER

## 2021-02-12 RX ORDER — PNV NO.95/FERROUS FUM/FOLIC AC 28MG-0.8MG
TABLET ORAL EVERY OTHER DAY
COMMUNITY

## 2021-02-12 RX ORDER — MEMANTINE HYDROCHLORIDE 10 MG/1
10 TABLET ORAL DAILY
Qty: 90 TABLET | Refills: 2 | Status: CANCELLED | OUTPATIENT
Start: 2021-02-12

## 2021-02-12 NOTE — TELEPHONE ENCOUNTER
Patient's wife called and LVM 5:52a on 2/12 that her  is sick and she is taking him to the hospital - she did state that he needs a refill on Namenda sent to [facility] for 3 month supply
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
## 2021-02-12 NOTE — TELEPHONE ENCOUNTER
Attempted to call to verify pharmacy  "[id number]" Is not listed on patients pharmacy  No answer  No option for voicemail  Appears [facility] is part of [employer]   Please sign off on refill request

## 2021-02-12 NOTE — PATIENT INSTRUCTIONS
Continue with Namenda 10mg daily  Continue with puzzles, games and social activity to keep mind sharp  Continue with Good Hydration and Mediterranean diet  Good exercise when able  Follow up in 6 months with the Neurology office

## 2021-02-23 ENCOUNTER — LAB (OUTPATIENT)
Dept: LAB | Facility: CLINIC | Age: 81
End: 2021-02-23
Payer: COMMERCIAL

## 2021-02-23 DIAGNOSIS — R91.1 NODULE OF LEFT LUNG: ICD-10-CM

## 2021-02-23 DIAGNOSIS — I48.20 CHRONIC ATRIAL FIBRILLATION (HCC): ICD-10-CM

## 2021-02-23 DIAGNOSIS — I10 HTN (HYPERTENSION), MALIGNANT: Primary | ICD-10-CM

## 2021-02-23 DIAGNOSIS — I10 HTN (HYPERTENSION), MALIGNANT: ICD-10-CM

## 2021-02-23 LAB
ALBUMIN SERPL BCP-MCNC: 3.4 G/DL (ref 3.5–5)
ALP SERPL-CCNC: 77 U/L (ref 46–116)
ALT SERPL W P-5'-P-CCNC: 18 U/L (ref 12–78)
ANION GAP SERPL CALCULATED.3IONS-SCNC: 5 MMOL/L (ref 4–13)
AST SERPL W P-5'-P-CCNC: 15 U/L (ref 5–45)
BILIRUB SERPL-MCNC: 0.28 MG/DL (ref 0.2–1)
BUN SERPL-MCNC: 23 MG/DL (ref 5–25)
CALCIUM ALBUM COR SERPL-MCNC: 9.4 MG/DL (ref 8.3–10.1)
CALCIUM SERPL-MCNC: 8.9 MG/DL (ref 8.3–10.1)
CHLORIDE SERPL-SCNC: 103 MMOL/L (ref 100–108)
CHOLEST SERPL-MCNC: 112 MG/DL (ref 50–200)
CO2 SERPL-SCNC: 30 MMOL/L (ref 21–32)
CREAT SERPL-MCNC: 1.4 MG/DL (ref 0.6–1.3)
ERYTHROCYTE [DISTWIDTH] IN BLOOD BY AUTOMATED COUNT: 12.3 % (ref 11.6–15.1)
GFR SERPL CREATININE-BSD FRML MDRD: 46 ML/MIN/1.73SQ M
GLUCOSE P FAST SERPL-MCNC: 132 MG/DL (ref 65–99)
HCT VFR BLD AUTO: 39.2 % (ref 36.5–49.3)
HDLC SERPL-MCNC: 34 MG/DL
HGB BLD-MCNC: 12.5 G/DL (ref 12–17)
LDLC SERPL CALC-MCNC: 41 MG/DL (ref 0–100)
MCH RBC QN AUTO: 31.3 PG (ref 26.8–34.3)
MCHC RBC AUTO-ENTMCNC: 31.9 G/DL (ref 31.4–37.4)
MCV RBC AUTO: 98 FL (ref 82–98)
PLATELET # BLD AUTO: 366 THOUSANDS/UL (ref 149–390)
PMV BLD AUTO: 10.5 FL (ref 8.9–12.7)
POTASSIUM SERPL-SCNC: 4 MMOL/L (ref 3.5–5.3)
PROT SERPL-MCNC: 7.6 G/DL (ref 6.4–8.2)
PSA SERPL-MCNC: 3.2 NG/ML (ref 0–4)
RBC # BLD AUTO: 4 MILLION/UL (ref 3.88–5.62)
SODIUM SERPL-SCNC: 138 MMOL/L (ref 136–145)
TRIGL SERPL-MCNC: 184 MG/DL
WBC # BLD AUTO: 11.58 THOUSAND/UL (ref 4.31–10.16)

## 2021-02-23 PROCEDURE — 80061 LIPID PANEL: CPT

## 2021-02-23 PROCEDURE — G0103 PSA SCREENING: HCPCS

## 2021-02-23 PROCEDURE — 36415 COLL VENOUS BLD VENIPUNCTURE: CPT

## 2021-02-23 PROCEDURE — 80053 COMPREHEN METABOLIC PANEL: CPT

## 2021-02-23 PROCEDURE — 85027 COMPLETE CBC AUTOMATED: CPT

## 2021-03-01 ENCOUNTER — IN-CLINIC DEVICE VISIT (OUTPATIENT)
Dept: CARDIOLOGY CLINIC | Facility: CLINIC | Age: 81
End: 2021-03-01
Payer: COMMERCIAL

## 2021-03-01 ENCOUNTER — OFFICE VISIT (OUTPATIENT)
Dept: CARDIOLOGY CLINIC | Facility: CLINIC | Age: 81
End: 2021-03-01
Payer: COMMERCIAL

## 2021-03-01 VITALS
OXYGEN SATURATION: 90 % | HEIGHT: 71 IN | BODY MASS INDEX: 20.02 KG/M2 | DIASTOLIC BLOOD PRESSURE: 72 MMHG | WEIGHT: 143 LBS | HEART RATE: 87 BPM | TEMPERATURE: 98.1 F | SYSTOLIC BLOOD PRESSURE: 148 MMHG

## 2021-03-01 DIAGNOSIS — I48.20 CHRONIC ATRIAL FIBRILLATION (HCC): Primary | ICD-10-CM

## 2021-03-01 DIAGNOSIS — Z95.810 PRESENCE OF IMPLANTABLE CARDIOVERTER-DEFIBRILLATOR (ICD): ICD-10-CM

## 2021-03-01 DIAGNOSIS — E11.59 TYPE 2 DIABETES MELLITUS WITH OTHER CIRCULATORY COMPLICATION, WITHOUT LONG-TERM CURRENT USE OF INSULIN (HCC): ICD-10-CM

## 2021-03-01 DIAGNOSIS — I50.1 HEART FAILURE, LEFT, WITH LVEF 31-40% (HCC): ICD-10-CM

## 2021-03-01 DIAGNOSIS — I50.1 HEART FAILURE, LEFT, WITH LVEF <=30% (HCC): ICD-10-CM

## 2021-03-01 DIAGNOSIS — Z95.810 PRESENCE OF IMPLANTABLE CARDIOVERTER-DEFIBRILLATOR (ICD): Primary | ICD-10-CM

## 2021-03-01 DIAGNOSIS — I10 HTN (HYPERTENSION), MALIGNANT: ICD-10-CM

## 2021-03-01 DIAGNOSIS — I42.8 NONISCHEMIC CARDIOMYOPATHY (HCC): ICD-10-CM

## 2021-03-01 DIAGNOSIS — I50.22 CHRONIC SYSTOLIC CONGESTIVE HEART FAILURE (HCC): ICD-10-CM

## 2021-03-01 PROCEDURE — 3077F SYST BP >= 140 MM HG: CPT | Performed by: INTERNAL MEDICINE

## 2021-03-01 PROCEDURE — 93000 ELECTROCARDIOGRAM COMPLETE: CPT | Performed by: INTERNAL MEDICINE

## 2021-03-01 PROCEDURE — 3078F DIAST BP <80 MM HG: CPT | Performed by: INTERNAL MEDICINE

## 2021-03-01 PROCEDURE — 99214 OFFICE O/P EST MOD 30 MIN: CPT | Performed by: INTERNAL MEDICINE

## 2021-03-01 PROCEDURE — RECHECK: Performed by: INTERNAL MEDICINE

## 2021-03-01 RX ORDER — ROSUVASTATIN CALCIUM 10 MG/1
10 TABLET, COATED ORAL
Qty: 90 TABLET | Refills: 3 | Status: SHIPPED | OUTPATIENT
Start: 2021-03-01 | End: 2021-07-08 | Stop reason: SDUPTHER

## 2021-03-01 RX ORDER — OMEGA-3-ACID ETHYL ESTERS 1 G/1
1 CAPSULE, LIQUID FILLED ORAL 2 TIMES DAILY
Qty: 360 CAPSULE | Refills: 0
Start: 2021-03-01 | End: 2021-11-11
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
## 2021-03-01 NOTE — PROGRESS NOTES
MDT SINGLE CHAMBER ICD/NOT MRI CONDITIONAL   DEVICE INTERROGATED IN THE [place] OFFICE TO CHECK BATTERY STATUS (NB):  BATTERY VOLTAGE NEARING WILFREDO (2 63V/RRT=2 63V, RRT NOT INDICATED)  WILL CONTINUE MONTHLY BATTERY CHECKS    2 7%    ALL AVAILABLE LEAD PARAMETERS WITHIN NORMAL LIMITS   NO HIGH RATE EPISODES   OPTI-VOL WITHIN NORMAL LIMITS   NORMAL DEVICE FUNCTION   RG

## 2021-03-11 ENCOUNTER — ANTICOAG VISIT (OUTPATIENT)
Dept: FAMILY MEDICINE CLINIC | Facility: CLINIC | Age: 81
End: 2021-03-11

## 2021-03-11 ENCOUNTER — LAB (OUTPATIENT)
Dept: LAB | Facility: CLINIC | Age: 81
End: 2021-03-11
Payer: COMMERCIAL

## 2021-03-11 ENCOUNTER — IMMUNIZATIONS (OUTPATIENT)
Dept: FAMILY MEDICINE CLINIC | Facility: HOSPITAL | Age: 81
End: 2021-03-11
Payer: COMMERCIAL

## 2021-03-11 DIAGNOSIS — I48.0 PAF (PAROXYSMAL ATRIAL FIBRILLATION) (HCC): ICD-10-CM

## 2021-03-11 DIAGNOSIS — I25.10 ASCVD (ARTERIOSCLEROTIC CARDIOVASCULAR DISEASE): ICD-10-CM

## 2021-03-11 DIAGNOSIS — Z23 ENCOUNTER FOR IMMUNIZATION: Primary | ICD-10-CM

## 2021-03-11 LAB
INR PPP: 2.01 (ref 0.84–1.19)
PROTHROMBIN TIME: 22.7 SECONDS (ref 11.6–14.5)

## 2021-03-11 PROCEDURE — 0012A SARS-COV-2 / COVID-19 MRNA VACCINE (MODERNA) 100 MCG: CPT

## 2021-03-11 PROCEDURE — 91301 SARS-COV-2 / COVID-19 MRNA VACCINE (MODERNA) 100 MCG: CPT

## 2021-03-11 PROCEDURE — 85610 PROTHROMBIN TIME: CPT

## 2021-03-11 PROCEDURE — 36415 COLL VENOUS BLD VENIPUNCTURE: CPT

## 2021-03-23 ENCOUNTER — RA CDI HCC (OUTPATIENT)
Dept: OTHER | Facility: HOSPITAL | Age: 81
End: 2021-03-23

## 2021-03-23 NOTE — PROGRESS NOTES
[facility]  coding oppertunities             Chart reviewed, (number of) suggestions sent to provider: 6                 DX: I11 0, I50 22 Hypertensive heart disease with heart failure with Chronic systolic (congestive) heart failure  DX: J44 9 Chronic Obstructive Pulmonary Disease, Unspecified  DX: E11 42 Type 2 diabetes mellitus with diabetic polyneuropathy  DX: I70 209 Unspecified atherosclerosis of native arteries of extremities, unspecified extremity  DX: J96 11 Chronic respiratory failure with hypoxia  DX: E43 Unspecified severe protein-calorie malnutrition

## 2021-03-25 ENCOUNTER — LAB (OUTPATIENT)
Dept: LAB | Facility: CLINIC | Age: 81
End: 2021-03-25
Payer: COMMERCIAL

## 2021-03-25 ENCOUNTER — ANTICOAG VISIT (OUTPATIENT)
Dept: FAMILY MEDICINE CLINIC | Facility: CLINIC | Age: 81
End: 2021-03-25

## 2021-03-25 DIAGNOSIS — E11.42 TYPE 2 DIABETES MELLITUS WITH DIABETIC POLYNEUROPATHY, WITHOUT LONG-TERM CURRENT USE OF INSULIN (HCC): ICD-10-CM

## 2021-03-25 DIAGNOSIS — I48.0 PAF (PAROXYSMAL ATRIAL FIBRILLATION) (HCC): ICD-10-CM

## 2021-03-25 DIAGNOSIS — I25.10 ASCVD (ARTERIOSCLEROTIC CARDIOVASCULAR DISEASE): ICD-10-CM

## 2021-03-25 LAB
ALBUMIN SERPL BCP-MCNC: 3.6 G/DL (ref 3.5–5)
ALP SERPL-CCNC: 97 U/L (ref 46–116)
ALT SERPL W P-5'-P-CCNC: 31 U/L (ref 12–78)
ANION GAP SERPL CALCULATED.3IONS-SCNC: 8 MMOL/L (ref 4–13)
AST SERPL W P-5'-P-CCNC: 19 U/L (ref 5–45)
BILIRUB SERPL-MCNC: 0.76 MG/DL (ref 0.2–1)
BUN SERPL-MCNC: 25 MG/DL (ref 5–25)
CALCIUM SERPL-MCNC: 8.9 MG/DL (ref 8.3–10.1)
CHLORIDE SERPL-SCNC: 100 MMOL/L (ref 100–108)
CO2 SERPL-SCNC: 27 MMOL/L (ref 21–32)
CREAT SERPL-MCNC: 1.64 MG/DL (ref 0.6–1.3)
CREAT UR-MCNC: 179 MG/DL
EST. AVERAGE GLUCOSE BLD GHB EST-MCNC: 169 MG/DL
GFR SERPL CREATININE-BSD FRML MDRD: 38 ML/MIN/1.73SQ M
GLUCOSE P FAST SERPL-MCNC: 151 MG/DL (ref 65–99)
HBA1C MFR BLD: 7.5 %
INR PPP: 2.17 (ref 0.84–1.19)
INR PPP: 2.17 (ref 0.84–1.19)
MICROALBUMIN UR-MCNC: 6.7 MG/L (ref 0–20)
MICROALBUMIN/CREAT 24H UR: 4 MG/G CREATININE (ref 0–30)
POTASSIUM SERPL-SCNC: 3.9 MMOL/L (ref 3.5–5.3)
PROT SERPL-MCNC: 7.2 G/DL (ref 6.4–8.2)
PROTHROMBIN TIME: 24.1 SECONDS (ref 11.6–14.5)
SODIUM SERPL-SCNC: 135 MMOL/L (ref 136–145)

## 2021-03-25 PROCEDURE — 82043 UR ALBUMIN QUANTITATIVE: CPT

## 2021-03-25 PROCEDURE — 82570 ASSAY OF URINE CREATININE: CPT

## 2021-03-25 PROCEDURE — 83036 HEMOGLOBIN GLYCOSYLATED A1C: CPT

## 2021-03-25 PROCEDURE — 80053 COMPREHEN METABOLIC PANEL: CPT

## 2021-03-25 PROCEDURE — 36415 COLL VENOUS BLD VENIPUNCTURE: CPT

## 2021-03-25 PROCEDURE — 85610 PROTHROMBIN TIME: CPT

## 2021-03-25 NOTE — RESULT ENCOUNTER NOTE
Elder Due I just made a mistake with his INR,  I got the result back earlier and I modified his dose  Loooking again, his result was acceptabl;e and he should be on the same dose he has been on  IT is completely stable and he will not need to redraw for a month    Sorry

## 2021-03-29 ENCOUNTER — OFFICE VISIT (OUTPATIENT)
Dept: FAMILY MEDICINE CLINIC | Facility: CLINIC | Age: 81
End: 2021-03-29
Payer: COMMERCIAL

## 2021-03-29 VITALS
WEIGHT: 145 LBS | HEIGHT: 71 IN | SYSTOLIC BLOOD PRESSURE: 140 MMHG | TEMPERATURE: 98.2 F | DIASTOLIC BLOOD PRESSURE: 60 MMHG | BODY MASS INDEX: 20.3 KG/M2 | RESPIRATION RATE: 16 BRPM | OXYGEN SATURATION: 94 % | HEART RATE: 81 BPM

## 2021-03-29 DIAGNOSIS — I70.209 PERIPHERAL ARTERIOSCLEROSIS (HCC): ICD-10-CM

## 2021-03-29 DIAGNOSIS — I42.0 DILATED CARDIOMYOPATHY (HCC): ICD-10-CM

## 2021-03-29 DIAGNOSIS — J96.11 CHRONIC HYPOXEMIC RESPIRATORY FAILURE (HCC): ICD-10-CM

## 2021-03-29 DIAGNOSIS — N28.9 RENAL INSUFFICIENCY: ICD-10-CM

## 2021-03-29 DIAGNOSIS — G30.9 ALZHEIMER'S DEMENTIA WITHOUT BEHAVIORAL DISTURBANCE, UNSPECIFIED TIMING OF DEMENTIA ONSET (HCC): ICD-10-CM

## 2021-03-29 DIAGNOSIS — E11.42 TYPE 2 DIABETES MELLITUS WITH DIABETIC POLYNEUROPATHY, WITHOUT LONG-TERM CURRENT USE OF INSULIN (HCC): Primary | ICD-10-CM

## 2021-03-29 DIAGNOSIS — F02.80 ALZHEIMER'S DEMENTIA WITHOUT BEHAVIORAL DISTURBANCE, UNSPECIFIED TIMING OF DEMENTIA ONSET (HCC): ICD-10-CM

## 2021-03-29 DIAGNOSIS — J43.2 CENTRILOBULAR EMPHYSEMA (HCC): ICD-10-CM

## 2021-03-29 DIAGNOSIS — I50.1 HEART FAILURE, LEFT, WITH LVEF 31-40% (HCC): ICD-10-CM

## 2021-03-29 DIAGNOSIS — D68.9 COAGULATION DEFECT, UNSPECIFIED (HCC): ICD-10-CM

## 2021-03-29 DIAGNOSIS — R41.89 COGNITIVE DECLINE: ICD-10-CM

## 2021-03-29 DIAGNOSIS — E43 UNSPECIFIED SEVERE PROTEIN-CALORIE MALNUTRITION (HCC): ICD-10-CM

## 2021-03-29 PROCEDURE — 99214 OFFICE O/P EST MOD 30 MIN: CPT | Performed by: FAMILY MEDICINE

## 2021-03-29 PROCEDURE — 3725F SCREEN DEPRESSION PERFORMED: CPT | Performed by: FAMILY MEDICINE

## 2021-03-29 PROCEDURE — 1160F RVW MEDS BY RX/DR IN RCRD: CPT | Performed by: FAMILY MEDICINE

## 2021-03-29 PROCEDURE — 1036F TOBACCO NON-USER: CPT | Performed by: FAMILY MEDICINE

## 2021-03-29 RX ORDER — MEMANTINE HYDROCHLORIDE 10 MG/1
10 TABLET ORAL 2 TIMES DAILY
Qty: 180 TABLET | Refills: 1 | Status: SHIPPED | OUTPATIENT
Start: 2021-03-29 | End: 2021-10-08

## 2021-03-29 NOTE — PROGRESS NOTES
Assessment/Plan:    1  Type 2 diabetes mellitus with diabetic polyneuropathy, without long-term current use of insulin (HCC)  -     Comprehensive metabolic panel; Future; Expected date: 06/12/2021  -     Hemoglobin A1C; Future; Expected date: 06/12/2021  -     Lipid Panel with Direct LDL reflex; Future; Expected date: 06/12/2021    2  Unspecified severe protein-calorie malnutrition ([facility] )  -     Lipid Panel with Direct LDL reflex; Future; Expected date: 06/12/2021    3  Centrilobular emphysema ([facility] )  -     Lipid Panel with Direct LDL reflex; Future; Expected date: 06/12/2021    4  Peripheral arteriosclerosis ([facility] 75 )  -     Lipid Panel with Direct LDL reflex; Future; Expected date: 06/12/2021    5  Chronic hypoxemic respiratory failure (HCC)  -     Lipid Panel with Direct LDL reflex; Future; Expected date: 06/12/2021    6  Coagulation defect, unspecified ([facility] 75 )  -     Lipid Panel with Direct LDL reflex; Future; Expected date: 06/12/2021    7  Dilated cardiomyopathy ([facility] )  -     Lipid Panel with Direct LDL reflex; Future; Expected date: 06/12/2021    8  Heart failure, left, 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% ([facility])  -     Lipid Panel with Direct LDL reflex; Future; Expected date: 06/12/2021    9  Cognitive decline  -     Lipid Panel with Direct LDL reflex; Future; Expected date: 06/12/2021  -     memantine (NAMENDA) 10 mg tablet; Take 1 tablet (10 mg total) by mouth 2 (two) times a day    10  Alzheimer's dementia without behavioral disturbance, unspecified timing of dementia onset ([facility] )  -     Lipid Panel with Direct LDL reflex; Future; Expected date: 06/12/2021  -     memantine (NAMENDA) 10 mg tablet; Take 1 tablet (10 mg total) by mouth 2 (two) times a day    11  Renal insufficiency  -     Comprehensive metabolic panel; Future; Expected date: 04/29/2021            There are no Patient Instructions on file for this visit  Return in about 6 months (around 9/29/2021) for Recheck  Subjective:      Patient ID: Deanna Redding is a 78 y o  male     Chief Complaint   Patient presents with    Follow-up     follow up on meds [id number]       Pt is here for a follow up  Pt had labs about a week ago   Pt denies CP, no SOB      Wife states pt does not seem to be drinking enough he has three coffe and one bottle of water a day      The following portions of the patient's history were reviewed and updated as appropriate: allergies, current medications, past family history, past medical history, past social history, past surgical history and problem list     Review of Systems   Constitutional: Negative for activity change, appetite change, chills, diaphoresis, fatigue, fever and unexpected weight change  HENT: Negative for congestion, dental problem, ear pain, mouth sores, sinus pressure, sinus pain, sore throat and trouble swallowing  Eyes: Negative for photophobia, discharge and itching  Respiratory: Negative for apnea, chest tightness and shortness of breath  Cardiovascular: Negative for chest pain, palpitations and leg swelling  Gastrointestinal: Negative for abdominal distention, abdominal pain, blood in stool, nausea and vomiting  Endocrine: Negative for cold intolerance, heat intolerance, polydipsia, polyphagia and polyuria  Genitourinary: Negative for difficulty urinating  Musculoskeletal: Negative for arthralgias  Skin: Negative for color change and wound  Neurological: Negative for dizziness, syncope, speech difficulty and headaches  Hematological: Negative for adenopathy  Psychiatric/Behavioral: Positive for decreased concentration  Negative for agitation, behavioral problems and suicidal ideas           Current Outpatient Medications   Medication Sig Dispense Refill    Ascorbic Acid (VITAMIN C) 1000 MG tablet Take 1,000 mg by mouth daily      carvedilol (COREG) 6 25 mg tablet Take 1 tablet (6 25 mg total) by mouth 2 (two) times a day 180 tablet 3    digoxin (LANOXIN) 0 125 mg tablet TAKE 1 TABLET DAILY 90 tablet 3    Ferrous Sulfate (Iron) 325 (65 Fe) MG TABS Take by mouth every other day      fluticasone-umeclidinium-vilanterol (TRELEGY) 100-62 5-25 MCG/INH inhaler Inhale 1 puff daily Rinse mouth after use  3 Inhaler 4    fosinopril (MONOPRIL) 10 mg tablet Take 1 tablet (10 mg total) by mouth daily 90 tablet 1    furosemide (LASIX) 40 mg tablet Take 1 tablet (40 mg total) by mouth daily 90 tablet 3    glucose blood (PRODIGY NO CODING BLOOD GLUC) test strip by In Vitro route      guaiFENesin (MUCINEX) 600 mg 12 hr tablet Take 1 tablet (600 mg total) by mouth every 12 (twelve) hours 30 tablet 0    memantine (NAMENDA) 10 mg tablet Take 1 tablet (10 mg total) by mouth 2 (two) times a day 180 tablet 1    metFORMIN (GLUCOPHAGE) 500 mg tablet Take 1 tablet (500 mg total) by mouth daily with breakfast 90 tablet 1    omega-3-acid ethyl esters (LOVAZA) 1 g capsule Take 1 capsule (1 g total) by mouth 2 (two) times a day 360 capsule 0    pantoprazole (PROTONIX) 40 mg tablet Take 1 tablet (40 mg total) by mouth daily 90 tablet 1    polyethylene glycol (MIRALAX) 17 g packet Take 17 g by mouth daily as needed (Constipation) 14 each 0    PRODIGY TWIST TOP LANCETS 28G MISC by Does not apply route      rosuvastatin (CRESTOR) 10 MG tablet Take 1 tablet (10 mg total) by mouth daily at bedtime 90 tablet 3    spironolactone (ALDACTONE) 25 mg tablet Take 1 tablet (25 mg total) by mouth daily 90 tablet 3    warfarin (COUMADIN) 1 mg tablet Take 1 tablet (1 mg total) by mouth daily 90 tablet 0    warfarin (COUMADIN) 3 mg tablet As directed by office based on INR, goal INR at present is 1 5-2 90 tablet 1    warfarin (COUMADIN) 5 mg tablet Take 1 tablet (5 mg total) by mouth daily 90 tablet 1    albuterol (Ventolin HFA) 90 mcg/act inhaler Inhale 2 puffs every 6 (six) hours as needed for wheezing (Patient not taking: Reported on 11/19/2020) 18 g 11     No current facility-administered medications for this visit          Objective:    BP 140/60   Pulse 81   Temp 98 2 °F (36 8 °C)   Resp 16   Ht 5' 11" (1 803 m)   Wt 65 8 kg (145 lb)   SpO2 94%   BMI 20 22 kg/m²        Physical Exam  Vitals signs and nursing note reviewed  Constitutional:       General: He is not in acute distress  Appearance: He is well-developed  He is not diaphoretic  HENT:      Head: Normocephalic and atraumatic  Right Ear: External ear normal       Left Ear: External ear normal       Nose: Nose normal       Mouth/Throat:      Pharynx: No oropharyngeal exudate  Eyes:      General: No scleral icterus  Right eye: No discharge  Left eye: No discharge  Pupils: Pupils are equal, round, and reactive to light  Neck:      Thyroid: No thyromegaly  Cardiovascular:      Rate and Rhythm: Normal rate  Heart sounds: Normal heart sounds  No murmur  Pulmonary:      Effort: Pulmonary effort is normal  No respiratory distress  Breath sounds: Normal breath sounds  No wheezing  Abdominal:      General: Bowel sounds are normal  There is no distension  Palpations: Abdomen is soft  There is no mass  Tenderness: There is no abdominal tenderness  There is no guarding or rebound  Musculoskeletal: Normal range of motion  Skin:     General: Skin is warm and dry  Findings: No erythema or rash  Neurological:      Mental Status: He is alert  Coordination: Coordination normal       Deep Tendon Reflexes: Reflexes normal    Psychiatric:         Behavior: Behavior normal          Cognition and Memory: Memory is impaired             Recent Results (from the past 672 hour(s))   Protime-INR    Collection Time: 03/11/21  7:27 AM   Result Value Ref Range    Protime 22 7 (H) 11 6 - 14 5 seconds    INR 2 01 (H) 0 84 - 1 19   Protime-INR    Collection Time: 03/25/21 12:00 AM   Result Value Ref Range    INR 2 17 (A) 0 84 - 1 19   Protime-INR    Collection Time: 03/25/21  7:18 AM   Result Value Ref Range    Protime 24 1 (H) 11 6 - 14 5 seconds    INR 2 17 (H) 0 84 - 1 19   Comprehensive metabolic panel    Collection Time: 03/25/21  7:18 AM   Result Value Ref Range    Sodium 135 (L) 136 - 145 mmol/L    Potassium 3 9 3 5 - 5 3 mmol/L    Chloride 100 100 - 108 mmol/L    CO2 27 21 - 32 mmol/L    ANION GAP 8 4 - 13 mmol/L    BUN 25 5 - 25 mg/dL    Creatinine 1 64 (H) 0 60 - 1 30 mg/dL    Glucose, Fasting 151 (H) 65 - 99 mg/dL    Calcium 8 9 8 3 - 10 1 mg/dL    AST 19 5 - 45 U/L    ALT 31 12 - 78 U/L    Alkaline Phosphatase 97 46 - 116 U/L    Total Protein 7 2 6 4 - 8 2 g/dL    Albumin 3 6 3 5 - 5 0 g/dL    Total Bilirubin 0 76 0 20 - 1 00 mg/dL    eGFR 38 ml/min/1 73sq m   Microalbumin / creatinine urine ratio    Collection Time: 03/25/21  7:18 AM   Result Value Ref Range    Creatinine, Ur 179 0 mg/dL    Microalbum  ,U,Random 6 7 0 0 - 20 0 mg/L    Microalb Creat Ratio 4 0 - 30 mg/g creatinine   Hemoglobin A1C    Collection Time: 03/25/21  7:18 AM   Result Value Ref Range    Hemoglobin A1C 7 5 (H) Normal 3 8-5 6%; PreDiabetic 5 7-6 4%;  Diabetic >=6 5%; Glycemic control for adults with diabetes <7 0% %     mg/dl         Mile Sullivan DO

## 2021-04-05 ENCOUNTER — IN-CLINIC DEVICE VISIT (OUTPATIENT)
Dept: CARDIOLOGY CLINIC | Facility: CLINIC | Age: 81
End: 2021-04-05

## 2021-04-05 DIAGNOSIS — Z95.810 PRESENCE OF IMPLANTABLE CARDIOVERTER-DEFIBRILLATOR (ICD): Primary | ICD-10-CM

## 2021-04-05 PROCEDURE — RECHECK: Performed by: INTERNAL MEDICINE

## 2021-04-05 NOTE — PROGRESS NOTES
MDT SINGLE CHAMBER ICD/NOT MRI CONDITIONAL   DEVICE INTERROGATED IN THE [place] OFFICE TO CHECK BATTERY STATUS (NB):  BATTERY VOLTAGE NEARING WILFREDO (2 63V/RRT=2 63V, RRT NOT INDICATED)  WILL CONTINUE MONTHLY BATTERY CHECKS    4 8%    ALL AVAILABLE LEAD PARAMETERS WITHIN NORMAL LIMITS   NO HIGH RATE EPISODES   OPTI-VOL WITHIN NORMAL LIMITS   NORMAL DEVICE FUNCTION   RG

## 2021-04-22 ENCOUNTER — LAB (OUTPATIENT)
Dept: LAB | Facility: CLINIC | Age: 81
End: 2021-04-22
Payer: COMMERCIAL

## 2021-04-22 ENCOUNTER — ANTICOAG VISIT (OUTPATIENT)
Dept: FAMILY MEDICINE CLINIC | Facility: CLINIC | Age: 81
End: 2021-04-22

## 2021-04-22 DIAGNOSIS — I48.0 PAF (PAROXYSMAL ATRIAL FIBRILLATION) (HCC): ICD-10-CM

## 2021-04-22 DIAGNOSIS — I25.10 ASCVD (ARTERIOSCLEROTIC CARDIOVASCULAR DISEASE): ICD-10-CM

## 2021-04-22 LAB
INR PPP: 1.7 (ref 0.84–1.19)
PROTHROMBIN TIME: 19.9 SECONDS (ref 11.6–14.5)

## 2021-04-22 PROCEDURE — 85610 PROTHROMBIN TIME: CPT

## 2021-04-22 PROCEDURE — 36415 COLL VENOUS BLD VENIPUNCTURE: CPT

## 2021-04-22 NOTE — RESULT ENCOUNTER NOTE
OK lets change the dose lets do 3 mg every days except Saturday which he should take 2 5mg    Redraw in a week

## 2021-04-29 ENCOUNTER — LAB (OUTPATIENT)
Dept: LAB | Facility: CLINIC | Age: 81
End: 2021-04-29
Payer: COMMERCIAL

## 2021-04-29 ENCOUNTER — ANTICOAG VISIT (OUTPATIENT)
Dept: FAMILY MEDICINE CLINIC | Facility: CLINIC | Age: 81
End: 2021-04-29

## 2021-04-29 DIAGNOSIS — I48.0 PAF (PAROXYSMAL ATRIAL FIBRILLATION) (HCC): ICD-10-CM

## 2021-04-29 DIAGNOSIS — I25.10 ASCVD (ARTERIOSCLEROTIC CARDIOVASCULAR DISEASE): ICD-10-CM

## 2021-04-29 LAB
INR PPP: 2.14 (ref 0.84–1.19)
PROTHROMBIN TIME: 23.8 SECONDS (ref 11.6–14.5)

## 2021-04-29 PROCEDURE — 36415 COLL VENOUS BLD VENIPUNCTURE: CPT

## 2021-04-29 PROCEDURE — 85610 PROTHROMBIN TIME: CPT

## 2021-05-03 ENCOUNTER — IN-CLINIC DEVICE VISIT (OUTPATIENT)
Dept: CARDIOLOGY CLINIC | Facility: CLINIC | Age: 81
End: 2021-05-03

## 2021-05-03 DIAGNOSIS — Z95.810 PRESENCE OF IMPLANTABLE CARDIOVERTER-DEFIBRILLATOR (ICD): Primary | ICD-10-CM

## 2021-05-03 PROCEDURE — RECHECK: Performed by: INTERNAL MEDICINE

## 2021-05-03 NOTE — PROGRESS NOTES
MDT SINGLE CHAMBER ICD/NOT MRI CONDITIONAL   NB DEVICE INTERROGATED IN THE [place] OFFICE TO CHECK BATTERY SATAUS:  NO TESTING   BATTERY VOLTAGE NEARING WILFREDO (2 62V/RRT =2 63V, RRT NOT INDICATED  WILL CONTINUE MONTHLY BATTERY CHECKS    3 4%    ALL AVAILABLE LEAD PARAMETERS WITHIN NORMAL LIMITS   NO HIGH RATE EPISODES   NO PROGRAMMING CHANGES MADE TO DEVICE PARAMETERS   NORMAL DEVICE FUNCTION   RG

## 2021-05-04 DIAGNOSIS — I48.20 CHRONIC ATRIAL FIBRILLATION (HCC): ICD-10-CM

## 2021-05-04 RX ORDER — WARFARIN SODIUM 3 MG/1
TABLET ORAL
Qty: 90 TABLET | Refills: 1 | Status: SHIPPED | OUTPATIENT
Start: 2021-05-04 | End: 2021-11-05

## 2021-05-27 ENCOUNTER — LAB (OUTPATIENT)
Dept: LAB | Facility: CLINIC | Age: 81
End: 2021-05-27
Payer: COMMERCIAL

## 2021-05-27 DIAGNOSIS — I25.10 ASCVD (ARTERIOSCLEROTIC CARDIOVASCULAR DISEASE): ICD-10-CM

## 2021-05-27 LAB
INR PPP: 2.38 (ref 0.84–1.19)
PROTHROMBIN TIME: 25.8 SECONDS (ref 11.6–14.5)

## 2021-05-27 PROCEDURE — 85610 PROTHROMBIN TIME: CPT

## 2021-05-27 PROCEDURE — 36415 COLL VENOUS BLD VENIPUNCTURE: CPT

## 2021-05-28 ENCOUNTER — ANTICOAG VISIT (OUTPATIENT)
Dept: FAMILY MEDICINE CLINIC | Facility: CLINIC | Age: 81
End: 2021-05-28

## 2021-05-28 DIAGNOSIS — I48.0 PAF (PAROXYSMAL ATRIAL FIBRILLATION) (HCC): ICD-10-CM

## 2021-06-10 DIAGNOSIS — I50.9 HEART FAILURE (HCC): ICD-10-CM

## 2021-06-10 DIAGNOSIS — I25.10 ARTERIOSCLEROSIS OF CORONARY ARTERY: ICD-10-CM

## 2021-06-10 DIAGNOSIS — E11.42 TYPE 2 DIABETES MELLITUS WITH DIABETIC POLYNEUROPATHY, WITHOUT LONG-TERM CURRENT USE OF INSULIN (HCC): ICD-10-CM

## 2021-06-10 DIAGNOSIS — K29.70 GASTRITIS: ICD-10-CM

## 2021-06-10 RX ORDER — FOSINOPRIL SODIUM 10 MG/1
TABLET ORAL
Qty: 90 TABLET | Refills: 1 | Status: SHIPPED | OUTPATIENT
Start: 2021-06-10 | End: 2021-12-17

## 2021-06-10 RX ORDER — PANTOPRAZOLE SODIUM 40 MG/1
TABLET, DELAYED RELEASE ORAL
Qty: 90 TABLET | Refills: 1 | Status: SHIPPED | OUTPATIENT
Start: 2021-06-10 | End: 2021-12-17

## 2021-06-14 ENCOUNTER — IN-CLINIC DEVICE VISIT (OUTPATIENT)
Dept: CARDIOLOGY CLINIC | Facility: CLINIC | Age: 81
End: 2021-06-14

## 2021-06-14 DIAGNOSIS — Z95.810 PRESENCE OF IMPLANTABLE CARDIOVERTER-DEFIBRILLATOR (ICD): Primary | ICD-10-CM

## 2021-06-14 PROCEDURE — RECHECK: Performed by: INTERNAL MEDICINE

## 2021-06-14 NOTE — PROGRESS NOTES
MDT SINGLE CHAMBER ICD/NOT MRI CONDITIONAL   NB DEVICE INTERROGATED IN THE [place] OFFICE TO CHECK BATTERY SATUS:  NO TESTING   BATTERY VOLTAGE NEARING WILFREDO (2 63V/RRT =2 63V, RRT NOT INDICATED  WILL CONTINUE MONTHLY BATTERY CHECKS    1 5%    ALL AVAILABLE LEAD PARAMETERS WITHIN NORMAL LIMITS   NO HIGH RATE EPISODES   OPTI-VOL WITHIN NORMAL LIMITS   NO PROGRAMMING CHANGES MADE TO DEVICE PARAMETERS   NORMAL DEVICE FUNCTION   RG

## 2021-06-18 RX ORDER — FUROSEMIDE 40 MG/1
40 TABLET ORAL DAILY
Qty: 90 TABLET | Refills: 1 | Status: SHIPPED | OUTPATIENT
Start: 2021-06-18 | End: 2021-11-05

## 2021-06-24 ENCOUNTER — ANTICOAG VISIT (OUTPATIENT)
Dept: FAMILY MEDICINE CLINIC | Facility: CLINIC | Age: 81
End: 2021-06-24

## 2021-06-24 ENCOUNTER — LAB (OUTPATIENT)
Dept: LAB | Facility: CLINIC | Age: 81
End: 2021-06-24
Payer: COMMERCIAL

## 2021-06-24 DIAGNOSIS — R41.89 COGNITIVE DECLINE: ICD-10-CM

## 2021-06-24 DIAGNOSIS — I25.10 ASCVD (ARTERIOSCLEROTIC CARDIOVASCULAR DISEASE): ICD-10-CM

## 2021-06-24 DIAGNOSIS — J96.11 CHRONIC HYPOXEMIC RESPIRATORY FAILURE (HCC): ICD-10-CM

## 2021-06-24 DIAGNOSIS — J43.2 CENTRILOBULAR EMPHYSEMA (HCC): ICD-10-CM

## 2021-06-24 DIAGNOSIS — E43 UNSPECIFIED SEVERE PROTEIN-CALORIE MALNUTRITION (HCC): ICD-10-CM

## 2021-06-24 DIAGNOSIS — D68.9 COAGULATION DEFECT, UNSPECIFIED (HCC): ICD-10-CM

## 2021-06-24 DIAGNOSIS — I70.209 PERIPHERAL ARTERIOSCLEROSIS (HCC): ICD-10-CM

## 2021-06-24 DIAGNOSIS — F02.80 ALZHEIMER'S DEMENTIA WITHOUT BEHAVIORAL DISTURBANCE, UNSPECIFIED TIMING OF DEMENTIA ONSET (HCC): ICD-10-CM

## 2021-06-24 DIAGNOSIS — I42.0 DILATED CARDIOMYOPATHY (HCC): ICD-10-CM

## 2021-06-24 DIAGNOSIS — G30.9 ALZHEIMER'S DEMENTIA WITHOUT BEHAVIORAL DISTURBANCE, UNSPECIFIED TIMING OF DEMENTIA ONSET (HCC): ICD-10-CM

## 2021-06-24 DIAGNOSIS — I50.1 HEART FAILURE, LEFT, WITH LVEF 31-40% (HCC): ICD-10-CM

## 2021-06-24 DIAGNOSIS — I48.0 PAF (PAROXYSMAL ATRIAL FIBRILLATION) (HCC): Primary | ICD-10-CM

## 2021-06-24 DIAGNOSIS — E11.42 TYPE 2 DIABETES MELLITUS WITH DIABETIC POLYNEUROPATHY, WITHOUT LONG-TERM CURRENT USE OF INSULIN (HCC): ICD-10-CM

## 2021-06-24 LAB
ALBUMIN SERPL BCP-MCNC: 3.6 G/DL (ref 3.5–5)
ALP SERPL-CCNC: 96 U/L (ref 46–116)
ALT SERPL W P-5'-P-CCNC: 33 U/L (ref 12–78)
ANION GAP SERPL CALCULATED.3IONS-SCNC: 5 MMOL/L (ref 4–13)
AST SERPL W P-5'-P-CCNC: 16 U/L (ref 5–45)
BILIRUB SERPL-MCNC: 0.48 MG/DL (ref 0.2–1)
BUN SERPL-MCNC: 25 MG/DL (ref 5–25)
CALCIUM SERPL-MCNC: 8.5 MG/DL (ref 8.3–10.1)
CHLORIDE SERPL-SCNC: 100 MMOL/L (ref 100–108)
CHOLEST SERPL-MCNC: 77 MG/DL (ref 50–200)
CO2 SERPL-SCNC: 30 MMOL/L (ref 21–32)
CREAT SERPL-MCNC: 1.59 MG/DL (ref 0.6–1.3)
EST. AVERAGE GLUCOSE BLD GHB EST-MCNC: 223 MG/DL
GFR SERPL CREATININE-BSD FRML MDRD: 40 ML/MIN/1.73SQ M
GLUCOSE P FAST SERPL-MCNC: 216 MG/DL (ref 65–99)
HBA1C MFR BLD: 9.4 %
HDLC SERPL-MCNC: 31 MG/DL
INR PPP: 2.32 (ref 0.84–1.19)
LDLC SERPL CALC-MCNC: 13 MG/DL (ref 0–100)
POTASSIUM SERPL-SCNC: 4.1 MMOL/L (ref 3.5–5.3)
PROT SERPL-MCNC: 7.4 G/DL (ref 6.4–8.2)
PROTHROMBIN TIME: 25.3 SECONDS (ref 11.6–14.5)
SODIUM SERPL-SCNC: 135 MMOL/L (ref 136–145)
TRIGL SERPL-MCNC: 163 MG/DL

## 2021-06-24 PROCEDURE — 36415 COLL VENOUS BLD VENIPUNCTURE: CPT

## 2021-06-24 PROCEDURE — 80053 COMPREHEN METABOLIC PANEL: CPT

## 2021-06-24 PROCEDURE — 80061 LIPID PANEL: CPT

## 2021-06-24 PROCEDURE — 85610 PROTHROMBIN TIME: CPT

## 2021-06-24 PROCEDURE — 83036 HEMOGLOBIN GLYCOSYLATED A1C: CPT

## 2021-06-24 NOTE — PROGRESS NOTES
Gave wife PT/INR results, she confirmed dose of 3 mg 6 days and 2 5 on Sunday  Redraw in 1 month    DELROY Monique

## 2021-06-25 ENCOUNTER — TELEPHONE (OUTPATIENT)
Dept: WOUND CARE | Facility: HOSPITAL | Age: 81
End: 2021-06-25

## 2021-07-06 ENCOUNTER — RA CDI HCC (OUTPATIENT)
Dept: OTHER | Facility: HOSPITAL | Age: 81
End: 2021-07-06

## 2021-07-06 NOTE — PROGRESS NOTES
[facility]  coding opportunities             Chart reviewed, (number of) suggestions sent to provider: 4                  Patients insurance company: [employer] (Medicare Advantage only)     DX: I11 0 Hypertensive heart disease with heart failure  DX: L40 32 Chronic systolic (congestive) heart failure  DX: J44 9 Chronic obstructive pulmonary disease, unspecified  DX: I70 209 Unspecified atherosclerosis of native arteries of extremities, unspecified extremity     DX: I11 0 and I50 22 were used, but DX: J44 9 and I70 209 were not used

## 2021-07-08 ENCOUNTER — OFFICE VISIT (OUTPATIENT)
Dept: CARDIOLOGY CLINIC | Facility: CLINIC | Age: 81
End: 2021-07-08
Payer: COMMERCIAL

## 2021-07-08 VITALS
DIASTOLIC BLOOD PRESSURE: 62 MMHG | BODY MASS INDEX: 20.16 KG/M2 | TEMPERATURE: 98.4 F | WEIGHT: 144 LBS | SYSTOLIC BLOOD PRESSURE: 118 MMHG | HEART RATE: 83 BPM | OXYGEN SATURATION: 93 % | HEIGHT: 71 IN

## 2021-07-08 DIAGNOSIS — I25.10 ARTERIOSCLEROSIS OF CORONARY ARTERY: ICD-10-CM

## 2021-07-08 DIAGNOSIS — I10 HTN (HYPERTENSION), MALIGNANT: ICD-10-CM

## 2021-07-08 DIAGNOSIS — Z95.810 PRESENCE OF IMPLANTABLE CARDIOVERTER-DEFIBRILLATOR (ICD): ICD-10-CM

## 2021-07-08 DIAGNOSIS — I50.22 CHRONIC SYSTOLIC CONGESTIVE HEART FAILURE (HCC): ICD-10-CM

## 2021-07-08 DIAGNOSIS — I48.20 CHRONIC ATRIAL FIBRILLATION (HCC): Primary | ICD-10-CM

## 2021-07-08 DIAGNOSIS — E11.59 TYPE 2 DIABETES MELLITUS WITH OTHER CIRCULATORY COMPLICATION, WITHOUT LONG-TERM CURRENT USE OF INSULIN (HCC): ICD-10-CM

## 2021-07-08 DIAGNOSIS — I50.1 HEART FAILURE, LEFT, WITH LVEF 31-40% (HCC): ICD-10-CM

## 2021-07-08 DIAGNOSIS — I42.9 CARDIOMYOPATHY, UNSPECIFIED TYPE (HCC): ICD-10-CM

## 2021-07-08 DIAGNOSIS — I42.8 NONISCHEMIC CARDIOMYOPATHY (HCC): ICD-10-CM

## 2021-07-08 PROCEDURE — 99214 OFFICE O/P EST MOD 30 MIN: CPT | Performed by: INTERNAL MEDICINE

## 2021-07-08 PROCEDURE — 93000 ELECTROCARDIOGRAM COMPLETE: CPT | Performed by: INTERNAL MEDICINE

## 2021-07-08 RX ORDER — ROSUVASTATIN CALCIUM 10 MG/1
10 TABLET, COATED ORAL
Qty: 90 TABLET | Refills: 3 | Status: SHIPPED | OUTPATIENT
Start: 2021-07-08 | End: 2022-05-09

## 2021-07-08 RX ORDER — CARVEDILOL 6.25 MG/1
6.25 TABLET ORAL 2 TIMES DAILY
Qty: 180 TABLET | Refills: 3 | Status: SHIPPED | OUTPATIENT
Start: 2021-07-08

## 2021-07-08 RX ORDER — DIGOXIN 125 MCG
TABLET ORAL
Qty: 90 TABLET | Refills: 3 | Status: SHIPPED | OUTPATIENT
Start: 2021-07-08 | End: 2022-06-07

## 2021-07-08 NOTE — PATIENT INSTRUCTIONS
Meal Planning with Diabetes Exchanges   AMBULATORY CARE:   Diabetes exchanges  are servings of food that contain similar amounts of carbohydrate, fat, protein, and calories within a food group  The exchanges can be used to develop a healthy meal plan that helps to keep your blood sugar within the recommended levels  A meal plan with the right amount of carbohydrates is especially important  Your blood sugar naturally rises after you eat carbohydrates  Too many carbohydrates in 1 meal or snack can raise your blood sugar level  Carbohydrates are found in starches, fruit, milk, yogurt, and sweets  Call your doctor if:   · You have high blood sugar levels during a certain time of day, or almost all of the time  · You often have low blood sugar levels  · You have questions or concerns about your condition or care  Create a meal plan with exchanges:  A dietitian will work with you to develop a healthy meal plan that is right for you  This meal plan will include the amount of exchanges you can have from each food group throughout the day  Follow your meal plan by keeping track of the amount of exchanges you eat for each meal and snack  Your meal plan will be based on your age, weight, blood sugar levels, medicine, and activity level  Starch food group exchanges:  Each exchange below contains about 15 grams of carbohydrate , 3 grams of protein, 1 gram of fat, and 80 calories  · 1 ounce of white, whole wheat or rye bread (1 slice)    · 1 ounce of bagel (about ¼ of a bagel)    · 1 6-inch flour or corn tortilla or 1 4-inch pancake (about ¼ inch thick)    · ?  cup of cooked pasta or rice    · ¾ cup of dry, ready-to-eat cereal with no sugar added     · ½ cup of cooked cereal, such as oatmeal    · 3 clint cracker squares or 8 animal crackers    · 6 saltine-type crackers or     · 3 cups of popcorn or ¾ ounce of pretzels     · Starchy vegetables and cooked legumes:      ? ½ cup of corn, green peas, sweet potatoes, or mashed potatoes     ? ¼ of a large baked potato     ? 1 cup of acorn, butternut squash, or pumpkin     ? ½ cup of beans, lentils, or peas (such as victoria, kidney, or black-eyed)    ? ? cup of lima beans    Fruit group exchanges:  Each exchange contains about 15 grams of carbohydrate  and 60 calories  · 1 small (4 ounce) apple, banana orange, or nectarine    · ½ cup of canned or fresh fruit    · ½ cup (4 ounces) of unsweetened fruit juice    · 2 tablespoons of dried fruit    Milk group exchanges:  Each exchange contains about 12 grams of carbohydrate  and 8 grams of protein  The amount of fat and calories in each serving depends on the type of milk (such as whole, low-fat, or fat-free)  · 1 cup fat-free or low-fat milk    · ¾ cup of plain, nonfat yogurt    · 1 cup fat-free, flavored yogurt with artificial (no calorie) sweetener    Non-starchy vegetable group exchanges:  Each exchange contains about 5 grams of carbohydrate , 2 grams of protein, and 25 calories  Examples include beets, broccoli, cabbage, carrots, cauliflower, cucumber, mushrooms, tomatoes, and zucchini  · ½ cup of cooked vegetables or 1 cup of raw vegetables     · ½ cup of vegetable juice    Meat and meat substitute group exchanges:  Each exchange of a lean meat  listed below contains about 7 grams of protein, 0 to 3 grams of fat, and 45 calories  The meat and meat substitutes food group does not contain any carbohydrates  Medium and high-fat meats have more calories  · 1 ounce of chicken or turkey without skin, or 1 ounce of fish (not breaded or fried)     · 1 ounce of lean beef, pork, or lamb     · 1-inch cube or 1 ounce of low-fat cheese     · 2 egg whites or ¼ cup of egg substitute     · ½ cup of tofu    Sweets, desserts, and other carbohydrate group exchanges:   · Sweets and other desserts:  Each exchange has about 15 grams of carbohydrate   ? 1 ounce of willem food cake or 2-inch square cake (unfrosted)    ? 2 small cookies     ?  ½ cup of sugar-free, fat-free ice cream    ? 1 tablespoon of syrup, jam, jelly, table sugar, or honey    · Combination foods:     ? 1 cup of an entrée, such as lasagna, spaghetti with meatballs, macaroni and cheese, and chili with beans (each serving counts as 2 carbohydrate exchanges )     ? 1 cup of tomato or vegetable beef soup (each serving counts as 1 carbohydrate exchange )    Fat group exchanges:  Each exchange contains 5 grams of fat and 45 calories  · 1 teaspoon of oil (such as canola, olive, or corn oil)     · 6 almonds or cashews, 10 peanuts, or 4 pecan halves     · 2 tablespoons of avocado     · ½ tablespoon of peanut butter     · 1 teaspoon of regular margarine or 2 teaspoons of low-fat margarine     · 1 teaspoon of regular butter or 1 tablespoon of low-fat butter     · 1 teaspoon of regular mayonnaise or 1 tablespoon of low-fat mayonnaise     · 1 tablespoon of regular salad dressing or 2 tablespoons of low-fat salad dressing    Free foods: The foods on this list are called free foods because they have very few calories  Free foods usually do not increase your blood sugar if you limit them  · 1 tablespoon of catsup or taco sauce     · ¼ cup of salsa     · 2 tablespoons of sugar-free syrup or 2 teaspoons of light jam or jelly     · 1 tablespoon of fat-free salad dressing     · 4 tablespoons of fat-free margarine or fat-free mayonnaise     · Sugar-free drinks: diet soda, sugar-free drink mixes, or mineral water     · Low-sodium bouillon or fat-free broth     · Mustard     · Seasonings such as spices, herbs, and garlic     · Sugar-free gelatin without added fruit    Other healthy nutrition guidelines:   · Limit drinks with sugar substitutes  Your dietitian or healthcare provider will encourage you to drink water  Water helps your kidneys to function properly  Ask how much water you should drink every day  · Eat more fiber    Choose foods that are good sources of fiber, such as fruits, vegetables, and whole grains  Cereals that contain 5 or more grams of fiber per serving are good sources of fiber  Legumes such as garbanzo, victoria beans, kidney beans, and lentils are also good sources  · Limit fat  Ask your dietitian or healthcare provider how much fat you should eat each day  Choose foods low in fat, saturated fat, trans fat, and cholesterol  Examples include turkey or chicken without the skin, fish, lean cuts of meat, and beans  Low-fat dairy foods, such as low-fat or fat-free milk and low-fat yogurt are also good choices  Omega-3 fatty acids are healthy fats that are found in canola oil, soybean oil and fatty fish  [place], albacore tuna, and sardines are good sources of omega 3 fatty acids  Eat 2 servings of these types of fish each week  Do not eat fried fish  · Limit sugar  Sugar and sweets must be counted toward the carbohydrate exchanges that you can have within your meal plan  Limit sugar and sweets because they are usually also high in calories and fat  Eat smaller portions of sweets by sharing a dessert or asking for a child-size portion at a restaurant  · Limit sodium  (salt) to about 2,300 mg per day  You may need to eat even less sodium if you have certain medical conditions  Foods high in sodium include soy sauce, potato chips, and soup  · Limit alcohol  Ask your healthcare provider if it is safe for you to drink alcohol  If alcohol is safe for you to have, eat a meal when you drink alcohol  If you drink alcohol on an empty stomach, your blood sugar may drop to a low level  Women 21 years or older and men 72 years or older should limit alcohol to 1 drink a day  Men aged 24 to 59 years should limit alcohol to 2 drinks a day  A drink of alcohol is 5 ounces of wine, 12 ounces of beer, or 1½ ounces of liquor  Other ways to manage your diabetes:   · Control your blood sugar level  Test your blood sugar level regularly and keep a record of the results   Ask your healthcare provider when and how often to test your blood sugar  You may need to check your blood sugar level at least 3 times each day  · Talk to your healthcare provider about your weight  Ask if you need to lose weight, and how much you need to lose  If you are overweight, you may need to make other changes to lose weight  Ask your healthcare provider to help you create a weight loss program      · Get regular physical activity  Physical activity can help decrease your blood sugar level  It can also help to decrease your risk for heart disease and help you lose weight  Adults should have moderate intensity physical activity for at least 150 minutes every week  Spread the amount of activity over at least 3 days a week  Do not skip more than 2 days in a row  Children should get at least 60 minutes of moderate physical activity on most days of the week  Examples of moderate physical activity include brisk walking, running, and swimming  Do not sit for longer than 30 minutes  Work with your healthcare provider to create a plan for physical activity  © Copyright [address] Information is for End User's use only and may not be sold, redistributed or otherwise used for commercial purposes  All illustrations and images included in CareNotes® are the copyrighted property of [employer]  or [address] [place] [address]  The above information is an  only  It is not intended as medical advice for individual conditions or treatments  Talk to your doctor, nurse or pharmacist before following any medical regimen to see if it is safe and effective for you
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
## 2021-07-13 ENCOUNTER — OFFICE VISIT (OUTPATIENT)
Dept: FAMILY MEDICINE CLINIC | Facility: CLINIC | Age: 81
End: 2021-07-13
Payer: COMMERCIAL

## 2021-07-13 VITALS
RESPIRATION RATE: 20 BRPM | DIASTOLIC BLOOD PRESSURE: 60 MMHG | HEIGHT: 71 IN | OXYGEN SATURATION: 97 % | TEMPERATURE: 97.2 F | WEIGHT: 143 LBS | HEART RATE: 7 BPM | SYSTOLIC BLOOD PRESSURE: 106 MMHG | BODY MASS INDEX: 20.02 KG/M2

## 2021-07-13 DIAGNOSIS — I50.1 HEART FAILURE, LEFT, WITH LVEF 31-40% (HCC): ICD-10-CM

## 2021-07-13 DIAGNOSIS — G30.9 ALZHEIMER'S DEMENTIA WITHOUT BEHAVIORAL DISTURBANCE, UNSPECIFIED TIMING OF DEMENTIA ONSET (HCC): ICD-10-CM

## 2021-07-13 DIAGNOSIS — I48.0 PAF (PAROXYSMAL ATRIAL FIBRILLATION) (HCC): ICD-10-CM

## 2021-07-13 DIAGNOSIS — I11.0 HYPERTENSIVE HEART DISEASE WITH CONGESTIVE HEART FAILURE, UNSPECIFIED HEART FAILURE TYPE (HCC): ICD-10-CM

## 2021-07-13 DIAGNOSIS — I42.0 DILATED CARDIOMYOPATHY (HCC): ICD-10-CM

## 2021-07-13 DIAGNOSIS — F02.80 ALZHEIMER'S DEMENTIA WITHOUT BEHAVIORAL DISTURBANCE, UNSPECIFIED TIMING OF DEMENTIA ONSET (HCC): ICD-10-CM

## 2021-07-13 DIAGNOSIS — E11.42 TYPE 2 DIABETES MELLITUS WITH DIABETIC POLYNEUROPATHY, WITHOUT LONG-TERM CURRENT USE OF INSULIN (HCC): Primary | ICD-10-CM

## 2021-07-13 DIAGNOSIS — I50.22 CHRONIC SYSTOLIC CONGESTIVE HEART FAILURE (HCC): ICD-10-CM

## 2021-07-13 PROCEDURE — 99214 OFFICE O/P EST MOD 30 MIN: CPT | Performed by: FAMILY MEDICINE

## 2021-07-13 PROCEDURE — 1036F TOBACCO NON-USER: CPT | Performed by: FAMILY MEDICINE

## 2021-07-13 PROCEDURE — 3078F DIAST BP <80 MM HG: CPT | Performed by: FAMILY MEDICINE

## 2021-07-13 PROCEDURE — 1101F PT FALLS ASSESS-DOCD LE1/YR: CPT | Performed by: FAMILY MEDICINE

## 2021-07-13 PROCEDURE — 3288F FALL RISK ASSESSMENT DOCD: CPT | Performed by: FAMILY MEDICINE

## 2021-07-13 PROCEDURE — 3725F SCREEN DEPRESSION PERFORMED: CPT | Performed by: FAMILY MEDICINE

## 2021-07-13 PROCEDURE — 3074F SYST BP LT 130 MM HG: CPT | Performed by: FAMILY MEDICINE

## 2021-07-13 PROCEDURE — 1160F RVW MEDS BY RX/DR IN RCRD: CPT | Performed by: FAMILY MEDICINE

## 2021-07-13 RX ORDER — EMPAGLIFLOZIN 10 MG/1
10 TABLET, FILM COATED ORAL EVERY MORNING
Qty: 90 TABLET | Refills: 1 | Status: SHIPPED | OUTPATIENT
Start: 2021-07-13 | End: 2021-10-12

## 2021-07-13 NOTE — ASSESSMENT & PLAN NOTE
A1c is now above 9 from his last draw in the sevens  Will increase his metformin and start jardiance    Will follow in three months  Lab Results   Component Value Date    HGBA1C 9 4 (H) 06/24/2021

## 2021-07-13 NOTE — PROGRESS NOTES
Assessment/Plan:    1  Type 2 diabetes mellitus with diabetic polyneuropathy, without long-term current use of insulin ([facility])  Assessment & Plan:  A1c is now above 9 from his last draw in the sevens  Will increase his metformin and start jardiance  Will follow in three months  Lab Results   Component Value Date    HGBA1C 9 4 (H) 06/24/2021       Orders:  -     metFORMIN (GLUCOPHAGE) 500 mg tablet; Take 1 tablet (500 mg total) by mouth 2 (two) times a day with meals  -     Empagliflozin (Jardiance) 10 MG TABS; Take 1 tablet (10 mg total) by mouth every morning  -     Hemoglobin A1C; Future; Expected date: 09/26/2021  -     Microalbumin / creatinine urine ratio; Future; Expected date: 09/26/2021    2  Hypertensive heart disease with congestive heart failure, unspecified heart failure type ([place] [address] )  -     Comprehensive metabolic panel; Future; Expected date: 09/26/2021    3  Alzheimer's dementia without behavioral disturbance, unspecified timing of dementia onset ([place] [address] )    4  Heart failure, left, 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% ([facility] )    5  Chronic systolic congestive heart failure ([facility] )    6  PAF (paroxysmal atrial fibrillation) ([place] [address] )    7  Dilated cardiomyopathy ([place] [address] )          There are no Patient Instructions on file for this visit  Return in about 3 months (around 10/13/2021) for Recheck  Subjective:      Patient ID: Madisyn Nicholson is a 78 y o  male  Chief Complaint   Patient presents with    Follow-up     Diabetic check  [id number]    shoes and socks removed       Pt is here for a follow up of chronic conditions  Pt denies CP, no SOB  Wife states his sugar was up on his labs  Pt was on 2000 metfromin and hospital dropped him dropped him to 500 as per his wife - A1c went from 7 5 to 9  X      The following portions of the patient's history were reviewed and updated as appropriate: allergies, current medications, past family history, past medical history, past social history, past surgical history and problem list     Review of Systems   Constitutional: Negative for activity change, appetite change, chills, diaphoresis, fatigue, fever and unexpected weight change  HENT: Negative for congestion, dental problem, ear pain, mouth sores, sinus pressure, sinus pain, sore throat and trouble swallowing  Eyes: Negative for photophobia, discharge and itching  Respiratory: Negative for apnea, chest tightness and shortness of breath  Cardiovascular: Negative for chest pain, palpitations and leg swelling  Gastrointestinal: Negative for abdominal distention, abdominal pain, blood in stool, nausea and vomiting  Endocrine: Negative for cold intolerance, heat intolerance, polydipsia, polyphagia and polyuria  Genitourinary: Negative for difficulty urinating  Musculoskeletal: Negative for arthralgias  Skin: Negative for color change and wound  Neurological: Negative for dizziness, syncope, speech difficulty and headaches  Hematological: Negative for adenopathy  Psychiatric/Behavioral: Negative for agitation and behavioral problems           Current Outpatient Medications   Medication Sig Dispense Refill    Ascorbic Acid (VITAMIN C) 1000 MG tablet Take 1,000 mg by mouth daily      carvedilol (COREG) 6 25 mg tablet Take 1 tablet (6 25 mg total) by mouth 2 (two) times a day 180 tablet 3    digoxin (LANOXIN) 0 125 mg tablet TAKE 1 TABLET DAILY 90 tablet 3    Ferrous Sulfate (Iron) 325 (65 Fe) MG TABS Take by mouth every other day      fosinopril (MONOPRIL) 10 mg tablet TAKE 1 TABLET EVERY DAY 90 tablet 1    furosemide (LASIX) 40 mg tablet TAKE 1 TABLET (40 MG TOTAL) BY MOUTH DAILY 90 tablet 1    glucose blood (PRODIGY NO CODING BLOOD GLUC) test strip by In Vitro route      guaiFENesin (MUCINEX) 600 mg 12 hr tablet Take 1 tablet (600 mg total) by mouth every 12 (twelve) hours 30 tablet 0    memantine (NAMENDA) 10 mg tablet Take 1 tablet (10 mg total) by mouth 2 (two) times a day 180 tablet 1    metFORMIN (GLUCOPHAGE) 500 mg tablet Take 1 tablet (500 mg total) by mouth 2 (two) times a day with meals 180 tablet 1    omega-3-acid ethyl esters (LOVAZA) 1 g capsule Take 1 capsule (1 g total) by mouth 2 (two) times a day 360 capsule 0    pantoprazole (PROTONIX) 40 mg tablet TAKE 1 TABLET EVERY DAY 90 tablet 1    PRODIGY TWIST TOP LANCETS 28G MISC by Does not apply route      rosuvastatin (CRESTOR) 10 MG tablet Take 1 tablet (10 mg total) by mouth daily at bedtime 90 tablet 3    spironolactone (ALDACTONE) 25 mg tablet Take 1 tablet (25 mg total) by mouth daily 90 tablet 3    warfarin (COUMADIN) 1 mg tablet Take 1 tablet (1 mg total) by mouth daily 90 tablet 0    warfarin (COUMADIN) 3 mg tablet TAKE AS DIRECTED BY OFFICE BASED ON INR (GOAL INR AT PRESENT IS 1 5 TO 2) 90 tablet 1    warfarin (COUMADIN) 5 mg tablet Take 1 tablet (5 mg total) by mouth daily 90 tablet 1    Empagliflozin (Jardiance) 10 MG TABS Take 1 tablet (10 mg total) by mouth every morning 90 tablet 1    fluticasone-umeclidinium-vilanterol (TRELEGY) 100-62 5-25 MCG/INH inhaler Inhale 1 puff daily Rinse mouth after use  3 Inhaler 4     No current facility-administered medications for this visit  Objective:    /60   Pulse (!) 7   Temp (!) 97 2 °F (36 2 °C)   Resp 20   Ht 5' 11" (1 803 m)   Wt 64 9 kg (143 lb)   SpO2 97%   BMI 19 94 kg/m²        Physical Exam  Vitals and nursing note reviewed  Constitutional:       General: He is not in acute distress  Appearance: He is well-developed  He is not diaphoretic  HENT:      Head: Normocephalic and atraumatic  Right Ear: External ear normal       Left Ear: External ear normal       Nose: Nose normal       Mouth/Throat:      Pharynx: No oropharyngeal exudate  Eyes:      General: No scleral icterus  Right eye: No discharge  Left eye: No discharge  Pupils: Pupils are equal, round, and reactive to light  Neck:      Thyroid: No thyromegaly     Cardiovascular:      Rate and Rhythm: Normal rate  Pulses: no weak pulses          Dorsalis pedis pulses are 2+ on the right side and 2+ on the left side  Posterior tibial pulses are 2+ on the right side and 2+ on the left side  Heart sounds: Normal heart sounds  No murmur heard  Pulmonary:      Effort: Pulmonary effort is normal  No respiratory distress  Breath sounds: Normal breath sounds  No wheezing  Abdominal:      General: Bowel sounds are normal  There is no distension  Palpations: Abdomen is soft  There is no mass  Tenderness: There is no abdominal tenderness  There is no guarding or rebound  Musculoskeletal:         General: Normal range of motion  Feet:      Right foot:      Skin integrity: No ulcer, skin breakdown, erythema, warmth, callus or dry skin  Left foot:      Skin integrity: No ulcer, skin breakdown, erythema, warmth, callus or dry skin  Skin:     General: Skin is warm and dry  Findings: No erythema or rash  Neurological:      Mental Status: He is alert        Coordination: Coordination normal       Deep Tendon Reflexes: Reflexes normal    Psychiatric:         Behavior: Behavior normal            Recent Results (from the past 672 hour(s))   Protime-INR    Collection Time: 06/24/21  7:16 AM   Result Value Ref Range    Protime 25 3 (H) 11 6 - 14 5 seconds    INR 2 32 (H) 0 84 - 1 19   Comprehensive metabolic panel    Collection Time: 06/24/21  7:16 AM   Result Value Ref Range    Sodium 135 (L) 136 - 145 mmol/L    Potassium 4 1 3 5 - 5 3 mmol/L    Chloride 100 100 - 108 mmol/L    CO2 30 21 - 32 mmol/L    ANION GAP 5 4 - 13 mmol/L    BUN 25 5 - 25 mg/dL    Creatinine 1 59 (H) 0 60 - 1 30 mg/dL    Glucose, Fasting 216 (H) 65 - 99 mg/dL    Calcium 8 5 8 3 - 10 1 mg/dL    AST 16 5 - 45 U/L    ALT 33 12 - 78 U/L    Alkaline Phosphatase 96 46 - 116 U/L    Total Protein 7 4 6 4 - 8 2 g/dL    Albumin 3 6 3 5 - 5 0 g/dL    Total Bilirubin 0 48 0 20 - 1 00 mg/dL    eGFR 40 ml/min/1 73sq m   Hemoglobin A1C    Collection Time: 06/24/21  7:16 AM   Result Value Ref Range    Hemoglobin A1C 9 4 (H) Normal 3 8-5 6%; PreDiabetic 5 7-6 4%; Diabetic >=6 5%; Glycemic control for adults with diabetes <7 0% %     mg/dl   Lipid Panel with Direct LDL reflex    Collection Time: 06/24/21  7:16 AM   Result Value Ref Range    Cholesterol 77 50 - 200 mg/dL    Triglycerides 163 (H) <=150 mg/dL    HDL, Direct 31 (L) >=40 mg/dL    LDL Calculated 13 0 - 100 mg/dL      Diabetic Foot Exam    Patient's shoes and socks removed  Right Foot/Ankle   Right Foot Inspection  Skin Exam: skin normal skin not intact, no dry skin, no warmth, no callus, no erythema, no maceration, no abnormal color, no pre-ulcer, no ulcer and no callus                          Toe Exam: ROM and strength within normal limits  Sensory   Vibration: intact  Proprioception: intact   Monofilament testing: absent  Vascular  Capillary refills: < 3 seconds  The right DP pulse is 2+  The right PT pulse is 2+  Left Foot/Ankle  Left Foot Inspection  Skin Exam: skin normalskin not intact, no dry skin, no warmth, no erythema, no maceration, normal color, no pre-ulcer, no ulcer and no callus                         Toe Exam: ROM and strength within normal limits                   Sensory   Vibration: intact  Proprioception: intact  Monofilament: diminished  Vascular  Capillary refills: < 3 seconds  The left DP pulse is 2+  The left PT pulse is 2+  Assign Risk Category:  No deformity present; No loss of protective sensation;  No weak pulses       Risk: 0      Yovany Al DO

## 2021-07-22 ENCOUNTER — LAB (OUTPATIENT)
Dept: LAB | Facility: CLINIC | Age: 81
End: 2021-07-22
Payer: COMMERCIAL

## 2021-07-22 ENCOUNTER — TELEPHONE (OUTPATIENT)
Dept: FAMILY MEDICINE CLINIC | Facility: CLINIC | Age: 81
End: 2021-07-22

## 2021-07-22 ENCOUNTER — ANTICOAG VISIT (OUTPATIENT)
Dept: FAMILY MEDICINE CLINIC | Facility: CLINIC | Age: 81
End: 2021-07-22

## 2021-07-22 DIAGNOSIS — I48.0 PAF (PAROXYSMAL ATRIAL FIBRILLATION) (HCC): Primary | ICD-10-CM

## 2021-07-22 DIAGNOSIS — I25.10 ASCVD (ARTERIOSCLEROTIC CARDIOVASCULAR DISEASE): ICD-10-CM

## 2021-07-22 LAB
INR PPP: 2.13 (ref 0.84–1.19)
PROTHROMBIN TIME: 23.7 SECONDS (ref 11.6–14.5)

## 2021-07-22 PROCEDURE — 36415 COLL VENOUS BLD VENIPUNCTURE: CPT

## 2021-07-22 PROCEDURE — 85610 PROTHROMBIN TIME: CPT

## 2021-07-26 NOTE — TELEPHONE ENCOUNTER
This request is from [employer]  In regards to CPAP and Pulmonary issues I spoke to neli Simon and told her that we do not handle cpap supplie as Dr Ortega Krishnamurthy is the primary doctor and not the pulmonologist   I stated that I will fax over last office note  but to please note on there end that we do not handle this  Pulmonary does    [address] [place], [place]

## 2021-07-29 ENCOUNTER — OFFICE VISIT (OUTPATIENT)
Dept: PULMONOLOGY | Facility: MEDICAL CENTER | Age: 81
End: 2021-07-29
Payer: COMMERCIAL

## 2021-07-29 VITALS
WEIGHT: 143 LBS | BODY MASS INDEX: 20.02 KG/M2 | HEART RATE: 83 BPM | HEIGHT: 71 IN | TEMPERATURE: 97.8 F | RESPIRATION RATE: 12 BRPM | OXYGEN SATURATION: 90 % | DIASTOLIC BLOOD PRESSURE: 60 MMHG | SYSTOLIC BLOOD PRESSURE: 122 MMHG

## 2021-07-29 DIAGNOSIS — J96.11 CHRONIC HYPOXEMIC RESPIRATORY FAILURE (HCC): Primary | ICD-10-CM

## 2021-07-29 DIAGNOSIS — J44.1 COPD EXACERBATION (HCC): ICD-10-CM

## 2021-07-29 DIAGNOSIS — R41.89 COGNITIVE DECLINE: ICD-10-CM

## 2021-07-29 DIAGNOSIS — I42.0 DILATED CARDIOMYOPATHY (HCC): ICD-10-CM

## 2021-07-29 DIAGNOSIS — J43.2 CENTRILOBULAR EMPHYSEMA (HCC): ICD-10-CM

## 2021-07-29 PROBLEM — R04.2 HEMOPTYSIS: Status: RESOLVED | Noted: 2020-06-08 | Resolved: 2021-07-29

## 2021-07-29 PROCEDURE — 99214 OFFICE O/P EST MOD 30 MIN: CPT | Performed by: PHYSICIAN ASSISTANT

## 2021-07-29 PROCEDURE — 3074F SYST BP LT 130 MM HG: CPT | Performed by: PHYSICIAN ASSISTANT

## 2021-07-29 PROCEDURE — 3078F DIAST BP <80 MM HG: CPT | Performed by: PHYSICIAN ASSISTANT

## 2021-07-29 NOTE — PATIENT INSTRUCTIONS
Plan for today/next follow-up:    COPD:  -continue Trelegy Ellipta  -refills provided    Chronic Hypoxemic Respiratory Failure:  -continue 3 5 L 02 as tolerated for 02 sat > 88%    Prior Hemorrhagic Bullae:  -had prior ongoing resolution  -no symptoms of recurrence  -imaging follow up deferred by patient   -follow up imaging as needed    Follow up in 1 year
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
## 2021-08-09 ENCOUNTER — IN-CLINIC DEVICE VISIT (OUTPATIENT)
Dept: CARDIOLOGY CLINIC | Facility: CLINIC | Age: 81
End: 2021-08-09

## 2021-08-09 DIAGNOSIS — Z95.810 PRESENCE OF IMPLANTABLE CARDIOVERTER-DEFIBRILLATOR (ICD): Primary | ICD-10-CM

## 2021-08-09 PROCEDURE — RECHECK: Performed by: INTERNAL MEDICINE

## 2021-08-09 NOTE — PROGRESS NOTES
MDT SINGLE CHAMBER ICD/NOT MRI CONDITIONAL   NB DEVICE INTERROGATED IN THE [place] OFFICE TO CHECK BATTERY SATUS:  NO TESTING   BATTERY VOLTAGE NEARING WILFREDO (2 61V/RRT =2 63V, RRT NOT INDICATED  WILL CONTINUE MONTHLY BATTERY CHECKS    2 4%    ALL AVAILABLE LEAD PARAMETERS WITHIN NORMAL LIMITS   NO HIGH RATE EPISODES   OPTI-VOL WITHIN NORMAL LIMITS   NO PROGRAMMING CHANGES MADE TO DEVICE PARAMETERS   NORMAL DEVICE FUNCTION   RG

## 2021-08-13 ENCOUNTER — OFFICE VISIT (OUTPATIENT)
Dept: NEUROLOGY | Facility: CLINIC | Age: 81
End: 2021-08-13
Payer: COMMERCIAL

## 2021-08-13 VITALS
DIASTOLIC BLOOD PRESSURE: 70 MMHG | HEIGHT: 71 IN | HEART RATE: 73 BPM | BODY MASS INDEX: 19.88 KG/M2 | TEMPERATURE: 97.3 F | SYSTOLIC BLOOD PRESSURE: 120 MMHG | WEIGHT: 142 LBS

## 2021-08-13 DIAGNOSIS — R41.89 COGNITIVE DECLINE: Primary | ICD-10-CM

## 2021-08-13 DIAGNOSIS — F02.80 ALZHEIMER'S DEMENTIA WITHOUT BEHAVIORAL DISTURBANCE, UNSPECIFIED TIMING OF DEMENTIA ONSET (HCC): ICD-10-CM

## 2021-08-13 DIAGNOSIS — G30.9 ALZHEIMER'S DEMENTIA WITHOUT BEHAVIORAL DISTURBANCE, UNSPECIFIED TIMING OF DEMENTIA ONSET (HCC): ICD-10-CM

## 2021-08-13 PROCEDURE — 3074F SYST BP LT 130 MM HG: CPT | Performed by: NURSE PRACTITIONER

## 2021-08-13 PROCEDURE — 1160F RVW MEDS BY RX/DR IN RCRD: CPT | Performed by: NURSE PRACTITIONER

## 2021-08-13 PROCEDURE — 99214 OFFICE O/P EST MOD 30 MIN: CPT | Performed by: NURSE PRACTITIONER

## 2021-08-13 PROCEDURE — 3078F DIAST BP <80 MM HG: CPT | Performed by: NURSE PRACTITIONER

## 2021-08-13 PROCEDURE — 1036F TOBACCO NON-USER: CPT | Performed by: NURSE PRACTITIONER

## 2021-08-13 NOTE — PATIENT INSTRUCTIONS
Can continue with Namenda 10mg twice a day per PCP  Continue with good nutrition, hydration, exercise  Games, puzzles and activities to stimulate his mind and social interactions  Follow up with Neurology office in 6 months
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
## 2021-08-20 ENCOUNTER — APPOINTMENT (OUTPATIENT)
Dept: LAB | Facility: CLINIC | Age: 81
End: 2021-08-20
Payer: COMMERCIAL

## 2021-08-20 ENCOUNTER — TELEPHONE (OUTPATIENT)
Dept: FAMILY MEDICINE CLINIC | Facility: CLINIC | Age: 81
End: 2021-08-20

## 2021-08-20 DIAGNOSIS — I25.10 ASCVD (ARTERIOSCLEROTIC CARDIOVASCULAR DISEASE): ICD-10-CM

## 2021-08-20 LAB
INR PPP: 1.7 (ref 0.84–1.19)
PROTHROMBIN TIME: 19.9 SECONDS (ref 11.6–14.5)

## 2021-08-20 PROCEDURE — 36415 COLL VENOUS BLD VENIPUNCTURE: CPT

## 2021-08-20 PROCEDURE — 85610 PROTHROMBIN TIME: CPT

## 2021-08-20 NOTE — TELEPHONE ENCOUNTER
----- Message from Dipesh Pa DO sent at 8/20/2021  3:14 PM EDT -----  Increased to 3 milligrams daily  recheck in 1 week  Dipesh Pa DO

## 2021-08-23 ENCOUNTER — ANTICOAG VISIT (OUTPATIENT)
Dept: FAMILY MEDICINE CLINIC | Facility: CLINIC | Age: 81
End: 2021-08-23

## 2021-08-23 DIAGNOSIS — I48.0 PAF (PAROXYSMAL ATRIAL FIBRILLATION) (HCC): Primary | ICD-10-CM

## 2021-08-23 NOTE — TELEPHONE ENCOUNTER
Patients wife left a msg on the results hotline looking for patients INR on Saturday  I called the patient back for the INR  Patients wife was upset that she had not heard from us through the weekend  I apologized and expressed understanding and compassion  INR level and new maintenance plan discussed with patients wife  Calendar updated  NFA needed     Amandeep Bird MA

## 2021-08-27 ENCOUNTER — ANTICOAG VISIT (OUTPATIENT)
Dept: FAMILY MEDICINE CLINIC | Facility: CLINIC | Age: 81
End: 2021-08-27

## 2021-08-27 ENCOUNTER — LAB (OUTPATIENT)
Dept: LAB | Facility: CLINIC | Age: 81
End: 2021-08-27
Payer: COMMERCIAL

## 2021-08-27 ENCOUNTER — TELEPHONE (OUTPATIENT)
Dept: FAMILY MEDICINE CLINIC | Facility: CLINIC | Age: 81
End: 2021-08-27

## 2021-08-27 DIAGNOSIS — I10 HTN (HYPERTENSION), MALIGNANT: ICD-10-CM

## 2021-08-27 DIAGNOSIS — I25.10 ASCVD (ARTERIOSCLEROTIC CARDIOVASCULAR DISEASE): ICD-10-CM

## 2021-08-27 DIAGNOSIS — I48.0 PAF (PAROXYSMAL ATRIAL FIBRILLATION) (HCC): Primary | ICD-10-CM

## 2021-08-27 LAB
INR PPP: 1.92 (ref 0.84–1.19)
PROTHROMBIN TIME: 21.9 SECONDS (ref 11.6–14.5)

## 2021-08-27 PROCEDURE — 36415 COLL VENOUS BLD VENIPUNCTURE: CPT

## 2021-08-27 PROCEDURE — 85610 PROTHROMBIN TIME: CPT

## 2021-08-27 NOTE — TELEPHONE ENCOUNTER
----- Message from Mina Marcano DO sent at 8/27/2021  2:21 PM EDT -----  Same dose redraw in a month

## 2021-08-28 RX ORDER — SPIRONOLACTONE 25 MG/1
TABLET ORAL
Qty: 90 TABLET | Refills: 3 | Status: SHIPPED | OUTPATIENT
Start: 2021-08-28 | End: 2022-03-28

## 2021-09-20 ENCOUNTER — IN-CLINIC DEVICE VISIT (OUTPATIENT)
Dept: CARDIOLOGY CLINIC | Facility: CLINIC | Age: 81
End: 2021-09-20

## 2021-09-20 DIAGNOSIS — Z95.810 PRESENCE OF IMPLANTABLE CARDIOVERTER-DEFIBRILLATOR (ICD): Primary | ICD-10-CM

## 2021-09-20 PROCEDURE — RECHECK: Performed by: INTERNAL MEDICINE

## 2021-09-20 NOTE — PROGRESS NOTES
MDT SINGLE CHAMBER ICD/NOT MRI CONDITIONAL   NB DEVICE INTERROGATED IN THE [facility] OFFICE TO CHECK BATTERY SATUS:  NO TESTING   BATTERY VOLTAGE NEARING WILFREDO (2 61V/RRT =2 63V, RRT NOT INDICATED  WILL CONTINUE MONTHLY BATTERY CHECKS    1 5%    ALL AVAILABLE LEAD PARAMETERS WITHIN NORMAL LIMITS   NO HIGH RATE EPISODES   OPTI-VOL WITHIN NORMAL LIMITS   NO PROGRAMMING CHANGES MADE TO DEVICE PARAMETERS   NORMAL DEVICE FUNCTION   RG

## 2021-09-23 ENCOUNTER — ANTICOAG VISIT (OUTPATIENT)
Dept: FAMILY MEDICINE CLINIC | Facility: CLINIC | Age: 81
End: 2021-09-23

## 2021-09-23 ENCOUNTER — APPOINTMENT (OUTPATIENT)
Dept: LAB | Facility: CLINIC | Age: 81
End: 2021-09-23
Payer: COMMERCIAL

## 2021-09-23 ENCOUNTER — TELEPHONE (OUTPATIENT)
Dept: FAMILY MEDICINE CLINIC | Facility: CLINIC | Age: 81
End: 2021-09-23

## 2021-09-23 DIAGNOSIS — I11.0 HYPERTENSIVE HEART DISEASE WITH CONGESTIVE HEART FAILURE, UNSPECIFIED HEART FAILURE TYPE (HCC): ICD-10-CM

## 2021-09-23 DIAGNOSIS — I48.0 PAF (PAROXYSMAL ATRIAL FIBRILLATION) (HCC): Primary | ICD-10-CM

## 2021-09-23 DIAGNOSIS — I48.91 ATRIAL FIBRILLATION, UNSPECIFIED TYPE (HCC): ICD-10-CM

## 2021-09-23 DIAGNOSIS — E11.42 TYPE 2 DIABETES MELLITUS WITH DIABETIC POLYNEUROPATHY, WITHOUT LONG-TERM CURRENT USE OF INSULIN (HCC): ICD-10-CM

## 2021-09-23 LAB
ALBUMIN SERPL BCP-MCNC: 3.4 G/DL (ref 3.5–5)
ALP SERPL-CCNC: 63 U/L (ref 46–116)
ALT SERPL W P-5'-P-CCNC: 43 U/L (ref 12–78)
ANION GAP SERPL CALCULATED.3IONS-SCNC: 1 MMOL/L (ref 4–13)
AST SERPL W P-5'-P-CCNC: 22 U/L (ref 5–45)
BILIRUB SERPL-MCNC: 0.74 MG/DL (ref 0.2–1)
BUN SERPL-MCNC: 27 MG/DL (ref 5–25)
CALCIUM ALBUM COR SERPL-MCNC: 9.2 MG/DL (ref 8.3–10.1)
CALCIUM SERPL-MCNC: 8.7 MG/DL (ref 8.3–10.1)
CHLORIDE SERPL-SCNC: 104 MMOL/L (ref 100–108)
CO2 SERPL-SCNC: 30 MMOL/L (ref 21–32)
CREAT SERPL-MCNC: 1.53 MG/DL (ref 0.6–1.3)
CREAT UR-MCNC: 128 MG/DL
EST. AVERAGE GLUCOSE BLD GHB EST-MCNC: 166 MG/DL
GFR SERPL CREATININE-BSD FRML MDRD: 42 ML/MIN/1.73SQ M
GLUCOSE P FAST SERPL-MCNC: 127 MG/DL (ref 65–99)
HBA1C MFR BLD: 7.4 %
INR PPP: 1.86 (ref 0.84–1.19)
MICROALBUMIN UR-MCNC: 6.2 MG/L (ref 0–20)
MICROALBUMIN/CREAT 24H UR: 5 MG/G CREATININE (ref 0–30)
POTASSIUM SERPL-SCNC: 5.2 MMOL/L (ref 3.5–5.3)
PROT SERPL-MCNC: 7.6 G/DL (ref 6.4–8.2)
PROTHROMBIN TIME: 20.5 SECONDS (ref 11.6–14.5)
SODIUM SERPL-SCNC: 135 MMOL/L (ref 136–145)

## 2021-09-23 PROCEDURE — 80053 COMPREHEN METABOLIC PANEL: CPT

## 2021-09-23 PROCEDURE — 85610 PROTHROMBIN TIME: CPT

## 2021-09-23 PROCEDURE — 36415 COLL VENOUS BLD VENIPUNCTURE: CPT

## 2021-09-23 PROCEDURE — 82570 ASSAY OF URINE CREATININE: CPT

## 2021-09-23 PROCEDURE — 82043 UR ALBUMIN QUANTITATIVE: CPT

## 2021-09-23 PROCEDURE — 83036 HEMOGLOBIN GLYCOSYLATED A1C: CPT

## 2021-09-30 ENCOUNTER — TELEPHONE (OUTPATIENT)
Dept: FAMILY MEDICINE CLINIC | Facility: CLINIC | Age: 81
End: 2021-09-30

## 2021-09-30 ENCOUNTER — ANTICOAG VISIT (OUTPATIENT)
Dept: FAMILY MEDICINE CLINIC | Facility: CLINIC | Age: 81
End: 2021-09-30

## 2021-09-30 ENCOUNTER — LAB (OUTPATIENT)
Dept: LAB | Facility: CLINIC | Age: 81
End: 2021-09-30
Payer: COMMERCIAL

## 2021-09-30 DIAGNOSIS — I48.0 PAF (PAROXYSMAL ATRIAL FIBRILLATION) (HCC): Primary | ICD-10-CM

## 2021-09-30 DIAGNOSIS — I48.20 CHRONIC ATRIAL FIBRILLATION (HCC): Primary | ICD-10-CM

## 2021-09-30 DIAGNOSIS — I48.91 ATRIAL FIBRILLATION, UNSPECIFIED TYPE (HCC): ICD-10-CM

## 2021-09-30 LAB
INR PPP: 2.11 (ref 0.84–1.19)
PROTHROMBIN TIME: 22.6 SECONDS (ref 11.6–14.5)

## 2021-09-30 PROCEDURE — 85610 PROTHROMBIN TIME: CPT

## 2021-09-30 PROCEDURE — 36415 COLL VENOUS BLD VENIPUNCTURE: CPT

## 2021-10-06 ENCOUNTER — RA CDI HCC (OUTPATIENT)
Dept: OTHER | Facility: HOSPITAL | Age: 81
End: 2021-10-06

## 2021-10-08 DIAGNOSIS — R41.89 COGNITIVE DECLINE: ICD-10-CM

## 2021-10-08 DIAGNOSIS — F02.80 ALZHEIMER'S DEMENTIA WITHOUT BEHAVIORAL DISTURBANCE, UNSPECIFIED TIMING OF DEMENTIA ONSET (HCC): ICD-10-CM

## 2021-10-08 DIAGNOSIS — G30.9 ALZHEIMER'S DEMENTIA WITHOUT BEHAVIORAL DISTURBANCE, UNSPECIFIED TIMING OF DEMENTIA ONSET (HCC): ICD-10-CM

## 2021-10-08 RX ORDER — MEMANTINE HYDROCHLORIDE 10 MG/1
TABLET ORAL
Qty: 180 TABLET | Refills: 1 | Status: SHIPPED | OUTPATIENT
Start: 2021-10-08 | End: 2022-02-22 | Stop reason: SDUPTHER

## 2021-10-12 ENCOUNTER — OFFICE VISIT (OUTPATIENT)
Dept: FAMILY MEDICINE CLINIC | Facility: CLINIC | Age: 81
End: 2021-10-12
Payer: COMMERCIAL

## 2021-10-12 ENCOUNTER — TELEPHONE (OUTPATIENT)
Dept: FAMILY MEDICINE CLINIC | Facility: CLINIC | Age: 81
End: 2021-10-12

## 2021-10-12 VITALS
HEART RATE: 90 BPM | DIASTOLIC BLOOD PRESSURE: 80 MMHG | SYSTOLIC BLOOD PRESSURE: 130 MMHG | TEMPERATURE: 97.9 F | OXYGEN SATURATION: 97 % | WEIGHT: 142 LBS | HEIGHT: 71 IN | BODY MASS INDEX: 19.88 KG/M2 | RESPIRATION RATE: 18 BRPM

## 2021-10-12 DIAGNOSIS — E11.42 TYPE 2 DIABETES MELLITUS WITH DIABETIC POLYNEUROPATHY, WITHOUT LONG-TERM CURRENT USE OF INSULIN (HCC): Primary | ICD-10-CM

## 2021-10-12 DIAGNOSIS — G30.9 ALZHEIMER'S DEMENTIA WITHOUT BEHAVIORAL DISTURBANCE, UNSPECIFIED TIMING OF DEMENTIA ONSET (HCC): ICD-10-CM

## 2021-10-12 DIAGNOSIS — I48.0 PAF (PAROXYSMAL ATRIAL FIBRILLATION) (HCC): ICD-10-CM

## 2021-10-12 DIAGNOSIS — N28.9 RENAL INSUFFICIENCY: ICD-10-CM

## 2021-10-12 DIAGNOSIS — Z00.00 MEDICARE ANNUAL WELLNESS VISIT, SUBSEQUENT: ICD-10-CM

## 2021-10-12 DIAGNOSIS — F02.80 ALZHEIMER'S DEMENTIA WITHOUT BEHAVIORAL DISTURBANCE, UNSPECIFIED TIMING OF DEMENTIA ONSET (HCC): ICD-10-CM

## 2021-10-12 DIAGNOSIS — I11.0 HYPERTENSIVE HEART DISEASE WITH CONGESTIVE HEART FAILURE, UNSPECIFIED HEART FAILURE TYPE (HCC): ICD-10-CM

## 2021-10-12 PROCEDURE — 3075F SYST BP GE 130 - 139MM HG: CPT | Performed by: FAMILY MEDICINE

## 2021-10-12 PROCEDURE — 92250 FUNDUS PHOTOGRAPHY W/I&R: CPT | Performed by: FAMILY MEDICINE

## 2021-10-12 PROCEDURE — 1125F AMNT PAIN NOTED PAIN PRSNT: CPT | Performed by: FAMILY MEDICINE

## 2021-10-12 PROCEDURE — 3725F SCREEN DEPRESSION PERFORMED: CPT | Performed by: FAMILY MEDICINE

## 2021-10-12 PROCEDURE — 3079F DIAST BP 80-89 MM HG: CPT | Performed by: FAMILY MEDICINE

## 2021-10-12 PROCEDURE — 3288F FALL RISK ASSESSMENT DOCD: CPT | Performed by: FAMILY MEDICINE

## 2021-10-12 PROCEDURE — 1101F PT FALLS ASSESS-DOCD LE1/YR: CPT | Performed by: FAMILY MEDICINE

## 2021-10-12 PROCEDURE — G0439 PPPS, SUBSEQ VISIT: HCPCS | Performed by: FAMILY MEDICINE

## 2021-10-12 PROCEDURE — 1036F TOBACCO NON-USER: CPT | Performed by: FAMILY MEDICINE

## 2021-10-12 PROCEDURE — 99214 OFFICE O/P EST MOD 30 MIN: CPT | Performed by: FAMILY MEDICINE

## 2021-10-12 PROCEDURE — 1170F FXNL STATUS ASSESSED: CPT | Performed by: FAMILY MEDICINE

## 2021-10-12 PROCEDURE — 1160F RVW MEDS BY RX/DR IN RCRD: CPT | Performed by: FAMILY MEDICINE

## 2021-10-18 ENCOUNTER — IN-CLINIC DEVICE VISIT (OUTPATIENT)
Dept: CARDIOLOGY CLINIC | Facility: CLINIC | Age: 81
End: 2021-10-18

## 2021-10-18 DIAGNOSIS — Z95.810 PRESENCE OF IMPLANTABLE CARDIOVERTER-DEFIBRILLATOR (ICD): Primary | ICD-10-CM

## 2021-10-18 PROCEDURE — RECHECK: Performed by: INTERNAL MEDICINE

## 2021-10-28 ENCOUNTER — APPOINTMENT (OUTPATIENT)
Dept: LAB | Facility: CLINIC | Age: 81
End: 2021-10-28
Payer: COMMERCIAL

## 2021-10-28 ENCOUNTER — ANTICOAG VISIT (OUTPATIENT)
Dept: FAMILY MEDICINE CLINIC | Facility: CLINIC | Age: 81
End: 2021-10-28

## 2021-10-28 DIAGNOSIS — I48.0 PAF (PAROXYSMAL ATRIAL FIBRILLATION) (HCC): Primary | ICD-10-CM

## 2021-10-28 LAB
INR PPP: 2.75 (ref 0.84–1.19)
PROTHROMBIN TIME: 27.7 SECONDS (ref 11.6–14.5)

## 2021-10-28 PROCEDURE — 85610 PROTHROMBIN TIME: CPT

## 2021-10-28 PROCEDURE — 36415 COLL VENOUS BLD VENIPUNCTURE: CPT

## 2021-11-05 DIAGNOSIS — I48.20 CHRONIC ATRIAL FIBRILLATION (HCC): ICD-10-CM

## 2021-11-05 DIAGNOSIS — I50.9 HEART FAILURE (HCC): ICD-10-CM

## 2021-11-05 RX ORDER — WARFARIN SODIUM 3 MG/1
TABLET ORAL
Qty: 90 TABLET | Refills: 1 | Status: SHIPPED | OUTPATIENT
Start: 2021-11-05 | End: 2022-05-06

## 2021-11-05 RX ORDER — FUROSEMIDE 40 MG/1
TABLET ORAL
Qty: 90 TABLET | Refills: 1 | Status: SHIPPED | OUTPATIENT
Start: 2021-11-05 | End: 2022-05-06

## 2021-11-11 ENCOUNTER — OFFICE VISIT (OUTPATIENT)
Dept: CARDIOLOGY CLINIC | Facility: CLINIC | Age: 81
End: 2021-11-11
Payer: COMMERCIAL

## 2021-11-11 VITALS
OXYGEN SATURATION: 96 % | TEMPERATURE: 99 F | DIASTOLIC BLOOD PRESSURE: 70 MMHG | BODY MASS INDEX: 19.88 KG/M2 | HEIGHT: 71 IN | SYSTOLIC BLOOD PRESSURE: 112 MMHG | WEIGHT: 142 LBS | HEART RATE: 81 BPM

## 2021-11-11 DIAGNOSIS — N18.31 STAGE 3A CHRONIC KIDNEY DISEASE (HCC): ICD-10-CM

## 2021-11-11 DIAGNOSIS — I50.22 CHRONIC SYSTOLIC CONGESTIVE HEART FAILURE (HCC): ICD-10-CM

## 2021-11-11 DIAGNOSIS — I48.20 CHRONIC ATRIAL FIBRILLATION (HCC): Primary | ICD-10-CM

## 2021-11-11 DIAGNOSIS — E11.59 TYPE 2 DIABETES MELLITUS WITH OTHER CIRCULATORY COMPLICATION, WITHOUT LONG-TERM CURRENT USE OF INSULIN (HCC): ICD-10-CM

## 2021-11-11 DIAGNOSIS — I42.8 NONISCHEMIC CARDIOMYOPATHY (HCC): ICD-10-CM

## 2021-11-11 DIAGNOSIS — I25.10 ARTERIOSCLEROSIS OF CORONARY ARTERY: ICD-10-CM

## 2021-11-11 DIAGNOSIS — I10 HTN (HYPERTENSION), MALIGNANT: ICD-10-CM

## 2021-11-11 DIAGNOSIS — Z95.810 PRESENCE OF IMPLANTABLE CARDIOVERTER-DEFIBRILLATOR (ICD): ICD-10-CM

## 2021-11-11 DIAGNOSIS — I50.1 HEART FAILURE, LEFT, WITH LVEF 31-40% (HCC): ICD-10-CM

## 2021-11-11 PROCEDURE — 3078F DIAST BP <80 MM HG: CPT | Performed by: INTERNAL MEDICINE

## 2021-11-11 PROCEDURE — 1036F TOBACCO NON-USER: CPT | Performed by: INTERNAL MEDICINE

## 2021-11-11 PROCEDURE — 3074F SYST BP LT 130 MM HG: CPT | Performed by: INTERNAL MEDICINE

## 2021-11-11 PROCEDURE — 93000 ELECTROCARDIOGRAM COMPLETE: CPT | Performed by: INTERNAL MEDICINE

## 2021-11-11 PROCEDURE — 99214 OFFICE O/P EST MOD 30 MIN: CPT | Performed by: INTERNAL MEDICINE

## 2021-11-11 PROCEDURE — 1160F RVW MEDS BY RX/DR IN RCRD: CPT | Performed by: INTERNAL MEDICINE
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
## 2021-11-29 ENCOUNTER — IN-CLINIC DEVICE VISIT (OUTPATIENT)
Dept: CARDIOLOGY CLINIC | Facility: CLINIC | Age: 81
End: 2021-11-29

## 2021-11-29 ENCOUNTER — TELEPHONE (OUTPATIENT)
Dept: FAMILY MEDICINE CLINIC | Facility: CLINIC | Age: 81
End: 2021-11-29

## 2021-11-29 ENCOUNTER — APPOINTMENT (OUTPATIENT)
Dept: LAB | Facility: CLINIC | Age: 81
End: 2021-11-29
Payer: COMMERCIAL

## 2021-11-29 ENCOUNTER — ANTICOAG VISIT (OUTPATIENT)
Dept: FAMILY MEDICINE CLINIC | Facility: CLINIC | Age: 81
End: 2021-11-29

## 2021-11-29 ENCOUNTER — TELEPHONE (OUTPATIENT)
Dept: CARDIOLOGY CLINIC | Facility: CLINIC | Age: 81
End: 2021-11-29

## 2021-11-29 DIAGNOSIS — I48.0 PAF (PAROXYSMAL ATRIAL FIBRILLATION) (HCC): Primary | ICD-10-CM

## 2021-11-29 DIAGNOSIS — Z95.810 PRESENCE OF IMPLANTABLE CARDIOVERTER-DEFIBRILLATOR (ICD): Primary | ICD-10-CM

## 2021-11-29 LAB
INR PPP: 2.44 (ref 0.84–1.19)
PROTHROMBIN TIME: 25.3 SECONDS (ref 11.6–14.5)

## 2021-11-29 PROCEDURE — 85610 PROTHROMBIN TIME: CPT

## 2021-11-29 PROCEDURE — 36415 COLL VENOUS BLD VENIPUNCTURE: CPT

## 2021-11-29 PROCEDURE — RECHECK: Performed by: INTERNAL MEDICINE

## 2021-11-29 NOTE — TELEPHONE ENCOUNTER
----- Message from Jerome Booker sent at 11/29/2021  8:46 AM EST -----  Regarding: ICD reached RRT on 10/31/21  For your review.  Thanks, Jerome KEVINT SINGLE CHAMBER ICD/NOT MRI CONDITIONAL   NB DEVICE INTERROGATED IN THE [place] OFFICE TO CHECK BATTERY STATUS:  BATTERY VOLTAGE REACHED RRT ON 10/31/2021 (2.62V/RRT=2.63V).   4.1%.  ALL LEAD PARAMETERS WITHIN NORMAL LIMITS.  1 VT-NS EPISODE SHOWING NSVT (6 @ 207 BPM).  NO PROGRAMMING CHANGES MADE TO DEVICE PARAMETERS.  TASK TO DR ALCANTARA.  NORMAL DEVICE FUNCTION.  RG

## 2021-12-01 ENCOUNTER — TELEPHONE (OUTPATIENT)
Dept: CARDIOLOGY CLINIC | Facility: CLINIC | Age: 81
End: 2021-12-01

## 2021-12-02 ENCOUNTER — ANTICOAG VISIT (OUTPATIENT)
Dept: FAMILY MEDICINE CLINIC | Facility: CLINIC | Age: 81
End: 2021-12-02

## 2021-12-02 ENCOUNTER — APPOINTMENT (OUTPATIENT)
Dept: LAB | Facility: CLINIC | Age: 81
End: 2021-12-02
Payer: COMMERCIAL

## 2021-12-02 ENCOUNTER — TELEPHONE (OUTPATIENT)
Dept: FAMILY MEDICINE CLINIC | Facility: CLINIC | Age: 81
End: 2021-12-02

## 2021-12-02 DIAGNOSIS — I25.10 ARTERIOSCLEROSIS OF CORONARY ARTERY: ICD-10-CM

## 2021-12-02 DIAGNOSIS — I48.20 CHRONIC ATRIAL FIBRILLATION (HCC): ICD-10-CM

## 2021-12-02 DIAGNOSIS — Z95.810 PRESENCE OF IMPLANTABLE CARDIOVERTER-DEFIBRILLATOR (ICD): ICD-10-CM

## 2021-12-02 DIAGNOSIS — I10 HTN (HYPERTENSION), MALIGNANT: ICD-10-CM

## 2021-12-02 DIAGNOSIS — I48.0 PAF (PAROXYSMAL ATRIAL FIBRILLATION) (HCC): Primary | ICD-10-CM

## 2021-12-02 DIAGNOSIS — E11.42 TYPE 2 DIABETES MELLITUS WITH DIABETIC POLYNEUROPATHY, WITHOUT LONG-TERM CURRENT USE OF INSULIN (HCC): ICD-10-CM

## 2021-12-02 DIAGNOSIS — I50.22 CHRONIC SYSTOLIC CONGESTIVE HEART FAILURE (HCC): ICD-10-CM

## 2021-12-02 DIAGNOSIS — E11.59 TYPE 2 DIABETES MELLITUS WITH OTHER CIRCULATORY COMPLICATION, WITHOUT LONG-TERM CURRENT USE OF INSULIN (HCC): ICD-10-CM

## 2021-12-02 LAB
ALBUMIN SERPL BCP-MCNC: 3.8 G/DL (ref 3.5–5)
ALP SERPL-CCNC: 64 U/L (ref 46–116)
ALT SERPL W P-5'-P-CCNC: 54 U/L (ref 12–78)
ANION GAP SERPL CALCULATED.3IONS-SCNC: 6 MMOL/L (ref 4–13)
AST SERPL W P-5'-P-CCNC: 27 U/L (ref 5–45)
BILIRUB SERPL-MCNC: 0.66 MG/DL (ref 0.2–1)
BUN SERPL-MCNC: 26 MG/DL (ref 5–25)
CALCIUM SERPL-MCNC: 9 MG/DL (ref 8.3–10.1)
CHLORIDE SERPL-SCNC: 99 MMOL/L (ref 100–108)
CHOLEST SERPL-MCNC: 83 MG/DL
CO2 SERPL-SCNC: 31 MMOL/L (ref 21–32)
CREAT SERPL-MCNC: 1.65 MG/DL (ref 0.6–1.3)
DIGOXIN SERPL-MCNC: 1.1 NG/ML (ref 0.8–2)
GFR SERPL CREATININE-BSD FRML MDRD: 38 ML/MIN/1.73SQ M
GLUCOSE SERPL-MCNC: 142 MG/DL (ref 65–140)
HDLC SERPL-MCNC: 32 MG/DL
LDLC SERPL CALC-MCNC: 9 MG/DL (ref 0–100)
POTASSIUM SERPL-SCNC: 3.8 MMOL/L (ref 3.5–5.3)
PROT SERPL-MCNC: 7.7 G/DL (ref 6.4–8.2)
SODIUM SERPL-SCNC: 136 MMOL/L (ref 136–145)
TRIGL SERPL-MCNC: 212 MG/DL

## 2021-12-02 PROCEDURE — 83036 HEMOGLOBIN GLYCOSYLATED A1C: CPT

## 2021-12-02 PROCEDURE — 80162 ASSAY OF DIGOXIN TOTAL: CPT

## 2021-12-02 PROCEDURE — 80061 LIPID PANEL: CPT

## 2021-12-02 PROCEDURE — 80053 COMPREHEN METABOLIC PANEL: CPT

## 2021-12-03 LAB
EST. AVERAGE GLUCOSE BLD GHB EST-MCNC: 163 MG/DL
HBA1C MFR BLD: 7.3 %

## 2021-12-06 ENCOUNTER — TELEPHONE (OUTPATIENT)
Dept: CARDIOLOGY CLINIC | Facility: CLINIC | Age: 81
End: 2021-12-06

## 2021-12-09 ENCOUNTER — HOSPITAL ENCOUNTER (OUTPATIENT)
Facility: HOSPITAL | Age: 81
Setting detail: OUTPATIENT SURGERY
Discharge: HOME | End: 2021-12-09
Attending: INTERNAL MEDICINE | Admitting: INTERNAL MEDICINE
Payer: COMMERCIAL

## 2021-12-09 ENCOUNTER — TELEPHONE (OUTPATIENT)
Dept: FAMILY MEDICINE CLINIC | Facility: CLINIC | Age: 81
End: 2021-12-09

## 2021-12-09 ENCOUNTER — APPOINTMENT (OUTPATIENT)
Dept: LAB | Facility: HOSPITAL | Age: 81
End: 2021-12-09
Attending: INTERNAL MEDICINE
Payer: COMMERCIAL

## 2021-12-09 VITALS
DIASTOLIC BLOOD PRESSURE: 81 MMHG | RESPIRATION RATE: 18 BRPM | HEART RATE: 83 BPM | SYSTOLIC BLOOD PRESSURE: 111 MMHG | TEMPERATURE: 98.7 F | OXYGEN SATURATION: 95 %

## 2021-12-09 DIAGNOSIS — Z45.010 PACEMAKER GENERATOR END OF LIFE: ICD-10-CM

## 2021-12-09 DIAGNOSIS — Z01.818 PRE-OP EVALUATION: ICD-10-CM

## 2021-12-09 LAB
ANION GAP SERPL CALCULATED.3IONS-SCNC: 7 MMOL/L (ref 4–13)
BASOPHILS # BLD AUTO: 0.08 THOUSANDS/ΜL (ref 0–0.1)
BASOPHILS NFR BLD AUTO: 1 % (ref 0–1)
BUN SERPL-MCNC: 31 MG/DL (ref 5–25)
CALCIUM SERPL-MCNC: 9.3 MG/DL (ref 8.3–10.1)
CHLORIDE SERPL-SCNC: 103 MMOL/L (ref 100–108)
CO2 SERPL-SCNC: 32 MMOL/L (ref 21–32)
CREAT SERPL-MCNC: 1.79 MG/DL (ref 0.6–1.3)
EOSINOPHIL # BLD AUTO: 0.14 THOUSAND/ΜL (ref 0–0.61)
EOSINOPHIL NFR BLD AUTO: 1 % (ref 0–6)
ERYTHROCYTE [DISTWIDTH] IN BLOOD BY AUTOMATED COUNT: 12.3 % (ref 11.6–15.1)
GFR SERPL CREATININE-BSD FRML MDRD: 35 ML/MIN/1.73SQ M
GLUCOSE P FAST SERPL-MCNC: 167 MG/DL (ref 65–99)
HCT VFR BLD AUTO: 43.4 % (ref 36.5–49.3)
HGB BLD-MCNC: 14.3 G/DL (ref 12–17)
IMM GRANULOCYTES # BLD AUTO: 0.1 THOUSAND/UL (ref 0–0.2)
IMM GRANULOCYTES NFR BLD AUTO: 1 % (ref 0–2)
LYMPHOCYTES # BLD AUTO: 1.8 THOUSANDS/ΜL (ref 0.6–4.47)
LYMPHOCYTES NFR BLD AUTO: 18 % (ref 14–44)
MCH RBC QN AUTO: 31.5 PG (ref 26.8–34.3)
MCHC RBC AUTO-ENTMCNC: 32.9 G/DL (ref 31.4–37.4)
MCV RBC AUTO: 96 FL (ref 82–98)
MONOCYTES # BLD AUTO: 0.86 THOUSAND/ΜL (ref 0.17–1.22)
MONOCYTES NFR BLD AUTO: 9 % (ref 4–12)
NEUTROPHILS # BLD AUTO: 6.95 THOUSANDS/ΜL (ref 1.85–7.62)
NEUTS SEG NFR BLD AUTO: 70 % (ref 43–75)
NRBC BLD AUTO-RTO: 0 /100 WBCS
PLATELET # BLD AUTO: 237 THOUSANDS/UL (ref 149–390)
PMV BLD AUTO: 10 FL (ref 8.9–12.7)
POTASSIUM SERPL-SCNC: 4.3 MMOL/L (ref 3.5–5.3)
RBC # BLD AUTO: 4.54 MILLION/UL (ref 3.88–5.62)
SODIUM SERPL-SCNC: 142 MMOL/L (ref 136–145)
WBC # BLD AUTO: 9.93 THOUSAND/UL (ref 4.31–10.16)

## 2021-12-09 PROCEDURE — 99152 MOD SED SAME PHYS/QHP 5/>YRS: CPT | Performed by: INTERNAL MEDICINE

## 2021-12-09 PROCEDURE — 85025 COMPLETE CBC W/AUTO DIFF WBC: CPT

## 2021-12-09 PROCEDURE — 99153 MOD SED SAME PHYS/QHP EA: CPT | Performed by: INTERNAL MEDICINE

## 2021-12-09 PROCEDURE — 33262 RMVL& REPLC PULSE GEN 1 LEAD: CPT | Performed by: INTERNAL MEDICINE

## 2021-12-09 PROCEDURE — C1722 AICD, SINGLE CHAMBER: HCPCS | Performed by: INTERNAL MEDICINE

## 2021-12-09 PROCEDURE — 80048 BASIC METABOLIC PNL TOTAL CA: CPT

## 2021-12-09 PROCEDURE — NC001 PR NO CHARGE: Performed by: INTERNAL MEDICINE

## 2021-12-09 DEVICE — IMPLANTABLE DEVICE: Type: IMPLANTABLE DEVICE | Site: CHEST | Status: FUNCTIONAL

## 2021-12-09 DEVICE — ANTIBIOTIC ENVELOPE TYRX AIGIS LARGE ICD: Type: IMPLANTABLE DEVICE | Site: CHEST | Status: FUNCTIONAL

## 2021-12-09 RX ORDER — FENTANYL CITRATE 50 UG/ML
INJECTION, SOLUTION INTRAMUSCULAR; INTRAVENOUS AS NEEDED
Status: DISCONTINUED | OUTPATIENT
Start: 2021-12-09 | End: 2021-12-09 | Stop reason: HOSPADM

## 2021-12-09 RX ORDER — CEPHALEXIN 500 MG/1
500 CAPSULE ORAL EVERY 6 HOURS SCHEDULED
Status: DISCONTINUED | OUTPATIENT
Start: 2021-12-09 | End: 2021-12-09 | Stop reason: HOSPADM

## 2021-12-09 RX ORDER — LIDOCAINE HYDROCHLORIDE 10 MG/ML
INJECTION, SOLUTION EPIDURAL; INFILTRATION; INTRACAUDAL; PERINEURAL AS NEEDED
Status: DISCONTINUED | OUTPATIENT
Start: 2021-12-09 | End: 2021-12-09 | Stop reason: HOSPADM

## 2021-12-09 RX ORDER — CEPHALEXIN 500 MG/1
500 CAPSULE ORAL EVERY 12 HOURS SCHEDULED
Qty: 6 CAPSULE | Refills: 0 | Status: SHIPPED | OUTPATIENT
Start: 2021-12-09 | End: 2021-12-12

## 2021-12-09 RX ORDER — MIDAZOLAM HYDROCHLORIDE 2 MG/2ML
INJECTION, SOLUTION INTRAMUSCULAR; INTRAVENOUS AS NEEDED
Status: DISCONTINUED | OUTPATIENT
Start: 2021-12-09 | End: 2021-12-09 | Stop reason: HOSPADM

## 2021-12-09 RX ADMIN — CEPHALEXIN 500 MG: 500 CAPSULE ORAL at 11:51

## 2021-12-09 NOTE — INTERVAL H&P NOTE
Update: (This section must be completed if the H&P was completed greater than 24 hrs to procedure or admission)    The note reviewed  Patient's EF around 20%  He had ICD for primary prevention  ICD is single-chamber ICD of [employer]  Patient procedure detail were discussed with patient and patient's wife  Risk of infection discussed  Consent was obtained he understand it very well  Previous note from 11/11/2021 reviewed  He does have history of atrial fibrillation he has been holding his Coumadin  He will restart Coumadin in 2 days to avoid any hematoma  Continue other Rx as before  All issues discussed with them  He also understand increase risk of stroke due to hold Coumadin    Patient re-evaluated   Accept as history and physical     Neri Turner MD/December 9, 2021/11:29 AM

## 2021-12-09 NOTE — Clinical Note
The ECG shows a paced rhythm and a sinus rhythm  Pacer inserted  The patient has permanent pacemaker  Pacer on demand mode

## 2021-12-09 NOTE — Clinical Note
Defib pad site: anterior/posterior and left lower flank  Defib pad site assessment: skin integrity intact

## 2021-12-09 NOTE — Clinical Note
Site (pad location): lateral thigh  Laterality: right  Grounding pad site assessment: skin integrity intact

## 2021-12-09 NOTE — PROGRESS NOTES
Patient discharged from cath lab following ICD generator change procedure  IV removed  Discharge instructions given to and reviewed with patient and spouse  Both state understanding  Questions answered as offered

## 2021-12-09 NOTE — DISCHARGE INSTRUCTIONS
Generator Change Discharge Instructions:    - You may removed the outer dressing tomorrow, 12/10/21  - Leave the inner dressing on    - Please follow up with cardiology office early next week for a site check  They will remove dressing at this time  - Keep dressing dry  - Call your doctor with any signs/symptoms of infection (fevers, chills, redness, swelling)    You may resume taking Coumadin either tomorrow (12/10) evening or Saturday (12/11) morning  You may resume taking Lasix (furosimide) and metformin tomorrow 12/10

## 2021-12-10 ENCOUNTER — ANTICOAG VISIT (OUTPATIENT)
Dept: FAMILY MEDICINE CLINIC | Facility: CLINIC | Age: 81
End: 2021-12-10

## 2021-12-10 DIAGNOSIS — I48.0 PAF (PAROXYSMAL ATRIAL FIBRILLATION) (HCC): Primary | ICD-10-CM

## 2021-12-13 ENCOUNTER — IN-CLINIC DEVICE VISIT (OUTPATIENT)
Dept: CARDIOLOGY CLINIC | Facility: CLINIC | Age: 81
End: 2021-12-13
Payer: COMMERCIAL

## 2021-12-13 ENCOUNTER — OFFICE VISIT (OUTPATIENT)
Dept: CARDIOLOGY CLINIC | Facility: CLINIC | Age: 81
End: 2021-12-13
Payer: COMMERCIAL

## 2021-12-13 VITALS
WEIGHT: 140 LBS | DIASTOLIC BLOOD PRESSURE: 68 MMHG | BODY MASS INDEX: 19.6 KG/M2 | HEIGHT: 71 IN | SYSTOLIC BLOOD PRESSURE: 110 MMHG | OXYGEN SATURATION: 100 % | HEART RATE: 85 BPM | TEMPERATURE: 98.4 F

## 2021-12-13 DIAGNOSIS — Z95.810 PRESENCE OF IMPLANTABLE CARDIOVERTER-DEFIBRILLATOR (ICD): Primary | ICD-10-CM

## 2021-12-13 DIAGNOSIS — I10 HTN (HYPERTENSION), MALIGNANT: ICD-10-CM

## 2021-12-13 DIAGNOSIS — E11.59 TYPE 2 DIABETES MELLITUS WITH OTHER CIRCULATORY COMPLICATION, WITHOUT LONG-TERM CURRENT USE OF INSULIN (HCC): ICD-10-CM

## 2021-12-13 DIAGNOSIS — I48.20 CHRONIC ATRIAL FIBRILLATION (HCC): ICD-10-CM

## 2021-12-13 DIAGNOSIS — I25.10 ARTERIOSCLEROSIS OF CORONARY ARTERY: ICD-10-CM

## 2021-12-13 DIAGNOSIS — I50.22 CHRONIC SYSTOLIC CONGESTIVE HEART FAILURE (HCC): Primary | ICD-10-CM

## 2021-12-13 DIAGNOSIS — Z95.810 PRESENCE OF IMPLANTABLE CARDIOVERTER-DEFIBRILLATOR (ICD): ICD-10-CM

## 2021-12-13 PROCEDURE — 3074F SYST BP LT 130 MM HG: CPT | Performed by: INTERNAL MEDICINE

## 2021-12-13 PROCEDURE — 93000 ELECTROCARDIOGRAM COMPLETE: CPT | Performed by: INTERNAL MEDICINE

## 2021-12-13 PROCEDURE — 99024 POSTOP FOLLOW-UP VISIT: CPT | Performed by: INTERNAL MEDICINE

## 2021-12-13 PROCEDURE — 1036F TOBACCO NON-USER: CPT | Performed by: INTERNAL MEDICINE

## 2021-12-13 PROCEDURE — 99213 OFFICE O/P EST LOW 20 MIN: CPT | Performed by: INTERNAL MEDICINE

## 2021-12-13 PROCEDURE — 1160F RVW MEDS BY RX/DR IN RCRD: CPT | Performed by: INTERNAL MEDICINE

## 2021-12-13 PROCEDURE — 3078F DIAST BP <80 MM HG: CPT | Performed by: INTERNAL MEDICINE

## 2021-12-15 DIAGNOSIS — K29.70 GASTRITIS: ICD-10-CM

## 2021-12-15 DIAGNOSIS — I25.10 ARTERIOSCLEROSIS OF CORONARY ARTERY: ICD-10-CM

## 2021-12-15 DIAGNOSIS — E11.42 TYPE 2 DIABETES MELLITUS WITH DIABETIC POLYNEUROPATHY, WITHOUT LONG-TERM CURRENT USE OF INSULIN (HCC): ICD-10-CM

## 2021-12-17 RX ORDER — FOSINOPRIL SODIUM 10 MG/1
TABLET ORAL
Qty: 90 TABLET | Refills: 1 | Status: SHIPPED | OUTPATIENT
Start: 2021-12-17 | End: 2022-05-06

## 2021-12-17 RX ORDER — PANTOPRAZOLE SODIUM 40 MG/1
TABLET, DELAYED RELEASE ORAL
Qty: 90 TABLET | Refills: 1 | Status: SHIPPED | OUTPATIENT
Start: 2021-12-17 | End: 2022-05-06

## 2021-12-30 ENCOUNTER — TELEPHONE (OUTPATIENT)
Dept: CARDIOLOGY CLINIC | Facility: CLINIC | Age: 81
End: 2021-12-30

## 2022-01-13 ENCOUNTER — ANTICOAG VISIT (OUTPATIENT)
Dept: FAMILY MEDICINE CLINIC | Facility: CLINIC | Age: 82
End: 2022-01-13

## 2022-01-13 ENCOUNTER — APPOINTMENT (OUTPATIENT)
Dept: LAB | Facility: CLINIC | Age: 82
End: 2022-01-13
Payer: COMMERCIAL

## 2022-01-13 DIAGNOSIS — I48.0 PAF (PAROXYSMAL ATRIAL FIBRILLATION) (HCC): Primary | ICD-10-CM

## 2022-01-13 NOTE — TELEPHONE ENCOUNTER
----- Message from Clif Hayward LPN sent at 08/7/4924  3:51 PM EST -----      ----- Message -----  From: Wilian Berg  Sent: 11/7/2018   3:42 PM  To: [facility] Clinical    PT/INR Lab work ordered

## 2022-01-27 ENCOUNTER — ANTICOAG VISIT (OUTPATIENT)
Dept: FAMILY MEDICINE CLINIC | Facility: CLINIC | Age: 82
End: 2022-01-27

## 2022-01-27 ENCOUNTER — APPOINTMENT (OUTPATIENT)
Dept: LAB | Facility: CLINIC | Age: 82
End: 2022-01-27
Payer: COMMERCIAL

## 2022-01-27 DIAGNOSIS — I48.0 PAF (PAROXYSMAL ATRIAL FIBRILLATION) (HCC): Primary | ICD-10-CM

## 2022-02-11 ENCOUNTER — RA CDI HCC (OUTPATIENT)
Dept: OTHER | Facility: HOSPITAL | Age: 82
End: 2022-02-11

## 2022-02-11 NOTE — PROGRESS NOTES
M[facility]  coding opportunities             Chart Reviewed * (Number of) Inbasket suggestions sent to Provider: 2     DX: [phone]  DX: [phone]             Patients insurance company: [employer] (Medicare Advantage only)

## 2022-02-16 ENCOUNTER — TELEPHONE (OUTPATIENT)
Dept: NEUROLOGY | Facility: CLINIC | Age: 82
End: 2022-02-16

## 2022-02-16 NOTE — TELEPHONE ENCOUNTER
Contacted patient to confirm appointment on 02/18/22, the patient states that he wife handles the appointments and she will call back to confirm

## 2022-02-18 ENCOUNTER — OFFICE VISIT (OUTPATIENT)
Dept: NEUROLOGY | Facility: CLINIC | Age: 82
End: 2022-02-18
Payer: COMMERCIAL

## 2022-02-18 VITALS
SYSTOLIC BLOOD PRESSURE: 163 MMHG | DIASTOLIC BLOOD PRESSURE: 95 MMHG | BODY MASS INDEX: 20.3 KG/M2 | TEMPERATURE: 97.6 F | HEART RATE: 91 BPM | HEIGHT: 71 IN | WEIGHT: 145 LBS

## 2022-02-18 DIAGNOSIS — R41.89 COGNITIVE DECLINE: Primary | ICD-10-CM

## 2022-02-18 DIAGNOSIS — G30.9 ALZHEIMER'S DEMENTIA WITHOUT BEHAVIORAL DISTURBANCE, UNSPECIFIED TIMING OF DEMENTIA ONSET (HCC): ICD-10-CM

## 2022-02-18 DIAGNOSIS — F02.80 ALZHEIMER'S DEMENTIA WITHOUT BEHAVIORAL DISTURBANCE, UNSPECIFIED TIMING OF DEMENTIA ONSET (HCC): ICD-10-CM

## 2022-02-18 PROCEDURE — 99214 OFFICE O/P EST MOD 30 MIN: CPT | Performed by: NURSE PRACTITIONER

## 2022-02-18 NOTE — PATIENT INSTRUCTIONS
Continues with Namenda 10mg twice a day  Medication being managed by PCP, MOCA relatively unchanged     Pt can follow up as needed
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
## 2022-02-22 ENCOUNTER — OFFICE VISIT (OUTPATIENT)
Dept: FAMILY MEDICINE CLINIC | Facility: CLINIC | Age: 82
End: 2022-02-22
Payer: COMMERCIAL

## 2022-02-22 VITALS
HEART RATE: 95 BPM | TEMPERATURE: 98.6 F | BODY MASS INDEX: 19.94 KG/M2 | WEIGHT: 142.4 LBS | DIASTOLIC BLOOD PRESSURE: 76 MMHG | SYSTOLIC BLOOD PRESSURE: 128 MMHG | OXYGEN SATURATION: 96 % | HEIGHT: 71 IN | RESPIRATION RATE: 18 BRPM

## 2022-02-22 DIAGNOSIS — E43 SEVERE PROTEIN-CALORIE MALNUTRITION (HCC): ICD-10-CM

## 2022-02-22 DIAGNOSIS — I25.10 ARTERIOSCLEROSIS OF CORONARY ARTERY: ICD-10-CM

## 2022-02-22 DIAGNOSIS — J43.2 CENTRILOBULAR EMPHYSEMA (HCC): ICD-10-CM

## 2022-02-22 DIAGNOSIS — I50.1 HEART FAILURE, LEFT, WITH LVEF 31-40% (HCC): ICD-10-CM

## 2022-02-22 DIAGNOSIS — I42.0 DILATED CARDIOMYOPATHY (HCC): ICD-10-CM

## 2022-02-22 DIAGNOSIS — I11.0 HYPERTENSIVE HEART DISEASE WITH CONGESTIVE HEART FAILURE, UNSPECIFIED HEART FAILURE TYPE (HCC): ICD-10-CM

## 2022-02-22 DIAGNOSIS — G30.9 ALZHEIMER'S DEMENTIA WITHOUT BEHAVIORAL DISTURBANCE, UNSPECIFIED TIMING OF DEMENTIA ONSET (HCC): ICD-10-CM

## 2022-02-22 DIAGNOSIS — D68.9 COAGULATION DEFECT, UNSPECIFIED (HCC): ICD-10-CM

## 2022-02-22 DIAGNOSIS — F02.80 ALZHEIMER'S DEMENTIA WITHOUT BEHAVIORAL DISTURBANCE, UNSPECIFIED TIMING OF DEMENTIA ONSET (HCC): ICD-10-CM

## 2022-02-22 DIAGNOSIS — J96.11 CHRONIC HYPOXEMIC RESPIRATORY FAILURE (HCC): ICD-10-CM

## 2022-02-22 DIAGNOSIS — N18.31 STAGE 3A CHRONIC KIDNEY DISEASE (HCC): ICD-10-CM

## 2022-02-22 DIAGNOSIS — R09.89 BILATERAL CAROTID BRUITS: ICD-10-CM

## 2022-02-22 DIAGNOSIS — E11.42 TYPE 2 DIABETES MELLITUS WITH DIABETIC POLYNEUROPATHY, WITHOUT LONG-TERM CURRENT USE OF INSULIN (HCC): Primary | ICD-10-CM

## 2022-02-22 DIAGNOSIS — I70.209 PERIPHERAL ARTERIOSCLEROSIS (HCC): ICD-10-CM

## 2022-02-22 DIAGNOSIS — R41.89 COGNITIVE DECLINE: ICD-10-CM

## 2022-02-22 DIAGNOSIS — I48.0 PAF (PAROXYSMAL ATRIAL FIBRILLATION) (HCC): ICD-10-CM

## 2022-02-22 DIAGNOSIS — I50.22 CHRONIC SYSTOLIC CONGESTIVE HEART FAILURE (HCC): ICD-10-CM

## 2022-02-22 PROBLEM — K40.90 LEFT INGUINAL HERNIA: Status: RESOLVED | Noted: 2018-08-31 | Resolved: 2022-02-22

## 2022-02-22 PROBLEM — D64.9 ACUTE ON CHRONIC ANEMIA: Status: RESOLVED | Noted: 2020-05-31 | Resolved: 2022-02-22

## 2022-02-22 PROBLEM — N28.9 RENAL INSUFFICIENCY: Status: RESOLVED | Noted: 2021-03-29 | Resolved: 2022-02-22

## 2022-02-22 PROCEDURE — 99214 OFFICE O/P EST MOD 30 MIN: CPT | Performed by: FAMILY MEDICINE

## 2022-02-22 PROCEDURE — 3078F DIAST BP <80 MM HG: CPT | Performed by: FAMILY MEDICINE

## 2022-02-22 PROCEDURE — 3074F SYST BP LT 130 MM HG: CPT | Performed by: FAMILY MEDICINE

## 2022-02-22 PROCEDURE — 1160F RVW MEDS BY RX/DR IN RCRD: CPT | Performed by: FAMILY MEDICINE

## 2022-02-22 PROCEDURE — 1036F TOBACCO NON-USER: CPT | Performed by: FAMILY MEDICINE

## 2022-02-22 RX ORDER — MEMANTINE HYDROCHLORIDE 10 MG/1
10 TABLET ORAL 2 TIMES DAILY
Qty: 180 TABLET | Refills: 1 | Status: SHIPPED | OUTPATIENT
Start: 2022-02-22

## 2022-02-22 NOTE — PROGRESS NOTES
Assessment/Plan:    1  Type 2 diabetes mellitus with diabetic polyneuropathy, without long-term current use of insulin ([facility])  -     Hemoglobin A1C; Future; Expected date: 06/22/2022  -     Microalbumin / creatinine urine ratio; Future; Expected date: 06/22/2022  -     Lipid Panel with Direct LDL reflex; Future; Expected date: 06/22/2022    2  Centrilobular emphysema ([place] [address] )  -     Lipid Panel with Direct LDL reflex; Future; Expected date: 06/22/2022    3  Chronic hypoxemic respiratory failure ([facility])  -     Lipid Panel with Direct LDL reflex; Future; Expected date: 06/22/2022    4  Arteriosclerosis of coronary artery  -     Lipid Panel with Direct LDL reflex; Future; Expected date: 06/22/2022    5  Chronic systolic congestive heart failure ([facility])  -     Lipid Panel with Direct LDL reflex; Future; Expected date: 06/22/2022    6  Dilated cardiomyopathy ([place] [address] )  -     Lipid Panel with Direct LDL reflex; Future; Expected date: 06/22/2022    7  Heart failure, left, 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% ([facility])  -     Lipid Panel with Direct LDL reflex; Future; Expected date: 06/22/2022    8  Hypertensive heart disease with congestive heart failure, unspecified heart failure type ([place] [address] )  -     Lipid Panel with Direct LDL reflex; Future; Expected date: 06/22/2022    9  PAF (paroxysmal atrial fibrillation) ([facility])  -     Lipid Panel with Direct LDL reflex; Future; Expected date: 06/22/2022    10  Alzheimer's dementia without behavioral disturbance, unspecified timing of dementia onset ([place] [address] )  Assessment & Plan:  No longer seeing neurology    Orders:  -     memantine (NAMENDA) 10 mg tablet; Take 1 tablet (10 mg total) by mouth 2 (two) times a day  -     Lipid Panel with Direct LDL reflex; Future; Expected date: 06/22/2022    11  Stage 3a chronic kidney disease ([facility])  -     Comprehensive metabolic panel; Future; Expected date: 06/22/2022  -     CBC and Platelet; Future; Expected date: 06/22/2022  -     Lipid Panel with Direct LDL reflex;  Future; Expected date: 06/22/2022    12  Bilateral carotid bruits  -     Lipid Panel with Direct LDL reflex; Future; Expected date: 06/22/2022    13  Severe protein-calorie malnutrition ([facility] )  -     Lipid Panel with Direct LDL reflex; Future; Expected date: 06/22/2022    14  Cognitive decline  -     memantine (NAMENDA) 10 mg tablet; Take 1 tablet (10 mg total) by mouth 2 (two) times a day  -     Lipid Panel with Direct LDL reflex; Future; Expected date: 06/22/2022    15  [facility]  -     Lipid Panel with Direct LDL reflex; Future; Expected date: 06/22/2022    16  Coagulation defect, unspecified ([email or web address] )  -     Lipid Panel with Direct LDL reflex; Future; Expected date: 06/22/2022      At this point pt and wife do not really want to do things - tests/labs that he does not need to do  He feels well and is happy and has no complaints    There are no Patient Instructions on file for this visit  No follow-ups on file  Subjective:      Patient ID: Nancy Nicholson is a 78 y o  male  Chief Complaint   Patient presents with    Follow-up     4 month f/u nm lpn       Pt is here for a follow up  Pt states feels well  Pt was released from neulogy - was advised they did not see a reason for him to keep comming      The following portions of the patient's history were reviewed and updated as appropriate: allergies, current medications, past family history, past medical history, past social history, past surgical history and problem list     Review of Systems   Constitutional: Negative for activity change, appetite change, chills, diaphoresis, fatigue, fever and unexpected weight change  HENT: Negative for congestion, dental problem, ear pain, mouth sores, sinus pressure, sinus pain, sore throat and trouble swallowing  Eyes: Negative for photophobia, discharge and itching  Respiratory: Negative for apnea, chest tightness and shortness of breath      Cardiovascular: Negative for chest pain, palpitations and leg swelling  Gastrointestinal: Negative for abdominal distention, abdominal pain, blood in stool, nausea and vomiting  Endocrine: Negative for cold intolerance, heat intolerance, polydipsia, polyphagia and polyuria  Genitourinary: Negative for difficulty urinating  Musculoskeletal: Negative for arthralgias  Skin: Negative for color change and wound  Neurological: Negative for dizziness, syncope, speech difficulty and headaches  Hematological: Negative for adenopathy  Psychiatric/Behavioral: Negative for agitation and behavioral problems  Current Outpatient Medications   Medication Sig Dispense Refill    Ascorbic Acid (VITAMIN C) 1000 MG tablet Take 1,000 mg by mouth daily      carvedilol (COREG) 6 25 mg tablet Take 1 tablet (6 25 mg total) by mouth 2 (two) times a day 180 tablet 3    digoxin (LANOXIN) 0 125 mg tablet TAKE 1 TABLET DAILY 90 tablet 3    Ferrous Sulfate (Iron) 325 (65 Fe) MG TABS Take by mouth every other day      fluticasone-umeclidinium-vilanterol (TRELEGY) 100-62 5-25 MCG/INH inhaler Inhale 1 puff daily Rinse mouth after use   60 blister 11    fosinopril (MONOPRIL) 10 mg tablet TAKE 1 TABLET EVERY DAY 90 tablet 1    furosemide (LASIX) 40 mg tablet TAKE 1 TABLET EVERY DAY 90 tablet 1    glucose blood (PRODIGY NO CODING BLOOD GLUC) test strip by In Vitro route      guaiFENesin (MUCINEX) 600 mg 12 hr tablet Take 1 tablet (600 mg total) by mouth every 12 (twelve) hours 30 tablet 0    memantine (NAMENDA) 10 mg tablet Take 1 tablet (10 mg total) by mouth 2 (two) times a day 180 tablet 1    metFORMIN (GLUCOPHAGE) 500 mg tablet TAKE 1 TABLET (500 MG TOTAL) BY MOUTH 2 (TWO) TIMES A DAY WITH MEALS 180 tablet 1    Omega-3 Fatty Acids (FISH OIL PO) Take by mouth      pantoprazole (PROTONIX) 40 mg tablet TAKE 1 TABLET EVERY DAY 90 tablet 1    PRODIGY TWIST TOP LANCETS 28G MISC Use        rosuvastatin (CRESTOR) 10 MG tablet Take 1 tablet (10 mg total) by mouth daily at bedtime 90 tablet 3    spironolactone (ALDACTONE) 25 mg tablet TAKE 1 TABLET EVERY DAY 90 tablet 3    warfarin (COUMADIN) 1 mg tablet Take 1 tablet (1 mg total) by mouth daily 90 tablet 0    warfarin (COUMADIN) 3 mg tablet TAKE AS DIRECTED BY OFFICE BASED ON INR (GOAL INR AT PRESENT IS 1 5 TO 2) 90 tablet 1    warfarin (COUMADIN) 5 mg tablet Take 1 tablet (5 mg total) by mouth daily 90 tablet 1     No current facility-administered medications for this visit  Objective:    /76   Pulse 95   Temp 98 6 °F (37 °C)   Resp 18   Ht 5' 11" (1 803 m)   Wt 64 6 kg (142 lb 6 4 oz)   SpO2 96%   BMI 19 86 kg/m²        Physical Exam  Vitals and nursing note reviewed  Constitutional:       General: He is not in acute distress  Appearance: He is well-developed  He is not diaphoretic  HENT:      Head: Normocephalic and atraumatic  Right Ear: External ear normal       Left Ear: External ear normal       Nose: Nose normal       Mouth/Throat:      Pharynx: No oropharyngeal exudate  Eyes:      General: No scleral icterus  Right eye: No discharge  Left eye: No discharge  Pupils: Pupils are equal, round, and reactive to light  Neck:      Thyroid: No thyromegaly  Cardiovascular:      Rate and Rhythm: Normal rate  Heart sounds: Normal heart sounds  No murmur heard  Pulmonary:      Effort: Pulmonary effort is normal  No respiratory distress  Breath sounds: Normal breath sounds  No wheezing  Abdominal:      General: Bowel sounds are normal  There is no distension  Palpations: Abdomen is soft  There is no mass  Tenderness: There is no abdominal tenderness  There is no guarding or rebound  Musculoskeletal:         General: Normal range of motion  Skin:     General: Skin is warm and dry  Findings: No erythema or rash  Neurological:      Mental Status: He is alert        Coordination: Coordination normal       Deep Tendon Reflexes: Reflexes normal  Psychiatric:         Behavior: Behavior normal               Recent Results (from the past 672 hour(s))   Protime-INR    Collection Time: 22  7:50 AM   Result Value Ref Range    Protime 23 8 (H) 11 6 - 14 5 seconds    INR 2 26 (H) 0 84 - 1 19       Recent Results (from the past 3360 hour(s))   IRIS Diabetic eye exam    Collection Time: 10/12/21  9:18 AM   Result Value Ref Range    Severity NORMAL     Right Eye Diabetic Retinopathy None     Right Eye Macular Edema None     Right Eye Other Retinopathy None     Right Eye Image Quality Gradeable Image     Left Eye Diabetic Retinopathy None     Left Eye Macular Edema None     Left Eye Other Retinopathy None     Left Eye Image Quality Ungradeable Image     Result Retinal Study Result for IMTIAZ CHURCHILL     Result      Result       neli US 79 y/o, M (: 1940, MRN: [id number])    Result       presented to [facility] on 10- for a retinal imaging study of the left and right eyes  Result      Result       Based on the findings of the study, the following is recommended for IMTIAZ CHURCHILL    Result       Not Gradable Eye(s) Found: Schedule an appointment with a retina specialist for further evaluation within 3 months  Result      Result       Interpreting Provider's Comments:  No comments provided    Result       Diagnoses Present:  - Type 2 diabetes mellitus with diabetic polyneuropathy    Result      Result       Right eye findings: Negative for Diabetic Retinopathy    Result Negative for Macular Edema     Result      Result       Left eye findings: Image not gradable for reasons listed: Image Out of Focus    Result      Result      Result       This result was electronically signed by Amol Cowan, NPI: [id number], Taxonomy: [id number] on 10- 07:21 [facility]  Result      Result       NOTE:  Any pathology noted on this diabetic retinal evaluation should be confirmed by an appropriate ophthalmic examination     Protime-INR Collection Time: 10/28/21  7:21 AM   Result Value Ref Range    Protime 27 7 (H) 11 6 - 14 5 seconds    INR 2 75 (H) 0 84 - 1 19   Protime-INR    Collection Time: 11/29/21  9:07 AM   Result Value Ref Range    Protime 25 3 (H) 11 6 - 14 5 seconds    INR 2 44 (H) 0 84 - 1 19   Protime-INR    Collection Time: 12/02/21  7:18 AM   Result Value Ref Range    Protime 25 7 (H) 11 6 - 14 5 seconds    INR 2 49 (H) 0 84 - 1 19   Comprehensive metabolic panel    Collection Time: 12/02/21  7:18 AM   Result Value Ref Range    Sodium 136 136 - 145 mmol/L    Potassium 3 8 3 5 - 5 3 mmol/L    Chloride 99 (L) 100 - 108 mmol/L    CO2 31 21 - 32 mmol/L    ANION GAP 6 4 - 13 mmol/L    BUN 26 (H) 5 - 25 mg/dL    Creatinine 1 65 (H) 0 60 - 1 30 mg/dL    Glucose 142 (H) 65 - 140 mg/dL    Calcium 9 0 8 3 - 10 1 mg/dL    AST 27 5 - 45 U/L    ALT 54 12 - 78 U/L    Alkaline Phosphatase 64 46 - 116 U/L    Total Protein 7 7 6 4 - 8 2 g/dL    Albumin 3 8 3 5 - 5 0 g/dL    Total Bilirubin 0 66 0 20 - 1 00 mg/dL    eGFR 38 ml/min/1 73sq m   Hemoglobin A1C    Collection Time: 12/02/21  7:18 AM   Result Value Ref Range    Hemoglobin A1C 7 3 (H) Normal 3 8-5 6%; PreDiabetic 5 7-6 4%;  Diabetic >=6 5%; Glycemic control for adults with diabetes <7 0% %     mg/dl   Lipid Panel with Direct LDL reflex    Collection Time: 12/02/21  7:18 AM   Result Value Ref Range    Cholesterol 83 See Comment mg/dL    Triglycerides 212 (H) See Comment mg/dL    HDL, Direct 32 (L) >=40 mg/dL    LDL Calculated 9 0 - 100 mg/dL   Digoxin level    Collection Time: 12/02/21  7:18 AM   Result Value Ref Range    Digoxin Lvl 1 1 0 8 - 2 0 ng/mL   Protime-INR    Collection Time: 12/09/21  9:30 AM   Result Value Ref Range    Protime 13 4 11 6 - 14 5 seconds    INR 1 04 0 84 - 1 19   CBC and differential    Collection Time: 12/09/21  9:30 AM   Result Value Ref Range    WBC 9 93 4 31 - 10 16 Thousand/uL    RBC 4 54 3 88 - 5 62 Million/uL    Hemoglobin 14 3 12 0 - 17 0 g/dL Hematocrit 43 4 36 5 - 49 3 %    MCV 96 82 - 98 fL    MCH 31 5 26 8 - 34 3 pg    MCHC 32 9 31 4 - 37 4 g/dL    RDW 12 3 11 6 - 15 1 %    MPV 10 0 8 9 - 12 7 fL    Platelets 810 662 - 366 Thousands/uL    nRBC 0 /100 WBCs    Neutrophils Relative 70 43 - 75 %    Immat GRANS % 1 0 - 2 %    Lymphocytes Relative 18 14 - 44 %    Monocytes Relative 9 4 - 12 %    Eosinophils Relative 1 0 - 6 %    Basophils Relative 1 0 - 1 %    Neutrophils Absolute 6 95 1 85 - 7 62 Thousands/µL    Immature Grans Absolute 0 10 0 00 - 0 20 Thousand/uL    Lymphocytes Absolute 1 80 0 60 - 4 47 Thousands/µL    Monocytes Absolute 0 86 0 17 - 1 22 Thousand/µL    Eosinophils Absolute 0 14 0 00 - 0 61 Thousand/µL    Basophils Absolute 0 08 0 00 - 0 10 Thousands/µL   Basic metabolic panel    Collection Time: 12/09/21  9:30 AM   Result Value Ref Range    Sodium 142 136 - 145 mmol/L    Potassium 4 3 3 5 - 5 3 mmol/L    Chloride 103 100 - 108 mmol/L    CO2 32 21 - 32 mmol/L    ANION GAP 7 4 - 13 mmol/L    BUN 31 (H) 5 - 25 mg/dL    Creatinine 1 79 (H) 0 60 - 1 30 mg/dL    Glucose, Fasting 167 (H) 65 - 99 mg/dL    Calcium 9 3 8 3 - 10 1 mg/dL    eGFR 35 ml/min/1 73sq m   Protime-INR    Collection Time: 01/13/22  7:50 AM   Result Value Ref Range    Protime 26 0 (H) 11 6 - 14 5 seconds    INR 2 53 (H) 0 84 - 1 19   Protime-INR    Collection Time: 01/27/22  7:50 AM   Result Value Ref Range    Protime 23 8 (H) 11 6 - 14 5 seconds    INR 2 26 (H) 0 84 - 1 19         Pop Powell, DO

## 2022-02-24 ENCOUNTER — ANTICOAG VISIT (OUTPATIENT)
Dept: FAMILY MEDICINE CLINIC | Facility: CLINIC | Age: 82
End: 2022-02-24

## 2022-02-24 ENCOUNTER — APPOINTMENT (OUTPATIENT)
Dept: LAB | Facility: CLINIC | Age: 82
End: 2022-02-24
Payer: COMMERCIAL

## 2022-02-24 DIAGNOSIS — I48.0 PAF (PAROXYSMAL ATRIAL FIBRILLATION) (HCC): Primary | ICD-10-CM

## 2022-03-24 ENCOUNTER — APPOINTMENT (OUTPATIENT)
Dept: LAB | Facility: CLINIC | Age: 82
End: 2022-03-24
Payer: COMMERCIAL

## 2022-03-24 ENCOUNTER — ANTICOAG VISIT (OUTPATIENT)
Dept: FAMILY MEDICINE CLINIC | Facility: CLINIC | Age: 82
End: 2022-03-24

## 2022-03-24 ENCOUNTER — TELEPHONE (OUTPATIENT)
Dept: FAMILY MEDICINE CLINIC | Facility: CLINIC | Age: 82
End: 2022-03-24

## 2022-03-24 DIAGNOSIS — I48.0 PAF (PAROXYSMAL ATRIAL FIBRILLATION) (HCC): Primary | ICD-10-CM

## 2022-03-24 LAB — INR PPP: 2.99 (ref 0.84–1.19)

## 2022-03-24 NOTE — TELEPHONE ENCOUNTER
Spoke with Mrs Nicholson and advised her to have Te Fillers take 3mg daily and redraw Monday AM  Calendar updated, no further action needed    Boris Napoles MA

## 2022-03-24 NOTE — TELEPHONE ENCOUNTER
She is correct, change dose to 3 mg daily     Redraw on Monday AM   Thank you  My apologies  DR PARSONS

## 2022-03-24 NOTE — TELEPHONE ENCOUNTER
----- Message from Mitul Larson MA sent at 3/24/2022  2:12 PM EDT -----  Mrs  May advised to have Paulie Banuelos continue same dose and redraw in one month  She wanted Dr Janet Powell to know that she is feeling a little reluctant about these instructions as she feels 2 99 is a bit thin for him  She states he has blood in his lungs about a year ago and they like to keep his INR around 2 5  Please advise  Mitul Larson MA

## 2022-03-25 ENCOUNTER — REMOTE DEVICE CLINIC VISIT (OUTPATIENT)
Dept: CARDIOLOGY CLINIC | Facility: CLINIC | Age: 82
End: 2022-03-25
Payer: COMMERCIAL

## 2022-03-25 DIAGNOSIS — Z95.810 PRESENCE OF AUTOMATIC CARDIOVERTER/DEFIBRILLATOR (AICD): Primary | ICD-10-CM

## 2022-03-25 PROCEDURE — 93295 DEV INTERROG REMOTE 1/2/MLT: CPT | Performed by: INTERNAL MEDICINE

## 2022-03-25 PROCEDURE — 93296 REM INTERROG EVL PM/IDS: CPT | Performed by: INTERNAL MEDICINE

## 2022-03-25 NOTE — PROGRESS NOTES
Results for orders placed or performed in visit on 03/25/22   Cardiac EP device report    Narrative    MDT SINGLE CHAMBER ICD/ACTIVE SYSTEM IS MRI CONDITIONAL  CARELINK TRANSMISSION: BATTERY VOLTAGE ADEQUATE  (13 4 YRS)  <1%  ALL AVAILABLE LEAD PARAMETERS WITHIN NORMAL LIMITS  4 NSVT EPISODES DETECTED MOST RECENT 6 BEATS @ 340ms  NO THERAPIES GIVEN  OPTI-VOL WITHIN NORMAL LIMITS  NORMAL DEVICE FUNCTION  ---ALMENDAREZ

## 2022-03-28 ENCOUNTER — ANTICOAG VISIT (OUTPATIENT)
Dept: FAMILY MEDICINE CLINIC | Facility: CLINIC | Age: 82
End: 2022-03-28

## 2022-03-28 ENCOUNTER — APPOINTMENT (OUTPATIENT)
Dept: LAB | Facility: CLINIC | Age: 82
End: 2022-03-28
Payer: COMMERCIAL

## 2022-03-28 ENCOUNTER — OFFICE VISIT (OUTPATIENT)
Dept: CARDIOLOGY CLINIC | Facility: CLINIC | Age: 82
End: 2022-03-28
Payer: COMMERCIAL

## 2022-03-28 VITALS
BODY MASS INDEX: 19.74 KG/M2 | TEMPERATURE: 99.7 F | HEIGHT: 71 IN | SYSTOLIC BLOOD PRESSURE: 96 MMHG | OXYGEN SATURATION: 94 % | HEART RATE: 78 BPM | WEIGHT: 141 LBS | DIASTOLIC BLOOD PRESSURE: 60 MMHG

## 2022-03-28 DIAGNOSIS — I48.0 PAF (PAROXYSMAL ATRIAL FIBRILLATION) (HCC): Primary | ICD-10-CM

## 2022-03-28 DIAGNOSIS — I70.209 PERIPHERAL ARTERIOSCLEROSIS (HCC): ICD-10-CM

## 2022-03-28 DIAGNOSIS — I50.22 CHRONIC SYSTOLIC CONGESTIVE HEART FAILURE (HCC): ICD-10-CM

## 2022-03-28 DIAGNOSIS — E11.59 TYPE 2 DIABETES MELLITUS WITH OTHER CIRCULATORY COMPLICATION, WITHOUT LONG-TERM CURRENT USE OF INSULIN (HCC): ICD-10-CM

## 2022-03-28 DIAGNOSIS — I10 HTN (HYPERTENSION), MALIGNANT: ICD-10-CM

## 2022-03-28 DIAGNOSIS — Z95.810 PRESENCE OF AUTOMATIC CARDIOVERTER/DEFIBRILLATOR (AICD): ICD-10-CM

## 2022-03-28 DIAGNOSIS — I48.20 CHRONIC ATRIAL FIBRILLATION (HCC): Primary | ICD-10-CM

## 2022-03-28 DIAGNOSIS — N18.31 STAGE 3A CHRONIC KIDNEY DISEASE (HCC): ICD-10-CM

## 2022-03-28 LAB — INR PPP: 2.82 (ref 0.84–1.19)

## 2022-03-28 PROCEDURE — 93000 ELECTROCARDIOGRAM COMPLETE: CPT | Performed by: INTERNAL MEDICINE

## 2022-03-28 PROCEDURE — 3074F SYST BP LT 130 MM HG: CPT | Performed by: INTERNAL MEDICINE

## 2022-03-28 PROCEDURE — 99214 OFFICE O/P EST MOD 30 MIN: CPT | Performed by: INTERNAL MEDICINE

## 2022-03-28 PROCEDURE — 1160F RVW MEDS BY RX/DR IN RCRD: CPT | Performed by: INTERNAL MEDICINE
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
## 2022-03-29 ENCOUNTER — TELEPHONE (OUTPATIENT)
Dept: FAMILY MEDICINE CLINIC | Facility: CLINIC | Age: 82
End: 2022-03-29

## 2022-03-29 DIAGNOSIS — I48.0 PAF (PAROXYSMAL ATRIAL FIBRILLATION) (HCC): Primary | ICD-10-CM

## 2022-03-29 NOTE — TELEPHONE ENCOUNTER
Spoke with Tom Schultz  She will give pt 2 5mg daily and recheck INR in 2 days  Pt did not need any refills   kljavier

## 2022-03-29 NOTE — TELEPHONE ENCOUNTER
----- Message from Cheryl Norris DO sent at 3/29/2022  9:33 AM EDT -----  Please call wife back - his INR is a special case  He is supposed to be kept between 1 8 to 2 5 so he should decrease his coumadin dose to 2 5 mg and redraw his numbers in two days    Please inform  Gissel Díaz

## 2022-03-31 ENCOUNTER — TELEPHONE (OUTPATIENT)
Dept: FAMILY MEDICINE CLINIC | Facility: CLINIC | Age: 82
End: 2022-03-31

## 2022-03-31 ENCOUNTER — APPOINTMENT (OUTPATIENT)
Dept: LAB | Facility: CLINIC | Age: 82
End: 2022-03-31
Payer: COMMERCIAL

## 2022-03-31 ENCOUNTER — ANTICOAG VISIT (OUTPATIENT)
Dept: FAMILY MEDICINE CLINIC | Facility: CLINIC | Age: 82
End: 2022-03-31

## 2022-03-31 DIAGNOSIS — I48.20 CHRONIC ATRIAL FIBRILLATION (HCC): ICD-10-CM

## 2022-03-31 DIAGNOSIS — I50.22 CHRONIC SYSTOLIC CONGESTIVE HEART FAILURE (HCC): ICD-10-CM

## 2022-03-31 DIAGNOSIS — N18.31 STAGE 3A CHRONIC KIDNEY DISEASE (HCC): ICD-10-CM

## 2022-03-31 DIAGNOSIS — E11.59 TYPE 2 DIABETES MELLITUS WITH OTHER CIRCULATORY COMPLICATION, WITHOUT LONG-TERM CURRENT USE OF INSULIN (HCC): ICD-10-CM

## 2022-03-31 DIAGNOSIS — I48.0 PAF (PAROXYSMAL ATRIAL FIBRILLATION) (HCC): Primary | ICD-10-CM

## 2022-03-31 LAB
ANION GAP SERPL CALCULATED.3IONS-SCNC: 1 MMOL/L (ref 4–13)
BUN SERPL-MCNC: 25 MG/DL (ref 5–25)
CALCIUM SERPL-MCNC: 9.4 MG/DL (ref 8.3–10.1)
CHLORIDE SERPL-SCNC: 98 MMOL/L (ref 100–108)
CO2 SERPL-SCNC: 34 MMOL/L (ref 21–32)
CREAT SERPL-MCNC: 1.71 MG/DL (ref 0.6–1.3)
DIGOXIN SERPL-MCNC: 1.3 NG/ML (ref 0.8–2)
GFR SERPL CREATININE-BSD FRML MDRD: 36 ML/MIN/1.73SQ M
GLUCOSE P FAST SERPL-MCNC: 205 MG/DL (ref 65–99)
POTASSIUM SERPL-SCNC: 4.7 MMOL/L (ref 3.5–5.3)
SODIUM SERPL-SCNC: 133 MMOL/L (ref 136–145)

## 2022-03-31 PROCEDURE — 80162 ASSAY OF DIGOXIN TOTAL: CPT

## 2022-03-31 PROCEDURE — 80048 BASIC METABOLIC PNL TOTAL CA: CPT

## 2022-03-31 NOTE — TELEPHONE ENCOUNTER
Attempted to call the patient to inform him  The phone just rings and rings  Will try again later    Edgardo Franco LPN

## 2022-03-31 NOTE — TELEPHONE ENCOUNTER
Spoke to patients wife, I informed her of the instructions same dose (2 5mg) redraw in 2 weeks  She would like some clarification on if that means he should be taking the 2 5mg everyday  She stated that she does not want to give him "not enough"  Please advise    Fariba Szymanski, NATALIIAN

## 2022-03-31 NOTE — TELEPHONE ENCOUNTER
----- Message from Mile Sullivan DO sent at 3/31/2022  2:25 PM EDT -----  Same dose of coumadin redraw in 2 weeks

## 2022-04-01 DIAGNOSIS — E11.42 TYPE 2 DIABETES MELLITUS WITH DIABETIC POLYNEUROPATHY, WITHOUT LONG-TERM CURRENT USE OF INSULIN (HCC): ICD-10-CM

## 2022-04-01 NOTE — TELEPHONE ENCOUNTER
Spoke to patients wife, informed her of INR 2 27, same dose redraw in 2 weeks  Calendar updated  NFA    Markus Gore LPN

## 2022-04-12 ENCOUNTER — TELEPHONE (OUTPATIENT)
Dept: CARDIOLOGY CLINIC | Facility: CLINIC | Age: 82
End: 2022-04-12

## 2022-04-12 NOTE — TELEPHONE ENCOUNTER
Pt's wife advised as noted  She notes that the pt's wt is stable at 144 lbs today dressed, she denies that he is experiencing any symptoms, notes no change in his status

## 2022-04-12 NOTE — TELEPHONE ENCOUNTER
----- Message from Michelle Goins MD sent at 4/11/2022  2:56 PM EDT -----  Kidney function and dig level stable  How is his weight?

## 2022-04-12 NOTE — TELEPHONE ENCOUNTER
Ok great  No change in furosemide 40mg dosage and digoxin every day  No extra bananas  Moderate potassium usage in diet

## 2022-04-13 ENCOUNTER — TELEPHONE (OUTPATIENT)
Dept: CARDIOLOGY CLINIC | Facility: CLINIC | Age: 82
End: 2022-04-13

## 2022-04-14 ENCOUNTER — APPOINTMENT (OUTPATIENT)
Dept: LAB | Facility: CLINIC | Age: 82
End: 2022-04-14
Payer: COMMERCIAL

## 2022-04-14 ENCOUNTER — ANTICOAG VISIT (OUTPATIENT)
Dept: FAMILY MEDICINE CLINIC | Facility: CLINIC | Age: 82
End: 2022-04-14

## 2022-04-14 DIAGNOSIS — I48.0 PAF (PAROXYSMAL ATRIAL FIBRILLATION) (HCC): Primary | ICD-10-CM

## 2022-04-18 ENCOUNTER — APPOINTMENT (OUTPATIENT)
Dept: LAB | Facility: CLINIC | Age: 82
End: 2022-04-18
Payer: COMMERCIAL

## 2022-04-18 ENCOUNTER — ANTICOAG VISIT (OUTPATIENT)
Dept: FAMILY MEDICINE CLINIC | Facility: CLINIC | Age: 82
End: 2022-04-18

## 2022-04-18 DIAGNOSIS — I48.0 PAF (PAROXYSMAL ATRIAL FIBRILLATION) (HCC): Primary | ICD-10-CM

## 2022-05-06 DIAGNOSIS — I50.22 CHRONIC SYSTOLIC CONGESTIVE HEART FAILURE (HCC): ICD-10-CM

## 2022-05-06 DIAGNOSIS — K29.70 GASTRITIS: ICD-10-CM

## 2022-05-06 DIAGNOSIS — I48.20 CHRONIC ATRIAL FIBRILLATION (HCC): ICD-10-CM

## 2022-05-06 DIAGNOSIS — I42.8 NONISCHEMIC CARDIOMYOPATHY (HCC): ICD-10-CM

## 2022-05-06 DIAGNOSIS — I50.9 HEART FAILURE (HCC): ICD-10-CM

## 2022-05-06 DIAGNOSIS — E11.59 TYPE 2 DIABETES MELLITUS WITH OTHER CIRCULATORY COMPLICATION, WITHOUT LONG-TERM CURRENT USE OF INSULIN (HCC): ICD-10-CM

## 2022-05-06 DIAGNOSIS — I50.1 HEART FAILURE, LEFT, WITH LVEF 31-40% (HCC): ICD-10-CM

## 2022-05-06 DIAGNOSIS — I25.10 ARTERIOSCLEROSIS OF CORONARY ARTERY: ICD-10-CM

## 2022-05-06 RX ORDER — PANTOPRAZOLE SODIUM 40 MG/1
TABLET, DELAYED RELEASE ORAL
Qty: 90 TABLET | Refills: 1 | Status: SHIPPED | OUTPATIENT
Start: 2022-05-06

## 2022-05-06 RX ORDER — FUROSEMIDE 40 MG/1
TABLET ORAL
Qty: 90 TABLET | Refills: 3 | Status: SHIPPED | OUTPATIENT
Start: 2022-05-06

## 2022-05-06 RX ORDER — FOSINOPRIL SODIUM 10 MG/1
TABLET ORAL
Qty: 90 TABLET | Refills: 1 | Status: SHIPPED | OUTPATIENT
Start: 2022-05-06

## 2022-05-06 RX ORDER — WARFARIN SODIUM 3 MG/1
TABLET ORAL
Qty: 90 TABLET | Refills: 1 | Status: SHIPPED | OUTPATIENT
Start: 2022-05-06

## 2022-05-09 RX ORDER — ROSUVASTATIN CALCIUM 10 MG/1
10 TABLET, COATED ORAL
Qty: 90 TABLET | Refills: 3 | Status: SHIPPED | OUTPATIENT
Start: 2022-05-09

## 2022-05-19 ENCOUNTER — APPOINTMENT (OUTPATIENT)
Dept: LAB | Facility: CLINIC | Age: 82
End: 2022-05-19
Payer: COMMERCIAL

## 2022-05-19 ENCOUNTER — ANTICOAG VISIT (OUTPATIENT)
Dept: FAMILY MEDICINE CLINIC | Facility: CLINIC | Age: 82
End: 2022-05-19

## 2022-05-19 DIAGNOSIS — I48.0 PAF (PAROXYSMAL ATRIAL FIBRILLATION) (HCC): Primary | ICD-10-CM

## 2022-06-07 DIAGNOSIS — I48.20 CHRONIC ATRIAL FIBRILLATION (HCC): ICD-10-CM

## 2022-06-07 RX ORDER — DIGOXIN 125 MCG
TABLET ORAL
Qty: 90 TABLET | Refills: 3 | Status: SHIPPED | OUTPATIENT
Start: 2022-06-07

## 2022-06-23 ENCOUNTER — ANTICOAG VISIT (OUTPATIENT)
Dept: FAMILY MEDICINE CLINIC | Facility: CLINIC | Age: 82
End: 2022-06-23

## 2022-06-23 ENCOUNTER — APPOINTMENT (OUTPATIENT)
Dept: LAB | Facility: CLINIC | Age: 82
End: 2022-06-23
Payer: COMMERCIAL

## 2022-06-23 DIAGNOSIS — I48.0 PAF (PAROXYSMAL ATRIAL FIBRILLATION) (HCC): ICD-10-CM

## 2022-06-23 DIAGNOSIS — R41.89 COGNITIVE DECLINE: ICD-10-CM

## 2022-06-23 DIAGNOSIS — D68.9 COAGULATION DEFECT, UNSPECIFIED (HCC): ICD-10-CM

## 2022-06-23 DIAGNOSIS — I48.0 PAF (PAROXYSMAL ATRIAL FIBRILLATION) (HCC): Primary | ICD-10-CM

## 2022-06-23 DIAGNOSIS — J96.11 CHRONIC HYPOXEMIC RESPIRATORY FAILURE (HCC): ICD-10-CM

## 2022-06-23 DIAGNOSIS — F02.80 ALZHEIMER'S DEMENTIA WITHOUT BEHAVIORAL DISTURBANCE, UNSPECIFIED TIMING OF DEMENTIA ONSET (HCC): ICD-10-CM

## 2022-06-23 DIAGNOSIS — I50.1 HEART FAILURE, LEFT, WITH LVEF 31-40% (HCC): ICD-10-CM

## 2022-06-23 DIAGNOSIS — I42.0 DILATED CARDIOMYOPATHY (HCC): ICD-10-CM

## 2022-06-23 DIAGNOSIS — R09.89 BILATERAL CAROTID BRUITS: ICD-10-CM

## 2022-06-23 DIAGNOSIS — G30.9 ALZHEIMER'S DEMENTIA WITHOUT BEHAVIORAL DISTURBANCE, UNSPECIFIED TIMING OF DEMENTIA ONSET (HCC): ICD-10-CM

## 2022-06-23 DIAGNOSIS — E43 SEVERE PROTEIN-CALORIE MALNUTRITION (HCC): ICD-10-CM

## 2022-06-23 DIAGNOSIS — I50.22 CHRONIC SYSTOLIC CONGESTIVE HEART FAILURE (HCC): ICD-10-CM

## 2022-06-23 DIAGNOSIS — I70.209 PERIPHERAL ARTERIOSCLEROSIS (HCC): ICD-10-CM

## 2022-06-23 DIAGNOSIS — E11.42 TYPE 2 DIABETES MELLITUS WITH DIABETIC POLYNEUROPATHY, WITHOUT LONG-TERM CURRENT USE OF INSULIN (HCC): ICD-10-CM

## 2022-06-23 DIAGNOSIS — N18.31 STAGE 3A CHRONIC KIDNEY DISEASE (HCC): ICD-10-CM

## 2022-06-23 DIAGNOSIS — J43.2 CENTRILOBULAR EMPHYSEMA (HCC): ICD-10-CM

## 2022-06-23 DIAGNOSIS — I25.10 ARTERIOSCLEROSIS OF CORONARY ARTERY: ICD-10-CM

## 2022-06-23 DIAGNOSIS — I11.0 HYPERTENSIVE HEART DISEASE WITH CONGESTIVE HEART FAILURE, UNSPECIFIED HEART FAILURE TYPE (HCC): ICD-10-CM

## 2022-06-23 LAB
ALBUMIN SERPL BCP-MCNC: 3.6 G/DL (ref 3.5–5)
ALP SERPL-CCNC: 54 U/L (ref 46–116)
ALT SERPL W P-5'-P-CCNC: 25 U/L (ref 12–78)
ANION GAP SERPL CALCULATED.3IONS-SCNC: 4 MMOL/L (ref 4–13)
AST SERPL W P-5'-P-CCNC: 18 U/L (ref 5–45)
BILIRUB SERPL-MCNC: 0.52 MG/DL (ref 0.2–1)
BUN SERPL-MCNC: 21 MG/DL (ref 5–25)
CALCIUM SERPL-MCNC: 9 MG/DL (ref 8.3–10.1)
CHLORIDE SERPL-SCNC: 100 MMOL/L (ref 100–108)
CHOLEST SERPL-MCNC: 75 MG/DL
CO2 SERPL-SCNC: 32 MMOL/L (ref 21–32)
CREAT SERPL-MCNC: 1.42 MG/DL (ref 0.6–1.3)
CREAT UR-MCNC: 86.8 MG/DL
ERYTHROCYTE [DISTWIDTH] IN BLOOD BY AUTOMATED COUNT: 12 % (ref 11.6–15.1)
EST. AVERAGE GLUCOSE BLD GHB EST-MCNC: 189 MG/DL
GFR SERPL CREATININE-BSD FRML MDRD: 45 ML/MIN/1.73SQ M
GLUCOSE P FAST SERPL-MCNC: 152 MG/DL (ref 65–99)
HBA1C MFR BLD: 8.2 %
HCT VFR BLD AUTO: 43.7 % (ref 36.5–49.3)
HDLC SERPL-MCNC: 30 MG/DL
HGB BLD-MCNC: 14 G/DL (ref 12–17)
LDLC SERPL CALC-MCNC: 15 MG/DL (ref 0–100)
MCH RBC QN AUTO: 30.9 PG (ref 26.8–34.3)
MCHC RBC AUTO-ENTMCNC: 32 G/DL (ref 31.4–37.4)
MCV RBC AUTO: 97 FL (ref 82–98)
MICROALBUMIN UR-MCNC: 15.6 MG/L (ref 0–20)
MICROALBUMIN/CREAT 24H UR: 18 MG/G CREATININE (ref 0–30)
PLATELET # BLD AUTO: 196 THOUSANDS/UL (ref 149–390)
PMV BLD AUTO: 11.3 FL (ref 8.9–12.7)
POTASSIUM SERPL-SCNC: 3.7 MMOL/L (ref 3.5–5.3)
PROT SERPL-MCNC: 7.3 G/DL (ref 6.4–8.2)
RBC # BLD AUTO: 4.53 MILLION/UL (ref 3.88–5.62)
SODIUM SERPL-SCNC: 136 MMOL/L (ref 136–145)
TRIGL SERPL-MCNC: 149 MG/DL
WBC # BLD AUTO: 9.31 THOUSAND/UL (ref 4.31–10.16)

## 2022-06-23 PROCEDURE — 80061 LIPID PANEL: CPT

## 2022-06-23 PROCEDURE — 82570 ASSAY OF URINE CREATININE: CPT

## 2022-06-23 PROCEDURE — 80053 COMPREHEN METABOLIC PANEL: CPT

## 2022-06-23 PROCEDURE — 83036 HEMOGLOBIN GLYCOSYLATED A1C: CPT

## 2022-06-23 PROCEDURE — 85027 COMPLETE CBC AUTOMATED: CPT

## 2022-06-23 PROCEDURE — 82043 UR ALBUMIN QUANTITATIVE: CPT

## 2022-06-24 ENCOUNTER — TELEPHONE (OUTPATIENT)
Dept: FAMILY MEDICINE CLINIC | Facility: CLINIC | Age: 82
End: 2022-06-24

## 2022-07-07 ENCOUNTER — ANTICOAG VISIT (OUTPATIENT)
Dept: FAMILY MEDICINE CLINIC | Facility: CLINIC | Age: 82
End: 2022-07-07

## 2022-07-07 ENCOUNTER — APPOINTMENT (OUTPATIENT)
Dept: LAB | Facility: CLINIC | Age: 82
End: 2022-07-07
Payer: COMMERCIAL

## 2022-07-07 DIAGNOSIS — I48.0 PAF (PAROXYSMAL ATRIAL FIBRILLATION) (HCC): Primary | ICD-10-CM

## 2022-07-07 NOTE — PROGRESS NOTES
Spoke with Nancy Duron himself who usually has wife take care of information  He repeated back to take 4mg T, TH and 3mg all other days  Will recheck in one week    Sujata Montez

## 2022-07-08 ENCOUNTER — OFFICE VISIT (OUTPATIENT)
Dept: CARDIOLOGY CLINIC | Facility: CLINIC | Age: 82
End: 2022-07-08
Payer: COMMERCIAL

## 2022-07-08 ENCOUNTER — TELEPHONE (OUTPATIENT)
Dept: FAMILY MEDICINE CLINIC | Facility: CLINIC | Age: 82
End: 2022-07-08

## 2022-07-08 VITALS
WEIGHT: 139 LBS | OXYGEN SATURATION: 93 % | HEIGHT: 71 IN | DIASTOLIC BLOOD PRESSURE: 66 MMHG | BODY MASS INDEX: 19.46 KG/M2 | SYSTOLIC BLOOD PRESSURE: 130 MMHG | HEART RATE: 78 BPM | TEMPERATURE: 98.4 F

## 2022-07-08 DIAGNOSIS — Z95.810 PRESENCE OF AUTOMATIC CARDIOVERTER/DEFIBRILLATOR (AICD): ICD-10-CM

## 2022-07-08 DIAGNOSIS — I42.8 NONISCHEMIC CARDIOMYOPATHY (HCC): ICD-10-CM

## 2022-07-08 DIAGNOSIS — E11.59 TYPE 2 DIABETES MELLITUS WITH OTHER CIRCULATORY COMPLICATION, WITHOUT LONG-TERM CURRENT USE OF INSULIN (HCC): ICD-10-CM

## 2022-07-08 DIAGNOSIS — I10 HTN (HYPERTENSION), MALIGNANT: ICD-10-CM

## 2022-07-08 DIAGNOSIS — I70.209 PERIPHERAL ARTERIOSCLEROSIS (HCC): ICD-10-CM

## 2022-07-08 DIAGNOSIS — I50.22 CHRONIC SYSTOLIC CONGESTIVE HEART FAILURE (HCC): ICD-10-CM

## 2022-07-08 DIAGNOSIS — I48.20 CHRONIC ATRIAL FIBRILLATION (HCC): Primary | ICD-10-CM

## 2022-07-08 DIAGNOSIS — I25.10 ARTERIOSCLEROSIS OF CORONARY ARTERY: ICD-10-CM

## 2022-07-08 PROCEDURE — 93000 ELECTROCARDIOGRAM COMPLETE: CPT | Performed by: INTERNAL MEDICINE

## 2022-07-08 PROCEDURE — 99214 OFFICE O/P EST MOD 30 MIN: CPT | Performed by: INTERNAL MEDICINE

## 2022-07-08 NOTE — TELEPHONE ENCOUNTER
Juan Antonio's wife calling to get his INR instructions  She stated her  has dementia and we should not be giving him directions because he forgets    Please call wife at [phone]    Thank Lucrecia Odonnell

## 2022-07-08 NOTE — TELEPHONE ENCOUNTER
INR instructions verbalized to patients wife  She is requesting we do not give INR instructions to the patient as he has dementia  There is an FYI flag in patients chart  NFA needed     Mukund Davila MA
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
## 2022-07-15 ENCOUNTER — ANTICOAG VISIT (OUTPATIENT)
Dept: FAMILY MEDICINE CLINIC | Facility: CLINIC | Age: 82
End: 2022-07-15

## 2022-07-15 ENCOUNTER — APPOINTMENT (OUTPATIENT)
Dept: LAB | Facility: CLINIC | Age: 82
End: 2022-07-15
Payer: COMMERCIAL

## 2022-07-15 DIAGNOSIS — I48.0 PAF (PAROXYSMAL ATRIAL FIBRILLATION) (HCC): Primary | ICD-10-CM

## 2022-07-19 ENCOUNTER — OFFICE VISIT (OUTPATIENT)
Dept: FAMILY MEDICINE CLINIC | Facility: CLINIC | Age: 82
End: 2022-07-19
Payer: COMMERCIAL

## 2022-07-19 VITALS
OXYGEN SATURATION: 96 % | HEART RATE: 75 BPM | WEIGHT: 140.2 LBS | HEIGHT: 71 IN | SYSTOLIC BLOOD PRESSURE: 118 MMHG | BODY MASS INDEX: 19.63 KG/M2 | TEMPERATURE: 96.6 F | DIASTOLIC BLOOD PRESSURE: 74 MMHG | RESPIRATION RATE: 18 BRPM

## 2022-07-19 DIAGNOSIS — G30.9 ALZHEIMER'S DEMENTIA WITHOUT BEHAVIORAL DISTURBANCE, UNSPECIFIED TIMING OF DEMENTIA ONSET: ICD-10-CM

## 2022-07-19 DIAGNOSIS — I50.1 HEART FAILURE, LEFT, WITH LVEF 31-40% (HCC): ICD-10-CM

## 2022-07-19 DIAGNOSIS — I48.0 PAF (PAROXYSMAL ATRIAL FIBRILLATION) (HCC): ICD-10-CM

## 2022-07-19 DIAGNOSIS — J43.2 CENTRILOBULAR EMPHYSEMA (HCC): ICD-10-CM

## 2022-07-19 DIAGNOSIS — N18.31 STAGE 3A CHRONIC KIDNEY DISEASE (HCC): ICD-10-CM

## 2022-07-19 DIAGNOSIS — F02.80 ALZHEIMER'S DEMENTIA WITHOUT BEHAVIORAL DISTURBANCE, UNSPECIFIED TIMING OF DEMENTIA ONSET: ICD-10-CM

## 2022-07-19 DIAGNOSIS — E11.42 TYPE 2 DIABETES MELLITUS WITH DIABETIC POLYNEUROPATHY, WITHOUT LONG-TERM CURRENT USE OF INSULIN (HCC): Primary | ICD-10-CM

## 2022-07-19 DIAGNOSIS — I11.0 HYPERTENSIVE HEART DISEASE WITH CONGESTIVE HEART FAILURE, UNSPECIFIED HEART FAILURE TYPE (HCC): ICD-10-CM

## 2022-07-19 PROCEDURE — 1101F PT FALLS ASSESS-DOCD LE1/YR: CPT | Performed by: FAMILY MEDICINE

## 2022-07-19 PROCEDURE — 1160F RVW MEDS BY RX/DR IN RCRD: CPT | Performed by: FAMILY MEDICINE

## 2022-07-19 PROCEDURE — 3078F DIAST BP <80 MM HG: CPT | Performed by: FAMILY MEDICINE

## 2022-07-19 PROCEDURE — 3288F FALL RISK ASSESSMENT DOCD: CPT | Performed by: FAMILY MEDICINE

## 2022-07-19 PROCEDURE — 3074F SYST BP LT 130 MM HG: CPT | Performed by: FAMILY MEDICINE

## 2022-07-19 PROCEDURE — 99214 OFFICE O/P EST MOD 30 MIN: CPT | Performed by: FAMILY MEDICINE

## 2022-07-19 PROCEDURE — 3725F SCREEN DEPRESSION PERFORMED: CPT | Performed by: FAMILY MEDICINE

## 2022-07-19 NOTE — PROGRESS NOTES
Assessment/Plan:    1  Type 2 diabetes mellitus with diabetic polyneuropathy, without long-term current use of insulin ([place] [address] )  Assessment & Plan:  Pt will work on limiting crabs in diet at this time  I will leave him at his current meds given his age etc   Will follow labs in 4 months  [facility] in the past - med was not covered  Lab Results   Component Value Date    HGBA1C 8 2 (H) 06/23/2022       Orders:  -     Hemoglobin A1C; Future; Expected date: 11/16/2022  -     Comprehensive metabolic panel; Future; Expected date: 11/16/2022    2  Centrilobular emphysema ([place] [address] )    3  PAF (paroxysmal atrial fibrillation) ([place] [address] )    4  Heart failure, left, 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% ([place] [address] )    5  Hypertensive heart disease with congestive heart failure, unspecified heart failure type ([place] [address] )    6  Alzheimer's dementia without behavioral disturbance, unspecified timing of dementia onset ([place] [address] )    7  Stage 3a chronic kidney disease ([place] [address] )        Pt would like to redraw inr in 3 weeks inseatd of the 1 week, which I think is fine  Will leave diabetic meds where they are  Numbers in 4 months    There are no Patient Instructions on file for this visit  Return in 4 months (on 11/19/2022) for Diabetes follow-up and AWV - 30 min appt  Subjective:      Patient ID: Jay Nicholson is a 78 y o  male  Chief Complaint   Patient presents with    Follow-up     4 month f/u nm lpn       Pt is here for a 4 month follow up      Pt's A1c has gone up, wife mentions pt has been eating a lot of grapes      The following portions of the patient's history were reviewed and updated as appropriate: allergies, current medications, past family history, past medical history, past social history, past surgical history and problem list     Review of Systems   Constitutional: Negative for activity change, appetite change, chills, diaphoresis, fatigue, fever and unexpected weight change     HENT: Negative for congestion, dental problem, ear pain, mouth sores, sinus pressure, sinus pain, sore throat and trouble swallowing  Eyes: Negative for photophobia, discharge and itching  Respiratory: Negative for apnea, chest tightness and shortness of breath  Cardiovascular: Negative for chest pain, palpitations and leg swelling  Gastrointestinal: Negative for abdominal distention, abdominal pain, blood in stool, nausea and vomiting  Endocrine: Negative for cold intolerance, heat intolerance, polydipsia, polyphagia and polyuria  Genitourinary: Negative for difficulty urinating  Musculoskeletal: Negative for arthralgias  Skin: Negative for color change and wound  Neurological: Negative for dizziness, syncope, speech difficulty and headaches  Hematological: Negative for adenopathy  Psychiatric/Behavioral: Negative for agitation and behavioral problems  Current Outpatient Medications   Medication Sig Dispense Refill    Ascorbic Acid (VITAMIN C) 1000 MG tablet Take 1,000 mg by mouth daily      carvedilol (COREG) 6 25 mg tablet Take 1 tablet (6 25 mg total) by mouth 2 (two) times a day 180 tablet 3    digoxin (LANOXIN) 0 125 mg tablet TAKE 1 TABLET EVERY DAY 90 tablet 3    Ferrous Sulfate (Iron) 325 (65 Fe) MG TABS Take by mouth every other day      fluticasone-umeclidinium-vilanterol (TRELEGY) 100-62 5-25 MCG/INH inhaler Inhale 1 puff daily Rinse mouth after use   60 blister 11    fosinopril (MONOPRIL) 10 mg tablet TAKE 1 TABLET EVERY DAY 90 tablet 1    furosemide (LASIX) 40 mg tablet TAKE 1 TABLET EVERY DAY 90 tablet 3    glucose blood test strip by In Vitro route      guaiFENesin (MUCINEX) 600 mg 12 hr tablet Take 1 tablet (600 mg total) by mouth every 12 (twelve) hours 30 tablet 0    memantine (NAMENDA) 10 mg tablet Take 1 tablet (10 mg total) by mouth 2 (two) times a day 180 tablet 1    metFORMIN (GLUCOPHAGE) 500 mg tablet TAKE 1 TABLET (500 MG TOTAL) BY MOUTH 2 (TWO) TIMES A DAY WITH MEALS 180 tablet 1    Omega-3 Fatty Acids (FISH OIL PO) Take by mouth      pantoprazole (PROTONIX) 40 mg tablet TAKE 1 TABLET EVERY DAY 90 tablet 1    rosuvastatin (CRESTOR) 10 MG tablet TAKE 1 TABLET (10 MG TOTAL) BY MOUTH DAILY AT BEDTIME 90 tablet 3    warfarin (COUMADIN) 1 mg tablet Take 1 tablet (1 mg total) by mouth daily 90 tablet 0    warfarin (COUMADIN) 3 mg tablet TAKE AS DIRECTED BY OFFICE BASED ON INR (GOAL INR AT PRESENT IS 1 5 TO 2) 90 tablet 1    warfarin (COUMADIN) 5 mg tablet Take 1 tablet (5 mg total) by mouth daily 90 tablet 1    PRODIGY TWIST TOP LANCETS 28G MISC Use   (Patient not taking: No sig reported)       No current facility-administered medications for this visit  Objective:    /74   Pulse 75   Temp (!) 96 6 °F (35 9 °C)   Resp 18   Ht 5' 11" (1 803 m)   Wt 63 6 kg (140 lb 3 2 oz)   SpO2 96%   BMI 19 55 kg/m²        Physical Exam  Vitals and nursing note reviewed  Constitutional:       General: He is not in acute distress  Appearance: He is well-developed  He is not diaphoretic  HENT:      Head: Normocephalic and atraumatic  Right Ear: External ear normal       Left Ear: External ear normal       Nose: Nose normal       Mouth/Throat:      Pharynx: No oropharyngeal exudate  Eyes:      General: No scleral icterus  Right eye: No discharge  Left eye: No discharge  Pupils: Pupils are equal, round, and reactive to light  Neck:      Thyroid: No thyromegaly  Cardiovascular:      Rate and Rhythm: Normal rate  Heart sounds: Normal heart sounds  No murmur heard  Pulmonary:      Effort: Pulmonary effort is normal  No respiratory distress  Breath sounds: Normal breath sounds  No wheezing  Abdominal:      General: Bowel sounds are normal  There is no distension  Palpations: Abdomen is soft  There is no mass  Tenderness: There is no abdominal tenderness  There is no guarding or rebound  Musculoskeletal:         General: Normal range of motion     Skin:     General: Skin is warm and dry  Findings: No erythema or rash  Neurological:      Mental Status: He is alert  Coordination: Coordination normal       Deep Tendon Reflexes: Reflexes normal    Psychiatric:         Behavior: Behavior normal               Recent Results (from the past 672 hour(s))   Protime-INR    Collection Time: 06/23/22  7:24 AM   Result Value Ref Range    Protime 19 4 (H) 11 6 - 14 5 seconds    INR 1 72 (H) 0 84 - 1 19   Hemoglobin A1C    Collection Time: 06/23/22  7:24 AM   Result Value Ref Range    Hemoglobin A1C 8 2 (H) Normal 3 8-5 6%; PreDiabetic 5 7-6 4%; Diabetic >=6 5%; Glycemic control for adults with diabetes <7 0% %     mg/dl   Comprehensive metabolic panel    Collection Time: 06/23/22  7:24 AM   Result Value Ref Range    Sodium 136 136 - 145 mmol/L    Potassium 3 7 3 5 - 5 3 mmol/L    Chloride 100 100 - 108 mmol/L    CO2 32 21 - 32 mmol/L    ANION GAP 4 4 - 13 mmol/L    BUN 21 5 - 25 mg/dL    Creatinine 1 42 (H) 0 60 - 1 30 mg/dL    Glucose, Fasting 152 (H) 65 - 99 mg/dL    Calcium 9 0 8 3 - 10 1 mg/dL    AST 18 5 - 45 U/L    ALT 25 12 - 78 U/L    Alkaline Phosphatase 54 46 - 116 U/L    Total Protein 7 3 6 4 - 8 2 g/dL    Albumin 3 6 3 5 - 5 0 g/dL    Total Bilirubin 0 52 0 20 - 1 00 mg/dL    eGFR 45 ml/min/1 73sq m   CBC and Platelet    Collection Time: 06/23/22  7:24 AM   Result Value Ref Range    WBC 9 31 4 31 - 10 16 Thousand/uL    RBC 4 53 3 88 - 5 62 Million/uL    Hemoglobin 14 0 12 0 - 17 0 g/dL    Hematocrit 43 7 36 5 - 49 3 %    MCV 97 82 - 98 fL    MCH 30 9 26 8 - 34 3 pg    MCHC 32 0 31 4 - 37 4 g/dL    RDW 12 0 11 6 - 15 1 %    Platelets 075 209 - 144 Thousands/uL    MPV 11 3 8 9 - 12 7 fL   Microalbumin / creatinine urine ratio    Collection Time: 06/23/22  7:24 AM   Result Value Ref Range    Creatinine, Ur 86 8 mg/dL    Microalbum  ,U,Random 15 6 0 0 - 20 0 mg/L    Microalb Creat Ratio 18 0 - 30 mg/g creatinine   Lipid Panel with Direct LDL reflex    Collection Time: 06/23/22  7:24 AM   Result Value Ref Range    Cholesterol 75 See Comment mg/dL    Triglycerides 149 See Comment mg/dL    HDL, Direct 30 (L) >=40 mg/dL    LDL Calculated 15 0 - 100 mg/dL   Protime-INR    Collection Time: 07/07/22  7:28 AM   Result Value Ref Range    Protime 20 5 (H) 11 6 - 14 5 seconds    INR 1 85 (H) 0 84 - 1 19   Protime-INR    Collection Time: 07/15/22  7:30 AM   Result Value Ref Range    Protime 20 9 (H) 11 6 - 14 5 seconds    INR 1 90 (H) 0 84 - 1 19         Jesenia Talavera DO

## 2022-07-19 NOTE — ASSESSMENT & PLAN NOTE
Pt will work on limiting crabs in diet at this time      I will leave him at his current meds given his age etc   Will follow labs in 4 months  Polly Salines in the past - med was not covered  Lab Results   Component Value Date    HGBA1C 8 2 (H) 06/23/2022

## 2022-08-22 ENCOUNTER — ANTICOAG VISIT (OUTPATIENT)
Dept: FAMILY MEDICINE CLINIC | Facility: CLINIC | Age: 82
End: 2022-08-22

## 2022-08-22 ENCOUNTER — APPOINTMENT (OUTPATIENT)
Dept: LAB | Facility: CLINIC | Age: 82
End: 2022-08-22
Payer: COMMERCIAL

## 2022-08-22 DIAGNOSIS — I48.0 PAF (PAROXYSMAL ATRIAL FIBRILLATION) (HCC): Primary | ICD-10-CM

## 2022-09-03 ENCOUNTER — HOSPITAL ENCOUNTER (EMERGENCY)
Facility: HOSPITAL | Age: 82
Discharge: HOME | End: 2022-09-03
Attending: EMERGENCY MEDICINE
Payer: COMMERCIAL

## 2022-09-03 ENCOUNTER — APPOINTMENT (OUTPATIENT)
Dept: RADIOLOGY | Facility: HOSPITAL | Age: 82
End: 2022-09-03
Payer: COMMERCIAL

## 2022-09-03 VITALS
RESPIRATION RATE: 28 BRPM | HEART RATE: 72 BPM | TEMPERATURE: 97.3 F | OXYGEN SATURATION: 91 % | SYSTOLIC BLOOD PRESSURE: 191 MMHG | DIASTOLIC BLOOD PRESSURE: 87 MMHG

## 2022-09-03 DIAGNOSIS — J43.2 CENTRILOBULAR EMPHYSEMA (HCC): ICD-10-CM

## 2022-09-03 DIAGNOSIS — L03.031 CELLULITIS OF GREAT TOE, RIGHT: Primary | ICD-10-CM

## 2022-09-03 LAB
ALBUMIN SERPL BCP-MCNC: 3.8 G/DL (ref 3.5–5)
ALP SERPL-CCNC: 78 U/L (ref 46–116)
ALT SERPL W P-5'-P-CCNC: 24 U/L (ref 12–78)
ANION GAP SERPL CALCULATED.3IONS-SCNC: 8 MMOL/L (ref 4–13)
AST SERPL W P-5'-P-CCNC: 16 U/L (ref 5–45)
ATRIAL RATE: 74 BPM
BASOPHILS # BLD AUTO: 0.08 THOUSANDS/ΜL (ref 0–0.1)
BASOPHILS NFR BLD AUTO: 1 % (ref 0–1)
BILIRUB SERPL-MCNC: 0.49 MG/DL (ref 0.2–1)
BUN SERPL-MCNC: 20 MG/DL (ref 5–25)
CALCIUM SERPL-MCNC: 8.9 MG/DL (ref 8.3–10.1)
CHLORIDE SERPL-SCNC: 101 MMOL/L (ref 96–108)
CO2 SERPL-SCNC: 33 MMOL/L (ref 21–32)
CREAT SERPL-MCNC: 1.57 MG/DL (ref 0.6–1.3)
EOSINOPHIL # BLD AUTO: 0.09 THOUSAND/ΜL (ref 0–0.61)
EOSINOPHIL NFR BLD AUTO: 1 % (ref 0–6)
ERYTHROCYTE [DISTWIDTH] IN BLOOD BY AUTOMATED COUNT: 12.8 % (ref 11.6–15.1)
GFR SERPL CREATININE-BSD FRML MDRD: 40 ML/MIN/1.73SQ M
GLUCOSE SERPL-MCNC: 132 MG/DL (ref 65–140)
HCT VFR BLD AUTO: 46.3 % (ref 36.5–49.3)
HGB BLD-MCNC: 14.7 G/DL (ref 12–17)
IMM GRANULOCYTES # BLD AUTO: 0.03 THOUSAND/UL (ref 0–0.2)
IMM GRANULOCYTES NFR BLD AUTO: 0 % (ref 0–2)
LYMPHOCYTES # BLD AUTO: 1.5 THOUSANDS/ΜL (ref 0.6–4.47)
LYMPHOCYTES NFR BLD AUTO: 17 % (ref 14–44)
MCH RBC QN AUTO: 29.8 PG (ref 26.8–34.3)
MCHC RBC AUTO-ENTMCNC: 31.7 G/DL (ref 31.4–37.4)
MCV RBC AUTO: 94 FL (ref 82–98)
MONOCYTES # BLD AUTO: 0.78 THOUSAND/ΜL (ref 0.17–1.22)
MONOCYTES NFR BLD AUTO: 9 % (ref 4–12)
NEUTROPHILS # BLD AUTO: 6.6 THOUSANDS/ΜL (ref 1.85–7.62)
NEUTS SEG NFR BLD AUTO: 72 % (ref 43–75)
NRBC BLD AUTO-RTO: 0 /100 WBCS
NT-PROBNP SERPL-MCNC: 354 PG/ML
P AXIS: 76 DEGREES
PLATELET # BLD AUTO: 229 THOUSANDS/UL (ref 149–390)
PMV BLD AUTO: 10.6 FL (ref 8.9–12.7)
POTASSIUM SERPL-SCNC: 3.7 MMOL/L (ref 3.5–5.3)
PR INTERVAL: 198 MS
PROT SERPL-MCNC: 7.5 G/DL (ref 6.4–8.4)
QRS AXIS: -56 DEGREES
QRSD INTERVAL: 76 MS
QT INTERVAL: 342 MS
QTC INTERVAL: 379 MS
RBC # BLD AUTO: 4.94 MILLION/UL (ref 3.88–5.62)
SARS-COV-2 RNA RESP QL NAA+PROBE: NEGATIVE
SODIUM SERPL-SCNC: 142 MMOL/L (ref 135–147)
T WAVE AXIS: 76 DEGREES
VENTRICULAR RATE: 74 BPM
WBC # BLD AUTO: 9.08 THOUSAND/UL (ref 4.31–10.16)

## 2022-09-03 PROCEDURE — 71045 X-RAY EXAM CHEST 1 VIEW: CPT

## 2022-09-03 PROCEDURE — 80053 COMPREHEN METABOLIC PANEL: CPT | Performed by: EMERGENCY MEDICINE

## 2022-09-03 PROCEDURE — 83880 ASSAY OF NATRIURETIC PEPTIDE: CPT | Performed by: EMERGENCY MEDICINE

## 2022-09-03 PROCEDURE — 99284 EMERGENCY DEPT VISIT MOD MDM: CPT

## 2022-09-03 PROCEDURE — 85025 COMPLETE CBC W/AUTO DIFF WBC: CPT | Performed by: EMERGENCY MEDICINE

## 2022-09-03 PROCEDURE — 94640 AIRWAY INHALATION TREATMENT: CPT

## 2022-09-03 PROCEDURE — U0005 INFEC AGEN DETEC AMPLI PROBE: HCPCS | Performed by: EMERGENCY MEDICINE

## 2022-09-03 PROCEDURE — 93005 ELECTROCARDIOGRAM TRACING: CPT

## 2022-09-03 PROCEDURE — 36415 COLL VENOUS BLD VENIPUNCTURE: CPT | Performed by: EMERGENCY MEDICINE

## 2022-09-03 PROCEDURE — U0003 INFECTIOUS AGENT DETECTION BY NUCLEIC ACID (DNA OR RNA); SEVERE ACUTE RESPIRATORY SYNDROME CORONAVIRUS 2 (SARS-COV-2) (CORONAVIRUS DISEASE [COVID-19]), AMPLIFIED PROBE TECHNIQUE, MAKING USE OF HIGH THROUGHPUT TECHNOLOGIES AS DESCRIBED BY CMS-2020-01-R: HCPCS | Performed by: EMERGENCY MEDICINE

## 2022-09-03 PROCEDURE — 99285 EMERGENCY DEPT VISIT HI MDM: CPT | Performed by: EMERGENCY MEDICINE

## 2022-09-03 RX ORDER — CEPHALEXIN 500 MG/1
500 CAPSULE ORAL EVERY 8 HOURS SCHEDULED
Qty: 15 CAPSULE | Refills: 0 | Status: SHIPPED | OUTPATIENT
Start: 2022-09-03 | End: 2022-09-08

## 2022-09-03 RX ORDER — IPRATROPIUM BROMIDE AND ALBUTEROL SULFATE 2.5; .5 MG/3ML; MG/3ML
3 SOLUTION RESPIRATORY (INHALATION) ONCE
Status: COMPLETED | OUTPATIENT
Start: 2022-09-03 | End: 2022-09-03

## 2022-09-03 RX ORDER — CEPHALEXIN 500 MG/1
500 CAPSULE ORAL ONCE
Status: COMPLETED | OUTPATIENT
Start: 2022-09-03 | End: 2022-09-03

## 2022-09-03 RX ORDER — ALBUTEROL SULFATE 90 UG/1
2 AEROSOL, METERED RESPIRATORY (INHALATION) EVERY 6 HOURS PRN
Qty: 8.5 G | Refills: 0 | Status: SHIPPED | OUTPATIENT
Start: 2022-09-03

## 2022-09-03 RX ADMIN — IPRATROPIUM BROMIDE AND ALBUTEROL SULFATE 3 ML: 2.5; .5 SOLUTION RESPIRATORY (INHALATION) at 13:55

## 2022-09-03 RX ADMIN — CEPHALEXIN 500 MG: 500 CAPSULE ORAL at 14:58

## 2022-09-03 NOTE — DISCHARGE INSTRUCTIONS
Use the prescribed antibiotic for the next 5 days  Call the number above to schedule follow-up with podiatry  Use the prescribed albuterol inhaler with the provided spacer for any shortness of breath  If you have any significantly worsening shortness of breath, wheezing, cough, chest tightness, or if your oxygen remains below 90% despite home oxygen, return to the emergency department
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
## 2022-09-06 LAB
ATRIAL RATE: 74 BPM
P AXIS: 76 DEGREES
PR INTERVAL: 198 MS
QRS AXIS: -56 DEGREES
QRSD INTERVAL: 76 MS
QT INTERVAL: 342 MS
QTC INTERVAL: 379 MS
T WAVE AXIS: 76 DEGREES
VENTRICULAR RATE: 74 BPM

## 2022-09-06 PROCEDURE — 93010 ELECTROCARDIOGRAM REPORT: CPT | Performed by: INTERNAL MEDICINE

## 2022-09-12 ENCOUNTER — OFFICE VISIT (OUTPATIENT)
Dept: PULMONOLOGY | Facility: MEDICAL CENTER | Age: 82
End: 2022-09-12
Payer: COMMERCIAL

## 2022-09-12 VITALS
BODY MASS INDEX: 19.32 KG/M2 | HEIGHT: 71 IN | DIASTOLIC BLOOD PRESSURE: 82 MMHG | HEART RATE: 85 BPM | RESPIRATION RATE: 12 BRPM | WEIGHT: 138 LBS | SYSTOLIC BLOOD PRESSURE: 138 MMHG | OXYGEN SATURATION: 95 % | TEMPERATURE: 97.7 F

## 2022-09-12 DIAGNOSIS — G30.9 ALZHEIMER'S DEMENTIA WITHOUT BEHAVIORAL DISTURBANCE, UNSPECIFIED TIMING OF DEMENTIA ONSET: ICD-10-CM

## 2022-09-12 DIAGNOSIS — J43.9 BULLOUS EMPHYSEMA (HCC): Primary | ICD-10-CM

## 2022-09-12 DIAGNOSIS — J96.11 CHRONIC HYPOXEMIC RESPIRATORY FAILURE (HCC): ICD-10-CM

## 2022-09-12 DIAGNOSIS — F02.80 ALZHEIMER'S DEMENTIA WITHOUT BEHAVIORAL DISTURBANCE, UNSPECIFIED TIMING OF DEMENTIA ONSET: ICD-10-CM

## 2022-09-12 PROCEDURE — 94010 BREATHING CAPACITY TEST: CPT | Performed by: INTERNAL MEDICINE

## 2022-09-12 PROCEDURE — 99214 OFFICE O/P EST MOD 30 MIN: CPT | Performed by: INTERNAL MEDICINE

## 2022-09-12 PROCEDURE — 1160F RVW MEDS BY RX/DR IN RCRD: CPT | Performed by: INTERNAL MEDICINE
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
## 2022-09-12 NOTE — ASSESSMENT & PLAN NOTE
Concetta Devendhaval does have dementia  He is functional and can ambulate  He has been cooperative  He is forgetful and does repeat things a lot as per his wife  Patient used to be very sharp in detail oriented but no longer is  Mostly watches television during the day

## 2022-09-12 NOTE — ASSESSMENT & PLAN NOTE
Som Doherty has very severe bullous emphysema  His FEV1 is very severely decreased at 0 53 L or 19% of predicted  Despite this he is comfortable and able to ambulate in his house without any significant dyspnea  He is doing well on regimen of Trelegy 100 mcg 1 puff daily and rarely needs to use rescue albuterol inhaler or nebulizer with albuterol      Continue Trelegy 100 mcg 1 puff daily

## 2022-09-12 NOTE — ASSESSMENT & PLAN NOTE
Toyin Schmitt does have oxygen which he uses 3 liters/minute with activity at times and sometimes at rest   His [employer] company is [employer]    Pulse oximetry testing done today is as follows:    Room air O2 saturation at rest was 96% and after ambulating 200 ft lowest O2 saturation was 84%    On 3 liters/minute nasal cannula oxygen at rest O2 saturation was 97% and on 3 liters/minute nasal cannula oxygen with ambulating 200 ft lowest O2 saturation was 91%

## 2022-09-19 ENCOUNTER — ANTICOAG VISIT (OUTPATIENT)
Dept: FAMILY MEDICINE CLINIC | Facility: CLINIC | Age: 82
End: 2022-09-19

## 2022-09-19 ENCOUNTER — APPOINTMENT (OUTPATIENT)
Dept: LAB | Facility: CLINIC | Age: 82
End: 2022-09-19
Payer: COMMERCIAL

## 2022-09-19 DIAGNOSIS — I48.0 PAF (PAROXYSMAL ATRIAL FIBRILLATION) (HCC): Primary | ICD-10-CM

## 2022-09-22 ENCOUNTER — OFFICE VISIT (OUTPATIENT)
Dept: PODIATRY | Facility: CLINIC | Age: 82
End: 2022-09-22
Payer: COMMERCIAL

## 2022-09-22 VITALS
SYSTOLIC BLOOD PRESSURE: 138 MMHG | WEIGHT: 138 LBS | RESPIRATION RATE: 17 BRPM | HEIGHT: 71 IN | DIASTOLIC BLOOD PRESSURE: 82 MMHG | BODY MASS INDEX: 19.32 KG/M2

## 2022-09-22 DIAGNOSIS — L60.0 INGROWN TOENAIL: ICD-10-CM

## 2022-09-22 DIAGNOSIS — L03.031 PARONYCHIA OF TOENAIL OF RIGHT FOOT: ICD-10-CM

## 2022-09-22 DIAGNOSIS — B35.1 ONYCHOMYCOSIS: ICD-10-CM

## 2022-09-22 DIAGNOSIS — I70.209 PERIPHERAL ARTERIOSCLEROSIS (HCC): ICD-10-CM

## 2022-09-22 DIAGNOSIS — M79.672 PAIN IN BOTH FEET: ICD-10-CM

## 2022-09-22 DIAGNOSIS — E11.42 DIABETIC POLYNEUROPATHY ASSOCIATED WITH TYPE 2 DIABETES MELLITUS (HCC): Primary | ICD-10-CM

## 2022-09-22 DIAGNOSIS — M79.671 PAIN IN BOTH FEET: ICD-10-CM

## 2022-09-22 PROCEDURE — 99203 OFFICE O/P NEW LOW 30 MIN: CPT | Performed by: PODIATRIST

## 2022-09-22 NOTE — PROGRESS NOTES
Assessment/Plan:  Ingrown toenail paronychia, resolving right hallux  Patient with diabetic neuropathy and peripheral artery disease  Pain upon ambulation  Mycosis of nail  Plan  Foot exam performed  Patient advised on condition  Patient advised on care of the diabetic foot  Today partial nail avulsion done right hallux  Under aseptic technique tibial aspect and fibular aspect right hallux nail plate avulsed  All nails debrided without pain or complication  Patient will finish antibiotic  Today gentian violet dry sterile dressing applied  Diagnoses and all orders for this visit:    Diabetic polyneuropathy associated with type 2 diabetes mellitus ([facility] [address] )    Peripheral arteriosclerosis ([facility] [address] )    Onychomycosis    Ingrown toenail    Paronychia of toenail of right foot    Pain in both feet          Subjective:  Patient is seen on referral from Urgent Care  Patient has pain in his right big toe  He is on antibiotic  Patient is diabetic    No history of trauma    No Known Allergies      Current Outpatient Medications:     albuterol (ProAir HFA) 90 mcg/act inhaler, Inhale 2 puffs every 6 (six) hours as needed for wheezing, Disp: 8 5 g, Rfl: 0    Ascorbic Acid (VITAMIN C) 1000 MG tablet, Take 1,000 mg by mouth daily, Disp: , Rfl:     carvedilol (COREG) 6 25 mg tablet, Take 1 tablet (6 25 mg total) by mouth 2 (two) times a day, Disp: 180 tablet, Rfl: 3    digoxin (LANOXIN) 0 125 mg tablet, TAKE 1 TABLET EVERY DAY, Disp: 90 tablet, Rfl: 3    Ferrous Sulfate (Iron) 325 (65 Fe) MG TABS, Take by mouth every other day, Disp: , Rfl:     fluticasone-umeclidinium-vilanterol (TRELEGY) 100-62 5-25 MCG/INH inhaler, Inhale 1 puff daily Rinse mouth after use , Disp: 60 blister, Rfl: 11    fluticasone-umeclidinium-vilanterol (TRELEGY) 100-62 5-25 MCG/INH inhaler, Inhale 1 puff daily Rinse mouth after use , Disp: 180 blister, Rfl: 3    fosinopril (MONOPRIL) 10 mg tablet, TAKE 1 TABLET EVERY DAY, Disp: 90 tablet, Rfl: 1    furosemide (LASIX) 40 mg tablet, TAKE 1 TABLET EVERY DAY, Disp: 90 tablet, Rfl: 3    glucose blood test strip, by In Vitro route, Disp: , Rfl:     guaiFENesin (MUCINEX) 600 mg 12 hr tablet, Take 1 tablet (600 mg total) by mouth every 12 (twelve) hours, Disp: 30 tablet, Rfl: 0    memantine (NAMENDA) 10 mg tablet, Take 1 tablet (10 mg total) by mouth 2 (two) times a day, Disp: 180 tablet, Rfl: 1    metFORMIN (GLUCOPHAGE) 500 mg tablet, TAKE 1 TABLET (500 MG TOTAL) BY MOUTH 2 (TWO) TIMES A DAY WITH MEALS, Disp: 180 tablet, Rfl: 1    Omega-3 Fatty Acids (FISH OIL PO), Take by mouth, Disp: , Rfl:     pantoprazole (PROTONIX) 40 mg tablet, TAKE 1 TABLET EVERY DAY, Disp: 90 tablet, Rfl: 1    PRODIGY TWIST TOP LANCETS 28G MISC, Use   (Patient not taking: No sig reported), Disp: , Rfl:     rosuvastatin (CRESTOR) 10 MG tablet, TAKE 1 TABLET (10 MG TOTAL) BY MOUTH DAILY AT BEDTIME, Disp: 90 tablet, Rfl: 3    warfarin (COUMADIN) 1 mg tablet, Take 1 tablet (1 mg total) by mouth daily, Disp: 90 tablet, Rfl: 0    warfarin (COUMADIN) 3 mg tablet, TAKE AS DIRECTED BY OFFICE BASED ON INR (GOAL INR AT PRESENT IS 1 5 TO 2), Disp: 90 tablet, Rfl: 1    warfarin (COUMADIN) 5 mg tablet, Take 1 tablet (5 mg total) by mouth daily, Disp: 90 tablet, Rfl: 1    Patient Active Problem List   Diagnosis    Type 2 diabetes mellitus with diabetic polyneuropathy, without long-term current use of insulin (HCC)    Nodule of left lung    Dilated cardiomyopathy (HCC)    Arteriosclerosis of coronary artery    PAF (paroxysmal atrial fibrillation) (HCC)    Bilateral carotid bruits    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HCC)    Chronic hypoxemic respiratory failure (HCC)    Heart failure, left, 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)    Hypertensive heart disease with congestive heart failure (HCC)    Severe left ventricular systolic dysfunction    Hypoxia    Pain in both feet    Peripheral arteriosclerosis (HCC)    Non-recurrent unilateral inguinal hernia without obstruction or gangrene    Diverticulosis    Cholelithiases    Nephrolithiasis    Abscess of lower lobe of left lung with pneumonia (HCC)    Severe protein-calorie malnutrition (HCC)    Physical deconditioning    Coagulation defect, unspecified ([facility] [address] )    Alzheimer's dementia without behavioral disturbance (HCC)    Chronic systolic congestive heart failure (HCC)    Stage 3a chronic kidney disease ([facility] )          Patient ID: Rody Nicholson is a 78 y o  male  HPI    The following portions of the patient's history were reviewed and updated as appropriate:     family history includes Cancer in his brother; Diabetes in his son; Heart disease in his mother; Lung cancer in his son; Stroke in his mother; Transient ischemic attack in his mother  reports that he quit smoking about 20 years ago  His smoking use included cigarettes  He has a 60 00 pack-year smoking history  He has never used smokeless tobacco  He reports that he does not drink alcohol and does not use drugs  Vitals:    09/22/22 1557   BP: 138/82   Resp: 17       Review of Systems      Objective:  Patient's shoes and socks removed  Foot ExamPhysical Exam  Cardiovascular:      Pulses: Pulses are weak  Physical Exam  Left Foot: Appearance: Normal except as noted: excessive pronation and pes planus  Great toe deformities include a bunion  Right Foot: Appearance: Normal except as noted: excessive pronation and pes planus  Left Ankle: ROM: limited ROM in all planes Motor: Normal    Right Ankle: ROM: limited ROM in all planes Motor: Normal    Neurological Exam: performed  Light touch was intact bilaterally  Vibratory sensation was absent bilaterally  Deep tendon reflexes: achilles reflex absent bilateraly  Vascular Exam: performed Dorsalis pedis pulses were diminished bilaterally  Posterior tibial pulses were diminished bilaterally  Elevation Pallor: present bilaterally  Dependence rubor was present bilaterally   Capillary refill time was Q9 findings bilateral  Negative digital hair noted  Positive abnormal cooling bilateral, but between 1-3 seconds bilaterally  Edema: mild bilaterally  Toenails: All of the toenails were elongated, hypertrophied, discolored and Dystrophic with mycosis  Both first toenails were Mycotic with early onychogryphosis       Socks and shoes removed, Right Foot Findings: erythematous and dry  The sensory exam showed diminished vibratory sensation at the level of the toes  Diminished tactile sensation with monofilament testing throughout the right foot    Socks and shoes removed, Left Foot Findings: erythematous and dry  The sensory exam showed diminished vibratory sensation at the level of the toes  Diminished tactile sensation with monofilament testing throughout the left foot            Foot Exam     Right Foot/Ankle      Inspection and Palpation  Skin Exam: callus and dry skin;      Neurovascular  Dorsalis pedis: 0  Posterior tibial: 1+        Left Foot/Ankle       Inspection and Palpation  Skin Exam: callus and dry skin;      Neurovascular  Dorsalis pedis: 0  Posterior tibial: 1+        Physical Exam   Cardiovascular: Pulses are weak pulses  Pulses:       Dorsalis pedis pulses are 0 on the right side, and 0 on the left side         Posterior tibial pulses are 1+ on the right side, and 1+ on the left side  Feet:   Right Foot:   Skin Integrity: Positive for callus and dry skin  Left Foot:   Skin Integrity: Positive for callus and dry skin  Patient's shoes and socks removed  Right Foot/Ankle   Right Foot Inspection  Skin Exam: dry skin, callus and callus                              Sensory   Vibration: diminished  Proprioception: diminished   Monofilament testing: diminished  Vascular  Capillary refills: elevated  The right DP pulse is 0   The right PT pulse is 1+       Left Foot/Ankle  Left Foot Inspection  Skin Exam: dry skin and callus                                          Sensory   Vibration: diminished  Proprioception: diminished  Monofilament: diminished  Vascular  Capillary refills: elevated  The left DP pulse is 0  The left PT pulse is 1+       Right Foot/Ankle   Right Foot Inspection      Sensory   Vibration: diminished      Vascular  Capillary refills: < 3 seconds          Left Foot/Ankle  Left Foot Inspection      Sensory   Vibration: diminished      Vascular  Capillary refills: < 3 seconds        Assign Risk Category  Deformity present  Loss of protective sensation  Weak pulses  Risk: 2

## 2022-09-23 ENCOUNTER — TELEPHONE (OUTPATIENT)
Dept: FAMILY MEDICINE CLINIC | Facility: CLINIC | Age: 82
End: 2022-09-23

## 2022-09-23 ENCOUNTER — REMOTE DEVICE CLINIC VISIT (OUTPATIENT)
Dept: CARDIOLOGY CLINIC | Facility: CLINIC | Age: 82
End: 2022-09-23
Payer: COMMERCIAL

## 2022-09-23 DIAGNOSIS — Z95.810 PRESENCE OF AUTOMATIC CARDIOVERTER/DEFIBRILLATOR (AICD): Primary | ICD-10-CM

## 2022-09-23 PROCEDURE — 93295 DEV INTERROG REMOTE 1/2/MLT: CPT | Performed by: INTERNAL MEDICINE

## 2022-09-23 PROCEDURE — 93296 REM INTERROG EVL PM/IDS: CPT | Performed by: INTERNAL MEDICINE

## 2022-09-23 NOTE — PROGRESS NOTES
Results for orders placed or performed in visit on 09/23/22   Cardiac EP device report    Narrative    MDT SINGLE CHAMBER ICD/ACTIVE SYSTEM IS MRI CONDITIONAL  CARELINK TRANSMISSION: BATTERY VOLTAGE ADEQUATE  (12 9 YRS)  <1%  ALL AVAILABLE LEAD PARAMETERS WITHIN NORMAL LIMITS  3 NSVT EPISODES DETECTED MOST RECENT 6 BEATS @ 320ms  NO THERAPIES GIVEN  OPTI-VOL WITHIN NORMAL LIMITS  NORMAL DEVICE FUNCTION  ---ALMENDAREZ

## 2022-09-23 NOTE — TELEPHONE ENCOUNTER
I spoke to Geoffrey Wray in regards to the POLST form/DNR  This has to be done with the patient face to face  Som Doherty has an appt in November and will discuss this with Dr Joi Cavanaugh then  NFA ariel Woodard MA

## 2022-09-23 NOTE — TELEPHONE ENCOUNTER
Please call wife ,not patient, who has dementia    Wife is looking to get DNR paperwork and wants information  Do we have these forms?     Call [phone] Sarah Adkins

## 2022-10-12 DIAGNOSIS — I42.9 CARDIOMYOPATHY, UNSPECIFIED TYPE (HCC): ICD-10-CM

## 2022-10-12 DIAGNOSIS — F02.80 ALZHEIMER'S DEMENTIA WITHOUT BEHAVIORAL DISTURBANCE (HCC): ICD-10-CM

## 2022-10-12 DIAGNOSIS — R41.89 COGNITIVE DECLINE: ICD-10-CM

## 2022-10-12 DIAGNOSIS — G30.9 ALZHEIMER'S DEMENTIA WITHOUT BEHAVIORAL DISTURBANCE (HCC): ICD-10-CM

## 2022-10-12 RX ORDER — MEMANTINE HYDROCHLORIDE 10 MG/1
TABLET ORAL
Qty: 180 TABLET | Refills: 1 | Status: SHIPPED | OUTPATIENT
Start: 2022-10-12

## 2022-10-12 RX ORDER — CARVEDILOL 6.25 MG/1
6.25 TABLET ORAL 2 TIMES DAILY
Qty: 180 TABLET | Refills: 3 | Status: SHIPPED | OUTPATIENT
Start: 2022-10-12

## 2022-10-14 ENCOUNTER — OFFICE VISIT (OUTPATIENT)
Dept: CARDIOLOGY CLINIC | Facility: CLINIC | Age: 82
End: 2022-10-14
Payer: COMMERCIAL

## 2022-10-14 VITALS
OXYGEN SATURATION: 92 % | SYSTOLIC BLOOD PRESSURE: 128 MMHG | HEART RATE: 82 BPM | TEMPERATURE: 98.6 F | BODY MASS INDEX: 19.18 KG/M2 | WEIGHT: 137 LBS | HEIGHT: 71 IN | DIASTOLIC BLOOD PRESSURE: 76 MMHG

## 2022-10-14 DIAGNOSIS — I50.22 CHRONIC SYSTOLIC CONGESTIVE HEART FAILURE (HCC): Primary | ICD-10-CM

## 2022-10-14 DIAGNOSIS — N18.31 STAGE 3A CHRONIC KIDNEY DISEASE (HCC): ICD-10-CM

## 2022-10-14 DIAGNOSIS — I50.9 HEART FAILURE (HCC): ICD-10-CM

## 2022-10-14 DIAGNOSIS — I50.1 HEART FAILURE, LEFT, WITH LVEF 31-40% (HCC): ICD-10-CM

## 2022-10-14 PROCEDURE — 99214 OFFICE O/P EST MOD 30 MIN: CPT | Performed by: INTERNAL MEDICINE

## 2022-10-14 RX ORDER — FUROSEMIDE 40 MG/1
40 TABLET ORAL DAILY
Qty: 90 TABLET | Refills: 3 | Status: SHIPPED | COMMUNITY
Start: 2022-10-14
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
## 2022-10-21 ENCOUNTER — ANTICOAG VISIT (OUTPATIENT)
Dept: FAMILY MEDICINE CLINIC | Facility: CLINIC | Age: 82
End: 2022-10-21

## 2022-10-21 ENCOUNTER — APPOINTMENT (OUTPATIENT)
Dept: LAB | Facility: CLINIC | Age: 82
End: 2022-10-21
Payer: COMMERCIAL

## 2022-10-21 DIAGNOSIS — I48.0 PAF (PAROXYSMAL ATRIAL FIBRILLATION) (HCC): Primary | ICD-10-CM

## 2022-10-28 ENCOUNTER — ANTICOAG VISIT (OUTPATIENT)
Dept: FAMILY MEDICINE CLINIC | Facility: CLINIC | Age: 82
End: 2022-10-28

## 2022-10-28 ENCOUNTER — APPOINTMENT (OUTPATIENT)
Dept: LAB | Facility: CLINIC | Age: 82
End: 2022-10-28
Payer: COMMERCIAL

## 2022-10-28 DIAGNOSIS — I48.0 PAF (PAROXYSMAL ATRIAL FIBRILLATION) (HCC): Primary | ICD-10-CM

## 2022-11-11 NOTE — PROGRESS NOTES
"  Discussion/Summary  Normal device function      Results/Data  Cardiac Device Remote 07Sep2017 08:38AM Nikolay Guanakito     Test Name Result Flag Reference   MISCELLANEOUS COMMENT      CARELINK TRANSMISSION: Alena Haas ADEQUATE   3%  ALL AVAILABLE LEAD PARAMETERS WITHIN NORMAL LIMITS  5 NSVT EPISODES DETECTED 4 BEATS @ 320ms  NO THERAPIES GIVEN  OPTI-VOL WITHIN NORMAL LIMITS  NORMAL DEVICE FUNCTION  ---ALMENDAREZ   Cardiac Electrophysiology Report      [id number]   DEVICE TYPE ICD       Cardiac Electrophysiology Report [id number] 08:38AM Nikolay Guanakito     Test Name Result Flag Reference   Cardiac Electrophysiology Report      [id number]  pdf     Signatures   Electronically signed by : Damien Granger, ; Sep 29 2017  3:34PM EST                       (Author)    Electronically signed by : OUSMANE Pinon ; Oct 14 2017  7:15AM EST                       (Author)    " No

## 2022-11-14 DIAGNOSIS — E11.42 TYPE 2 DIABETES MELLITUS WITH DIABETIC POLYNEUROPATHY, WITHOUT LONG-TERM CURRENT USE OF INSULIN (HCC): ICD-10-CM

## 2022-11-28 ENCOUNTER — RA CDI HCC (OUTPATIENT)
Dept: OTHER | Facility: HOSPITAL | Age: 82
End: 2022-11-28

## 2022-11-28 NOTE — PROGRESS NOTES
[facility]  coding opportunities          Chart Reviewed number of suggestions sent to Provider: 2  E11 [id number]  I13 0     Patients Insurance     Medicare Insurance: [facility] Advantage

## 2022-12-01 ENCOUNTER — ANTICOAG VISIT (OUTPATIENT)
Dept: FAMILY MEDICINE CLINIC | Facility: CLINIC | Age: 82
End: 2022-12-01

## 2022-12-01 ENCOUNTER — APPOINTMENT (OUTPATIENT)
Dept: LAB | Facility: CLINIC | Age: 82
End: 2022-12-01

## 2022-12-01 DIAGNOSIS — E11.42 TYPE 2 DIABETES MELLITUS WITH DIABETIC POLYNEUROPATHY, WITHOUT LONG-TERM CURRENT USE OF INSULIN (HCC): ICD-10-CM

## 2022-12-01 DIAGNOSIS — I48.0 PAF (PAROXYSMAL ATRIAL FIBRILLATION) (HCC): Primary | ICD-10-CM

## 2022-12-01 LAB
ALBUMIN SERPL BCP-MCNC: 3.6 G/DL (ref 3.5–5)
ALP SERPL-CCNC: 61 U/L (ref 46–116)
ALT SERPL W P-5'-P-CCNC: 24 U/L (ref 12–78)
ANION GAP SERPL CALCULATED.3IONS-SCNC: 5 MMOL/L (ref 4–13)
AST SERPL W P-5'-P-CCNC: 15 U/L (ref 5–45)
BILIRUB SERPL-MCNC: 0.64 MG/DL (ref 0.2–1)
BUN SERPL-MCNC: 28 MG/DL (ref 5–25)
CALCIUM SERPL-MCNC: 9.5 MG/DL (ref 8.3–10.1)
CHLORIDE SERPL-SCNC: 101 MMOL/L (ref 96–108)
CO2 SERPL-SCNC: 30 MMOL/L (ref 21–32)
CREAT SERPL-MCNC: 1.43 MG/DL (ref 0.6–1.3)
GFR SERPL CREATININE-BSD FRML MDRD: 44 ML/MIN/1.73SQ M
GLUCOSE SERPL-MCNC: 117 MG/DL (ref 65–140)
POTASSIUM SERPL-SCNC: 4 MMOL/L (ref 3.5–5.3)
PROT SERPL-MCNC: 7.9 G/DL (ref 6.4–8.4)
SODIUM SERPL-SCNC: 136 MMOL/L (ref 135–147)

## 2022-12-02 LAB
EST. AVERAGE GLUCOSE BLD GHB EST-MCNC: 131 MG/DL
HBA1C MFR BLD: 6.2 %

## 2022-12-05 ENCOUNTER — OFFICE VISIT (OUTPATIENT)
Dept: FAMILY MEDICINE CLINIC | Facility: CLINIC | Age: 82
End: 2022-12-05

## 2022-12-05 VITALS
HEART RATE: 51 BPM | DIASTOLIC BLOOD PRESSURE: 74 MMHG | OXYGEN SATURATION: 93 % | RESPIRATION RATE: 14 BRPM | SYSTOLIC BLOOD PRESSURE: 118 MMHG | TEMPERATURE: 97.1 F

## 2022-12-05 DIAGNOSIS — I50.22 CHRONIC SYSTOLIC CONGESTIVE HEART FAILURE (HCC): ICD-10-CM

## 2022-12-05 DIAGNOSIS — E43 SEVERE PROTEIN-CALORIE MALNUTRITION (HCC): ICD-10-CM

## 2022-12-05 DIAGNOSIS — I11.0 HYPERTENSIVE HEART DISEASE WITH CONGESTIVE HEART FAILURE, UNSPECIFIED HEART FAILURE TYPE (HCC): ICD-10-CM

## 2022-12-05 DIAGNOSIS — F02.80 ALZHEIMER'S DEMENTIA WITHOUT BEHAVIORAL DISTURBANCE (HCC): ICD-10-CM

## 2022-12-05 DIAGNOSIS — N18.31 STAGE 3A CHRONIC KIDNEY DISEASE (HCC): ICD-10-CM

## 2022-12-05 DIAGNOSIS — Z00.00 MEDICARE ANNUAL WELLNESS VISIT, SUBSEQUENT: Primary | ICD-10-CM

## 2022-12-05 DIAGNOSIS — G30.9 ALZHEIMER'S DEMENTIA WITHOUT BEHAVIORAL DISTURBANCE (HCC): ICD-10-CM

## 2022-12-05 DIAGNOSIS — E11.42 TYPE 2 DIABETES MELLITUS WITH DIABETIC POLYNEUROPATHY, WITHOUT LONG-TERM CURRENT USE OF INSULIN (HCC): ICD-10-CM

## 2022-12-05 DIAGNOSIS — D68.9 COAGULATION DEFECT, UNSPECIFIED (HCC): ICD-10-CM

## 2022-12-05 NOTE — ASSESSMENT & PLAN NOTE
I will decrease metformin to Q AM and follow labs in 4 months    Pt is also now on Jardiance  Lab Results   Component Value Date    HGBA1C 6 2 (H) 12/01/2022
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
## 2022-12-05 NOTE — PATIENT INSTRUCTIONS
Medicare Preventive Visit Patient Instructions  Thank you for completing your Welcome to Medicare Visit or Medicare Annual Wellness Visit today  Your next wellness visit will be due in one year (12/6/2023)  The screening/preventive services that you may require over the next 5-10 years are detailed below  Some tests may not apply to you based off risk factors and/or age  Screening tests ordered at today's visit but not completed yet may show as past due  Also, please note that scanned in results may not display below  Preventive Screenings:  Service Recommendations Previous Testing/Comments   Colorectal Cancer Screening  · Colonoscopy    · Fecal Occult Blood Test (FOBT)/Fecal Immunochemical Test (FIT)  · Fecal DNA/Cologuard Test  · Flexible Sigmoidoscopy Age: 39-70 years old   Colonoscopy: every 10 years (May be performed more frequently if at higher risk)  OR  FOBT/FIT: every 1 year  OR  Cologuard: every 3 years  OR  Sigmoidoscopy: every 5 years  Screening may be recommended earlier than age 39 if at higher risk for colorectal cancer  Also, an individualized decision between you and your healthcare provider will decide whether screening between the ages of 74-80 would be appropriate   Colonoscopy: 06/04/2020  FOBT/FIT: 06/04/2020  Cologuard: Not on file  Sigmoidoscopy: Not on file          Prostate Cancer Screening Individualized decision between patient and health care provider in men between ages of 53-78   Medicare will cover every 12 months beginning on the day after your 50th birthday PSA: 3 2 ng/mL     Screening Not Indicated     Hepatitis C Screening Once for adults born between 1945 and 1965  More frequently in patients at high risk for Hepatitis C Hep C Antibody: Not on file        Diabetes Screening 1-2 times per year if you're at risk for diabetes or have pre-diabetes Fasting glucose: 152 mg/dL (6/23/2022)  A1C: 6 2 % (12/1/2022)  Screening Not Indicated  History Diabetes   Cholesterol Screening Once every 5 years if you don't have a lipid disorder  May order more often based on risk factors  Lipid panel: 06/23/2022  Screening Current      Other Preventive Screenings Covered by Medicare:  1  Abdominal Aortic Aneurysm (AAA) Screening: covered once if your at risk  You're considered to be at risk if you have a family history of AAA or a male between the age of 73-68 who smoking at least 100 cigarettes in your lifetime  2  Lung Cancer Screening: covers low dose CT scan once per year if you meet all of the following conditions: (1) Age 50-69; (2) No signs or symptoms of lung cancer; (3) Current smoker or have quit smoking within the last 15 years; (4) You have a tobacco smoking history of at least 20 pack years (packs per day x number of years you smoked); (5) You get a written order from a healthcare provider  3  Glaucoma Screening: covered annually if you're considered high risk: (1) You have diabetes OR (2) Family history of glaucoma OR (3)  aged 48 and older OR (3)  American aged 72 and older  3  Osteoporosis Screening: covered every 2 years if you meet one of the following conditions: (1) Have a vertebral abnormality; (2) On glucocorticoid therapy for more than 3 months; (3) Have primary hyperparathyroidism; (4) On osteoporosis medications and need to assess response to drug therapy  5  HIV Screening: covered annually if you're between the age of 12-76  Also covered annually if you are younger than 13 and older than 72 with risk factors for HIV infection  For pregnant patients, it is covered up to 3 times per pregnancy      Immunizations:  Immunization Recommendations   Influenza Vaccine Annual influenza vaccination during flu season is recommended for all persons aged >= 6 months who do not have contraindications   Pneumococcal Vaccine   * Pneumococcal conjugate vaccine = PCV13 (Prevnar 13), PCV15 (Vaxneuvance), PCV20 (Prevnar 20)  * Pneumococcal polysaccharide vaccine = PPSV23 (Pneumovax) Adults 2364 years old: 1-3 doses may be recommended based on certain risk factors  Adults 72 years old: 1-2 doses may be recommended based off what pneumonia vaccine you previously received   Hepatitis B Vaccine 3 dose series if at intermediate or high risk (ex: diabetes, end stage renal disease, liver disease)   Tetanus (Td) Vaccine - COST NOT COVERED BY MEDICARE PART B Following completion of primary series, a booster dose should be given every 10 years to maintain immunity against tetanus  Td may also be given as tetanus wound prophylaxis  Tdap Vaccine - COST NOT COVERED BY MEDICARE PART B Recommended at least once for all adults  For pregnant patients, recommended with each pregnancy  Shingles Vaccine (Shingrix) - COST NOT COVERED BY MEDICARE PART B  2 shot series recommended in those aged 48 and above     Health Maintenance Due:  There are no preventive care reminders to display for this patient  Immunizations Due:      Topic Date Due   • Hepatitis B Vaccine (1 of 3 - 3-dose series) Never done   • COVID-19 Vaccine (3 - Booster for Moderna series) 08/11/2021   • Influenza Vaccine (1) 09/01/2022     Advance Directives   What are advance directives? Advance directives are legal documents that state your wishes and plans for medical care  These plans are made ahead of time in case you lose your ability to make decisions for yourself  Advance directives can apply to any medical decision, such as the treatments you want, and if you want to donate organs  What are the types of advance directives? There are many types of advance directives, and each state has rules about how to use them  You may choose a combination of any of the following:  · Living will: This is a written record of the treatment you want  You can also choose which treatments you do not want, which to limit, and which to stop at a certain time  This includes surgery, medicine, IV fluid, and tube feedings     · Durable power of  for [facility]): This is a written record that states who you want to make healthcare choices for you when you are unable to make them for yourself  This person, called a proxy, is usually a family member or a friend  You may choose more than 1 proxy  · Do not resuscitate (DNR) order:  A DNR order is used in case your heart stops beating or you stop breathing  It is a request not to have certain forms of treatment, such as CPR  A DNR order may be included in other types of advance directives  · Medical directive: This covers the care that you want if you are in a coma, near death, or unable to make decisions for yourself  You can list the treatments you want for each condition  Treatment may include pain medicine, surgery, blood transfusions, dialysis, IV or tube feedings, and a ventilator (breathing machine)  · Values history: This document has questions about your views, beliefs, and how you feel and think about life  This information can help others choose the care that you would choose  Why are advance directives important? An advance directive helps you control your care  Although spoken wishes may be used, it is better to have your wishes written down  Spoken wishes can be misunderstood, or not followed  Treatments may be given even if you do not want them  An advance directive may make it easier for your family to make difficult choices about your care  © Copyright [employer] 2018 Information is for End User's use only and may not be sold, redistributed or otherwise used for commercial purposes   All illustrations and images included in CareNotes® are the copyrighted property of [employer]  or [address] [place] [address] [employer]

## 2022-12-06 ENCOUNTER — TELEPHONE (OUTPATIENT)
Dept: ADMINISTRATIVE | Facility: OTHER | Age: 82
End: 2022-12-06

## 2022-12-06 NOTE — TELEPHONE ENCOUNTER
Upon review of the In Basket request we were able to identify that the patient had the requested item(s) completed internally  Internal labs/procedures/tests are not able to be linked to   The item(s) requested can be found within the Chart Review tabs  Any additional questions or concerns should be emailed to the Practice Liaisons via the appropriate education email address, please do not reply via In Basket      Thank you  Dedrick Hand MA

## 2022-12-06 NOTE — TELEPHONE ENCOUNTER
----- Message from Leodan Ctuler DO sent at 12/5/2022 12:36 PM EST -----  Regarding: foot  12/05/22 12:36 PM    Hello, our patient Randal Nicholson has had Diabetic Foot Exam completed/performed  Please assist in updating the patient chart by making an External outreach to Dr Mitul Adhikari facility located in in [facility]  The date of service is recent      Thank you,  Leodan Cutler DO  [facility]

## 2022-12-10 ENCOUNTER — TELEPHONE (OUTPATIENT)
Dept: GASTROENTEROLOGY | Facility: CLINIC | Age: 82
End: 2022-12-10

## 2022-12-10 NOTE — TELEPHONE ENCOUNTER
Patient is overdue for her repeat EGD as per biopsy results on 06/17/2020    "no signs of Cash's however given endoscopic appearance, would recommend repeat EGD in 1-2 years"    Can someone please reach out to patient to set up a apt to discuss ? Thank you!

## 2022-12-27 DIAGNOSIS — I48.20 CHRONIC ATRIAL FIBRILLATION (HCC): ICD-10-CM

## 2022-12-27 RX ORDER — WARFARIN SODIUM 3 MG/1
TABLET ORAL
Qty: 90 TABLET | Refills: 1 | Status: SHIPPED | OUTPATIENT
Start: 2022-12-27

## 2022-12-28 DIAGNOSIS — I48.0 PAF (PAROXYSMAL ATRIAL FIBRILLATION) (HCC): ICD-10-CM

## 2022-12-28 RX ORDER — WARFARIN SODIUM 1 MG/1
1 TABLET ORAL DAILY
Qty: 90 TABLET | Refills: 0 | Status: SHIPPED | OUTPATIENT
Start: 2022-12-28

## 2023-01-01 ENCOUNTER — TELEPHONE (OUTPATIENT)
Dept: FAMILY MEDICINE CLINIC | Facility: CLINIC | Age: 83
End: 2023-01-01

## 2023-01-05 ENCOUNTER — REMOTE DEVICE CLINIC VISIT (OUTPATIENT)
Dept: CARDIOLOGY CLINIC | Facility: CLINIC | Age: 83
End: 2023-01-05

## 2023-01-05 ENCOUNTER — TELEPHONE (OUTPATIENT)
Dept: CARDIOLOGY CLINIC | Facility: CLINIC | Age: 83
End: 2023-01-05

## 2023-01-05 DIAGNOSIS — Z95.810 PRESENCE OF AUTOMATIC CARDIOVERTER/DEFIBRILLATOR (AICD): Primary | ICD-10-CM

## 2023-01-05 NOTE — TELEPHONE ENCOUNTER
----- Message from Marcella Campbell MD sent at 1/5/2023  3:15 PM EST -----  Reviewed all notes/transmission  Normal device function  Please let patient know

## 2023-01-05 NOTE — PROGRESS NOTES
Results for orders placed or performed in visit on 01/05/23   Cardiac EP device report    Narrative    MDT SINGLE CHAMBER ICD/ACTIVE SYSTEM IS MRI CONDITIONAL  CARELINK TRANSMISSION: BATTERY VOLTAGE ADEQUATE  (12 7 YRS)  <1%  ALL AVAILABLE LEAD PARAMETERS WITHIN NORMAL LIMITS  NO SIGNIFICANT HIGH RATE EPISODES  OPTI-VOL WITHIN NORMAL LIMITS  NORMAL DEVICE FUNCTION  ---ALMENDAREZ

## 2023-01-12 ENCOUNTER — APPOINTMENT (OUTPATIENT)
Dept: LAB | Facility: CLINIC | Age: 83
End: 2023-01-12

## 2023-01-12 ENCOUNTER — TELEPHONE (OUTPATIENT)
Dept: OTHER | Facility: OTHER | Age: 83
End: 2023-01-12

## 2023-01-12 ENCOUNTER — TELEPHONE (OUTPATIENT)
Dept: FAMILY MEDICINE CLINIC | Facility: CLINIC | Age: 83
End: 2023-01-12

## 2023-01-12 DIAGNOSIS — I48.0 PAF (PAROXYSMAL ATRIAL FIBRILLATION) (HCC): Primary | ICD-10-CM

## 2023-01-12 DIAGNOSIS — D68.9 COAGULATION DEFECT, UNSPECIFIED (HCC): Primary | ICD-10-CM

## 2023-01-12 LAB
INR PPP: 1.59 (ref 0.84–1.19)
PROTHROMBIN TIME: 19.2 SECONDS (ref 11.6–14.5)

## 2023-01-12 NOTE — TELEPHONE ENCOUNTER
Pt's wife called in stating they went to the [facility] lab today to have pt's PT/INR done and the lab needs a new order placed and faxed to them ASAP or they will have to throw the tube away  Please advise

## 2023-01-13 ENCOUNTER — ANTICOAG VISIT (OUTPATIENT)
Dept: FAMILY MEDICINE CLINIC | Facility: CLINIC | Age: 83
End: 2023-01-13

## 2023-01-13 DIAGNOSIS — I48.0 PAF (PAROXYSMAL ATRIAL FIBRILLATION) (HCC): Primary | ICD-10-CM

## 2023-01-13 NOTE — TELEPHONE ENCOUNTER
I called wife back  She is very upset because "she has been dealing with this all day "  I advised INR is 1 59 and he should increase to 4 mg per day  She states his INR is kept lower because of lung bleeding and I told her to continue current dose and I would send this to Dr Raya Nieves to make sure he is okay with the lower INR  Also advised wife that standing order was placed this morning; she was very upset at the lab when she was told the old one had       VIVIANA

## 2023-01-13 NOTE — TELEPHONE ENCOUNTER
We don't know when the standing orders  as they are in the labs computer  The order was renewed in the am when we were contacted by the lab  The INR for damaris is not the typical 2-3 its to be btwn 1 8 and 2 5   As such given his current regimen I would switch him to 3 5 mg daily and redraw on Monday AM

## 2023-01-16 ENCOUNTER — ANTICOAG VISIT (OUTPATIENT)
Dept: FAMILY MEDICINE CLINIC | Facility: CLINIC | Age: 83
End: 2023-01-16

## 2023-01-16 ENCOUNTER — APPOINTMENT (OUTPATIENT)
Dept: LAB | Facility: CLINIC | Age: 83
End: 2023-01-16

## 2023-01-16 DIAGNOSIS — I48.0 PAF (PAROXYSMAL ATRIAL FIBRILLATION) (HCC): Primary | ICD-10-CM

## 2023-01-16 DIAGNOSIS — D68.9 COAGULATION DEFECT, UNSPECIFIED (HCC): ICD-10-CM

## 2023-01-16 LAB
INR PPP: 1.77 (ref 0.84–1.19)
PROTHROMBIN TIME: 20.9 SECONDS (ref 11.6–14.5)

## 2023-01-23 ENCOUNTER — ANTICOAG VISIT (OUTPATIENT)
Dept: FAMILY MEDICINE CLINIC | Facility: CLINIC | Age: 83
End: 2023-01-23

## 2023-01-23 ENCOUNTER — LAB (OUTPATIENT)
Dept: LAB | Facility: CLINIC | Age: 83
End: 2023-01-23

## 2023-01-23 DIAGNOSIS — D68.9 COAGULATION DEFECT, UNSPECIFIED (HCC): ICD-10-CM

## 2023-01-23 DIAGNOSIS — I48.0 PAF (PAROXYSMAL ATRIAL FIBRILLATION) (HCC): Primary | ICD-10-CM

## 2023-01-23 LAB
INR PPP: 1.88 (ref 0.84–1.19)
PROTHROMBIN TIME: 21.9 SECONDS (ref 11.6–14.5)

## 2023-01-26 ENCOUNTER — TELEPHONE (OUTPATIENT)
Dept: CARDIOLOGY CLINIC | Facility: CLINIC | Age: 83
End: 2023-01-26

## 2023-01-26 DIAGNOSIS — I50.9 HEART FAILURE (HCC): ICD-10-CM

## 2023-01-26 RX ORDER — FUROSEMIDE 40 MG/1
40 TABLET ORAL DAILY
Qty: 90 TABLET | Refills: 1 | Status: SHIPPED | OUTPATIENT
Start: 2023-01-26 | End: 2023-02-09 | Stop reason: SDUPTHER

## 2023-01-30 ENCOUNTER — TELEPHONE (OUTPATIENT)
Dept: CARDIOLOGY CLINIC | Facility: CLINIC | Age: 83
End: 2023-01-30

## 2023-01-30 DIAGNOSIS — I50.9 HEART FAILURE (HCC): ICD-10-CM

## 2023-01-30 NOTE — TELEPHONE ENCOUNTER
Pharmacy seeking clarification on furosemide dosage frequency  Is it to be taken daily, or three days a week

## 2023-02-01 NOTE — TELEPHONE ENCOUNTER
Spoke with Pharmacist Suzanne at [address] [place] [address] regarding the  Furosemide 40 mg tablet to be taken one tablet on Monday, Wednesday and Friday mornings  They will be dispensing 39 tablets for a 90 day supply

## 2023-02-06 ENCOUNTER — LAB (OUTPATIENT)
Dept: LAB | Facility: CLINIC | Age: 83
End: 2023-02-06

## 2023-02-06 ENCOUNTER — ANTICOAG VISIT (OUTPATIENT)
Dept: FAMILY MEDICINE CLINIC | Facility: CLINIC | Age: 83
End: 2023-02-06

## 2023-02-06 DIAGNOSIS — D68.9 COAGULATION DEFECT, UNSPECIFIED (HCC): ICD-10-CM

## 2023-02-06 DIAGNOSIS — I48.0 PAF (PAROXYSMAL ATRIAL FIBRILLATION) (HCC): Primary | ICD-10-CM

## 2023-02-06 LAB
INR PPP: 2.1 (ref 0.84–1.19)
PROTHROMBIN TIME: 23.8 SECONDS (ref 11.6–14.5)

## 2023-02-09 RX ORDER — FUROSEMIDE 40 MG/1
TABLET ORAL
Qty: 90 TABLET | Refills: 1 | Status: SHIPPED | OUTPATIENT
Start: 2023-02-09

## 2023-03-06 ENCOUNTER — LAB (OUTPATIENT)
Dept: LAB | Facility: CLINIC | Age: 83
End: 2023-03-06

## 2023-03-06 DIAGNOSIS — D68.9 COAGULATION DEFECT, UNSPECIFIED (HCC): ICD-10-CM

## 2023-03-06 LAB
INR PPP: 1.55 (ref 0.84–1.19)
PROTHROMBIN TIME: 18.8 SECONDS (ref 11.6–14.5)

## 2023-03-07 ENCOUNTER — TELEPHONE (OUTPATIENT)
Dept: FAMILY MEDICINE CLINIC | Facility: CLINIC | Age: 83
End: 2023-03-07

## 2023-03-07 ENCOUNTER — ANTICOAG VISIT (OUTPATIENT)
Dept: FAMILY MEDICINE CLINIC | Facility: CLINIC | Age: 83
End: 2023-03-07

## 2023-03-07 DIAGNOSIS — I48.0 PAF (PAROXYSMAL ATRIAL FIBRILLATION) (HCC): Primary | ICD-10-CM

## 2023-03-10 ENCOUNTER — LAB (OUTPATIENT)
Dept: LAB | Facility: CLINIC | Age: 83
End: 2023-03-10

## 2023-03-10 DIAGNOSIS — D68.9 COAGULATION DEFECT, UNSPECIFIED (HCC): ICD-10-CM

## 2023-03-10 LAB
INR PPP: 1.69 (ref 0.84–1.19)
PROTHROMBIN TIME: 20.2 SECONDS (ref 11.6–14.5)

## 2023-03-11 ENCOUNTER — ANTICOAG VISIT (OUTPATIENT)
Dept: FAMILY MEDICINE CLINIC | Facility: CLINIC | Age: 83
End: 2023-03-11

## 2023-03-11 DIAGNOSIS — I48.0 PAF (PAROXYSMAL ATRIAL FIBRILLATION) (HCC): Primary | ICD-10-CM

## 2023-03-13 ENCOUNTER — LAB (OUTPATIENT)
Dept: LAB | Facility: CLINIC | Age: 83
End: 2023-03-13

## 2023-03-13 DIAGNOSIS — D68.9 COAGULATION DEFECT, UNSPECIFIED (HCC): ICD-10-CM

## 2023-03-13 LAB
INR PPP: 1.92 (ref 0.84–1.19)
PROTHROMBIN TIME: 22.2 SECONDS (ref 11.6–14.5)

## 2023-03-14 ENCOUNTER — ANTICOAG VISIT (OUTPATIENT)
Dept: FAMILY MEDICINE CLINIC | Facility: CLINIC | Age: 83
End: 2023-03-14

## 2023-03-14 DIAGNOSIS — I48.0 PAF (PAROXYSMAL ATRIAL FIBRILLATION) (HCC): Primary | ICD-10-CM

## 2023-03-21 DIAGNOSIS — K29.70 GASTRITIS: ICD-10-CM

## 2023-03-21 DIAGNOSIS — I48.0 PAF (PAROXYSMAL ATRIAL FIBRILLATION) (HCC): ICD-10-CM

## 2023-03-21 RX ORDER — WARFARIN SODIUM 1 MG/1
TABLET ORAL
Qty: 90 TABLET | Refills: 0 | Status: SHIPPED | OUTPATIENT
Start: 2023-03-21

## 2023-03-21 RX ORDER — PANTOPRAZOLE SODIUM 40 MG/1
TABLET, DELAYED RELEASE ORAL
Qty: 90 TABLET | Refills: 1 | Status: SHIPPED | OUTPATIENT
Start: 2023-03-21

## 2023-03-28 ENCOUNTER — APPOINTMENT (OUTPATIENT)
Dept: LAB | Facility: CLINIC | Age: 83
End: 2023-03-28

## 2023-03-29 ENCOUNTER — ANTICOAG VISIT (OUTPATIENT)
Dept: FAMILY MEDICINE CLINIC | Facility: CLINIC | Age: 83
End: 2023-03-29

## 2023-03-29 DIAGNOSIS — I48.0 PAF (PAROXYSMAL ATRIAL FIBRILLATION) (HCC): Primary | ICD-10-CM

## 2023-03-30 ENCOUNTER — ANTICOAG VISIT (OUTPATIENT)
Dept: FAMILY MEDICINE CLINIC | Facility: CLINIC | Age: 83
End: 2023-03-30

## 2023-03-30 ENCOUNTER — APPOINTMENT (OUTPATIENT)
Dept: LAB | Facility: CLINIC | Age: 83
End: 2023-03-30

## 2023-03-30 DIAGNOSIS — G30.9 ALZHEIMER'S DEMENTIA WITHOUT BEHAVIORAL DISTURBANCE (HCC): ICD-10-CM

## 2023-03-30 DIAGNOSIS — I48.0 PAF (PAROXYSMAL ATRIAL FIBRILLATION) (HCC): Primary | ICD-10-CM

## 2023-03-30 DIAGNOSIS — F02.80 ALZHEIMER'S DEMENTIA WITHOUT BEHAVIORAL DISTURBANCE (HCC): ICD-10-CM

## 2023-03-30 DIAGNOSIS — D68.9 COAGULATION DEFECT, UNSPECIFIED (HCC): ICD-10-CM

## 2023-03-30 DIAGNOSIS — E43 SEVERE PROTEIN-CALORIE MALNUTRITION (HCC): ICD-10-CM

## 2023-03-30 DIAGNOSIS — I50.22 CHRONIC SYSTOLIC CONGESTIVE HEART FAILURE (HCC): ICD-10-CM

## 2023-03-30 DIAGNOSIS — E11.42 TYPE 2 DIABETES MELLITUS WITH DIABETIC POLYNEUROPATHY, WITHOUT LONG-TERM CURRENT USE OF INSULIN (HCC): ICD-10-CM

## 2023-03-30 DIAGNOSIS — Z00.00 MEDICARE ANNUAL WELLNESS VISIT, SUBSEQUENT: ICD-10-CM

## 2023-03-30 DIAGNOSIS — N18.31 STAGE 3A CHRONIC KIDNEY DISEASE (HCC): ICD-10-CM

## 2023-03-30 DIAGNOSIS — I11.0 HYPERTENSIVE HEART DISEASE WITH CONGESTIVE HEART FAILURE, UNSPECIFIED HEART FAILURE TYPE (HCC): ICD-10-CM

## 2023-03-30 LAB
ALBUMIN SERPL BCP-MCNC: 3.4 G/DL (ref 3.5–5)
ALP SERPL-CCNC: 58 U/L (ref 46–116)
ALT SERPL W P-5'-P-CCNC: 32 U/L (ref 12–78)
ANION GAP SERPL CALCULATED.3IONS-SCNC: 4 MMOL/L (ref 4–13)
AST SERPL W P-5'-P-CCNC: 24 U/L (ref 5–45)
BILIRUB SERPL-MCNC: 0.49 MG/DL (ref 0.2–1)
BUN SERPL-MCNC: 26 MG/DL (ref 5–25)
CALCIUM ALBUM COR SERPL-MCNC: 9.9 MG/DL (ref 8.3–10.1)
CALCIUM SERPL-MCNC: 9.4 MG/DL (ref 8.3–10.1)
CHLORIDE SERPL-SCNC: 100 MMOL/L (ref 96–108)
CHOLEST SERPL-MCNC: 79 MG/DL
CO2 SERPL-SCNC: 31 MMOL/L (ref 21–32)
CREAT SERPL-MCNC: 1.58 MG/DL (ref 0.6–1.3)
CREAT UR-MCNC: 102 MG/DL
GFR SERPL CREATININE-BSD FRML MDRD: 39 ML/MIN/1.73SQ M
GLUCOSE P FAST SERPL-MCNC: 130 MG/DL (ref 65–99)
HDLC SERPL-MCNC: 37 MG/DL
INR PPP: 2.48 (ref 0.84–1.19)
LDLC SERPL CALC-MCNC: 20 MG/DL (ref 0–100)
MICROALBUMIN UR-MCNC: 12.5 MG/L (ref 0–20)
MICROALBUMIN/CREAT 24H UR: 12 MG/G CREATININE (ref 0–30)
POTASSIUM SERPL-SCNC: 3.9 MMOL/L (ref 3.5–5.3)
PROT SERPL-MCNC: 7.6 G/DL (ref 6.4–8.4)
PROTHROMBIN TIME: 27.1 SECONDS (ref 11.6–14.5)
SODIUM SERPL-SCNC: 135 MMOL/L (ref 135–147)
TRIGL SERPL-MCNC: 109 MG/DL

## 2023-04-01 LAB
EST. AVERAGE GLUCOSE BLD GHB EST-MCNC: 166 MG/DL
HBA1C MFR BLD: 7.4 %

## 2023-04-03 ENCOUNTER — TELEPHONE (OUTPATIENT)
Dept: FAMILY MEDICINE CLINIC | Facility: CLINIC | Age: 83
End: 2023-04-03

## 2023-04-03 ENCOUNTER — APPOINTMENT (OUTPATIENT)
Dept: LAB | Facility: CLINIC | Age: 83
End: 2023-04-03

## 2023-04-03 ENCOUNTER — ANTICOAG VISIT (OUTPATIENT)
Dept: FAMILY MEDICINE CLINIC | Facility: CLINIC | Age: 83
End: 2023-04-03

## 2023-04-03 DIAGNOSIS — D68.9 COAGULATION DEFECT, UNSPECIFIED (HCC): ICD-10-CM

## 2023-04-03 DIAGNOSIS — I48.0 PAF (PAROXYSMAL ATRIAL FIBRILLATION) (HCC): Primary | ICD-10-CM

## 2023-04-03 LAB
INR PPP: 1.21 (ref 0.84–1.19)
PROTHROMBIN TIME: 15.5 SECONDS (ref 11.6–14.5)

## 2023-04-03 NOTE — TELEPHONE ENCOUNTER
Calling about his INR, needs to know by this evening what his warfarin dosage should be  Please call back   [phone]

## 2023-04-04 DIAGNOSIS — I25.10 ARTERIOSCLEROSIS OF CORONARY ARTERY: ICD-10-CM

## 2023-04-04 RX ORDER — FOSINOPRIL SODIUM 10 MG/1
TABLET ORAL
Qty: 90 TABLET | Refills: 1 | Status: SHIPPED | OUTPATIENT
Start: 2023-04-04

## 2023-04-24 ENCOUNTER — IN-CLINIC DEVICE VISIT (OUTPATIENT)
Dept: CARDIOLOGY CLINIC | Facility: CLINIC | Age: 83
End: 2023-04-24

## 2023-04-24 ENCOUNTER — TELEPHONE (OUTPATIENT)
Dept: CARDIOLOGY CLINIC | Facility: CLINIC | Age: 83
End: 2023-04-24

## 2023-04-24 ENCOUNTER — OFFICE VISIT (OUTPATIENT)
Dept: CARDIOLOGY CLINIC | Facility: CLINIC | Age: 83
End: 2023-04-24

## 2023-04-24 VITALS
HEART RATE: 72 BPM | OXYGEN SATURATION: 96 % | SYSTOLIC BLOOD PRESSURE: 138 MMHG | WEIGHT: 133 LBS | HEIGHT: 71 IN | DIASTOLIC BLOOD PRESSURE: 78 MMHG | BODY MASS INDEX: 18.62 KG/M2

## 2023-04-24 DIAGNOSIS — I50.1 HEART FAILURE, LEFT, WITH LVEF 31-40% (HCC): ICD-10-CM

## 2023-04-24 DIAGNOSIS — I50.22 CHRONIC SYSTOLIC CONGESTIVE HEART FAILURE (HCC): ICD-10-CM

## 2023-04-24 DIAGNOSIS — E11.59 TYPE 2 DIABETES MELLITUS WITH OTHER CIRCULATORY COMPLICATION, WITHOUT LONG-TERM CURRENT USE OF INSULIN (HCC): ICD-10-CM

## 2023-04-24 DIAGNOSIS — I42.8 NONISCHEMIC CARDIOMYOPATHY (HCC): ICD-10-CM

## 2023-04-24 DIAGNOSIS — N18.31 STAGE 3A CHRONIC KIDNEY DISEASE (HCC): ICD-10-CM

## 2023-04-24 DIAGNOSIS — Z95.810 PRESENCE OF AUTOMATIC CARDIOVERTER/DEFIBRILLATOR (AICD): ICD-10-CM

## 2023-04-24 DIAGNOSIS — Z95.810 AICD (AUTOMATIC CARDIOVERTER/DEFIBRILLATOR) PRESENT: Primary | ICD-10-CM

## 2023-04-24 DIAGNOSIS — I10 HTN (HYPERTENSION), MALIGNANT: ICD-10-CM

## 2023-04-24 DIAGNOSIS — I48.20 CHRONIC ATRIAL FIBRILLATION (HCC): Primary | ICD-10-CM

## 2023-04-24 DIAGNOSIS — I25.10 ARTERIOSCLEROSIS OF CORONARY ARTERY: ICD-10-CM

## 2023-04-24 DIAGNOSIS — I70.209 PERIPHERAL ARTERIOSCLEROSIS (HCC): ICD-10-CM

## 2023-04-24 NOTE — PROGRESS NOTES
Results for orders placed or performed in visit on 04/24/23   Cardiac EP device report    Narrative    MDT SINGLE CHAMBER ICD/ACTIVE SYSTEM IS MRI CONDITIONAL  DEVICE INTERROGATED IN THE [facility] OFFICE: BATTERY VOLTAGE ADEQUATE-12 YRS   0%  ALL AVAILABLE LEAD PARAMETERS WITHIN NORMAL LIMITS  NO SIGNIFICANT HIGH RATE EPISODES  OPTI-VOL WITHIN NORMAL LIMITS  NID PATHWAYS CHANGED PER MDT RECOMMENDATINS FROM AX>B TO B>AX  NORMAL DEVICE FUNCTION   NC
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
## 2023-04-25 ENCOUNTER — TELEPHONE (OUTPATIENT)
Dept: CARDIOLOGY CLINIC | Facility: CLINIC | Age: 83
End: 2023-04-25

## 2023-04-25 NOTE — TELEPHONE ENCOUNTER
----- Message from Stewart Casiano MD sent at 4/25/2023  8:39 AM EDT -----  Reviewed all notes/transmission  Normal device function  Please let patient know

## 2023-05-01 ENCOUNTER — APPOINTMENT (OUTPATIENT)
Dept: LAB | Facility: CLINIC | Age: 83
End: 2023-05-01

## 2023-05-01 ENCOUNTER — ANTICOAG VISIT (OUTPATIENT)
Dept: FAMILY MEDICINE CLINIC | Facility: CLINIC | Age: 83
End: 2023-05-01

## 2023-05-01 ENCOUNTER — TELEPHONE (OUTPATIENT)
Dept: FAMILY MEDICINE CLINIC | Facility: CLINIC | Age: 83
End: 2023-05-01

## 2023-05-01 DIAGNOSIS — I48.0 PAF (PAROXYSMAL ATRIAL FIBRILLATION) (HCC): Primary | ICD-10-CM

## 2023-05-01 DIAGNOSIS — I50.9 CHRONIC CONGESTIVE HEART FAILURE, UNSPECIFIED HEART FAILURE TYPE (HCC): ICD-10-CM

## 2023-05-01 LAB
INR PPP: 2.26 (ref 0.84–1.19)
PROTHROMBIN TIME: 25.2 SECONDS (ref 11.6–14.5)

## 2023-05-01 NOTE — TELEPHONE ENCOUNTER
Patient wife called for INR results  There are not any in the chart yet    NO FURTHER ACTION  Kendy Vo, ANGELA

## 2023-06-09 DIAGNOSIS — I42.8 NONISCHEMIC CARDIOMYOPATHY (HCC): ICD-10-CM

## 2023-06-09 DIAGNOSIS — G30.9 ALZHEIMER'S DEMENTIA WITHOUT BEHAVIORAL DISTURBANCE (HCC): ICD-10-CM

## 2023-06-09 DIAGNOSIS — I50.1 HEART FAILURE, LEFT, WITH LVEF 31-40% (HCC): ICD-10-CM

## 2023-06-09 DIAGNOSIS — I50.22 CHRONIC SYSTOLIC CONGESTIVE HEART FAILURE (HCC): ICD-10-CM

## 2023-06-09 DIAGNOSIS — I48.20 CHRONIC ATRIAL FIBRILLATION (HCC): ICD-10-CM

## 2023-06-09 DIAGNOSIS — E11.59 TYPE 2 DIABETES MELLITUS WITH OTHER CIRCULATORY COMPLICATION, WITHOUT LONG-TERM CURRENT USE OF INSULIN (HCC): ICD-10-CM

## 2023-06-09 DIAGNOSIS — R41.89 COGNITIVE DECLINE: ICD-10-CM

## 2023-06-09 DIAGNOSIS — F02.80 ALZHEIMER'S DEMENTIA WITHOUT BEHAVIORAL DISTURBANCE (HCC): ICD-10-CM

## 2023-06-10 RX ORDER — MEMANTINE HYDROCHLORIDE 10 MG/1
TABLET ORAL
Qty: 180 TABLET | Refills: 1 | Status: SHIPPED | OUTPATIENT
Start: 2023-06-10

## 2023-06-12 ENCOUNTER — APPOINTMENT (OUTPATIENT)
Dept: LAB | Facility: CLINIC | Age: 83
End: 2023-06-12
Payer: COMMERCIAL

## 2023-06-12 ENCOUNTER — ANTICOAG VISIT (OUTPATIENT)
Dept: FAMILY MEDICINE CLINIC | Facility: CLINIC | Age: 83
End: 2023-06-12

## 2023-06-12 DIAGNOSIS — I48.0 PAF (PAROXYSMAL ATRIAL FIBRILLATION) (HCC): Primary | ICD-10-CM

## 2023-06-12 DIAGNOSIS — I50.9 CHRONIC CONGESTIVE HEART FAILURE, UNSPECIFIED HEART FAILURE TYPE (HCC): ICD-10-CM

## 2023-06-12 LAB
INR PPP: 2.54 (ref 0.84–1.19)
PROTHROMBIN TIME: 27.6 SECONDS (ref 11.6–14.5)

## 2023-06-12 PROCEDURE — 36415 COLL VENOUS BLD VENIPUNCTURE: CPT

## 2023-06-12 PROCEDURE — 85610 PROTHROMBIN TIME: CPT

## 2023-06-12 RX ORDER — ROSUVASTATIN CALCIUM 10 MG/1
10 TABLET, COATED ORAL
Qty: 90 TABLET | Refills: 3 | Status: SHIPPED | OUTPATIENT
Start: 2023-06-12

## 2023-06-12 RX ORDER — DIGOXIN 125 MCG
TABLET ORAL
Qty: 90 TABLET | Refills: 3 | Status: SHIPPED | OUTPATIENT
Start: 2023-06-12

## 2023-07-05 DIAGNOSIS — E11.42 TYPE 2 DIABETES MELLITUS WITH DIABETIC POLYNEUROPATHY, WITHOUT LONG-TERM CURRENT USE OF INSULIN (HCC): ICD-10-CM

## 2023-07-07 ENCOUNTER — APPOINTMENT (OUTPATIENT)
Dept: LAB | Facility: CLINIC | Age: 83
End: 2023-07-07
Payer: COMMERCIAL

## 2023-07-07 DIAGNOSIS — I50.9 CHRONIC CONGESTIVE HEART FAILURE, UNSPECIFIED HEART FAILURE TYPE (HCC): ICD-10-CM

## 2023-07-07 LAB
INR PPP: 2.03 (ref 0.84–1.19)
PROTHROMBIN TIME: 23.2 SECONDS (ref 11.6–14.5)

## 2023-07-07 PROCEDURE — 36415 COLL VENOUS BLD VENIPUNCTURE: CPT

## 2023-07-07 PROCEDURE — 85610 PROTHROMBIN TIME: CPT

## 2023-07-08 ENCOUNTER — ANTICOAG VISIT (OUTPATIENT)
Dept: FAMILY MEDICINE CLINIC | Facility: CLINIC | Age: 83
End: 2023-07-08

## 2023-07-08 DIAGNOSIS — I48.0 PAF (PAROXYSMAL ATRIAL FIBRILLATION) (HCC): Primary | ICD-10-CM

## 2023-07-28 ENCOUNTER — REMOTE DEVICE CLINIC VISIT (OUTPATIENT)
Dept: CARDIOLOGY CLINIC | Facility: CLINIC | Age: 83
End: 2023-07-28
Payer: COMMERCIAL

## 2023-07-28 DIAGNOSIS — Z95.810 PRESENCE OF AUTOMATIC CARDIOVERTER/DEFIBRILLATOR (AICD): Primary | ICD-10-CM

## 2023-07-28 PROCEDURE — 93296 REM INTERROG EVL PM/IDS: CPT | Performed by: INTERNAL MEDICINE

## 2023-07-28 PROCEDURE — 93295 DEV INTERROG REMOTE 1/2/MLT: CPT | Performed by: INTERNAL MEDICINE

## 2023-07-28 NOTE — PROGRESS NOTES
Results for orders placed or performed in visit on 07/28/23   Cardiac EP device report    Narrative    MDT SINGLE CHAMBER ICD/ACTIVE SYSTEM IS MRI CONDITIONAL  CARELINK TRANSMISSION: BATTERY VOLTAGE ADEQUATE. (12.1 YRS)  <1%. ALL AVAILABLE LEAD PARAMETERS WITHIN NORMAL LIMITS. 1 NSVT EPISODE DETECTED 8 BEATS @ 330ms. NO THERAPIES GIVEN. OPTI-VOL WITHIN NORMAL LIMITS. NORMAL DEVICE FUNCTION. ---ALMENDAREZ

## 2023-08-02 ENCOUNTER — RA CDI HCC (OUTPATIENT)
Dept: OTHER | Facility: HOSPITAL | Age: 83
End: 2023-08-02

## 2023-08-02 DIAGNOSIS — I48.0 PAF (PAROXYSMAL ATRIAL FIBRILLATION) (HCC): ICD-10-CM

## 2023-08-02 RX ORDER — WARFARIN SODIUM 1 MG/1
TABLET ORAL
Qty: 90 TABLET | Refills: 0 | Status: SHIPPED | OUTPATIENT
Start: 2023-08-02

## 2023-08-02 NOTE — PROGRESS NOTES
[facility] coding opportunities          Chart Reviewed number of suggestions sent to Provider: 2  E11.22  I13.0     Patients Insurance     Medicare Insurance: [facility] Advantage

## 2023-08-07 ENCOUNTER — APPOINTMENT (OUTPATIENT)
Dept: LAB | Facility: CLINIC | Age: 83
End: 2023-08-07
Payer: COMMERCIAL

## 2023-08-07 ENCOUNTER — ANTICOAG VISIT (OUTPATIENT)
Dept: FAMILY MEDICINE CLINIC | Facility: CLINIC | Age: 83
End: 2023-08-07

## 2023-08-07 DIAGNOSIS — I50.9 CHRONIC CONGESTIVE HEART FAILURE, UNSPECIFIED HEART FAILURE TYPE (HCC): ICD-10-CM

## 2023-08-07 DIAGNOSIS — E43 SEVERE PROTEIN-CALORIE MALNUTRITION (HCC): ICD-10-CM

## 2023-08-07 DIAGNOSIS — G30.9 ALZHEIMER'S DEMENTIA WITHOUT BEHAVIORAL DISTURBANCE (HCC): ICD-10-CM

## 2023-08-07 DIAGNOSIS — N18.31 STAGE 3A CHRONIC KIDNEY DISEASE (HCC): ICD-10-CM

## 2023-08-07 DIAGNOSIS — I48.0 PAF (PAROXYSMAL ATRIAL FIBRILLATION) (HCC): ICD-10-CM

## 2023-08-07 DIAGNOSIS — I11.0 HYPERTENSIVE HEART DISEASE WITH CONGESTIVE HEART FAILURE, UNSPECIFIED HEART FAILURE TYPE (HCC): ICD-10-CM

## 2023-08-07 DIAGNOSIS — J96.11 CHRONIC HYPOXEMIC RESPIRATORY FAILURE (HCC): ICD-10-CM

## 2023-08-07 DIAGNOSIS — I48.0 PAF (PAROXYSMAL ATRIAL FIBRILLATION) (HCC): Primary | ICD-10-CM

## 2023-08-07 DIAGNOSIS — E11.42 TYPE 2 DIABETES MELLITUS WITH DIABETIC POLYNEUROPATHY, WITHOUT LONG-TERM CURRENT USE OF INSULIN (HCC): ICD-10-CM

## 2023-08-07 DIAGNOSIS — D68.9 COAGULATION DEFECT, UNSPECIFIED (HCC): ICD-10-CM

## 2023-08-07 DIAGNOSIS — F02.80 ALZHEIMER'S DEMENTIA WITHOUT BEHAVIORAL DISTURBANCE (HCC): ICD-10-CM

## 2023-08-07 DIAGNOSIS — I70.209 PERIPHERAL ARTERIOSCLEROSIS (HCC): ICD-10-CM

## 2023-08-07 DIAGNOSIS — J43.9 BULLOUS EMPHYSEMA (HCC): ICD-10-CM

## 2023-08-07 LAB
ALBUMIN SERPL BCP-MCNC: 3.9 G/DL (ref 3.5–5)
ALP SERPL-CCNC: 66 U/L (ref 46–116)
ALT SERPL W P-5'-P-CCNC: 24 U/L (ref 12–78)
ANION GAP SERPL CALCULATED.3IONS-SCNC: 2 MMOL/L
AST SERPL W P-5'-P-CCNC: 13 U/L (ref 5–45)
BASOPHILS # BLD AUTO: 0.11 THOUSANDS/ÂΜL (ref 0–0.1)
BASOPHILS NFR BLD AUTO: 1 % (ref 0–1)
BILIRUB SERPL-MCNC: 0.54 MG/DL (ref 0.2–1)
BUN SERPL-MCNC: 25 MG/DL (ref 5–25)
CALCIUM SERPL-MCNC: 9 MG/DL (ref 8.3–10.1)
CHLORIDE SERPL-SCNC: 107 MMOL/L (ref 96–108)
CHOLEST SERPL-MCNC: 76 MG/DL
CO2 SERPL-SCNC: 31 MMOL/L (ref 21–32)
CREAT SERPL-MCNC: 1.5 MG/DL (ref 0.6–1.3)
DIGOXIN SERPL-MCNC: 1.2 NG/ML (ref 0.8–2)
EOSINOPHIL # BLD AUTO: 0.13 THOUSAND/ÂΜL (ref 0–0.61)
EOSINOPHIL NFR BLD AUTO: 1 % (ref 0–6)
ERYTHROCYTE [DISTWIDTH] IN BLOOD BY AUTOMATED COUNT: 13.1 % (ref 11.6–15.1)
EST. AVERAGE GLUCOSE BLD GHB EST-MCNC: 171 MG/DL
GFR SERPL CREATININE-BSD FRML MDRD: 42 ML/MIN/1.73SQ M
GLUCOSE P FAST SERPL-MCNC: 132 MG/DL (ref 65–99)
HBA1C MFR BLD: 7.6 %
HCT VFR BLD AUTO: 45.4 % (ref 36.5–49.3)
HDLC SERPL-MCNC: 39 MG/DL
HGB BLD-MCNC: 14.8 G/DL (ref 12–17)
IMM GRANULOCYTES # BLD AUTO: 0.05 THOUSAND/UL (ref 0–0.2)
IMM GRANULOCYTES NFR BLD AUTO: 1 % (ref 0–2)
INR PPP: 2.56 (ref 0.84–1.19)
LDLC SERPL CALC-MCNC: 15 MG/DL (ref 0–100)
LYMPHOCYTES # BLD AUTO: 2.85 THOUSANDS/ÂΜL (ref 0.6–4.47)
LYMPHOCYTES NFR BLD AUTO: 32 % (ref 14–44)
MCH RBC QN AUTO: 30.9 PG (ref 26.8–34.3)
MCHC RBC AUTO-ENTMCNC: 32.6 G/DL (ref 31.4–37.4)
MCV RBC AUTO: 95 FL (ref 82–98)
MONOCYTES # BLD AUTO: 0.8 THOUSAND/ÂΜL (ref 0.17–1.22)
MONOCYTES NFR BLD AUTO: 9 % (ref 4–12)
NEUTROPHILS # BLD AUTO: 5.07 THOUSANDS/ÂΜL (ref 1.85–7.62)
NEUTS SEG NFR BLD AUTO: 56 % (ref 43–75)
NRBC BLD AUTO-RTO: 0 /100 WBCS
PLATELET # BLD AUTO: 175 THOUSANDS/UL (ref 149–390)
PMV BLD AUTO: 11.2 FL (ref 8.9–12.7)
POTASSIUM SERPL-SCNC: 4.5 MMOL/L (ref 3.5–5.3)
PROT SERPL-MCNC: 7.5 G/DL (ref 6.4–8.4)
PROTHROMBIN TIME: 27.8 SECONDS (ref 11.6–14.5)
RBC # BLD AUTO: 4.79 MILLION/UL (ref 3.88–5.62)
SODIUM SERPL-SCNC: 140 MMOL/L (ref 135–147)
TRIGL SERPL-MCNC: 109 MG/DL
WBC # BLD AUTO: 9.01 THOUSAND/UL (ref 4.31–10.16)

## 2023-08-07 PROCEDURE — 85025 COMPLETE CBC W/AUTO DIFF WBC: CPT

## 2023-08-07 PROCEDURE — 83036 HEMOGLOBIN GLYCOSYLATED A1C: CPT

## 2023-08-07 PROCEDURE — 82570 ASSAY OF URINE CREATININE: CPT

## 2023-08-07 PROCEDURE — 85610 PROTHROMBIN TIME: CPT

## 2023-08-07 PROCEDURE — 36415 COLL VENOUS BLD VENIPUNCTURE: CPT

## 2023-08-07 PROCEDURE — 80053 COMPREHEN METABOLIC PANEL: CPT

## 2023-08-07 PROCEDURE — 82043 UR ALBUMIN QUANTITATIVE: CPT

## 2023-08-07 PROCEDURE — 80061 LIPID PANEL: CPT

## 2023-08-11 ENCOUNTER — TELEPHONE (OUTPATIENT)
Dept: CARDIOLOGY CLINIC | Facility: CLINIC | Age: 83
End: 2023-08-11

## 2023-08-11 NOTE — TELEPHONE ENCOUNTER
----- Message from [address] [place] [address] I-20 sent at 8/11/2023  1:53 PM EDT -----  Digoxin level is stable. Thanks.   Continue same dosing

## 2023-08-14 ENCOUNTER — OFFICE VISIT (OUTPATIENT)
Dept: FAMILY MEDICINE CLINIC | Facility: CLINIC | Age: 83
End: 2023-08-14
Payer: COMMERCIAL

## 2023-08-14 VITALS
BODY MASS INDEX: 18.06 KG/M2 | TEMPERATURE: 97 F | HEIGHT: 71 IN | HEART RATE: 76 BPM | DIASTOLIC BLOOD PRESSURE: 78 MMHG | WEIGHT: 129 LBS | RESPIRATION RATE: 16 BRPM | SYSTOLIC BLOOD PRESSURE: 130 MMHG

## 2023-08-14 DIAGNOSIS — I25.10 ARTERIOSCLEROSIS OF CORONARY ARTERY: ICD-10-CM

## 2023-08-14 DIAGNOSIS — I50.22 CHRONIC SYSTOLIC CONGESTIVE HEART FAILURE (HCC): ICD-10-CM

## 2023-08-14 DIAGNOSIS — N18.31 STAGE 3A CHRONIC KIDNEY DISEASE (HCC): ICD-10-CM

## 2023-08-14 DIAGNOSIS — I42.0 DILATED CARDIOMYOPATHY (HCC): ICD-10-CM

## 2023-08-14 DIAGNOSIS — I48.0 PAF (PAROXYSMAL ATRIAL FIBRILLATION) (HCC): ICD-10-CM

## 2023-08-14 DIAGNOSIS — J43.9 BULLOUS EMPHYSEMA (HCC): ICD-10-CM

## 2023-08-14 DIAGNOSIS — I50.1 HEART FAILURE, LEFT, WITH LVEF 31-40% (HCC): ICD-10-CM

## 2023-08-14 DIAGNOSIS — I11.0 HYPERTENSIVE HEART DISEASE WITH CONGESTIVE HEART FAILURE, UNSPECIFIED HEART FAILURE TYPE (HCC): ICD-10-CM

## 2023-08-14 DIAGNOSIS — J96.11 CHRONIC HYPOXEMIC RESPIRATORY FAILURE (HCC): ICD-10-CM

## 2023-08-14 DIAGNOSIS — E11.42 TYPE 2 DIABETES MELLITUS WITH DIABETIC POLYNEUROPATHY, WITHOUT LONG-TERM CURRENT USE OF INSULIN (HCC): Primary | ICD-10-CM

## 2023-08-14 PROCEDURE — 99214 OFFICE O/P EST MOD 30 MIN: CPT | Performed by: FAMILY MEDICINE

## 2023-08-14 RX ORDER — FOSINOPRIL SODIUM 10 MG/1
10 TABLET ORAL DAILY
Qty: 90 TABLET | Refills: 1 | Status: SHIPPED | OUTPATIENT
Start: 2023-08-14

## 2023-08-14 NOTE — PROGRESS NOTES
Assessment/Plan:    1. Type 2 diabetes mellitus with diabetic polyneuropathy, without long-term current use of insulin ([facility])  -     Albumin / creatinine urine ratio; Future; Expected date: 12/12/2023  -     Comprehensive metabolic panel; Future; Expected date: 12/12/2023  -     Hemoglobin A1C; Future; Expected date: 12/12/2023  -     Lipid Panel with Direct LDL reflex; Future; Expected date: 12/12/2023  -     CBC and differential; Future; Expected date: 12/12/2023    2. Arteriosclerosis of coronary artery  -     fosinopril (MONOPRIL) 10 mg tablet; Take 1 tablet (10 mg total) by mouth daily    3. Chronic systolic congestive heart failure (HCC)  -     Empagliflozin (Jardiance) 10 MG TABS tablet; Take 1 tablet (10 mg total) by mouth every morning    4. Stage 3a chronic kidney disease (HCC)  -     Empagliflozin (Jardiance) 10 MG TABS tablet; Take 1 tablet (10 mg total) by mouth every morning  -     Albumin / creatinine urine ratio; Future; Expected date: 12/12/2023  -     Comprehensive metabolic panel; Future; Expected date: 12/12/2023  -     Hemoglobin A1C; Future; Expected date: 12/12/2023  -     Lipid Panel with Direct LDL reflex; Future; Expected date: 12/12/2023  -     CBC and differential; Future; Expected date: 12/12/2023    5. 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[address])    7. Chronic hypoxemic respiratory failure ([facility])    8. Dilated cardiomyopathy ([facility])  -     Albumin / creatinine urine ratio; Future; Expected date: 12/12/2023  -     Comprehensive metabolic panel; Future; Expected date: 12/12/2023  -     Hemoglobin A1C; Future; Expected date: 12/12/2023  -     Lipid Panel with Direct LDL reflex; Future; Expected date: 12/12/2023  -     CBC and differential; Future; Expected date: 12/12/2023    9.  PAF (paroxysmal atrial fibrillation) ([facility])  -     Albumin / creatinine urine ratio; Future; Expected date: 12/12/2023  -     Comprehensive metabolic panel; Future; Expected date: 12/12/2023  -     Hemoglobin A1C; Future; Expected date: 12/12/2023  -     Lipid Panel with Direct LDL reflex; Future; Expected date: 12/12/2023  -     CBC and differential; Future; Expected date: 12/12/2023    10. Hypertensive heart disease with congestive heart failure, unspecified heart failure type (HCC)  -     Albumin / creatinine urine ratio; Future; Expected date: 12/12/2023  -     Comprehensive metabolic panel; Future; Expected date: 12/12/2023  -     Hemoglobin A1C; Future; Expected date: 12/12/2023  -     Lipid Panel with Direct LDL reflex; Future; Expected date: 12/12/2023  -     CBC and differential; Future; Expected date: 12/12/2023            There are no Patient Instructions on file for this visit. Return in 4 months (on 12/14/2023) for Diabetes follow-up. Subjective:      Patient ID: Josefa Nicholson is a 78 y.o. male. Chief Complaint   Patient presents with   • Follow-up     Lw apolinar       Pt is here for a follow up  Pt had his labs  Pt feels well      The following portions of the patient's history were reviewed and updated as appropriate: allergies, current medications, past family history, past medical history, past social history, past surgical history and problem list.    Review of Systems   Constitutional: Negative for activity change, appetite change, chills, diaphoresis, fatigue, fever and unexpected weight change. HENT: Negative for congestion, dental problem, ear pain, mouth sores, sinus pressure, sinus pain, sore throat and trouble swallowing. Eyes: Negative for photophobia, discharge and itching. Respiratory: Negative for apnea, chest tightness and shortness of breath. Cardiovascular: Negative for chest pain, palpitations and leg swelling. Gastrointestinal: Negative for abdominal distention, abdominal pain, blood in stool, nausea and vomiting.    Endocrine: Negative for cold intolerance, heat intolerance, polydipsia, polyphagia and polyuria. Genitourinary: Negative for difficulty urinating. Musculoskeletal: Negative for arthralgias. Skin: Negative for color change and wound. Neurological: Negative for dizziness, syncope, speech difficulty and headaches. Hematological: Negative for adenopathy. Psychiatric/Behavioral: Negative for agitation and behavioral problems. Current Outpatient Medications   Medication Sig Dispense Refill   • albuterol (ProAir HFA) 90 mcg/act inhaler Inhale 2 puffs every 6 (six) hours as needed for wheezing 8.5 g 0   • Ascorbic Acid (VITAMIN C) 1000 MG tablet Take 1,000 mg by mouth daily     • carvedilol (COREG) 6.25 mg tablet TAKE 1 TABLET (6.25 MG TOTAL) BY MOUTH 2 (TWO) TIMES A  tablet 3   • digoxin (LANOXIN) 0.125 mg tablet TAKE 1 TABLET EVERY DAY 90 tablet 3   • Empagliflozin (Jardiance) 10 MG TABS tablet Take 1 tablet (10 mg total) by mouth every morning 90 tablet 1   • Ferrous Sulfate (Iron) 325 (65 Fe) MG TABS Take by mouth every other day     • fluticasone-umeclidinium-vilanterol (TRELEGY) 100-62.5-25 MCG/INH inhaler Inhale 1 puff daily Rinse mouth after use.  60 blister 11   • fosinopril (MONOPRIL) 10 mg tablet Take 1 tablet (10 mg total) by mouth daily 90 tablet 1   • furosemide (LASIX) 40 mg tablet Take one tablet on Jzjuxt-Oknshofta-Jmlqto morning 90 tablet 1   • glucose blood test strip by In Vitro route     • guaiFENesin (MUCINEX) 600 mg 12 hr tablet Take 1 tablet (600 mg total) by mouth every 12 (twelve) hours 30 tablet 0   • memantine (NAMENDA) 10 mg tablet TAKE 1 TABLET TWICE DAILY 180 tablet 1   • metFORMIN (GLUCOPHAGE) 500 mg tablet TAKE 1 TABLET EVERY DAY WITH BREAKFAST 90 tablet 1   • Omega-3 Fatty Acids (FISH OIL PO) Take by mouth     • pantoprazole (PROTONIX) 40 mg tablet TAKE 1 TABLET EVERY DAY 90 tablet 1   • PRODIGY TWIST TOP LANCETS 28G MISC Use     • rosuvastatin (CRESTOR) 10 MG tablet TAKE 1 TABLET (10 MG TOTAL) BY MOUTH DAILY AT BEDTIME 90 tablet 3   • warfarin (COUMADIN) 1 mg tablet TAKE 1 TABLET EVERY DAY (APPT NEEDED NOW - OVER A YEAR SINCE LAST SEEN) 90 tablet 0   • warfarin (COUMADIN) 3 mg tablet TAKE AS DIRECTED BY OFFICE BASED ON INR (GOAL INR AT PRESENT IS 1.5 TO 2) 90 tablet 1   • warfarin (COUMADIN) 5 mg tablet Take 1 tablet (5 mg total) by mouth daily 90 tablet 1   • fluticasone-umeclidinium-vilanterol (TRELEGY) 100-62.5-25 MCG/INH inhaler Inhale 1 puff daily Rinse mouth after use. (Patient not taking: Reported on 8/14/2023) 180 blister 3     No current facility-administered medications for this visit. Objective:    /78   Pulse 76   Temp (!) 97 °F (36.1 °C) (Tympanic)   Resp 16   Ht 5' 11" (1.803 m)   Wt 58.5 kg (129 lb)   BMI 17.99 kg/m²        Physical Exam  Vitals and nursing note reviewed. Constitutional:       General: He is not in acute distress. Appearance: He is well-developed. He is not diaphoretic. HENT:      Head: Normocephalic and atraumatic. Right Ear: External ear normal.      Left Ear: External ear normal.      Nose: Nose normal.      Mouth/Throat:      Pharynx: No oropharyngeal exudate. Eyes:      General: No scleral icterus. Right eye: No discharge. Left eye: No discharge. Pupils: Pupils are equal, round, and reactive to light. Neck:      Thyroid: No thyromegaly. Cardiovascular:      Rate and Rhythm: Normal rate. Heart sounds: Normal heart sounds. No murmur heard. Pulmonary:      Effort: Pulmonary effort is normal. No respiratory distress. Breath sounds: Normal breath sounds. No wheezing. Abdominal:      General: Bowel sounds are normal. There is no distension. Palpations: Abdomen is soft. There is no mass. Tenderness: There is no abdominal tenderness. There is no guarding or rebound. Musculoskeletal:         General: Normal range of motion. Skin:     General: Skin is warm and dry.       Findings: No erythema or rash. Neurological:      Mental Status: He is alert. Coordination: Coordination normal.      Deep Tendon Reflexes: Reflexes normal.   Psychiatric:         Behavior: Behavior normal.              Recent Results (from the past 2016 hour(s))   Protime-INR    Collection Time: 06/12/23  7:50 AM   Result Value Ref Range    Protime 27.6 (H) 11.6 - 14.5 seconds    INR 2.54 (H) 0.84 - 1.19   Protime-INR    Collection Time: 07/07/23  8:09 AM   Result Value Ref Range    Protime 23.2 (H) 11.6 - 14.5 seconds    INR 2.03 (H) 0.84 - 1.19   Digoxin level    Collection Time: 08/07/23  8:11 AM   Result Value Ref Range    Digoxin Lvl 1.2 0.8 - 2.0 ng/mL   Hemoglobin A1C    Collection Time: 08/07/23  8:11 AM   Result Value Ref Range    Hemoglobin A1C 7.6 (H) Normal 4.0-5.6%; PreDiabetic 5.7-6.4%;  Diabetic >=6.5%; Glycemic control for adults with diabetes <7.0% %     mg/dl   Comprehensive metabolic panel    Collection Time: 08/07/23  8:11 AM   Result Value Ref Range    Sodium 140 135 - 147 mmol/L    Potassium 4.5 3.5 - 5.3 mmol/L    Chloride 107 96 - 108 mmol/L    CO2 31 21 - 32 mmol/L    ANION GAP 2 mmol/L    BUN 25 5 - 25 mg/dL    Creatinine 1.50 (H) 0.60 - 1.30 mg/dL    Glucose, Fasting 132 (H) 65 - 99 mg/dL    Calcium 9.0 8.3 - 10.1 mg/dL    AST 13 5 - 45 U/L    ALT 24 12 - 78 U/L    Alkaline Phosphatase 66 46 - 116 U/L    Total Protein 7.5 6.4 - 8.4 g/dL    Albumin 3.9 3.5 - 5.0 g/dL    Total Bilirubin 0.54 0.20 - 1.00 mg/dL    eGFR 42 ml/min/1.73sq m   CBC and differential    Collection Time: 08/07/23  8:11 AM   Result Value Ref Range    WBC 9.01 4.31 - 10.16 Thousand/uL    RBC 4.79 3.88 - 5.62 Million/uL    Hemoglobin 14.8 12.0 - 17.0 g/dL    Hematocrit 45.4 36.5 - 49.3 %    MCV 95 82 - 98 fL    MCH 30.9 26.8 - 34.3 pg    MCHC 32.6 31.4 - 37.4 g/dL    RDW 13.1 11.6 - 15.1 %    MPV 11.2 8.9 - 12.7 fL    Platelets 286 004 - 221 Thousands/uL    nRBC 0 /100 WBCs    Neutrophils Relative 56 43 - 75 %    Immat GRANS % 1 0 - 2 %    Lymphocytes Relative 32 14 - 44 %    Monocytes Relative 9 4 - 12 %    Eosinophils Relative 1 0 - 6 %    Basophils Relative 1 0 - 1 %    Neutrophils Absolute 5.07 1.85 - 7.62 Thousands/µL    Immature Grans Absolute 0.05 0.00 - 0.20 Thousand/uL    Lymphocytes Absolute 2.85 0.60 - 4.47 Thousands/µL    Monocytes Absolute 0.80 0.17 - 1.22 Thousand/µL    Eosinophils Absolute 0.13 0.00 - 0.61 Thousand/µL    Basophils Absolute 0.11 (H) 0.00 - 0.10 Thousands/µL   Lipid Panel with Direct LDL reflex    Collection Time: 08/07/23  8:11 AM   Result Value Ref Range    Cholesterol 76 See Comment mg/dL    Triglycerides 109 See Comment mg/dL    HDL, Direct 39 (L) >=40 mg/dL    LDL Calculated 15 0 - 100 mg/dL   Protime-INR    Collection Time: 08/07/23  8:11 AM   Result Value Ref Range    Protime 27.8 (H) 11.6 - 14.5 seconds    INR 2.56 (H) 0.84 - 1.19   Microalbumin / creatinine urine ratio    Collection Time: 08/07/23  8:11 AM   Result Value Ref Range    Creatinine, Ur      Albumin,U,Random      Albumin Creat Ratio           Monica Lee DO

## 2023-08-15 DIAGNOSIS — N18.31 STAGE 3A CHRONIC KIDNEY DISEASE (HCC): ICD-10-CM

## 2023-08-15 DIAGNOSIS — I50.22 CHRONIC SYSTOLIC CONGESTIVE HEART FAILURE (HCC): ICD-10-CM

## 2023-08-15 DIAGNOSIS — I50.1 HEART FAILURE, LEFT, WITH LVEF 31-40% (HCC): ICD-10-CM

## 2023-09-12 ENCOUNTER — LAB (OUTPATIENT)
Dept: LAB | Facility: CLINIC | Age: 83
End: 2023-09-12
Payer: COMMERCIAL

## 2023-09-12 ENCOUNTER — ANTICOAG VISIT (OUTPATIENT)
Dept: FAMILY MEDICINE CLINIC | Facility: CLINIC | Age: 83
End: 2023-09-12

## 2023-09-12 DIAGNOSIS — I48.0 PAF (PAROXYSMAL ATRIAL FIBRILLATION) (HCC): Primary | ICD-10-CM

## 2023-09-12 DIAGNOSIS — I50.9 CHRONIC CONGESTIVE HEART FAILURE, UNSPECIFIED HEART FAILURE TYPE (HCC): ICD-10-CM

## 2023-09-12 LAB
INR PPP: 2.24 (ref 0.84–1.19)
PROTHROMBIN TIME: 25.1 SECONDS (ref 11.6–14.5)

## 2023-09-12 PROCEDURE — 85610 PROTHROMBIN TIME: CPT

## 2023-09-12 PROCEDURE — 36415 COLL VENOUS BLD VENIPUNCTURE: CPT

## 2023-09-12 NOTE — RESULT ENCOUNTER NOTE
INR is withion his normal limit.   Please cont with same dose of coumadin and redraw in a month Notification Instructions: Patient will be notified of biopsy results. However, patient instructed to call the office if not contacted within 2 weeks. Dressing: bandage X Size Of Lesion In Cm: 0 Electrodesiccation And Curettage Text: The wound bed was treated with electrodesiccation and curettage after the biopsy was performed. Billing Type: Third-Party Bill Lab: [id number] Post-Care Instructions: I reviewed with the patient in detail post-care instructions. Patient is to keep the biopsy site dry overnight, and then apply bacitracin twice daily until healed. Patient may apply hydrogen peroxide soaks to remove any crusting. consent was obtained and risks were reviewed including but not limited to scarring, infection, bleeding, scabbing, incomplete removal, nerve damage and allergy to anesthesia. Bill For Surgical Tray: no Electrodesiccation Text: The wound bed was treated with electrodesiccation after the biopsy was performed. Biopsy Method: Double edge Personna blades Anesthesia Volume In Cc (Will Not Render If 0): 0.5 Anesthesia Type: 1% Xylocaine with epinephrine Cryotherapy Text: The wound bed was treated with cryotherapy after the biopsy was performed. Silver Nitrate Text: The wound bed was treated with silver nitrate after the biopsy was performed. Detail Level: Detailed Wound Care: Mupirocin Hemostasis: Monsel's and [employer]roc[employer] Lab Facility: [id number] Type Of Destruction Used: Curettage Biopsy Type: H and E Curettage Text: The wound bed was treated with curettage after the biopsy was performed.

## 2023-10-12 ENCOUNTER — APPOINTMENT (OUTPATIENT)
Dept: LAB | Facility: CLINIC | Age: 83
End: 2023-10-12
Payer: COMMERCIAL

## 2023-10-12 ENCOUNTER — ANTICOAG VISIT (OUTPATIENT)
Dept: FAMILY MEDICINE CLINIC | Facility: CLINIC | Age: 83
End: 2023-10-12

## 2023-10-12 DIAGNOSIS — I48.0 PAF (PAROXYSMAL ATRIAL FIBRILLATION) (HCC): Primary | ICD-10-CM

## 2023-10-12 DIAGNOSIS — I50.9 CHRONIC CONGESTIVE HEART FAILURE, UNSPECIFIED HEART FAILURE TYPE (HCC): ICD-10-CM

## 2023-10-12 LAB
INR PPP: 3.01 (ref 0.84–1.19)
PROTHROMBIN TIME: 30.6 SECONDS (ref 11.6–14.5)

## 2023-10-12 PROCEDURE — 36415 COLL VENOUS BLD VENIPUNCTURE: CPT

## 2023-10-12 PROCEDURE — 85610 PROTHROMBIN TIME: CPT

## 2023-10-19 ENCOUNTER — LAB (OUTPATIENT)
Dept: LAB | Facility: CLINIC | Age: 83
End: 2023-10-19
Payer: COMMERCIAL

## 2023-10-19 DIAGNOSIS — I50.9 CHRONIC CONGESTIVE HEART FAILURE, UNSPECIFIED HEART FAILURE TYPE (HCC): ICD-10-CM

## 2023-10-19 LAB
INR PPP: 2.54 (ref 0.84–1.19)
PROTHROMBIN TIME: 26.9 SECONDS (ref 11.6–14.5)

## 2023-10-19 PROCEDURE — 36415 COLL VENOUS BLD VENIPUNCTURE: CPT

## 2023-10-19 PROCEDURE — 85610 PROTHROMBIN TIME: CPT

## 2023-10-20 ENCOUNTER — ANTICOAG VISIT (OUTPATIENT)
Dept: FAMILY MEDICINE CLINIC | Facility: CLINIC | Age: 83
End: 2023-10-20

## 2023-10-20 ENCOUNTER — TELEPHONE (OUTPATIENT)
Age: 83
End: 2023-10-20

## 2023-10-20 DIAGNOSIS — I48.0 PAF (PAROXYSMAL ATRIAL FIBRILLATION) (HCC): Primary | ICD-10-CM

## 2023-10-20 LAB — INR PPP: 2.54 (ref 0.84–1.19)

## 2023-10-20 NOTE — TELEPHONE ENCOUNTER
According to telephone call Shireen Guerin, BRADLEY spoke to patients wife and informed her of pts INR, same dose recheck in 2 weeks. Updated the patients calendar, NFA.     Dacia Hernandez LPN

## 2023-10-20 NOTE — TELEPHONE ENCOUNTER
2nd attempt patient' spouse called to get results of recent INR test that was done. Patient's spouse stated that it is very important that she gets the results of test so she knows how much meds to give patient. Thank you for all your help. It is greatly appreciated.

## 2023-10-20 NOTE — TELEPHONE ENCOUNTER
Patient's spouse called in frustrated due to her  multiple calls made to get INR results and coumadin dosing. RN reviewed INR results and providers comments to continue current dose of Coumadin 4 mg and next INR in 2 weeks (11/2/23).

## 2023-10-20 NOTE — TELEPHONE ENCOUNTER
I reviewed this earlier today and put a message in for it.   The instructions were to keep same dose and redraw in two weeks because it was borderline high

## 2023-10-23 ENCOUNTER — OFFICE VISIT (OUTPATIENT)
Dept: CARDIOLOGY CLINIC | Facility: CLINIC | Age: 83
End: 2023-10-23
Payer: COMMERCIAL

## 2023-10-23 VITALS
HEIGHT: 71 IN | WEIGHT: 130 LBS | OXYGEN SATURATION: 93 % | DIASTOLIC BLOOD PRESSURE: 80 MMHG | SYSTOLIC BLOOD PRESSURE: 130 MMHG | BODY MASS INDEX: 18.2 KG/M2 | HEART RATE: 79 BPM

## 2023-10-23 DIAGNOSIS — E11.59 TYPE 2 DIABETES MELLITUS WITH OTHER CIRCULATORY COMPLICATION, WITHOUT LONG-TERM CURRENT USE OF INSULIN (HCC): ICD-10-CM

## 2023-10-23 DIAGNOSIS — I50.1 HEART FAILURE, LEFT, WITH LVEF 31-40% (HCC): ICD-10-CM

## 2023-10-23 DIAGNOSIS — I42.0 DILATED CARDIOMYOPATHY (HCC): ICD-10-CM

## 2023-10-23 DIAGNOSIS — I42.9 CARDIOMYOPATHY, UNSPECIFIED TYPE (HCC): ICD-10-CM

## 2023-10-23 DIAGNOSIS — I48.20 CHRONIC ATRIAL FIBRILLATION (HCC): Primary | ICD-10-CM

## 2023-10-23 DIAGNOSIS — I42.8 NONISCHEMIC CARDIOMYOPATHY (HCC): ICD-10-CM

## 2023-10-23 DIAGNOSIS — N18.31 STAGE 3A CHRONIC KIDNEY DISEASE (HCC): ICD-10-CM

## 2023-10-23 DIAGNOSIS — I50.22 CHRONIC SYSTOLIC CONGESTIVE HEART FAILURE (HCC): ICD-10-CM

## 2023-10-23 DIAGNOSIS — I25.10 ARTERIOSCLEROSIS OF CORONARY ARTERY: ICD-10-CM

## 2023-10-23 DIAGNOSIS — I48.0 PAF (PAROXYSMAL ATRIAL FIBRILLATION) (HCC): ICD-10-CM

## 2023-10-23 DIAGNOSIS — I50.9 HEART FAILURE (HCC): ICD-10-CM

## 2023-10-23 PROCEDURE — 93000 ELECTROCARDIOGRAM COMPLETE: CPT | Performed by: NURSE PRACTITIONER

## 2023-10-23 PROCEDURE — 99213 OFFICE O/P EST LOW 20 MIN: CPT | Performed by: NURSE PRACTITIONER

## 2023-10-23 RX ORDER — DIGOXIN 125 MCG
125 TABLET ORAL DAILY
Qty: 90 TABLET | Refills: 3 | Status: SHIPPED | OUTPATIENT
Start: 2023-10-23

## 2023-10-23 RX ORDER — FUROSEMIDE 40 MG/1
TABLET ORAL
Qty: 90 TABLET | Refills: 1 | Status: SHIPPED | OUTPATIENT
Start: 2023-10-23

## 2023-10-23 RX ORDER — ROSUVASTATIN CALCIUM 10 MG/1
10 TABLET, COATED ORAL
Qty: 90 TABLET | Refills: 3 | Status: SHIPPED | OUTPATIENT
Start: 2023-10-23

## 2023-10-23 RX ORDER — FOSINOPRIL SODIUM 10 MG/1
10 TABLET ORAL DAILY
Qty: 90 TABLET | Refills: 1 | Status: SHIPPED | OUTPATIENT
Start: 2023-10-23

## 2023-10-23 RX ORDER — CARVEDILOL 6.25 MG/1
6.25 TABLET ORAL 2 TIMES DAILY
Qty: 180 TABLET | Refills: 3 | Status: SHIPPED | OUTPATIENT
Start: 2023-10-23
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
## 2023-10-27 ENCOUNTER — REMOTE DEVICE CLINIC VISIT (OUTPATIENT)
Dept: CARDIOLOGY CLINIC | Facility: CLINIC | Age: 83
End: 2023-10-27
Payer: COMMERCIAL

## 2023-10-27 DIAGNOSIS — Z95.810 PRESENCE OF AUTOMATIC CARDIOVERTER/DEFIBRILLATOR (AICD): Primary | ICD-10-CM

## 2023-10-27 PROCEDURE — 93296 REM INTERROG EVL PM/IDS: CPT | Performed by: INTERNAL MEDICINE

## 2023-10-27 PROCEDURE — 93295 DEV INTERROG REMOTE 1/2/MLT: CPT | Performed by: INTERNAL MEDICINE

## 2023-10-27 NOTE — PROGRESS NOTES
Results for orders placed or performed in visit on 10/27/23   Cardiac EP device report    Narrative    MDT SINGLE CHAMBER ICD/ACTIVE SYSTEM IS MRI CONDITIONAL  CARELINK TRANSMISSION: BATTERY VOLTAGE ADEQUATE. (11.9 YRS)  <1%. ALL AVAILABLE LEAD PARAMETERS WITHIN NORMAL LIMITS. NO SIGNIFICANT HIGH RATE EPISODES. OPTI-VOL WITHIN NORMAL LIMITS. NORMAL DEVICE FUNCTION. ---ALMENDAREZ

## 2023-11-02 ENCOUNTER — LAB (OUTPATIENT)
Dept: LAB | Facility: CLINIC | Age: 83
End: 2023-11-02
Payer: COMMERCIAL

## 2023-11-02 ENCOUNTER — ANTICOAG VISIT (OUTPATIENT)
Dept: FAMILY MEDICINE CLINIC | Facility: CLINIC | Age: 83
End: 2023-11-02

## 2023-11-02 DIAGNOSIS — I48.0 PAF (PAROXYSMAL ATRIAL FIBRILLATION) (HCC): Primary | ICD-10-CM

## 2023-11-02 DIAGNOSIS — I50.9 CHRONIC CONGESTIVE HEART FAILURE, UNSPECIFIED HEART FAILURE TYPE (HCC): ICD-10-CM

## 2023-11-02 LAB
INR PPP: 3.23 (ref 0.84–1.19)
PROTHROMBIN TIME: 32.3 SECONDS (ref 11.6–14.5)

## 2023-11-02 PROCEDURE — 36415 COLL VENOUS BLD VENIPUNCTURE: CPT

## 2023-11-02 PROCEDURE — 85610 PROTHROMBIN TIME: CPT

## 2023-11-06 ENCOUNTER — ANTICOAG VISIT (OUTPATIENT)
Dept: FAMILY MEDICINE CLINIC | Facility: CLINIC | Age: 83
End: 2023-11-06

## 2023-11-06 ENCOUNTER — LAB (OUTPATIENT)
Dept: LAB | Facility: CLINIC | Age: 83
End: 2023-11-06
Payer: COMMERCIAL

## 2023-11-06 DIAGNOSIS — J43.9 BULLOUS EMPHYSEMA (HCC): ICD-10-CM

## 2023-11-06 DIAGNOSIS — I48.20 CHRONIC ATRIAL FIBRILLATION (HCC): ICD-10-CM

## 2023-11-06 DIAGNOSIS — I50.9 CHRONIC CONGESTIVE HEART FAILURE, UNSPECIFIED HEART FAILURE TYPE (HCC): ICD-10-CM

## 2023-11-06 DIAGNOSIS — I48.0 PAF (PAROXYSMAL ATRIAL FIBRILLATION) (HCC): Primary | ICD-10-CM

## 2023-11-06 LAB
INR PPP: 2.33 (ref 0.84–1.19)
PROTHROMBIN TIME: 25.1 SECONDS (ref 11.6–14.5)

## 2023-11-06 PROCEDURE — 36415 COLL VENOUS BLD VENIPUNCTURE: CPT

## 2023-11-06 PROCEDURE — 85610 PROTHROMBIN TIME: CPT

## 2023-11-06 RX ORDER — FLUTICASONE FUROATE, UMECLIDINIUM BROMIDE AND VILANTEROL TRIFENATATE 100; 62.5; 25 UG/1; UG/1; UG/1
1 POWDER RESPIRATORY (INHALATION) DAILY
Qty: 180 BLISTER | Refills: 3 | Status: SHIPPED | OUTPATIENT
Start: 2023-11-06 | End: 2024-02-04

## 2023-11-06 RX ORDER — WARFARIN SODIUM 3 MG/1
TABLET ORAL
Qty: 90 TABLET | Refills: 1 | Status: SHIPPED | OUTPATIENT
Start: 2023-11-06

## 2023-11-20 ENCOUNTER — HOSPITAL ENCOUNTER (EMERGENCY)
Facility: HOSPITAL | Age: 83
Discharge: HOME | End: 2023-11-20
Attending: EMERGENCY MEDICINE
Payer: COMMERCIAL

## 2023-11-20 ENCOUNTER — APPOINTMENT (EMERGENCY)
Dept: RADIOLOGY | Facility: HOSPITAL | Age: 83
End: 2023-11-20
Payer: COMMERCIAL

## 2023-11-20 VITALS
TEMPERATURE: 98.1 F | OXYGEN SATURATION: 97 % | SYSTOLIC BLOOD PRESSURE: 163 MMHG | RESPIRATION RATE: 18 BRPM | DIASTOLIC BLOOD PRESSURE: 77 MMHG | HEART RATE: 92 BPM

## 2023-11-20 DIAGNOSIS — R53.1 WEAKNESS: ICD-10-CM

## 2023-11-20 DIAGNOSIS — R06.00 DYSPNEA: ICD-10-CM

## 2023-11-20 DIAGNOSIS — J43.9 BULLOUS EMPHYSEMA (HCC): Primary | ICD-10-CM

## 2023-11-20 LAB
2HR DELTA HS TROPONIN: -1 NG/L
ALBUMIN SERPL BCP-MCNC: 4.4 G/DL (ref 3.5–5)
ALP SERPL-CCNC: 55 U/L (ref 34–104)
ALT SERPL W P-5'-P-CCNC: 12 U/L (ref 7–52)
ANION GAP SERPL CALCULATED.3IONS-SCNC: 7 MMOL/L
AST SERPL W P-5'-P-CCNC: 14 U/L (ref 13–39)
BASOPHILS # BLD AUTO: 0.1 THOUSANDS/ÂΜL (ref 0–0.1)
BASOPHILS NFR BLD AUTO: 1 % (ref 0–1)
BILIRUB SERPL-MCNC: 0.56 MG/DL (ref 0.2–1)
BUN SERPL-MCNC: 28 MG/DL (ref 5–25)
CALCIUM SERPL-MCNC: 9.2 MG/DL (ref 8.4–10.2)
CARDIAC TROPONIN I PNL SERPL HS: 11 NG/L
CARDIAC TROPONIN I PNL SERPL HS: 12 NG/L
CHLORIDE SERPL-SCNC: 98 MMOL/L (ref 96–108)
CO2 SERPL-SCNC: 33 MMOL/L (ref 21–32)
CREAT SERPL-MCNC: 1.28 MG/DL (ref 0.6–1.3)
EOSINOPHIL # BLD AUTO: 0.12 THOUSAND/ÂΜL (ref 0–0.61)
EOSINOPHIL NFR BLD AUTO: 1 % (ref 0–6)
ERYTHROCYTE [DISTWIDTH] IN BLOOD BY AUTOMATED COUNT: 12.6 % (ref 11.6–15.1)
GFR SERPL CREATININE-BSD FRML MDRD: 51 ML/MIN/1.73SQ M
GLUCOSE SERPL-MCNC: 167 MG/DL (ref 65–140)
HCT VFR BLD AUTO: 44.9 % (ref 36.5–49.3)
HGB BLD-MCNC: 14.6 G/DL (ref 12–17)
IMM GRANULOCYTES # BLD AUTO: 0.04 THOUSAND/UL (ref 0–0.2)
IMM GRANULOCYTES NFR BLD AUTO: 0 % (ref 0–2)
LYMPHOCYTES # BLD AUTO: 1.73 THOUSANDS/ÂΜL (ref 0.6–4.47)
LYMPHOCYTES NFR BLD AUTO: 19 % (ref 14–44)
MCH RBC QN AUTO: 31.9 PG (ref 26.8–34.3)
MCHC RBC AUTO-ENTMCNC: 32.5 G/DL (ref 31.4–37.4)
MCV RBC AUTO: 98 FL (ref 82–98)
MONOCYTES # BLD AUTO: 0.9 THOUSAND/ÂΜL (ref 0.17–1.22)
MONOCYTES NFR BLD AUTO: 10 % (ref 4–12)
NEUTROPHILS # BLD AUTO: 6.17 THOUSANDS/ÂΜL (ref 1.85–7.62)
NEUTS SEG NFR BLD AUTO: 69 % (ref 43–75)
NRBC BLD AUTO-RTO: 0 /100 WBCS
PLATELET # BLD AUTO: 197 THOUSANDS/UL (ref 149–390)
PMV BLD AUTO: 11 FL (ref 8.9–12.7)
POTASSIUM SERPL-SCNC: 4 MMOL/L (ref 3.5–5.3)
PROT SERPL-MCNC: 7.2 G/DL (ref 6.4–8.4)
RBC # BLD AUTO: 4.57 MILLION/UL (ref 3.88–5.62)
SODIUM SERPL-SCNC: 138 MMOL/L (ref 135–147)
WBC # BLD AUTO: 9.06 THOUSAND/UL (ref 4.31–10.16)

## 2023-11-20 PROCEDURE — 71260 CT THORAX DX C+: CPT

## 2023-11-20 PROCEDURE — 93005 ELECTROCARDIOGRAM TRACING: CPT

## 2023-11-20 PROCEDURE — 96374 THER/PROPH/DIAG INJ IV PUSH: CPT

## 2023-11-20 PROCEDURE — 99285 EMERGENCY DEPT VISIT HI MDM: CPT

## 2023-11-20 PROCEDURE — 36415 COLL VENOUS BLD VENIPUNCTURE: CPT | Performed by: EMERGENCY MEDICINE

## 2023-11-20 PROCEDURE — 97167 OT EVAL HIGH COMPLEX 60 MIN: CPT

## 2023-11-20 PROCEDURE — 99285 EMERGENCY DEPT VISIT HI MDM: CPT | Performed by: EMERGENCY MEDICINE

## 2023-11-20 PROCEDURE — G1004 CDSM NDSC: HCPCS

## 2023-11-20 PROCEDURE — 85025 COMPLETE CBC W/AUTO DIFF WBC: CPT | Performed by: EMERGENCY MEDICINE

## 2023-11-20 PROCEDURE — 97163 PT EVAL HIGH COMPLEX 45 MIN: CPT

## 2023-11-20 PROCEDURE — 80053 COMPREHEN METABOLIC PANEL: CPT | Performed by: EMERGENCY MEDICINE

## 2023-11-20 PROCEDURE — 84484 ASSAY OF TROPONIN QUANT: CPT | Performed by: EMERGENCY MEDICINE

## 2023-11-20 RX ORDER — PREDNISONE 20 MG/1
20 TABLET ORAL 2 TIMES DAILY
Qty: 20 TABLET | Refills: 0 | Status: SHIPPED | OUTPATIENT
Start: 2023-11-20 | End: 2023-11-21 | Stop reason: SDUPTHER

## 2023-11-20 RX ORDER — METHYLPREDNISOLONE SODIUM SUCCINATE 125 MG/2ML
125 INJECTION, POWDER, LYOPHILIZED, FOR SOLUTION INTRAMUSCULAR; INTRAVENOUS ONCE
Status: COMPLETED | OUTPATIENT
Start: 2023-11-20 | End: 2023-11-20

## 2023-11-20 RX ADMIN — IOHEXOL 80 ML: 350 INJECTION, SOLUTION INTRAVENOUS at 12:49

## 2023-11-20 RX ADMIN — METHYLPREDNISOLONE SODIUM SUCCINATE 125 MG: 125 INJECTION, POWDER, FOR SOLUTION INTRAMUSCULAR; INTRAVENOUS at 15:23

## 2023-11-20 NOTE — CASE MANAGEMENT
Case Management ED Assessment    Patient name Meredith Mohs  Location ED 05/ED 05 MRN [id number]  : 1940 Date 2023        OBJECTIVE:  Predictive Model Details         27% Factor Value    Risk of Hospital Admission or ED Visit Model Is in Relationship Yes     Number of ED Visits 1     Has Chronic Kidney Disease Yes     Has Atrial Fibrillation Yes     Has Peripheral Vascular Disease Yes     Has COPD Yes     Has CVD Yes     Has Diabetes Yes     Has CHF Yes     Has PCP Yes            Chief Complaint: Weakness generalized . Patient Class: Emergency  Preferred Pharmacy:   [facility] [place], [address] [facility]  [address] [place] [id number]  Phone: [phone] Fax: [phone]    [address]  [address] [place] [address] [address]  Phone: [phone] Fax: [phone]    Primary Care Provider: Subhash Dubois DO    Primary Insurance: [facility]  Secondary Insurance:     ED ASSESSMENT:  [employer]       [facility] - Spouse   Primary Phone: [phone] (Home)  Mobile Phone: [phone]                  Readmission Root Cause  30 Day Readmission: No    Patient Information  Admitted from[de-identified] Home  Mental Status: Alert  During Assessment patient was accompanied by: Spouse  Assessment information provided by[de-identified] Spouse  Primary Caregiver: Family  Caregiver's Name[de-identified] Kash Garciaenix Relationship to Patient[de-identified] Family Member  Caregiver's Telephone Number[de-identified] [phone]  Support Systems: Spouse/significant other  [place] of Residence: [address] [place] [address] do you live in?: [facility] entry access options.  Select all that apply.: No steps to enter home  Type of Current Residence: [facility]  Living Arrangements: Lives w/ Spouse/significant other    Patient Information Continued  Income Source: Pension/intermediate  Does patient have prescription coverage?: Yes  Does patient receive dialysis treatments?: No

## 2023-11-20 NOTE — PHYSICAL THERAPY NOTE
PT EVALUATION       11/20/23 1510   PT Last Visit   PT Visit Date 11/20/23   Note Type   Note type Evaluation   Pain Assessment   Pain Assessment Tool 0-10   Pain Score 1   Pain Location/Orientation Orientation: Left; Location: Abdomen  (ribcage)   Pain Onset/Description Onset: Ongoing; Descriptor: Sore   Effect of Pain on Daily Activities limits mobility   Patient's Stated Pain Goal No pain   Hospital Pain Intervention(s) Repositioned; Ambulation/increased activity   Multiple Pain Sites No   Restrictions/Precautions   Weight Bearing Precautions Per Order No   Other Precautions Bed Alarm; Chair Alarm; Fall Risk;Pain;Cognitive   Home Living   Type of [address] [place] [address] Dr One level; Other (Comment)  (no FELICITA)   Bathroom Toilet Raised   Bathroom Equipment Shower chair; Toilet raiser;Grab bars in shower   Home Equipment Walker;Quad cane   Prior Function   Level of [place] Independent with functional mobility; Needs assistance with ADLs; Needs assistance with IADLS   Lives With Spouse   Receives Help From Family   IADLs Family/Friend/Other provides medication management; Family/Friend/Other provides meals   Falls in the last 6 months 1 to 4  (1 fall approx 1 week ago with L rib pain)   General   Additional Pertinent History Pt is an 78year-old male who presents to the ED today due to generalized weakness, fall approx 1 week ago with L rib pain. CT revealed Multiple bilateral rib irregularities that have the appearance of chronic fractures.  PMH significant for severe COPD on O2   Family/Caregiver Present Yes  (wife at bedside throughout session)   Cognition   Overall Cognitive Status Impaired   Arousal/Participation Cooperative   Attention Attends with cues to redirect   Orientation Level Oriented to person;Oriented to place;Oriented to situation;Disoriented to time   Following Commands Follows one step commands with increased time or repetition   Subjective   Subjective "I feel better than I did"   RLE Assessment   RLE Assessment WFL   LLE Assessment   LLE Assessment WFL   Bed Mobility   Supine to Sit 5  Supervision   Sit to Supine 5  Supervision   Transfers   Sit to Stand 5  Supervision   Stand to Sit 5  Supervision   Ambulation/Elevation   Gait pattern Foward flexed; Short stride; Step through pattern  (decreased gait speed, overall steady with ambulation short distance within room)   Gait Assistance 5  Supervision  (CG to supervision)   Additional items Assist x 1;Verbal cues   Assistive Device Rolling walker   Distance 30 feet with change of direction within room   Stair Management Assistance Not tested   Balance   Static Sitting Fair +   Dynamic Sitting Fair   Static Standing Fair   Dynamic Standing Fair   Ambulatory Fair -  (RW)   Activity Tolerance   Activity Tolerance Patient tolerated treatment well;Patient limited by fatigue  (SOB on exertion short distance within room)   Medical Staff Made Aware yes Dr. Belinda Kevin made aware, CM   Nurse Made Aware yes RN June   Assessment   Prognosis Good   Problem List Decreased strength;Decreased endurance; Impaired balance;Decreased mobility;Pain   Assessment Patient seen for Physical Therapy evaluation. Patient admitted with generalized weakness, fall approx 1 week ago. Comorbidities affecting patient's physical performance include: Afib, CAD, cardiac disease, chronic pain, CHF, CKD, dementia, and diabetes. Personal factors affecting patient at time of initial evaluation include: lives in 1 story house, ambulating with assistive device, inability to navigate community distances, inability to navigate level surfaces without external assistance, positive fall history, inability to perform IADLS , and inability to live alone. Prior to admission, patient was independent with functional mobility with walker vs. No AD, independent with ADLS, requiring assist for IADLS, living with spouse in a 1 level home with 0 steps to enter, and ambulating household distance.   Please find objective findings from Physical Therapy assessment regarding body systems outlined above with impairments and limitations including weakness, impaired balance, decreased endurance, gait deviations, pain, decreased activity tolerance, decreased functional mobility tolerance, decreased safety awareness, and fall risk. The Barthel Index was used as a functional outcome tool presenting with a score of Barthel Index Score: 45 today indicating marked limitations of functional mobility and ADLS. Patient's clinical presentation is currently unstable/unpredictable as seen in patient's presentation of changing level of pain, increased fall risk, new onset of impairment of functional mobility, decreased endurance, and new onset of weakness. Pt would benefit from continued Physical Therapy treatment to address deficits as defined above and maximize level of functional mobility. As demonstrated by objective findings, the assigned level of complexity for this evaluation is high. The patient's -[facility] Basic Mobility Inpatient Short Form Raw Score is 18. A Raw score of greater than 16 suggests the patient may benefit from discharge to home. Please also refer to the recommendation of the Physical Therapist for safe discharge planning. Goals   Patient Goals to go home   STG Expiration Date 11/27/23   Short Term Goal #1 Pt will perform bed mobility IND ; Standing balance will improve to Fair to decrease risk of falls; Pt will ambulate x 100 feet with supervision + RW ; Pt will negotiate x 1 step/curb with supervision + RW ; AMPAC score will improve >20/24 to demonstrate improved functional independence   LTG Expiration Date 12/04/23   Long Term Goal #1 Standing balance will improve to Fair+ to decrease risk of falls;  Pt will ambulate x 150 feet Mod I + RW ; Pt will negotiate x 1 step/curb Mod I + RW ; AMPAC score will improve >22/24 to demonstrate improved functional independence   Plan   Treatment/Interventions ADL retraining;Functional transfer training;LE strengthening/ROM; Therapeutic exercise; Endurance training;Bed mobility;Gait training;Spoke to nursing;OT   PT Frequency Other (Comment)  (5x/week)   Discharge Recommendation   Rehab Resource Intensity Level, PT III (Minimum Resource Intensity)   AM-PAC Basic Mobility Inpatient   Turning in Flat Bed Without Bedrails 3   Lying on Back to Sitting on Edge of Flat Bed Without Bedrails 3   Moving Bed to Chair 3   Standing Up From Chair Using Arms 3   Walk in Room 3   Climb 3-5 Stairs With Railing 3   Basic Mobility Inpatient Raw Score 18   Basic Mobility Standardized Score 41.05   Highest Level Of Mobility   JH-HLM Goal 6: Walk 10 steps or more   JH-HLM Achieved 7: Walk 25 feet or more   Barthel Index   Feeding 10   Bathing 0   Grooming Score 0   Dressing Score 5   Bladder Score 10   Bowels Score 10   Toilet Use Score 5   Transfers (Bed/Chair) Score 5   Mobility (Level Surface) Score 0   Stairs Score 0   Barthel Index Score 45   End of Consult   Patient Position at End of Consult Supine; All needs within reach   [facility] Insurance Number  Audrey Pope [id number]

## 2023-11-20 NOTE — CASE MANAGEMENT
Case Management ED Discharge Planning Note    Patient name Mihir Haywood  Location ED 05/ED 05 MRN [id number]  : 1940 Date 2023        OBJECTIVE:  Predictive Model Details         27% Factor Value    Risk of Hospital Admission or ED Visit Model Is in Relationship Yes     Number of ED Visits 1     Has Chronic Kidney Disease Yes     Has Atrial Fibrillation Yes     Has Peripheral Vascular Disease Yes     Has COPD Yes     Has CVD Yes     Has Diabetes Yes     Has CHF Yes     Has PCP Yes            Chief Complaint: Weakness generalized . Patient Class: Emergency  Preferred Pharmacy:   [facility], [id number] [place] [address]  [address] [place] [address]  Phone: [phone] Fax: [phone]    [facility] [address], [address] [place] [address] [address]  [address] [place] [address] [address]  Phone: [phone] Fax: [phone]    Primary Care Provider: Siddhartha Vera DO    Primary Insurance: [facility]  Secondary Insurance:     ED Discharge Details:    Discharge planning discussed with[de-identified] Patient and spouse  Freedom of Choice: Yes  Comments - Freedom of Choice: [facility] preference - no preference for particular agency noted  CM contacted family/caregiver?: Yes  Were Treatment Team discharge recommendations reviewed with patient/caregiver?: Yes  Did patient/caregiver verbalize understanding of patient care needs?: Yes  Were patient/caregiver advised of the risks associated with not following Treatment Team discharge recommendations?: Yes    Contacts  Patient Contacts: Cody Nicholson  Relationship to Patient[de-identified] Family  Contact Method:  In Person  Reason/Outcome: Discharge  [facility] [address]         Is the patient interested in [address] at discharge?: Yes  [facility] requested[de-identified] [address], Nursing, Physical Therapy, [address] [facility] [address] Name[de-identified] Other  [facility] External Referral Reason (only applicable if external HHA name selected): Services not provided in network or near patient location  [address] Provider[de-identified] PCP  Home Health Services Needed[de-identified] Evaluate Functional Status and Safety, Diabetes Management, Oxygen Via Nasal Cannula, Heart Failure Management, Gait/ADL Training, Strengthening/Theraputic Exercises to Improve Function  Homebound Criteria Met[de-identified] Requires the Assistance of Another Person for Safe Ambulation or to Leave the Home, Uses an Assist Device (i.e. cane, walker, etc)  Supporting Clincal Findings[de-identified] Requires Oxygen, Fatigues Easliy in [employer], Limited Endurance, Dyspnea with Exertion    DME Referral Provided  Referral made for DME?: No    Other Referral/Resources/Interventions Provided:  Interventions: [facility]  Referral Comments: CM met with patient and spouse at bedside, introduced self and role and discharge planning. Discussed home therapy per therapy recommendations, patient and spouse agreeable. CM sent referral via [facility] to [facility] home health and Patient care, awaiting response. Patient's spouse made aware that someone will reach out to her with home health information and scheduling. Patient and spouse agreeable.      Treatment Team Recommendation: Home with [address] [facility]  Discharge Destination Plan[de-identified] Home with [address] at Discharge : Family
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
## 2023-11-20 NOTE — PLAN OF CARE
Problem: PHYSICAL THERAPY ADULT  Goal: Performs mobility at highest level of function for planned discharge setting. See evaluation for individualized goals. Description: Treatment/Interventions: ADL retraining, Functional transfer training, LE strengthening/ROM, Therapeutic exercise, Endurance training, Bed mobility, Gait training, Spoke to nursing, OT          See flowsheet documentation for full assessment, interventions and recommendations. Note: Prognosis: Good  Problem List: Decreased strength, Decreased endurance, Impaired balance, Decreased mobility, Pain  Assessment: Patient seen for Physical Therapy evaluation. Patient admitted with generalized weakness, fall approx 1 week ago. Comorbidities affecting patient's physical performance include: Afib, CAD, cardiac disease, chronic pain, CHF, CKD, dementia, and diabetes. Personal factors affecting patient at time of initial evaluation include: lives in 1 story house, ambulating with assistive device, inability to navigate community distances, inability to navigate level surfaces without external assistance, positive fall history, inability to perform IADLS , and inability to live alone. Prior to admission, patient was independent with functional mobility with walker vs. No AD, independent with ADLS, requiring assist for IADLS, living with spouse in a 1 level home with 0 steps to enter, and ambulating household distance. Please find objective findings from Physical Therapy assessment regarding body systems outlined above with impairments and limitations including weakness, impaired balance, decreased endurance, gait deviations, pain, decreased activity tolerance, decreased functional mobility tolerance, decreased safety awareness, and fall risk. The Barthel Index was used as a functional outcome tool presenting with a score of Barthel Index Score: 45 today indicating marked limitations of functional mobility and ADLS.   Patient's clinical presentation is currently unstable/unpredictable as seen in patient's presentation of changing level of pain, increased fall risk, new onset of impairment of functional mobility, decreased endurance, and new onset of weakness. Pt would benefit from continued Physical Therapy treatment to address deficits as defined above and maximize level of functional mobility. As demonstrated by objective findings, the assigned level of complexity for this evaluation is high. The patient's [facility] Basic Mobility Inpatient Short Form Raw Score is 18. A Raw score of greater than 16 suggests the patient may benefit from discharge to home. Please also refer to the recommendation of the Physical Therapist for safe discharge planning. Rehab Resource Intensity Level, PT: III (Minimum Resource Intensity)    See flowsheet documentation for full assessment.

## 2023-11-20 NOTE — OCCUPATIONAL THERAPY NOTE
OT EVALUATION       11/20/23 1500   OT Last Visit   OT Visit Date 11/20/23   Note Type   Note type Evaluation   Pain Assessment   Pain Assessment Tool 0-10   Pain Score 1   Pain Location/Orientation Orientation: Left; Location: Rib Cage   Restrictions/Precautions   Other Precautions Cognitive; Chair Alarm; Bed Alarm; Fall Risk;Pain;O2   Home Living   Type of [facility]  One level  (0 FELICITA)   Bathroom Shower/Tub Walk-in shower   Bathroom Toilet Raised   Bathroom Equipment Shower chair;Grab bars in shower; Toilet raiser;Grab bars around toilet   Home Equipment Walker;Quad cane   Prior Function   Level of [place] Independent with functional mobility; Needs assistance with ADLs; Needs assistance with IADLS   Lives With Spouse   Receives Help From Family   IADLs Family/Friend/Other provides medication management; Family/Friend/Other provides meals   Comments patient presents in ED due to generalized weakness   Subjective   Subjective "I can stand up myself" from the toilet   ADL   Eating Assistance 5  Supervision/Setup   Grooming Assistance 5  Supervision/Setup   [address]  4  Minimal Assistance   Bed Mobility   Supine to Sit 5  Supervision   Sit to Supine 5  Supervision   Transfers   Sit to Stand 5  Supervision   Stand to Sit 5  Supervision   Toilet transfer 4  Minimal assistance   Additional items Standard toilet   Functional Mobility   Functional Mobility 5  Supervision   Additional Comments to and from bathroom with RW with 2L O2 spO2 83% after mobility   Balance   Static Sitting Fair +   Dynamic Sitting Fair   Static Standing Fair   Dynamic Standing Fair   Activity Tolerance   Activity Tolerance Patient limited by fatigue  (SOB)   Medical Staff Made Aware Dr Destiny Hammond Assessment   LUE Assessment WFL   Cognition   Overall Cognitive Status Impaired   Arousal/Participation Cooperative   Attention Attends with cues to redirect   Orientation Level Oriented to person;Oriented to place;Oriented to situation   Following Commands Follows one step commands with increased time or repetition   Comments pt gets agitated with increased questions   Assessment   Limitation Decreased ADL status; Decreased UE strength;Decreased Safe judgement during ADL;Decreased endurance;Decreased high-level ADLs; Decreased self-care trans;Decreased cognition  (decreased balance and mobility)   Prognosis Good   Assessment Patient evaluated by Occupational Therapy. Patient admitted with generalized weakness and rib pain on L from fall x 1 week ago. The patients occupational profile, medical and therapy history includes a extensive additional review of physical, cognitive, or psychosocial history related to current functional performance. Comorbidities affecting functional mobility and ADLS include: Afib, cardiac disease, CHF, COPD, and CVA. Prior to admission, patient was independent with functional mobility with walker for last week, requiring assist for ADLS, and requiring assist for IADLS. The evaluation identifies the following performance deficits: weakness, impaired balance, decreased endurance, increased fall risk, new onset of impairment of functional mobility, decreased ADLS, decreased IADLS, pain, decreased activity tolerance, decreased safety awareness, impaired judgement, decreased cognition, and decreased strength, that result in activity limitations and/or participation restrictions. This evaluation requires clinical decision making of high complexity, because the patient presents with comorbidites that affect occupational performance and required significant modification of tasks or assistance with consideration of multiple treatment options.   The Barthel Index was used as a functional outcome tool presenting with a score of Barthel Index Score: 45, indicating marked limitations of functional mobility and ADLS. The patient's raw score on the -PAC Daily Activity Inpatient Short Form is 21. A raw score of less than 19 suggests the patient may benefit from discharge to post-acute rehabilitation services. Please refer to the recommendation of the Occupational Therapist for safe discharge planning. Patient will benefit from skilled Occupational Therapy services to address above deficits and facilitate a safe return to prior level of function. Goals   Patient Goals to go home   STG Time Frame   (1-7 days)   Short Term Goal  Goals established to promote Patient Goals: to go home:  Eating: independent; Grooming: independent standing at sink; Bathing: supervision; Upper Body Dressing supervision; Lower Body Dressing: supervision; Toileting: supervision; Patient will increase ambulatory standard toilet transfer to supervision with rolling walker to increase performance and safety with ADLS and functional mobility; Patient will increase standing tolerance to 5 minutes during ADL task to decrease assistance level and decrease fall risk; Patient will increase bed mobility to independent in preparation for ADLS and transfers;  Patient will increase functional mobility to and from bathroom with rolling walker independently to increase performance with ADLS and to use a toilet; Patient will tolerate 5 minutes of UE ROM/strengthening to increase general activity tolerance and performance in ADLS/IADLS; Patient will improve functional activity tolerance to 10 minutes of sustained functional tasks to increase participation in basic self-care and decrease assistance level;  Patient will be able to to verbalize understanding and perform energy conservation/proper body mechanics during ADLS and functional mobility at least 75% of the time with moderate cueing to decrease signs of fatigue and increase stamina to return to prior level of function; Patient will increase dynamic sitting balance to fair+ to improve the ability to sit at edge of bed or on a chair for ADLS;  Patient will increase dynamic standing balance to fair+ to improve postural stability and decrease fall risk during standing ADLS and transfers. LTG Time Frame   (8-14 days)   Long Term Goal Upper Body Dressing independent; Lower Body Dressing: independent; Toileting: independent; Patient will increase ambulatory standard toilet transfer to independent with rolling walker to increase performance and safety with ADLS and functional mobility; Patient will increase standing tolerance to 10 minutes during ADL task to decrease assistance level and decrease fall risk;  Patient will tolerate 10 minutes of UE ROM/strengthening to increase general activity tolerance and performance in ADLS/IADLS; Patient will improve functional activity tolerance to 20 minutes of sustained functional tasks to increase participation in basic self-care and decrease assistance level;  Patient will be able to to verbalize understanding and perform energy conservation/proper body mechanics during ADLS and functional mobility at least 90% of the time with minimal cueing to decrease signs of fatigue and increase stamina to return to prior level of function; Patient will increase dynamic sitting balance to good to improve the ability to sit at edge of bed or on a chair for ADLS;  Patient will increase dynamic standing balance to good to improve postural stability and decrease fall risk during standing ADLS and transfers. Pt will score >/= 24/24 on AM-PAC Daily Activity Inpatient scale to promote safe independence with ADLs and functional mobility; Pt will score >/= 75/100 on Barthel Index in order to decrease caregiver assistance needed and increase ability to perform ADLs and functional mobility.    Plan   Treatment Interventions ADL retraining;Functional transfer training;UE strengthening/ROM; Endurance training;Cognitive reorientation;Equipment evaluation/education;Patient/family training; Activityengagement; Compensatory technique education   Goal Expiration Date 12/04/23   OT Frequency 3-5x/wk   Discharge Recommendation   Rehab Resource Intensity Level, OT III (Minimum Resource Intensity)   AM-PAC Daily Activity Inpatient   Lower Body Dressing 3   Bathing 3   Toileting 3   Upper Body Dressing 4   Grooming 4   Eating 4   Daily Activity Raw Score 21   Daily Activity Standardized Score (Calc for Raw Score >=11) 44.27   AM-PAC Applied Cognition Inpatient   Following a Speech/Presentation 2   Understanding Ordinary Conversation 3   Taking Medications 2   Remembering Where Things Are Placed or Put Away 2   Remembering List of 4-5 Errands 2   Taking Care of Complicated Tasks 1   Applied Cognition Raw Score 12   Applied Cognition Standardized Score 28.82   Barthel Index   Feeding 10   Bathing 0   Grooming Score 0   Dressing Score 5   Bladder Score 10   Bowels Score 10   Toilet Use Score 5   Transfers (Bed/Chair) Score 5   Mobility (Level Surface) Score 0   Stairs Score 0   Barthel Index Score 45   Licensure   [place] License Number  Benjamni Snow [id number] [facility] OTR/L [id number]

## 2023-11-21 ENCOUNTER — TELEPHONE (OUTPATIENT)
Dept: OTHER | Facility: HOSPITAL | Age: 83
End: 2023-11-21

## 2023-11-21 ENCOUNTER — VBI (OUTPATIENT)
Dept: FAMILY MEDICINE CLINIC | Facility: CLINIC | Age: 83
End: 2023-11-21

## 2023-11-21 ENCOUNTER — PATIENT OUTREACH (OUTPATIENT)
Dept: FAMILY MEDICINE CLINIC | Facility: CLINIC | Age: 83
End: 2023-11-21

## 2023-11-21 DIAGNOSIS — Z71.89 COMPLEX CARE COORDINATION: Primary | ICD-10-CM

## 2023-11-21 RX ORDER — PREDNISONE 20 MG/1
20 TABLET ORAL 2 TIMES DAILY
Qty: 20 TABLET | Refills: 0 | Status: SHIPPED | OUTPATIENT
Start: 2023-11-21 | End: 2023-12-01

## 2023-11-21 NOTE — TELEPHONE ENCOUNTER
11/21/23 11:12 AM    Patient contacted post ED visit, VBI department spoke with patient/caregiver and outreach was successful. Patient's wife inquired about getting help at home, let her know that CM referral was placed in ED. Medication prescribed by ED doctor was sent to mail order, should have been sent to [facility]. Called office, spoke to TIFFANIE, she stated message will be sent to staff and patient's wife will be called. Thank you.   Beatrice Carrasquillo  [facility]

## 2023-11-21 NOTE — TELEPHONE ENCOUNTER
Received a call from Sukhwinder Calhoun who is with the [employer] Department, she received a call from the patients wife telling her that the ER doctor sent the patients predniSONE 20 mg tablet to the patients mail order and the patient needs them right away. Patients wife is asking if provider can write the prescription and send it to [facility] in [facility] so the patient can start the medication as soon as possible. Please follow up with the patients wife.

## 2023-11-21 NOTE — TELEPHONE ENCOUNTER
Patients wife called and was informed she needed to call the hospital and have providing dr haley the script as the medication was not filled by our providers at this practice.  [address] [facility], [address] [facility]

## 2023-11-22 ENCOUNTER — TELEPHONE (OUTPATIENT)
Dept: FAMILY MEDICINE CLINIC | Facility: CLINIC | Age: 83
End: 2023-11-22

## 2023-11-22 ENCOUNTER — PATIENT OUTREACH (OUTPATIENT)
Dept: FAMILY MEDICINE CLINIC | Facility: CLINIC | Age: 83
End: 2023-11-22

## 2023-11-22 DIAGNOSIS — Z59.9 INADEQUATE COMMUNITY RESOURCES: Primary | ICD-10-CM

## 2023-11-22 LAB
ATRIAL RATE: 84 BPM
P AXIS: 74 DEGREES
PR INTERVAL: 184 MS
QRS AXIS: -67 DEGREES
QRSD INTERVAL: 76 MS
QT INTERVAL: 338 MS
QTC INTERVAL: 399 MS
T WAVE AXIS: 85 DEGREES
VENTRICULAR RATE: 84 BPM

## 2023-11-22 PROCEDURE — 93010 ELECTROCARDIOGRAM REPORT: CPT | Performed by: INTERNAL MEDICINE

## 2023-11-22 SDOH — ECONOMIC STABILITY - INCOME SECURITY: PROBLEM RELATED TO HOUSING AND ECONOMIC CIRCUMSTANCES, UNSPECIFIED: Z59.9

## 2023-11-22 NOTE — PROGRESS NOTES
IPCM referral received via IB for CCM services. Referral placed for help with [employer] and help at home. Chart reviewed. Patient was recently in the ER at [facility] on 11/20/23 with generalized weakness and increased SOB. Patient reported a rib fracture s/p fall 1 week prior. Patients wife present stating patients strength has declined and requires a squad for lift assistance. Patient was off O2 for a long time but went back on after fracture. Patient was treated with IV Solu-medrol in the ER. Patient was discharged home with his wife and Prednisone prescription for 10 days. This RNCM called patients wife Becca Cochran and introduced self and explained the role of the [facility] services. She states patient is doing okay since discharge but she really needs help with patient at home. She says it is too difficult to handle him on her own. She says she is his FT care giver. Patient with dementia. We discussed memory care unit placement and respite care but she is not interested. She inquired about obtaining services at home and doesn't know where to begin. We discussed making a referral to [facility] and she requests a referral be made. Per [facility] note Robyn Gallegos is the only [facility] agency in the area that accepts patients insurance-[facility]. Patient will need to be seen by his PCP and have an order placed for [facility] and a A. Patient has an apt with his PCP on 12/21/23. This was discussed with patients wife. I advised her to move his appointment up sooner so he can be evaluated and she could maybe get services sooner. She says she will try and was very appreciative. I told her I would send a message to Dr Иван Henderson and his office staff as well. Patients wife manages all of patients medications. She states that she has all of his medications and he takes them as directed. She reviewed his medications with me. She states she just got the Prednisone the ER ordered today.  She says they sent the prescription to the mail order pharmacy instead of the local. She states he will start it today. Patient  has a h/o CHF, COPD and DM. Jesus Perera states these conditions are all stable and declines any education, discussion or follow up. She states the only thing she wants is help to care for her  at home. She does not check his BS or weight's at home. Patient is on 3.5 L O2. She states he is sore on the left side where he fell otherwise he does not have any c/o CP, chest tightness, wheezing, HA, dizziness, lightheadedness, vision changes or swelling. She states he does have some SOB on exertion which has been since his fall. An IB message was sent to Dr Nolberto Kelly and his office staff regarding the need for [address] [address] [address] [facility] services and to move apt up sooner if possible. A referral placed for [facility] CM. Patients wife declines CCM services.

## 2023-11-22 NOTE — TELEPHONE ENCOUNTER
----- Message from Janna Niranjan sent at 11/22/2023 12:39 PM EST -----  Regarding: FW: PCP APT/HHC/HHA NEEDED    ----- Message -----  From: Christian Byrne RN  Sent: 11/22/2023  12:33 PM EST  To: Ricci Enriquez DO; #  Subject: PCP APT/HHC/HHA NEEDED                           Hi Dr Иван Henderson! I just spoke with patients wife. Patient had a fall recently and fractured a rib. He was seen in the ER this past week. She is requesting help at home for patient. [facility] is the only agency in net work with his insurance. He will need to be seen by you and have it ordered. He does not have an apt until 12/21. Is there any chance that the office could get him in sooner to get this ordered? He will need a HHA ordered because his wife is having a hard time showering/bathing him right now. I am also sending a referral to  to see if there is any other help they can get. Thank you!  Ericka Eugene

## 2023-11-24 ENCOUNTER — OFFICE VISIT (OUTPATIENT)
Dept: FAMILY MEDICINE CLINIC | Facility: CLINIC | Age: 83
End: 2023-11-24
Payer: COMMERCIAL

## 2023-11-24 ENCOUNTER — PATIENT OUTREACH (OUTPATIENT)
Dept: FAMILY MEDICINE CLINIC | Facility: CLINIC | Age: 83
End: 2023-11-24

## 2023-11-24 VITALS
SYSTOLIC BLOOD PRESSURE: 126 MMHG | RESPIRATION RATE: 18 BRPM | OXYGEN SATURATION: 81 % | DIASTOLIC BLOOD PRESSURE: 78 MMHG | HEART RATE: 81 BPM | WEIGHT: 131 LBS | BODY MASS INDEX: 18.27 KG/M2 | TEMPERATURE: 99.1 F

## 2023-11-24 DIAGNOSIS — R53.81 PHYSICAL DECONDITIONING: ICD-10-CM

## 2023-11-24 DIAGNOSIS — I50.1 HEART FAILURE, LEFT, WITH LVEF 31-40% (HCC): ICD-10-CM

## 2023-11-24 DIAGNOSIS — R09.02 HYPOXIA: ICD-10-CM

## 2023-11-24 DIAGNOSIS — J43.9 BULLOUS EMPHYSEMA (HCC): Primary | ICD-10-CM

## 2023-11-24 DIAGNOSIS — I42.0 DILATED CARDIOMYOPATHY (HCC): ICD-10-CM

## 2023-11-24 DIAGNOSIS — R39.15 URINARY URGENCY: ICD-10-CM

## 2023-11-24 DIAGNOSIS — J96.11 CHRONIC HYPOXEMIC RESPIRATORY FAILURE (HCC): ICD-10-CM

## 2023-11-24 PROCEDURE — 99214 OFFICE O/P EST MOD 30 MIN: CPT | Performed by: FAMILY MEDICINE

## 2023-11-24 RX ORDER — TAMSULOSIN HYDROCHLORIDE 0.4 MG/1
0.4 CAPSULE ORAL
Qty: 90 CAPSULE | Refills: 3 | Status: SHIPPED | OUTPATIENT
Start: 2023-11-24

## 2023-11-24 NOTE — PROGRESS NOTES
MARILYN THOMAS received referral for assistance at home. Per chart review, patient's spouse is his primary caregiver as he has dementia that has been continuing to progress. MARILYN THOMAS reached out to patient's spouse to discuss options such as private duty HHA, [address] pending eligibility, the [place] program, etc.  Spouse is not interested in any programs such as [facility] or [facility]. She is inquiring as to the cost and minimum requirements in terms of hourly shift as she is only looking for assistance with showering at this time. MARILYN THOMAS called the following agencies:    [facility] [employer] requires a minimum of 16 hours/week in 4 hour shifts at the rate of $34.50/hour    [facility]  requires a minimum of one 4 hour shift/week at the rate of $35/hour and any thing less than 4 hours would be $50/hour    [facility] also requires a minimum of one 4 hour shift/week at the rate of $34/hour. Spouse confirmed that she is going to the PCP office shortly for an appointment. MARILYN THOMAS informed her that a f/u call can be made this afternoon regarding the above.

## 2023-11-24 NOTE — PROGRESS NOTES
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 Chronic hypoxemic respiratory failure ([address])  -     Ambulatory Referral to Pulmonology; Future  -     EXTERNAL Referral to Home Health; Future  -     Ambulatory Referral to Palliative Care; Future    3. Hypoxia  -     Ambulatory Referral to Pulmonology; Future  -     EXTERNAL Referral to Home Health; Future  -     Ambulatory Referral to Palliative Care; Future    4. 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 Dilated cardiomyopathy ([address] )  -     EXTERNAL Referral to Home Health; Future  -     Ambulatory Referral to Palliative Care; Future    6. Physical deconditioning  -     EXTERNAL Referral to Home Health; Future  -     Ambulatory Referral to Palliative Care; Future    7. Urinary urgency  -     tamsulosin (FLOMAX) 0.4 mg; Take 1 capsule (0.4 mg total) by mouth daily with dinner  -     EXTERNAL Referral to [facility]; Future  -     Ambulatory Referral to Palliative Care; Future    Long copnversation had with pt and wife  Also a note in the chart about them being eligible for [facility] nurse [facility]  I placed order  Pt was also advised thye may want to reach out to local [employer] for help - they usually have people who can help with a loved one        There are no Patient Instructions on file for this visit. No follow-ups on file. Subjective:      Patient ID: Betsy Nicholson is a 78 y.o. male. Chief Complaint   Patient presents with   • Follow-up   • Fall     Sas/cma       Pt had a fall - refused to go to the ED  Pt went to the ED 6 days later for breathing issues. Pt is sched for an ED follow up - actually here for a letter for home hgealth because he is weak.     Pt brought in room in a wheelchair    Pt was in the bathroom - fell and broke a wooden shelf - left side. Hard to get off the floor. Would not go to the ED. This was a Tuesday Thursday - he could not stand up off the toilet - wife called the squad to take to the ed - Pt refused. Since the fall pt has been on oxygen - before that he was not on oxygen  This was started by wife because he could not breath  If he takes off the oxygen and he ambultes the o2 sat will drop to 60  If he is at rest he will go to 90  Pt also has issues with showering as well - he is to weak to get out of the shower on his own. They can't get any help at home    Wife is requesting VNA    Pt's last appt with cardio - 10/23  Pt states they were told by pulmonary they only had to come when he is needed. Pt is also borderline incontinent - states he just got to office and he had to go immediatly upon arrival.  They went 20 min before. And when he has to go he has to go immediatly        The following portions of the patient's history were reviewed and updated as appropriate: allergies, current medications, past family history, past medical history, past social history, past surgical history and problem list.    Review of Systems   HENT:  Negative for congestion. Respiratory:  Positive for shortness of breath. Negative for chest tightness. Genitourinary:         Urgency   Neurological:  Positive for weakness.          Current Outpatient Medications   Medication Sig Dispense Refill   • albuterol (ProAir HFA) 90 mcg/act inhaler Inhale 2 puffs every 6 (six) hours as needed for wheezing 8.5 g 0   • Ascorbic Acid (VITAMIN C) 1000 MG tablet Take 1,000 mg by mouth daily     • carvedilol (COREG) 6.25 mg tablet Take 1 tablet (6.25 mg total) by mouth 2 (two) times a day 180 tablet 3   • digoxin (LANOXIN) 0.125 mg tablet Take 1 tablet (125 mcg total) by mouth daily 90 tablet 3   • Empagliflozin (Jardiance) 10 MG TABS tablet Take 1 tablet (10 mg total) by mouth every morning 90 tablet 3 • Ferrous Sulfate (Iron) 325 (65 Fe) MG TABS Take by mouth every other day     • fosinopril (MONOPRIL) 10 mg tablet Take 1 tablet (10 mg total) by mouth daily 90 tablet 1   • furosemide (LASIX) 40 mg tablet Take one tablet on Cdadmc-Wwmclquze-Ngtkso morning 90 tablet 1   • glucose blood test strip Test     • memantine (NAMENDA) 10 mg tablet TAKE 1 TABLET TWICE DAILY 180 tablet 1   • metFORMIN (GLUCOPHAGE) 500 mg tablet TAKE 1 TABLET EVERY DAY WITH BREAKFAST 90 tablet 1   • Omega-3 Fatty Acids (FISH OIL PO) Take by mouth     • pantoprazole (PROTONIX) 40 mg tablet TAKE 1 TABLET EVERY DAY 90 tablet 1   • predniSONE 20 mg tablet Take 1 tablet (20 mg total) by mouth 2 (two) times a day for 10 days 20 tablet 0   • PRODIGY TWIST TOP LANCETS 28G MISC Use     • rosuvastatin (CRESTOR) 10 MG tablet Take 1 tablet (10 mg total) by mouth daily at bedtime 90 tablet 3   • tamsulosin (FLOMAX) 0.4 mg Take 1 capsule (0.4 mg total) by mouth daily with dinner 90 capsule 3   • Trelegy Ellipta 100-62.5-25 MCG/ACT inhaler Inhale 1 puff daily 180 blister 3   • warfarin (COUMADIN) 1 mg tablet TAKE 1 TABLET EVERY DAY (APPT NEEDED NOW - OVER A YEAR SINCE LAST SEEN) 90 tablet 0   • warfarin (COUMADIN) 3 mg tablet TAKE AS DIRECTED BY OFFICE BASED ON INR (GOAL INR AT PRESENT IS 1.5 TO 2) 90 tablet 1   • warfarin (COUMADIN) 5 mg tablet Take 1 tablet (5 mg total) by mouth daily 90 tablet 1   • fluticasone-umeclidinium-vilanterol (TRELEGY) 100-62.5-25 MCG/INH inhaler Inhale 1 puff daily Rinse mouth after use. (Patient not taking: Reported on 11/22/2023) 60 blister 11     No current facility-administered medications for this visit. Objective:    /78   Pulse 81   Temp 99.1 °F (37.3 °C)   Resp 18   Wt 59.4 kg (131 lb)   SpO2 (!) 81%   BMI 18.27 kg/m²        Physical Exam  Cardiovascular:      Heart sounds: Murmur heard. Musculoskeletal:      Comments:  In wheelchair  Gait dysfunction                Martin Speak, DO

## 2023-11-27 ENCOUNTER — PATIENT OUTREACH (OUTPATIENT)
Dept: FAMILY MEDICINE CLINIC | Facility: CLINIC | Age: 83
End: 2023-11-27

## 2023-11-27 NOTE — PROGRESS NOTES
OPCM SW reached out to spouse for f/u and discussed HHA requirements. Spouse confirmed she will call [facility] also, but that at this time she is not interested as she does not feel she needs a 4 hour shift at this time. Spouse appreciative of SW assistance, however declines further need at this time.

## 2023-11-28 ENCOUNTER — TELEPHONE (OUTPATIENT)
Dept: FAMILY MEDICINE CLINIC | Facility: CLINIC | Age: 83
End: 2023-11-28

## 2023-11-28 NOTE — TELEPHONE ENCOUNTER
Called and spoke to pts wife, FRANSISCO has already been to the home.  [address], [address] [facility]

## 2023-11-28 NOTE — TELEPHONE ENCOUNTER
----- Message from Andrew Summers RN sent at 11/22/2023 12:29 PM EST -----  Regarding: PCP APT/HHC/HHA NEEDED  Hi Dr Javan Unger! I just spoke with patients wife. Patient had a fall recently and fractured a rib. He was seen in the ER this past week. She is requesting help at home for patient. [facility] is the only agency in net work with his insurance. He will need to be seen by you and have it ordered. He does not have an apt until 12/21. Is there any chance that the office could get him in sooner to get this ordered? He will need a HHA ordered because his wife is having a hard time showering/bathing him right now. I am also sending a referral to  to see if there is any other help they can get. Thank you!  Danica Ruiz

## 2023-12-04 ENCOUNTER — PATIENT OUTREACH (OUTPATIENT)
Dept: FAMILY MEDICINE CLINIC | Facility: CLINIC | Age: 83
End: 2023-12-04

## 2023-12-04 NOTE — PROGRESS NOTES
Chart reviewed. Patient was seen by his PCP on 11/24/23. HHC order was placed. Patient had f/u with [facility] as well. This RNCM called patients wife Fátima Venegas for follow up. She states things are going well. She states patients breathing is at baseline. No better but no worse. She states that patient now has services with [facility]. He has nursing, PT, OT and a HHA which she is very pleased with. She denies any additional needs. Patients wife Fátima Venegas declines the need for CCM services or follow up. I provided her with my contact number and encouraged her to call with any questions or concerns. She was very appreciative of the follow up.     RNCM removed self from care team.

## 2023-12-08 ENCOUNTER — TELEPHONE (OUTPATIENT)
Age: 83
End: 2023-12-08

## 2023-12-08 NOTE — TELEPHONE ENCOUNTER
Jana called stating that she fax over a home care order for the patient PCP to sign off on and return but, they haven't received anything as yet. I asked her to resend the fax and we will have someone fax it back over to her once the PCP signs it. Their fax number is [phone]

## 2023-12-12 ENCOUNTER — APPOINTMENT (OUTPATIENT)
Dept: LAB | Facility: CLINIC | Age: 83
End: 2023-12-12
Payer: COMMERCIAL

## 2023-12-12 ENCOUNTER — LAB (OUTPATIENT)
Dept: LAB | Facility: HOSPITAL | Age: 83
End: 2023-12-12
Payer: COMMERCIAL

## 2023-12-12 ENCOUNTER — ANTICOAG VISIT (OUTPATIENT)
Dept: FAMILY MEDICINE CLINIC | Facility: CLINIC | Age: 83
End: 2023-12-12

## 2023-12-12 DIAGNOSIS — E11.42 TYPE 2 DIABETES MELLITUS WITH DIABETIC POLYNEUROPATHY, WITHOUT LONG-TERM CURRENT USE OF INSULIN (HCC): ICD-10-CM

## 2023-12-12 DIAGNOSIS — I11.0 HYPERTENSIVE HEART DISEASE WITH CONGESTIVE HEART FAILURE, UNSPECIFIED HEART FAILURE TYPE (HCC): ICD-10-CM

## 2023-12-12 DIAGNOSIS — N18.31 STAGE 3A CHRONIC KIDNEY DISEASE (HCC): ICD-10-CM

## 2023-12-12 DIAGNOSIS — I48.0 PAF (PAROXYSMAL ATRIAL FIBRILLATION) (HCC): Primary | ICD-10-CM

## 2023-12-12 DIAGNOSIS — I42.0 DILATED CARDIOMYOPATHY (HCC): ICD-10-CM

## 2023-12-12 DIAGNOSIS — I48.0 PAF (PAROXYSMAL ATRIAL FIBRILLATION) (HCC): ICD-10-CM

## 2023-12-12 DIAGNOSIS — I50.9 CHRONIC CONGESTIVE HEART FAILURE, UNSPECIFIED HEART FAILURE TYPE (HCC): ICD-10-CM

## 2023-12-12 LAB
ALBUMIN SERPL BCP-MCNC: 4.1 G/DL (ref 3.5–5)
ALP SERPL-CCNC: 76 U/L (ref 34–104)
ALT SERPL W P-5'-P-CCNC: 16 U/L (ref 7–52)
ANION GAP SERPL CALCULATED.3IONS-SCNC: 10 MMOL/L
AST SERPL W P-5'-P-CCNC: 12 U/L (ref 13–39)
BASOPHILS # BLD AUTO: 0.07 THOUSANDS/ÂΜL (ref 0–0.1)
BASOPHILS NFR BLD AUTO: 1 % (ref 0–1)
BILIRUB SERPL-MCNC: 0.58 MG/DL (ref 0.2–1)
BUN SERPL-MCNC: 28 MG/DL (ref 5–25)
CALCIUM SERPL-MCNC: 9.2 MG/DL (ref 8.4–10.2)
CHLORIDE SERPL-SCNC: 95 MMOL/L (ref 96–108)
CHOLEST SERPL-MCNC: 89 MG/DL
CO2 SERPL-SCNC: 31 MMOL/L (ref 21–32)
CREAT SERPL-MCNC: 1.34 MG/DL (ref 0.6–1.3)
CREAT UR-MCNC: 93.4 MG/DL
EOSINOPHIL # BLD AUTO: 0.15 THOUSAND/ÂΜL (ref 0–0.61)
EOSINOPHIL NFR BLD AUTO: 1 % (ref 0–6)
ERYTHROCYTE [DISTWIDTH] IN BLOOD BY AUTOMATED COUNT: 12.6 % (ref 11.6–15.1)
EST. AVERAGE GLUCOSE BLD GHB EST-MCNC: 206 MG/DL
GFR SERPL CREATININE-BSD FRML MDRD: 48 ML/MIN/1.73SQ M
GLUCOSE P FAST SERPL-MCNC: 148 MG/DL (ref 65–99)
HBA1C MFR BLD: 8.8 %
HCT VFR BLD AUTO: 45.6 % (ref 36.5–49.3)
HDLC SERPL-MCNC: 35 MG/DL
HGB BLD-MCNC: 15.1 G/DL (ref 12–17)
IMM GRANULOCYTES # BLD AUTO: 0.12 THOUSAND/UL (ref 0–0.2)
IMM GRANULOCYTES NFR BLD AUTO: 1 % (ref 0–2)
INR PPP: 1.94 (ref 0.84–1.19)
LDLC SERPL CALC-MCNC: 24 MG/DL (ref 0–100)
LYMPHOCYTES # BLD AUTO: 1.58 THOUSANDS/ÂΜL (ref 0.6–4.47)
LYMPHOCYTES NFR BLD AUTO: 12 % (ref 14–44)
MCH RBC QN AUTO: 32.2 PG (ref 26.8–34.3)
MCHC RBC AUTO-ENTMCNC: 33.1 G/DL (ref 31.4–37.4)
MCV RBC AUTO: 97 FL (ref 82–98)
MICROALBUMIN UR-MCNC: 99.7 MG/L
MICROALBUMIN/CREAT 24H UR: 107 MG/G CREATININE (ref 0–30)
MONOCYTES # BLD AUTO: 1.09 THOUSAND/ÂΜL (ref 0.17–1.22)
MONOCYTES NFR BLD AUTO: 8 % (ref 4–12)
NEUTROPHILS # BLD AUTO: 10.28 THOUSANDS/ÂΜL (ref 1.85–7.62)
NEUTS SEG NFR BLD AUTO: 77 % (ref 43–75)
NRBC BLD AUTO-RTO: 0 /100 WBCS
PLATELET # BLD AUTO: 132 THOUSANDS/UL (ref 149–390)
PMV BLD AUTO: 10.4 FL (ref 8.9–12.7)
POTASSIUM SERPL-SCNC: 3.9 MMOL/L (ref 3.5–5.3)
PROT SERPL-MCNC: 7.2 G/DL (ref 6.4–8.4)
PROTHROMBIN TIME: 22.3 SECONDS (ref 11.6–14.5)
RBC # BLD AUTO: 4.69 MILLION/UL (ref 3.88–5.62)
SODIUM SERPL-SCNC: 136 MMOL/L (ref 135–147)
TRIGL SERPL-MCNC: 150 MG/DL
WBC # BLD AUTO: 13.29 THOUSAND/UL (ref 4.31–10.16)

## 2023-12-12 PROCEDURE — 36415 COLL VENOUS BLD VENIPUNCTURE: CPT

## 2023-12-12 PROCEDURE — 82570 ASSAY OF URINE CREATININE: CPT

## 2023-12-12 PROCEDURE — 85610 PROTHROMBIN TIME: CPT

## 2023-12-12 PROCEDURE — 80053 COMPREHEN METABOLIC PANEL: CPT

## 2023-12-12 PROCEDURE — 80061 LIPID PANEL: CPT

## 2023-12-12 PROCEDURE — 82043 UR ALBUMIN QUANTITATIVE: CPT

## 2023-12-12 PROCEDURE — 83036 HEMOGLOBIN GLYCOSYLATED A1C: CPT

## 2023-12-12 PROCEDURE — 85025 COMPLETE CBC W/AUTO DIFF WBC: CPT

## 2023-12-19 ENCOUNTER — APPOINTMENT (EMERGENCY)
Dept: RADIOLOGY | Facility: HOSPITAL | Age: 83
End: 2023-12-19
Payer: COMMERCIAL

## 2023-12-19 ENCOUNTER — HOSPITAL ENCOUNTER (INPATIENT)
Facility: HOSPITAL | Age: 83
LOS: 1 days | Discharge: HOME | End: 2023-12-21
Attending: EMERGENCY MEDICINE | Admitting: STUDENT IN AN ORGANIZED HEALTH CARE EDUCATION/TRAINING PROGRAM
Payer: COMMERCIAL

## 2023-12-19 DIAGNOSIS — E11.42 TYPE 2 DIABETES MELLITUS WITH DIABETIC POLYNEUROPATHY, WITHOUT LONG-TERM CURRENT USE OF INSULIN (HCC): ICD-10-CM

## 2023-12-19 DIAGNOSIS — Z99.81 HYPOXEMIA REQUIRING SUPPLEMENTAL OXYGEN: ICD-10-CM

## 2023-12-19 DIAGNOSIS — R09.02 HYPOXEMIA REQUIRING SUPPLEMENTAL OXYGEN: ICD-10-CM

## 2023-12-19 DIAGNOSIS — J44.1 COPD EXACERBATION (HCC): Primary | ICD-10-CM

## 2023-12-19 LAB
ALBUMIN SERPL BCP-MCNC: 3.8 G/DL (ref 3.5–5)
ALP SERPL-CCNC: 76 U/L (ref 34–104)
ALT SERPL W P-5'-P-CCNC: 25 U/L (ref 7–52)
ANION GAP SERPL CALCULATED.3IONS-SCNC: 7 MMOL/L
AST SERPL W P-5'-P-CCNC: 23 U/L (ref 13–39)
ATRIAL RATE: 98 BPM
BASE EX.OXY STD BLDV CALC-SCNC: 67 % (ref 60–80)
BASE EX.OXY STD BLDV CALC-SCNC: 74.8 % (ref 60–80)
BASE EXCESS BLDV CALC-SCNC: 0.6 MMOL/L
BASE EXCESS BLDV CALC-SCNC: 2.7 MMOL/L
BASOPHILS # BLD AUTO: 0.08 THOUSANDS/ÂΜL (ref 0–0.1)
BASOPHILS NFR BLD AUTO: 1 % (ref 0–1)
BILIRUB SERPL-MCNC: 0.32 MG/DL (ref 0.2–1)
BUN SERPL-MCNC: 24 MG/DL (ref 5–25)
CALCIUM SERPL-MCNC: 8.6 MG/DL (ref 8.4–10.2)
CHLORIDE SERPL-SCNC: 100 MMOL/L (ref 96–108)
CO2 SERPL-SCNC: 29 MMOL/L (ref 21–32)
CREAT SERPL-MCNC: 1.13 MG/DL (ref 0.6–1.3)
EOSINOPHIL # BLD AUTO: 0.21 THOUSAND/ÂΜL (ref 0–0.61)
EOSINOPHIL NFR BLD AUTO: 2 % (ref 0–6)
ERYTHROCYTE [DISTWIDTH] IN BLOOD BY AUTOMATED COUNT: 12.6 % (ref 11.6–15.1)
FLUAV RNA RESP QL NAA+PROBE: NEGATIVE
FLUBV RNA RESP QL NAA+PROBE: NEGATIVE
GFR SERPL CREATININE-BSD FRML MDRD: 58 ML/MIN/1.73SQ M
GLUCOSE SERPL-MCNC: 170 MG/DL (ref 65–140)
HCO3 BLDV-SCNC: 28.6 MMOL/L (ref 24–30)
HCO3 BLDV-SCNC: 30.3 MMOL/L (ref 24–30)
HCT VFR BLD AUTO: 42.5 % (ref 36.5–49.3)
HGB BLD-MCNC: 13.6 G/DL (ref 12–17)
IMM GRANULOCYTES # BLD AUTO: 0.05 THOUSAND/UL (ref 0–0.2)
IMM GRANULOCYTES NFR BLD AUTO: 1 % (ref 0–2)
INR PPP: 2.38 (ref 0.84–1.19)
LACTATE SERPL-SCNC: 0.9 MMOL/L (ref 0.5–2)
LYMPHOCYTES # BLD AUTO: 2.1 THOUSANDS/ÂΜL (ref 0.6–4.47)
LYMPHOCYTES NFR BLD AUTO: 19 % (ref 14–44)
MCH RBC QN AUTO: 31.6 PG (ref 26.8–34.3)
MCHC RBC AUTO-ENTMCNC: 32 G/DL (ref 31.4–37.4)
MCV RBC AUTO: 99 FL (ref 82–98)
MONOCYTES # BLD AUTO: 0.97 THOUSAND/ÂΜL (ref 0.17–1.22)
MONOCYTES NFR BLD AUTO: 9 % (ref 4–12)
NEUTROPHILS # BLD AUTO: 7.55 THOUSANDS/ÂΜL (ref 1.85–7.62)
NEUTS SEG NFR BLD AUTO: 68 % (ref 43–75)
NRBC BLD AUTO-RTO: 0 /100 WBCS
O2 CT BLDV-SCNC: 12.5 ML/DL
O2 CT BLDV-SCNC: 15.5 ML/DL
P AXIS: 81 DEGREES
PCO2 BLDV: 60.1 MM HG (ref 42–50)
PCO2 BLDV: 60.3 MM HG (ref 42–50)
PH BLDV: 7.29 [PH] (ref 7.3–7.4)
PH BLDV: 7.32 [PH] (ref 7.3–7.4)
PLATELET # BLD AUTO: 134 THOUSANDS/UL (ref 149–390)
PMV BLD AUTO: 10.4 FL (ref 8.9–12.7)
PO2 BLDV: 37.6 MM HG (ref 35–45)
PO2 BLDV: 46.4 MM HG (ref 35–45)
POTASSIUM SERPL-SCNC: 4.3 MMOL/L (ref 3.5–5.3)
PR INTERVAL: 174 MS
PROCALCITONIN SERPL-MCNC: 0.13 NG/ML
PROT SERPL-MCNC: 6.2 G/DL (ref 6.4–8.4)
PROTHROMBIN TIME: 26.1 SECONDS (ref 11.6–14.5)
QRS AXIS: -65 DEGREES
QRSD INTERVAL: 76 MS
QT INTERVAL: 304 MS
QTC INTERVAL: 388 MS
RBC # BLD AUTO: 4.31 MILLION/UL (ref 3.88–5.62)
RSV RNA RESP QL NAA+PROBE: NEGATIVE
SARS-COV-2 RNA RESP QL NAA+PROBE: NEGATIVE
SODIUM SERPL-SCNC: 136 MMOL/L (ref 135–147)
T WAVE AXIS: 82 DEGREES
VENTRICULAR RATE: 98 BPM
WBC # BLD AUTO: 10.96 THOUSAND/UL (ref 4.31–10.16)

## 2023-12-19 PROCEDURE — 94760 N-INVAS EAR/PLS OXIMETRY 1: CPT

## 2023-12-19 PROCEDURE — 96365 THER/PROPH/DIAG IV INF INIT: CPT

## 2023-12-19 PROCEDURE — 36415 COLL VENOUS BLD VENIPUNCTURE: CPT | Performed by: EMERGENCY MEDICINE

## 2023-12-19 PROCEDURE — 80053 COMPREHEN METABOLIC PANEL: CPT | Performed by: EMERGENCY MEDICINE

## 2023-12-19 PROCEDURE — 85025 COMPLETE CBC W/AUTO DIFF WBC: CPT | Performed by: EMERGENCY MEDICINE

## 2023-12-19 PROCEDURE — 94002 VENT MGMT INPAT INIT DAY: CPT

## 2023-12-19 PROCEDURE — 94640 AIRWAY INHALATION TREATMENT: CPT

## 2023-12-19 PROCEDURE — 85610 PROTHROMBIN TIME: CPT | Performed by: STUDENT IN AN ORGANIZED HEALTH CARE EDUCATION/TRAINING PROGRAM

## 2023-12-19 PROCEDURE — 99291 CRITICAL CARE FIRST HOUR: CPT | Performed by: EMERGENCY MEDICINE

## 2023-12-19 PROCEDURE — 99285 EMERGENCY DEPT VISIT HI MDM: CPT

## 2023-12-19 PROCEDURE — 93005 ELECTROCARDIOGRAM TRACING: CPT

## 2023-12-19 PROCEDURE — 84145 PROCALCITONIN (PCT): CPT | Performed by: EMERGENCY MEDICINE

## 2023-12-19 PROCEDURE — 71045 X-RAY EXAM CHEST 1 VIEW: CPT

## 2023-12-19 PROCEDURE — 83605 ASSAY OF LACTIC ACID: CPT | Performed by: EMERGENCY MEDICINE

## 2023-12-19 PROCEDURE — 99223 1ST HOSP IP/OBS HIGH 75: CPT | Performed by: STUDENT IN AN ORGANIZED HEALTH CARE EDUCATION/TRAINING PROGRAM

## 2023-12-19 PROCEDURE — 0241U HB NFCT DS VIR RESP RNA 4 TRGT: CPT | Performed by: EMERGENCY MEDICINE

## 2023-12-19 PROCEDURE — 82805 BLOOD GASES W/O2 SATURATION: CPT | Performed by: EMERGENCY MEDICINE

## 2023-12-19 RX ORDER — ACETAMINOPHEN 325 MG/1
650 TABLET ORAL EVERY 6 HOURS PRN
Status: DISCONTINUED | OUTPATIENT
Start: 2023-12-19 | End: 2023-12-21 | Stop reason: HOSPADM

## 2023-12-19 RX ORDER — GUAIFENESIN 600 MG/1
600 TABLET, EXTENDED RELEASE ORAL 2 TIMES DAILY
Status: DISCONTINUED | OUTPATIENT
Start: 2023-12-19 | End: 2023-12-21 | Stop reason: HOSPADM

## 2023-12-19 RX ORDER — WARFARIN SODIUM 3 MG/1
3 TABLET ORAL
Status: DISCONTINUED | OUTPATIENT
Start: 2023-12-19 | End: 2023-12-21 | Stop reason: HOSPADM

## 2023-12-19 RX ORDER — LISINOPRIL 10 MG/1
10 TABLET ORAL DAILY
Status: DISCONTINUED | OUTPATIENT
Start: 2023-12-20 | End: 2023-12-21 | Stop reason: HOSPADM

## 2023-12-19 RX ORDER — AZITHROMYCIN 250 MG/1
500 TABLET, FILM COATED ORAL EVERY 24 HOURS
Status: COMPLETED | OUTPATIENT
Start: 2023-12-19 | End: 2023-12-21

## 2023-12-19 RX ORDER — TAMSULOSIN HYDROCHLORIDE 0.4 MG/1
0.4 CAPSULE ORAL
Status: DISCONTINUED | OUTPATIENT
Start: 2023-12-19 | End: 2023-12-21 | Stop reason: HOSPADM

## 2023-12-19 RX ORDER — MAGNESIUM SULFATE HEPTAHYDRATE 40 MG/ML
2 INJECTION, SOLUTION INTRAVENOUS ONCE
Status: COMPLETED | OUTPATIENT
Start: 2023-12-19 | End: 2023-12-19

## 2023-12-19 RX ORDER — BUDESONIDE 0.5 MG/2ML
0.5 INHALANT ORAL
Status: DISCONTINUED | OUTPATIENT
Start: 2023-12-19 | End: 2023-12-21 | Stop reason: HOSPADM

## 2023-12-19 RX ORDER — METHYLPREDNISOLONE SODIUM SUCCINATE 40 MG/ML
40 INJECTION, POWDER, LYOPHILIZED, FOR SOLUTION INTRAMUSCULAR; INTRAVENOUS EVERY 8 HOURS
Status: DISCONTINUED | OUTPATIENT
Start: 2023-12-19 | End: 2023-12-20

## 2023-12-19 RX ORDER — SODIUM CHLORIDE FOR INHALATION 0.9 %
3 VIAL, NEBULIZER (ML) INHALATION
Status: DISCONTINUED | OUTPATIENT
Start: 2023-12-19 | End: 2023-12-21

## 2023-12-19 RX ORDER — LEVALBUTEROL INHALATION SOLUTION 1.25 MG/3ML
1.25 SOLUTION RESPIRATORY (INHALATION)
Status: DISCONTINUED | OUTPATIENT
Start: 2023-12-19 | End: 2023-12-21 | Stop reason: HOSPADM

## 2023-12-19 RX ORDER — CARVEDILOL 6.25 MG/1
6.25 TABLET ORAL 2 TIMES DAILY
Status: DISCONTINUED | OUTPATIENT
Start: 2023-12-19 | End: 2023-12-21 | Stop reason: HOSPADM

## 2023-12-19 RX ORDER — PRAVASTATIN SODIUM 80 MG/1
80 TABLET ORAL
Status: DISCONTINUED | OUTPATIENT
Start: 2023-12-19 | End: 2023-12-21 | Stop reason: HOSPADM

## 2023-12-19 RX ORDER — DIGOXIN 125 MCG
125 TABLET ORAL DAILY
Status: DISCONTINUED | OUTPATIENT
Start: 2023-12-19 | End: 2023-12-21 | Stop reason: HOSPADM

## 2023-12-19 RX ORDER — FUROSEMIDE 40 MG/1
40 TABLET ORAL
Status: DISCONTINUED | OUTPATIENT
Start: 2023-12-20 | End: 2023-12-21 | Stop reason: HOSPADM

## 2023-12-19 RX ADMIN — CARVEDILOL 6.25 MG: 6.25 TABLET, FILM COATED ORAL at 18:02

## 2023-12-19 RX ADMIN — BUDESONIDE 0.5 MG: 0.5 INHALANT ORAL at 19:29

## 2023-12-19 RX ADMIN — METHYLPREDNISOLONE SODIUM SUCCINATE 40 MG: 40 INJECTION, POWDER, FOR SOLUTION INTRAMUSCULAR; INTRAVENOUS at 14:33

## 2023-12-19 RX ADMIN — GUAIFENESIN 600 MG: 600 TABLET, EXTENDED RELEASE ORAL at 18:02

## 2023-12-19 RX ADMIN — DIGOXIN 125 MCG: 125 TABLET ORAL at 16:34

## 2023-12-19 RX ADMIN — WARFARIN SODIUM 3 MG: 3 TABLET ORAL at 18:02

## 2023-12-19 RX ADMIN — AZITHROMYCIN 500 MG: 250 TABLET, FILM COATED ORAL at 14:31

## 2023-12-19 RX ADMIN — TAMSULOSIN HYDROCHLORIDE 0.4 MG: 0.4 CAPSULE ORAL at 16:34

## 2023-12-19 RX ADMIN — LEVALBUTEROL HYDROCHLORIDE 1.25 MG: 1.25 SOLUTION RESPIRATORY (INHALATION) at 19:27

## 2023-12-19 RX ADMIN — PRAVASTATIN SODIUM 80 MG: 80 TABLET ORAL at 16:34

## 2023-12-19 RX ADMIN — GUAIFENESIN 600 MG: 600 TABLET, EXTENDED RELEASE ORAL at 14:31

## 2023-12-19 RX ADMIN — METHYLPREDNISOLONE SODIUM SUCCINATE 40 MG: 40 INJECTION, POWDER, FOR SOLUTION INTRAMUSCULAR; INTRAVENOUS at 21:31

## 2023-12-19 RX ADMIN — MAGNESIUM SULFATE HEPTAHYDRATE 2 G: 40 INJECTION, SOLUTION INTRAVENOUS at 09:33

## 2023-12-19 RX ADMIN — ISODIUM CHLORIDE 3 ML: 0.03 SOLUTION RESPIRATORY (INHALATION) at 19:27

## 2023-12-19 RX ADMIN — IPRATROPIUM BROMIDE 0.5 MG: 0.5 SOLUTION RESPIRATORY (INHALATION) at 19:27

## 2023-12-19 NOTE — ASSESSMENT & PLAN NOTE
83-year-old male with past history of severe emphysema, congestive heart failure EF 35 to 40%, A-fib on Coumadin, type II diabetes and and CKD presented with shortness of breath  Reported increasing shortness of breath, respiratory distress evaluated by EMS, treated with heart nebs, IV Solu-Medrol 125 mg  Currently on 5 L nasal cannula, baseline 3 L  Chest x-ray showing emphysema, no concerns for infiltrate  Labs unremarkable, no white count, procalcitonin negative  Reports symptoms has improved, no significant wheezing on exam, decreased breath sounds bilaterally  Continue IV Solu-Medrol 40 every 8, Xopenex, Atrovent and Pulmicort  Azithromycin for 3 days  Hopeful transition to p.o. steroids tomorrow given rapid improvement

## 2023-12-19 NOTE — ASSESSMENT & PLAN NOTE
Lab Results   Component Value Date    HGBA1C 8.8 (H) 12/12/2023     Hold Jardiance, metformin, not on insulin prior to arrival  Sliding scale, Accu-Cheks, diabetic diet  Monitor blood glucose, as patient currently on IV steroids for COPD

## 2023-12-19 NOTE — ASSESSMENT & PLAN NOTE
Lab Results   Component Value Date    EGFR 58 12/19/2023    EGFR 48 12/12/2023    EGFR 51 11/20/2023    CREATININE 1.13 12/19/2023    CREATININE 1.34 (H) 12/12/2023    CREATININE 1.28 11/20/2023     Renal functions currently stable, resume Lasix

## 2023-12-19 NOTE — PLAN OF CARE
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
## 2023-12-19 NOTE — PLAN OF CARE
Problem: Potential for Falls  Goal: Patient will remain free of falls  Description: INTERVENTIONS:  - Educate patient/family on patient safety including physical limitations  - Instruct patient to call for assistance with activity   - Consult OT/PT to assist with strengthening/mobility   - Keep Call bell within reach  - Keep bed low and locked with side rails adjusted as appropriate  - Keep care items and personal belongings within reach  - Initiate and maintain comfort rounds  - Make Fall Risk Sign visible to staff  - Offer Toileting every 2 Hours, in advance of need  - Initiate/Maintain bed/chair alarm  - Obtain necessary fall risk management equipment: fall risk sign  - Apply yellow socks and bracelet for high fall risk patients  - Consider moving patient to room near nurses station  Outcome: Progressing     Problem: SAFETY ADULT  Goal: Maintain or return to baseline ADL function  Description: INTERVENTIONS:  -  Assess patient's ability to carry out ADLs; assess patient's baseline for ADL function and identify physical deficits which impact ability to perform ADLs (bathing, care of mouth/teeth, toileting, grooming, dressing, etc.)  - Assess/evaluate cause of self-care deficits   - Assess range of motion  - Assess patient's mobility; develop plan if impaired  - Assess patient's need for assistive devices and provide as appropriate  - Encourage maximum independence but intervene and supervise when necessary  - Involve family in performance of ADLs  - Assess for home care needs following discharge   - Consider OT consult to assist with ADL evaluation and planning for discharge  - Provide patient education as appropriate  Outcome: Progressing     Problem: RESPIRATORY - ADULT  Goal: Achieves optimal ventilation and oxygenation  Description: INTERVENTIONS:  - Assess for changes in respiratory status  - Assess for changes in mentation and behavior  - Position to facilitate oxygenation and minimize respiratory  effort  - Oxygen administered by appropriate delivery if ordered  - Initiate smoking cessation education as indicated  - Encourage broncho-pulmonary hygiene including cough, deep breathe, Incentive Spirometry  - Assess the need for suctioning and aspirate as needed  - Assess and instruct to report SOB or any respiratory difficulty  - Respiratory Therapy support as indicated  Outcome: Progressing
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
## 2023-12-19 NOTE — ASSESSMENT & PLAN NOTE
Rate controlled: Continue carvedilol 6.25 twice daily, digoxin 125 mcg daily  Anticoagulated on Coumadin, check INR and resume Coumadin 3 mg daily

## 2023-12-19 NOTE — Clinical Note
Case was discussed with [facility] and the patient's admission status was agreed to be Admission Status: inpatient status to the service of Dr. Suarez.

## 2023-12-19 NOTE — ASSESSMENT & PLAN NOTE
Wt Readings from Last 3 Encounters:   11/24/23 59.4 kg (131 lb)   10/23/23 59 kg (130 lb)   08/14/23 58.5 kg (129 lb)     Appears euvolemic, the echo showing EF 35 to 40%  S/p AICD  Continue carvedilol 6.25 twice daily  Continue Lasix 40 mg every other day

## 2023-12-20 LAB
ALBUMIN SERPL BCP-MCNC: 3.2 G/DL (ref 3.5–5)
ALP SERPL-CCNC: 56 U/L (ref 34–104)
ALT SERPL W P-5'-P-CCNC: 19 U/L (ref 7–52)
ANION GAP SERPL CALCULATED.3IONS-SCNC: 8 MMOL/L
AST SERPL W P-5'-P-CCNC: 10 U/L (ref 13–39)
BASOPHILS # BLD AUTO: 0.01 THOUSANDS/ÂΜL (ref 0–0.1)
BASOPHILS NFR BLD AUTO: 0 % (ref 0–1)
BILIRUB SERPL-MCNC: 0.26 MG/DL (ref 0.2–1)
BUN SERPL-MCNC: 28 MG/DL (ref 5–25)
CALCIUM ALBUM COR SERPL-MCNC: 8.8 MG/DL (ref 8.3–10.1)
CALCIUM SERPL-MCNC: 8.2 MG/DL (ref 8.4–10.2)
CHLORIDE SERPL-SCNC: 101 MMOL/L (ref 96–108)
CO2 SERPL-SCNC: 29 MMOL/L (ref 21–32)
CREAT SERPL-MCNC: 1.1 MG/DL (ref 0.6–1.3)
EOSINOPHIL # BLD AUTO: 0 THOUSAND/ÂΜL (ref 0–0.61)
EOSINOPHIL NFR BLD AUTO: 0 % (ref 0–6)
ERYTHROCYTE [DISTWIDTH] IN BLOOD BY AUTOMATED COUNT: 12.5 % (ref 11.6–15.1)
GFR SERPL CREATININE-BSD FRML MDRD: 60 ML/MIN/1.73SQ M
GLUCOSE P FAST SERPL-MCNC: 143 MG/DL (ref 65–99)
GLUCOSE SERPL-MCNC: 143 MG/DL (ref 65–140)
HCT VFR BLD AUTO: 37 % (ref 36.5–49.3)
HGB BLD-MCNC: 11.7 G/DL (ref 12–17)
IMM GRANULOCYTES # BLD AUTO: 0.03 THOUSAND/UL (ref 0–0.2)
IMM GRANULOCYTES NFR BLD AUTO: 1 % (ref 0–2)
LYMPHOCYTES # BLD AUTO: 0.8 THOUSANDS/ÂΜL (ref 0.6–4.47)
LYMPHOCYTES NFR BLD AUTO: 14 % (ref 14–44)
MAGNESIUM SERPL-MCNC: 2.2 MG/DL (ref 1.9–2.7)
MCH RBC QN AUTO: 30.3 PG (ref 26.8–34.3)
MCHC RBC AUTO-ENTMCNC: 31.6 G/DL (ref 31.4–37.4)
MCV RBC AUTO: 96 FL (ref 82–98)
MONOCYTES # BLD AUTO: 0.21 THOUSAND/ÂΜL (ref 0.17–1.22)
MONOCYTES NFR BLD AUTO: 4 % (ref 4–12)
NEUTROPHILS # BLD AUTO: 4.55 THOUSANDS/ÂΜL (ref 1.85–7.62)
NEUTS SEG NFR BLD AUTO: 81 % (ref 43–75)
NRBC BLD AUTO-RTO: 0 /100 WBCS
PLATELET # BLD AUTO: 153 THOUSANDS/UL (ref 149–390)
PMV BLD AUTO: 10.5 FL (ref 8.9–12.7)
POTASSIUM SERPL-SCNC: 4 MMOL/L (ref 3.5–5.3)
PROCALCITONIN SERPL-MCNC: 0.33 NG/ML
PROT SERPL-MCNC: 5.8 G/DL (ref 6.4–8.4)
RBC # BLD AUTO: 3.86 MILLION/UL (ref 3.88–5.62)
SODIUM SERPL-SCNC: 138 MMOL/L (ref 135–147)
WBC # BLD AUTO: 5.6 THOUSAND/UL (ref 4.31–10.16)

## 2023-12-20 PROCEDURE — 84145 PROCALCITONIN (PCT): CPT | Performed by: STUDENT IN AN ORGANIZED HEALTH CARE EDUCATION/TRAINING PROGRAM

## 2023-12-20 PROCEDURE — 99232 SBSQ HOSP IP/OBS MODERATE 35: CPT | Performed by: STUDENT IN AN ORGANIZED HEALTH CARE EDUCATION/TRAINING PROGRAM

## 2023-12-20 PROCEDURE — 83735 ASSAY OF MAGNESIUM: CPT | Performed by: STUDENT IN AN ORGANIZED HEALTH CARE EDUCATION/TRAINING PROGRAM

## 2023-12-20 PROCEDURE — 94640 AIRWAY INHALATION TREATMENT: CPT

## 2023-12-20 PROCEDURE — 85025 COMPLETE CBC W/AUTO DIFF WBC: CPT | Performed by: STUDENT IN AN ORGANIZED HEALTH CARE EDUCATION/TRAINING PROGRAM

## 2023-12-20 PROCEDURE — 94760 N-INVAS EAR/PLS OXIMETRY 1: CPT

## 2023-12-20 PROCEDURE — 80053 COMPREHEN METABOLIC PANEL: CPT | Performed by: STUDENT IN AN ORGANIZED HEALTH CARE EDUCATION/TRAINING PROGRAM

## 2023-12-20 RX ORDER — METHYLPREDNISOLONE SODIUM SUCCINATE 40 MG/ML
40 INJECTION, POWDER, LYOPHILIZED, FOR SOLUTION INTRAMUSCULAR; INTRAVENOUS EVERY 12 HOURS SCHEDULED
Status: COMPLETED | OUTPATIENT
Start: 2023-12-20 | End: 2023-12-20

## 2023-12-20 RX ORDER — PREDNISONE 20 MG/1
40 TABLET ORAL DAILY
Status: DISCONTINUED | OUTPATIENT
Start: 2023-12-21 | End: 2023-12-21 | Stop reason: HOSPADM

## 2023-12-20 RX ADMIN — METHYLPREDNISOLONE SODIUM SUCCINATE 40 MG: 40 INJECTION, POWDER, FOR SOLUTION INTRAMUSCULAR; INTRAVENOUS at 04:27

## 2023-12-20 RX ADMIN — CARVEDILOL 6.25 MG: 6.25 TABLET, FILM COATED ORAL at 17:31

## 2023-12-20 RX ADMIN — GUAIFENESIN 600 MG: 600 TABLET, EXTENDED RELEASE ORAL at 17:31

## 2023-12-20 RX ADMIN — PRAVASTATIN SODIUM 80 MG: 80 TABLET ORAL at 16:10

## 2023-12-20 RX ADMIN — IPRATROPIUM BROMIDE 0.5 MG: 0.5 SOLUTION RESPIRATORY (INHALATION) at 07:04

## 2023-12-20 RX ADMIN — BUDESONIDE 0.5 MG: 0.5 INHALANT ORAL at 07:04

## 2023-12-20 RX ADMIN — GUAIFENESIN 600 MG: 600 TABLET, EXTENDED RELEASE ORAL at 09:42

## 2023-12-20 RX ADMIN — CARVEDILOL 6.25 MG: 6.25 TABLET, FILM COATED ORAL at 09:41

## 2023-12-20 RX ADMIN — METHYLPREDNISOLONE SODIUM SUCCINATE 40 MG: 40 INJECTION, POWDER, FOR SOLUTION INTRAMUSCULAR; INTRAVENOUS at 20:00

## 2023-12-20 RX ADMIN — LEVALBUTEROL HYDROCHLORIDE 1.25 MG: 1.25 SOLUTION RESPIRATORY (INHALATION) at 13:15

## 2023-12-20 RX ADMIN — FUROSEMIDE 40 MG: 40 TABLET ORAL at 09:42

## 2023-12-20 RX ADMIN — IPRATROPIUM BROMIDE 0.5 MG: 0.5 SOLUTION RESPIRATORY (INHALATION) at 19:18

## 2023-12-20 RX ADMIN — WARFARIN SODIUM 3 MG: 3 TABLET ORAL at 17:31

## 2023-12-20 RX ADMIN — ISODIUM CHLORIDE 3 ML: 0.03 SOLUTION RESPIRATORY (INHALATION) at 19:18

## 2023-12-20 RX ADMIN — LEVALBUTEROL HYDROCHLORIDE 1.25 MG: 1.25 SOLUTION RESPIRATORY (INHALATION) at 07:04

## 2023-12-20 RX ADMIN — BUDESONIDE 0.5 MG: 0.5 INHALANT ORAL at 19:18

## 2023-12-20 RX ADMIN — IPRATROPIUM BROMIDE 0.5 MG: 0.5 SOLUTION RESPIRATORY (INHALATION) at 13:15

## 2023-12-20 RX ADMIN — AZITHROMYCIN 500 MG: 250 TABLET, FILM COATED ORAL at 11:32

## 2023-12-20 RX ADMIN — LEVALBUTEROL HYDROCHLORIDE 1.25 MG: 1.25 SOLUTION RESPIRATORY (INHALATION) at 19:18

## 2023-12-20 RX ADMIN — LISINOPRIL 10 MG: 10 TABLET ORAL at 09:42

## 2023-12-20 RX ADMIN — ISODIUM CHLORIDE 3 ML: 0.03 SOLUTION RESPIRATORY (INHALATION) at 13:15

## 2023-12-20 RX ADMIN — TAMSULOSIN HYDROCHLORIDE 0.4 MG: 0.4 CAPSULE ORAL at 16:10

## 2023-12-20 RX ADMIN — ISODIUM CHLORIDE 3 ML: 0.03 SOLUTION RESPIRATORY (INHALATION) at 07:04

## 2023-12-20 RX ADMIN — DIGOXIN 125 MCG: 125 TABLET ORAL at 09:41

## 2023-12-20 NOTE — PLAN OF CARE
Problem: RESPIRATORY - ADULT  Goal: Achieves optimal ventilation and oxygenation  Description: INTERVENTIONS:  - Assess for changes in respiratory status  - Assess for changes in mentation and behavior  - Position to facilitate oxygenation and minimize respiratory effort  - Oxygen administered by appropriate delivery if ordered  - Initiate smoking cessation education as indicated  - Encourage broncho-pulmonary hygiene including cough, deep breathe, Incentive Spirometry  - Assess the need for suctioning and aspirate as needed  - Assess and instruct to report SOB or any respiratory difficulty  - Respiratory Therapy support as indicated  Outcome: Progressing     Problem: SAFETY ADULT  Goal: Maintain or return to baseline ADL function  Description: INTERVENTIONS:  -  Assess patient's ability to carry out ADLs; assess patient's baseline for ADL function and identify physical deficits which impact ability to perform ADLs (bathing, care of mouth/teeth, toileting, grooming, dressing, etc.)  - Assess/evaluate cause of self-care deficits   - Assess range of motion  - Assess patient's mobility; develop plan if impaired  - Assess patient's need for assistive devices and provide as appropriate  - Encourage maximum independence but intervene and supervise when necessary  - Involve family in performance of ADLs  - Assess for home care needs following discharge   - Consider OT consult to assist with ADL evaluation and planning for discharge  - Provide patient education as appropriate  Outcome: Progressing     Problem: SAFETY ADULT  Goal: Patient will remain free of falls  Description: INTERVENTIONS:  - Educate patient/family on patient safety including physical limitations  - Instruct patient to call for assistance with activity   - Consult OT/PT to assist with strengthening/mobility   - Keep Call bell within reach  - Keep bed low and locked with side rails adjusted as appropriate  - Keep care items and personal belongings within  reach  - Initiate and maintain comfort rounds  - Make Fall Risk Sign visible to staff  - Offer Toileting every 2 Hours, in advance of need  - Initiate/Maintain bed/chair alarm  - Obtain necessary fall risk management equipment: fall risk sign  - Apply yellow socks and bracelet for high fall risk patients  - Consider moving patient to room near nurses station  Outcome: Progressing

## 2023-12-20 NOTE — CASE MANAGEMENT
Case Management Assessment & Discharge Planning Note    Patient name Juan Antonio Nicholson  Location 2 [address] [facility]/[address] 208 MRN [id number]  : 1940 Date 2023       Current Admission Date: 2023  Current Admission Diagnosis:COPD with acute exacerbation (HCC)   Patient Active Problem List    Diagnosis Date Noted    Stage 3a chronic kidney disease ([facility]) 2021    Chronic systolic congestive heart failure (HCC) 2021    Alzheimer's dementia without behavioral disturbance ([facility]) 2021    Physical deconditioning 2020    Coagulation defect, unspecified ([facility]) 2020    Abscess of lower lobe of left lung with pneumonia ([facility]) 2020    Severe protein-calorie malnutrition ([facility]) 2020    Diverticulosis 2019    Cholelithiases 2019    Nephrolithiasis 2019    COPD with acute exacerbation ([facility]) 2019    Non-recurrent unilateral inguinal hernia without obstruction or gangrene 2018    Pain in both feet 2018    Peripheral arteriosclerosis ([facility]) 2018    Arteriosclerosis of coronary artery 2018    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[facility]) 11/10/2017    Chronic hypoxemic respiratory failure ([facility]) 11/10/2017    Bilateral carotid bruits 2017    Heart failure, left, 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% ([facility]) 2017    Nodule of left lung 2017    Dilated cardiomyopathy ([facility]) 2017    Type 2 diabetes mellitus with diabetic polyneuropathy, without long-term current use of insulin ([facility])     Severe left ventricular systolic dysfunction 2017    Hypoxia 10/24/2013    PAF (paroxysmal atrial fibrillation) ([facility]) 2013    Hypertensive heart disease with congestive heart failure ([facility]) 2013      LOS (days): 0  Geometric Mean LOS (GMLOS) (days): 3  Days to GMLOS:2.6     OBJECTIVE:    Risk of Unplanned Readmission Score: 20.88     Current admission status: Inpatient    Preferred Pharmacy:   [facility] - [facility] [address]    [address] [facility] [address]  Phone: [phone] Fax: [phone]    [id number] - [place], [place] - [address]  [address]  [facility] [address]  Phone: [phone] Fax: [phone]    Primary Care Provider: Frank Lombardi, DO    Primary Insurance: [place]  Secondary Insurance:     ASSESSMENT:  Active Health Care Proxies       Neeru Nicholson Health Care Representative - Spouse   Primary Phone: [phone] (Home)  Mobile Phone: [phone]                 Advance Directives  Does patient currently have a Health Care decision maker?: Yes, please see Health Care Proxy section  Primary Contact: Neeru Nicholson    Readmission Root Cause  30 Day Readmission: No    Patient Information  Admitted from:: Home  Mental Status: Alert, Confused  During Assessment patient was accompanied by: Not accompanied during assessment  Assessment information provided by:: Spouse  Primary Caregiver: Spouse  Caregiver's Name:: Neeru May  Caregiver's Relationship to Patient:: Family Member (wife)  Caregiver's Telephone Number:: [phone]  Support Systems: Spouse/significant other  County of Residence: [place]  What [facility] do you live in?: [place]  Home entry access options. Select all that apply.: Stairs, No steps to enter home  Type of Current Residence: Other (Comment) (one level)  Living Arrangements: Lives w/ Spouse/significant other  Is patient a ?: Yes  Is patient active with [facility] ([place] [facility]: No (wife anticipating meeting with representative [facility] [place] next week)    Activities of Daily Living Prior to Admission  Functional Status: Assistance  Completes ADLs independently?: No  Level of ADL dependence: Assistance  Ambulates independently?: Yes  Does patient use assisted devices?: Yes  Assisted Devices (DME) used: Home Oxygen concentrator, Portable Oxygen concentrator, Portable Oxygen tanks, Shower Chair, Walker, Wheelchair  DME Company Name (respiratory  supplies): [employer]  Does patient currently own DME?: Yes  What DME does the patient currently own?: Home Oxygen concentrator, Portable Oxygen concentrator, Portable Oxygen tanks, Shower Chair, Walker, Wheelchair  Does patient have a history of Outpatient Therapy (PT/OT)?: No  Does the patient have a history of Short-Term Rehab?: No  Does patient have a history of HHC?: Yes  Does patient currently have HHC?: Yes    Current Home Health Care  Type of Current Home Care Services: Nurse visit, Home PT  Current Home Health Agency:: [facility]  Current Home Health Follow-Up Provider:: PCP    Patient Information Continued  Income Source: Pension/snf  Does patient have prescription coverage?: Yes  Does patient receive dialysis treatments?: No    Means of Transportation  Means of Transport to Appts:: Family transport      Housing Stability: Low Risk  (12/20/2023)    Housing Stability Vital Sign     Unable to Pay for Housing in the Last Year: No     Number of Places Lived in the Last Year: 1     Unstable Housing in the Last Year: No   Food Insecurity: No Food Insecurity (12/20/2023)    Hunger Vital Sign     Worried About Running Out of Food in the Last Year: Never true     Ran Out of Food in the Last Year: Never true   Transportation Needs: No Transportation Needs (12/20/2023)    PRAPARE - Transportation     Lack of Transportation (Medical): No     Lack of Transportation (Non-Medical): No   Utilities: Not At Risk (12/20/2023)    [facility] Utilities     Threatened with loss of utilities: No       DISCHARGE DETAILS:    Discharge planning discussed with:: WifeNeeru  Freedom of Choice: Yes  Comments - Freedom of Choice: SW spoke with pt's wife over phone to assess needs and discuss plans.  Wife's plan is for pt to return home with her and home care services through [facility].  Per wife pt has been receiving services and the services were anticipated to end soon prior to admission.  WILLIAM offered to make referral  for resumption of care.  Wife expressed concern because she doesn't feel pt's breathing is getting any better and she is worried pt is not ready to return home.  Wife also shared that she and pt have no other support system either.  SW inquired as to whether wife was ever in contact with [facility] to have benefit screen completed to determine eligiblity for services.  Pt said she didn't think so. Per wife she is supposed to be talking with someone from a local  services agency next week.  SW offered to make referral to [facility] and wife was open to that.  Wife said she would be visiting in hospital tomorrow and SW offered to meet with her when she came in.  SW will continue to follow to monitor progress and assist with planning as needed.  CM contacted family/caregiver?: Yes    Contacts  Patient Contacts: Neeru Nicholson  Relationship to Patient:: Family (wife)  Contact Method: Phone  Phone Number: [phone]  Reason/Outcome: Discharge Planning    Requested Home Health Care         Is the patient interested in HHC at discharge?: Yes  Home Health Discipline requested:: Nursing, Physical Therapy, Occupational Therapy, Medical Social Work  Home Health Agency Name:: [facility] [address]  HHA External Referral Reason (only applicable if external HHA name selected): Patient has established relationship with provider  Home Health Follow-Up Provider:: PCP  Home Health Services Needed:: COPD Management, Evaluate Functional Status and Safety, Strengthening/Theraputic Exercises to Improve Function, Oxygen Via Nasal Cannula  Oxygen LPM Ordered (if applicable based on home health services needed):: 3 LPM  Homebound Criteria Met:: Requires the Assistance of Another Person for Safe Ambulation or to Leave the Home, Uses an Assist Device (i.e. cane, walker, etc)  Supporting Clincal Findings:: Fatigues Easliy in Short Distances, Limited Endurance, Requires Oxygen    DME Referral  Provided  Referral made for DME?: No    Other Referral/Resources/Interventions Provided:  Interventions: HHC, Other (Specify)  Referral Comments: Referral made to [facility] to request resumption of care when pt is discharged.  SW will also make referral to [facility] tomorrow when office is open.    Treatment Team Recommendation: Home with Home Health Care  Discharge Destination Plan:: Home with Home Health Care  Transport at Discharge : Family    IMM Given (Date):: 12/20/23 (Initial)

## 2023-12-20 NOTE — PROGRESS NOTES
[facility]  Progress Note  Name: Juan Antonio Nicholson I  MRN: [id number]  Unit/Bed#: 2 [facility] 208 I Date of Admission: 12/19/2023   Date of Service: 12/20/2023 I Hospital Day: 0    Assessment/Plan   Stage 3a chronic kidney disease (HCC)  Assessment & Plan  Lab Results   Component Value Date    EGFR 60 12/20/2023    EGFR 58 12/19/2023    EGFR 48 12/12/2023    CREATININE 1.10 12/20/2023    CREATININE 1.13 12/19/2023    CREATININE 1.34 (H) 12/12/2023     Renal functions currently stable    Chronic systolic congestive heart failure (HCC)  Assessment & Plan  Wt Readings from Last 3 Encounters:   12/19/23 54.9 kg (121 lb)   11/24/23 59.4 kg (131 lb)   10/23/23 59 kg (130 lb)     Appears euvolemic, the echo showing EF 35 to 40%  S/p AICD  Continue carvedilol 6.25 twice daily  Continue Lasix 40 mg every other day            Chronic hypoxemic respiratory failure (HCC)  Assessment & Plan  Chronically on 3 L    PAF (paroxysmal atrial fibrillation) ([facility])  Assessment & Plan  Rate controlled: Continue carvedilol 6.25 twice daily, digoxin 125 mcg daily  Anticoagulated on Coumadin,INR therapeutic and resume Coumadin 3 mg daily    Type 2 diabetes mellitus with diabetic polyneuropathy, without long-term current use of insulin ([facility])  Assessment & Plan  Lab Results   Component Value Date    HGBA1C 8.8 (H) 12/12/2023     Hold Jardiance, metformin, not on insulin prior to arrival  Sliding scale, Accu-Cheks, diabetic diet  Monitor blood glucose, as patient currently on IV steroids for COPD    * COPD with acute exacerbation (HCC)  Assessment & Plan  83-year-old male with past history of severe emphysema, congestive heart failure EF 35 to 40%, A-fib on Coumadin, type II diabetes and and CKD presented with shortness of breath  Reported increasing shortness of breath, respiratory distress evaluated by EMS, treated with heart nebs, IV Solu-Medrol 125 mg  Currently on 5 L nasal cannula, baseline 3 L  Chest x-ray showing emphysema,  no concerns for infiltrate  Labs unremarkable, no white count. Procal neg  Reports symptoms has improved, no significant wheezing on exam, decreased breath sounds bilaterally  Continue nebsXopenex, Atrovent and Pulmicort  Azithromycin for 3 days  IV solumedrol q12 today, transition to PO tomorrow  Hopeful for discharge on prednisone taper, currently on 3L baseline o2 requirement         Acute and chronic respiratory failure with hypoxia POA (now controlled) due to COPD with acute exacerbation a/e/b acute SOB with  O2 sat (80's with O2 at 3L), visible retractions with accessory muscle use treated with CPAP and nebulizers by ALS in the field, BiPAP, nebulizers, IV Solumedrol and prophylaxis Azithromycin in ED, and respiratory therapy, nebulizers, continuous pulse Ox monitoring, IV Solumedrol, and supplemental O2 ongoing.     Respiratory Acidosis resolved    VTE Pharmacologic Prophylaxis:   Pharmacologic: Warfarin (Coumadin)  Mechanical VTE Prophylaxis in Place: Yes    Discussions with Specialists or Other Care Team Provider: CM    Education and Discussions with Family / Patient: patient    Current Length of Stay: 0 day(s)    Current Patient Status: Inpatient   Certification Statement: The patient will continue to require additional inpatient hospital stay due to IV steroids, nebs    Discharge Plan: home tomorrow    Code Status: Level 3 - DNAR and DNI      Subjective:   No events overnight. Tolerating diet. Reportedly ambulating independently without dyspnea. Denies CP, fevers or SOB.    Objective:     Vitals:   Temp (24hrs), Av.9 °F (36.6 °C), Min:97.7 °F (36.5 °C), Max:98.2 °F (36.8 °C)    Temp:  [97.7 °F (36.5 °C)-98.2 °F (36.8 °C)] 97.7 °F (36.5 °C)  HR:  [] 90  Resp:  [9-27] 16  BP: ()/(58-79) 143/58  SpO2:  [90 %-99 %] 97 %  Body mass index is 16.88 kg/m².     Input and Output Summary (last 24 hours):       Intake/Output Summary (Last 24 hours) at 2023 1024  Last data filed at  12/19/2023 1033  Gross per 24 hour   Intake 50 ml   Output --   Net 50 ml       Physical Exam:     Physical Exam  Vitals and nursing note reviewed.   HENT:      Head: Normocephalic.   Eyes:      Conjunctiva/sclera: Conjunctivae normal.   Cardiovascular:      Rate and Rhythm: Normal rate.   Pulmonary:      Effort: Pulmonary effort is normal.      Breath sounds: No wheezing or rhonchi.      Comments: Decreased breath sounds bilaterally  Abdominal:      General: Bowel sounds are normal. There is no distension.      Palpations: Abdomen is soft.   Musculoskeletal:         General: No swelling. Normal range of motion.   Skin:     General: Skin is warm and dry.   Neurological:      General: No focal deficit present.      Mental Status: He is alert. Mental status is at baseline.       Additional Data:     Labs:    Results from last 7 days   Lab Units 12/20/23  0437   WBC Thousand/uL 5.60   HEMOGLOBIN g/dL 11.7*   HEMATOCRIT % 37.0   PLATELETS Thousands/uL 153   NEUTROS PCT % 81*   LYMPHS PCT % 14   MONOS PCT % 4   EOS PCT % 0     Results from last 7 days   Lab Units 12/20/23  0437   SODIUM mmol/L 138   POTASSIUM mmol/L 4.0   CHLORIDE mmol/L 101   CO2 mmol/L 29   BUN mg/dL 28*   CREATININE mg/dL 1.10   ANION GAP mmol/L 8   CALCIUM mg/dL 8.2*   ALBUMIN g/dL 3.2*   TOTAL BILIRUBIN mg/dL 0.26   ALK PHOS U/L 56   ALT U/L 19   AST U/L 10*   GLUCOSE RANDOM mg/dL 143*     Results from last 7 days   Lab Units 12/19/23  1216   INR  2.38*             Results from last 7 days   Lab Units 12/20/23  0437 12/19/23  0929   LACTIC ACID mmol/L  --  0.9   PROCALCITONIN ng/ml 0.33* 0.13           * I Have Reviewed All Lab Data Listed Above.  * Additional Pertinent Lab Tests Reviewed: All Labs For Current Hospital Admission Reviewed    Mobility:  Basic Mobility Inpatient Raw Score: 23  -HLM Goal: 7: Walk 25 feet or more  -HLM Achieved: 7: Walk 25 feet or more    Lines:   Invasive Devices       Peripheral Intravenous Line  Duration              Peripheral IV 12/19/23 Proximal;Right;Ventral (anterior) Forearm 1 day                       Imaging:    Imaging Reports Reviewed Today Include:     XR chest portable    Result Date: 12/19/2023  Impression: Severe emphysema, greater on the left, with no definite acute disease. Workstation performed: [id number]        Recent Cultures (last 7 days):           Last 24 Hours Medication List:   Current Facility-Administered Medications   Medication Dose Route Frequency Provider Last Rate    acetaminophen  650 mg Oral Q6H PRN Eric Arthur MD      azithromycin  500 mg Oral Q24H Eric Arthur MD      budesonide  0.5 mg Nebulization Q12H Eric Arthur MD      carvedilol  6.25 mg Oral BID Eric Arthur MD      digoxin  125 mcg Oral Daily Eric Arthur MD      furosemide  40 mg Oral Q48H Eric Arthur MD      guaiFENesin  600 mg Oral BID Eric Arthur MD      ipratropium  0.5 mg Nebulization TID Eric Arthur MD      levalbuterol  1.25 mg Nebulization TID Eric Arthur MD      And    sodium chloride  3 mL Nebulization TID Eric Arthur MD      lisinopril  10 mg Oral Daily Eric Arthur MD      methylPREDNISolone sodium succinate  40 mg Intravenous Q12H ANCA Eric Arthur MD      pravastatin  80 mg Oral Daily With Dinner Eric Arthur MD      [START ON 12/21/2023] predniSONE  40 mg Oral Daily Eric Arthur MD      tamsulosin  0.4 mg Oral Daily With Dinner Eric Arthur MD      warfarin  3 mg Oral Daily (warfarin) Eric Arthur MD          Today, Patient Was Seen By: Eric Arthur MD    ** Please Note: Dictation voice to text software may have been used in the creation of this document. **

## 2023-12-20 NOTE — ASSESSMENT & PLAN NOTE
Rate controlled: Continue carvedilol 6.25 twice daily, digoxin 125 mcg daily  Anticoagulated on Coumadin,INR therapeutic and resume Coumadin 3 mg daily
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
## 2023-12-20 NOTE — PLAN OF CARE
Problem: Potential for Falls  Goal: Patient will remain free of falls  Description: INTERVENTIONS:  - Educate patient/family on patient safety including physical limitations  - Instruct patient to call for assistance with activity   - Consult OT/PT to assist with strengthening/mobility   - Keep Call bell within reach  - Keep bed low and locked with side rails adjusted as appropriate  - Keep care items and personal belongings within reach  - Initiate and maintain comfort rounds  - Make Fall Risk Sign visible to staff  - Offer Toileting every 2 Hours, in advance of need  - Initiate/Maintain bed alarm  - Apply yellow socks and bracelet for high fall risk patients  - Consider moving patient to room near nurses station  Outcome: Progressing     Problem: SAFETY ADULT  Goal: Patient will remain free of falls  Description: INTERVENTIONS:  - Educate patient/family on patient safety including physical limitations  - Instruct patient to call for assistance with activity   - Consult OT/PT to assist with strengthening/mobility   - Keep Call bell within reach  - Keep bed low and locked with side rails adjusted as appropriate  - Keep care items and personal belongings within reach  - Initiate and maintain comfort rounds  - Make Fall Risk Sign visible to staff  - Offer Toileting every 2 Hours, in advance of need  - Initiate/Maintain bed alarm  - Apply yellow socks and bracelet for high fall risk patients  - Consider moving patient to room near nurses station  Outcome: Progressing  Goal: Maintain or return to baseline ADL function  Description: INTERVENTIONS:  -  Assess patient's ability to carry out ADLs; assess patient's baseline for ADL function and identify physical deficits which impact ability to perform ADLs (bathing, care of mouth/teeth, toileting, grooming, dressing, etc.)  - Assess/evaluate cause of self-care deficits   - Assess range of motion  - Assess patient's mobility; develop plan if impaired  - Assess patient's  need for assistive devices and provide as appropriate  - Encourage maximum independence but intervene and supervise when necessary  - Involve family in performance of ADLs  - Assess for home care needs following discharge   - Consider OT consult to assist with ADL evaluation and planning for discharge  - Provide patient education as appropriate  Outcome: Progressing     Problem: SAFETY ADULT  Goal: Maintain or return to baseline ADL function  Description: INTERVENTIONS:  -  Assess patient's ability to carry out ADLs; assess patient's baseline for ADL function and identify physical deficits which impact ability to perform ADLs (bathing, care of mouth/teeth, toileting, grooming, dressing, etc.)  - Assess/evaluate cause of self-care deficits   - Assess range of motion  - Assess patient's mobility; develop plan if impaired  - Assess patient's need for assistive devices and provide as appropriate  - Encourage maximum independence but intervene and supervise when necessary  - Involve family in performance of ADLs  - Assess for home care needs following discharge   - Consider OT consult to assist with ADL evaluation and planning for discharge  - Provide patient education as appropriate  Outcome: Progressing     Problem: RESPIRATORY - ADULT  Goal: Achieves optimal ventilation and oxygenation  Description: INTERVENTIONS:  - Assess for changes in respiratory status  - Assess for changes in mentation and behavior  - Position to facilitate oxygenation and minimize respiratory effort  - Oxygen administered by appropriate delivery if ordered  - Initiate smoking cessation education as indicated  - Encourage broncho-pulmonary hygiene including cough, deep breathe, Incentive Spirometry  - Assess the need for suctioning and aspirate as needed  - Assess and instruct to report SOB or any respiratory difficulty  - Respiratory Therapy support as indicated  Outcome: Progressing

## 2023-12-20 NOTE — ASSESSMENT & PLAN NOTE
Lab Results   Component Value Date    EGFR 60 12/20/2023    EGFR 58 12/19/2023    EGFR 48 12/12/2023    CREATININE 1.10 12/20/2023    CREATININE 1.13 12/19/2023    CREATININE 1.34 (H) 12/12/2023     Renal functions currently stable

## 2023-12-20 NOTE — ASSESSMENT & PLAN NOTE
Wt Readings from Last 3 Encounters:   12/19/23 54.9 kg (121 lb)   11/24/23 59.4 kg (131 lb)   10/23/23 59 kg (130 lb)     Appears euvolemic, the echo showing EF 35 to 40%  S/p AICD  Continue carvedilol 6.25 twice daily  Continue Lasix 40 mg every other day

## 2023-12-20 NOTE — ASSESSMENT & PLAN NOTE
83-year-old male with past history of severe emphysema, congestive heart failure EF 35 to 40%, A-fib on Coumadin, type II diabetes and and CKD presented with shortness of breath  Reported increasing shortness of breath, respiratory distress evaluated by EMS, treated with heart nebs, IV Solu-Medrol 125 mg  Currently on 5 L nasal cannula, baseline 3 L  Chest x-ray showing emphysema, no concerns for infiltrate  Labs unremarkable, no white count. Procal neg  Reports symptoms has improved, no significant wheezing on exam, decreased breath sounds bilaterally  Continue nebsXopenex, Atrovent and Pulmicort  Azithromycin for 3 days  IV solumedrol q12 today, transition to PO tomorrow  Hopeful for discharge on prednisone taper, currently on 3L baseline o2 requirement

## 2023-12-21 ENCOUNTER — TRANSITIONAL CARE MANAGEMENT (OUTPATIENT)
Dept: FAMILY MEDICINE CLINIC | Facility: CLINIC | Age: 83
End: 2023-12-21

## 2023-12-21 ENCOUNTER — TELEPHONE (OUTPATIENT)
Dept: FAMILY MEDICINE CLINIC | Facility: CLINIC | Age: 83
End: 2023-12-21

## 2023-12-21 VITALS
TEMPERATURE: 97.8 F | RESPIRATION RATE: 18 BRPM | WEIGHT: 121 LBS | HEIGHT: 71 IN | HEART RATE: 117 BPM | SYSTOLIC BLOOD PRESSURE: 107 MMHG | BODY MASS INDEX: 16.94 KG/M2 | OXYGEN SATURATION: 98 % | DIASTOLIC BLOOD PRESSURE: 60 MMHG

## 2023-12-21 PROBLEM — J96.20 ACUTE ON CHRONIC RESPIRATORY FAILURE (HCC): Status: ACTIVE | Noted: 2023-12-21

## 2023-12-21 LAB
ANION GAP SERPL CALCULATED.3IONS-SCNC: 8 MMOL/L
BASOPHILS # BLD AUTO: 0.02 THOUSANDS/ÂΜL (ref 0–0.1)
BASOPHILS NFR BLD AUTO: 0 % (ref 0–1)
BUN SERPL-MCNC: 34 MG/DL (ref 5–25)
CALCIUM SERPL-MCNC: 8.4 MG/DL (ref 8.4–10.2)
CHLORIDE SERPL-SCNC: 99 MMOL/L (ref 96–108)
CO2 SERPL-SCNC: 32 MMOL/L (ref 21–32)
CREAT SERPL-MCNC: 1.27 MG/DL (ref 0.6–1.3)
EOSINOPHIL # BLD AUTO: 0 THOUSAND/ÂΜL (ref 0–0.61)
EOSINOPHIL NFR BLD AUTO: 0 % (ref 0–6)
ERYTHROCYTE [DISTWIDTH] IN BLOOD BY AUTOMATED COUNT: 12.6 % (ref 11.6–15.1)
GFR SERPL CREATININE-BSD FRML MDRD: 51 ML/MIN/1.73SQ M
GLUCOSE SERPL-MCNC: 176 MG/DL (ref 65–140)
HCT VFR BLD AUTO: 37.3 % (ref 36.5–49.3)
HGB BLD-MCNC: 11.9 G/DL (ref 12–17)
IMM GRANULOCYTES # BLD AUTO: 0.07 THOUSAND/UL (ref 0–0.2)
IMM GRANULOCYTES NFR BLD AUTO: 1 % (ref 0–2)
INR PPP: 4.55 (ref 0.84–1.19)
LYMPHOCYTES # BLD AUTO: 0.77 THOUSANDS/ÂΜL (ref 0.6–4.47)
LYMPHOCYTES NFR BLD AUTO: 8 % (ref 14–44)
MAGNESIUM SERPL-MCNC: 2 MG/DL (ref 1.9–2.7)
MCH RBC QN AUTO: 30.6 PG (ref 26.8–34.3)
MCHC RBC AUTO-ENTMCNC: 31.9 G/DL (ref 31.4–37.4)
MCV RBC AUTO: 96 FL (ref 82–98)
MONOCYTES # BLD AUTO: 0.48 THOUSAND/ÂΜL (ref 0.17–1.22)
MONOCYTES NFR BLD AUTO: 5 % (ref 4–12)
NEUTROPHILS # BLD AUTO: 8.4 THOUSANDS/ÂΜL (ref 1.85–7.62)
NEUTS SEG NFR BLD AUTO: 86 % (ref 43–75)
NRBC BLD AUTO-RTO: 0 /100 WBCS
PLATELET # BLD AUTO: 182 THOUSANDS/UL (ref 149–390)
PMV BLD AUTO: 10.5 FL (ref 8.9–12.7)
POTASSIUM SERPL-SCNC: 4.4 MMOL/L (ref 3.5–5.3)
PROTHROMBIN TIME: 43 SECONDS (ref 11.6–14.5)
RBC # BLD AUTO: 3.89 MILLION/UL (ref 3.88–5.62)
SODIUM SERPL-SCNC: 139 MMOL/L (ref 135–147)
WBC # BLD AUTO: 9.74 THOUSAND/UL (ref 4.31–10.16)

## 2023-12-21 PROCEDURE — 94760 N-INVAS EAR/PLS OXIMETRY 1: CPT

## 2023-12-21 PROCEDURE — 99239 HOSP IP/OBS DSCHRG MGMT >30: CPT | Performed by: STUDENT IN AN ORGANIZED HEALTH CARE EDUCATION/TRAINING PROGRAM

## 2023-12-21 PROCEDURE — 85610 PROTHROMBIN TIME: CPT | Performed by: STUDENT IN AN ORGANIZED HEALTH CARE EDUCATION/TRAINING PROGRAM

## 2023-12-21 PROCEDURE — 94640 AIRWAY INHALATION TREATMENT: CPT

## 2023-12-21 PROCEDURE — 97166 OT EVAL MOD COMPLEX 45 MIN: CPT

## 2023-12-21 PROCEDURE — 97530 THERAPEUTIC ACTIVITIES: CPT

## 2023-12-21 PROCEDURE — 97163 PT EVAL HIGH COMPLEX 45 MIN: CPT

## 2023-12-21 PROCEDURE — 85025 COMPLETE CBC W/AUTO DIFF WBC: CPT | Performed by: STUDENT IN AN ORGANIZED HEALTH CARE EDUCATION/TRAINING PROGRAM

## 2023-12-21 PROCEDURE — 80048 BASIC METABOLIC PNL TOTAL CA: CPT | Performed by: STUDENT IN AN ORGANIZED HEALTH CARE EDUCATION/TRAINING PROGRAM

## 2023-12-21 PROCEDURE — 83735 ASSAY OF MAGNESIUM: CPT | Performed by: STUDENT IN AN ORGANIZED HEALTH CARE EDUCATION/TRAINING PROGRAM

## 2023-12-21 RX ORDER — AZITHROMYCIN 500 MG/1
500 TABLET, FILM COATED ORAL EVERY 24 HOURS
Qty: 1 TABLET | Refills: 0 | Status: SHIPPED | OUTPATIENT
Start: 2023-12-21 | End: 2023-12-22

## 2023-12-21 RX ORDER — PREDNISONE 10 MG/1
TABLET ORAL DAILY
Qty: 30 TABLET | Refills: 0 | Status: SHIPPED | OUTPATIENT
Start: 2023-12-22 | End: 2024-01-03

## 2023-12-21 RX ADMIN — BUDESONIDE 0.5 MG: 0.5 INHALANT ORAL at 08:38

## 2023-12-21 RX ADMIN — AZITHROMYCIN 500 MG: 250 TABLET, FILM COATED ORAL at 12:02

## 2023-12-21 RX ADMIN — PREDNISONE 40 MG: 20 TABLET ORAL at 08:34

## 2023-12-21 RX ADMIN — LEVALBUTEROL HYDROCHLORIDE 1.25 MG: 1.25 SOLUTION RESPIRATORY (INHALATION) at 08:38

## 2023-12-21 RX ADMIN — DIGOXIN 125 MCG: 125 TABLET ORAL at 08:34

## 2023-12-21 RX ADMIN — GUAIFENESIN 600 MG: 600 TABLET, EXTENDED RELEASE ORAL at 08:34

## 2023-12-21 RX ADMIN — ISODIUM CHLORIDE 3 ML: 0.03 SOLUTION RESPIRATORY (INHALATION) at 08:38

## 2023-12-21 RX ADMIN — IPRATROPIUM BROMIDE 0.5 MG: 0.5 SOLUTION RESPIRATORY (INHALATION) at 08:38

## 2023-12-21 NOTE — PLAN OF CARE
Problem: Potential for Falls  Goal: Patient will remain free of falls  Description: INTERVENTIONS:  - Educate patient/family on patient safety including physical limitations  - Instruct patient to call for assistance with activity   - Consult OT/PT to assist with strengthening/mobility   - Keep Call bell within reach  - Keep bed low and locked with side rails adjusted as appropriate  - Keep care items and personal belongings within reach  - Initiate and maintain comfort rounds  - Make Fall Risk Sign visible to staff  - Offer Toileting every 2 Hours, in advance of need  - Initiate/Maintain bed alarm  - Apply yellow socks and bracelet for high fall risk patients  - Consider moving patient to room near nurses station  Outcome: Progressing

## 2023-12-21 NOTE — ASSESSMENT & PLAN NOTE
Lab Results   Component Value Date    HGBA1C 8.8 (H) 12/12/2023     Continue home medications on discharge

## 2023-12-21 NOTE — PROGRESS NOTES
Patient:    MRN:  [id number]    Aidin Request ID:  [id number]    Level of care reserved:  Home Health Agency    Partner Reserved:  [facility], [place] [id number] [phone]    Clinical needs requested:    Geography searched:  [id number]    Start of Service:    Request sent:  5:26pm EST on 12/20/2023 by Johana Castañeda    Partner reserved:  11:44am EST on 12/21/2023 by Johana Castañeda    Choice list shared:  9:42am EST on 12/21/2023 by Johana Castañeda

## 2023-12-21 NOTE — UTILIZATION REVIEW
NOTIFICATION OF INPATIENT ADMISSION   AUTHORIZATION REQUEST   SERVICING FACILITY:   [place] [address]  Tax ID: [id number]  NPI: [id number] ATTENDING PROVIDER:  Attending Name and NPI#: Eric Arthur Md [[id number]]  Address: [address] [place] [address]  Phone: [phone]   ADMISSION INFORMATION:  Place of Service: Inpatient Acute [facility]  Place of Service Code: 21  Inpatient Admission Date/Time: 12/20/23  8:27 AM  Discharge Date/Time: 12/21/2023  1:05 PM  Admitting Diagnosis Code/Description:  SOB (shortness of breath) [R06.02]  Hypoxemia requiring supplemental oxygen [R09.02, Z99.81]  COPD exacerbation (HCC) [J44.1]     UTILIZATION REVIEW CONTACT:  Urszula Beckford, Utilization   Network Utilization Review Department  Phone: [phone]  Fax [phone]  Email: [email or web address]  Contact for approvals/pending authorizations, clinical reviews, and discharge.     PHYSICIAN ADVISORY SERVICES:  Medical Necessity Denial & Nbyn-pt-Hgbk Review  Phone: [phone]  Fax: [phone]  Email: [email or web address]     DISCHARGE SUPPORT TEAM:  For Patients Discharge Needs & Updates  Phone: [phone] opt. 2 Fax: [phone]  Email: [email or web address]

## 2023-12-21 NOTE — ASSESSMENT & PLAN NOTE
83-year-old male with past history of severe emphysema, congestive heart failure EF 35 to 40%, A-fib on Coumadin, type II diabetes and and CKD presented with shortness of breath  Reported increasing shortness of breath, respiratory distress evaluated by EMS, treated with heart nebs, IV Solu-Medrol 125 mg  Currently on 5 L nasal cannula, baseline 3 L  Chest x-ray showing emphysema, no concerns for infiltrate  Labs unremarkable, no white count. Procal neg  Reports symptoms has improved, no significant wheezing on exam, decreased breath sounds bilaterally  Continue nebsXopenex, Atrovent and Pulmicort  Patient symptoms improved, reports breathing without shortness of breath, dyspnea on exertion  Transition to prednisone 40 mg, taper 10 mg every 3 days until completion.  Prescribed to pharmacy

## 2023-12-21 NOTE — ASSESSMENT & PLAN NOTE
In setting of COPD exacerbation, required up to 5 L nasal cannula, wean down back to baseline of 3 L

## 2023-12-21 NOTE — PLAN OF CARE
Problem: PHYSICAL THERAPY ADULT  Goal: Performs mobility at highest level of function for planned discharge setting.  See evaluation for individualized goals.  Description: Treatment/Interventions: Therapeutic exercise, LE strengthening/ROM, Functional transfer training, Gait training, Bed mobility, Equipment eval/education, Patient/family training  Equipment Recommended:  (Pt has all DME needed)       See flowsheet documentation for full assessment, interventions and recommendations.  Note: Prognosis: Good  Problem List: Decreased strength, Decreased endurance, Impaired balance, Decreased mobility, Decreased cognition  Assessment: Patient is an 83 y.o. year old male seen for Physical Therapy evaluation. Patient admitted with COPD with acute exacerbation (HCC).  Comorbidities affecting patient's physical performance include: PAF, COPD/emphysema, DM with neuropathy, alzheimers dementia.  Personal factors affecting patient at time of initial evaluation include: ambulating with assistive device, inability to navigate level surfaces without external assistance, decreased cognition, and inability to live alone. Prior to admission, patient was ambulating short distances with a walker and 02.  Please find objective findings from Physical Therapy assessment regarding body systems outlined above with impairments and limitations including weakness, impaired balance, decreased endurance, gait deviations, decreased activity tolerance, decreased functional mobility tolerance, and fall risk.  The Barthel Index was used as a functional outcome tool presenting with a score of Barthel Index Score: 55 today indicating marked limitations of functional mobility and ADLS.  Patient's clinical presentation is currently unstable/unpredictable as seen in patient's presentation of vital sign response, increased fall risk, new onset of impairment of functional mobility, decreased endurance, and new onset of weakness. Pt would benefit from  continued Physical Therapy treatment to address deficits as defined above and maximize level of functional mobility. As demonstrated by objective findings, the assigned level of complexity for this evaluation is high.The patient's AM-PAC Basic Mobility Inpatient Short Form Raw Score is 20. A Raw score of greater than 16 suggests the patient may benefit from discharge to home.        Rehab Resource Intensity Level, PT: III (Minimum Resource Intensity)    See flowsheet documentation for full assessment.

## 2023-12-21 NOTE — TELEPHONE ENCOUNTER
First attempt to schedule Juan Antonio on 12/21/23.  Mrs Nicholson wants to call back tomorrow or next week. She wants to give him time to settle in. She Is expecting visiting nurses to be coming possibly tomorrow.  She knows to take him to the ER if any dyspnea, etc and she will check with visiting nurse if they will draw his INR at home if that becomes an issue for them. He is not due until January anyway. [email or web address]

## 2023-12-21 NOTE — PHYSICAL THERAPY NOTE
"       PHYSICAL THERAPY EVALUATION/TREATMENT     12/21/23 1025   PT Last Visit   PT Visit Date 12/21/23   Note Type   Note type Evaluation   Pain Assessment   Pain Assessment Tool 0-10   Pain Score No Pain   Restrictions/Precautions   Other Precautions Chair Alarm;Bed Alarm;O2;Fall Risk  (3L02)   Home Living   Type of Home House   Home Layout One level  (1 jermain)   Home Equipment Walker;Wheelchair-manual  (02)   Prior Function   Level of [place]   (Pt has been ambulating short distances with a walker and 02 recently.)   Lives With Spouse   Receives Help From   (wife)   General   Additional Pertinent History Pt admitted with COPD exacerbation.   Family/Caregiver Present Yes  (wife)   Cognition   Overall Cognitive Status Impaired   Arousal/Participation Cooperative   Orientation Level Oriented to person;Oriented to place;Disoriented to time;Disoriented to situation   Following Commands Follows one step commands without difficulty   Subjective   Subjective \"I hope I'm going home today\"   RLE Assessment   RLE Assessment   (ROM WFL, MMT 4+/5)   LLE Assessment   LLE Assessment   (ROM WFL, MMT 4+/5)   Bed Mobility   Supine to Sit 5  Supervision   Sit to Supine 5  Supervision   Transfers   Sit to Stand 5  Supervision   Stand to Sit 5  Supervision   Stand pivot 4  Minimal assistance   Ambulation/Elevation   Gait pattern   (unsteady)   Gait Assistance 4  Minimal assist   Assistive Device   (3L02)   Distance 80 feet   Balance   Static Sitting Good   Static Standing Fair   Ambulatory Fair -   Endurance Deficit   Endurance Deficit   (Sp02 drops to 82% on 3L02 with activity/MD aware)   Activity Tolerance   Activity Tolerance Patient tolerated treatment well   Assessment   Prognosis Good   Problem List Decreased strength;Decreased endurance;Impaired balance;Decreased mobility;Decreased cognition   Assessment Patient is an 83 y.o. year old male seen for Physical Therapy evaluation. Patient admitted with COPD with acute " "exacerbation (HCC).  Comorbidities affecting patient's physical performance include: PAF, COPD/emphysema, DM with neuropathy, alzheimers dementia.  Personal factors affecting patient at time of initial evaluation include: ambulating with assistive device, inability to navigate level surfaces without external assistance, decreased cognition, and inability to live alone. Prior to admission, patient was ambulating short distances with a walker and 02.  Please find objective findings from Physical Therapy assessment regarding body systems outlined above with impairments and limitations including weakness, impaired balance, decreased endurance, gait deviations, decreased activity tolerance, decreased functional mobility tolerance, and fall risk.  The Barthel Index was used as a functional outcome tool presenting with a score of Barthel Index Score: 55 today indicating marked limitations of functional mobility and ADLS.  Patient's clinical presentation is currently unstable/unpredictable as seen in patient's presentation of vital sign response, increased fall risk, new onset of impairment of functional mobility, decreased endurance, and new onset of weakness. Pt would benefit from continued Physical Therapy treatment to address deficits as defined above and maximize level of functional mobility. As demonstrated by objective findings, the assigned level of complexity for this evaluation is high.The patient's -[facility] Basic Mobility Inpatient Short Form Raw Score is 20. A Raw score of greater than 16 suggests the patient may benefit from discharge to home.   Goals   Patient Goals \"go home\"   STG Expiration Date 12/28/23   Short Term Goal #1 independent bed mobility, independent transfers, supervision ambulation with a walker 25 feet with Sp20 >88%   LTG Expiration Date 01/04/24   Long Term Goal #1 no falls, supervision ambulation 50 feet with a steady gait with Sp02 >88%   Plan   Treatment/Interventions Therapeutic " "exercise;LE strengthening/ROM;Functional transfer training;Gait training;Bed mobility;Equipment eval/education;Patient/family training   PT Frequency Other (Comment)  (5x/week)   Discharge Recommendation   Rehab Resource Intensity Level, PT III (Minimum Resource Intensity)   Equipment Recommended   (Pt has all DME needed)   AM-PAC Basic Mobility Inpatient   Turning in Flat Bed Without Bedrails 4   Lying on Back to Sitting on Edge of Flat Bed Without Bedrails 4   Moving Bed to Chair 3   Standing Up From Chair Using Arms 3   Walk in Room 3   Climb 3-5 Stairs With Railing 3   Basic Mobility Inpatient Raw Score 20   Basic Mobility Standardized Score 43.99   Highest Level Of Mobility   JH-HLM Goal 6: Walk 10 steps or more   JH-HLM Achieved 7: Walk 25 feet or more   Barthel Index   Feeding 10   Bathing 0   Grooming Score 0   Dressing Score 5   Bladder Score 10   Bowels Score 10   Toilet Use Score 5   Transfers (Bed/Chair) Score 10   Mobility (Level Surface) Score 0   Stairs Score 5   Barthel Index Score 55   Additional Treatment Session   Start Time 1015   End Time 1025   Treatment Assessment S:  \"I'm a little out of breath\"  O:  Pt ambulated with a rolling walker and 3L02 x 40 feet with supervision assist.  A:  Endurance and quality of gait is improved with use of walker.  Pt and wife agreed. P:  Continue PT to improve endurance, balance and function.  Encoruaged pt to ambulate with a rolling walker with nursing assist.   End of Consult   Patient Position at End of Consult All needs within reach;Bed/Chair alarm activated;Bedside chair   Licensure   [place] License Number  Bonnieelizabeth Maresradha PT [id number]     "

## 2023-12-21 NOTE — DISCHARGE SUMMARY
[facility]  Discharge- Juan Antonio May 1940, 83 y.o. male MRN: [id number]  Unit/Bed#: 2 [place] [address] Encounter: [id number]  Primary Care Provider: Frank Lombardi, DO   Date and time admitted to hospital: 12/19/2023  9:09 AM    Acute on chronic respiratory failure (HCC)  Assessment & Plan  In setting of COPD exacerbation, required up to 5 L nasal cannula, wean down back to baseline of 3 L    Stage 3a chronic kidney disease (HCC)  Assessment & Plan  Lab Results   Component Value Date    EGFR 51 12/21/2023    EGFR 60 12/20/2023    EGFR 58 12/19/2023    CREATININE 1.27 12/21/2023    CREATININE 1.10 12/20/2023    CREATININE 1.13 12/19/2023     Renal functions currently stable    Chronic systolic congestive heart failure (HCC)  Assessment & Plan  Wt Readings from Last 3 Encounters:   12/19/23 54.9 kg (121 lb)   11/24/23 59.4 kg (131 lb)   10/23/23 59 kg (130 lb)     Appears euvolemic, the echo showing EF 35 to 40%  S/p AICD  Continue carvedilol 6.25 twice daily  Continue Lasix 40 mg every other day            Chronic hypoxemic respiratory failure ([facility])  Assessment & Plan  Chronically on 3 L    PAF (paroxysmal atrial fibrillation) ([facility])  Assessment & Plan  Rate controlled: Continue carvedilol 6.25 twice daily, digoxin 125 mcg daily  Anticoagulated on Coumadin,INR therapeutic and resume Coumadin 3 mg daily    Type 2 diabetes mellitus with diabetic polyneuropathy, without long-term current use of insulin ([facility])  Assessment & Plan  Lab Results   Component Value Date    HGBA1C 8.8 (H) 12/12/2023     Continue home medications on discharge    * COPD with acute exacerbation (HCC)  Assessment & Plan  83-year-old male with past history of severe emphysema, congestive heart failure EF 35 to 40%, A-fib on Coumadin, type II diabetes and and CKD presented with shortness of breath  Reported increasing shortness of breath, respiratory distress evaluated by EMS, treated with heart nebs, IV Solu-Medrol 125  mg  Currently on 5 L nasal cannula, baseline 3 L  Chest x-ray showing emphysema, no concerns for infiltrate  Labs unremarkable, no white count. Procal neg  Reports symptoms has improved, no significant wheezing on exam, decreased breath sounds bilaterally  Continue nebsXopenex, Atrovent and Pulmicort  Patient symptoms improved, reports breathing without shortness of breath, dyspnea on exertion  Transition to prednisone 40 mg, taper 10 mg every 3 days until completion.  Prescribed to pharmacy      Underweight POA possibly due to chronic illness a/e/b BMI 16.88 (less than 19.0) treated with registered dietitian evaluation, Darryl/CHO Controlled; Consistent Carbohydrate Diet, monitor PO intake, registered dietitian recommendations to optimize protein/calorie intake,     Discharging Physician / Practitioner: Eric Arthur MD  PCP: Frank Lombardi, DO  Admission Date:   Admission Orders (From admission, onward)       Ordered        12/20/23 0827  Inpatient Admission  Once            12/19/23 1153  Place in Observation  Once                          Discharge Date: 12/21/23    Medical Problems       Resolved Problems  Date Reviewed: 12/21/2023   None         Consultations During Hospital Stay:  none    Procedures Performed:   none    Significant Findings / Test Results:   XR chest portable    Result Date: 12/19/2023  Impression: Severe emphysema, greater on the left, with no definite acute disease. Workstation performed: [id number]        Incidental Findings:   none     Test Results Pending at Discharge (will require follow up):   none     Outpatient Tests Requested:  none    Complications:  none    Reason for Admission: SOB    Hospital Course:     Juan Antonio Nicholson is a 83 y.o. male patient who originally presented to the hospital on 12/19/2023 due to shortness of breath.  Patient was found to be in COPD exacerbation, treated with IV Solu-Medrol, Xopenex, Atrovent and Pulmicort.  Patient's symptoms improved the following day, was  "still tight on exam and I recommend continuing for an additional day.  Patient completed 2 days of IV steroids, transition to p.o. steroids.  Ambulating without difficulty, oxygen requirements increased to 5 L initially, wean down back to baseline of 3 L.  PT and OT evaluated, recommending no rehab needs.  Patient was discharged home with steroid taper 40 mg daily for 3 days, weaning to 10 mg every 3 days until completion.    Please see above list of diagnoses and related plan for additional information.     Condition at Discharge: fair     Discharge Day Visit / Exam:     Subjective:    Patient reports doing well.  He slept well overnight, tolerating diet without difficulty.  He was able to work and ambulate with PT and OT.  No complaints at this time, stating his breathing is back to baseline on 3 L.  Denies any chest pain or shortness of breath.  Vitals: Blood Pressure: 107/60 (12/21/23 0831)  Pulse: (!) 117 (12/21/23 0838)  Temperature: 97.8 °F (36.6 °C) (12/21/23 0831)  Temp Source: Oral (12/21/23 0831)  Respirations: 18 (12/21/23 0838)  Height: 5' 11\" (180.3 cm) (12/19/23 2003)  Weight - Scale: 54.9 kg (121 lb) (12/19/23 1756)  SpO2: 98 % (12/21/23 0848)  Exam:   Physical Exam  Vitals and nursing note reviewed.   Constitutional:       Appearance: Normal appearance.   HENT:      Head: Normocephalic.   Eyes:      Conjunctiva/sclera: Conjunctivae normal.   Cardiovascular:      Rate and Rhythm: Normal rate.      Heart sounds: Normal heart sounds.   Pulmonary:      Effort: Pulmonary effort is normal.      Breath sounds: No wheezing.      Comments: Decreased breath sounds, improved from day prior, mild wheezing  Abdominal:      General: Bowel sounds are normal. There is no distension.      Palpations: Abdomen is soft.   Musculoskeletal:         General: No swelling. Normal range of motion.   Skin:     General: Skin is warm and dry.   Neurological:      General: No focal deficit present.      Mental Status: He is " alert. Mental status is at baseline.         Discharge instructions/Information to patient and family:   See after visit summary for information provided to patient and family.      Provisions for Follow-Up Care:  See after visit summary for information related to follow-up care and any pertinent home health orders.      Disposition:     Home     Discharge Statement:  I spent 40 minutes discharging the patient. This time was spent on the day of discharge. I had direct contact with the patient on the day of discharge. Greater than 50% of the total time was spent examining patient, answering all patient questions, arranging and discussing plan of care with patient as well as directly providing post-discharge instructions.  Additional time then spent on discharge activities.    Discharge Medications:  See after visit summary for reconciled discharge medications provided to patient and family.      ** Please Note: This note has been constructed using a voice recognition system **

## 2023-12-21 NOTE — CASE MANAGEMENT
Case Management Discharge Planning Note    Patient name Juan Antonio Nicholson  Location 2 [facility] [address] MRN [id number]  : 1940 Date 2023       Current Admission Date: 2023  Current Admission Diagnosis:COPD with acute exacerbation (HCC)   Patient Active Problem List    Diagnosis Date Noted    Acute on chronic respiratory failure ([facility]) 2023    Stage 3a chronic kidney disease ([facility]) 2021    Chronic systolic congestive heart failure ([facility]) 2021    Alzheimer's dementia without behavioral disturbance ([facility]) 2021    Physical deconditioning 2020    Coagulation defect, unspecified ([facility]) 2020    Abscess of lower lobe of left lung with pneumonia ([facility]) 2020    Severe protein-calorie malnutrition ([facility]) 2020    Diverticulosis 2019    Cholelithiases 2019    Nephrolithiasis 2019    COPD with acute exacerbation ([facility]) 2019    Non-recurrent unilateral inguinal hernia without obstruction or gangrene 2018    Pain in both feet 2018    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    Arteriosclerosis of coronary artery 2018    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[facility]) 11/10/2017    Chronic hypoxemic respiratory failure ([facility]) 11/10/2017    Bilateral carotid bruits 2017    Heart failure, left, 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% ([facility]) 2017    Nodule of left lung 2017    Dilated cardiomyopathy ([facility]) 2017    Type 2 diabetes mellitus with diabetic polyneuropathy, without long-term current use of insulin ([facility])     Severe left ventricular systolic dysfunction 2017    Hypoxia 10/24/2013    PAF (paroxysmal atrial fibrillation) ([facility]) 2013    Hypertensive heart disease with congestive heart failure ([facility]) 2013      LOS (days): 1  Geometric Mean LOS (GMLOS) (days): 3.5  Days to GMLOS:2.4     OBJECTIVE:  Risk of Unplanned Readmission Score: 18.44     Current admission status: Inpatient   Preferred Pharmacy:   [facility]  - [place], [place] - [address]  [address] [address]  Phone: [phone] Fax: [phone]    [id number] - [place], [place] - [address]  [address]  [facility] [address]  Phone: [phone] Fax: [phone]    Primary Care Provider: Frank Lombardi, DO    Primary Insurance: [facility]  Secondary Insurance:     DISCHARGE DETAILS:    Discharge planning discussed with:: Patient and wife, Neeru Nicholson  Freedom of Choice: Yes  Comments - Freedom of Choice: Discharge home ordered today.  SW met with pt's wife to review plans and discharge timeframe.  Wife aware that discharge is planned for today.  [facility] accepted case for resumption of care and wife requested services.  Agency will be reserved in A[employer].  Wife expressed hope for additional services as well.  WILLIAM placed call to [facility] earlier today to make referral if pt not already involved with agency.  Spoke with Leila who confirmed pt is already in their system and plans were in place for wife to receive call from their [place] , Tita, next week.  This was the call wife eluded to yesterday during call.  SW reviewed planned call from [facility] for next week.  SW also provided wife with information on [employer] to assist with locating additional assistance and planning ahead as well as information for a different private duty agency to explore.  Wife has  information for future reference.  No other discharge needs expressed at this time.  CM contacted family/caregiver?: Yes  Were Treatment Team discharge recommendations reviewed with patient/caregiver?: Yes  Did patient/caregiver verbalize understanding of patient care needs?: Yes    Contacts  Patient Contacts: Neeru Nicholson  Relationship to Patient:: Family  Contact Method: In Person  Reason/Outcome: Discharge Planning    Other Referral/Resources/Interventions Provided:  Interventions:  HHC, Other (Specify) ([facility] [place])  Referral Comments: Notified [facility] of planned discharge.  AVS and F2F will be sent to agency via Aidin when available.    Treatment Team Recommendation: Home with Home Health Care  Discharge Destination Plan:: Home with Home Health Care  Transport at Discharge : Family

## 2023-12-21 NOTE — ASSESSMENT & PLAN NOTE
Lab Results   Component Value Date    EGFR 51 12/21/2023    EGFR 60 12/20/2023    EGFR 58 12/19/2023    CREATININE 1.27 12/21/2023    CREATININE 1.10 12/20/2023    CREATININE 1.13 12/19/2023     Renal functions currently stable

## 2023-12-21 NOTE — PLAN OF CARE
Problem: Potential for Falls  Goal: Patient will remain free of falls  Description: INTERVENTIONS:  - Educate patient/family on patient safety including physical limitations  - Instruct patient to call for assistance with activity   - Consult OT/PT to assist with strengthening/mobility   - Keep Call bell within reach  - Keep bed low and locked with side rails adjusted as appropriate  - Keep care items and personal belongings within reach  - Initiate and maintain comfort rounds  - Make Fall Risk Sign visible to staff  - Offer Toileting every 2 Hours, in advance of need  - Initiate/Maintain bed alarm  - Obtain necessary fall risk management equipment: socks  - Apply yellow socks and bracelet for high fall risk patients  - Consider moving patient to room near nurses station  Outcome: Progressing     Problem: SAFETY ADULT  Goal: Patient will remain free of falls  Description: INTERVENTIONS:  - Educate patient/family on patient safety including physical limitations  - Instruct patient to call for assistance with activity   - Consult OT/PT to assist with strengthening/mobility   - Keep Call bell within reach  - Keep bed low and locked with side rails adjusted as appropriate  - Keep care items and personal belongings within reach  - Initiate and maintain comfort rounds  - Make Fall Risk Sign visible to staff  - Offer Toileting every 2 Hours, in advance of need  - Initiate/Maintain bed alarm  - Obtain necessary fall risk management equipment: socks  - Apply yellow socks and bracelet for high fall risk patients  - Consider moving patient to room near nurses station  Outcome: Progressing  Goal: Maintain or return to baseline ADL function  Description: INTERVENTIONS:  -  Assess patient's ability to carry out ADLs; assess patient's baseline for ADL function and identify physical deficits which impact ability to perform ADLs (bathing, care of mouth/teeth, toileting, grooming, dressing, etc.)  - Assess/evaluate cause of  self-care deficits   - Assess range of motion  - Assess patient's mobility; develop plan if impaired  - Assess patient's need for assistive devices and provide as appropriate  - Encourage maximum independence but intervene and supervise when necessary  - Involve family in performance of ADLs  - Assess for home care needs following discharge   - Consider OT consult to assist with ADL evaluation and planning for discharge  - Provide patient education as appropriate  Outcome: Progressing     Problem: RESPIRATORY - ADULT  Goal: Achieves optimal ventilation and oxygenation  Description: INTERVENTIONS:  - Assess for changes in respiratory status  - Assess for changes in mentation and behavior  - Position to facilitate oxygenation and minimize respiratory effort  - Oxygen administered by appropriate delivery if ordered  - Initiate smoking cessation education as indicated  - Encourage broncho-pulmonary hygiene including cough, deep breathe, Incentive Spirometry  - Assess the need for suctioning and aspirate as needed  - Assess and instruct to report SOB or any respiratory difficulty  - Respiratory Therapy support as indicated  Outcome: Progressing

## 2023-12-21 NOTE — OCCUPATIONAL THERAPY NOTE
"Occupational Therapy Evaluation       12/21/23 0940   OT Last Visit   OT Visit Date 12/21/23   Note Type   Note type Evaluation   Pain Assessment   Pain Assessment Tool 0-10   Pain Score No Pain   Restrictions/Precautions   Other Precautions O2;Bed Alarm;Fall Risk  (3L O2)   Home Living   Type of Home House   Home Layout One level   Bathroom Shower/Tub Walk-in shower   Bathroom Toilet Raised   Bathroom Equipment Grab bars in shower;Grab bars around toilet;Toilet raiser;Shower chair   Home Equipment Walker;Quad cane   Prior Function   Level of [place] Independent with functional mobility;Needs assistance with ADLs;Needs assistance with IADLS   Lives With Spouse   Receives Help From Family   IADLs Family/Friend/Other provides transportation;Family/Friend/Other provides meals;Family/Friend/Other provides medication management   General   Additional Pertinent History Pt admitted with worsening SOB.   Family/Caregiver Present No   Subjective   Subjective \"I don't want to get worn out.\"   ADL   Eating Assistance 7  Independent   Grooming Assistance 5  Supervision/Setup   UB Bathing Assistance 5  Supervision/Setup   LB Bathing Assistance 5  Supervision/Setup   UB Dressing Assistance 5  Supervision/Setup   LB Dressing Assistance 5  Supervision/Setup   Toileting Assistance  5  Supervision/Setup   Bed Mobility   Supine to Sit 5  Supervision   Sit to Supine 5  Supervision   Transfers   Sit to Stand 5  Supervision   Stand to Sit 5  Supervision   Toilet transfer 4  Minimal assistance   Functional Mobility   Functional Mobility 5  Supervision   Additional Comments short fxl distances within room and bathroom   Additional items   (no device)   Balance   Static Sitting Good   Dynamic Sitting Fair +   Static Standing Fair   Dynamic Standing Fair -   Activity Tolerance   Activity Tolerance Patient limited by fatigue   RUE Assessment   RUE Assessment   (ROM WFL, MMT grossly graded 3+/5)   LUE Assessment   LUE Assessment   (ROM " WFL, MMT grossly graded 3+/5)   Cognition   Overall Cognitive Status WFL   Attention Within functional limits   Orientation Level Oriented to person;Oriented to place   Following Commands Follows all commands and directions without difficulty   Assessment   Limitation Decreased ADL status;Decreased UE strength;Decreased endurance;Decreased self-care trans;Decreased high-level ADLs  (decreased balance and mobility)   Prognosis Fair   Assessment Patient evaluated by Occupational Therapy.  Patient admitted with COPD with acute exacerbation (HCC).  The patients occupational profile, medical and therapy history includes a extensive additional review of physical, cognitive, or psychosocial history related to current functional performance.  Comorbidities affecting functional mobility and ADLS include: Afib, cardiac disease, CHF, COPD, CVA, emphysema, chronic respiratory failure.  Prior to admission, patient was independent with functional mobility without assistive device, requiring intermittent assist for ADLS, and requiring assist for IADLS.  The evaluation identifies the following performance deficits: weakness, impaired balance, decreased endurance, increased fall risk, decreased ADLS, decreased IADLS, decreased activity tolerance, SOB upon exertion, and decreased strength, that result in activity limitations and/or participation restrictions. This evaluation requires clinical decision making of moderate complexity, because the patient may present with comorbidities that affect occupational performance and required minimal or moderate modification of tasks or assistance with the consideration of several treatment options.  The Barthel Index was used as a functional outcome tool presenting with a score of Barthel Index Score: 55, indicating moderate limitations of functional mobility and ADLS.  The patient's raw score on the AM-PAC Daily Activity Inpatient Short Form is 19. A raw score of greater than or equal to 19  suggests the patient may benefit from discharge to home. Please refer to the recommendation of the Occupational Therapist for safe discharge planning.  Patient will benefit from skilled Occupational Therapy services to address above deficits and facilitate a safe return to prior level of function.   Goals   Patient Goals to improve breathing and go home   STG Time Frame   (1-8 days)   Short Term Goal  Goals established to promote Patient Goals: to improve breathing and go home: Grooming: independent standing at sink; Bathing: independent; Upper Body Dressing independent; Lower Body Dressing: independent; Toileting: independent; Patient will increase ambulatory standard toilet transfer to supervision with no assistive device to increase performance and safety with ADLS and functional mobility; Patient will increase standing tolerance to 5 minutes during ADL task to decrease assistance level and decrease fall risk; Patient will increase bed mobility to independent in preparation for ADLS and transfers; Patient will increase functional mobility to and from bathroom with no assistive device independently to increase performance with ADLS and to use a toilet; Patient will tolerate 5 minutes of UE ROM/strengthening to increase general activity tolerance and performance in ADLS/IADLS; Patient will improve functional activity tolerance to 10 minutes of sustained functional tasks to increase participation in basic self-care and decrease assistance level;  Patient will be able to to verbalize understanding and perform energy conservation/proper body mechanics during ADLS and functional mobility at least 75% of the time with moderate cueing to decrease signs of fatigue and increase stamina to return to prior level of function; Patient will increase dynamic sitting balance to good to improve the ability to sit at edge of bed or on a chair for ADLS;  Patient will increase dynamic standing balance to fair to improve postural  stability and decrease fall risk during standing ADLS and transfers.   LTG Time Frame   (8-14 days)   Long Term Goal Patient will increase ambulatory standard toilet transfer to independent with no assistive device to increase performance and safety with ADLS and functional mobility; Patient will increase standing tolerance to 10 minutes during ADL task to decrease assistance level and decrease fall risk; Patient will tolerate 10 minutes of UE ROM/strengthening to increase general activity tolerance and performance in ADLS/IADLS; Patient will improve functional activity tolerance to 15 minutes of sustained functional tasks to increase participation in basic self-care and decrease assistance level;  Patient will be able to to verbalize understanding and perform energy conservation/proper body mechanics during ADLS and functional mobility at least 90% of the time with minimal cueing to decrease signs of fatigue and increase stamina to return to prior level of function; Patient will increase dynamic standing balance to fair+ to improve postural stability and decrease fall risk during standing ADLS and transfers.  Pt will score >/= 23/24 on AM-PAC Daily Activity Inpatient scale to promote safe independence with ADLs and functional mobility; Pt will score >/= 85/100 on Barthel Index in order to decrease caregiver assistance needed and increase ability to perform ADLs and functional mobility.   Plan   Treatment Interventions ADL retraining;Functional transfer training;UE strengthening/ROM;Endurance training;Patient/family training;Energy conservation;Activityengagement   Goal Expiration Date 01/04/24   OT Frequency 3-5x/wk   Discharge Recommendation   Rehab Resource Intensity Level, OT No post-acute rehabilitation needs   AM-PAC Daily Activity Inpatient   Lower Body Dressing 3   Bathing 3   Toileting 3   Upper Body Dressing 3   Grooming 3   Eating 4   Daily Activity Raw Score 19   Daily Activity Standardized Score (Calc  for Raw Score >=11) 40.22   AM-PAC Applied Cognition Inpatient   Following a Speech/Presentation 4   Understanding Ordinary Conversation 4   Taking Medications 3   Remembering Where Things Are Placed or Put Away 3   Remembering List of 4-5 Errands 3   Taking Care of Complicated Tasks 3   Applied Cognition Raw Score 20   Applied Cognition Standardized Score 41.76   Barthel Index   Feeding 10   Bathing 0   Grooming Score 0   Dressing Score 5   Bladder Score 10   Bowels Score 10   Toilet Use Score 5   Transfers (Bed/Chair) Score 10   Mobility (Level Surface) Score 0   Stairs Score 5   Barthel Index Score 55   Licensure   [place] License Number  Bonnie Wilman OTR/L [id number]

## 2023-12-22 NOTE — UTILIZATION REVIEW
NOTIFICATION OF ADMISSION DISCHARGE   This is a Notification of Discharge from [facility]. Please be advised that this patient has been discharge from our facility. Below you will find the admission and discharge date and time including the patient’s disposition.   UTILIZATION REVIEW CONTACT:  Sonia Beckford  Utilization   Network Utilization Review Department  Phone: [phone] x carefully listen to the prompts. All voicemails are confidential.  Email: [email or web address]     ADMISSION INFORMATION  PRESENTATION DATE: 12/19/2023  9:09 AM  OBERVATION ADMISSION DATE:   INPATIENT ADMISSION DATE: 12/20/23  8:27 AM   DISCHARGE DATE: 12/21/2023  1:05 PM   DISPOSITION:Home/Self Care    Network Utilization Review Department  ATTENTION: Please call with any questions or concerns to [phone] and carefully listen to the prompts so that you are directed to the right person. All voicemails are confidential.   For Discharge needs, contact Care Management DC Support Team at [phone] opt. [phone]  Send all requests for admission clinical reviews, approved or denied determinations and any other requests to dedicated fax number below belonging to the campus where the patient is receiving treatment. List of dedicated fax numbers for the Facilities:  FACILITY NAME UR FAX NUMBER   ADMISSION DENIALS (Administrative/Medical Necessity) [phone]   DISCHARGE SUPPORT TEAM ([facility]) [phone]   PARENT CHILD HEALTH (Maternity/NICU/Pediatrics) [phone]   [facility] [phone]   [facility] [phone]   [facility] [phone]   General acute hospital [phone]   [facility] [phone]   [facility] [phone]   [facility] [phone]   [facility] [phone]    [facility] [phone]   [facility] [phone]   [facility] [phone]

## 2023-12-26 DIAGNOSIS — K29.70 GASTRITIS: ICD-10-CM

## 2023-12-27 ENCOUNTER — TELEPHONE (OUTPATIENT)
Age: 83
End: 2023-12-27

## 2023-12-27 RX ORDER — PANTOPRAZOLE SODIUM 40 MG/1
TABLET, DELAYED RELEASE ORAL
Qty: 90 TABLET | Refills: 1 | Status: SHIPPED | OUTPATIENT
Start: 2023-12-27

## 2023-12-27 NOTE — TELEPHONE ENCOUNTER
Jana called stating that inquiring about orders that were faxed to the office. She states that there is missing information on the order, however did not elaborate on the information that was missing. Warm transfer attempted to office clerical who states they will look out for paperwork, and advised Jana to call back on Friday if she has not heard from the office.

## 2023-12-28 ENCOUNTER — OFFICE VISIT (OUTPATIENT)
Dept: PULMONOLOGY | Facility: MEDICAL CENTER | Age: 83
End: 2023-12-28
Payer: COMMERCIAL

## 2023-12-28 VITALS
WEIGHT: 124.2 LBS | HEIGHT: 71 IN | RESPIRATION RATE: 12 BRPM | OXYGEN SATURATION: 97 % | TEMPERATURE: 97.6 F | DIASTOLIC BLOOD PRESSURE: 68 MMHG | SYSTOLIC BLOOD PRESSURE: 122 MMHG | HEART RATE: 102 BPM | BODY MASS INDEX: 17.39 KG/M2

## 2023-12-28 DIAGNOSIS — R91.1 LUNG NODULE: ICD-10-CM

## 2023-12-28 DIAGNOSIS — J43.9 BULLOUS EMPHYSEMA (HCC): Primary | ICD-10-CM

## 2023-12-28 DIAGNOSIS — J96.11 CHRONIC HYPOXEMIC RESPIRATORY FAILURE (HCC): ICD-10-CM

## 2023-12-28 PROCEDURE — 99214 OFFICE O/P EST MOD 30 MIN: CPT | Performed by: INTERNAL MEDICINE

## 2023-12-28 RX ORDER — IPRATROPIUM BROMIDE AND ALBUTEROL SULFATE 2.5; .5 MG/3ML; MG/3ML
3 SOLUTION RESPIRATORY (INHALATION) 4 TIMES DAILY PRN
Qty: 360 ML | Refills: 7 | Status: SHIPPED | OUTPATIENT
Start: 2023-12-28
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
## 2023-12-28 NOTE — TELEPHONE ENCOUNTER
Had Dr Lombardi sign everything we had with pt names on it, faxed and all confirmations received

## 2023-12-28 NOTE — PATIENT INSTRUCTIONS
Back telephone number  [phone]    Follow-up in 4 months    I did place order for nebulizer from [employer].  They will deliver this to you use 1 vial of albuterol-ipratropium twice a day and once he is doing well can cut it down to once a day could use this up to 4 times a day if he has a bad day with his breathing

## 2023-12-29 LAB
DME PARACHUTE DELIVERY DATE REQUESTED: NORMAL
DME PARACHUTE ITEM DESCRIPTION: NORMAL
DME PARACHUTE ORDER STATUS: NORMAL
DME PARACHUTE SUPPLIER NAME: NORMAL
DME PARACHUTE SUPPLIER PHONE: NORMAL

## 2023-12-31 NOTE — ASSESSMENT & PLAN NOTE
He did have a new 5 mm nodule seen in lateral segment of right upper lobe.  I did order CT scan of chest for follow-up and this will be done in June 2024.

## 2023-12-31 NOTE — ASSESSMENT & PLAN NOTE
Juan Antonio has very severe COPD with FEV1 of 0.53 L or 19% of predicted.  Will continue with Trelegy 10 micro 1 puff daily and neb treatments with DuoNeb 4 times a day as needed

## 2023-12-31 NOTE — ASSESSMENT & PLAN NOTE
He will continue use oxygen 3 L/min on continuous basis.  DME company is [employer].  O2 saturation today on 3 L/min is satisfactory

## 2024-01-01 ENCOUNTER — HOSPITAL ENCOUNTER (INPATIENT)
Facility: HOSPITAL | Age: 84
LOS: 1 days | DRG: 189 | End: 2024-01-29
Attending: EMERGENCY MEDICINE | Admitting: ANESTHESIOLOGY
Payer: COMMERCIAL

## 2024-01-01 ENCOUNTER — APPOINTMENT (EMERGENCY)
Dept: RADIOLOGY | Facility: HOSPITAL | Age: 84
DRG: 189 | End: 2024-01-01
Payer: COMMERCIAL

## 2024-01-01 ENCOUNTER — HOSPITAL ENCOUNTER (INPATIENT)
Facility: HOSPITAL | Age: 84
LOS: 7 days | Discharge: NURSING, REHAB OR LONG-TERM CARE | DRG: 189 | End: 2024-01-26
Attending: EMERGENCY MEDICINE | Admitting: INTERNAL MEDICINE
Payer: COMMERCIAL

## 2024-01-01 ENCOUNTER — TELEPHONE (OUTPATIENT)
Dept: ADMINISTRATIVE | Facility: OTHER | Age: 84
End: 2024-01-01

## 2024-01-01 ENCOUNTER — APPOINTMENT (INPATIENT)
Dept: RADIOLOGY | Facility: HOSPITAL | Age: 84
DRG: 189 | End: 2024-01-01
Payer: COMMERCIAL

## 2024-01-01 ENCOUNTER — HOSPITAL ENCOUNTER (OUTPATIENT)
Facility: HOSPITAL | Age: 84
End: 2024-01-30
Attending: ANESTHESIOLOGY | Admitting: INTERNAL MEDICINE
Payer: COMMERCIAL

## 2024-01-01 VITALS
HEIGHT: 71 IN | HEART RATE: 101 BPM | BODY MASS INDEX: 17.28 KG/M2 | RESPIRATION RATE: 15 BRPM | DIASTOLIC BLOOD PRESSURE: 57 MMHG | SYSTOLIC BLOOD PRESSURE: 121 MMHG | WEIGHT: 123.46 LBS | TEMPERATURE: 98.5 F | OXYGEN SATURATION: 81 %

## 2024-01-01 VITALS
HEIGHT: 71 IN | BODY MASS INDEX: 17.5 KG/M2 | SYSTOLIC BLOOD PRESSURE: 118 MMHG | HEART RATE: 99 BPM | TEMPERATURE: 97.5 F | WEIGHT: 125 LBS | RESPIRATION RATE: 20 BRPM | DIASTOLIC BLOOD PRESSURE: 72 MMHG | OXYGEN SATURATION: 98 %

## 2024-01-01 VITALS
RESPIRATION RATE: 17 BRPM | SYSTOLIC BLOOD PRESSURE: 133 MMHG | OXYGEN SATURATION: 85 % | DIASTOLIC BLOOD PRESSURE: 62 MMHG | TEMPERATURE: 100 F

## 2024-01-01 DIAGNOSIS — J96.90 RESPIRATORY FAILURE (HCC): ICD-10-CM

## 2024-01-01 DIAGNOSIS — R09.02 HYPOXIA: ICD-10-CM

## 2024-01-01 DIAGNOSIS — I48.0 PAF (PAROXYSMAL ATRIAL FIBRILLATION) (HCC): ICD-10-CM

## 2024-01-01 DIAGNOSIS — J10.1 INFLUENZA A: ICD-10-CM

## 2024-01-01 DIAGNOSIS — J44.1 COPD EXACERBATION (HCC): ICD-10-CM

## 2024-01-01 DIAGNOSIS — K21.9 GERD (GASTROESOPHAGEAL REFLUX DISEASE): ICD-10-CM

## 2024-01-01 DIAGNOSIS — F02.80 ALZHEIMER'S DEMENTIA WITHOUT BEHAVIORAL DISTURBANCE (HCC): ICD-10-CM

## 2024-01-01 DIAGNOSIS — R06.03 RESPIRATORY DISTRESS: Primary | ICD-10-CM

## 2024-01-01 DIAGNOSIS — J44.1 COPD WITH ACUTE EXACERBATION (HCC): Primary | ICD-10-CM

## 2024-01-01 DIAGNOSIS — G30.9 ALZHEIMER'S DEMENTIA WITHOUT BEHAVIORAL DISTURBANCE (HCC): ICD-10-CM

## 2024-01-01 LAB
2HR DELTA HS TROPONIN: -1 NG/L
2HR DELTA HS TROPONIN: 2 NG/L
4HR DELTA HS TROPONIN: 8 NG/L
ALBUMIN SERPL BCP-MCNC: 3.1 G/DL (ref 3.5–5)
ALBUMIN SERPL BCP-MCNC: 4.1 G/DL (ref 3.5–5)
ALP SERPL-CCNC: 41 U/L (ref 34–104)
ALP SERPL-CCNC: 67 U/L (ref 34–104)
ALT SERPL W P-5'-P-CCNC: 10 U/L (ref 7–52)
ALT SERPL W P-5'-P-CCNC: 16 U/L (ref 7–52)
ANION GAP SERPL CALCULATED.3IONS-SCNC: 0 MMOL/L
ANION GAP SERPL CALCULATED.3IONS-SCNC: 12 MMOL/L
ANION GAP SERPL CALCULATED.3IONS-SCNC: 2 MMOL/L
ANION GAP SERPL CALCULATED.3IONS-SCNC: 4 MMOL/L
ANION GAP SERPL CALCULATED.3IONS-SCNC: 6 MMOL/L
ANION GAP SERPL CALCULATED.3IONS-SCNC: 6 MMOL/L
ANION GAP SERPL CALCULATED.3IONS-SCNC: 7 MMOL/L
ANION GAP SERPL CALCULATED.3IONS-SCNC: 7 MMOL/L
ANION GAP SERPL CALCULATED.3IONS-SCNC: 8 MMOL/L
ARTERIAL PATENCY WRIST A: YES
ARTERIAL PATENCY WRIST A: YES
AST SERPL W P-5'-P-CCNC: 10 U/L (ref 13–39)
AST SERPL W P-5'-P-CCNC: 18 U/L (ref 13–39)
ATRIAL RATE: 106 BPM
ATRIAL RATE: 111 BPM
BACTERIA BLD CULT: NORMAL
BACTERIA BLD CULT: NORMAL
BASE EX.OXY STD BLDV CALC-SCNC: 22 % (ref 60–80)
BASE EX.OXY STD BLDV CALC-SCNC: 45 % (ref 60–80)
BASE EX.OXY STD BLDV CALC-SCNC: 52.7 % (ref 60–80)
BASE EX.OXY STD BLDV CALC-SCNC: 56.1 % (ref 60–80)
BASE EXCESS BLDA CALC-SCNC: -1.9 MMOL/L
BASE EXCESS BLDA CALC-SCNC: 5.5 MMOL/L
BASE EXCESS BLDV CALC-SCNC: -4 MMOL/L
BASE EXCESS BLDV CALC-SCNC: 1.3 MMOL/L
BASE EXCESS BLDV CALC-SCNC: 5.1 MMOL/L
BASE EXCESS BLDV CALC-SCNC: 8.3 MMOL/L
BASOPHILS # BLD AUTO: 0.04 THOUSANDS/ÂΜL (ref 0–0.1)
BASOPHILS # BLD AUTO: 0.04 THOUSANDS/ÂΜL (ref 0–0.1)
BASOPHILS # BLD AUTO: 0.05 THOUSANDS/ÂΜL (ref 0–0.1)
BASOPHILS # BLD AUTO: 0.05 THOUSANDS/ÂΜL (ref 0–0.1)
BASOPHILS # BLD AUTO: 0.08 THOUSANDS/ÂΜL (ref 0–0.1)
BASOPHILS NFR BLD AUTO: 0 % (ref 0–1)
BASOPHILS NFR BLD AUTO: 1 % (ref 0–1)
BILIRUB SERPL-MCNC: 0.48 MG/DL (ref 0.2–1)
BILIRUB SERPL-MCNC: 0.54 MG/DL (ref 0.2–1)
BODY TEMPERATURE: 93.6 DEGREES FEHRENHEIT
BODY TEMPERATURE: 97.4 DEGREES FEHRENHEIT
BUN SERPL-MCNC: 17 MG/DL (ref 5–25)
BUN SERPL-MCNC: 24 MG/DL (ref 5–25)
BUN SERPL-MCNC: 28 MG/DL (ref 5–25)
BUN SERPL-MCNC: 29 MG/DL (ref 5–25)
BUN SERPL-MCNC: 30 MG/DL (ref 5–25)
BUN SERPL-MCNC: 33 MG/DL (ref 5–25)
BUN SERPL-MCNC: 33 MG/DL (ref 5–25)
BUN SERPL-MCNC: 37 MG/DL (ref 5–25)
BUN SERPL-MCNC: 40 MG/DL (ref 5–25)
CALCIUM ALBUM COR SERPL-MCNC: 9.1 MG/DL (ref 8.3–10.1)
CALCIUM SERPL-MCNC: 8.4 MG/DL (ref 8.4–10.2)
CALCIUM SERPL-MCNC: 8.5 MG/DL (ref 8.4–10.2)
CALCIUM SERPL-MCNC: 8.5 MG/DL (ref 8.4–10.2)
CALCIUM SERPL-MCNC: 8.7 MG/DL (ref 8.4–10.2)
CALCIUM SERPL-MCNC: 8.8 MG/DL (ref 8.4–10.2)
CALCIUM SERPL-MCNC: 9 MG/DL (ref 8.4–10.2)
CALCIUM SERPL-MCNC: 9.3 MG/DL (ref 8.4–10.2)
CARDIAC TROPONIN I PNL SERPL HS: 14 NG/L
CARDIAC TROPONIN I PNL SERPL HS: 16 NG/L
CARDIAC TROPONIN I PNL SERPL HS: 20 NG/L
CARDIAC TROPONIN I PNL SERPL HS: 21 NG/L
CARDIAC TROPONIN I PNL SERPL HS: 22 NG/L
CHLORIDE SERPL-SCNC: 100 MMOL/L (ref 96–108)
CHLORIDE SERPL-SCNC: 102 MMOL/L (ref 96–108)
CHLORIDE SERPL-SCNC: 97 MMOL/L (ref 96–108)
CHLORIDE SERPL-SCNC: 97 MMOL/L (ref 96–108)
CHLORIDE SERPL-SCNC: 98 MMOL/L (ref 96–108)
CHLORIDE SERPL-SCNC: 99 MMOL/L (ref 96–108)
CHLORIDE SERPL-SCNC: 99 MMOL/L (ref 96–108)
CO2 SERPL-SCNC: 26 MMOL/L (ref 21–32)
CO2 SERPL-SCNC: 33 MMOL/L (ref 21–32)
CO2 SERPL-SCNC: 33 MMOL/L (ref 21–32)
CO2 SERPL-SCNC: 34 MMOL/L (ref 21–32)
CO2 SERPL-SCNC: 35 MMOL/L (ref 21–32)
CO2 SERPL-SCNC: 35 MMOL/L (ref 21–32)
CO2 SERPL-SCNC: 40 MMOL/L (ref 21–32)
CREAT SERPL-MCNC: 1.04 MG/DL (ref 0.6–1.3)
CREAT SERPL-MCNC: 1.05 MG/DL (ref 0.6–1.3)
CREAT SERPL-MCNC: 1.08 MG/DL (ref 0.6–1.3)
CREAT SERPL-MCNC: 1.1 MG/DL (ref 0.6–1.3)
CREAT SERPL-MCNC: 1.13 MG/DL (ref 0.6–1.3)
CREAT SERPL-MCNC: 1.14 MG/DL (ref 0.6–1.3)
CREAT SERPL-MCNC: 1.16 MG/DL (ref 0.6–1.3)
CREAT SERPL-MCNC: 1.2 MG/DL (ref 0.6–1.3)
CREAT SERPL-MCNC: 1.22 MG/DL (ref 0.6–1.3)
EOSINOPHIL # BLD AUTO: 0.01 THOUSAND/ÂΜL (ref 0–0.61)
EOSINOPHIL # BLD AUTO: 0.03 THOUSAND/ÂΜL (ref 0–0.61)
EOSINOPHIL # BLD AUTO: 0.06 THOUSAND/ÂΜL (ref 0–0.61)
EOSINOPHIL # BLD AUTO: 0.06 THOUSAND/ÂΜL (ref 0–0.61)
EOSINOPHIL # BLD AUTO: 0.19 THOUSAND/ÂΜL (ref 0–0.61)
EOSINOPHIL NFR BLD AUTO: 0 % (ref 0–6)
EOSINOPHIL NFR BLD AUTO: 0 % (ref 0–6)
EOSINOPHIL NFR BLD AUTO: 1 % (ref 0–6)
EOSINOPHIL NFR BLD AUTO: 1 % (ref 0–6)
EOSINOPHIL NFR BLD AUTO: 2 % (ref 0–6)
ERYTHROCYTE [DISTWIDTH] IN BLOOD BY AUTOMATED COUNT: 13.4 % (ref 11.6–15.1)
ERYTHROCYTE [DISTWIDTH] IN BLOOD BY AUTOMATED COUNT: 13.5 % (ref 11.6–15.1)
ERYTHROCYTE [DISTWIDTH] IN BLOOD BY AUTOMATED COUNT: 13.5 % (ref 11.6–15.1)
ERYTHROCYTE [DISTWIDTH] IN BLOOD BY AUTOMATED COUNT: 13.6 % (ref 11.6–15.1)
ERYTHROCYTE [DISTWIDTH] IN BLOOD BY AUTOMATED COUNT: 13.6 % (ref 11.6–15.1)
ERYTHROCYTE [DISTWIDTH] IN BLOOD BY AUTOMATED COUNT: 13.7 % (ref 11.6–15.1)
ERYTHROCYTE [DISTWIDTH] IN BLOOD BY AUTOMATED COUNT: 13.8 % (ref 11.6–15.1)
FLUAV RNA RESP QL NAA+PROBE: NEGATIVE
FLUAV RNA RESP QL NAA+PROBE: POSITIVE
FLUBV RNA RESP QL NAA+PROBE: NEGATIVE
FLUBV RNA RESP QL NAA+PROBE: NEGATIVE
GFR SERPL CREATININE-BSD FRML MDRD: 53 ML/MIN/1.73SQ M
GFR SERPL CREATININE-BSD FRML MDRD: 54 ML/MIN/1.73SQ M
GFR SERPL CREATININE-BSD FRML MDRD: 57 ML/MIN/1.73SQ M
GFR SERPL CREATININE-BSD FRML MDRD: 58 ML/MIN/1.73SQ M
GFR SERPL CREATININE-BSD FRML MDRD: 58 ML/MIN/1.73SQ M
GFR SERPL CREATININE-BSD FRML MDRD: 60 ML/MIN/1.73SQ M
GFR SERPL CREATININE-BSD FRML MDRD: 62 ML/MIN/1.73SQ M
GFR SERPL CREATININE-BSD FRML MDRD: 64 ML/MIN/1.73SQ M
GFR SERPL CREATININE-BSD FRML MDRD: 65 ML/MIN/1.73SQ M
GLUCOSE SERPL-MCNC: 116 MG/DL (ref 65–140)
GLUCOSE SERPL-MCNC: 116 MG/DL (ref 65–140)
GLUCOSE SERPL-MCNC: 117 MG/DL (ref 65–140)
GLUCOSE SERPL-MCNC: 121 MG/DL (ref 65–140)
GLUCOSE SERPL-MCNC: 123 MG/DL (ref 65–140)
GLUCOSE SERPL-MCNC: 125 MG/DL (ref 65–140)
GLUCOSE SERPL-MCNC: 125 MG/DL (ref 65–140)
GLUCOSE SERPL-MCNC: 139 MG/DL (ref 65–140)
GLUCOSE SERPL-MCNC: 140 MG/DL (ref 65–140)
GLUCOSE SERPL-MCNC: 148 MG/DL (ref 65–140)
GLUCOSE SERPL-MCNC: 149 MG/DL (ref 65–140)
GLUCOSE SERPL-MCNC: 151 MG/DL (ref 65–140)
GLUCOSE SERPL-MCNC: 152 MG/DL (ref 65–140)
GLUCOSE SERPL-MCNC: 156 MG/DL (ref 65–140)
GLUCOSE SERPL-MCNC: 164 MG/DL (ref 65–140)
GLUCOSE SERPL-MCNC: 168 MG/DL (ref 65–140)
GLUCOSE SERPL-MCNC: 173 MG/DL (ref 65–140)
GLUCOSE SERPL-MCNC: 179 MG/DL (ref 65–140)
GLUCOSE SERPL-MCNC: 180 MG/DL (ref 65–140)
GLUCOSE SERPL-MCNC: 182 MG/DL (ref 65–140)
GLUCOSE SERPL-MCNC: 186 MG/DL (ref 65–140)
GLUCOSE SERPL-MCNC: 197 MG/DL (ref 65–140)
GLUCOSE SERPL-MCNC: 199 MG/DL (ref 65–140)
GLUCOSE SERPL-MCNC: 203 MG/DL (ref 65–140)
GLUCOSE SERPL-MCNC: 208 MG/DL (ref 65–140)
GLUCOSE SERPL-MCNC: 220 MG/DL (ref 65–140)
GLUCOSE SERPL-MCNC: 232 MG/DL (ref 65–140)
GLUCOSE SERPL-MCNC: 233 MG/DL (ref 65–140)
GLUCOSE SERPL-MCNC: 237 MG/DL (ref 65–140)
GLUCOSE SERPL-MCNC: 254 MG/DL (ref 65–140)
GLUCOSE SERPL-MCNC: 259 MG/DL (ref 65–140)
GLUCOSE SERPL-MCNC: 273 MG/DL (ref 65–140)
GLUCOSE SERPL-MCNC: 283 MG/DL (ref 65–140)
GLUCOSE SERPL-MCNC: 327 MG/DL (ref 65–140)
GLUCOSE SERPL-MCNC: 91 MG/DL (ref 65–140)
GLUCOSE SERPL-MCNC: 92 MG/DL (ref 65–140)
GLUCOSE SERPL-MCNC: 98 MG/DL (ref 65–140)
HCO3 BLDA-SCNC: 25 MMOL/L (ref 22–28)
HCO3 BLDA-SCNC: 33.4 MMOL/L (ref 22–28)
HCO3 BLDV-SCNC: 25.5 MMOL/L (ref 24–30)
HCO3 BLDV-SCNC: 30.7 MMOL/L (ref 24–30)
HCO3 BLDV-SCNC: 32 MMOL/L (ref 24–30)
HCO3 BLDV-SCNC: 38.9 MMOL/L (ref 24–30)
HCT VFR BLD AUTO: 34.9 % (ref 36.5–49.3)
HCT VFR BLD AUTO: 34.9 % (ref 36.5–49.3)
HCT VFR BLD AUTO: 35.9 % (ref 36.5–49.3)
HCT VFR BLD AUTO: 36.6 % (ref 36.5–49.3)
HCT VFR BLD AUTO: 36.9 % (ref 36.5–49.3)
HCT VFR BLD AUTO: 38.5 % (ref 36.5–49.3)
HCT VFR BLD AUTO: 38.6 % (ref 36.5–49.3)
HCT VFR BLD AUTO: 39.4 % (ref 36.5–49.3)
HCT VFR BLD AUTO: 41.7 % (ref 36.5–49.3)
HGB BLD-MCNC: 11.5 G/DL (ref 12–17)
HGB BLD-MCNC: 11.6 G/DL (ref 12–17)
HGB BLD-MCNC: 11.6 G/DL (ref 12–17)
HGB BLD-MCNC: 11.8 G/DL (ref 12–17)
HGB BLD-MCNC: 11.9 G/DL (ref 12–17)
HGB BLD-MCNC: 12.4 G/DL (ref 12–17)
HGB BLD-MCNC: 12.7 G/DL (ref 12–17)
HGB BLD-MCNC: 12.8 G/DL (ref 12–17)
HGB BLD-MCNC: 13.8 G/DL (ref 12–17)
IMM GRANULOCYTES # BLD AUTO: 0.07 THOUSAND/UL (ref 0–0.2)
IMM GRANULOCYTES # BLD AUTO: 0.07 THOUSAND/UL (ref 0–0.2)
IMM GRANULOCYTES # BLD AUTO: 0.12 THOUSAND/UL (ref 0–0.2)
IMM GRANULOCYTES # BLD AUTO: 0.15 THOUSAND/UL (ref 0–0.2)
IMM GRANULOCYTES # BLD AUTO: 0.24 THOUSAND/UL (ref 0–0.2)
IMM GRANULOCYTES NFR BLD AUTO: 1 % (ref 0–2)
IMM GRANULOCYTES NFR BLD AUTO: 2 % (ref 0–2)
INR PPP: 1.85 (ref 0.84–1.19)
INR PPP: 2.11 (ref 0.84–1.19)
INR PPP: 2.17 (ref 0.84–1.19)
INR PPP: 2.44 (ref 0.84–1.19)
INR PPP: 2.47 (ref 0.84–1.19)
INR PPP: 2.5 (ref 0.84–1.19)
INR PPP: 2.67 (ref 0.84–1.19)
INR PPP: 2.99 (ref 0.84–1.19)
IPAP: 16
LACTATE SERPL-SCNC: 0.8 MMOL/L (ref 0.5–2)
LACTATE SERPL-SCNC: 1.3 MMOL/L (ref 0.5–2)
LYMPHOCYTES # BLD AUTO: 0.93 THOUSANDS/ÂΜL (ref 0.6–4.47)
LYMPHOCYTES # BLD AUTO: 0.96 THOUSANDS/ÂΜL (ref 0.6–4.47)
LYMPHOCYTES # BLD AUTO: 1.01 THOUSANDS/ÂΜL (ref 0.6–4.47)
LYMPHOCYTES # BLD AUTO: 1.14 THOUSANDS/ÂΜL (ref 0.6–4.47)
LYMPHOCYTES # BLD AUTO: 2.29 THOUSANDS/ÂΜL (ref 0.6–4.47)
LYMPHOCYTES NFR BLD AUTO: 21 % (ref 14–44)
LYMPHOCYTES NFR BLD AUTO: 5 % (ref 14–44)
LYMPHOCYTES NFR BLD AUTO: 7 % (ref 14–44)
LYMPHOCYTES NFR BLD AUTO: 8 % (ref 14–44)
LYMPHOCYTES NFR BLD AUTO: 9 % (ref 14–44)
MACROCYTES BLD QL AUTO: PRESENT
MAGNESIUM SERPL-MCNC: 1.9 MG/DL (ref 1.9–2.7)
MAGNESIUM SERPL-MCNC: 1.9 MG/DL (ref 1.9–2.7)
MAGNESIUM SERPL-MCNC: 2 MG/DL (ref 1.9–2.7)
MAGNESIUM SERPL-MCNC: 2 MG/DL (ref 1.9–2.7)
MAGNESIUM SERPL-MCNC: 2.2 MG/DL (ref 1.9–2.7)
MCH RBC QN AUTO: 31.2 PG (ref 26.8–34.3)
MCH RBC QN AUTO: 31.3 PG (ref 26.8–34.3)
MCH RBC QN AUTO: 31.5 PG (ref 26.8–34.3)
MCH RBC QN AUTO: 31.8 PG (ref 26.8–34.3)
MCH RBC QN AUTO: 31.8 PG (ref 26.8–34.3)
MCH RBC QN AUTO: 31.9 PG (ref 26.8–34.3)
MCH RBC QN AUTO: 32 PG (ref 26.8–34.3)
MCH RBC QN AUTO: 32.2 PG (ref 26.8–34.3)
MCH RBC QN AUTO: 32.4 PG (ref 26.8–34.3)
MCHC RBC AUTO-ENTMCNC: 31.2 G/DL (ref 31.4–37.4)
MCHC RBC AUTO-ENTMCNC: 32.2 G/DL (ref 31.4–37.4)
MCHC RBC AUTO-ENTMCNC: 33.1 G/DL (ref 31.4–37.4)
MCHC RBC AUTO-ENTMCNC: 33.1 G/DL (ref 31.4–37.4)
MCHC RBC AUTO-ENTMCNC: 33.2 G/DL (ref 31.4–37.4)
MCV RBC AUTO: 101 FL (ref 82–98)
MCV RBC AUTO: 96 FL (ref 82–98)
MCV RBC AUTO: 97 FL (ref 82–98)
MCV RBC AUTO: 98 FL (ref 82–98)
MCV RBC AUTO: 99 FL (ref 82–98)
MONOCYTES # BLD AUTO: 0.95 THOUSAND/ÂΜL (ref 0.17–1.22)
MONOCYTES # BLD AUTO: 1.36 THOUSAND/ÂΜL (ref 0.17–1.22)
MONOCYTES # BLD AUTO: 1.4 THOUSAND/ÂΜL (ref 0.17–1.22)
MONOCYTES # BLD AUTO: 1.54 THOUSAND/ÂΜL (ref 0.17–1.22)
MONOCYTES # BLD AUTO: 2.2 THOUSAND/ÂΜL (ref 0.17–1.22)
MONOCYTES NFR BLD AUTO: 10 % (ref 4–12)
MONOCYTES NFR BLD AUTO: 11 % (ref 4–12)
MONOCYTES NFR BLD AUTO: 11 % (ref 4–12)
MONOCYTES NFR BLD AUTO: 12 % (ref 4–12)
MONOCYTES NFR BLD AUTO: 9 % (ref 4–12)
MRSA NOSE QL CULT: NORMAL
NASAL CANNULA: 8
NEUTROPHILS # BLD AUTO: 11.2 THOUSANDS/ÂΜL (ref 1.85–7.62)
NEUTROPHILS # BLD AUTO: 17.2 THOUSANDS/ÂΜL (ref 1.85–7.62)
NEUTROPHILS # BLD AUTO: 7.42 THOUSANDS/ÂΜL (ref 1.85–7.62)
NEUTROPHILS # BLD AUTO: 9.57 THOUSANDS/ÂΜL (ref 1.85–7.62)
NEUTROPHILS # BLD AUTO: 9.85 THOUSANDS/ÂΜL (ref 1.85–7.62)
NEUTS SEG NFR BLD AUTO: 66 % (ref 43–75)
NEUTS SEG NFR BLD AUTO: 76 % (ref 43–75)
NEUTS SEG NFR BLD AUTO: 79 % (ref 43–75)
NEUTS SEG NFR BLD AUTO: 82 % (ref 43–75)
NEUTS SEG NFR BLD AUTO: 83 % (ref 43–75)
NON VENT- BIPAP: ABNORMAL
NRBC BLD AUTO-RTO: 0 /100 WBCS
O2 CT BLDA-SCNC: 18.7 ML/DL (ref 16–23)
O2 CT BLDA-SCNC: 19 ML/DL (ref 16–23)
O2 CT BLDV-SCNC: 10 ML/DL
O2 CT BLDV-SCNC: 10.6 ML/DL
O2 CT BLDV-SCNC: 3.9 ML/DL
O2 CT BLDV-SCNC: 8.3 ML/DL
OXYHGB MFR BLDA: 96.6 % (ref 94–97)
OXYHGB MFR BLDA: 98.3 % (ref 94–97)
P AXIS: 72 DEGREES
P AXIS: 73 DEGREES
PCO2 BLDA: 51 MM HG (ref 36–44)
PCO2 BLDA: 63.7 MM HG (ref 36–44)
PCO2 BLDV: 57.1 MM HG (ref 42–50)
PCO2 BLDV: 69.8 MM HG (ref 42–50)
PCO2 BLDV: 71.9 MM HG (ref 42–50)
PCO2 BLDV: 91.3 MM HG (ref 42–50)
PCO2 TEMP ADJ BLDA: 49.5 MM HG (ref 36–44)
PCO2 TEMP ADJ BLDA: 56.4 MM HG (ref 36–44)
PEEP MAX SETTING VENT: 10 CM[H2O]
PH BLD: 7.32 [PH] (ref 7.35–7.45)
PH BLD: 7.38 [PH] (ref 7.35–7.45)
PH BLDA: 7.31 [PH] (ref 7.35–7.45)
PH BLDA: 7.34 [PH] (ref 7.35–7.45)
PH BLDV: 7.18 [PH] (ref 7.3–7.4)
PH BLDV: 7.25 [PH] (ref 7.3–7.4)
PH BLDV: 7.25 [PH] (ref 7.3–7.4)
PH BLDV: 7.37 [PH] (ref 7.3–7.4)
PLATELET # BLD AUTO: 159 THOUSANDS/UL (ref 149–390)
PLATELET # BLD AUTO: 181 THOUSANDS/UL (ref 149–390)
PLATELET # BLD AUTO: 225 THOUSANDS/UL (ref 149–390)
PLATELET # BLD AUTO: 235 THOUSANDS/UL (ref 149–390)
PLATELET # BLD AUTO: 245 THOUSANDS/UL (ref 149–390)
PLATELET # BLD AUTO: 261 THOUSANDS/UL (ref 149–390)
PLATELET # BLD AUTO: 263 THOUSANDS/UL (ref 149–390)
PLATELET # BLD AUTO: 276 THOUSANDS/UL (ref 149–390)
PLATELET BLD QL SMEAR: ABNORMAL
PMV BLD AUTO: 10.1 FL (ref 8.9–12.7)
PMV BLD AUTO: 10.2 FL (ref 8.9–12.7)
PMV BLD AUTO: 10.3 FL (ref 8.9–12.7)
PMV BLD AUTO: 10.3 FL (ref 8.9–12.7)
PMV BLD AUTO: 10.4 FL (ref 8.9–12.7)
PMV BLD AUTO: 9.7 FL (ref 8.9–12.7)
PO2 BLD: 118.4 MM HG (ref 75–129)
PO2 BLD: 89.8 MM HG (ref 75–129)
PO2 BLDA: 134.6 MM HG (ref 75–129)
PO2 BLDA: 93.8 MM HG (ref 75–129)
PO2 BLDV: 18.3 MM HG (ref 35–45)
PO2 BLDV: 24.8 MM HG (ref 35–45)
PO2 BLDV: 31.8 MM HG (ref 35–45)
PO2 BLDV: 32.1 MM HG (ref 35–45)
POTASSIUM SERPL-SCNC: 3.6 MMOL/L (ref 3.5–5.3)
POTASSIUM SERPL-SCNC: 3.6 MMOL/L (ref 3.5–5.3)
POTASSIUM SERPL-SCNC: 4 MMOL/L (ref 3.5–5.3)
POTASSIUM SERPL-SCNC: 4 MMOL/L (ref 3.5–5.3)
POTASSIUM SERPL-SCNC: 4.1 MMOL/L (ref 3.5–5.3)
POTASSIUM SERPL-SCNC: 4.1 MMOL/L (ref 3.5–5.3)
POTASSIUM SERPL-SCNC: 4.2 MMOL/L (ref 3.5–5.3)
POTASSIUM SERPL-SCNC: 4.2 MMOL/L (ref 3.5–5.3)
POTASSIUM SERPL-SCNC: 4.3 MMOL/L (ref 3.5–5.3)
PR INTERVAL: 170 MS
PR INTERVAL: 196 MS
PROCALCITONIN SERPL-MCNC: 0.08 NG/ML
PROCALCITONIN SERPL-MCNC: 0.13 NG/ML
PROT SERPL-MCNC: 5.7 G/DL (ref 6.4–8.4)
PROT SERPL-MCNC: 7.3 G/DL (ref 6.4–8.4)
PROTHROMBIN TIME: 21.5 SECONDS (ref 11.6–14.5)
PROTHROMBIN TIME: 23.8 SECONDS (ref 11.6–14.5)
PROTHROMBIN TIME: 24.4 SECONDS (ref 11.6–14.5)
PROTHROMBIN TIME: 26.6 SECONDS (ref 11.6–14.5)
PROTHROMBIN TIME: 26.9 SECONDS (ref 11.6–14.5)
PROTHROMBIN TIME: 27.1 SECONDS (ref 11.6–14.5)
PROTHROMBIN TIME: 28.6 SECONDS (ref 11.6–14.5)
PROTHROMBIN TIME: 31.1 SECONDS (ref 11.6–14.5)
QRS AXIS: -50 DEGREES
QRS AXIS: -68 DEGREES
QRSD INTERVAL: 68 MS
QRSD INTERVAL: 80 MS
QT INTERVAL: 296 MS
QT INTERVAL: 306 MS
QTC INTERVAL: 402 MS
QTC INTERVAL: 406 MS
RBC # BLD AUTO: 3.58 MILLION/UL (ref 3.88–5.62)
RBC # BLD AUTO: 3.63 MILLION/UL (ref 3.88–5.62)
RBC # BLD AUTO: 3.65 MILLION/UL (ref 3.88–5.62)
RBC # BLD AUTO: 3.7 MILLION/UL (ref 3.88–5.62)
RBC # BLD AUTO: 3.78 MILLION/UL (ref 3.88–5.62)
RBC # BLD AUTO: 3.96 MILLION/UL (ref 3.88–5.62)
RBC # BLD AUTO: 3.99 MILLION/UL (ref 3.88–5.62)
RBC # BLD AUTO: 4.02 MILLION/UL (ref 3.88–5.62)
RBC # BLD AUTO: 4.33 MILLION/UL (ref 3.88–5.62)
RBC MORPH BLD: PRESENT
RSV RNA RESP QL NAA+PROBE: NEGATIVE
RSV RNA RESP QL NAA+PROBE: NEGATIVE
SARS-COV-2 RNA RESP QL NAA+PROBE: NEGATIVE
SARS-COV-2 RNA RESP QL NAA+PROBE: NEGATIVE
SODIUM SERPL-SCNC: 136 MMOL/L (ref 135–147)
SODIUM SERPL-SCNC: 136 MMOL/L (ref 135–147)
SODIUM SERPL-SCNC: 139 MMOL/L (ref 135–147)
SODIUM SERPL-SCNC: 139 MMOL/L (ref 135–147)
SODIUM SERPL-SCNC: 140 MMOL/L (ref 135–147)
SODIUM SERPL-SCNC: 142 MMOL/L (ref 135–147)
SPECIMEN SOURCE: ABNORMAL
SPECIMEN SOURCE: ABNORMAL
T WAVE AXIS: 100 DEGREES
T WAVE AXIS: 109 DEGREES
TOXIC GRANULES BLD QL SMEAR: PRESENT
VENT BIPAP FIO2: 40 %
VENTRICULAR RATE: 106 BPM
VENTRICULAR RATE: 111 BPM
WBC # BLD AUTO: 11 THOUSAND/UL (ref 4.31–10.16)
WBC # BLD AUTO: 12.31 THOUSAND/UL (ref 4.31–10.16)
WBC # BLD AUTO: 12.4 THOUSAND/UL (ref 4.31–10.16)
WBC # BLD AUTO: 12.6 THOUSAND/UL (ref 4.31–10.16)
WBC # BLD AUTO: 13.72 THOUSAND/UL (ref 4.31–10.16)
WBC # BLD AUTO: 20.56 THOUSAND/UL (ref 4.31–10.16)
WBC # BLD AUTO: 7.97 THOUSAND/UL (ref 4.31–10.16)
WBC # BLD AUTO: 8.92 THOUSAND/UL (ref 4.31–10.16)
WBC # BLD AUTO: 9.92 THOUSAND/UL (ref 4.31–10.16)

## 2024-01-01 PROCEDURE — 94640 AIRWAY INHALATION TREATMENT: CPT

## 2024-01-01 PROCEDURE — 80048 BASIC METABOLIC PNL TOTAL CA: CPT | Performed by: INTERNAL MEDICINE

## 2024-01-01 PROCEDURE — 83735 ASSAY OF MAGNESIUM: CPT | Performed by: INTERNAL MEDICINE

## 2024-01-01 PROCEDURE — 96374 THER/PROPH/DIAG INJ IV PUSH: CPT

## 2024-01-01 PROCEDURE — 94760 N-INVAS EAR/PLS OXIMETRY 1: CPT

## 2024-01-01 PROCEDURE — 85610 PROTHROMBIN TIME: CPT | Performed by: INTERNAL MEDICINE

## 2024-01-01 PROCEDURE — 97167 OT EVAL HIGH COMPLEX 60 MIN: CPT

## 2024-01-01 PROCEDURE — 97535 SELF CARE MNGMENT TRAINING: CPT

## 2024-01-01 PROCEDURE — 97530 THERAPEUTIC ACTIVITIES: CPT

## 2024-01-01 PROCEDURE — 99233 SBSQ HOSP IP/OBS HIGH 50: CPT | Performed by: INTERNAL MEDICINE

## 2024-01-01 PROCEDURE — 99232 SBSQ HOSP IP/OBS MODERATE 35: CPT | Performed by: INTERNAL MEDICINE

## 2024-01-01 PROCEDURE — 0241U HB NFCT DS VIR RESP RNA 4 TRGT: CPT | Performed by: EMERGENCY MEDICINE

## 2024-01-01 PROCEDURE — 82948 REAGENT STRIP/BLOOD GLUCOSE: CPT

## 2024-01-01 PROCEDURE — 82805 BLOOD GASES W/O2 SATURATION: CPT | Performed by: INTERNAL MEDICINE

## 2024-01-01 PROCEDURE — 80053 COMPREHEN METABOLIC PANEL: CPT | Performed by: EMERGENCY MEDICINE

## 2024-01-01 PROCEDURE — 83605 ASSAY OF LACTIC ACID: CPT | Performed by: EMERGENCY MEDICINE

## 2024-01-01 PROCEDURE — 99291 CRITICAL CARE FIRST HOUR: CPT | Performed by: EMERGENCY MEDICINE

## 2024-01-01 PROCEDURE — 87040 BLOOD CULTURE FOR BACTERIA: CPT | Performed by: EMERGENCY MEDICINE

## 2024-01-01 PROCEDURE — 36600 WITHDRAWAL OF ARTERIAL BLOOD: CPT

## 2024-01-01 PROCEDURE — 82805 BLOOD GASES W/O2 SATURATION: CPT | Performed by: PHYSICIAN ASSISTANT

## 2024-01-01 PROCEDURE — 82805 BLOOD GASES W/O2 SATURATION: CPT | Performed by: NURSE PRACTITIONER

## 2024-01-01 PROCEDURE — 94660 CPAP INITIATION&MGMT: CPT

## 2024-01-01 PROCEDURE — 84145 PROCALCITONIN (PCT): CPT | Performed by: EMERGENCY MEDICINE

## 2024-01-01 PROCEDURE — 93005 ELECTROCARDIOGRAM TRACING: CPT

## 2024-01-01 PROCEDURE — 85025 COMPLETE CBC W/AUTO DIFF WBC: CPT | Performed by: EMERGENCY MEDICINE

## 2024-01-01 PROCEDURE — 99284 EMERGENCY DEPT VISIT MOD MDM: CPT | Performed by: INTERNAL MEDICINE

## 2024-01-01 PROCEDURE — 85027 COMPLETE CBC AUTOMATED: CPT | Performed by: INTERNAL MEDICINE

## 2024-01-01 PROCEDURE — 85025 COMPLETE CBC W/AUTO DIFF WBC: CPT | Performed by: INTERNAL MEDICINE

## 2024-01-01 PROCEDURE — 92610 EVALUATE SWALLOWING FUNCTION: CPT

## 2024-01-01 PROCEDURE — NC001 PR NO CHARGE: Performed by: PHYSICIAN ASSISTANT

## 2024-01-01 PROCEDURE — 94002 VENT MGMT INPAT INIT DAY: CPT

## 2024-01-01 PROCEDURE — 71045 X-RAY EXAM CHEST 1 VIEW: CPT

## 2024-01-01 PROCEDURE — 99285 EMERGENCY DEPT VISIT HI MDM: CPT

## 2024-01-01 PROCEDURE — 94003 VENT MGMT INPAT SUBQ DAY: CPT

## 2024-01-01 PROCEDURE — 74230 X-RAY XM SWLNG FUNCJ C+: CPT

## 2024-01-01 PROCEDURE — 99232 SBSQ HOSP IP/OBS MODERATE 35: CPT | Performed by: STUDENT IN AN ORGANIZED HEALTH CARE EDUCATION/TRAINING PROGRAM

## 2024-01-01 PROCEDURE — 97163 PT EVAL HIGH COMPLEX 45 MIN: CPT

## 2024-01-01 PROCEDURE — 92611 MOTION FLUOROSCOPY/SWALLOW: CPT | Performed by: SPEECH-LANGUAGE PATHOLOGIST

## 2024-01-01 PROCEDURE — 97116 GAIT TRAINING THERAPY: CPT

## 2024-01-01 PROCEDURE — 92526 ORAL FUNCTION THERAPY: CPT | Performed by: SPEECH-LANGUAGE PATHOLOGIST

## 2024-01-01 PROCEDURE — 99223 1ST HOSP IP/OBS HIGH 75: CPT | Performed by: ANESTHESIOLOGY

## 2024-01-01 PROCEDURE — 87081 CULTURE SCREEN ONLY: CPT | Performed by: ANESTHESIOLOGY

## 2024-01-01 PROCEDURE — 84484 ASSAY OF TROPONIN QUANT: CPT | Performed by: EMERGENCY MEDICINE

## 2024-01-01 PROCEDURE — 5A0935A ASSISTANCE WITH RESPIRATORY VENTILATION, LESS THAN 24 CONSECUTIVE HOURS, HIGH NASAL FLOW/VELOCITY: ICD-10-PCS | Performed by: ANESTHESIOLOGY

## 2024-01-01 PROCEDURE — 96365 THER/PROPH/DIAG IV INF INIT: CPT

## 2024-01-01 PROCEDURE — 99291 CRITICAL CARE FIRST HOUR: CPT

## 2024-01-01 PROCEDURE — 82805 BLOOD GASES W/O2 SATURATION: CPT | Performed by: EMERGENCY MEDICINE

## 2024-01-01 PROCEDURE — 99238 HOSP IP/OBS DSCHRG MGMT 30/<: CPT | Performed by: FAMILY MEDICINE

## 2024-01-01 PROCEDURE — 99239 HOSP IP/OBS DSCHRG MGMT >30: CPT | Performed by: INTERNAL MEDICINE

## 2024-01-01 PROCEDURE — 96361 HYDRATE IV INFUSION ADD-ON: CPT

## 2024-01-01 PROCEDURE — 5A09357 ASSISTANCE WITH RESPIRATORY VENTILATION, LESS THAN 24 CONSECUTIVE HOURS, CONTINUOUS POSITIVE AIRWAY PRESSURE: ICD-10-PCS | Performed by: ANESTHESIOLOGY

## 2024-01-01 PROCEDURE — 83735 ASSAY OF MAGNESIUM: CPT | Performed by: EMERGENCY MEDICINE

## 2024-01-01 PROCEDURE — 36415 COLL VENOUS BLD VENIPUNCTURE: CPT | Performed by: EMERGENCY MEDICINE

## 2024-01-01 RX ORDER — CARVEDILOL 6.25 MG/1
6.25 TABLET ORAL 2 TIMES DAILY
Status: DISCONTINUED | OUTPATIENT
Start: 2024-01-01 | End: 2024-01-01

## 2024-01-01 RX ORDER — WARFARIN SODIUM 2 MG/1
2 TABLET ORAL
Start: 2024-01-01 | End: 2024-01-01

## 2024-01-01 RX ORDER — SODIUM CHLORIDE FOR INHALATION 0.9 %
12 VIAL, NEBULIZER (ML) INHALATION ONCE
Status: COMPLETED | OUTPATIENT
Start: 2024-01-01 | End: 2024-01-01

## 2024-01-01 RX ORDER — LORAZEPAM 2 MG/ML
0.5 INJECTION INTRAMUSCULAR EVERY 4 HOURS PRN
Status: DISCONTINUED | OUTPATIENT
Start: 2024-01-01 | End: 2024-01-01 | Stop reason: HOSPADM

## 2024-01-01 RX ORDER — TAMSULOSIN HYDROCHLORIDE 0.4 MG/1
0.4 CAPSULE ORAL
Status: DISCONTINUED | OUTPATIENT
Start: 2024-01-01 | End: 2024-01-01 | Stop reason: HOSPADM

## 2024-01-01 RX ORDER — LISINOPRIL 10 MG/1
10 TABLET ORAL DAILY
Status: DISCONTINUED | OUTPATIENT
Start: 2024-01-01 | End: 2024-01-01 | Stop reason: HOSPADM

## 2024-01-01 RX ORDER — FAMOTIDINE 20 MG/1
20 TABLET, FILM COATED ORAL
Start: 2024-01-01 | End: 2024-01-01

## 2024-01-01 RX ORDER — TAMSULOSIN HYDROCHLORIDE 0.4 MG/1
0.4 CAPSULE ORAL
Status: DISCONTINUED | OUTPATIENT
Start: 2024-01-01 | End: 2024-01-01

## 2024-01-01 RX ORDER — WARFARIN SODIUM 1 MG/1
1 TABLET ORAL
Status: DISCONTINUED | OUTPATIENT
Start: 2024-01-01 | End: 2024-01-01

## 2024-01-01 RX ORDER — PRAVASTATIN SODIUM 80 MG/1
80 TABLET ORAL
Status: DISCONTINUED | OUTPATIENT
Start: 2024-01-01 | End: 2024-01-01 | Stop reason: HOSPADM

## 2024-01-01 RX ORDER — PRAVASTATIN SODIUM 80 MG/1
80 TABLET ORAL
Status: DISCONTINUED | OUTPATIENT
Start: 2024-01-01 | End: 2024-01-01

## 2024-01-01 RX ORDER — GUAIFENESIN 600 MG/1
1200 TABLET, EXTENDED RELEASE ORAL EVERY 12 HOURS SCHEDULED
COMMUNITY
End: 2024-01-01

## 2024-01-01 RX ORDER — QUETIAPINE FUMARATE 25 MG/1
25 TABLET, FILM COATED ORAL
Start: 2024-01-01 | End: 2024-01-01

## 2024-01-01 RX ORDER — PREDNISONE 20 MG/1
40 TABLET ORAL DAILY
Status: COMPLETED | OUTPATIENT
Start: 2024-01-01 | End: 2024-01-01

## 2024-01-01 RX ORDER — DIGOXIN 125 MCG
125 TABLET ORAL DAILY
Status: DISCONTINUED | OUTPATIENT
Start: 2024-01-01 | End: 2024-01-01 | Stop reason: HOSPADM

## 2024-01-01 RX ORDER — FORMOTEROL FUMARATE DIHYDRATE 20 UG/2ML
20 SOLUTION RESPIRATORY (INHALATION)
Status: DISCONTINUED | OUTPATIENT
Start: 2024-01-01 | End: 2024-01-01 | Stop reason: HOSPADM

## 2024-01-01 RX ORDER — FAMOTIDINE 10 MG/ML
20 INJECTION, SOLUTION INTRAVENOUS
Status: DISCONTINUED | OUTPATIENT
Start: 2024-01-01 | End: 2024-01-01

## 2024-01-01 RX ORDER — PANTOPRAZOLE SODIUM 40 MG/1
40 TABLET, DELAYED RELEASE ORAL DAILY
Status: DISCONTINUED | OUTPATIENT
Start: 2024-01-01 | End: 2024-01-01 | Stop reason: HOSPADM

## 2024-01-01 RX ORDER — CARVEDILOL 6.25 MG/1
6.25 TABLET ORAL 2 TIMES DAILY
Status: DISCONTINUED | OUTPATIENT
Start: 2024-01-01 | End: 2024-01-01 | Stop reason: HOSPADM

## 2024-01-01 RX ORDER — INSULIN LISPRO 100 [IU]/ML
1-5 INJECTION, SOLUTION INTRAVENOUS; SUBCUTANEOUS
Status: DISCONTINUED | OUTPATIENT
Start: 2024-01-01 | End: 2024-01-01 | Stop reason: HOSPADM

## 2024-01-01 RX ORDER — OLANZAPINE 10 MG/2ML
2.5 INJECTION, POWDER, FOR SOLUTION INTRAMUSCULAR ONCE
Status: COMPLETED | OUTPATIENT
Start: 2024-01-01 | End: 2024-01-01

## 2024-01-01 RX ORDER — DIGOXIN 125 MCG
125 TABLET ORAL DAILY
Status: DISCONTINUED | OUTPATIENT
Start: 2024-01-01 | End: 2024-01-01

## 2024-01-01 RX ORDER — CHLORHEXIDINE GLUCONATE ORAL RINSE 1.2 MG/ML
15 SOLUTION DENTAL EVERY 12 HOURS SCHEDULED
Status: DISCONTINUED | OUTPATIENT
Start: 2024-01-01 | End: 2024-01-01

## 2024-01-01 RX ORDER — LORAZEPAM 2 MG/ML
1 INJECTION INTRAMUSCULAR
Status: DISCONTINUED | OUTPATIENT
Start: 2024-01-01 | End: 2024-01-01 | Stop reason: HOSPADM

## 2024-01-01 RX ORDER — MEMANTINE HYDROCHLORIDE 10 MG/1
10 TABLET ORAL 2 TIMES DAILY
Status: DISCONTINUED | OUTPATIENT
Start: 2024-01-01 | End: 2024-01-01 | Stop reason: HOSPADM

## 2024-01-01 RX ORDER — LEVALBUTEROL INHALATION SOLUTION 1.25 MG/3ML
1.25 SOLUTION RESPIRATORY (INHALATION)
Status: DISCONTINUED | OUTPATIENT
Start: 2024-01-01 | End: 2024-01-01 | Stop reason: HOSPADM

## 2024-01-01 RX ORDER — FUROSEMIDE 40 MG/1
40 TABLET ORAL 3 TIMES WEEKLY
Status: DISCONTINUED | OUTPATIENT
Start: 2024-01-01 | End: 2024-01-01 | Stop reason: HOSPADM

## 2024-01-01 RX ORDER — LANOLIN ALCOHOL/MO/W.PET/CERES
3 CREAM (GRAM) TOPICAL
Status: DISCONTINUED | OUTPATIENT
Start: 2024-01-01 | End: 2024-01-01 | Stop reason: HOSPADM

## 2024-01-01 RX ORDER — QUETIAPINE FUMARATE 25 MG/1
25 TABLET, FILM COATED ORAL
Status: DISCONTINUED | OUTPATIENT
Start: 2024-01-01 | End: 2024-01-01

## 2024-01-01 RX ORDER — INSULIN GLARGINE 100 [IU]/ML
8 INJECTION, SOLUTION SUBCUTANEOUS
Status: DISCONTINUED | OUTPATIENT
Start: 2024-01-01 | End: 2024-01-01 | Stop reason: HOSPADM

## 2024-01-01 RX ORDER — CEFEPIME HYDROCHLORIDE 2 G/50ML
2000 INJECTION, SOLUTION INTRAVENOUS EVERY 12 HOURS
Status: DISCONTINUED | OUTPATIENT
Start: 2024-01-01 | End: 2024-01-01

## 2024-01-01 RX ORDER — WARFARIN SODIUM 2 MG/1
2 TABLET ORAL
Status: DISCONTINUED | OUTPATIENT
Start: 2024-01-01 | End: 2024-01-01

## 2024-01-01 RX ORDER — BISACODYL 10 MG
10 SUPPOSITORY, RECTAL RECTAL DAILY PRN
Status: DISCONTINUED | OUTPATIENT
Start: 2024-01-01 | End: 2024-01-01 | Stop reason: HOSPADM

## 2024-01-01 RX ORDER — ATORVASTATIN CALCIUM 10 MG/1
20 TABLET, FILM COATED ORAL DAILY
Status: DISCONTINUED | OUTPATIENT
Start: 2024-01-01 | End: 2024-01-01

## 2024-01-01 RX ORDER — OSELTAMIVIR PHOSPHATE 30 MG/1
30 CAPSULE ORAL EVERY 12 HOURS SCHEDULED
Status: COMPLETED | OUTPATIENT
Start: 2024-01-01 | End: 2024-01-01

## 2024-01-01 RX ORDER — METHYLPREDNISOLONE SODIUM SUCCINATE 125 MG/2ML
60 INJECTION, POWDER, LYOPHILIZED, FOR SOLUTION INTRAMUSCULAR; INTRAVENOUS ONCE
Status: COMPLETED | OUTPATIENT
Start: 2024-01-01 | End: 2024-01-01

## 2024-01-01 RX ORDER — IPRATROPIUM BROMIDE AND ALBUTEROL SULFATE 2.5; .5 MG/3ML; MG/3ML
3 SOLUTION RESPIRATORY (INHALATION)
Status: DISCONTINUED | OUTPATIENT
Start: 2024-01-01 | End: 2024-01-01

## 2024-01-01 RX ORDER — PREDNISONE 20 MG/1
20 TABLET ORAL DAILY
Status: COMPLETED | OUTPATIENT
Start: 2024-01-01 | End: 2024-01-01

## 2024-01-01 RX ORDER — ENOXAPARIN SODIUM 100 MG/ML
40 INJECTION SUBCUTANEOUS DAILY
Status: DISCONTINUED | OUTPATIENT
Start: 2024-01-01 | End: 2024-01-01

## 2024-01-01 RX ORDER — PANTOPRAZOLE SODIUM 40 MG/1
40 TABLET, DELAYED RELEASE ORAL
Status: DISCONTINUED | OUTPATIENT
Start: 2024-01-01 | End: 2024-01-01

## 2024-01-01 RX ORDER — METHYLPREDNISOLONE SODIUM SUCCINATE 40 MG/ML
40 INJECTION, POWDER, LYOPHILIZED, FOR SOLUTION INTRAMUSCULAR; INTRAVENOUS EVERY 8 HOURS SCHEDULED
Status: DISCONTINUED | OUTPATIENT
Start: 2024-01-01 | End: 2024-01-01

## 2024-01-01 RX ORDER — PREDNISONE 10 MG/1
10 TABLET ORAL DAILY
Start: 2024-01-01 | End: 2024-01-01

## 2024-01-01 RX ORDER — PREDNISONE 10 MG/1
10 TABLET ORAL DAILY
Status: DISCONTINUED | OUTPATIENT
Start: 2024-01-01 | End: 2024-01-01 | Stop reason: HOSPADM

## 2024-01-01 RX ORDER — GLYCOPYRROLATE 0.2 MG/ML
0.1 INJECTION INTRAMUSCULAR; INTRAVENOUS EVERY 4 HOURS PRN
Status: DISCONTINUED | OUTPATIENT
Start: 2024-01-01 | End: 2024-01-01 | Stop reason: HOSPADM

## 2024-01-01 RX ORDER — WARFARIN SODIUM 2 MG/1
2 TABLET ORAL
Status: DISCONTINUED | OUTPATIENT
Start: 2024-01-01 | End: 2024-01-01 | Stop reason: HOSPADM

## 2024-01-01 RX ORDER — ACETAMINOPHEN 325 MG/1
650 TABLET ORAL EVERY 6 HOURS PRN
Status: DISCONTINUED | OUTPATIENT
Start: 2024-01-01 | End: 2024-01-01 | Stop reason: HOSPADM

## 2024-01-01 RX ORDER — WARFARIN SODIUM 3 MG/1
3 TABLET ORAL
Status: DISCONTINUED | OUTPATIENT
Start: 2024-01-01 | End: 2024-01-01

## 2024-01-01 RX ORDER — ONDANSETRON 2 MG/ML
4 INJECTION INTRAMUSCULAR; INTRAVENOUS EVERY 6 HOURS PRN
Status: DISCONTINUED | OUTPATIENT
Start: 2024-01-01 | End: 2024-01-01 | Stop reason: HOSPADM

## 2024-01-01 RX ORDER — SODIUM CHLORIDE 9 MG/ML
75 INJECTION, SOLUTION INTRAVENOUS CONTINUOUS
Status: DISCONTINUED | OUTPATIENT
Start: 2024-01-01 | End: 2024-01-01

## 2024-01-01 RX ORDER — ATORVASTATIN CALCIUM 20 MG/1
20 TABLET, FILM COATED ORAL DAILY
COMMUNITY
End: 2024-01-01

## 2024-01-01 RX ORDER — MAGNESIUM SULFATE HEPTAHYDRATE 40 MG/ML
2 INJECTION, SOLUTION INTRAVENOUS ONCE
Status: COMPLETED | OUTPATIENT
Start: 2024-01-01 | End: 2024-01-01

## 2024-01-01 RX ORDER — LORAZEPAM 2 MG/ML
0.5 INJECTION INTRAMUSCULAR ONCE
Status: COMPLETED | OUTPATIENT
Start: 2024-01-01 | End: 2024-01-01

## 2024-01-01 RX ORDER — BUDESONIDE 0.5 MG/2ML
0.5 INHALANT ORAL
Status: DISCONTINUED | OUTPATIENT
Start: 2024-01-01 | End: 2024-01-01 | Stop reason: HOSPADM

## 2024-01-01 RX ORDER — FAMOTIDINE 20 MG/1
20 TABLET, FILM COATED ORAL
Status: DISCONTINUED | OUTPATIENT
Start: 2024-01-01 | End: 2024-01-01 | Stop reason: HOSPADM

## 2024-01-01 RX ORDER — OLANZAPINE 10 MG/2ML
5 INJECTION, POWDER, FOR SOLUTION INTRAMUSCULAR EVERY 6 HOURS PRN
Status: DISCONTINUED | OUTPATIENT
Start: 2024-01-01 | End: 2024-01-01 | Stop reason: HOSPADM

## 2024-01-01 RX ORDER — QUETIAPINE FUMARATE 25 MG/1
25 TABLET, FILM COATED ORAL
Status: DISCONTINUED | OUTPATIENT
Start: 2024-01-01 | End: 2024-01-01 | Stop reason: HOSPADM

## 2024-01-01 RX ADMIN — MORPHINE SULFATE 2 MG: 2 INJECTION, SOLUTION INTRAMUSCULAR; INTRAVENOUS at 14:07

## 2024-01-01 RX ADMIN — INSULIN LISPRO 1 UNITS: 100 INJECTION, SOLUTION INTRAVENOUS; SUBCUTANEOUS at 21:33

## 2024-01-01 RX ADMIN — PRAVASTATIN SODIUM 80 MG: 80 TABLET ORAL at 18:00

## 2024-01-01 RX ADMIN — OLANZAPINE 2.5 MG: 10 INJECTION, POWDER, FOR SOLUTION INTRAMUSCULAR at 22:40

## 2024-01-01 RX ADMIN — TAMSULOSIN HYDROCHLORIDE 0.4 MG: 0.4 CAPSULE ORAL at 17:39

## 2024-01-01 RX ADMIN — FORMOTEROL FUMARATE DIHYDRATE 20 MCG: 20 SOLUTION RESPIRATORY (INHALATION) at 20:10

## 2024-01-01 RX ADMIN — CARVEDILOL 6.25 MG: 6.25 TABLET, FILM COATED ORAL at 17:48

## 2024-01-01 RX ADMIN — IPRATROPIUM BROMIDE 0.5 MG: 0.5 SOLUTION RESPIRATORY (INHALATION) at 19:37

## 2024-01-01 RX ADMIN — DIGOXIN 125 MCG: 125 TABLET ORAL at 09:47

## 2024-01-01 RX ADMIN — TAMSULOSIN HYDROCHLORIDE 0.4 MG: 0.4 CAPSULE ORAL at 18:00

## 2024-01-01 RX ADMIN — LISINOPRIL 10 MG: 10 TABLET ORAL at 09:47

## 2024-01-01 RX ADMIN — LEVALBUTEROL HYDROCHLORIDE 1.25 MG: 1.25 SOLUTION RESPIRATORY (INHALATION) at 13:34

## 2024-01-01 RX ADMIN — IPRATROPIUM BROMIDE 0.5 MG: 0.5 SOLUTION RESPIRATORY (INHALATION) at 13:25

## 2024-01-01 RX ADMIN — VANCOMYCIN HYDROCHLORIDE 1500 MG: 10 INJECTION, POWDER, LYOPHILIZED, FOR SOLUTION INTRAVENOUS at 08:34

## 2024-01-01 RX ADMIN — IPRATROPIUM BROMIDE 0.5 MG: 0.5 SOLUTION RESPIRATORY (INHALATION) at 20:02

## 2024-01-01 RX ADMIN — LEVALBUTEROL HYDROCHLORIDE 1.25 MG: 1.25 SOLUTION RESPIRATORY (INHALATION) at 19:31

## 2024-01-01 RX ADMIN — FORMOTEROL FUMARATE DIHYDRATE 20 MCG: 20 SOLUTION RESPIRATORY (INHALATION) at 07:25

## 2024-01-01 RX ADMIN — LEVALBUTEROL HYDROCHLORIDE 1.25 MG: 1.25 SOLUTION RESPIRATORY (INHALATION) at 20:43

## 2024-01-01 RX ADMIN — FAMOTIDINE 20 MG: 10 INJECTION INTRAVENOUS at 21:26

## 2024-01-01 RX ADMIN — MEMANTINE 10 MG: 10 TABLET ORAL at 11:09

## 2024-01-01 RX ADMIN — LISINOPRIL 10 MG: 10 TABLET ORAL at 09:38

## 2024-01-01 RX ADMIN — DIGOXIN 125 MCG: 125 TABLET ORAL at 11:09

## 2024-01-01 RX ADMIN — WARFARIN SODIUM 2 MG: 2 TABLET ORAL at 17:39

## 2024-01-01 RX ADMIN — IPRATROPIUM BROMIDE 0.5 MG: 0.5 SOLUTION RESPIRATORY (INHALATION) at 07:31

## 2024-01-01 RX ADMIN — IPRATROPIUM BROMIDE 0.5 MG: 0.5 SOLUTION RESPIRATORY (INHALATION) at 19:31

## 2024-01-01 RX ADMIN — MEMANTINE 10 MG: 10 TABLET ORAL at 17:39

## 2024-01-01 RX ADMIN — BUDESONIDE INHALATION 0.5 MG: 0.5 SUSPENSION RESPIRATORY (INHALATION) at 20:02

## 2024-01-01 RX ADMIN — FUROSEMIDE 40 MG: 40 TABLET ORAL at 09:46

## 2024-01-01 RX ADMIN — MAGNESIUM SULFATE HEPTAHYDRATE 2 G: 40 INJECTION, SOLUTION INTRAVENOUS at 09:57

## 2024-01-01 RX ADMIN — MORPHINE SULFATE 2 MG: 2 INJECTION, SOLUTION INTRAMUSCULAR; INTRAVENOUS at 00:12

## 2024-01-01 RX ADMIN — SODIUM CHLORIDE 75 ML/HR: 0.9 INJECTION, SOLUTION INTRAVENOUS at 11:11

## 2024-01-01 RX ADMIN — OSELTAMAVIR PHOSPHATE 30 MG: 30 CAPSULE ORAL at 09:39

## 2024-01-01 RX ADMIN — MEMANTINE 10 MG: 10 TABLET ORAL at 09:47

## 2024-01-01 RX ADMIN — FORMOTEROL FUMARATE DIHYDRATE 20 MCG: 20 SOLUTION RESPIRATORY (INHALATION) at 07:19

## 2024-01-01 RX ADMIN — ALBUTEROL SULFATE 10 MG: 2.5 SOLUTION RESPIRATORY (INHALATION) at 10:47

## 2024-01-01 RX ADMIN — INSULIN LISPRO 1 UNITS: 100 INJECTION, SOLUTION INTRAVENOUS; SUBCUTANEOUS at 09:42

## 2024-01-01 RX ADMIN — OSELTAMAVIR PHOSPHATE 30 MG: 30 CAPSULE ORAL at 21:53

## 2024-01-01 RX ADMIN — LEVALBUTEROL HYDROCHLORIDE 1.25 MG: 1.25 SOLUTION RESPIRATORY (INHALATION) at 14:55

## 2024-01-01 RX ADMIN — IPRATROPIUM BROMIDE 0.5 MG: 0.5 SOLUTION RESPIRATORY (INHALATION) at 07:26

## 2024-01-01 RX ADMIN — MEMANTINE 10 MG: 10 TABLET ORAL at 18:01

## 2024-01-01 RX ADMIN — WARFARIN SODIUM 1 MG: 1 TABLET ORAL at 17:25

## 2024-01-01 RX ADMIN — PRAVASTATIN SODIUM 80 MG: 80 TABLET ORAL at 17:23

## 2024-01-01 RX ADMIN — FAMOTIDINE 20 MG: 20 TABLET, FILM COATED ORAL at 21:47

## 2024-01-01 RX ADMIN — INSULIN LISPRO 2 UNITS: 100 INJECTION, SOLUTION INTRAVENOUS; SUBCUTANEOUS at 17:44

## 2024-01-01 RX ADMIN — MEMANTINE 10 MG: 10 TABLET ORAL at 09:26

## 2024-01-01 RX ADMIN — CEFEPIME HYDROCHLORIDE 2000 MG: 2 INJECTION, SOLUTION INTRAVENOUS at 08:11

## 2024-01-01 RX ADMIN — BUDESONIDE INHALATION 0.5 MG: 0.5 SUSPENSION RESPIRATORY (INHALATION) at 19:17

## 2024-01-01 RX ADMIN — BUDESONIDE INHALATION 0.5 MG: 0.5 SUSPENSION RESPIRATORY (INHALATION) at 07:25

## 2024-01-01 RX ADMIN — LORAZEPAM 1 MG: 2 INJECTION INTRAMUSCULAR; INTRAVENOUS at 16:47

## 2024-01-01 RX ADMIN — ENOXAPARIN SODIUM 40 MG: 40 INJECTION SUBCUTANEOUS at 16:36

## 2024-01-01 RX ADMIN — PANTOPRAZOLE SODIUM 40 MG: 40 TABLET, DELAYED RELEASE ORAL at 08:51

## 2024-01-01 RX ADMIN — INSULIN LISPRO 3 UNITS: 100 INJECTION, SOLUTION INTRAVENOUS; SUBCUTANEOUS at 17:39

## 2024-01-01 RX ADMIN — PRAVASTATIN SODIUM 80 MG: 80 TABLET ORAL at 17:29

## 2024-01-01 RX ADMIN — DIGOXIN 125 MCG: 125 TABLET ORAL at 09:38

## 2024-01-01 RX ADMIN — MEMANTINE 10 MG: 10 TABLET ORAL at 17:48

## 2024-01-01 RX ADMIN — LEVALBUTEROL HYDROCHLORIDE 1.25 MG: 1.25 SOLUTION RESPIRATORY (INHALATION) at 07:04

## 2024-01-01 RX ADMIN — IPRATROPIUM BROMIDE 0.5 MG: 0.5 SOLUTION RESPIRATORY (INHALATION) at 19:42

## 2024-01-01 RX ADMIN — IPRATROPIUM BROMIDE 0.5 MG: 0.5 SOLUTION RESPIRATORY (INHALATION) at 14:01

## 2024-01-01 RX ADMIN — IPRATROPIUM BROMIDE 0.5 MG: 0.5 SOLUTION RESPIRATORY (INHALATION) at 20:43

## 2024-01-01 RX ADMIN — FORMOTEROL FUMARATE DIHYDRATE 20 MCG: 20 SOLUTION RESPIRATORY (INHALATION) at 19:31

## 2024-01-01 RX ADMIN — LEVALBUTEROL HYDROCHLORIDE 1.25 MG: 1.25 SOLUTION RESPIRATORY (INHALATION) at 14:01

## 2024-01-01 RX ADMIN — Medication 12 ML: at 05:18

## 2024-01-01 RX ADMIN — FAMOTIDINE 20 MG: 20 TABLET, FILM COATED ORAL at 00:03

## 2024-01-01 RX ADMIN — CHLORHEXIDINE GLUCONATE 15 ML: 1.2 SOLUTION ORAL at 09:57

## 2024-01-01 RX ADMIN — FAMOTIDINE 20 MG: 20 TABLET, FILM COATED ORAL at 21:44

## 2024-01-01 RX ADMIN — LEVALBUTEROL HYDROCHLORIDE 1.25 MG: 1.25 SOLUTION RESPIRATORY (INHALATION) at 08:24

## 2024-01-01 RX ADMIN — LISINOPRIL 10 MG: 10 TABLET ORAL at 09:30

## 2024-01-01 RX ADMIN — INSULIN LISPRO 1 UNITS: 100 INJECTION, SOLUTION INTRAVENOUS; SUBCUTANEOUS at 09:40

## 2024-01-01 RX ADMIN — ENOXAPARIN SODIUM 40 MG: 40 INJECTION SUBCUTANEOUS at 09:42

## 2024-01-01 RX ADMIN — INSULIN LISPRO 2 UNITS: 100 INJECTION, SOLUTION INTRAVENOUS; SUBCUTANEOUS at 21:44

## 2024-01-01 RX ADMIN — LEVALBUTEROL HYDROCHLORIDE 1.25 MG: 1.25 SOLUTION RESPIRATORY (INHALATION) at 07:19

## 2024-01-01 RX ADMIN — OSELTAMAVIR PHOSPHATE 30 MG: 30 CAPSULE ORAL at 08:52

## 2024-01-01 RX ADMIN — ENOXAPARIN SODIUM 40 MG: 40 INJECTION SUBCUTANEOUS at 08:51

## 2024-01-01 RX ADMIN — LISINOPRIL 10 MG: 10 TABLET ORAL at 13:15

## 2024-01-01 RX ADMIN — CARVEDILOL 6.25 MG: 6.25 TABLET, FILM COATED ORAL at 09:39

## 2024-01-01 RX ADMIN — INSULIN LISPRO 3 UNITS: 100 INJECTION, SOLUTION INTRAVENOUS; SUBCUTANEOUS at 21:47

## 2024-01-01 RX ADMIN — DIGOXIN 125 MCG: 125 TABLET ORAL at 13:14

## 2024-01-01 RX ADMIN — PANTOPRAZOLE SODIUM 40 MG: 40 TABLET, DELAYED RELEASE ORAL at 09:26

## 2024-01-01 RX ADMIN — BUDESONIDE INHALATION 0.5 MG: 0.5 SUSPENSION RESPIRATORY (INHALATION) at 08:26

## 2024-01-01 RX ADMIN — MORPHINE SULFATE 2 MG: 2 INJECTION, SOLUTION INTRAMUSCULAR; INTRAVENOUS at 19:35

## 2024-01-01 RX ADMIN — INSULIN GLARGINE 8 UNITS: 100 INJECTION, SOLUTION SUBCUTANEOUS at 21:33

## 2024-01-01 RX ADMIN — METHYLPREDNISOLONE SODIUM SUCCINATE 40 MG: 40 INJECTION, POWDER, FOR SOLUTION INTRAMUSCULAR; INTRAVENOUS at 21:26

## 2024-01-01 RX ADMIN — DIGOXIN 125 MCG: 125 TABLET ORAL at 09:30

## 2024-01-01 RX ADMIN — BUDESONIDE INHALATION 0.5 MG: 0.5 SUSPENSION RESPIRATORY (INHALATION) at 19:31

## 2024-01-01 RX ADMIN — FORMOTEROL FUMARATE DIHYDRATE 20 MCG: 20 SOLUTION RESPIRATORY (INHALATION) at 07:20

## 2024-01-01 RX ADMIN — FORMOTEROL FUMARATE DIHYDRATE 20 MCG: 20 SOLUTION RESPIRATORY (INHALATION) at 20:45

## 2024-01-01 RX ADMIN — PREDNISONE 40 MG: 20 TABLET ORAL at 18:00

## 2024-01-01 RX ADMIN — INSULIN LISPRO 1 UNITS: 100 INJECTION, SOLUTION INTRAVENOUS; SUBCUTANEOUS at 13:04

## 2024-01-01 RX ADMIN — FORMOTEROL FUMARATE DIHYDRATE 20 MCG: 20 SOLUTION RESPIRATORY (INHALATION) at 19:55

## 2024-01-01 RX ADMIN — PANTOPRAZOLE SODIUM 40 MG: 40 TABLET, DELAYED RELEASE ORAL at 09:47

## 2024-01-01 RX ADMIN — OSELTAMAVIR PHOSPHATE 30 MG: 30 CAPSULE ORAL at 21:31

## 2024-01-01 RX ADMIN — FAMOTIDINE 20 MG: 10 INJECTION INTRAVENOUS at 21:40

## 2024-01-01 RX ADMIN — FORMOTEROL FUMARATE DIHYDRATE 20 MCG: 20 SOLUTION RESPIRATORY (INHALATION) at 07:43

## 2024-01-01 RX ADMIN — PRAVASTATIN SODIUM 80 MG: 80 TABLET ORAL at 17:39

## 2024-01-01 RX ADMIN — INSULIN GLARGINE 8 UNITS: 100 INJECTION, SOLUTION SUBCUTANEOUS at 00:03

## 2024-01-01 RX ADMIN — Medication 3 MG: at 00:08

## 2024-01-01 RX ADMIN — CARVEDILOL 6.25 MG: 6.25 TABLET, FILM COATED ORAL at 01:58

## 2024-01-01 RX ADMIN — BUDESONIDE INHALATION 0.5 MG: 0.5 SUSPENSION RESPIRATORY (INHALATION) at 07:20

## 2024-01-01 RX ADMIN — LISINOPRIL 10 MG: 10 TABLET ORAL at 11:09

## 2024-01-01 RX ADMIN — BUDESONIDE INHALATION 0.5 MG: 0.5 SUSPENSION RESPIRATORY (INHALATION) at 07:31

## 2024-01-01 RX ADMIN — TAMSULOSIN HYDROCHLORIDE 0.4 MG: 0.4 CAPSULE ORAL at 17:23

## 2024-01-01 RX ADMIN — MEMANTINE 10 MG: 10 TABLET ORAL at 13:15

## 2024-01-01 RX ADMIN — BUDESONIDE INHALATION 0.5 MG: 0.5 SUSPENSION RESPIRATORY (INHALATION) at 20:43

## 2024-01-01 RX ADMIN — ENOXAPARIN SODIUM 40 MG: 40 INJECTION SUBCUTANEOUS at 09:37

## 2024-01-01 RX ADMIN — LEVALBUTEROL HYDROCHLORIDE 1.25 MG: 1.25 SOLUTION RESPIRATORY (INHALATION) at 16:38

## 2024-01-01 RX ADMIN — METHYLPREDNISOLONE SODIUM SUCCINATE 40 MG: 40 INJECTION, POWDER, FOR SOLUTION INTRAMUSCULAR; INTRAVENOUS at 16:33

## 2024-01-01 RX ADMIN — METHYLPREDNISOLONE SODIUM SUCCINATE 40 MG: 40 INJECTION, POWDER, FOR SOLUTION INTRAMUSCULAR; INTRAVENOUS at 09:57

## 2024-01-01 RX ADMIN — LORAZEPAM 0.5 MG: 2 INJECTION INTRAMUSCULAR; INTRAVENOUS at 23:39

## 2024-01-01 RX ADMIN — LEVALBUTEROL HYDROCHLORIDE 1.25 MG: 1.25 SOLUTION RESPIRATORY (INHALATION) at 13:40

## 2024-01-01 RX ADMIN — INSULIN LISPRO 1 UNITS: 100 INJECTION, SOLUTION INTRAVENOUS; SUBCUTANEOUS at 00:03

## 2024-01-01 RX ADMIN — INSULIN LISPRO 3 UNITS: 100 INJECTION, SOLUTION INTRAVENOUS; SUBCUTANEOUS at 17:29

## 2024-01-01 RX ADMIN — METHYLPREDNISOLONE SODIUM SUCCINATE 40 MG: 40 INJECTION, POWDER, FOR SOLUTION INTRAMUSCULAR; INTRAVENOUS at 06:07

## 2024-01-01 RX ADMIN — FORMOTEROL FUMARATE DIHYDRATE 20 MCG: 20 SOLUTION RESPIRATORY (INHALATION) at 08:27

## 2024-01-01 RX ADMIN — PREDNISONE 20 MG: 20 TABLET ORAL at 09:30

## 2024-01-01 RX ADMIN — IPRATROPIUM BROMIDE 0.5 MG: 0.5 SOLUTION RESPIRATORY (INHALATION) at 13:03

## 2024-01-01 RX ADMIN — IPRATROPIUM BROMIDE 0.5 MG: 0.5 SOLUTION RESPIRATORY (INHALATION) at 13:34

## 2024-01-01 RX ADMIN — PREDNISONE 40 MG: 20 TABLET ORAL at 09:47

## 2024-01-01 RX ADMIN — INSULIN LISPRO 1 UNITS: 100 INJECTION, SOLUTION INTRAVENOUS; SUBCUTANEOUS at 12:21

## 2024-01-01 RX ADMIN — MORPHINE SULFATE 2 MG: 2 INJECTION, SOLUTION INTRAMUSCULAR; INTRAVENOUS at 05:27

## 2024-01-01 RX ADMIN — BUDESONIDE INHALATION 0.5 MG: 0.5 SUSPENSION RESPIRATORY (INHALATION) at 19:37

## 2024-01-01 RX ADMIN — PANTOPRAZOLE SODIUM 40 MG: 40 TABLET, DELAYED RELEASE ORAL at 11:09

## 2024-01-01 RX ADMIN — MORPHINE SULFATE 2 MG: 2 INJECTION, SOLUTION INTRAMUSCULAR; INTRAVENOUS at 12:17

## 2024-01-01 RX ADMIN — QUETIAPINE FUMARATE 25 MG: 25 TABLET ORAL at 21:47

## 2024-01-01 RX ADMIN — PREDNISONE 20 MG: 20 TABLET ORAL at 09:26

## 2024-01-01 RX ADMIN — GLYCOPYRROLATE 0.1 MG: 0.2 INJECTION, SOLUTION INTRAMUSCULAR; INTRAVENOUS at 11:37

## 2024-01-01 RX ADMIN — FAMOTIDINE 20 MG: 20 TABLET, FILM COATED ORAL at 21:06

## 2024-01-01 RX ADMIN — ALBUTEROL SULFATE 10 MG: 2.5 SOLUTION RESPIRATORY (INHALATION) at 05:18

## 2024-01-01 RX ADMIN — METHYLPREDNISOLONE SODIUM SUCCINATE 60 MG: 125 INJECTION, POWDER, FOR SOLUTION INTRAMUSCULAR; INTRAVENOUS at 12:28

## 2024-01-01 RX ADMIN — OSELTAMAVIR PHOSPHATE 30 MG: 30 CAPSULE ORAL at 20:22

## 2024-01-01 RX ADMIN — OSELTAMAVIR PHOSPHATE 30 MG: 30 CAPSULE ORAL at 13:17

## 2024-01-01 RX ADMIN — INSULIN GLARGINE 8 UNITS: 100 INJECTION, SOLUTION SUBCUTANEOUS at 21:47

## 2024-01-01 RX ADMIN — PRAVASTATIN SODIUM 80 MG: 80 TABLET ORAL at 17:48

## 2024-01-01 RX ADMIN — WARFARIN SODIUM 2 MG: 2 TABLET ORAL at 17:44

## 2024-01-01 RX ADMIN — LEVALBUTEROL HYDROCHLORIDE 1.25 MG: 1.25 SOLUTION RESPIRATORY (INHALATION) at 07:55

## 2024-01-01 RX ADMIN — INSULIN LISPRO 2 UNITS: 100 INJECTION, SOLUTION INTRAVENOUS; SUBCUTANEOUS at 17:59

## 2024-01-01 RX ADMIN — LEVALBUTEROL HYDROCHLORIDE 1.25 MG: 1.25 SOLUTION RESPIRATORY (INHALATION) at 19:17

## 2024-01-01 RX ADMIN — PRAVASTATIN SODIUM 80 MG: 80 TABLET ORAL at 17:44

## 2024-01-01 RX ADMIN — MEMANTINE 10 MG: 10 TABLET ORAL at 17:29

## 2024-01-01 RX ADMIN — OSELTAMAVIR PHOSPHATE 30 MG: 30 CAPSULE ORAL at 22:02

## 2024-01-01 RX ADMIN — PANTOPRAZOLE SODIUM 40 MG: 40 TABLET, DELAYED RELEASE ORAL at 09:30

## 2024-01-01 RX ADMIN — BUDESONIDE INHALATION 0.5 MG: 0.5 SUSPENSION RESPIRATORY (INHALATION) at 07:03

## 2024-01-01 RX ADMIN — INSULIN LISPRO 1 UNITS: 100 INJECTION, SOLUTION INTRAVENOUS; SUBCUTANEOUS at 12:13

## 2024-01-01 RX ADMIN — INSULIN LISPRO 2 UNITS: 100 INJECTION, SOLUTION INTRAVENOUS; SUBCUTANEOUS at 21:07

## 2024-01-01 RX ADMIN — BUDESONIDE INHALATION 0.5 MG: 0.5 SUSPENSION RESPIRATORY (INHALATION) at 07:19

## 2024-01-01 RX ADMIN — LEVALBUTEROL HYDROCHLORIDE 1.25 MG: 1.25 SOLUTION RESPIRATORY (INHALATION) at 20:02

## 2024-01-01 RX ADMIN — FORMOTEROL FUMARATE DIHYDRATE 20 MCG: 20 SOLUTION RESPIRATORY (INHALATION) at 20:31

## 2024-01-01 RX ADMIN — Medication 3 MG: at 21:47

## 2024-01-01 RX ADMIN — BUDESONIDE INHALATION 0.5 MG: 0.5 SUSPENSION RESPIRATORY (INHALATION) at 19:42

## 2024-01-01 RX ADMIN — LISINOPRIL 10 MG: 10 TABLET ORAL at 08:51

## 2024-01-01 RX ADMIN — MORPHINE SULFATE 2 MG: 2 INJECTION, SOLUTION INTRAMUSCULAR; INTRAVENOUS at 02:35

## 2024-01-01 RX ADMIN — IPRATROPIUM BROMIDE 0.5 MG: 0.5 SOLUTION RESPIRATORY (INHALATION) at 13:40

## 2024-01-01 RX ADMIN — QUETIAPINE FUMARATE 25 MG: 25 TABLET ORAL at 21:06

## 2024-01-01 RX ADMIN — LEVALBUTEROL HYDROCHLORIDE 1.25 MG: 1.25 SOLUTION RESPIRATORY (INHALATION) at 19:37

## 2024-01-01 RX ADMIN — OLANZAPINE 2.5 MG: 10 INJECTION, POWDER, FOR SOLUTION INTRAMUSCULAR at 20:23

## 2024-01-01 RX ADMIN — MEMANTINE 10 MG: 10 TABLET ORAL at 17:23

## 2024-01-01 RX ADMIN — Medication 3 MG: at 21:06

## 2024-01-01 RX ADMIN — BUDESONIDE INHALATION 0.5 MG: 0.5 SUSPENSION RESPIRATORY (INHALATION) at 08:24

## 2024-01-01 RX ADMIN — IPRATROPIUM BROMIDE 0.5 MG: 0.5 SOLUTION RESPIRATORY (INHALATION) at 13:59

## 2024-01-01 RX ADMIN — OSELTAMAVIR PHOSPHATE 30 MG: 30 CAPSULE ORAL at 09:48

## 2024-01-01 RX ADMIN — PREDNISONE 30 MG: 20 TABLET ORAL at 11:09

## 2024-01-01 RX ADMIN — IPRATROPIUM BROMIDE 0.5 MG: 0.5 SOLUTION RESPIRATORY (INHALATION) at 07:55

## 2024-01-01 RX ADMIN — CARVEDILOL 6.25 MG: 6.25 TABLET, FILM COATED ORAL at 17:23

## 2024-01-01 RX ADMIN — INSULIN GLARGINE 8 UNITS: 100 INJECTION, SOLUTION SUBCUTANEOUS at 21:06

## 2024-01-01 RX ADMIN — MORPHINE SULFATE 2 MG: 2 INJECTION, SOLUTION INTRAMUSCULAR; INTRAVENOUS at 12:43

## 2024-01-01 RX ADMIN — LISINOPRIL 10 MG: 10 TABLET ORAL at 09:26

## 2024-01-01 RX ADMIN — FUROSEMIDE 40 MG: 40 TABLET ORAL at 09:30

## 2024-01-01 RX ADMIN — WARFARIN SODIUM 2 MG: 2 TABLET ORAL at 18:01

## 2024-01-01 RX ADMIN — IPRATROPIUM BROMIDE 0.5 MG: 0.5 SOLUTION RESPIRATORY (INHALATION) at 07:19

## 2024-01-01 RX ADMIN — IPRATROPIUM BROMIDE 0.5 MG: 0.5 SOLUTION RESPIRATORY (INHALATION) at 07:20

## 2024-01-01 RX ADMIN — MEMANTINE 10 MG: 10 TABLET ORAL at 09:38

## 2024-01-01 RX ADMIN — TAMSULOSIN HYDROCHLORIDE 0.4 MG: 0.4 CAPSULE ORAL at 17:48

## 2024-01-01 RX ADMIN — WARFARIN SODIUM 2 MG: 2 TABLET ORAL at 17:48

## 2024-01-01 RX ADMIN — OSELTAMAVIR PHOSPHATE 30 MG: 30 CAPSULE ORAL at 11:10

## 2024-01-01 RX ADMIN — IPRATROPIUM BROMIDE 0.5 MG: 0.5 SOLUTION RESPIRATORY (INHALATION) at 16:38

## 2024-01-01 RX ADMIN — Medication 12 ML: at 10:47

## 2024-01-01 RX ADMIN — QUETIAPINE FUMARATE 25 MG: 25 TABLET ORAL at 00:03

## 2024-01-01 RX ADMIN — FORMOTEROL FUMARATE DIHYDRATE 20 MCG: 20 SOLUTION RESPIRATORY (INHALATION) at 20:00

## 2024-01-01 RX ADMIN — LEVALBUTEROL HYDROCHLORIDE 1.25 MG: 1.25 SOLUTION RESPIRATORY (INHALATION) at 07:26

## 2024-01-01 RX ADMIN — METHYLPREDNISOLONE SODIUM SUCCINATE 40 MG: 40 INJECTION, POWDER, FOR SOLUTION INTRAMUSCULAR; INTRAVENOUS at 21:33

## 2024-01-01 RX ADMIN — DIGOXIN 125 MCG: 125 TABLET ORAL at 09:26

## 2024-01-01 RX ADMIN — FORMOTEROL FUMARATE DIHYDRATE 20 MCG: 20 SOLUTION RESPIRATORY (INHALATION) at 07:06

## 2024-01-01 RX ADMIN — MEMANTINE 10 MG: 10 TABLET ORAL at 22:02

## 2024-01-01 RX ADMIN — METHYLPREDNISOLONE SODIUM SUCCINATE 40 MG: 40 INJECTION, POWDER, FOR SOLUTION INTRAMUSCULAR; INTRAVENOUS at 05:23

## 2024-01-01 RX ADMIN — CARVEDILOL 6.25 MG: 6.25 TABLET, FILM COATED ORAL at 09:26

## 2024-01-01 RX ADMIN — SODIUM CHLORIDE 1000 ML: 0.9 INJECTION, SOLUTION INTRAVENOUS at 11:21

## 2024-01-01 RX ADMIN — METHYLPREDNISOLONE SODIUM SUCCINATE 40 MG: 40 INJECTION, POWDER, FOR SOLUTION INTRAMUSCULAR; INTRAVENOUS at 13:53

## 2024-01-01 RX ADMIN — Medication 1 MG/HR: at 17:29

## 2024-01-01 RX ADMIN — TAMSULOSIN HYDROCHLORIDE 0.4 MG: 0.4 CAPSULE ORAL at 17:44

## 2024-01-01 RX ADMIN — CARVEDILOL 6.25 MG: 6.25 TABLET, FILM COATED ORAL at 17:39

## 2024-01-01 RX ADMIN — INSULIN LISPRO 2 UNITS: 100 INJECTION, SOLUTION INTRAVENOUS; SUBCUTANEOUS at 12:11

## 2024-01-01 RX ADMIN — LEVALBUTEROL HYDROCHLORIDE 1.25 MG: 1.25 SOLUTION RESPIRATORY (INHALATION) at 13:59

## 2024-01-01 RX ADMIN — CARVEDILOL 6.25 MG: 6.25 TABLET, FILM COATED ORAL at 09:30

## 2024-01-01 RX ADMIN — LEVALBUTEROL HYDROCHLORIDE 1.25 MG: 1.25 SOLUTION RESPIRATORY (INHALATION) at 13:03

## 2024-01-01 RX ADMIN — CARVEDILOL 6.25 MG: 6.25 TABLET, FILM COATED ORAL at 13:16

## 2024-01-01 RX ADMIN — IPRATROPIUM BROMIDE 0.5 MG: 0.5 SOLUTION RESPIRATORY (INHALATION) at 19:17

## 2024-01-01 RX ADMIN — CARVEDILOL 6.25 MG: 6.25 TABLET, FILM COATED ORAL at 09:47

## 2024-01-01 RX ADMIN — INSULIN LISPRO 1 UNITS: 100 INJECTION, SOLUTION INTRAVENOUS; SUBCUTANEOUS at 21:36

## 2024-01-01 RX ADMIN — LEVALBUTEROL HYDROCHLORIDE 1.25 MG: 1.25 SOLUTION RESPIRATORY (INHALATION) at 19:42

## 2024-01-01 RX ADMIN — TAMSULOSIN HYDROCHLORIDE 0.4 MG: 0.4 CAPSULE ORAL at 17:29

## 2024-01-01 RX ADMIN — MEMANTINE 10 MG: 10 TABLET ORAL at 08:52

## 2024-01-01 RX ADMIN — IPRATROPIUM BROMIDE 1 MG: 0.5 SOLUTION RESPIRATORY (INHALATION) at 05:18

## 2024-01-01 RX ADMIN — IPRATROPIUM BROMIDE 0.5 MG: 0.5 SOLUTION RESPIRATORY (INHALATION) at 14:55

## 2024-01-01 RX ADMIN — LEVALBUTEROL HYDROCHLORIDE 1.25 MG: 1.25 SOLUTION RESPIRATORY (INHALATION) at 13:25

## 2024-01-01 RX ADMIN — LEVALBUTEROL HYDROCHLORIDE 1.25 MG: 1.25 SOLUTION RESPIRATORY (INHALATION) at 07:31

## 2024-01-01 RX ADMIN — MORPHINE SULFATE 2 MG: 2 INJECTION, SOLUTION INTRAMUSCULAR; INTRAVENOUS at 23:00

## 2024-01-01 RX ADMIN — DIGOXIN 125 MCG: 125 TABLET ORAL at 08:51

## 2024-01-01 RX ADMIN — PANTOPRAZOLE SODIUM 40 MG: 40 TABLET, DELAYED RELEASE ORAL at 09:39

## 2024-01-01 RX ADMIN — MORPHINE SULFATE 2 MG: 2 INJECTION, SOLUTION INTRAMUSCULAR; INTRAVENOUS at 16:39

## 2024-01-01 RX ADMIN — CARVEDILOL 6.25 MG: 6.25 TABLET, FILM COATED ORAL at 18:01

## 2024-01-01 RX ADMIN — MORPHINE SULFATE 2 MG: 2 INJECTION, SOLUTION INTRAMUSCULAR; INTRAVENOUS at 11:37

## 2024-01-01 RX ADMIN — PREDNISONE 30 MG: 20 TABLET ORAL at 13:15

## 2024-01-01 RX ADMIN — LEVALBUTEROL HYDROCHLORIDE 1.25 MG: 1.25 SOLUTION RESPIRATORY (INHALATION) at 07:20

## 2024-01-01 RX ADMIN — CARVEDILOL 6.25 MG: 6.25 TABLET, FILM COATED ORAL at 08:52

## 2024-01-01 RX ADMIN — FUROSEMIDE 40 MG: 40 TABLET ORAL at 11:09

## 2024-01-01 RX ADMIN — FAMOTIDINE 20 MG: 20 TABLET, FILM COATED ORAL at 21:54

## 2024-01-01 RX ADMIN — INSULIN GLARGINE 8 UNITS: 100 INJECTION, SOLUTION SUBCUTANEOUS at 21:43

## 2024-01-01 RX ADMIN — IPRATROPIUM BROMIDE 0.5 MG: 0.5 SOLUTION RESPIRATORY (INHALATION) at 08:24

## 2024-01-01 RX ADMIN — MEMANTINE 10 MG: 10 TABLET ORAL at 09:30

## 2024-01-01 RX ADMIN — CARVEDILOL 6.25 MG: 6.25 TABLET, FILM COATED ORAL at 17:29

## 2024-01-01 RX ADMIN — OSELTAMAVIR PHOSPHATE 30 MG: 30 CAPSULE ORAL at 20:24

## 2024-01-01 RX ADMIN — INSULIN LISPRO 2 UNITS: 100 INJECTION, SOLUTION INTRAVENOUS; SUBCUTANEOUS at 17:00

## 2024-01-01 RX ADMIN — AZITHROMYCIN MONOHYDRATE 500 MG: 500 INJECTION, POWDER, LYOPHILIZED, FOR SOLUTION INTRAVENOUS at 07:03

## 2024-01-01 RX ADMIN — PANTOPRAZOLE SODIUM 40 MG: 40 TABLET, DELAYED RELEASE ORAL at 13:16

## 2024-01-01 RX ADMIN — LORAZEPAM 0.5 MG: 2 INJECTION INTRAMUSCULAR; INTRAVENOUS at 05:16

## 2024-01-01 RX ADMIN — WARFARIN SODIUM 2 MG: 2 TABLET ORAL at 17:29

## 2024-01-01 RX ADMIN — IPRATROPIUM BROMIDE 0.5 MG: 0.5 SOLUTION RESPIRATORY (INHALATION) at 07:03

## 2024-01-01 RX ADMIN — IPRATROPIUM BROMIDE 1 MG: 0.5 SOLUTION RESPIRATORY (INHALATION) at 10:47

## 2024-01-01 RX ADMIN — INSULIN GLARGINE 8 UNITS: 100 INJECTION, SOLUTION SUBCUTANEOUS at 21:35

## 2024-01-01 RX ADMIN — CARVEDILOL 6.25 MG: 6.25 TABLET, FILM COATED ORAL at 11:09

## 2024-01-01 RX ADMIN — MORPHINE SULFATE 2 MG: 2 INJECTION, SOLUTION INTRAMUSCULAR; INTRAVENOUS at 21:54

## 2024-01-08 ENCOUNTER — OFFICE VISIT (OUTPATIENT)
Dept: FAMILY MEDICINE CLINIC | Facility: CLINIC | Age: 84
End: 2024-01-08
Payer: COMMERCIAL

## 2024-01-08 VITALS
WEIGHT: 124 LBS | BODY MASS INDEX: 17.36 KG/M2 | OXYGEN SATURATION: 90 % | TEMPERATURE: 98.4 F | HEART RATE: 76 BPM | SYSTOLIC BLOOD PRESSURE: 106 MMHG | RESPIRATION RATE: 22 BRPM | HEIGHT: 71 IN | DIASTOLIC BLOOD PRESSURE: 66 MMHG

## 2024-01-08 DIAGNOSIS — I48.0 PAF (PAROXYSMAL ATRIAL FIBRILLATION) (HCC): ICD-10-CM

## 2024-01-08 DIAGNOSIS — I50.1 HEART FAILURE, LEFT, WITH LVEF 31-40% (HCC): ICD-10-CM

## 2024-01-08 DIAGNOSIS — D68.9 COAGULATION DEFECT, UNSPECIFIED (HCC): ICD-10-CM

## 2024-01-08 DIAGNOSIS — J43.9 BULLOUS EMPHYSEMA (HCC): ICD-10-CM

## 2024-01-08 DIAGNOSIS — E43 SEVERE PROTEIN-CALORIE MALNUTRITION (HCC): ICD-10-CM

## 2024-01-08 DIAGNOSIS — I50.22 CHRONIC SYSTOLIC CONGESTIVE HEART FAILURE (HCC): ICD-10-CM

## 2024-01-08 DIAGNOSIS — J96.11 CHRONIC HYPOXEMIC RESPIRATORY FAILURE (HCC): ICD-10-CM

## 2024-01-08 DIAGNOSIS — I11.0 HYPERTENSIVE HEART DISEASE WITH CONGESTIVE HEART FAILURE, UNSPECIFIED HEART FAILURE TYPE (HCC): ICD-10-CM

## 2024-01-08 DIAGNOSIS — N18.31 STAGE 3A CHRONIC KIDNEY DISEASE (HCC): ICD-10-CM

## 2024-01-08 DIAGNOSIS — G30.9 ALZHEIMER'S DEMENTIA WITHOUT BEHAVIORAL DISTURBANCE (HCC): ICD-10-CM

## 2024-01-08 DIAGNOSIS — E11.42 TYPE 2 DIABETES MELLITUS WITH DIABETIC POLYNEUROPATHY, WITHOUT LONG-TERM CURRENT USE OF INSULIN (HCC): Primary | ICD-10-CM

## 2024-01-08 DIAGNOSIS — F02.80 ALZHEIMER'S DEMENTIA WITHOUT BEHAVIORAL DISTURBANCE (HCC): ICD-10-CM

## 2024-01-08 DIAGNOSIS — Z00.00 MEDICARE ANNUAL WELLNESS VISIT, SUBSEQUENT: ICD-10-CM

## 2024-01-08 DIAGNOSIS — I70.209 PERIPHERAL ARTERIOSCLEROSIS (HCC): ICD-10-CM

## 2024-01-08 PROBLEM — J96.20 ACUTE ON CHRONIC RESPIRATORY FAILURE (HCC): Status: RESOLVED | Noted: 2023-01-01 | Resolved: 2024-01-01

## 2024-01-08 PROCEDURE — G0439 PPPS, SUBSEQ VISIT: HCPCS | Performed by: FAMILY MEDICINE

## 2024-01-08 PROCEDURE — 99214 OFFICE O/P EST MOD 30 MIN: CPT | Performed by: FAMILY MEDICINE

## 2024-01-08 NOTE — PROGRESS NOTES
Assessment/Plan:    1. Type 2 diabetes mellitus with diabetic polyneuropathy, without long-term current use of insulin (HCC)  -     Albumin / creatinine urine ratio; Future; Expected date: 05/07/2024  -     Comprehensive metabolic panel; Future; Expected date: 05/07/2024  -     Hemoglobin A1C; Future; Expected date: 05/07/2024  -     CBC and differential; Future; Expected date: 05/07/2024  -     TSH, 3rd generation with Free T4 reflex; Future; Expected date: 05/07/2024  -     Lipid Panel with Direct LDL reflex; Future; Expected date: 05/07/2024    2. Chronic hypoxemic respiratory failure (HCC)  Assessment & Plan:  3 L baseline    Orders:  -     Albumin / creatinine urine ratio; Future; Expected date: 05/07/2024  -     Comprehensive metabolic panel; Future; Expected date: 05/07/2024  -     Hemoglobin A1C; Future; Expected date: 05/07/2024  -     CBC and differential; Future; Expected date: 05/07/2024  -     TSH, 3rd generation with Free T4 reflex; Future; Expected date: 05/07/2024  -     Lipid Panel with Direct LDL reflex; Future; Expected date: 05/07/2024    3. Severe protein-calorie malnutrition (HCC)  -     Albumin / creatinine urine ratio; Future; Expected date: 05/07/2024  -     Comprehensive metabolic panel; Future; Expected date: 05/07/2024  -     Hemoglobin A1C; Future; Expected date: 05/07/2024  -     CBC and differential; Future; Expected date: 05/07/2024  -     TSH, 3rd generation with Free T4 reflex; Future; Expected date: 05/07/2024  -     Lipid Panel with Direct LDL reflex; Future; Expected date: 05/07/2024    4. Alzheimer's dementia without behavioral disturbance (HCC)    5. 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HCC)  -     Albumin / creatinine urine ratio; Future; Expected date: 05/07/2024  -     Comprehensive metabolic panel; Future; Expected date: 05/07/2024  -     Hemoglobin A1C; Future; Expected date: 05/07/2024  -     CBC and differential; Future; Expected date: 05/07/2024  -     TSH, 3rd generation with Free T4  reflex; Future; Expected date: 05/07/2024  -     Lipid Panel with Direct LDL reflex; Future; Expected date: 05/07/2024    6. Chronic systolic congestive heart failure (HCC)  -     Albumin / creatinine urine ratio; Future; Expected date: 05/07/2024  -     Comprehensive metabolic panel; Future; Expected date: 05/07/2024  -     Hemoglobin A1C; Future; Expected date: 05/07/2024  -     CBC and differential; Future; Expected date: 05/07/2024  -     TSH, 3rd generation with Free T4 reflex; Future; Expected date: 05/07/2024  -     Lipid Panel with Direct LDL reflex; Future; Expected date: 05/07/2024    7. Peripheral arteriosclerosis (HCC)    8. PAF (paroxysmal atrial fibrillation) (HCC)    9. Coagulation defect, unspecified (HCC)    10. Stage 3a chronic kidney disease (HCC)  -     Albumin / creatinine urine ratio; Future; Expected date: 05/07/2024  -     Comprehensive metabolic panel; Future; Expected date: 05/07/2024  -     Hemoglobin A1C; Future; Expected date: 05/07/2024  -     CBC and differential; Future; Expected date: 05/07/2024  -     TSH, 3rd generation with Free T4 reflex; Future; Expected date: 05/07/2024  -     Lipid Panel with Direct LDL reflex; Future; Expected date: 05/07/2024    11. 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 Hypertensive heart disease with congestive heart failure, unspecified heart failure type (HCC)    13. Medicare annual wellness visit, subsequent            Patient Instructions   Medicare Preventive Visit Patient Instructions  Thank you for completing your  Welcome to Medicare Visit or Medicare Annual Wellness Visit today. Your next wellness visit will be due in one year (1/8/2025).  The screening/preventive services that you may require over the next 5-10 years are detailed below. Some tests may not apply to you based off risk factors and/or age. Screening tests ordered at today's visit but not completed yet may show as past due. Also, please note that scanned in results may not display below.  Preventive Screenings:  Service Recommendations Previous Testing/Comments   Colorectal Cancer Screening  Colonoscopy    Fecal Occult Blood Test (FOBT)/Fecal Immunochemical Test (FIT)  Fecal DNA/Cologuard Test  Flexible Sigmoidoscopy Age: 45-75 years old   Colonoscopy: every 10 years (May be performed more frequently if at higher risk)  OR  FOBT/FIT: every 1 year  OR  Cologuard: every 3 years  OR  Sigmoidoscopy: every 5 years  Screening may be recommended earlier than age 45 if at higher risk for colorectal cancer. Also, an individualized decision between you and your healthcare provider will decide whether screening between the ages of 76-85 would be appropriate. Colonoscopy: 06/04/2020  FOBT/FIT: Not on file  Cologuard: Not on file  Sigmoidoscopy: Not on file    Screening Not Indicated     Prostate Cancer Screening Individualized decision between patient and health care provider in men between ages of 55-69   Medicare will cover every 12 months beginning on the day after your 50th birthday PSA: 3.2 ng/mL     Screening Not Indicated     Hepatitis C Screening Once for adults born between 1945 and 1965  More frequently in patients at high risk for Hepatitis C Hep C Antibody: Not on file        Diabetes Screening 1-2 times per year if you're at risk for diabetes or have pre-diabetes Fasting glucose: 143 mg/dL (12/20/2023)  A1C: 8.8 % (12/12/2023)  Screening Not Indicated  History Diabetes   Cholesterol Screening Once every 5 years if you don't have a lipid disorder. May order more  often based on risk factors. Lipid panel: 12/12/2023  Screening Current      Other Preventive Screenings Covered by Medicare:  Abdominal Aortic Aneurysm (AAA) Screening: covered once if your at risk. You're considered to be at risk if you have a family history of AAA or a male between the age of 65-75 who smoking at least 100 cigarettes in your lifetime.  Lung Cancer Screening: covers low dose CT scan once per year if you meet all of the following conditions: (1) Age 55-77; (2) No signs or symptoms of lung cancer; (3) Current smoker or have quit smoking within the last 15 years; (4) You have a tobacco smoking history of at least 20 pack years (packs per day x number of years you smoked); (5) You get a written order from a healthcare provider.  Glaucoma Screening: covered annually if you're considered high risk: (1) You have diabetes OR (2) Family history of glaucoma OR (3)  aged 50 and older OR (4)  American aged 65 and older  Osteoporosis Screening: covered every 2 years if you meet one of the following conditions: (1) Have a vertebral abnormality; (2) On glucocorticoid therapy for more than 3 months; (3) Have primary hyperparathyroidism; (4) On osteoporosis medications and need to assess response to drug therapy.  HIV Screening: covered annually if you're between the age of 15-65. Also covered annually if you are younger than 15 and older than 65 with risk factors for HIV infection. For pregnant patients, it is covered up to 3 times per pregnancy.    Immunizations:  Immunization Recommendations   Influenza Vaccine Annual influenza vaccination during flu season is recommended for all persons aged >= 6 months who do not have contraindications   Pneumococcal Vaccine   * Pneumococcal conjugate vaccine = PCV13 (Prevnar 13), PCV15 (Vaxneuvance), PCV20 (Prevnar 20)  * Pneumococcal polysaccharide vaccine = PPSV23 (Pneumovax) Adults 19-65 yo with certain risk factors or if 65+ yo  If never received  any pneumonia vaccine: recommend Prevnar 20 (PCV20)  Give PCV20 if previously received 1 dose of PCV13 or PPSV23   Hepatitis B Vaccine 3 dose series if at intermediate or high risk (ex: diabetes, end stage renal disease, liver disease)   Respiratory syncytial virus (RSV) Vaccine - COVERED BY MEDICARE PART D  * RSVPreF3 (Arexvy) CDC recommends that adults 60 years of age and older may receive a single dose of RSV vaccine using shared clinical decision-making (SCDM)   Tetanus (Td) Vaccine - COST NOT COVERED BY MEDICARE PART B Following completion of primary series, a booster dose should be given every 10 years to maintain immunity against tetanus. Td may also be given as tetanus wound prophylaxis.   Tdap Vaccine - COST NOT COVERED BY MEDICARE PART B Recommended at least once for all adults. For pregnant patients, recommended with each pregnancy.   Shingles Vaccine (Shingrix) - COST NOT COVERED BY MEDICARE PART B  2 shot series recommended in those 19 years and older who have or will have weakened immune systems or those 50 years and older     Health Maintenance Due:  There are no preventive care reminders to display for this patient.  Immunizations Due:      Topic Date Due   • Influenza Vaccine (1) 09/01/2023   • COVID-19 Vaccine (3 - 2023-24 season) 09/01/2023     Advance Directives   What are advance directives?  Advance directives are legal documents that state your wishes and plans for medical care. These plans are made ahead of time in case you lose your ability to make decisions for yourself. Advance directives can apply to any medical decision, such as the treatments you want, and if you want to donate organs.   What are the types of advance directives?  There are many types of advance directives, and each state has rules about how to use them. You may choose a combination of any of the following:  Living will:  This is a written record of the treatment you want. You can also choose which treatments you do not  want, which to limit, and which to stop at a certain time. This includes surgery, medicine, IV fluid, and tube feedings.   Durable power of  for healthcare (DPAHC):  This is a written record that states who you want to make healthcare choices for you when you are unable to make them for yourself. This person, called a proxy, is usually a family member or a friend. You may choose more than 1 proxy.  Do not resuscitate (DNR) order:  A DNR order is used in case your heart stops beating or you stop breathing. It is a request not to have certain forms of treatment, such as CPR. A DNR order may be included in other types of advance directives.  Medical directive:  This covers the care that you want if you are in a coma, near death, or unable to make decisions for yourself. You can list the treatments you want for each condition. Treatment may include pain medicine, surgery, blood transfusions, dialysis, IV or tube feedings, and a ventilator (breathing machine).  Values history:  This document has questions about your views, beliefs, and how you feel and think about life. This information can help others choose the care that you would choose.  Why are advance directives important?  An advance directive helps you control your care. Although spoken wishes may be used, it is better to have your wishes written down. Spoken wishes can be misunderstood, or not followed. Treatments may be given even if you do not want them. An advance directive may make it easier for your family to make difficult choices about your care.   Fall Prevention    Fall prevention  includes ways to make your home and other areas safer. It also includes ways you can move more carefully to prevent a fall. Health conditions that cause changes in your blood pressure, vision, or muscle strength and coordination may increase your risk for falls. Medicines may also increase your risk for falls if they make you dizzy, weak, or sleepy.   Fall prevention  tips:   Stand or sit up slowly.    Use assistive devices as directed.    Wear shoes that fit well and have soles that .    Wear a personal alarm.    Stay active.    Manage your medical conditions.    Home Safety Tips:  Add items to prevent falls in the bathroom.    Keep paths clear.    Install bright lights in your home.    Keep items you use often on shelves within reach.    Paint or place reflective tape on the edges of your stairs.    Underweight  Underweight is defined as having a body mass index (BMI) of less than 18.5 kg/m2   Anorexia  is a loss of appetite, decreased food intake, or both. Your appetite naturally decreases as you get older. You also get full faster than you used to. This occurs because your body needs less energy. Other body changes can also lead to a decreased appetite. Even though some appetite loss is normal, you still need to get enough calories and nutrients to keep you healthy. You can start to lose too much weight if you do not eat as much food as your body needs. Unwanted weight loss can cause health problems, or worsen health problems you already have. You can also become dehydrated if you do not drink enough liquid.  How to eat healthy and get enough nutrients:   Choose healthy foods.  Eat a variety of fruits, vegetables, whole grains, low-fat dairy foods, lean meats, and other protein foods. Limit foods high in fat, sugar, and salt. Limit or avoid alcohol as directed. Work with a dietitian to help you plan your meals if you need to follow a special diet. A dietitian can also teach you how to modify foods if you have trouble chewing or swallowing.   Snack on healthy foods between meals  if you only eat a small amount during meals. Snacks provide extra healthy nutrients and calories between meals. Examples include fruit, cheese, and whole grain crackers.   Drink liquids as directed  to avoid dehydration. Drink liquids between meals if they cause you to get full too quickly during  meals. Ask how much liquid to drink each day and which liquids are best for you.   Use herbs, spices, and flavor enhancers to add flavor to foods.  Avoid using herbs and spice blends that also contain sodium. Ask your healthcare provider or dietitian about flavor enhancers. Flavor enhancers with ham, natural cast, and roast beef flavors can also be sprinkled on food to add flavor.   Share meals with others as often as you can.  Eating with others may help you to eat better during meal time. Ask family members, neighbors, or friends to join you for lunch. There are also senior centers where you can meet people, and share meals with them.   Ask family and friends for help  with shopping or preparing foods. Ask for a ride to the grocery store, if needed.       © Copyright [employer] 2018 Information is for End User's use only and may not be sold, redistributed or otherwise used for commercial purposes. All illustrations and images included in CareNotes® are the copyrighted property of [employer] or [employer]       No follow-ups on file.    Subjective:      Patient ID: Juan Antonio Nicholson is a 83 y.o. male.    Chief Complaint   Patient presents with   • Transition of Care Management     [id number]       Pt is here for a TCM - the time is greated than 2 weeks  Pt was admitted for SOB - dx with Exac of COPD -   Pt was increased to 5l of of oxygen in the hospital his baseling is 3l and he is at that now    Pt is using visiting nurses - they have a little over a week left.    Pt was recentl;y seen by podiatry - states Dr Carey    Pt states since discharge - breathiobg is at baseline, no furger exacerbations of copd  He is at baseline          The following portions of the patient's history were reviewed and updated as appropriate: allergies, current medications, past family history, past medical history, past social history, past surgical history and problem list.    Review of Systems   Constitutional:  Negative for  activity change, appetite change, chills, diaphoresis, fatigue, fever and unexpected weight change.   HENT:  Negative for congestion, dental problem, ear pain, mouth sores, sinus pressure, sinus pain, sore throat and trouble swallowing.    Eyes:  Negative for photophobia, discharge and itching.   Respiratory:  Negative for apnea, chest tightness and shortness of breath.    Cardiovascular:  Negative for chest pain, palpitations and leg swelling.   Gastrointestinal:  Negative for abdominal distention, abdominal pain, blood in stool, nausea and vomiting.   Endocrine: Negative for cold intolerance, heat intolerance, polydipsia, polyphagia and polyuria.   Genitourinary:  Negative for difficulty urinating.   Musculoskeletal:  Negative for arthralgias.   Skin:  Negative for color change and wound.   Neurological:  Negative for dizziness, syncope, speech difficulty and headaches.   Hematological:  Negative for adenopathy.   Psychiatric/Behavioral:  Negative for agitation and behavioral problems.          Current Outpatient Medications   Medication Sig Dispense Refill   • albuterol (ProAir HFA) 90 mcg/act inhaler Inhale 2 puffs every 6 (six) hours as needed for wheezing 8.5 g 0   • Ascorbic Acid (VITAMIN C) 1000 MG tablet Take 1,000 mg by mouth daily     • carvedilol (COREG) 6.25 mg tablet Take 1 tablet (6.25 mg total) by mouth 2 (two) times a day 180 tablet 3   • digoxin (LANOXIN) 0.125 mg tablet Take 1 tablet (125 mcg total) by mouth daily 90 tablet 3   • Empagliflozin (Jardiance) 10 MG TABS tablet Take 1 tablet (10 mg total) by mouth every morning 90 tablet 3   • Ferrous Sulfate (Iron) 325 (65 Fe) MG TABS Take by mouth every other day     • fosinopril (MONOPRIL) 10 mg tablet Take 1 tablet (10 mg total) by mouth daily 90 tablet 1   • furosemide (LASIX) 40 mg tablet Take one tablet on Qahwof-Wntmduwbq-Ooduea morning 90 tablet 1   • glucose blood test strip Test     • ipratropium-albuterol (DUO-NEB) 0.5-2.5 mg/3 mL nebulizer  "solution Take 3 mL by nebulization 4 (four) times a day as needed for wheezing or shortness of breath 360 mL 7   • memantine (NAMENDA) 10 mg tablet TAKE 1 TABLET TWICE DAILY 180 tablet 1   • metFORMIN (GLUCOPHAGE) 500 mg tablet TAKE 1 TABLET EVERY DAY WITH BREAKFAST 90 tablet 1   • Omega-3 Fatty Acids (FISH OIL PO) Take by mouth     • pantoprazole (PROTONIX) 40 mg tablet TAKE 1 TABLET EVERY DAY 90 tablet 1   • PRODIGY TWIST TOP LANCETS 28G MISC Use     • rosuvastatin (CRESTOR) 10 MG tablet Take 1 tablet (10 mg total) by mouth daily at bedtime 90 tablet 3   • tamsulosin (FLOMAX) 0.4 mg Take 1 capsule (0.4 mg total) by mouth daily with dinner 90 capsule 3   • Trelegy Ellipta 100-62.5-25 MCG/ACT inhaler Inhale 1 puff daily 180 blister 3   • warfarin (COUMADIN) 1 mg tablet TAKE 1 TABLET EVERY DAY (APPT NEEDED NOW - OVER A YEAR SINCE LAST SEEN) 90 tablet 0   • warfarin (COUMADIN) 3 mg tablet TAKE AS DIRECTED BY OFFICE BASED ON INR (GOAL INR AT PRESENT IS 1.5 TO 2) 90 tablet 1   • warfarin (COUMADIN) 5 mg tablet Take 1 tablet (5 mg total) by mouth daily 90 tablet 1     No current facility-administered medications for this visit.       Objective:    /66   Pulse 76   Temp 98.4 °F (36.9 °C)   Resp 22   Ht 5' 11\" (1.803 m)   Wt 56.2 kg (124 lb)   SpO2 90%   BMI 17.29 kg/m²        Physical Exam  Vitals and nursing note reviewed.   Constitutional:       General: He is not in acute distress.     Appearance: He is well-developed. He is not diaphoretic.   HENT:      Head: Normocephalic and atraumatic.      Right Ear: External ear normal.      Left Ear: External ear normal.      Nose: Nose normal.      Mouth/Throat:      Pharynx: No oropharyngeal exudate.   Eyes:      General: No scleral icterus.        Right eye: No discharge.         Left eye: No discharge.      Pupils: Pupils are equal, round, and reactive to light.   Neck:      Thyroid: No thyromegaly.   Cardiovascular:      Rate and Rhythm: Normal rate.      Heart " sounds: Murmur heard.   Pulmonary:      Effort: Pulmonary effort is normal. No respiratory distress.      Breath sounds: Normal breath sounds. No wheezing.   Abdominal:      General: Bowel sounds are normal. There is no distension.      Palpations: Abdomen is soft. There is no mass.      Tenderness: There is no abdominal tenderness. There is no guarding or rebound.   Musculoskeletal:         General: Normal range of motion.   Skin:     General: Skin is warm and dry.      Findings: No erythema or rash.   Neurological:      Mental Status: He is alert.      Coordination: Coordination normal.      Deep Tendon Reflexes: Reflexes normal.   Psychiatric:         Behavior: Behavior normal.                Frank Lombardi, DO

## 2024-01-08 NOTE — PATIENT INSTRUCTIONS
Medicare Preventive Visit Patient Instructions  Thank you for completing your Welcome to Medicare Visit or Medicare Annual Wellness Visit today. Your next wellness visit will be due in one year (1/8/2025).  The screening/preventive services that you may require over the next 5-10 years are detailed below. Some tests may not apply to you based off risk factors and/or age. Screening tests ordered at today's visit but not completed yet may show as past due. Also, please note that scanned in results may not display below.  Preventive Screenings:  Service Recommendations Previous Testing/Comments   Colorectal Cancer Screening  Colonoscopy    Fecal Occult Blood Test (FOBT)/Fecal Immunochemical Test (FIT)  Fecal DNA/Cologuard Test  Flexible Sigmoidoscopy Age: 45-75 years old   Colonoscopy: every 10 years (May be performed more frequently if at higher risk)  OR  FOBT/FIT: every 1 year  OR  Cologuard: every 3 years  OR  Sigmoidoscopy: every 5 years  Screening may be recommended earlier than age 45 if at higher risk for colorectal cancer. Also, an individualized decision between you and your healthcare provider will decide whether screening between the ages of 76-85 would be appropriate. Colonoscopy: 06/04/2020  FOBT/FIT: Not on file  Cologuard: Not on file  Sigmoidoscopy: Not on file    Screening Not Indicated     Prostate Cancer Screening Individualized decision between patient and health care provider in men between ages of 55-69   Medicare will cover every 12 months beginning on the day after your 50th birthday PSA: 3.2 ng/mL     Screening Not Indicated     Hepatitis C Screening Once for adults born between 1945 and 1965  More frequently in patients at high risk for Hepatitis C Hep C Antibody: Not on file        Diabetes Screening 1-2 times per year if you're at risk for diabetes or have pre-diabetes Fasting glucose: 143 mg/dL (12/20/2023)  A1C: 8.8 % (12/12/2023)  Screening Not Indicated  History Diabetes   Cholesterol  Screening Once every 5 years if you don't have a lipid disorder. May order more often based on risk factors. Lipid panel: 12/12/2023  Screening Current      Other Preventive Screenings Covered by Medicare:  Abdominal Aortic Aneurysm (AAA) Screening: covered once if your at risk. You're considered to be at risk if you have a family history of AAA or a male between the age of 65-75 who smoking at least 100 cigarettes in your lifetime.  Lung Cancer Screening: covers low dose CT scan once per year if you meet all of the following conditions: (1) Age 55-77; (2) No signs or symptoms of lung cancer; (3) Current smoker or have quit smoking within the last 15 years; (4) You have a tobacco smoking history of at least 20 pack years (packs per day x number of years you smoked); (5) You get a written order from a healthcare provider.  Glaucoma Screening: covered annually if you're considered high risk: (1) You have diabetes OR (2) Family history of glaucoma OR (3)  aged 50 and older OR (4)  American aged 65 and older  Osteoporosis Screening: covered every 2 years if you meet one of the following conditions: (1) Have a vertebral abnormality; (2) On glucocorticoid therapy for more than 3 months; (3) Have primary hyperparathyroidism; (4) On osteoporosis medications and need to assess response to drug therapy.  HIV Screening: covered annually if you're between the age of 15-65. Also covered annually if you are younger than 15 and older than 65 with risk factors for HIV infection. For pregnant patients, it is covered up to 3 times per pregnancy.    Immunizations:  Immunization Recommendations   Influenza Vaccine Annual influenza vaccination during flu season is recommended for all persons aged >= 6 months who do not have contraindications   Pneumococcal Vaccine   * Pneumococcal conjugate vaccine = PCV13 (Prevnar 13), PCV15 (Vaxneuvance), PCV20 (Prevnar 20)  * Pneumococcal polysaccharide vaccine = PPSV23  (Pneumovax) Adults 19-65 yo with certain risk factors or if 65+ yo  If never received any pneumonia vaccine: recommend Prevnar 20 (PCV20)  Give PCV20 if previously received 1 dose of PCV13 or PPSV23   Hepatitis B Vaccine 3 dose series if at intermediate or high risk (ex: diabetes, end stage renal disease, liver disease)   Respiratory syncytial virus (RSV) Vaccine - COVERED BY MEDICARE PART D  * RSVPreF3 (Arexvy) CDC recommends that adults 60 years of age and older may receive a single dose of RSV vaccine using shared clinical decision-making (SCDM)   Tetanus (Td) Vaccine - COST NOT COVERED BY MEDICARE PART B Following completion of primary series, a booster dose should be given every 10 years to maintain immunity against tetanus. Td may also be given as tetanus wound prophylaxis.   Tdap Vaccine - COST NOT COVERED BY MEDICARE PART B Recommended at least once for all adults. For pregnant patients, recommended with each pregnancy.   Shingles Vaccine (Shingrix) - COST NOT COVERED BY MEDICARE PART B  2 shot series recommended in those 19 years and older who have or will have weakened immune systems or those 50 years and older     Health Maintenance Due:  There are no preventive care reminders to display for this patient.  Immunizations Due:      Topic Date Due   • Influenza Vaccine (1) 09/01/2023   • COVID-19 Vaccine (3 - 2023-24 season) 09/01/2023     Advance Directives   What are advance directives?  Advance directives are legal documents that state your wishes and plans for medical care. These plans are made ahead of time in case you lose your ability to make decisions for yourself. Advance directives can apply to any medical decision, such as the treatments you want, and if you want to donate organs.   What are the types of advance directives?  There are many types of advance directives, and each state has rules about how to use them. You may choose a combination of any of the following:  Living will:  This is a  written record of the treatment you want. You can also choose which treatments you do not want, which to limit, and which to stop at a certain time. This includes surgery, medicine, IV fluid, and tube feedings.   Durable power of  for healthcare (DPAHC):  This is a written record that states who you want to make healthcare choices for you when you are unable to make them for yourself. This person, called a proxy, is usually a family member or a friend. You may choose more than 1 proxy.  Do not resuscitate (DNR) order:  A DNR order is used in case your heart stops beating or you stop breathing. It is a request not to have certain forms of treatment, such as CPR. A DNR order may be included in other types of advance directives.  Medical directive:  This covers the care that you want if you are in a coma, near death, or unable to make decisions for yourself. You can list the treatments you want for each condition. Treatment may include pain medicine, surgery, blood transfusions, dialysis, IV or tube feedings, and a ventilator (breathing machine).  Values history:  This document has questions about your views, beliefs, and how you feel and think about life. This information can help others choose the care that you would choose.  Why are advance directives important?  An advance directive helps you control your care. Although spoken wishes may be used, it is better to have your wishes written down. Spoken wishes can be misunderstood, or not followed. Treatments may be given even if you do not want them. An advance directive may make it easier for your family to make difficult choices about your care.   Fall Prevention    Fall prevention  includes ways to make your home and other areas safer. It also includes ways you can move more carefully to prevent a fall. Health conditions that cause changes in your blood pressure, vision, or muscle strength and coordination may increase your risk for falls. Medicines may  also increase your risk for falls if they make you dizzy, weak, or sleepy.   Fall prevention tips:   Stand or sit up slowly.    Use assistive devices as directed.    Wear shoes that fit well and have soles that .    Wear a personal alarm.    Stay active.    Manage your medical conditions.    Home Safety Tips:  Add items to prevent falls in the bathroom.    Keep paths clear.    Install bright lights in your home.    Keep items you use often on shelves within reach.    Paint or place reflective tape on the edges of your stairs.    Underweight  Underweight is defined as having a body mass index (BMI) of less than 18.5 kg/m2   Anorexia  is a loss of appetite, decreased food intake, or both. Your appetite naturally decreases as you get older. You also get full faster than you used to. This occurs because your body needs less energy. Other body changes can also lead to a decreased appetite. Even though some appetite loss is normal, you still need to get enough calories and nutrients to keep you healthy. You can start to lose too much weight if you do not eat as much food as your body needs. Unwanted weight loss can cause health problems, or worsen health problems you already have. You can also become dehydrated if you do not drink enough liquid.  How to eat healthy and get enough nutrients:   Choose healthy foods.  Eat a variety of fruits, vegetables, whole grains, low-fat dairy foods, lean meats, and other protein foods. Limit foods high in fat, sugar, and salt. Limit or avoid alcohol as directed. Work with a dietitian to help you plan your meals if you need to follow a special diet. A dietitian can also teach you how to modify foods if you have trouble chewing or swallowing.   Snack on healthy foods between meals  if you only eat a small amount during meals. Snacks provide extra healthy nutrients and calories between meals. Examples include fruit, cheese, and whole grain crackers.   Drink liquids as directed  to  avoid dehydration. Drink liquids between meals if they cause you to get full too quickly during meals. Ask how much liquid to drink each day and which liquids are best for you.   Use herbs, spices, and flavor enhancers to add flavor to foods.  Avoid using herbs and spice blends that also contain sodium. Ask your healthcare provider or dietitian about flavor enhancers. Flavor enhancers with ham, natural cast, and roast beef flavors can also be sprinkled on food to add flavor.   Share meals with others as often as you can.  Eating with others may help you to eat better during meal time. Ask family members, neighbors, or friends to join you for lunch. There are also senior centers where you can meet people, and share meals with them.   Ask family and friends for help  with shopping or preparing foods. Ask for a ride to the grocery store, if needed.       © Copyright [employer] 2018 Information is for End User's use only and may not be sold, redistributed or otherwise used for commercial purposes. All illustrations and images included in CareNotes® are the copyrighted property of [employer] or [employer]
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
## 2024-01-09 NOTE — LETTER
Diabetic Foot Exam Form    Date Requested: 24  Patient: Juan Antonio Nicholson  Patient : [id number]   Referring Provider: Frank Lombardi, DO    Diabetic Foot Exam Performed with shoes and socks removed        Yes         No     Date of Diabetic Foot Exam ______________________________  Risk Score ____________________________________________    Left Foot       Visual Inspection         Monofilament Testing Sensory Exam        Pedal Pulses         Additional Comments         Right Foot      Visual Inspection         Monofilament Testing Sensory Exam       Pedal Pulses         Additional Comments         Comments __________________________________________________________    Practice Providing Exam ______________________________________________    Exam Performed By (print name) _______________________________________      Provider Signature ___________________________________________________      These reports are needed for  compliance.    Please fax this completed form and a copy of the Diabetic Foot Exam report to our office located at [address] [place] [address] as soon as possible via Fax [phone] attention [place]: Phone [phone]    We thank you for your assistance in treating our mutual patient.    [facility] [phone] F [phone]

## 2024-01-09 NOTE — TELEPHONE ENCOUNTER
----- Message from Frank Lombardi, DO sent at 1/8/2024  1:16 PM EST -----  Regarding: foot  01/08/24 1:16 PM    Hello, our patient Juan Antonio Nicholson has had Diabetic Foot Exam completed/performed. Please assist in updating the patient chart by making an External outreach to [facility] facility located in recent. The date of service is p burg.    Thank you,  Frank Lombardi, DO  [facility]

## 2024-01-11 NOTE — TELEPHONE ENCOUNTER
Upon review of the In Basket request and the patient's chart, initial outreach has been made via fax to facility. Please see Contacts section for details.     Thank you  Dinorah Noe

## 2024-01-19 PROBLEM — J11.1 INFLUENZA: Status: ACTIVE | Noted: 2024-01-01

## 2024-01-19 NOTE — ASSESSMENT & PLAN NOTE
Secondary to COPD exacerbation and concomitant Influenza; severe disease severe pulmonary disease at baseline  Has required BiPAP for respiratory support since transport with EMS, when taken off BiPAP in the ER became significantly hypoxic.  Case discussed with ICU given BiPAP dependence, patient deemed stable for the floor on BiPAP    Continue BiPAP; will attempt to wean off in 1 to 2 hours.  IV Solu-Medrol 40 mg 3 times daily  Budesonide and Perforomist nebulizers every 12 hours  Albuterol/Atrovent nebulizer 3 times daily  Tamiflu x 5 days  Goals of care: DNR/DNI

## 2024-01-19 NOTE — ED NOTES
Patient stood up at bedside to urinate, O2 sats dropped to 80s on 6L. Placed back on bipap.     Laisha Hill RN  01/19/24 [id number]
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
## 2024-01-19 NOTE — ED NOTES
As per RAMON Bailey, DO, ok to give PO medications when patient is off Bipap.     Laisha Hill RN  01/19/24 7204

## 2024-01-19 NOTE — ASSESSMENT & PLAN NOTE
Continue home carvedilol and digoxin for rate control  Continue home Coumadin for anticoagulation  Daily INR checks  Goal INR 2.0-3.0

## 2024-01-19 NOTE — ASSESSMENT & PLAN NOTE
Patient presents with acute on chronic respiratory failure  Tested positive for influenza A in the emergency room  Chest x-ray with no acute disease    Tamiflu x 5 days  Droplet and contact precautions
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
## 2024-01-19 NOTE — ED PROVIDER NOTES
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 mouth 2 (two) times a day  Qty: 180 tablet, Refills: 3    Associated Diagnoses: Cardiomyopathy, unspecified type ([facility])      digoxin (LANOXIN) 0.125 mg tablet Take 1 tablet (125 mcg total) by mouth daily  Qty: 90 tablet, Refills: 3    Associated Diagnoses: Chronic atrial fibrillation ([facility])      Empagliflozin (Jardiance) 10 MG TABS tablet Take 1 tablet (10 mg total) by mouth every morning  Qty: 90 tablet, Refills: 3    Associated Diagnoses: Chronic systolic congestive heart failure (HCC); Heart failure, left, 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% ([facility]); Stage 3a chronic kidney disease ([facility])      fosinopril (MONOPRIL) 10 mg tablet Take 1 tablet (10 mg total) by mouth daily  Qty: 90 tablet, Refills: 1    Associated Diagnoses: Arteriosclerosis of coronary artery      guaiFENesin (MUCINEX) 600 mg 12 hr tablet Take 1,200 mg by mouth every 12 (twelve) hours      ipratropium-albuterol (DUO-NEB) 0.5-2.5 mg/3 mL nebulizer solution Take 3 mL by nebulization 4 (four) times a day as needed for wheezing or shortness of breath  Qty: 360 mL, Refills: 7    Associated Diagnoses: 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HCC)      memantine (NAMENDA) 10 mg tablet TAKE 1 TABLET TWICE DAILY  Qty: 180 tablet, Refills: 1    Associated Diagnoses: Alzheimer's dementia without behavioral disturbance (HCC); Cognitive decline      metFORMIN (GLUCOPHAGE) 500 mg tablet TAKE 1 TABLET EVERY  DAY WITH BREAKFAST  Qty: 90 tablet, Refills: 1    Associated Diagnoses: Type 2 diabetes mellitus with diabetic polyneuropathy, without long-term current use of insulin ([facility])      Omega-3 Fatty Acids (FISH OIL PO) Take by mouth      pantoprazole (PROTONIX) 40 mg tablet TAKE 1 TABLET EVERY DAY  Qty: 90 tablet, Refills: 1    Associated Diagnoses: Gastritis      rosuvastatin (CRESTOR) 10 MG tablet Take 1 tablet (10 mg total) by mouth daily at bedtime  Qty: 90 tablet, Refills: 3    Associated Diagnoses: Chronic systolic congestive heart failure ([facility]); Heart failure, left, 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 warfarin (COUMADIN) 3 mg tablet TAKE AS DIRECTED BY OFFICE BASED ON INR (GOAL INR AT PRESENT IS 1.5 TO 2)  Qty: 90 tablet, Refills: 1    Associated Diagnoses: Chronic atrial fibrillation ([facility])      Ferrous Sulfate (Iron) 325 (65 Fe) MG TABS Take by mouth every other day      furosemide (LASIX) 40 mg tablet Take one tablet on Unhtmc-Mheqaglwz-Jsnlpd morning  Qty: 90 tablet, Refills: 1    Associated Diagnoses: Heart failure ([facility])      glucose blood test strip Test      PRODIGY TWIST TOP LANCETS 28G MISC Use      !! warfarin (COUMADIN) 1 mg tablet TAKE 1 TABLET EVERY DAY (APPT NEEDED NOW - OVER A YEAR SINCE LAST SEEN)  Qty: 90 tablet, Refills: 0    Associated Diagnoses: PAF (paroxysmal atrial fibrillation) ([facility])      !! warfarin (COUMADIN) 5 mg tablet Take 1 tablet (5 mg total) by mouth daily  Qty: 90 tablet, Refills: 1    Associated Diagnoses: PAF (paroxysmal atrial fibrillation)  ([facility])       !! - Potential duplicate medications found. Please discuss with provider.          No discharge procedures on file.    PDMP Review       None            ED Provider  Electronically Signed by             Tesfaye Crooks MD  01/19/24 140       Tesfaye Crooks MD  01/20/24 3367

## 2024-01-19 NOTE — ED NOTES
Took patient off of Bipap at this time  and   placed on 6L nc at this time.  Sats 97%      Phoebe Smith, BRADLEY  01/19/24 5555

## 2024-01-19 NOTE — ASSESSMENT & PLAN NOTE
Hold home oral regimen  Start Lantus 8 units nightly  Insulin sliding scale for correctional coverage  Consistent carb diet when able to to come off BiPAP  Hypoglycemia protocol  Monitor for steroid-induced hyperglycemia

## 2024-01-19 NOTE — ED NOTES
Dr. Crooks does not want a sepsis alert called at this time.       Robert Landry, RN  01/19/24 2341

## 2024-01-19 NOTE — ASSESSMENT & PLAN NOTE
Not in acute exacerbation  Continue home Lasix 40 mg Monday/Wednesday/Friday  Low-sodium diet when able to come off BiPAP  Monitor on telemetry

## 2024-01-20 NOTE — ASSESSMENT & PLAN NOTE
Secondary to COPD exacerbation and concomitant Influenza; severe disease severe pulmonary disease at baseline  Has required BiPAP for respiratory support since transport with EMS, when taken off BiPAP in the ER became significantly hypoxic.  Case discussed with ICU given BiPAP dependence, patient deemed stable for the floor on BiPAP    Patient refused BiPAP late evening, required 15 L O2 overnight,  Has been stable so far today on 15 L via mid flow  Continue IV Solu-Medrol 40 mg 3 times daily  Budesonide and Perforomist nebulizers every 12 hours  Albuterol/Atrovent nebulizer 3 times daily  Tamiflu x 5 days  Goals of care: DNR/DNI

## 2024-01-20 NOTE — ASSESSMENT & PLAN NOTE
Secondary to COPD exacerbation and concomitant Influenza; severe disease severe pulmonary disease at baseline  Has required BiPAP for respiratory support since transport with EMS, when taken off BiPAP in the ER became significantly hypoxic.  Initially required BiPAP on the floor, but has since been stabilized on high flow nasal cannula oxygen    Now off BiPAP and stable on high flow  IV Solu-Medrol discontinued; started on prednisone taper  Budesonide and Perforomist nebulizers every 12 hours  Albuterol/Atrovent nebulizer 3 times daily  BiPAP nightly; patient tolerating only intermittently  Tamiflu x 5 days  Goals of care: DNR/DNI

## 2024-01-20 NOTE — PHYSICAL THERAPY NOTE
"   PT EVALUATION     01/20/24 1445   PT Last Visit   PT Visit Date 01/20/24   Note Type   Note type Evaluation   Pain Assessment   Pain Assessment Tool 0-10   Pain Score No Pain   Restrictions/Precautions   Weight Bearing Precautions Per Order No   Other Precautions Contact/isolation;Droplet precautions;Chair Alarm;Bed Alarm;Cognitive;O2;Fall Risk   Home Living   Type of Home Apartment   Home Layout One level  (0 FELICITA)   Additional Comments Pt states that he has no AD and does not use one at home.  Pt is a questionable historian   Prior Function   Level of [place] Independent with ADLs;Independent with functional mobility;Needs assistance with IADLS   Lives With Spouse   Receives Help From Family   Comments Pt has home O2 which he usually wears at night   General   Additional Pertinent History Pt admitted with SOB; RSV   Family/Caregiver Present No   Cognition   Overall Cognitive Status Impaired   Arousal/Participation Cooperative   Orientation Level Oriented to person;Oriented to place   Memory Decreased recall of biographical information   Following Commands Follows one step commands inconsistently   Subjective   Subjective Pt is refusing to do any \"exercises\" today.  Pt also declines to get up with therapist.   RLE Assessment   RLE Assessment WFL  (strength at least 3+/5 via AROM)   LLE Assessment   LLE Assessment WFL  (strength at least 3+/5 via AROM)   Bed Mobility   Supine to Sit Unable to assess   Additional Comments Pt declined any mobility at this time   Transfers   Sit to Stand Unable to assess   Stand to Sit Unable to assess   Additional Comments Pt declined mobility: pt getting slightly agitated with PT requests to mobilize   Ambulation/Elevation   Gait Assistance Not tested   Activity Tolerance   Activity Tolerance Treatment limited secondary to agitation   Nurse Made Aware yes: Artur: pt was getting agitated   (RN aware, was getting agitated earlier as well)   Assessment   Prognosis Good   Problem " List Decreased strength;Decreased endurance;Impaired balance;Decreased mobility;Decreased cognition;Impaired judgement;Decreased safety awareness   Assessment Patient seen for Physical Therapy evaluation. Patient admitted with Acute on chronic respiratory failure with hypoxia and hypercapnia (HCC).  Comorbidities affecting patient's physical performance include: COPD, CAD, DM, alzheimer's dementia, CVA .  Personal factors affecting patient at time of initial evaluation include: lives in single story house, ambulating with assistive device, inability to ambulate household distances, inability to navigate community distances, inability to navigate level surfaces without external assistance, limited home support, limited insight into impairments, inability to perform ADLS, and inability to perform IADLS . Prior to admission, patient was independent with functional mobility without assistive device, independent with ADLS, requiring assist for IADLS, living with wife in a single level home with 0 steps to enter, ambulating household distance, retired, and home with family assist.  Please find objective findings from Physical Therapy assessment regarding body systems outlined above with impairments and limitations including weakness, impaired balance, decreased endurance, gait deviations, decreased activity tolerance, decreased functional mobility tolerance, decreased safety awareness, impaired judgement, fall risk, SOB upon exertion, and decreased cognition.  The Barthel Index was used as a functional outcome tool presenting with a score of Barthel Index Score: 30 today indicating marked limitations of functional mobility and ADLS.  Patient's clinical presentation is currently unstable/unpredictable as seen in patient's presentation of vital sign response, varying levels of cognitive performance, increased fall risk, new onset of impairment of functional mobility, and decreased endurance. Pt would benefit from continued  Physical Therapy treatment to address deficits as defined above and maximize level of functional mobility. As demonstrated by objective findings, the assigned level of complexity for this evaluation is high.The patient's AM-PAC Basic Mobility Inpatient Short Form Raw Score is 15. A Raw score of less than or equal to 16 suggests the patient may benefit from discharge to post-acute rehabilitation services, pending progress during hospital course.. Please also refer to the recommendation of the Physical Therapist for safe discharge planning.   Goals   Patient Goals to not exercise now   STG Expiration Date 01/27/24   Short Term Goal #1 Pt participates with functional mobility assessment for OOB and gait.   Short Term Goal #2 Indep with transfers supine <> short sit and Min A for sit <> stand   LTG Expiration Date 02/03/24   Long Term Goal #1 Min A for amb. with LRAD for functional household distances.   Long Term Goal #2 AMPAC to at least 17/24   Plan   Treatment/Interventions Functional transfer training;LE strengthening/ROM;Therapeutic exercise;Endurance training;Cognitive reorientation;Patient/family training;Equipment eval/education;Bed mobility;Gait training;Spoke to nursing   PT Frequency Other (Comment)  (5/wk)   Discharge Recommendation   Rehab Resource Intensity Level, PT II (Moderate Resource Intensity)   Additional Comments Pt is a questionable historian.  Will continue to investigate pt's home set up and abilities prior to admit.   Additional Comments 2 AMPAC is scored with help of RN who stood pt before PT entered room.   AM-PAC Basic Mobility Inpatient   Turning in Flat Bed Without Bedrails 3   Lying on Back to Sitting on Edge of Flat Bed Without Bedrails 3   Moving Bed to Chair 3   Standing Up From Chair Using Arms 3   Walk in Room 2   Climb 3-5 Stairs With Railing 1   Basic Mobility Inpatient Raw Score 15   Basic Mobility Standardized Score 36.97   Highest Level Of Mobility   -[facility] Goal 4: Move to  chair/commode   -[facility] Achieved 2: Bed activities/Dependent transfer   Barthel Index   Feeding 5   Bathing 0   Grooming Score 0   Dressing Score 5   Bladder Score 5   Bowels Score 5   Toilet Use Score 5   Transfers (Bed/Chair) Score 5   Mobility (Level Surface) Score 0   Stairs Score 0   Barthel Index Score 30   End of Consult   Patient Position at End of Consult Supine;Bed/Chair alarm activated;All needs within reach   Licensure   [place] License Number  Vida Kendrick PT  [id number]

## 2024-01-20 NOTE — ASSESSMENT & PLAN NOTE
Hold home oral regimen  Start Lantus 8 units nightly  Insulin sliding scale for correctional coverage  Consistent carb diet when able  Hypoglycemia protocol  Monitor for steroid-induced hyperglycemia

## 2024-01-20 NOTE — RESPIRATORY THERAPY NOTE
Patient ripped bipap mask off again and desaturated to 75%. The mask was damaged and not able to be placed back on. RN at the bedside and placed on nasal cannula at 15L. NP Girish Albrecht was on the unit was was notified. She did not go to assess the patient at time.

## 2024-01-20 NOTE — PLAN OF CARE
Problem: RESPIRATORY - ADULT  Goal: Achieves optimal ventilation and oxygenation  Description: INTERVENTIONS:  - Assess for changes in respiratory status  - Assess for changes in mentation and behavior  - Position to facilitate oxygenation and minimize respiratory effort  - Oxygen administered by appropriate delivery if ordered  - Initiate smoking cessation education as indicated  - Encourage broncho-pulmonary hygiene including cough, deep breathe, Incentive Spirometry  - Assess the need for suctioning and aspirate as needed  - Assess and instruct to report SOB or any respiratory difficulty  - Respiratory Therapy support as indicated  Outcome: Progressing     Problem: PAIN - ADULT  Goal: Verbalizes/displays adequate comfort level or baseline comfort level  Description: Interventions:  - Encourage patient to monitor pain and request assistance  - Assess pain using appropriate pain scale  - Administer analgesics based on type and severity of pain and evaluate response  - Implement non-pharmacological measures as appropriate and evaluate response  - Consider cultural and social influences on pain and pain management  - Notify physician/advanced practitioner if interventions unsuccessful or patient reports new pain  Outcome: Progressing     Problem: INFECTION - ADULT  Goal: Absence or prevention of progression during hospitalization  Description: INTERVENTIONS:  - Assess and monitor for signs and symptoms of infection  - Monitor lab/diagnostic results  - Monitor all insertion sites, i.e. indwelling lines, tubes, and drains  - Monitor endotracheal if appropriate and nasal secretions for changes in amount and color  - [place] appropriate cooling/warming therapies per order  - Administer medications as ordered  - Instruct and encourage patient and family to use good hand hygiene technique  - Identify and instruct in appropriate isolation precautions for identified infection/condition  Outcome: Progressing     Problem:  SAFETY ADULT  Goal: Patient will remain free of falls  Description: INTERVENTIONS:  - Educate patient/family on patient safety including physical limitations  - Instruct patient to call for assistance with activity   - Consult OT/PT to assist with strengthening/mobility   - Keep Call bell within reach  - Keep bed low and locked with side rails adjusted as appropriate  - Keep care items and personal belongings within reach  - Initiate and maintain comfort rounds  - Make Fall Risk Sign visible to staff  - Offer Toileting every 2 Hours, in advance of need  - Initiate/Maintain bed alarm  - Obtain necessary fall risk management equipment: yellow socks  - Apply yellow socks and bracelet for high fall risk patients  - Consider moving patient to room near nurses station  Outcome: Progressing  Goal: Maintain or return to baseline ADL function  Description: INTERVENTIONS:  -  Assess patient's ability to carry out ADLs; assess patient's baseline for ADL function and identify physical deficits which impact ability to perform ADLs (bathing, care of mouth/teeth, toileting, grooming, dressing, etc.)  - Assess/evaluate cause of self-care deficits   - Assess range of motion  - Assess patient's mobility; develop plan if impaired  - Assess patient's need for assistive devices and provide as appropriate  - Encourage maximum independence but intervene and supervise when necessary  - Involve family in performance of ADLs  - Assess for home care needs following discharge   - Consider OT consult to assist with ADL evaluation and planning for discharge  - Provide patient education as appropriate  Outcome: Progressing  Goal: Maintains/Returns to pre admission functional level  Description: INTERVENTIONS:  - Perform AM-PAC 6 Click Basic Mobility/ Daily Activity assessment daily.  - Set and communicate daily mobility goal to care team and patient/family/caregiver.   - Collaborate with rehabilitation services on mobility goals if consulted  -  Perform Range of Motion 4 times a day.  - Reposition patient every 2 hours.  - Dangle patient 3 times a day  - Stand patient 3 times a day  - Ambulate patient 3 times a day  - Out of bed to chair 3 times a day   - Out of bed for meals 3 times a day  - Out of bed for toileting  - Record patient progress and toleration of activity level   Outcome: Progressing     Problem: DISCHARGE PLANNING  Goal: Discharge to home or other facility with appropriate resources  Description: INTERVENTIONS:  - Identify barriers to discharge w/patient and caregiver  - Arrange for needed discharge resources and transportation as appropriate  - Identify discharge learning needs (meds, wound care, etc.)  - Arrange for interpretive services to assist at discharge as needed  - Refer to Case Management Department for coordinating discharge planning if the patient needs post-hospital services based on physician/advanced practitioner order or complex needs related to functional status, cognitive ability, or social support system  Outcome: Progressing     Problem: Knowledge Deficit  Goal: Patient/family/caregiver demonstrates understanding of disease process, treatment plan, medications, and discharge instructions  Description: Complete learning assessment and assess knowledge base.  Interventions:  - Provide teaching at level of understanding  - Provide teaching via preferred learning methods  Outcome: Progressing

## 2024-01-20 NOTE — ASSESSMENT & PLAN NOTE
Not in acute exacerbation  Continue home Lasix 40 mg Monday/Wednesday/Friday  Low-sodium diet  Monitor on telemetry

## 2024-01-20 NOTE — PROGRESS NOTES
[facility]  Progress Note  Name: Juan Antonio Nicholson I  MRN: [id number]  Unit/Bed#: [address] [place] [address] I Date of Admission: 1/19/2024   Date of Service: 1/20/2024 I Hospital Day: 1    Assessment/Plan   * Acute on chronic respiratory failure with hypoxia and hypercapnia (HCC)  Assessment & Plan  Secondary to COPD exacerbation and concomitant Influenza; severe disease severe pulmonary disease at baseline  Has required BiPAP for respiratory support since transport with EMS, when taken off BiPAP in the ER became significantly hypoxic.  Case discussed with ICU given BiPAP dependence, patient deemed stable for the floor on BiPAP    Now off BiPAP and stable on Vapotherm  Continue IV Solu-Medrol 40 mg 3 times daily  Budesonide and Perforomist nebulizers every 12 hours  Albuterol/Atrovent nebulizer 3 times daily  BiPAP nightly  Tamiflu x 5 days  Goals of care: DNR/DNI    Influenza  Assessment & Plan  Patient presents with acute on chronic respiratory failure  Tested positive for influenza A in the emergency room  Chest x-ray with no acute disease    Tamiflu x 5 days  Droplet and contact precautions    Stage 3a chronic kidney disease ([facility])  Assessment & Plan  Renal function at baseline with creatinine around 1.1-1.2  Monitor daily BMP    Alzheimer's dementia without behavioral disturbance ([facility])  Assessment & Plan  Continue home Namenda  Supportive care  Fall and delirium precautions    Heart failure, left, 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% ([facility])  Assessment & Plan  Not in acute exacerbation  Continue home Lasix 40 mg Monday/Wednesday/Friday  Low-sodium diet  Monitor on telemetry    PAF (paroxysmal atrial fibrillation) ([facility])  Assessment & Plan  Continue home carvedilol and digoxin for rate control  Continue home Coumadin for anticoagulation  Daily INR checks  Goal INR 2.0-3.0    Type 2 diabetes mellitus with diabetic polyneuropathy, without long-term current use of insulin ([facility])  Assessment & Plan  Hold home oral regimen  Start  Lantus 8 units nightly  Insulin sliding scale for correctional coverage  Consistent carb diet when able to to come off BiPAP  Hypoglycemia protocol  Monitor for steroid-induced hyperglycemia        VTE Pharmacologic Prophylaxis: VTE Score: 6 High Risk (Score >/= 5) - Pharmacological DVT Prophylaxis Ordered: warfarin (Coumadin). Sequential Compression Devices Ordered.    Mobility:   Basic Mobility Inpatient Raw Score: 16  JH-HLM Goal: 5: Stand one or more mins  JH-HLM Achieved: 2: Bed activities/Dependent transfer  HLM Goal NOT achieved. Continue with multidisciplinary rounding and encourage appropriate mobility to improve upon HLM goals.    Patient Centered Rounds: I performed bedside rounds with nursing staff today.   Discussions with Specialists or Other Care Team Provider: [facility]. Respiratory Therapy.     Education and Discussions with Family / Patient: Updated  (wife) at bedside.    Total Time Spent on Date of Encounter in care of patient: 35 mins. This time was spent on one or more of the following: performing physical exam; counseling and coordination of care; obtaining or reviewing history; documenting in the medical record; reviewing/ordering tests, medications or procedures; communicating with other healthcare professionals and discussing with patient's family/caregivers.    Current Length of Stay: 1 day(s)  Current Patient Status: Inpatient   Certification Statement: The patient will continue to require additional inpatient hospital stay due to hypoxic respiratory failure, BiPAP support, influenza, COPD exacerbation, IV steroids,  Discharge Plan: Anticipate discharge in >72 hrs to discharge location to be determined pending rehab evaluations.    Code Status: Level 3 - DNAR and DNI    Subjective:   Patient seen and examined earlier this morning.  He refused BiPAP and took it off in my presence.  We placed him on mid flow at 15 L.  Still had some mild respiratory distress though was maintaining  adequate saturation.  Wife at bedside.    Objective:     Vitals:   Temp (24hrs), Av.2 °F (36.2 °C), Min:96.5 °F (35.8 °C), Max:98.2 °F (36.8 °C)    Temp:  [96.5 °F (35.8 °C)-98.2 °F (36.8 °C)] 98.2 °F (36.8 °C)  HR:  [] 88  Resp:  [19-26] 22  BP: (121-169)/() 121/62  SpO2:  [75 %-99 %] 95 %  Body mass index is 17.43 kg/m².     Input and Output Summary (last 24 hours):     Intake/Output Summary (Last 24 hours) at 2024 1515  Last data filed at 2024 1101  Gross per 24 hour   Intake --   Output 550 ml   Net -550 ml       PHYSICAL EXAM:    Vitals signs reviewed  Constitutional   Awake and cooperative. NAD.  Cachectic.   Head/Neck   Normocephalic. Atraumatic.   HEENT   No scleral icterus. EOMI.   Heart   Regular rate and rhythm. No murmers.   Lungs Prolonged expiration.  Tachypneic.  Labored respirations.  Expiratory faint expiratory wheezing present.   Abdomen   Soft. Nontender. Nondistended.    Skin   Skin color normal. No rashes.   Extremities   No deformities. No peripheral edema.   Neuro   Alert and oriented. No new deficits.   Psych   Mood stable. Affect normal.         Additional Data:     Labs:  Results from last 7 days   Lab Units 24  0357 24  1044   WBC Thousand/uL 7.97 11.00*   HEMOGLOBIN g/dL 12.7 13.8   HEMATOCRIT % 39.4 41.7   PLATELETS Thousands/uL 159 181   NEUTROS PCT %  --  66   LYMPHS PCT %  --  21   MONOS PCT %  --  9   EOS PCT %  --  2     Results from last 7 days   Lab Units 24  0357 24  1044   SODIUM mmol/L 140 136   POTASSIUM mmol/L 4.1 4.0   CHLORIDE mmol/L 102 102   CO2 mmol/L 26 34*   BUN mg/dL 33* 17   CREATININE mg/dL 1.08 1.14   ANION GAP mmol/L 12 0   CALCIUM mg/dL 8.8 9.3   ALBUMIN g/dL  --  4.1   TOTAL BILIRUBIN mg/dL  --  0.48   ALK PHOS U/L  --  67   ALT U/L  --  16   AST U/L  --  18   GLUCOSE RANDOM mg/dL 164* 168*     Results from last 7 days   Lab Units 24  0358   INR  2.47*     Results from last 7 days   Lab Units 24  1129  01/20/24  0728 01/19/24  2029 01/19/24  1650   POC GLUCOSE mg/dl 182* 197* 208* 180*         Results from last 7 days   Lab Units 01/19/24  1136 01/19/24  1044   LACTIC ACID mmol/L 0.8  --    PROCALCITONIN ng/ml  --  0.08       Lines/Drains:  Invasive Devices       Peripheral Intravenous Line  Duration             Peripheral IV 01/19/24 Right;Ventral (anterior) Hand <1 day                      Telemetry:  Telemetry Orders (From admission, onward)               24 Hour Telemetry Monitoring  Continuous x 24 Hours (Telem)        Expiring   Question:  Reason for 24 Hour Telemetry  Answer:  Acute respiratory failure on BiPAP                     Telemetry Reviewed:   Indication for Continued Telemetry Use: Acute respiratory failure on Bipap             Imaging: Reviewed radiology reports from this admission including: chest xray    Recent Cultures (last 7 days):   Results from last 7 days   Lab Units 01/19/24  1044   BLOOD CULTURE  No Growth at 24 hrs.  No Growth at 24 hrs.       Last 24 Hours Medication List:   Current Facility-Administered Medications   Medication Dose Route Frequency Provider Last Rate    acetaminophen  650 mg Oral Q6H PRN Jean Pierre Bailey DO      budesonide  0.5 mg Nebulization Q12H Sonia Varner DO      carvedilol  6.25 mg Oral BID Jean Pierre Bailey DO      digoxin  125 mcg Oral Daily Jean Pierre Bailey DO      enoxaparin  40 mg Subcutaneous Daily Jean Pierre Bailey DO      famotidine  20 mg Intravenous HS Girish LeathaRD hamilton      formoterol  20 mcg Nebulization Q12H Sonia Varner DO      furosemide  40 mg Oral Once per day on Monday Wednesday Friday Jean Pierre Bailey DO      insulin glargine  8 Units Subcutaneous HS Jean Pierre Bailey DO      insulin lispro  1-5 Units Subcutaneous TID AC Jean Pierre Bailey DO      insulin lispro  1-5 Units Subcutaneous HS Jean Pierre Bailey DO      ipratropium  0.5 mg Nebulization TID Sonia Varner DO       levalbuterol  1.25 mg Nebulization TID Sonia Varner,       lisinopril  10 mg Oral Daily Jean Pierre Bailey, DO      memantine  10 mg Oral BID Jean Pierre Bailey, DO      methylPREDNISolone sodium succinate  40 mg Intravenous Q8H ANCA Varner, DO      ondansetron  4 mg Intravenous Q6H PRN Jean Pierre Bailey, DO      oseltamivir  30 mg Oral Q12H ANCA Sonia Varner, DO      pantoprazole  40 mg Oral Daily Jean Pierre Bailey, DO      pravastatin  80 mg Oral Daily With Dinner Jean Pierre Bailey, DO      tamsulosin  0.4 mg Oral Daily With Dinner Jean Pierre Bailey, DO      warfarin  1 mg Oral Daily (warfarin) Jean Pierre Bailey DO          Today, Patient Was Seen By: Jean Pierre Bailey DO    **Please Note: This note may have been constructed using a voice recognition system.**

## 2024-01-20 NOTE — PROGRESS NOTES
Pulmonary Progress Note  Juan Antonio May 83 y.o. male MRN: [id number]  Unit/Bed#: [address] [place] [address] Encounter: [id number]      Assessment/Recommendations:   Acute on chronic hypoxic and chronic hypercapnic respiratory failure due to influenza A and COPD exacerbation  Very severe COPD with acute exacerbation  Influenza A    -Overall improving  -Now on HFNC, 30 L 40% FiO2.  Work of breathing is improved so he is off BiPAP  -Titrate supplemental oxygen for goal SpO2 above 88%  -I would recommend BiPAP during sleep still due to chronic hypercapnia while hospitalized  -Check VBG in am  -Continue Xopenex/Atrovent  -Continue budesonide/Perforomist  -Continue methylprednisolone 40 mg every 8 hours, will likely down titrate tomorrow  -Tamiflu 5 day course  -Procal 0.08  -Patient is DNR/DNI.  He has an advanced directive  -Home regimen of Trelegy Ellipta and Nila, follows with Dr. Wheeler      Subjective: He feels significantly better from a breathing standpoint.  He is off BiPAP and is currently on Vapotherm 30 L 40% FiO2.  His appetite is coming back.  He is trying to stay hydrated.      Objective:  Vitals: Vitals personally reviewed  Vitals:    01/20/24 0150 01/20/24 0230 01/20/24 0727 01/20/24 1333   BP:  137/84 136/84 121/62   BP Location:       Pulse:  95 94 88   Resp:  20 21 22   Temp:  (!) 96.5 °F (35.8 °C) (!) 96.7 °F (35.9 °C) 98.2 °F (36.8 °C)   TempSrc:       SpO2: 96% 97% 98% 95%   Weight:       Height:          Body mass index is 17.43 kg/m².    Intake/Output Summary (Last 24 hours) at 1/20/2024 1510  Last data filed at 1/20/2024 1101  Gross per 24 hour   Intake --   Output 550 ml   Net -550 ml     Invasive Devices       Peripheral Intravenous Line  Duration             Peripheral IV 01/19/24 Right;Ventral (anterior) Hand <1 day                    Physical Exam  General: Elderly male sitting in bed upright in no acute distress, nontoxic  HEET: head is normocephalic, atraumatic.  EOMI.  No scleral icterus.    Neck:  Supple, no JVD, minimally using accessory muscles  CV:  Regular rhythm, +S1 S2  Lungs: Mild end expiratory wheezing in the right lung, diminished breath sounds left lung  Abdomen: Soft, non-distended  Extremities: No cyanosis.  No edema  Neuro: No focal deficits  Skin: Warm, dry  Lines/tubes: Peripheral IV  Oxygenation: High flow nasal cannula 30 L 40% FiO2, SpO2 of 96%      Labs: I have personally reviewed pertinent lab results.  Laboratory and Diagnostics  Results from last 7 days   Lab Units 01/20/24  0357 01/19/24  1044   WBC Thousand/uL 7.97 11.00*   HEMOGLOBIN g/dL 12.7 13.8   HEMATOCRIT % 39.4 41.7   PLATELETS Thousands/uL 159 181   NEUTROS PCT %  --  66   MONOS PCT %  --  9   EOS PCT %  --  2     Results from last 7 days   Lab Units 01/20/24  0357 01/19/24  1044   SODIUM mmol/L 140 136   POTASSIUM mmol/L 4.1 4.0   CHLORIDE mmol/L 102 102   CO2 mmol/L 26 34*   ANION GAP mmol/L 12 0   BUN mg/dL 33* 17   CREATININE mg/dL 1.08 1.14   CALCIUM mg/dL 8.8 9.3   GLUCOSE RANDOM mg/dL 164* 168*   ALT U/L  --  16   AST U/L  --  18   ALK PHOS U/L  --  67   ALBUMIN g/dL  --  4.1   TOTAL BILIRUBIN mg/dL  --  0.48     Results from last 7 days   Lab Units 01/19/24  1044   MAGNESIUM mg/dL 2.0      Results from last 7 days   Lab Units 01/20/24  0358 01/19/24  1844   INR  2.47* 1.85*          Results from last 7 days   Lab Units 01/19/24  1136   LACTIC ACID mmol/L 0.8                     Results from last 7 days   Lab Units 01/19/24  1044   PROCALCITONIN ng/ml 0.08           ABG:   Results from last 7 days   Lab Units 01/19/24  2120   PH ART  7.308*   PCO2 ART mm Hg 51.0*   PO2 ART mm Hg 93.8   HCO3 ART mmol/L 25.0   BASE EXC ART mmol/L -1.9   ABG SOURCE  Brachial, Left       Imaging and other studies: I have personally reviewed pertinent films in PACS  1/19 CXR -left-sided cardiac pacer, hyperinflation, cardiac silhouette appears unremarkable.      Code Status: Level 3 - DNAR and DNI      Anthony Liang MD  Pulmonary, Critical  "Care and Sleep Medicine  [facility]     Portions of the record may have been created with voice recognition software. Occasional wrong word or \"sound a like\" substitutions may have occurred due to the inherent limitations of voice recognition software. Please read the chart carefully and recognize, using context, where substitutions have occurred.     "
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
## 2024-01-21 PROBLEM — T17.908A ASPIRATION INTO AIRWAY: Status: ACTIVE | Noted: 2024-01-21

## 2024-01-21 NOTE — PLAN OF CARE
Problem: RESPIRATORY - ADULT  Goal: Achieves optimal ventilation and oxygenation  Description: INTERVENTIONS:  - Assess for changes in respiratory status  - Assess for changes in mentation and behavior  - Position to facilitate oxygenation and minimize respiratory effort  - Oxygen administered by appropriate delivery if ordered  - Initiate smoking cessation education as indicated  - Encourage broncho-pulmonary hygiene including cough, deep breathe, Incentive Spirometry  - Assess the need for suctioning and aspirate as needed  - Assess and instruct to report SOB or any respiratory difficulty  - Respiratory Therapy support as indicated  Outcome: Progressing     Problem: PAIN - ADULT  Goal: Verbalizes/displays adequate comfort level or baseline comfort level  Description: Interventions:  - Encourage patient to monitor pain and request assistance  - Assess pain using appropriate pain scale  - Administer analgesics based on type and severity of pain and evaluate response  - Implement non-pharmacological measures as appropriate and evaluate response  - Consider cultural and social influences on pain and pain management  - Notify physician/advanced practitioner if interventions unsuccessful or patient reports new pain  Outcome: Progressing     Problem: INFECTION - ADULT  Goal: Absence or prevention of progression during hospitalization  Description: INTERVENTIONS:  - Assess and monitor for signs and symptoms of infection  - Monitor lab/diagnostic results  - Monitor all insertion sites, i.e. indwelling lines, tubes, and drains  - Monitor endotracheal if appropriate and nasal secretions for changes in amount and color  - [place] appropriate cooling/warming therapies per order  - Administer medications as ordered  - Instruct and encourage patient and family to use good hand hygiene technique  - Identify and instruct in appropriate isolation precautions for identified infection/condition  Outcome: Progressing     Problem:  SAFETY ADULT  Goal: Patient will remain free of falls  Description: INTERVENTIONS:  - Educate patient/family on patient safety including physical limitations  - Instruct patient to call for assistance with activity   - Consult OT/PT to assist with strengthening/mobility   - Keep Call bell within reach  - Keep bed low and locked with side rails adjusted as appropriate  - Keep care items and personal belongings within reach  - Initiate and maintain comfort rounds  - Make Fall Risk Sign visible to staff  - Offer Toileting every 2 Hours, in advance of need  - Initiate/Maintain bed alarm  - Obtain necessary fall risk management equipment:   Problem: Prexisting or High Potential for Compromised Skin Integrity  Goal: Skin integrity is maintained or improved  Description: INTERVENTIONS:  - Identify patients at risk for skin breakdown  - Assess and monitor skin integrity  - Assess and monitor nutrition and hydration status  - Monitor labs   - Assess for incontinence   - Turn and reposition patient  - Assist with mobility/ambulation  - Relieve pressure over bony prominences  - Avoid friction and shearing  - Provide appropriate hygiene as needed including keeping skin clean and dry  - Evaluate need for skin moisturizer/barrier cream  - Collaborate with interdisciplinary team   - Patient/family teaching  - Consider wound care consult   Outcome: Progressing     - Apply yellow socks and bracelet for high fall risk patients  - Consider moving patient to room near nurses station  Outcome: Progressing
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
## 2024-01-21 NOTE — PROGRESS NOTES
Formerly [facility]  Progress Note  Name: Juan Antonio Nicholson I  MRN: [id number]  Unit/Bed#: [address] [place] [address] I Date of Admission: 1/19/2024   Date of Service: 1/21/2024 I Hospital Day: 2    Assessment/Plan   * Acute on chronic respiratory failure with hypoxia and hypercapnia (HCC)  Assessment & Plan  Secondary to COPD exacerbation and concomitant Influenza; severe disease severe pulmonary disease at baseline  Has required BiPAP for respiratory support since transport with EMS, when taken off BiPAP in the ER became significantly hypoxic.  Case discussed with ICU given BiPAP dependence, patient deemed stable for the floor on BiPAP    Now off BiPAP and stable on high flow  Continue IV Solu-Medrol 40 mg 3 times daily  Budesonide and Perforomist nebulizers every 12 hours  Albuterol/Atrovent nebulizer 3 times daily  BiPAP nightly  Tamiflu x 5 days  Goals of care: DNR/DNI    Aspiration into airway  Assessment & Plan  Witnessed aspiration with breakfast 1/21  Now n.p.o.  Speech pathology consulted: Recommended n.p.o. for now, may need video swallow    Influenza  Assessment & Plan  Patient presents with acute on chronic respiratory failure  Tested positive for influenza A in the emergency room  Chest x-ray with no acute disease    Tamiflu x 5 days  Droplet and contact precautions    Stage 3a chronic kidney disease ([facility])  Assessment & Plan  Renal function at baseline with creatinine around 1.1-1.2  Monitor daily BMP    Alzheimer's dementia without behavioral disturbance ([facility])  Assessment & Plan  Continue home Namenda  Supportive care  Fall and delirium precautions    Heart failure, left, 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% ([facility])  Assessment & Plan  Not in acute exacerbation  Continue home Lasix 40 mg Monday/Wednesday/Friday  Low-sodium diet  Monitor on telemetry    PAF (paroxysmal atrial fibrillation) ([facility])  Assessment & Plan  Continue home carvedilol and digoxin for rate control  Continue home Coumadin for anticoagulation  Daily INR  checks  Goal INR 2.0-3.0    Type 2 diabetes mellitus with diabetic polyneuropathy, without long-term current use of insulin (HCC)  Assessment & Plan  Hold home oral regimen  Start Lantus 8 units nightly  Insulin sliding scale for correctional coverage  Consistent carb diet when able  Hypoglycemia protocol  Monitor for steroid-induced hyperglycemia        VTE Pharmacologic Prophylaxis: VTE Score: 6 High Risk (Score >/= 5) - Pharmacological DVT Prophylaxis Ordered: warfarin (Coumadin). Sequential Compression Devices Ordered.    Mobility:   Basic Mobility Inpatient Raw Score: 15  -HLM Goal: 4: Move to chair/commode  JH-HLM Achieved: 2: Bed activities/Dependent transfer  HLM Goal NOT achieved. Continue with multidisciplinary rounding and encourage appropriate mobility to improve upon HLM goals.    Patient Centered Rounds: I performed bedside rounds with nursing staff today.   Discussions with Specialists or Other Care Team Provider: [facility]. Respiratory Therapy.     Education and Discussions with Family / Patient: Updated  (wife) at bedside.    Total Time Spent on Date of Encounter in care of patient: 35 mins. This time was spent on one or more of the following: performing physical exam; counseling and coordination of care; obtaining or reviewing history; documenting in the medical record; reviewing/ordering tests, medications or procedures; communicating with other healthcare professionals and discussing with patient's family/caregivers.    Current Length of Stay: 2 day(s)  Current Patient Status: Inpatient   Certification Statement: The patient will continue to require additional inpatient hospital stay due to hypoxic respiratory failure, BiPAP support, influenza, COPD exacerbation, IV steroids,  Discharge Plan: Anticipate discharge in >72 hrs to discharge location to be determined pending rehab evaluations.    Code Status: Level 3 - DNAR and DNI    Subjective:   Patient seen and examined.  Had a witnessed  aspiration event this morning with breakfast.  Had increased oxygen requirements temporarily.  Still doing okay on high flow now though.  Being evaluated by speech.    Objective:     Vitals:   Temp (24hrs), Av.6 °F (36.4 °C), Min:97.3 °F (36.3 °C), Max:97.9 °F (36.6 °C)    Temp:  [97.3 °F (36.3 °C)-97.9 °F (36.6 °C)] 97.3 °F (36.3 °C)  HR:  [82-95] 90  Resp:  [18-22] 22  BP: (105-130)/(62-81) 108/64  SpO2:  [94 %-99 %] 98 %  Body mass index is 17.43 kg/m².     Input and Output Summary (last 24 hours):     Intake/Output Summary (Last 24 hours) at 2024 1345  Last data filed at 2024 0901  Gross per 24 hour   Intake --   Output 550 ml   Net -550 ml       PHYSICAL EXAM:    Vitals signs reviewed  Constitutional   Awake and cooperative. NAD.  Cachectic.   Head/Neck   Normocephalic. Atraumatic.   HEENT   No scleral icterus. EOMI.   Heart   Regular rate and rhythm. No murmers.   Lungs Prolonged expiration.  Tachypneic.  Labored respirations.  Faint expiratory wheezing present.   Abdomen   Soft. Nontender. Nondistended.    Skin   Skin color normal. No rashes.   Extremities   No deformities. No peripheral edema.   Neuro   Alert and oriented. No new deficits.   Psych   Mood stable. Affect normal.         Additional Data:     Labs:  Results from last 7 days   Lab Units 24  1034 24  0357 24  1044   WBC Thousand/uL 12.31*   < > 11.00*   HEMOGLOBIN g/dL 12.8   < > 13.8   HEMATOCRIT % 38.6   < > 41.7   PLATELETS Thousands/uL 235   < > 181   NEUTROS PCT %  --   --  66   LYMPHS PCT %  --   --  21   MONOS PCT %  --   --  9   EOS PCT %  --   --  2    < > = values in this interval not displayed.     Results from last 7 days   Lab Units 24  1116 24  0357 24  1044   SODIUM mmol/L 140   < > 136   POTASSIUM mmol/L 4.2   < > 4.0   CHLORIDE mmol/L 99   < > 102   CO2 mmol/L 33*   < > 34*   BUN mg/dL 40*   < > 17   CREATININE mg/dL 1.22   < > 1.14   ANION GAP mmol/L 8   < > 0   CALCIUM mg/dL 9.0    < > 9.3   ALBUMIN g/dL  --   --  4.1   TOTAL BILIRUBIN mg/dL  --   --  0.48   ALK PHOS U/L  --   --  67   ALT U/L  --   --  16   AST U/L  --   --  18   GLUCOSE RANDOM mg/dL 152*   < > 168*    < > = values in this interval not displayed.     Results from last 7 days   Lab Units 01/21/24  1034   INR  2.44*     Results from last 7 days   Lab Units 01/21/24  1115 01/21/24  0732 01/20/24  2134 01/20/24  1612 01/20/24  1129 01/20/24  0728 01/19/24  2029 01/19/24  1650   POC GLUCOSE mg/dl 151* 148* 179* 140 182* 197* 208* 180*         Results from last 7 days   Lab Units 01/19/24  1136 01/19/24  1044   LACTIC ACID mmol/L 0.8  --    PROCALCITONIN ng/ml  --  0.08       Lines/Drains:  Invasive Devices       Peripheral Intravenous Line  Duration             Peripheral IV 01/19/24 Right;Ventral (anterior) Hand 1 day                      Telemetry:  Telemetry Orders (From admission, onward)               24 Hour Telemetry Monitoring  Continuous x 24 Hours (Telem)        Expiring   Question:  Reason for 24 Hour Telemetry  Answer:  Acute respiratory failure on BiPAP                     Telemetry Reviewed:   Indication for Continued Telemetry Use: Acute respiratory failure on Bipap             Imaging: Reviewed radiology reports from this admission including: chest xray    Recent Cultures (last 7 days):   Results from last 7 days   Lab Units 01/19/24  1044   BLOOD CULTURE  No Growth at 24 hrs.  No Growth at 24 hrs.       Last 24 Hours Medication List:   Current Facility-Administered Medications   Medication Dose Route Frequency Provider Last Rate    acetaminophen  650 mg Oral Q6H PRN Jean Pierre Bailey DO      budesonide  0.5 mg Nebulization Q12H Sonia Varner, DO      carvedilol  6.25 mg Oral BID Jean Pierre Bailey DO      digoxin  125 mcg Oral Daily Jean Pierre Bailey DO      enoxaparin  40 mg Subcutaneous Daily Jean Pierre Bailey DO      famotidine  20 mg Oral HS Jean Pierre Bailey DO       formoterol  20 mcg Nebulization Q12H Sonia Varner, DO      furosemide  40 mg Oral Once per day on Monday Wednesday Friday Jean Pierre Bailey, DO      insulin glargine  8 Units Subcutaneous HS Jean Pierre Bailey, DO      insulin lispro  1-5 Units Subcutaneous TID AC Jean Pierre Bailey, DO      insulin lispro  1-5 Units Subcutaneous HS Jean Pierre Bailey, DO      ipratropium  0.5 mg Nebulization TID Sonia Varner, DO      levalbuterol  1.25 mg Nebulization TID Sonia Varner, DO      lisinopril  10 mg Oral Daily Jean Pierre Bailey, DO      memantine  10 mg Oral BID Jean Pierre Bailey, DO      methylPREDNISolone sodium succinate  40 mg Intravenous Q8H [facility] Sonia Varner, DO      ondansetron  4 mg Intravenous Q6H PRN Jean Pierre Bailey, DO      oseltamivir  30 mg Oral Q12H [facility] Sonia Varner, DO      pantoprazole  40 mg Oral Daily Jean Pierre Bailey, DO      pravastatin  80 mg Oral Daily With Dinner Jean Pierre Bailey, DO      tamsulosin  0.4 mg Oral Daily With Dinner Jean Pierre Bailey, DO      warfarin  2 mg Oral Daily (warfarin) Jean Pierre Bailey DO          Today, Patient Was Seen By: Jean Pierre Bailey DO    **Please Note: This note may have been constructed using a voice recognition system.**

## 2024-01-21 NOTE — PROGRESS NOTES
The famotidine has / have been converted to Oral per [facility] IV-to-PO Auto-Conversion Protocol for Adults as approved by the Pharmacy and Therapeutics Committee. The patient met all eligible criteria:  1) Age = 18 years old   2) Received at least one dose of the IV form   3) Receiving at least one other scheduled oral/enteral medication   4) Tolerating an oral/enteral diet   and did not have any exclusions:   1) Critical care patient   2) Active GI bleed (IF assessing H2RAs or PPIs)   3) Continuous tube feeding (IF assessing cipro, doxycycline, levofloxacin, minocycline, rifampin, or voriconazole)   4) Receiving PO vancomycin (IF assessing metronidazole)   5) Persistent nausea and/or vomiting   6) Ileus or gastrointestinal obstruction   7) Wesly/nasogastric tube set for continuous suction   8) Specific order not to automatically convert to PO (in the order's comments or if discussed in the most recent Infectious Disease or primary team's progress notes).
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
## 2024-01-21 NOTE — ASSESSMENT & PLAN NOTE
Witnessed aspiration with breakfast 1/21  Now n.p.o.  Speech pathology consulted: Video swallow pending

## 2024-01-22 NOTE — H&P
Goals established to promote Patient Goals: to go home:  Eating: supervision; Grooming: supervision seated; Bathing: min assist; Upper Body Dressing supervision; Lower Body Dressing: mod assist; Toileting: min assist; Patient will increase stand pivot commode transfer to mod assist with rolling walker to increase performance and safety with ADLS and functional mobility; Patient will increase standing tolerance to 2 minutes during ADL task to decrease assistance level and decrease fall risk; Patient will increase bed mobility to min assist in preparation for ADLS and transfers; Patient will increase functional mobility to and from bathroom with rolling walker with mod assist to increase performance with ADLS and to use a toilet; Patient will tolerate 5 minutes of UE ROM/strengthening to increase general activity tolerance and performance in ADLS/IADLS; Patient will improve functional activity tolerance to 5 minutes of sustained functional tasks to increase participation in basic self-care and decrease assistance level;  Patient will be able to to verbalize understanding and perform energy conservation/proper body mechanics during ADLS and functional mobility at least 75% of the time with minimal cueing to decrease signs of fatigue and increase stamina to return to prior level of function; Patient will increase dynamic sitting balance to fair+ to improve the ability to sit at edge of bed or on a chair for ADLS;  Patient will increase static standing balance to poor+ to improve postural stability and decrease fall risk during standing ADLS and transfers.Patient will orient self x 4 with minimal verbal cues to increase overall awareness and promote safety with ADL/IADL tasks.         Patient will demonstrate compensatory techniques for low vision with minimal verbal cues to increase safety awareness, increase independence with ADLS and decrease fall risk.

## 2024-01-22 NOTE — PROGRESS NOTES
[facility]  Progress Note  Name: Juan Antonio Nicholson I  MRN: [id number]  Unit/Bed#: [address] [place] [address] I Date of Admission: 1/19/2024   Date of Service: 1/22/2024 I Hospital Day: 3    Assessment/Plan   * Acute on chronic respiratory failure with hypoxia and hypercapnia (HCC)  Assessment & Plan  Secondary to COPD exacerbation and concomitant Influenza; severe disease severe pulmonary disease at baseline  Has required BiPAP for respiratory support since transport with EMS, when taken off BiPAP in the ER became significantly hypoxic.  Initially required BiPAP on the floor, but has since been stabilized on high flow nasal cannula oxygen    Now off BiPAP and stable on high flow  IV Solu-Medrol discontinued; started on prednisone taper  Budesonide and Perforomist nebulizers every 12 hours  Albuterol/Atrovent nebulizer 3 times daily  BiPAP nightly; patient tolerating only intermittently  Tamiflu x 5 days  Goals of care: DNR/DNI    Aspiration into airway  Assessment & Plan  Witnessed aspiration with breakfast 1/21  Now n.p.o.  Speech pathology consulted: Video swallow pending    Influenza  Assessment & Plan  Patient presents with acute on chronic respiratory failure  Tested positive for influenza A in the emergency room  Chest x-ray with no acute disease    Tamiflu x 5 days  Droplet and contact precautions    Stage 3a chronic kidney disease ([facility])  Assessment & Plan  Renal function at baseline with creatinine around 1.1-1.2  Monitor daily BMP    Alzheimer's dementia without behavioral disturbance ([facility])  Assessment & Plan  Continue home Namenda  Supportive care  Fall and delirium precautions    Heart failure, left, 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% ([facility])  Assessment & Plan  Not in acute exacerbation  Continue home Lasix 40 mg Monday/Wednesday/Friday  Low-sodium diet  Monitor on telemetry    PAF (paroxysmal atrial fibrillation) ([facility])  Assessment & Plan  Continue home carvedilol and digoxin for rate control  Continue home Coumadin  for anticoagulation  Daily INR checks  Goal INR 2.0-3.0    Type 2 diabetes mellitus with diabetic polyneuropathy, without long-term current use of insulin (HCC)  Assessment & Plan  Hold home oral regimen  Start Lantus 8 units nightly  Insulin sliding scale for correctional coverage  Consistent carb diet when able  Hypoglycemia protocol  Monitor for steroid-induced hyperglycemia        VTE Pharmacologic Prophylaxis: VTE Score: 6 High Risk (Score >/= 5) - Pharmacological DVT Prophylaxis Ordered: warfarin (Coumadin). Sequential Compression Devices Ordered.    Mobility:   Basic Mobility Inpatient Raw Score: 15  -HLM Goal: 4: Move to chair/commode  JH-HLM Achieved: 2: Bed activities/Dependent transfer  HLM Goal NOT achieved. Continue with multidisciplinary rounding and encourage appropriate mobility to improve upon HLM goals.    Patient Centered Rounds: I performed bedside rounds with nursing staff today.   Discussions with Specialists or Other Care Team Provider: [facility]. Respiratory Therapy.     Education and Discussions with Family / Patient: Updated  (wife) at bedside.    Total Time Spent on Date of Encounter in care of patient: 40 mins. This time was spent on one or more of the following: performing physical exam; counseling and coordination of care; obtaining or reviewing history; documenting in the medical record; reviewing/ordering tests, medications or procedures; communicating with other healthcare professionals and discussing with patient's family/caregivers.    Current Length of Stay: 3 day(s)  Current Patient Status: Inpatient   Certification Statement: The patient will continue to require additional inpatient hospital stay due to hypoxic respiratory failure, BiPAP support, influenza, COPD exacerbation, IV steroids,  Discharge Plan: Anticipate discharge in >72 hrs to discharge location to be determined pending rehab evaluations.    Code Status: Level 3 - DNAR and DNI    Subjective:   Patient seen  and examined.  Patient still requiring high flow oxygen and is intermittently dyspneic.  Appeared comfortable this morning during my evaluation however.  Still having intermittent bouts of delirium and agitation.  Inconsistently wearing BiPAP.    Objective:     Vitals:   Temp (24hrs), Av.6 °F (36.4 °C), Min:97.2 °F (36.2 °C), Max:98.1 °F (36.7 °C)    Temp:  [97.2 °F (36.2 °C)-98.1 °F (36.7 °C)] 97.2 °F (36.2 °C)  HR:  [73-93] 78  Resp:  [20] 20  BP: (112-133)/(63-69) 116/65  SpO2:  [93 %-98 %] 95 %  Body mass index is 17.43 kg/m².     Input and Output Summary (last 24 hours):     Intake/Output Summary (Last 24 hours) at 2024 1530  Last data filed at 2024 1101  Gross per 24 hour   Intake --   Output 900 ml   Net -900 ml       PHYSICAL EXAM:    Vitals signs reviewed  Constitutional   Awake and cooperative.  Cachectic.   Head/Neck   Normocephalic. Atraumatic.   HEENT   No scleral icterus. EOMI.   Heart   Regular rate and rhythm. No murmers.   Lungs Prolonged expiration.  Decreased air movement globally.  Labored breathing.   Abdomen   Soft. Nontender. Nondistended.    Skin   Skin color normal. No rashes.   Extremities   No deformities. No peripheral edema.   Neuro   Alert and oriented. No new deficits.         Additional Data:     Labs:  Results from last 7 days   Lab Units 24  0524  03524  1044   WBC Thousand/uL 9.92   < > 11.00*   HEMOGLOBIN g/dL 11.6*   < > 13.8   HEMATOCRIT % 34.9*   < > 41.7   PLATELETS Thousands/uL 225   < > 181   NEUTROS PCT %  --   --  66   LYMPHS PCT %  --   --  21   MONOS PCT %  --   --  9   EOS PCT %  --   --  2    < > = values in this interval not displayed.     Results from last 7 days   Lab Units 24  0524  03524  1044   SODIUM mmol/L 140   < > 136   POTASSIUM mmol/L 4.3   < > 4.0   CHLORIDE mmol/L 100   < > 102   CO2 mmol/L 34*   < > 34*   BUN mg/dL 37*   < > 17   CREATININE mg/dL 1.20   < > 1.14   ANION GAP mmol/L 6   < > 0    CALCIUM mg/dL 8.5   < > 9.3   ALBUMIN g/dL  --   --  4.1   TOTAL BILIRUBIN mg/dL  --   --  0.48   ALK PHOS U/L  --   --  67   ALT U/L  --   --  16   AST U/L  --   --  18   GLUCOSE RANDOM mg/dL 173*   < > 168*    < > = values in this interval not displayed.     Results from last 7 days   Lab Units 01/22/24  0529   INR  2.67*     Results from last 7 days   Lab Units 01/22/24  1155 01/22/24  0726 01/21/24  2114 01/21/24  1537 01/21/24  1115 01/21/24  0732 01/20/24  2134 01/20/24  1612 01/20/24  1129 01/20/24  0728 01/19/24  2029 01/19/24  1650   POC GLUCOSE mg/dl 121 156* 98 232* 151* 148* 179* 140 182* 197* 208* 180*         Results from last 7 days   Lab Units 01/19/24  1136 01/19/24  1044   LACTIC ACID mmol/L 0.8  --    PROCALCITONIN ng/ml  --  0.08       Lines/Drains:  Invasive Devices       Peripheral Intravenous Line  Duration             Peripheral IV 01/21/24 Left;Ventral (anterior) Forearm 1 day                      Telemetry:  Telemetry Orders (From admission, onward)               24 Hour Telemetry Monitoring  Continuous x 24 Hours (Telem)        Expiring   Question:  Reason for 24 Hour Telemetry  Answer:  Acute respiratory failure on BiPAP                     Telemetry Reviewed:   Indication for Continued Telemetry Use: Acute respiratory failure on Bipap             Imaging: Reviewed radiology reports from this admission including: chest xray    Recent Cultures (last 7 days):   Results from last 7 days   Lab Units 01/19/24  1044   BLOOD CULTURE  No Growth at 72 hrs.  No Growth at 72 hrs.       Last 24 Hours Medication List:   Current Facility-Administered Medications   Medication Dose Route Frequency Provider Last Rate    acetaminophen  650 mg Oral Q6H PRN Jean Pierre Bailey DO      budesonide  0.5 mg Nebulization Q12H Sonia Varner,       carvedilol  6.25 mg Oral BID Jean Pierre Bailey DO      digoxin  125 mcg Oral Daily Jean Pierre Bailey DO      enoxaparin  40 mg Subcutaneous  Daily Jean Pierre Bailey, DO      famotidine  20 mg Oral HS Jean Pierre Bailey, DO      formoterol  20 mcg Nebulization Q12H Sonia Varner, DO      furosemide  40 mg Oral Once per day on Monday Wednesday Friday Jean Pierre Bailey, DO      insulin glargine  8 Units Subcutaneous HS Jean Pierre Bailey, DO      insulin lispro  1-5 Units Subcutaneous TID AC Jean Pierre Bailey, DO      insulin lispro  1-5 Units Subcutaneous HS Jean Pierre Bailey, DO      ipratropium  0.5 mg Nebulization TID Sonia Varner, DO      levalbuterol  1.25 mg Nebulization TID Sonia Varner, DO      lisinopril  10 mg Oral Daily Jean Pierre Bailey, DO      memantine  10 mg Oral BID Jean Pierre Bailey, DO      ondansetron  4 mg Intravenous Q6H PRN Jean Pierre Bailey DO      oseltamivir  30 mg Oral Q12H [facility] Sonia Varner, DO      pantoprazole  40 mg Oral Daily Jean Pierre Bailey DO      pravastatin  80 mg Oral Daily With Dinner Jean Pierre Bailey DO      [START ON 1/23/2024] predniSONE  30 mg Oral Daily Anthony Liang MD      Followed by    [START ON 1/25/2024] predniSONE  20 mg Oral Daily Anthony Liang MD      Followed by    [START ON 1/27/2024] predniSONE  10 mg Oral Daily Anthony Liang MD      sodium chloride  75 mL/hr Intravenous Continuous Jean Pierre Bailey DO 75 mL/hr (01/22/24 1111)    tamsulosin  0.4 mg Oral Daily With Dinner Jean Pierre Bailey DO      warfarin  2 mg Oral Daily (warfarin) Jean Pierre Bailey DO          Today, Patient Was Seen By: Jean Pierre Bailey DO    **Please Note: This note may have been constructed using a voice recognition system.**

## 2024-01-22 NOTE — CASE MANAGEMENT
Case Management Assessment & Discharge Planning Note    Patient name Juan Antonio Nicholson  Location [address] [address]/[address] [place] [address]-* MRN [id number]  : 1940 Date 2024       Current Admission Date: 2024  Current Admission Diagnosis:Acute on chronic respiratory failure with hypoxia and hypercapnia ([facility])   Patient Active Problem List    Diagnosis Date Noted    Aspiration into airway 2024    Influenza 2024    Stage 3a chronic kidney disease ([facility]) 2021    Chronic systolic congestive heart failure ([facility]) 2021    Alzheimer's dementia without behavioral disturbance ([facility]) 2021    Physical deconditioning 2020    Coagulation defect, unspecified ([facility]) 2020    Acute on chronic respiratory failure with hypoxia and hypercapnia ([facility]) 2020    Abscess of lower lobe of left lung with pneumonia ([facility]) 2020    Severe protein-calorie malnutrition ([facility]) 2020    Diverticulosis 2019    Cholelithiases 2019    Nephrolithiasis 2019    COPD with acute exacerbation ([facility]) 2019    Non-recurrent unilateral inguinal hernia without obstruction or gangrene 2018    Pain in both feet 2018    Peripheral arteriosclerosis ([facility]) 2018    Arteriosclerosis of coronary artery 2018    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    Chronic hypoxemic respiratory failure ([facility]) 11/10/2017    Bilateral carotid bruits 2017    Heart failure, left, 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% ([facility]) 2017    Lung nodule 2017    Dilated cardiomyopathy ([facility]) 2017    Type 2 diabetes mellitus with diabetic polyneuropathy, without long-term current use of insulin ([facility])     Severe left ventricular systolic dysfunction 2017    Hypoxia 10/24/2013    PAF (paroxysmal atrial fibrillation) ([facility]) 2013    Hypertensive heart disease with congestive heart failure ([facility]) 2013      LOS (days): 3  Geometric Mean LOS (GMLOS) (days): 3.6  Days to GMLOS:0.7     OBJECTIVE:  PATIENT  READMITTED TO HOSPITAL  Risk of Unplanned Readmission Score: 27.24       Current admission status: Inpatient  Referral Reason: Other (Discharge planning)    Preferred Pharmacy:   [facility] - [place], [place] - [address]  [address] [address]  Phone: [phone] Fax: [phone]    [id number] - [place], [place] - [address]  [address]  [facility] [address]  Phone: [phone] Fax: [phone]    Primary Care Provider: Frank Lombardi, DO    Primary Insurance: [employer]  Secondary Insurance:     ASSESSMENT:  Active Health Care Proxies       MayNeeru Health Care Representative - Spouse   Primary Phone: [phone] (Home)  Mobile Phone: [phone]                  Readmission Root Cause  30 Day Readmission: Yes  Who directed you to return to the hospital?: Family  Did you understand whom to contact if you had questions or problems?: Yes  Did you get your prescriptions before you left the hospital?: No  Reason:: Not preferred pharmacy  Were you able to get your prescriptions filled when you left the hospital?: Yes  Did you take your medications as prescribed?: Yes  Were you able to get to your follow-up appointments?: Yes  During previous admission, was a post-acute recommendation made?: Yes  What post-acute resources were offered?: [facility]  Patient was readmitted due to: Acute on chronic respiratory failure, influenza  Action Plan: Medical management, STR recommended, referrals placed    Patient Information  Admitted from:: Home  Mental Status: Confused, Alert  During Assessment patient was accompanied by: Spouse  Assessment information provided by:: Spouse  Primary Caregiver: Spouse  Caregiver's Name:: Neeru May (spouse)  Caregiver's Relationship to Patient:: Family Member  Caregiver's Telephone Number:: [phone]  Support Systems: Spouse/significant other  County of Residence: [place]  What city do you live in?: [facility] entry  access options. Select all that apply.: No steps to enter home  Type of Current Residence: [facility]  Living Arrangements: Lives w/ Spouse/significant other  Is patient a ?: Yes    Activities of Daily Living Prior to Admission  Functional Status: Assistance  Completes ADLs independently?: No  Level of ADL dependence: Assistance  Ambulates independently?: Yes  Does patient use assisted devices?: Yes  Assisted Devices (DME) used: Home Oxygen concentrator, Portable Oxygen concentrator, Portable Oxygen tanks, Shower Chair, Walker, Wheelchair  DME Company Name (respiratory supplies): [employer]  O2 Rate(s): 3L  Does patient currently own DME?: Yes  What DME does the patient currently own?: Home Oxygen concentrator, Portable Oxygen concentrator, Portable Oxygen tanks, Shower Chair, Walker, Wheelchair  Does patient have a history of Outpatient Therapy (PT/OT)?: No  Does the patient have a history of Short-Term Rehab?: No  Does patient have a history of HHC?: Yes ([facility])  Does patient currently have HHC?: Yes    Current Home Health Care  Type of Current Home Care Services: Nurse visit, Home PT  Current Home Health Agency:: [facility]  Current Home Health Follow-Up Provider:: PCP    Patient Information Continued  Income Source: Pension/long term  Does patient have prescription coverage?: Yes  Does patient receive dialysis treatments?: No  Does patient have a history of substance abuse?: No  Does patient have a history of Mental Health Diagnosis?: No    Means of Transportation  Means of Transport to Appts:: Family transport      Housing Stability: Low Risk  (1/22/2024)    Housing Stability Vital Sign     Unable to Pay for Housing in the Last Year: No     Number of Places Lived in the Last Year: 1     Unstable Housing in the Last Year: No   Food Insecurity: No Food Insecurity (1/22/2024)    Hunger Vital Sign     Worried About Running Out of Food in the Last Year: Never true     Ran Out of Food in the Last  Year: Never true   Transportation Needs: No Transportation Needs (1/22/2024)    PRAPARE - Transportation     Lack of Transportation (Medical): No     Lack of Transportation (Non-Medical): No   Utilities: Not At Risk (1/22/2024)    [facility] Utilities     Threatened with loss of utilities: No       DISCHARGE DETAILS:    Discharge planning discussed with:: Suzanna Siddiqi  Freedom of Choice: Yes    Comments - Freedom of Choice: SW spoke with wife Neeru at bedside to discuss discharge planning.  At this time STR is recommended and Neeru was in agreement with referrals being placed.  SW placed referrals in [facility] and will follow for bed availability. Patient will need insurance authorization.      CM contacted family/caregiver?: Yes  Were Treatment Team discharge recommendations reviewed with patient/caregiver?: Yes  Did patient/caregiver verbalize understanding of patient care needs?: Yes  Were patient/caregiver advised of the risks associated with not following Treatment Team discharge recommendations?: Yes    Contacts  Patient Contacts: Neeru Nicholson (spouse)  Relationship to Patient:: Family  Contact Method: In Person  Reason/Outcome: Emergency Contact, Discharge Planning, Referral    Requested Home Health Care         Is the patient interested in HHC at discharge?: No    DME Referral Provided  Referral made for DME?: No    Other Referral/Resources/Interventions Provided:  Interventions: Short Term Rehab    Would you like to participate in our [facility] service program?  : No - Declined    Treatment Team Recommendation: Short Term Rehab  Discharge Destination Plan:: Short Term Rehab

## 2024-01-22 NOTE — UTILIZATION REVIEW
NOTIFICATION OF INPATIENT ADMISSION   AUTHORIZATION REQUEST   SERVICING FACILITY:   [place] [address]  Tax ID: [id number]  NPI: [id number] ATTENDING PROVIDER:  Attending Name and NPI#: Jean Pierre Bailey Do [[id number]]  Address: [address] [place] [address]  Phone: [phone]   ADMISSION INFORMATION:  Place of Service: Inpatient Acute [facility]  Place of Service Code: 21  Inpatient Admission Date/Time: 1/19/24  3:18 PM  Discharge Date/Time: No discharge date for patient encounter.  Admitting Diagnosis Code/Description:  Respiratory failure (HCC) [J96.90]  Influenza A [J10.1]  Hypoxia [R09.02]  COPD with acute exacerbation (HCC) [J44.1]  Short of breath on exertion [R06.02]     UTILIZATION REVIEW CONTACT:  Urszula Beckford Utilization   Network Utilization Review Department  Phone: [phone]  Fax [phone]  Email: [email or web address]  Contact for approvals/pending authorizations, clinical reviews, and discharge.     PHYSICIAN ADVISORY SERVICES:  Medical Necessity Denial & Kdom-eb-Ncmp Review  Phone: [phone]  Fax: [phone]  Email: [email or web address]     DISCHARGE SUPPORT TEAM:  For Patients Discharge Needs & Updates  Phone: [phone] opt. 2 Fax: [phone]  Email: [email or web address]

## 2024-01-22 NOTE — SPEECH THERAPY NOTE
"Speech Language/Pathology    Speech/Language Pathology Progress Note    Patient Name: Juan Antonio Nicholson  Today's Date: 1/22/2024    Subjective:  Patient reports no difficulty swallowing.  Patients wife is present and reports \"I think one of his medications is making him like this. He is normally very charming\"    Objective:  Patient seen upright in bed with pudding, cookies and thin liquids. He was noted to have delayed coughing ( ? Baseline cough vs asp/pen related) across all po with increased red face. Despite this his SPO2 remained WFL- he denied any increased WOB/SOB. Intermittent wet vocal quality also noted- some benefit from verbal cues for secondary swallows.     Education provided to the patient and his family on A&P of the swallow, aspiration sxs/risks as well as proceeding with VBS to further assess. Reciprocal comprehension was verbally expressed.    Assessment:  Patient continues with sxs of aspiration- VBS is recommended to further assess.    Plan/Recommendations:  Continue NPO for now  Further recs pending VBS results.    Merry Reich M.S., CCC-SLP  Speech Language Pathologist   Available via [employer]  [place] #[id number]  [place] #[id number]       "

## 2024-01-22 NOTE — NURSING NOTE
Pt refused BIPAP.Dr. Bobby Antony aware. Advised to leave the pt on highflow nasal cannula for the night.
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
## 2024-01-22 NOTE — OCCUPATIONAL THERAPY NOTE
"Occupational Therapy Evaluation       01/22/24 1014   OT Last Visit   OT Visit Date 01/22/24   Note Type   Note type Evaluation   Pain Assessment   Pain Assessment Tool 0-10   Pain Score No Pain   Restrictions/Precautions   Weight Bearing Precautions Per Order No   Other Precautions Contact/isolation;Droplet precautions;Chair Alarm;Bed Alarm;O2;Fall Risk  (high flow O2)   Home Living   Type of Home House   Home Layout One level   Bathroom Shower/Tub Walk-in shower   Bathroom Toilet Standard   Bathroom Equipment Grab bars in shower;Shower chair;Toilet raiser   Prior Function   Level of [employer] Independent with ADLs;Independent with functional mobility;Needs assistance with IADLS   Lives With Spouse   Receives Help From Family;Home health  (as per wife was receiving home health services following last admission to hospital)   IADLs Family/Friend/Other provides transportation;Family/Friend/Other provides meals;Family/Friend/Other provides medication management   Vocational Retired   General   Additional Pertinent History Pt admitted with progressive SOB, tested positive for influenza A in the ER.   Family/Caregiver Present Yes  (wife)   Subjective   Subjective \"It's too early to be doing this.\"   ADL   Eating Assistance Unable to assess  (pt is NPO following episode of choking yesterday 1/21)   Grooming Assistance 4  Minimal Assistance   UB Bathing Assistance 4  Minimal Assistance   LB Bathing Assistance 3  Moderate Assistance   UB Dressing Assistance 4  Minimal Assistance   LB Dressing Assistance 2  Maximal Assistance   Toileting Assistance  3  Moderate Assistance   Bed Mobility   Supine to Sit 3  Moderate assistance   Additional items Assist x 1;Verbal cues;Increased time required   Sit to Supine 3  Moderate assistance   Additional items Assist x 1;Verbal cues;Increased time required   Transfers   Sit to Stand 2  Maximal assistance   Additional items Assist x 1;Verbal cues;Increased time required   Stand to Sit " 2  Maximal assistance   Additional items Assist x 1;Verbal cues   Stand pivot Unable to assess   Toilet transfer Unable to assess   Additional Comments pt very shaky and unsteady on his feet, furthermore desaturating to ~80% on high flow O2 after standing for <15 seconds. Returned to supine, resat to above 90% within 1-2 minutes.   Functional Mobility   Functional Mobility   (unable to assess)   Balance   Static Sitting Fair +   Dynamic Sitting Fair   Static Standing Poor   Activity Tolerance   Activity Tolerance Patient limited by fatigue;Treatment limited secondary to medical complications (Comment);Treatment limited secondary to agitation  (unstable O2; also pt agitated at times, requiring max encouragement/education to participate)   Nurse Made Aware yes BRADLEY Rodriguez   RUE Assessment   RUE Assessment   (ROM WFL except shoulder flex and ABD ~80 degrees; MMT grossly 2+/5)   LUE Assessment   LUE Assessment   (ROM WFL except shoulder flex and ABD ~80 degrees; MMT grossly 2+/5)   Vision-Basic Assessment   Current Vision Wears glasses all the time   Cognition   Overall Cognitive Status Impaired   Arousal/Participation Uncooperative   Attention Attends with cues to redirect   Orientation Level Oriented to person;Disoriented to place;Disoriented to time;Disoriented to situation   Memory Decreased recall of biographical information;Decreased short term memory;Decreased recall of recent events;Decreased recall of precautions   Following Commands Follows one step commands with increased time or repetition  (inconsistently due to agitation)   Assessment   Limitation Decreased ADL status;Decreased UE ROM;Decreased UE strength;Decreased Safe judgement during ADL;Decreased cognition;Decreased endurance;Decreased self-care trans;Decreased high-level ADLs  (decreased balance and mobility)   Prognosis Fair   Assessment Patient evaluated by Occupational Therapy.  Patient admitted with Acute on chronic respiratory failure with  hypoxia and hypercapnia (HCC).  The patients occupational profile, medical and therapy history includes a extensive additional review of physical, cognitive, or psychosocial history related to current functional performance.  Comorbidities affecting functional mobility and ADLS include: CAD, COPD, CVA, dementia, and diabetes.  Prior to admission, patient was independent with functional mobility without assistive device, independent with ADLS, and requiring assist for IADLS.  The evaluation identifies the following performance deficits: weakness, decreased ROM, impaired balance, decreased endurance, decreased coordination, increased fall risk, new onset of impairment of functional mobility, decreased ADLS, decreased IADLS, decreased activity tolerance, decreased safety awareness, impaired judgement, SOB upon exertion, decreased cognition, and decreased strength, that result in activity limitations and/or participation restrictions. This evaluation requires clinical decision making of high complexity, because the patient presents with comorbidites that affect occupational performance and required significant modification of tasks or assistance with consideration of multiple treatment options.  The Barthel Index was used as a functional outcome tool presenting with a score of Barthel Index Score: 30, indicating marked limitations of functional mobility and ADLS.  The patient's raw score on the -PAC Daily Activity Inpatient Short Form is 15. A raw score of less than 19 suggests the patient may benefit from discharge to post-acute rehabilitation services. Please refer to the recommendation of the Occupational Therapist for safe discharge planning.  Patient will benefit from skilled Occupational Therapy services to address above deficits and facilitate a safe return to prior level of function.   Goals   Patient Goals to go home   STG Time Frame   (1-7 days)   Short Term Goal  Goals established to promote Patient Goals:  to go home:  Eating: supervision; Grooming: supervision seated; Bathing: min assist; Upper Body Dressing supervision; Lower Body Dressing: mod assist; Toileting: min assist; Patient will increase stand pivot commode transfer to mod assist with rolling walker to increase performance and safety with ADLS and functional mobility; Patient will increase standing tolerance to 2 minutes during ADL task to decrease assistance level and decrease fall risk; Patient will increase bed mobility to min assist in preparation for ADLS and transfers; Patient will increase functional mobility to and from bathroom with rolling walker with mod assist to increase performance with ADLS and to use a toilet; Patient will tolerate 5 minutes of UE ROM/strengthening to increase general activity tolerance and performance in ADLS/IADLS; Patient will improve functional activity tolerance to 5 minutes of sustained functional tasks to increase participation in basic self-care and decrease assistance level;  Patient will be able to to verbalize understanding and perform energy conservation/proper body mechanics during ADLS and functional mobility at least 75% of the time with minimal cueing to decrease signs of fatigue and increase stamina to return to prior level of function; Patient will increase dynamic sitting balance to fair+ to improve the ability to sit at edge of bed or on a chair for ADLS;  Patient will increase static standing balance to poor+ to improve postural stability and decrease fall risk during standing ADLS and transfers.Patient will orient self x 4 with minimal verbal cues to increase overall awareness and promote safety with ADL/IADL tasks.   LTG Time Frame   (8-14 days)   Long Term Goal Eating: independent; Grooming: independent seated; Bathing: supervision; Upper Body Dressing independent; Lower Body Dressing: min assist; Toileting: supervision; Patient will increase stand pivot commode transfer to min assist with rolling  walker to increase performance and safety with ADLS and functional mobility; Patient will increase standing tolerance to 5 minutes during ADL task to decrease assistance level and decrease fall risk; Patient will increase bed mobility to supervision in preparation for ADLS and transfers; Patient will increase functional mobility to and from bathroom with rolling walker with min assist to increase performance with ADLS and to use a toilet; Patient will tolerate 10 minutes of UE ROM/strengthening to increase general activity tolerance and performance in ADLS/IADLS; Patient will improve functional activity tolerance to 10 minutes of sustained functional tasks to increase participation in basic self-care and decrease assistance level;  Patient will be able to to verbalize understanding and perform energy conservation/proper body mechanics during ADLS and functional mobility at least 90% of the time with no cueing to decrease signs of fatigue and increase stamina to return to prior level of function; Patient will increase dynamic sitting balance to good to improve the ability to sit at edge of bed or on a chair for ADLS;  Patient will increase static standing balance to fair- to improve postural stability and decrease fall risk during standing ADLS and transfers.  Pt will score >/= 19/24 on AM-PAC Daily Activity Inpatient scale to promote safe independence with ADLs and functional mobility; Pt will score >/= 60/100 on Barthel Index in order to decrease caregiver assistance needed and increase ability to perform ADLs and functional mobility.   Plan   Treatment Interventions ADL retraining;Functional transfer training;UE strengthening/ROM;Endurance training;Patient/family training;Equipment evaluation/education;Activityengagement;Continued evaluation;Energy conservation;Compensatory technique education   Goal Expiration Date 02/05/24   OT Frequency 3-5x/wk   Discharge Recommendation   Rehab Resource Intensity Level, OT II  (Moderate Resource Intensity)   AM-PAC Daily Activity Inpatient   Lower Body Dressing 2   Bathing 2   Toileting 2   Upper Body Dressing 3   Grooming 3   Eating 3   Daily Activity Raw Score 15   Daily Activity Standardized Score (Calc for Raw Score >=11) 34.69   AM-PAC Applied Cognition Inpatient   Following a Speech/Presentation 2   Understanding Ordinary Conversation 4   Taking Medications 1   Remembering Where Things Are Placed or Put Away 1   Remembering List of 4-5 Errands 1   Taking Care of Complicated Tasks 1   Applied Cognition Raw Score 10   Applied Cognition Standardized Score 24.98   Barthel Index   Feeding 5   Bathing 0   Grooming Score 0   Dressing Score 5   Bladder Score 5   Bowels Score 5   Toilet Use Score 5   Transfers (Bed/Chair) Score 5   Mobility (Level Surface) Score 0   Stairs Score 0   Barthel Index Score 30   Licensure   [place] License Number  Bonnie Stovall OTR/L [id number]
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
## 2024-01-22 NOTE — PLAN OF CARE
Problem: RESPIRATORY - ADULT  Goal: Achieves optimal ventilation and oxygenation  Description: INTERVENTIONS:  - Assess for changes in respiratory status  - Assess for changes in mentation and behavior  - Position to facilitate oxygenation and minimize respiratory effort  - Oxygen administered by appropriate delivery if ordered  - Initiate smoking cessation education as indicated  - Encourage broncho-pulmonary hygiene including cough, deep breathe, Incentive Spirometry  - Assess the need for suctioning and aspirate as needed  - Assess and instruct to report SOB or any respiratory difficulty  - Respiratory Therapy support as indicated  Outcome: Progressing     Problem: PAIN - ADULT  Goal: Verbalizes/displays adequate comfort level or baseline comfort level  Description: Interventions:  - Encourage patient to monitor pain and request assistance  - Assess pain using appropriate pain scale  - Administer analgesics based on type and severity of pain and evaluate response  - Implement non-pharmacological measures as appropriate and evaluate response  - Consider cultural and social influences on pain and pain management  - Notify physician/advanced practitioner if interventions unsuccessful or patient reports new pain  Outcome: Progressing     Problem: INFECTION - ADULT  Goal: Absence or prevention of progression during hospitalization  Description: INTERVENTIONS:  - Assess and monitor for signs and symptoms of infection  - Monitor lab/diagnostic results  - Monitor all insertion sites, i.e. indwelling lines, tubes, and drains  - Monitor endotracheal if appropriate and nasal secretions for changes in amount and color  - [place] appropriate cooling/warming therapies per order  - Administer medications as ordered  - Instruct and encourage patient and family to use good hand hygiene technique  - Identify and instruct in appropriate isolation precautions for identified infection/condition  Outcome: Progressing     Problem:  SAFETY ADULT  Goal: Patient will remain free of falls  Description: INTERVENTIONS:  - Educate patient/family on patient safety including physical limitations  - Instruct patient to call for assistance with activity   - Consult OT/PT to assist with strengthening/mobility   - Keep Call bell within reach  - Keep bed low and locked with side rails adjusted as appropriate  - Keep care items and personal belongings within reach  - Initiate and maintain comfort rounds  - Make Fall Risk Sign visible to staff  - Offer Toileting every 2 Hours, in advance of need  - Initiate/Maintain bed/chair alarm    - Apply yellow socks and bracelet for high fall risk patients  - Consider moving patient to room near nurses station  Outcome: Progressing  Goal: Maintain or return to baseline ADL function  Description: INTERVENTIONS:  -  Assess patient's ability to carry out ADLs; assess patient's baseline for ADL function and identify physical deficits which impact ability to perform ADLs (bathing, care of mouth/teeth, toileting, grooming, dressing, etc.)  - Assess/evaluate cause of self-care deficits   - Assess range of motion  - Assess patient's mobility; develop plan if impaired  - Assess patient's need for assistive devices and provide as appropriate  - Encourage maximum independence but intervene and supervise when necessary  - Involve family in performance of ADLs  - Assess for home care needs following discharge   - Consider OT consult to assist with ADL evaluation and planning for discharge  - Provide patient education as appropriate  Outcome: Progressing  Goal: Maintains/Returns to pre admission functional level  Description: INTERVENTIONS:  - Perform AM-PAC 6 Click Basic Mobility/ Daily Activity assessment daily.  - Set and communicate daily mobility goal to care team and patient/family/caregiver.   - Collaborate with rehabilitation services on mobility goals if consulted  - Perform Range of Motion 4 times a day.  - Reposition  patient every 2 hours.  -   - Out of bed to chair 3 times a day     - Out of bed for toileting  - Record patient progress and toleration of activity level   Outcome: Progressing     Problem: DISCHARGE PLANNING  Goal: Discharge to home or other facility with appropriate resources  Description: INTERVENTIONS:  - Identify barriers to discharge w/patient and caregiver  - Arrange for needed discharge resources and transportation as appropriate  - Identify discharge learning needs (meds, wound care, etc.)  - Arrange for interpretive services to assist at discharge as needed  - Refer to Case Management Department for coordinating discharge planning if the patient needs post-hospital services based on physician/advanced practitioner order or complex needs related to functional status, cognitive ability, or social support system  Outcome: Progressing     Problem: Knowledge Deficit  Goal: Patient/family/caregiver demonstrates understanding of disease process, treatment plan, medications, and discharge instructions  Description: Complete learning assessment and assess knowledge base.  Interventions:  - Provide teaching at level of understanding  - Provide teaching via preferred learning methods  Outcome: Progressing     Problem: Prexisting or High Potential for Compromised Skin Integrity  Goal: Skin integrity is maintained or improved  Description: INTERVENTIONS:  - Identify patients at risk for skin breakdown  - Assess and monitor skin integrity  - Assess and monitor nutrition and hydration status  - Monitor labs   - Assess for incontinence   - Turn and reposition patient  - Assist with mobility/ambulation  - Relieve pressure over bony prominences  - Avoid friction and shearing  - Provide appropriate hygiene as needed including keeping skin clean and dry  - Evaluate need for skin moisturizer/barrier cream  - Collaborate with interdisciplinary team   - Patient/family teaching  - Consider wound care consult   Outcome:  Progressing     Problem: Nutrition/Hydration-ADULT  Goal: Nutrient/Hydration intake appropriate for improving, restoring or maintaining nutritional needs  Description: Monitor and assess patient's nutrition/hydration status for malnutrition. Collaborate with interdisciplinary team and initiate plan and interventions as ordered.  Monitor patient's weight and dietary intake as ordered or per policy. Utilize nutrition screening tool and intervene as necessary. Determine patient's food preferences and provide high-protein, high-caloric foods as appropriate.     INTERVENTIONS:  - Monitor oral intake, urinary output, labs, and treatment plans  - Assess nutrition and hydration status and recommend course of action  - Evaluate amount of meals eaten  - Assist patient with eating if necessary   - Allow adequate time for meals  - Recommend/ encourage appropriate diets, oral nutritional supplements, and vitamin/mineral supplements  - Order, calculate, and assess calorie counts as needed  - Recommend, monitor, and adjust tube feedings and TPN/PPN based on assessed needs  - Assess need for intravenous fluids  - Provide specific nutrition/hydration education as appropriate  - Include patient/family/caregiver in decisions related to nutrition  Outcome: Progressing
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
## 2024-01-22 NOTE — PLAN OF CARE
Problem: RESPIRATORY - ADULT  Goal: Achieves optimal ventilation and oxygenation  Description: INTERVENTIONS:  - Assess for changes in respiratory status  - Assess for changes in mentation and behavior  - Position to facilitate oxygenation and minimize respiratory effort  - Oxygen administered by appropriate delivery if ordered  - Initiate smoking cessation education as indicated  - Encourage broncho-pulmonary hygiene including cough, deep breathe, Incentive Spirometry  - Assess the need for suctioning and aspirate as needed  - Assess and instruct to report SOB or any respiratory difficulty  - Respiratory Therapy support as indicated  Outcome: Progressing     Problem: PAIN - ADULT  Goal: Verbalizes/displays adequate comfort level or baseline comfort level  Description: Interventions:  - Encourage patient to monitor pain and request assistance  - Assess pain using appropriate pain scale  - Administer analgesics based on type and severity of pain and evaluate response  - Implement non-pharmacological measures as appropriate and evaluate response  - Consider cultural and social influences on pain and pain management  - Notify physician/advanced practitioner if interventions unsuccessful or patient reports new pain  Outcome: Progressing     Problem: SAFETY ADULT  Goal: Patient will remain free of falls  Description: INTERVENTIONS:  - Educate patient/family on patient safety including physical limitations  - Instruct patient to call for assistance with activity   - Consult OT/PT to assist with strengthening/mobility   - Keep Call bell within reach  - Keep bed low and locked with side rails adjusted as appropriate  - Keep care items and personal belongings within reach  - Initiate and maintain comfort rounds  - Make Fall Risk Sign visible to staff  - Offer Toileting every 2 Hours, in advance of need  - Initiate/Maintain bed alarm  - Obtain necessary fall risk management equipment: fall risk sign  - Apply yellow socks  and bracelet for high fall risk patients  - Consider moving patient to room near nurses station  Outcome: Progressing

## 2024-01-23 NOTE — PLAN OF CARE
Problem: RESPIRATORY - ADULT  Goal: Achieves optimal ventilation and oxygenation  Description: INTERVENTIONS:  - Assess for changes in respiratory status  - Assess for changes in mentation and behavior  - Position to facilitate oxygenation and minimize respiratory effort  - Oxygen administered by appropriate delivery if ordered  - Initiate smoking cessation education as indicated  - Encourage broncho-pulmonary hygiene including cough, deep breathe, Incentive Spirometry  - Assess the need for suctioning and aspirate as needed  - Assess and instruct to report SOB or any respiratory difficulty  - Respiratory Therapy support as indicated  Outcome: Progressing     Problem: PAIN - ADULT  Goal: Verbalizes/displays adequate comfort level or baseline comfort level  Description: Interventions:  - Encourage patient to monitor pain and request assistance  - Assess pain using appropriate pain scale  - Administer analgesics based on type and severity of pain and evaluate response  - Implement non-pharmacological measures as appropriate and evaluate response  - Consider cultural and social influences on pain and pain management  - Notify physician/advanced practitioner if interventions unsuccessful or patient reports new pain  Outcome: Progressing     Problem: INFECTION - ADULT  Goal: Absence or prevention of progression during hospitalization  Description: INTERVENTIONS:  - Assess and monitor for signs and symptoms of infection  - Monitor lab/diagnostic results  - Monitor all insertion sites, i.e. indwelling lines, tubes, and drains  - Monitor endotracheal if appropriate and nasal secretions for changes in amount and color  - [place] appropriate cooling/warming therapies per order  - Administer medications as ordered  - Instruct and encourage patient and family to use good hand hygiene technique  - Identify and instruct in appropriate isolation precautions for identified infection/condition  Outcome: Progressing     Problem:  SAFETY ADULT  Goal: Patient will remain free of falls  Description: INTERVENTIONS:  - Educate patient/family on patient safety including physical limitations  - Instruct patient to call for assistance with activity   - Consult OT/PT to assist with strengthening/mobility   - Keep Call bell within reach  - Keep bed low and locked with side rails adjusted as appropriate  - Keep care items and personal belongings within reach  - Initiate and maintain comfort rounds  - Make Fall Risk Sign visible to staff  - Offer Toileting every 2 Hours, in advance of need  - Initiate/Maintain bed alarm  - Obtain necessary fall risk management equipment:   Problem: Prexisting or High Potential for Compromised Skin Integrity  Goal: Skin integrity is maintained or improved  Description: INTERVENTIONS:  - Identify patients at risk for skin breakdown  - Assess and monitor skin integrity  - Assess and monitor nutrition and hydration status  - Monitor labs   - Assess for incontinence   - Turn and reposition patient  - Assist with mobility/ambulation  - Relieve pressure over bony prominences  - Avoid friction and shearing  - Provide appropriate hygiene as needed including keeping skin clean and dry  - Evaluate need for skin moisturizer/barrier cream  - Collaborate with interdisciplinary team   - Patient/family teaching  - Consider wound care consult   Outcome: Progressing     Problem: Nutrition/Hydration-ADULT  Goal: Nutrient/Hydration intake appropriate for improving, restoring or maintaining nutritional needs  Description: Monitor and assess patient's nutrition/hydration status for malnutrition. Collaborate with interdisciplinary team and initiate plan and interventions as ordered.  Monitor patient's weight and dietary intake as ordered or per policy. Utilize nutrition screening tool and intervene as necessary. Determine patient's food preferences and provide high-protein, high-caloric foods as appropriate.     INTERVENTIONS:  - Monitor oral  intake, urinary output, labs, and treatment plans  - Assess nutrition and hydration status and recommend course of action  - Evaluate amount of meals eaten  - Assist patient with eating if necessary   - Allow adequate time for meals  - Recommend/ encourage appropriate diets, oral nutritional supplements, and vitamin/mineral supplements  - Order, calculate, and assess calorie counts as needed  - Recommend, monitor, and adjust tube feedings and TPN/PPN based on assessed needs  - Assess need for intravenous fluids  - Provide specific nutrition/hydration education as appropriate  - Include patient/family/caregiver in decisions related to nutrition  Outcome: Progressing     - Apply yellow socks and bracelet for high fall risk patients  - Consider moving patient to room near nurses station  Outcome: Progressing

## 2024-01-23 NOTE — SPEECH THERAPY NOTE
Speech Language/Pathology    Speech/Language Pathology Progress Note    Patient Name: Juan Antonio Nicholson  Today's Date: 1/23/2024    Subjective:  Per patients wife he ate approx 60-75% of meal but did not like the thick liquids- has been drinking thin water.  Patient is now on 5L O2    Objective:  Patient seen upright in bed. Education was provided on results of VBS as well as A&P of the swallow, aspiration risks/sxs and recommendations. Reciprocal comprehension was verbally expressed.     Patient consumed thin liquids without sxs of aspiration today.     Assessment:  Resp status is improved today- suspect the same for dysphagia although assessment is limited.    Plan/Recommendations:  Consider dysphagia 2 with thin liquids  Meds in puree for now    Will f/u    Merry Reich M.S., CCC-SLP  Speech Language Pathologist   Available via [employer]  [place] #[id number]  [place] #[id number]

## 2024-01-23 NOTE — PROGRESS NOTES
[facility]  Progress Note  Name: Juan Antonio Nicholson I  MRN: [id number]  Unit/Bed#: [address] [place] [address] I Date of Admission: 1/19/2024   Date of Service: 1/23/2024 I Hospital Day: 4      Assessment & Plan:    * Acute on chronic respiratory failure with hypoxia and hypercapnia (HCC)  Assessment & Plan  Secondary to COPD exacerbation and concomitant Influenza; severe disease severe pulmonary disease at baseline  Has required BiPAP for respiratory support since transport with EMS, when taken off BiPAP in the ER became significantly hypoxic.  Initially required BiPAP on the floor, but has since been stabilized on high flow nasal cannula oxygen  Now off BiPAP and stable on high flow -> further weaned down to 5 L today  IV Solu-Medrol discontinued and continued on a Prednisone tapering course  Continue Pulmicort/Perforomist and nebulizer treatments  BiPAP nightly; patient tolerating only intermittently  Tamiflu x 5 days completed  Goals of care: DNR/DNI     Aspiration into airway  Assessment & Plan  Witnessed aspiration with breakfast 1/21  Appreciate speech therapy input and video barium swallow -> diet modified to a mechanical soft with thin liquids today     Influenza  Assessment & Plan  Patient presents with acute on chronic respiratory failure  Tested positive for influenza A in the emergency room  Completed 5-day course of Tamiflu  Droplet and contact precautions     Stage 3 chronic kidney disease (HCC)  Assessment & Plan  Renal function at baseline with creatinine around 1.1-1.2 -> currently stable around baseline  Monitor renal function and urine output - limit/avoid nephrotoxins and hypotension as possible     Alzheimer's dementia without behavioral disturbance (HCC)  Assessment & Plan  Continue home Namenda  Supportive care  Fall and delirium precautions     Heart failure 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)  Assessment & Plan  Not in acute exacerbation  Continue home Lasix 40 mg Monday/Wednesday/Friday  Continue  Coreg/Digoxin/Zestril  Fluid/sodium restriction  Monitor/replete serum potassium/magnesium deficiencies if present     PAF (paroxysmal atrial fibrillation) (HCC)  Assessment & Plan  Continue Coreg/Digoxin  Continue Coumadin for anticoagulation with a target INR of 2-3     Type 2 diabetes mellitus (HCC)  Assessment & Plan  Hold oral hypoglycemics while hospitalized  Recent A1c of 8.8 in December 2023  Basal insulin with additional SSI coverage per Accu-Cheks  Insulin sliding scale for correctional coverage  Carbohydrate consistent diet  Hypoglycemia protocol  Monitor for steroid-induced hyperglycemia       DVT Prophylaxis:  Coumadin    AM-PAC Basic Mobility:  Basic Mobility Inpatient Raw Score: 15  -[facility] Achieved: 3: Sit at edge of bed  -[facility] Goal: 4: Move to chair/commode    Patient Centered Rounds:  I have performed bedside rounds and discussed plan of care with nursing today.  Discussions with Specialists or Other Care Team Provider:  see above assessments if applicable    Education and Discussions with Family / Patient: Patient, along with wife, at bedside today    Time Spent for Care:  35 minutes. More than 50% of total time spent on counseling and coordination of care, on one or more of the following: performing physical exam; counseling and coordination of care, obtaining or reviewing history, documenting in the medical record, reviewing/ordering tests/medications/procedures, and communicating with other healthcare professionals.    Current Length of Stay: 4 day(s)  Current Patient Status: Inpatient   Certification Statement:  Patient will continue to require additional hospital stay due to assessments as noted above.    Code Status: Level 3 - DNAR and DNI        Subjective:     Encountered earlier in the day.  Resting fairly comfortably in bed without active complaints.  Remains weak and fatigue and states his shortness of breath has improved.  Wife also started to note improvement in his general  condition.        Objective:     Vitals:   Temp (24hrs), Av.2 °F (36.2 °C), Min:97.2 °F (36.2 °C), Max:97.3 °F (36.3 °C)    Temp:  [97.2 °F (36.2 °C)-97.3 °F (36.3 °C)] 97.2 °F (36.2 °C)  HR:  [65-86] 79  Resp:  [18-20] 18  BP: ()/(56-69) 109/56  SpO2:  [93 %-100 %] 97 %  Body mass index is 17.43 kg/m².     Input and Output Summary (last 24 hours):     No intake or output data in the 24 hours ending 24 8669    Physical Exam:     GENERAL Weak/fatigued   HEAD   Normocephalic - atraumatic   EYES   PERRL - EOMI    MOUTH   Mucosa moist   NECK   Supple - full range of motion   CARDIAC Rate controlled - S1/S2 positive   PULMONARY Diminished bilaterally more prominent at the bases - nonlabored respirations on nasal cannula oxygen   ABDOMEN   Soft - nontender/nondistended - active bowel sounds   MUSCULOSKELETAL   Motor strength/range of motion deconditioned   NEUROLOGIC Baseline cognitive impairment present   SKIN   Chronic wrinkles/blemishes    PSYCHIATRIC   Mood/affect stable         Additional Data:     Labs & Recent Cultures:    Results from last 7 days   Lab Units 24  1044   WBC Thousand/uL 8.92   < > 11.00*   HEMOGLOBIN g/dL 11.8*   < > 13.8   HEMATOCRIT % 36.6   < > 41.7   PLATELETS Thousands/uL 245   < > 181   NEUTROS PCT %  --   --  66   LYMPHS PCT %  --   --  21   MONOS PCT %  --   --  9   EOS PCT %  --   --  2    < > = values in this interval not displayed.     Results from last 7 days   Lab Units 24  0624  1044   POTASSIUM mmol/L 4.1   < > 4.0   CHLORIDE mmol/L 102   < > 102   CO2 mmol/L 33*   < > 34*   BUN mg/dL 33*   < > 17   CREATININE mg/dL 1.13   < > 1.14   CALCIUM mg/dL 8.4   < > 9.3   ALK PHOS U/L  --   --  67   ALT U/L  --   --  16   AST U/L  --   --  18    < > = values in this interval not displayed.     Results from last 7 days   Lab Units 24  0641   INR  2.99*     Results from last 7 days   Lab Units 24  1042  01/23/24  1109 01/23/24  0723 01/22/24  2052 01/22/24  1704 01/22/24  1155 01/22/24  0726 01/21/24  2114 01/21/24  1537 01/21/24  1115 01/21/24  0732 01/20/24  2134   POC GLUCOSE mg/dl 220* 91 117 237* 283* 121 156* 98 232* 151* 148* 179*         Results from last 7 days   Lab Units 01/19/24  1136 01/19/24  1044   LACTIC ACID mmol/L 0.8  --    PROCALCITONIN ng/ml  --  0.08         Results from last 7 days   Lab Units 01/19/24  1044   BLOOD CULTURE  No Growth After 4 Days.  No Growth After 4 Days.         Lines/Drains:  Invasive Devices       Peripheral Intravenous Line  Duration             Peripheral IV 01/22/24 Dorsal (posterior);Left Forearm 1 day                      Last 24 Hours Medication List:   Current Facility-Administered Medications   Medication Dose Route Frequency Provider Last Rate    acetaminophen  650 mg Oral Q6H PRN Jean Pierre Bailey, DO      budesonide  0.5 mg Nebulization Q12H Sonia Varner, DO      carvedilol  6.25 mg Oral BID Jean Pierre Bailey, DO      digoxin  125 mcg Oral Daily Jean Pierre Bailey, DO      famotidine  20 mg Oral HS Jean Pierre Bailey, DO      formoterol  20 mcg Nebulization Q12H Sonia Varner, DO      furosemide  40 mg Oral Once per day on Monday Wednesday Friday Jean Pierre Bailey, DO      insulin glargine  8 Units Subcutaneous HS Jean Pierre Bailey, DO      insulin lispro  1-5 Units Subcutaneous TID AC Jean Pierre Bailey, DO      insulin lispro  1-5 Units Subcutaneous HS Jean Pierre Bailey, DO      ipratropium  0.5 mg Nebulization TID Sonia Varner, DO      levalbuterol  1.25 mg Nebulization TID Sonia Varner, DO      lisinopril  10 mg Oral Daily Jean Pierre Bailey, DO      memantine  10 mg Oral BID Jean Pierre Bailey, DO      ondansetron  4 mg Intravenous Q6H PRN Jean Pierre Bailey, DO      oseltamivir  30 mg Oral Q12H [facility] Sonia Varner, DO      pantoprazole  40 mg Oral Daily Jean Pierre Duarte  Leo,       pravastatin  80 mg Oral Daily With Dinner Jean Pierre Bailey DO      predniSONE  30 mg Oral Daily Anthony Liang MD      Followed by    [START ON 1/25/2024] predniSONE  20 mg Oral Daily Anthony Liang MD      Followed by    [START ON 1/27/2024] predniSONE  10 mg Oral Daily Anthony Liang MD      tamsulosin  0.4 mg Oral Daily With Dinner Jean Pierre Bailey, DO      warfarin  2 mg Oral Daily (warfarin) DO DAYAN Gillis MD   Hospitalist - [facility]        ** Please Note: This note is constructed using a voice recognition dictation system.  An occasional wrong word/phrase or “sound-a-like” substitution may have been picked up by dictation device due to the inherent limitations of voice recognition software.  Read the chart carefully and recognize, using reasonable context, where substitutions may have occurred.**

## 2024-01-23 NOTE — PLAN OF CARE
Problem: RESPIRATORY - ADULT  Goal: Achieves optimal ventilation and oxygenation  Description: INTERVENTIONS:  - Assess for changes in respiratory status  - Assess for changes in mentation and behavior  - Position to facilitate oxygenation and minimize respiratory effort  - Oxygen administered by appropriate delivery if ordered  - Initiate smoking cessation education as indicated  - Encourage broncho-pulmonary hygiene including cough, deep breathe, Incentive Spirometry  - Assess the need for suctioning and aspirate as needed  - Assess and instruct to report SOB or any respiratory difficulty  - Respiratory Therapy support as indicated  Outcome: Progressing     Problem: PAIN - ADULT  Goal: Verbalizes/displays adequate comfort level or baseline comfort level  Description: Interventions:  - Encourage patient to monitor pain and request assistance  - Assess pain using appropriate pain scale  - Administer analgesics based on type and severity of pain and evaluate response  - Implement non-pharmacological measures as appropriate and evaluate response  - Consider cultural and social influences on pain and pain management  - Notify physician/advanced practitioner if interventions unsuccessful or patient reports new pain  Outcome: Progressing     Problem: INFECTION - ADULT  Goal: Absence or prevention of progression during hospitalization  Description: INTERVENTIONS:  - Assess and monitor for signs and symptoms of infection  - Monitor lab/diagnostic results  - Monitor all insertion sites, i.e. indwelling lines, tubes, and drains  - Monitor endotracheal if appropriate and nasal secretions for changes in amount and color  - [place] appropriate cooling/warming therapies per order  - Administer medications as ordered  - Instruct and encourage patient and family to use good hand hygiene technique  - Identify and instruct in appropriate isolation precautions for identified infection/condition  Outcome: Progressing     Problem:  SAFETY ADULT  Goal: Patient will remain free of falls  Description: INTERVENTIONS:  - Educate patient/family on patient safety including physical limitations  - Instruct patient to call for assistance with activity   - Consult OT/PT to assist with strengthening/mobility   - Keep Call bell within reach  - Keep bed low and locked with side rails adjusted as appropriate  - Keep care items and personal belongings within reach  - Initiate and maintain comfort rounds  - Make Fall Risk Sign visible to staff  - Offer Toileting every 2 Hours, in advance of need  - Initiate/Maintain bed alarm  - Obtain necessary fall risk management equipment: n/a  - Apply yellow socks and bracelet for high fall risk patients  - Consider moving patient to room near nurses station  Outcome: Progressing  Goal: Maintain or return to baseline ADL function  Description: INTERVENTIONS:  -  Assess patient's ability to carry out ADLs; assess patient's baseline for ADL function and identify physical deficits which impact ability to perform ADLs (bathing, care of mouth/teeth, toileting, grooming, dressing, etc.)  - Assess/evaluate cause of self-care deficits   - Assess range of motion  - Assess patient's mobility; develop plan if impaired  - Assess patient's need for assistive devices and provide as appropriate  - Encourage maximum independence but intervene and supervise when necessary  - Involve family in performance of ADLs  - Assess for home care needs following discharge   - Consider OT consult to assist with ADL evaluation and planning for discharge  - Provide patient education as appropriate  Outcome: Progressing  Goal: Maintains/Returns to pre admission functional level  Description: INTERVENTIONS:  - Perform AM-PAC 6 Click Basic Mobility/ Daily Activity assessment daily.  - Set and communicate daily mobility goal to care team and patient/family/caregiver.   - Collaborate with rehabilitation services on mobility goals if consulted  - Perform  Range of Motion 3 times a day.  - Reposition patient every 2 hours.  - Dangle patient 3 times a day  - Stand patient 3 times a day  - Ambulate patient 3 times a day  - Out of bed to chair 3 times a day   - Out of bed for meals 3 times a day  - Out of bed for toileting  - Record patient progress and toleration of activity level   Outcome: Progressing     Problem: DISCHARGE PLANNING  Goal: Discharge to home or other facility with appropriate resources  Description: INTERVENTIONS:  - Identify barriers to discharge w/patient and caregiver  - Arrange for needed discharge resources and transportation as appropriate  - Identify discharge learning needs (meds, wound care, etc.)  - Arrange for interpretive services to assist at discharge as needed  - Refer to Case Management Department for coordinating discharge planning if the patient needs post-hospital services based on physician/advanced practitioner order or complex needs related to functional status, cognitive ability, or social support system  Outcome: Progressing     Problem: Knowledge Deficit  Goal: Patient/family/caregiver demonstrates understanding of disease process, treatment plan, medications, and discharge instructions  Description: Complete learning assessment and assess knowledge base.  Interventions:  - Provide teaching at level of understanding  - Provide teaching via preferred learning methods  Outcome: Progressing     Problem: Prexisting or High Potential for Compromised Skin Integrity  Goal: Skin integrity is maintained or improved  Description: INTERVENTIONS:  - Identify patients at risk for skin breakdown  - Assess and monitor skin integrity  - Assess and monitor nutrition and hydration status  - Monitor labs   - Assess for incontinence   - Turn and reposition patient  - Assist with mobility/ambulation  - Relieve pressure over bony prominences  - Avoid friction and shearing  - Provide appropriate hygiene as needed including keeping skin clean and  dry  - Evaluate need for skin moisturizer/barrier cream  - Collaborate with interdisciplinary team   - Patient/family teaching  - Consider wound care consult   Outcome: Progressing     Problem: Nutrition/Hydration-ADULT  Goal: Nutrient/Hydration intake appropriate for improving, restoring or maintaining nutritional needs  Description: Monitor and assess patient's nutrition/hydration status for malnutrition. Collaborate with interdisciplinary team and initiate plan and interventions as ordered.  Monitor patient's weight and dietary intake as ordered or per policy. Utilize nutrition screening tool and intervene as necessary. Determine patient's food preferences and provide high-protein, high-caloric foods as appropriate.     INTERVENTIONS:  - Monitor oral intake, urinary output, labs, and treatment plans  - Assess nutrition and hydration status and recommend course of action  - Evaluate amount of meals eaten  - Assist patient with eating if necessary   - Allow adequate time for meals  - Recommend/ encourage appropriate diets, oral nutritional supplements, and vitamin/mineral supplements  - Order, calculate, and assess calorie counts as needed  - Assess need for intravenous fluids  - Provide specific nutrition/hydration education as appropriate  - Include patient/family/caregiver in decisions related to nutrition  Outcome: Progressing

## 2024-01-23 NOTE — NURSING NOTE
Pt's wife at the bedside, pt stated willing to take morning medication at this time. Pt more cooperative with wife at bedside. Med pass successful.

## 2024-01-23 NOTE — NURSING NOTE
Patient stated he was tired this am and wanted to rest upon getting vitals and doing am assessment. Pt a bit irritated and stated he wanted to wait until he was more awake to take medications.

## 2024-01-24 NOTE — PHYSICAL THERAPY NOTE
PT cancellation note       01/24/24 1444   Note Type   Note type Cancelled Session   Cancel Reasons Other  (Pt with nursing staff then MD.  Will follow)     Bonnie Peterson PT  [id number]

## 2024-01-24 NOTE — ASSESSMENT & PLAN NOTE
Hold home oral regimen  Start Lantus 8 units nightly, titrate up as needed  Insulin sliding scale for correctional coverage  Consistent carb diet when able  Hypoglycemia protocol  Monitor for steroid-induced hyperglycemia

## 2024-01-24 NOTE — PLAN OF CARE
Problem: RESPIRATORY - ADULT  Goal: Achieves optimal ventilation and oxygenation  Description: INTERVENTIONS:  - Assess for changes in respiratory status  - Assess for changes in mentation and behavior  - Position to facilitate oxygenation and minimize respiratory effort  - Oxygen administered by appropriate delivery if ordered  - Initiate smoking cessation education as indicated  - Encourage broncho-pulmonary hygiene including cough, deep breathe, Incentive Spirometry  - Assess the need for suctioning and aspirate as needed  - Assess and instruct to report SOB or any respiratory difficulty  - Respiratory Therapy support as indicated  Outcome: Progressing     Problem: PAIN - ADULT  Goal: Verbalizes/displays adequate comfort level or baseline comfort level  Description: Interventions:  - Encourage patient to monitor pain and request assistance  - Assess pain using appropriate pain scale  - Administer analgesics based on type and severity of pain and evaluate response  - Implement non-pharmacological measures as appropriate and evaluate response  - Consider cultural and social influences on pain and pain management  - Notify physician/advanced practitioner if interventions unsuccessful or patient reports new pain  Outcome: Progressing     Problem: INFECTION - ADULT  Goal: Absence or prevention of progression during hospitalization  Description: INTERVENTIONS:  - Assess and monitor for signs and symptoms of infection  - Monitor lab/diagnostic results  - Monitor all insertion sites, i.e. indwelling lines, tubes, and drains  - Monitor endotracheal if appropriate and nasal secretions for changes in amount and color  - [place] appropriate cooling/warming therapies per order  - Administer medications as ordered  - Instruct and encourage patient and family to use good hand hygiene technique  - Identify and instruct in appropriate isolation precautions for identified infection/condition  Outcome: Progressing     Problem:  SAFETY ADULT  Goal: Patient will remain free of falls  Description: INTERVENTIONS:  - Educate patient/family on patient safety including physical limitations  - Instruct patient to call for assistance with activity   - Consult OT/PT to assist with strengthening/mobility   - Keep Call bell within reach  - Keep bed low and locked with side rails adjusted as appropriate  - Keep care items and personal belongings within reach  - Initiate and maintain comfort rounds  - Make Fall Risk Sign visible to staff  - Offer Toileting every 2 Hours, in advance of need  - Initiate/Maintain bed alarm  - Obtain necessary fall risk management equipment: n/a  - Apply yellow socks and bracelet for high fall risk patients  - Consider moving patient to room near nurses station  Outcome: Progressing  Goal: Maintain or return to baseline ADL function  Description: INTERVENTIONS:  -  Assess patient's ability to carry out ADLs; assess patient's baseline for ADL function and identify physical deficits which impact ability to perform ADLs (bathing, care of mouth/teeth, toileting, grooming, dressing, etc.)  - Assess/evaluate cause of self-care deficits   - Assess range of motion  - Assess patient's mobility; develop plan if impaired  - Assess patient's need for assistive devices and provide as appropriate  - Encourage maximum independence but intervene and supervise when necessary  - Involve family in performance of ADLs  - Assess for home care needs following discharge   - Consider OT consult to assist with ADL evaluation and planning for discharge  - Provide patient education as appropriate  Outcome: Progressing  Goal: Maintains/Returns to pre admission functional level  Description: INTERVENTIONS:  - Perform AM-PAC 6 Click Basic Mobility/ Daily Activity assessment daily.  - Set and communicate daily mobility goal to care team and patient/family/caregiver.   - Collaborate with rehabilitation services on mobility goals if consulted  - Perform  Range of Motion 3 times a day.  - Reposition patient every 2 hours.  - Dangle patient 3 times a day  - Stand patient 3 times a day  - Ambulate patient 3 times a day  - Out of bed to chair 3 times a day   - Out of bed for meals 3 times a day  - Out of bed for toileting  - Record patient progress and toleration of activity level   Outcome: Progressing     Problem: DISCHARGE PLANNING  Goal: Discharge to home or other facility with appropriate resources  Description: INTERVENTIONS:  - Identify barriers to discharge w/patient and caregiver  - Arrange for needed discharge resources and transportation as appropriate  - Identify discharge learning needs (meds, wound care, etc.)  - Arrange for interpretive services to assist at discharge as needed  - Refer to Case Management Department for coordinating discharge planning if the patient needs post-hospital services based on physician/advanced practitioner order or complex needs related to functional status, cognitive ability, or social support system  Outcome: Progressing     Problem: Knowledge Deficit  Goal: Patient/family/caregiver demonstrates understanding of disease process, treatment plan, medications, and discharge instructions  Description: Complete learning assessment and assess knowledge base.  Interventions:  - Provide teaching at level of understanding  - Provide teaching via preferred learning methods  Outcome: Progressing     Problem: Prexisting or High Potential for Compromised Skin Integrity  Goal: Skin integrity is maintained or improved  Description: INTERVENTIONS:  - Identify patients at risk for skin breakdown  - Assess and monitor skin integrity  - Assess and monitor nutrition and hydration status  - Monitor labs   - Assess for incontinence   - Turn and reposition patient  - Assist with mobility/ambulation  - Relieve pressure over bony prominences  - Avoid friction and shearing  - Provide appropriate hygiene as needed including keeping skin clean and  dry  - Evaluate need for skin moisturizer/barrier cream  - Collaborate with interdisciplinary team   - Patient/family teaching  - Consider wound care consult   Outcome: Progressing     Problem: Nutrition/Hydration-ADULT  Goal: Nutrient/Hydration intake appropriate for improving, restoring or maintaining nutritional needs  Description: Monitor and assess patient's nutrition/hydration status for malnutrition. Collaborate with interdisciplinary team and initiate plan and interventions as ordered.  Monitor patient's weight and dietary intake as ordered or per policy. Utilize nutrition screening tool and intervene as necessary. Determine patient's food preferences and provide high-protein, high-caloric foods as appropriate.     INTERVENTIONS:  - Monitor oral intake, urinary output, labs, and treatment plans  - Assess nutrition and hydration status and recommend course of action  - Evaluate amount of meals eaten  - Assist patient with eating if necessary   - Allow adequate time for meals  - Recommend/ encourage appropriate diets, oral nutritional supplements, and vitamin/mineral supplements  - Order, calculate, and assess calorie counts as needed  - Assess need for intravenous fluids  - Provide specific nutrition/hydration education as appropriate  - Include patient/family/caregiver in decisions related to nutrition  Outcome: Progressing

## 2024-01-24 NOTE — PROGRESS NOTES
Replaced by [facility]  Progress Note  Name: Juan Antonio Nicholson I  MRN: [id number]  Unit/Bed#: [address] [place] [address] I Date of Admission: 1/19/2024   Date of Service: 1/24/2024 I Hospital Day: 5    Assessment/Plan   * Acute on chronic respiratory failure with hypoxia and hypercapnia (HCC)  Assessment & Plan  Secondary to COPD exacerbation and concomitant Influenza; severe disease severe pulmonary disease at baseline  Has required BiPAP for respiratory support since transport with EMS, when taken off BiPAP in the ER became significantly hypoxic.  Initially required BiPAP on the floor, but has since been stabilized on high flow nasal cannula oxygen    Now off BiPAP and stable on high flow  IV Solu-Medrol discontinued; started on prednisone taper  Budesonide and Perforomist nebulizers every 12 hours  Albuterol/Atrovent nebulizer 3 times daily  BiPAP nightly; patient tolerating only intermittently  Tamiflu x 5 days  Goals of care: DNR/DNI    Aspiration into airway  Assessment & Plan  Witnessed aspiration with breakfast 1/21  Now n.p.o.  Speech pathology consulted: Video swallow pending    Influenza  Assessment & Plan  Patient presents with acute on chronic respiratory failure  Tested positive for influenza A in the emergency room  Chest x-ray with no acute disease    Tamiflu x 5 days  Droplet and contact precautions    Stage 3a chronic kidney disease ([facility])  Assessment & Plan  Renal function at baseline with creatinine around 1.1-1.2  Monitor daily BMP    Alzheimer's dementia without behavioral disturbance ([facility])  Assessment & Plan  Continue home Namenda  Supportive care  Fall and delirium precautions    Heart failure, left, 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% ([facility])  Assessment & Plan  Not in acute exacerbation  Continue home Lasix 40 mg Monday/Wednesday/Friday  Low-sodium diet  Monitor on telemetry    PAF (paroxysmal atrial fibrillation) ([facility])  Assessment & Plan  Continue home carvedilol and digoxin for rate control  Continue home Coumadin  "for anticoagulation  Daily INR checks  Goal INR 2.0-3.0    Type 2 diabetes mellitus with diabetic polyneuropathy, without long-term current use of insulin (HCC)  Assessment & Plan  Hold home oral regimen  Start Lantus 8 units nightly, titrate up as needed  Insulin sliding scale for correctional coverage  Consistent carb diet when able  Hypoglycemia protocol  Monitor for steroid-induced hyperglycemia             VTE Pharmacologic Prophylaxis: VTE Score: 6 Moderate Risk (Score 3-4) - Pharmacological DVT Prophylaxis Ordered: warfarin (Coumadin).    Mobility:   Basic Mobility Inpatient Raw Score: 15  -HLM Goal: 4: Move to chair/commode  JH-HLM Achieved: 3: Sit at edge of bed  HLM Goal NOT achieved. Continue with multidisciplinary rounding and encourage appropriate mobility to improve upon HLM goals.    Patient Centered Rounds: I evaluated the patient without nursing staff present due to other pt care responsibilities    Discussions with Specialists or Other Care Team Provider: Pulmonology    Education and Discussions with Family / Patient: Attempted to update  (wife) via phone. Unable to contact.  \"Mailbox full unable to leave message\"    Total Time Spent on Date of Encounter in care of patient: 35 mins. This time was spent on one or more of the following: performing physical exam; counseling and coordination of care; obtaining or reviewing history; documenting in the medical record; reviewing/ordering tests, medications or procedures; communicating with other healthcare professionals and discussing with patient's family/caregivers.    Current Length of Stay: 5 day(s)  Current Patient Status: Inpatient   Certification Statement: The patient will continue to require additional inpatient hospital stay due to specialist recs and clinical course and placement  Discharge Plan: Anticipate discharge in 24-48 hrs to discharge location to be determined pending rehab evaluations.    Code Status: Level 3 - DNAR and " DNI    Subjective:   Patient seen and examined at bedside, reported no chest pain, no worsening shortness of breath, stated he was upset that he is within the hospital.  Patient requested to call wife, called patient's wife unable to reach and no ability to leave voicemail.    Objective:     Vitals:   Temp (24hrs), Av.6 °F (36.4 °C), Min:97.2 °F (36.2 °C), Max:97.9 °F (36.6 °C)    Temp:  [97.2 °F (36.2 °C)-97.9 °F (36.6 °C)] 97.6 °F (36.4 °C)  HR:  [79-88] 82  Resp:  [18-22] 22  BP: (103-118)/(56-65) 117/65  SpO2:  [94 %-97 %] 97 %  Body mass index is 17.43 kg/m².     Input and Output Summary (last 24 hours):   No intake or output data in the 24 hours ending 24 1224    Physical Exam:   Physical Exam  Vitals and nursing note reviewed.   Constitutional:       General: He is not in acute distress.     Appearance: He is well-developed. He is not ill-appearing.   HENT:      Head: Normocephalic and atraumatic.   Eyes:      Conjunctiva/sclera: Conjunctivae normal.   Cardiovascular:      Rate and Rhythm: Normal rate and regular rhythm.      Heart sounds: No murmur heard.  Pulmonary:      Effort: Pulmonary effort is normal. No respiratory distress.      Breath sounds: No wheezing or rhonchi.      Comments: Diminished breath sounds at current baseline, NC  Abdominal:      Palpations: Abdomen is soft.      Tenderness: There is no abdominal tenderness.   Musculoskeletal:         General: No swelling.      Cervical back: Neck supple.      Right lower leg: No edema.      Left lower leg: No edema.   Skin:     General: Skin is warm and dry.      Capillary Refill: Capillary refill takes less than 2 seconds.   Neurological:      Mental Status: He is alert.   Psychiatric:         Mood and Affect: Mood normal.          Additional Data:     Labs:  Results from last 7 days   Lab Units 24  1143   WBC Thousand/uL 12.40*   HEMOGLOBIN g/dL 12.4   HEMATOCRIT % 38.5   PLATELETS Thousands/uL 263   NEUTROS PCT % 79*   LYMPHS PCT  % 8*   MONOS PCT % 11   EOS PCT % 1     Results from last 7 days   Lab Units 01/23/24  0641 01/20/24  0357 01/19/24  1044   SODIUM mmol/L 142   < > 136   POTASSIUM mmol/L 4.1   < > 4.0   CHLORIDE mmol/L 102   < > 102   CO2 mmol/L 33*   < > 34*   BUN mg/dL 33*   < > 17   CREATININE mg/dL 1.13   < > 1.14   ANION GAP mmol/L 7   < > 0   CALCIUM mg/dL 8.4   < > 9.3   ALBUMIN g/dL  --   --  4.1   TOTAL BILIRUBIN mg/dL  --   --  0.48   ALK PHOS U/L  --   --  67   ALT U/L  --   --  16   AST U/L  --   --  18   GLUCOSE RANDOM mg/dL 125   < > 168*    < > = values in this interval not displayed.     Results from last 7 days   Lab Units 01/24/24  1143   INR  2.50*     Results from last 7 days   Lab Units 01/24/24  1115 01/24/24  0716 01/23/24  2017 01/23/24  1612 01/23/24  1109 01/23/24  0723 01/22/24  2052 01/22/24  1704 01/22/24  1155 01/22/24  0726 01/21/24  2114 01/21/24  1537   POC GLUCOSE mg/dl 199* 123 327* 220* 91 117 237* 283* 121 156* 98 232*         Results from last 7 days   Lab Units 01/19/24  1136 01/19/24  1044   LACTIC ACID mmol/L 0.8  --    PROCALCITONIN ng/ml  --  0.08       Lines/Drains:  Invasive Devices       Peripheral Intravenous Line  Duration             Peripheral IV 01/22/24 Dorsal (posterior);Left Forearm 1 day                          Imaging: Reviewed radiology reports from this admission including: chest xray    Recent Cultures (last 7 days):   Results from last 7 days   Lab Units 01/19/24  1044   BLOOD CULTURE  No Growth After 4 Days.  No Growth After 4 Days.       Last 24 Hours Medication List:   Current Facility-Administered Medications   Medication Dose Route Frequency Provider Last Rate    acetaminophen  650 mg Oral Q6H PRN Jean Pierre Bailey, DO      budesonide  0.5 mg Nebulization Q12H Sonia Varner, DO      carvedilol  6.25 mg Oral BID Jean Pierre Bailey,       digoxin  125 mcg Oral Daily Jean Pierre Bailey,       famotidine  20 mg Oral HS Jean Pierre Bailey, DO       formoterol  20 mcg Nebulization Q12H Sonia Varner, DO      furosemide  40 mg Oral Once per day on Monday Wednesday Friday Jean Pierre Bailey, DO      insulin glargine  8 Units Subcutaneous HS Jean Pierre Bailey, DO      insulin lispro  1-5 Units Subcutaneous TID AC Jean Pierre Bailey, DO      insulin lispro  1-5 Units Subcutaneous HS Jean Pierre Bailey, DO      ipratropium  0.5 mg Nebulization TID Sonia Varner, DO      levalbuterol  1.25 mg Nebulization TID Sonia Varner, DO      lisinopril  10 mg Oral Daily Jean Pierre Bailey, DO      melatonin  3 mg Oral HS Bobby Antony MD      memantine  10 mg Oral BID Jean Pierre Bailey, DO      OLANZapine  5 mg Intramuscular Q6H PRN Bobby Antony MD      ondansetron  4 mg Intravenous Q6H PRN Jean Pierre Bailey, DO      pantoprazole  40 mg Oral Daily Jean Pierre Bailey DO      pravastatin  80 mg Oral Daily With Dinner Jean Pierre Bailey DO      [START ON 1/25/2024] predniSONE  20 mg Oral Daily Anthony Liang MD      Followed by    [START ON 1/27/2024] predniSONE  10 mg Oral Daily Anthony Liang MD      QUEtiapine  25 mg Oral HS Bobby Antony MD      tamsulosin  0.4 mg Oral Daily With Dinner Jean Pierre Bailey DO      warfarin  2 mg Oral Daily (warfarin) Jean Pierre Bailey DO          Today, Patient Was Seen By: Josefa Suarez MD    **Please Note: This note may have been constructed using a voice recognition system.**

## 2024-01-25 NOTE — CASE MANAGEMENT
Case Management Discharge Planning Note    Patient name Juan Antonio Nicholson  Location [address] [address]/[address] [address] MRN [id number]  : 1940 Date 2024       Current Admission Date: 2024  Current Admission Diagnosis:Acute on chronic respiratory failure with hypoxia and hypercapnia ([facility])   Patient Active Problem List    Diagnosis Date Noted    Aspiration into airway 2024    Influenza 2024    Stage 3a chronic kidney disease ([facility]) 2021    Chronic systolic congestive heart failure ([facility]) 2021    Alzheimer's dementia without behavioral disturbance ([facility]) 2021    Physical deconditioning 2020    Coagulation defect, unspecified ([facility]) 2020    Acute on chronic respiratory failure with hypoxia and hypercapnia ([facility]) 2020    Abscess of lower lobe of left lung with pneumonia ([facility]) 2020    Severe protein-calorie malnutrition ([facility]) 2020    Diverticulosis 2019    Cholelithiases 2019    Nephrolithiasis 2019    COPD with acute exacerbation ([facility]) 2019    Non-recurrent unilateral inguinal hernia without obstruction or gangrene 2018    Pain in both feet 2018    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    Arteriosclerosis of coronary artery 2018    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    Chronic hypoxemic respiratory failure ([facility]) 11/10/2017    Bilateral carotid bruits 2017    Heart failure, left, 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% ([facility]) 2017    Lung nodule 2017    Dilated cardiomyopathy ([facility]) 2017    Type 2 diabetes mellitus with diabetic polyneuropathy, without long-term current use of insulin ([facility])     Severe left ventricular systolic dysfunction 2017    Hypoxia 10/24/2013    PAF (paroxysmal atrial fibrillation) ([facility]) 2013    Hypertensive heart disease with congestive heart failure ([facility]) 2013      LOS (days): 6  Geometric Mean LOS (GMLOS) (days): 3.6  Days to GMLOS:-2.4     OBJECTIVE:  Risk of Unplanned  Readmission Score: 31.06       Current admission status: Inpatient   Preferred Pharmacy:   [facility] - [place], [place] - [address]  [address] [facility] [facility] [address]  Phone: [phone] Fax: [phone]    [facility] - [place], [place] - [address]  [address]  [facility] [address]  Phone: [phone] Fax: [phone]    Primary Care Provider: Frank Lombardi, DO    Primary Insurance: [employer] REP  Secondary Insurance:     DISCHARGE DETAILS:    Discharge planning discussed with:: Wife Neeru  Freedom of Choice: Yes    Comments - Freedom of Choice: SW spoke with wife Neeru and she expressed preference for [facility] for [facility].  Facility was reserved in [facility].  SW tasked insurance authorization request to the CM DSC and will continue to follow.      CM contacted family/caregiver?: Yes  Were Treatment Team discharge recommendations reviewed with patient/caregiver?: Yes  Did patient/caregiver verbalize understanding of patient care needs?: Yes  Were patient/caregiver advised of the risks associated with not following Treatment Team discharge recommendations?: Yes    Contacts  Patient Contacts: Neeru Nicholson (spouse)  Relationship to Patient:: Family  Contact Method: In Person  Reason/Outcome: Emergency Contact, Discharge Planning, Referral    Requested Home Health Care         Is the patient interested in HHC at discharge?: No    DME Referral Provided  Referral made for DME?: No    Other Referral/Resources/Interventions Provided:  Interventions: Short Term Rehab    Would you like to participate in our [facility] service program?  : No - Declined    Treatment Team Recommendation: Short Term Rehab  Discharge Destination Plan:: Short Term Rehab  Transport at Discharge : [facility] Ambulance         IMM Given (Date):: 01/25/24

## 2024-01-25 NOTE — PLAN OF CARE
Problem: PHYSICAL THERAPY ADULT  Goal: Performs mobility at highest level of function for planned discharge setting.  See evaluation for individualized goals.  Description: Treatment/Interventions: ADL retraining, Functional transfer training, LE strengthening/ROM, Therapeutic exercise, Endurance training, Cognitive reorientation, Patient/family training, Equipment eval/education, Bed mobility, Gait training, Compensatory technique education, Spoke to case management, OT, Family          See flowsheet documentation for full assessment, interventions and recommendations.  Outcome: Progressing  Note: Prognosis: Good  Problem List: Decreased strength, Decreased range of motion, Decreased endurance, Impaired balance, Decreased mobility, Decreased coordination, Decreased cognition, Impaired judgement, Decreased safety awareness  Assessment: Pt agreeable to PT session this am. Compared to previous PT session, pt much more cooperative, not agitated and is noted to participate well with all therapy asked of pt. While adm, pt will cont to benefit from skilled PT services to increase pt's strength, balance, endurance, safe gait and mobility. At this time, pt will benefit from cont amb with the RW with progression as able. Prior to admission, pt was amb w/out an AD. Pt is appropriate for Level II Moderate Resource intensity when medically stable for dc.        Rehab Resource Intensity Level, PT: II (Moderate Resource Intensity)    See flowsheet documentation for full assessment.

## 2024-01-25 NOTE — CASE MANAGEMENT
[facility] received request for authorization from Care Manager.  Authorization request for:   Facility Name: [facility]  NPI: [id number]  Facility MD:  Dr. Macedo    NPI: [id number]   Authorization initiated by contacting insurance:  [employer]  Via: Good Photo   Clinicals submitted via: Good [employer] attachment   Pending Reference #: [id number]

## 2024-01-25 NOTE — CASE MANAGEMENT
Case Management Discharge Planning Note    Patient name Juan Antonio Nicholson  Location [address] [address]/[address] [place] [address]-* MRN [id number]  : 1940 Date 2024       Current Admission Date: 2024  Current Admission Diagnosis:Acute on chronic respiratory failure with hypoxia and hypercapnia ([facility])   Patient Active Problem List    Diagnosis Date Noted    Aspiration into airway 2024    Influenza 2024    Stage 3a chronic kidney disease ([facility]) 2021    Chronic systolic congestive heart failure ([facility]) 2021    Alzheimer's dementia without behavioral disturbance ([facility]) 2021    Physical deconditioning 2020    Coagulation defect, unspecified ([facility]) 2020    Acute on chronic respiratory failure with hypoxia and hypercapnia ([facility]) 2020    Abscess of lower lobe of left lung with pneumonia ([facility]) 2020    Severe protein-calorie malnutrition ([facility]) 2020    Diverticulosis 2019    Cholelithiases 2019    Nephrolithiasis 2019    COPD with acute exacerbation ([facility]) 2019    Non-recurrent unilateral inguinal hernia without obstruction or gangrene 2018    Pain in both feet 2018    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    Arteriosclerosis of coronary artery 2018    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    Chronic hypoxemic respiratory failure ([facility]) 11/10/2017    Bilateral carotid bruits 2017    Heart failure, left, 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% ([facility]) 2017    Lung nodule 2017    Dilated cardiomyopathy ([facility]) 2017    Type 2 diabetes mellitus with diabetic polyneuropathy, without long-term current use of insulin ([facility])     Severe left ventricular systolic dysfunction 2017    Hypoxia 10/24/2013    PAF (paroxysmal atrial fibrillation) ([facility]) 2013    Hypertensive heart disease with congestive heart failure ([facility]) 2013      LOS (days): 6  Geometric Mean LOS (GMLOS) (days): 3.6  Days to GMLOS:-2.4     OBJECTIVE:  Risk of Unplanned  Readmission Score: 31.06         Current admission status: Inpatient   Preferred Pharmacy:   [facility] - [place], [place] - [address]  [address] [facility] [facility] [address]  Phone: [phone] Fax: [phone]    [facility] - [place], [place] - [address]  [address]  [facility] [address]  Phone: [phone] Fax: [phone]    Primary Care Provider: Frank Lombardi, DO    Primary Insurance: [employer] REP  Secondary Insurance:     DISCHARGE DETAILS:          IMM Given (Date):: 01/25/24  IMM Given to:: Patient (IMM reviewed with patient and wife. They verbalized understanding and signed the form. Original placed in the scan bin and a copy was left with the patient.)

## 2024-01-25 NOTE — CASE MANAGEMENT
Case Management Progress Note    Patient name Juan Antonio Nicholson  Location [address] [address] [address] MRN [id number]  : 1940 Date 2024       LOS (days): 6  Geometric Mean LOS (GMLOS) (days): 3.6  Days to GMLOS:-2.2        OBJECTIVE:      Current admission status: Inpatient  Preferred Pharmacy:   [facility] - [place], [place] - [address]  [address] [facility] [address] [facility] [address]  Phone: [phone] Fax: [phone]    [facility] - [place], [place] - [address]  [address]  [facility] [address]  Phone: [phone] Fax: [phone]    Primary Care Provider: Frank Lombardi, DO    Primary Insurance: [employer] REP  Secondary Insurance:     PROGRESS NOTE:    STR bed availability so far reviewed with patient and wife at bedside ([facility], [facility], [facility]).  [facility] continues to follow but stated that patient must be off droplet precautions.  Patient and wife will discuss and SW will follow up.

## 2024-01-25 NOTE — NURSING NOTE
Patient refusing bipap at this time. Per order bipap is to be worn at night. Nurse educated patient about bipap use but patient still refusing.

## 2024-01-25 NOTE — OCCUPATIONAL THERAPY NOTE
"  OT TREATMENT         01/25/24 1135   OT Last Visit   OT Visit Date 01/25/24   Note Type   Note Type Treatment   Pain Assessment   Pain Assessment Tool 0-10   Pain Score No Pain   Restrictions/Precautions   Weight Bearing Precautions Per Order No   Other Precautions Contact/isolation;Droplet precautions;Cognitive;Fall Risk;Bed Alarm;Chair Alarm;O2;Telemetry  (5L O2 via NC)   ADL   Eating Assistance 5  Supervision/Setup   Grooming Assistance 4  Minimal Assistance   UB Bathing Assistance 4  Minimal Assistance   LB Bathing Assistance 3  Moderate Assistance   UB Dressing Assistance 4  Minimal Assistance   LB Dressing Assistance 2  Maximal Assistance   Toileting Assistance  3  Moderate Assistance   Bed Mobility   Supine to Sit 5  Supervision   Additional items Assist x 1;Verbal cues;Increased time required   Transfers   Sit to Stand 4  Minimal assistance   Additional items Assist x 1;Verbal cues;Increased time required   Stand to Sit 4  Minimal assistance   Additional items Assist x 1;Verbal cues;Increased time required   Stand pivot 4  Minimal assistance   Additional items Assist x 1;Verbal cues;Increased time required   Toilet transfer 4  Minimal assistance   Additional items Assist x 1;Verbal cues;Increased time required;Commode  (commode over standard toilet)   Toilet Transfers   Toilet Transfer From Rolling walker   Toilet Transfer Type To and from   Toilet Transfer to Standard bedside commode  (commode over toilet)   Toilet Transfer Technique Ambulating   Toilet Transfers Minimal assistance   Subjective   Subjective \"What's next?\"   Cognition   Overall Cognitive Status Impaired   Arousal/Participation Cooperative   Attention Attends with cues to redirect   Orientation Level Oriented to person;Disoriented to place;Disoriented to time;Disoriented to situation   Memory Decreased short term memory;Decreased recall of recent events;Decreased recall of biographical information   Following Commands Follows one step " commands with increased time or repetition   Activity Tolerance   Activity Tolerance Patient limited by fatigue;Treatment limited secondary to medical complications (Comment)  (SOB with activity)   Assessment   Assessment Pt seen for OT treatment session. Pt more cooperative and willing to participate today compared to prior session. Pt with improving functional status, able to perform supine to sit with supervision. Pt requires min A for sit to stand, ambulatory transfers and fxl mobility using RW with verbal cues for safety awareness, hand placement, and RW mgmt. Pt requires min-max A for ADLs. Pt on 5L O2 at rest saturating at 93%. During fxl mobility using O2 tank pt on 6L and desaturating to 82%, requiring seated rest and several minutes to recover to >90%. Pt requiring increased time to complete all tasks due to overall deconditioning, weakness, quick onset fatigue and SOB with activity. Pt would benefit from cont OT services to maximize safety and fxl performance of ADLs. The patient's raw score on the AM-PAC Daily Activity Inpatient Short Form is 15. A raw score of less than 19 suggests the patient may benefit from discharge to post-acute rehabilitation services. Please refer to the recommendation of the Occupational Therapist for safe discharge planning.   Plan   Treatment Interventions ADL retraining;Functional transfer training;UE strengthening/ROM;Endurance training;Cognitive reorientation;Patient/family training;Equipment evaluation/education;Activityengagement;Compensatory technique education   OT Frequency 3-5x/wk   Discharge Recommendation   Rehab Resource Intensity Level, OT II (Moderate Resource Intensity)   AM-PAC Daily Activity Inpatient   Lower Body Dressing 2   Bathing 2   Toileting 2   Upper Body Dressing 3   Grooming 3   Eating 3   Daily Activity Raw Score 15   Daily Activity Standardized Score (Calc for Raw Score >=11) 34.69   AM-PAC Applied Cognition Inpatient   Following a  Speech/Presentation 2   Understanding Ordinary Conversation 4   Taking Medications 1   Remembering Where Things Are Placed or Put Away 1   Remembering List of 4-5 Errands 1   Taking Care of Complicated Tasks 1   Applied Cognition Raw Score 10   Applied Cognition Standardized Score 24.98   Licensure   [place] License Number  Bonnie Wilman OTR/L [id number]

## 2024-01-25 NOTE — PLAN OF CARE
Problem: OCCUPATIONAL THERAPY ADULT  Goal: Performs self-care activities at highest level of function for planned discharge setting.  See evaluation for individualized goals.  Description: Treatment Interventions: ADL retraining, Functional transfer training, UE strengthening/ROM, Endurance training, Patient/family training, Equipment evaluation/education, Activityengagement, Continued evaluation, Energy conservation, Compensatory technique education          See flowsheet documentation for full assessment, interventions and recommendations.   Outcome: Progressing  Note: Limitation: Decreased ADL status, Decreased UE ROM, Decreased UE strength, Decreased Safe judgement during ADL, Decreased cognition, Decreased endurance, Decreased self-care trans, Decreased high-level ADLs (decreased balance and mobility)  Prognosis: Fair  Assessment: Pt seen for OT treatment session. Pt more cooperative and willing to participate today compared to prior session. Pt with improving functional status, able to perform supine to sit with supervision. Pt requires min A for sit to stand, ambulatory transfers and fxl mobility using RW with verbal cues for safety awareness, hand placement, and RW mgmt. Pt requires min-max A for ADLs. Pt on 5L O2 at rest saturating at 93%. During fxl mobility using O2 tank pt on 6L and desaturating to 82%, requiring seated rest and several minutes to recover to >90%. Pt requiring increased time to complete all tasks due to overall deconditioning, weakness, quick onset fatigue and SOB with activity. Pt would benefit from cont OT services to maximize safety and fxl performance of ADLs.     Rehab Resource Intensity Level, OT: II (Moderate Resource Intensity)
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
## 2024-01-25 NOTE — PLAN OF CARE
Problem: RESPIRATORY - ADULT  Goal: Achieves optimal ventilation and oxygenation  Description: INTERVENTIONS:  - Assess for changes in respiratory status  - Assess for changes in mentation and behavior  - Position to facilitate oxygenation and minimize respiratory effort  - Oxygen administered by appropriate delivery if ordered  - Initiate smoking cessation education as indicated  - Encourage broncho-pulmonary hygiene including cough, deep breathe, Incentive Spirometry  - Assess the need for suctioning and aspirate as needed  - Assess and instruct to report SOB or any respiratory difficulty  - Respiratory Therapy support as indicated  Outcome: Progressing     Problem: PAIN - ADULT  Goal: Verbalizes/displays adequate comfort level or baseline comfort level  Description: Interventions:  - Encourage patient to monitor pain and request assistance  - Assess pain using appropriate pain scale  - Administer analgesics based on type and severity of pain and evaluate response  - Implement non-pharmacological measures as appropriate and evaluate response  - Consider cultural and social influences on pain and pain management  - Notify physician/advanced practitioner if interventions unsuccessful or patient reports new pain  Outcome: Progressing     Problem: INFECTION - ADULT  Goal: Absence or prevention of progression during hospitalization  Description: INTERVENTIONS:  - Assess and monitor for signs and symptoms of infection  - Monitor lab/diagnostic results  - Monitor all insertion sites, i.e. indwelling lines, tubes, and drains  - Monitor endotracheal if appropriate and nasal secretions for changes in amount and color  - [place] appropriate cooling/warming therapies per order  - Administer medications as ordered  - Instruct and encourage patient and family to use good hand hygiene technique  - Identify and instruct in appropriate isolation precautions for identified infection/condition  Outcome: Progressing     Problem:  SAFETY ADULT  Goal: Patient will remain free of falls  Description: INTERVENTIONS:  - Educate patient/family on patient safety including physical limitations  - Instruct patient to call for assistance with activity   - Consult OT/PT to assist with strengthening/mobility   - Keep Call bell within reach  - Keep bed low and locked with side rails adjusted as appropriate  - Keep care items and personal belongings within reach  - Initiate and maintain comfort rounds  - Make Fall Risk Sign visible to staff  - Offer Toileting every 2 Hours, in advance of need  - Initiate/Maintain bed alarm  - Obtain necessary fall risk management equipment: n/a  - Apply yellow socks and bracelet for high fall risk patients  - Consider moving patient to room near nurses station  Outcome: Progressing     Problem: SAFETY ADULT  Goal: Maintain or return to baseline ADL function  Description: INTERVENTIONS:  -  Assess patient's ability to carry out ADLs; assess patient's baseline for ADL function and identify physical deficits which impact ability to perform ADLs (bathing, care of mouth/teeth, toileting, grooming, dressing, etc.)  - Assess/evaluate cause of self-care deficits   - Assess range of motion  - Assess patient's mobility; develop plan if impaired  - Assess patient's need for assistive devices and provide as appropriate  - Encourage maximum independence but intervene and supervise when necessary  - Involve family in performance of ADLs  - Assess for home care needs following discharge   - Consider OT consult to assist with ADL evaluation and planning for discharge  - Provide patient education as appropriate  Outcome: Progressing     Problem: SAFETY ADULT  Goal: Maintains/Returns to pre admission functional level  Description: INTERVENTIONS:  - Perform AM-PAC 6 Click Basic Mobility/ Daily Activity assessment daily.  - Set and communicate daily mobility goal to care team and patient/family/caregiver.   - Collaborate with rehabilitation  services on mobility goals if consulted  - Perform Range of Motion 3 times a day.  - Reposition patient every 2 hours.  - Dangle patient 3 times a day  - Stand patient 3 times a day  - Ambulate patient 3 times a day  - Out of bed to chair 3 times a day   - Out of bed for meals 3 times a day  - Out of bed for toileting  - Record patient progress and toleration of activity level   Outcome: Progressing     Problem: Knowledge Deficit  Goal: Patient/family/caregiver demonstrates understanding of disease process, treatment plan, medications, and discharge instructions  Description: Complete learning assessment and assess knowledge base.  Interventions:  - Provide teaching at level of understanding  - Provide teaching via preferred learning methods  Outcome: Progressing     Problem: Prexisting or High Potential for Compromised Skin Integrity  Goal: Skin integrity is maintained or improved  Description: INTERVENTIONS:  - Identify patients at risk for skin breakdown  - Assess and monitor skin integrity  - Assess and monitor nutrition and hydration status  - Monitor labs   - Assess for incontinence   - Turn and reposition patient  - Assist with mobility/ambulation  - Relieve pressure over bony prominences  - Avoid friction and shearing  - Provide appropriate hygiene as needed including keeping skin clean and dry  - Evaluate need for skin moisturizer/barrier cream  - Collaborate with interdisciplinary team   - Patient/family teaching  - Consider wound care consult   Outcome: Progressing     Problem: Nutrition/Hydration-ADULT  Goal: Nutrient/Hydration intake appropriate for improving, restoring or maintaining nutritional needs  Description: Monitor and assess patient's nutrition/hydration status for malnutrition. Collaborate with interdisciplinary team and initiate plan and interventions as ordered.  Monitor patient's weight and dietary intake as ordered or per policy. Utilize nutrition screening tool and intervene as  necessary. Determine patient's food preferences and provide high-protein, high-caloric foods as appropriate.     INTERVENTIONS:  - Monitor oral intake, urinary output, labs, and treatment plans  - Assess nutrition and hydration status and recommend course of action  - Evaluate amount of meals eaten  - Assist patient with eating if necessary   - Allow adequate time for meals  - Recommend/ encourage appropriate diets, oral nutritional supplements, and vitamin/mineral supplements  - Order, calculate, and assess calorie counts as needed  - Assess need for intravenous fluids  - Provide specific nutrition/hydration education as appropriate  - Include patient/family/caregiver in decisions related to nutrition  Outcome: Progressing

## 2024-01-25 NOTE — PLAN OF CARE
Problem: RESPIRATORY - ADULT  Goal: Achieves optimal ventilation and oxygenation  Description: INTERVENTIONS:  - Assess for changes in respiratory status  - Assess for changes in mentation and behavior  - Position to facilitate oxygenation and minimize respiratory effort  - Oxygen administered by appropriate delivery if ordered  - Initiate smoking cessation education as indicated  - Encourage broncho-pulmonary hygiene including cough, deep breathe, Incentive Spirometry  - Assess the need for suctioning and aspirate as needed  - Assess and instruct to report SOB or any respiratory difficulty  - Respiratory Therapy support as indicated  Outcome: Progressing     Problem: PAIN - ADULT  Goal: Verbalizes/displays adequate comfort level or baseline comfort level  Description: Interventions:  - Encourage patient to monitor pain and request assistance  - Assess pain using appropriate pain scale  - Administer analgesics based on type and severity of pain and evaluate response  - Implement non-pharmacological measures as appropriate and evaluate response  - Consider cultural and social influences on pain and pain management  - Notify physician/advanced practitioner if interventions unsuccessful or patient reports new pain  Outcome: Progressing     Problem: INFECTION - ADULT  Goal: Absence or prevention of progression during hospitalization  Description: INTERVENTIONS:  - Assess and monitor for signs and symptoms of infection  - Monitor lab/diagnostic results  - Monitor all insertion sites, i.e. indwelling lines, tubes, and drains  - Monitor endotracheal if appropriate and nasal secretions for changes in amount and color  - Fonda appropriate cooling/warming therapies per order  - Administer medications as ordered  - Instruct and encourage patient and family to use good hand hygiene technique  - Identify and instruct in appropriate isolation precautions for identified infection/condition  Outcome: Progressing     Problem:  SAFETY ADULT  Goal: Patient will remain free of falls  Description: INTERVENTIONS:  - Educate patient/family on patient safety including physical limitations  - Instruct patient to call for assistance with activity   - Consult OT/PT to assist with strengthening/mobility   - Keep Call bell within reach  - Keep bed low and locked with side rails adjusted as appropriate  - Keep care items and personal belongings within reach  - Initiate and maintain comfort rounds  - Make Fall Risk Sign visible to staff  - Offer Toileting every 2 Hours, in advance of need  - Initiate/Maintain bed alarm  - Obtain necessary fall risk management equipment: n/a  - Apply yellow socks and bracelet for high fall risk patients  - Consider moving patient to room near nurses station  Outcome: Progressing  Goal: Maintain or return to baseline ADL function  Description: INTERVENTIONS:  -  Assess patient's ability to carry out ADLs; assess patient's baseline for ADL function and identify physical deficits which impact ability to perform ADLs (bathing, care of mouth/teeth, toileting, grooming, dressing, etc.)  - Assess/evaluate cause of self-care deficits   - Assess range of motion  - Assess patient's mobility; develop plan if impaired  - Assess patient's need for assistive devices and provide as appropriate  - Encourage maximum independence but intervene and supervise when necessary  - Involve family in performance of ADLs  - Assess for home care needs following discharge   - Consider OT consult to assist with ADL evaluation and planning for discharge  - Provide patient education as appropriate  Outcome: Progressing  Goal: Maintains/Returns to pre admission functional level  Description: INTERVENTIONS:  - Perform AM-PAC 6 Click Basic Mobility/ Daily Activity assessment daily.  - Set and communicate daily mobility goal to care team and patient/family/caregiver.   - Collaborate with rehabilitation services on mobility goals if consulted  - Perform  Range of Motion 3 times a day.  - Reposition patient every 2 hours.  - Dangle patient 3 times a day  - Stand patient 3 times a day  - Ambulate patient 3 times a day  - Out of bed to chair 3 times a day   - Out of bed for meals 3 times a day  - Out of bed for toileting  - Record patient progress and toleration of activity level   Outcome: Progressing     Problem: DISCHARGE PLANNING  Goal: Discharge to home or other facility with appropriate resources  Description: INTERVENTIONS:  - Identify barriers to discharge w/patient and caregiver  - Arrange for needed discharge resources and transportation as appropriate  - Identify discharge learning needs (meds, wound care, etc.)  - Arrange for interpretive services to assist at discharge as needed  - Refer to Case Management Department for coordinating discharge planning if the patient needs post-hospital services based on physician/advanced practitioner order or complex needs related to functional status, cognitive ability, or social support system  Outcome: Progressing     Problem: Knowledge Deficit  Goal: Patient/family/caregiver demonstrates understanding of disease process, treatment plan, medications, and discharge instructions  Description: Complete learning assessment and assess knowledge base.  Interventions:  - Provide teaching at level of understanding  - Provide teaching via preferred learning methods  Outcome: Progressing     Problem: Prexisting or High Potential for Compromised Skin Integrity  Goal: Skin integrity is maintained or improved  Description: INTERVENTIONS:  - Identify patients at risk for skin breakdown  - Assess and monitor skin integrity  - Assess and monitor nutrition and hydration status  - Monitor labs   - Assess for incontinence   - Turn and reposition patient  - Assist with mobility/ambulation  - Relieve pressure over bony prominences  - Avoid friction and shearing  - Provide appropriate hygiene as needed including keeping skin clean and  dry  - Evaluate need for skin moisturizer/barrier cream  - Collaborate with interdisciplinary team   - Patient/family teaching  - Consider wound care consult   Outcome: Progressing     Problem: Nutrition/Hydration-ADULT  Goal: Nutrient/Hydration intake appropriate for improving, restoring or maintaining nutritional needs  Description: Monitor and assess patient's nutrition/hydration status for malnutrition. Collaborate with interdisciplinary team and initiate plan and interventions as ordered.  Monitor patient's weight and dietary intake as ordered or per policy. Utilize nutrition screening tool and intervene as necessary. Determine patient's food preferences and provide high-protein, high-caloric foods as appropriate.     INTERVENTIONS:  - Monitor oral intake, urinary output, labs, and treatment plans  - Assess nutrition and hydration status and recommend course of action  - Evaluate amount of meals eaten  - Assist patient with eating if necessary   - Allow adequate time for meals  - Recommend/ encourage appropriate diets, oral nutritional supplements, and vitamin/mineral supplements  - Order, calculate, and assess calorie counts as needed  - Assess need for intravenous fluids  - Provide specific nutrition/hydration education as appropriate  - Include patient/family/caregiver in decisions related to nutrition  Outcome: Progressing

## 2024-01-25 NOTE — PHYSICAL THERAPY NOTE
"   PT TREATMENT     24 1207   PT Last Visit   PT Visit Date 24   Note Type   Note Type Treatment   Pain Assessment   Pain Assessment Tool 0-10   Pain Score No Pain   Restrictions/Precautions   Other Precautions Contact/isolation;Droplet precautions;Cognitive;Fall Risk;Bed Alarm;Chair Alarm;O2;Telemetry  (5L O2 via NC;Mark)   General   Chart Reviewed Yes   Family/Caregiver Present Yes  (pt's wife)   Cognition   Overall Cognitive Status Impaired   Arousal/Participation Cooperative   Attention Attends with cues to redirect   Orientation Level Oriented to person;Disoriented to time;Disoriented to situation  (Generally oriented to place \"hospital\" but does not know name of hospital. When asked about time, states \"I don't pay attention to that\")   Memory Decreased recall of precautions;Decreased recall of recent events;Decreased short term memory   Following Commands Follows one step commands with increased time or repetition   Comments At least 2 pt identifiers including name and    Subjective   Subjective \"Just tell me what you want me to do\"   Bed Mobility   Supine to Sit 5  Supervision   Additional items Assist x 1;Verbal cues;Increased time required   Transfers   Sit to Stand 4  Minimal assistance   Additional items Assist x 1;Verbal cues;Increased time required  (cues for safe hand placement)   Stand to Sit 4  Minimal assistance   Additional items Assist x 1;Verbal cues;Increased time required;Armrests  (cues for safe hand placement)   Ambulation/Elevation   Gait pattern Narrow LESLIE;Scissoring;Short stride;Step to;Foward flexed;Decreased foot clearance;Decreased heel strike;Inconsistent ailin;Knees flexed;Retropulsion;R Knee Kin;L Knee Kin   Gait Assistance 4  Minimal assist  (to occasional mod A with turns)   Additional items Assist x 1;Verbal cues;Tactile cues   Assistive Device None;Rolling walker   Distance Few steps bed to chair w/out an AD. 20 feet with change in direction with RW. " SAO2 on 5L at rest in the low to mid 90s, drops to low to mid 70s with mobility on 6L, pt needing increased time to recover. Pt mod SOB   Balance   Static Sitting Fair   Static Standing   (P+/F- with RW)   Ambulatory   (P/P+ with RW)   Endurance Deficit   Endurance Deficit Yes   Endurance Deficit Description limited standing, gait and overall activity endurance   Activity Tolerance   Activity Tolerance Patient limited by fatigue;Treatment limited secondary to medical complications (Comment)  (impaired cognition;decreasing SAO2)   Assessment   Problem List Decreased strength;Decreased range of motion;Decreased endurance;Impaired balance;Decreased mobility;Decreased coordination;Decreased cognition;Impaired judgement;Decreased safety awareness   Assessment Pt agreeable to PT session this am. Compared to previous PT session, pt much more cooperative, not agitated and is noted to participate well with all therapy asked of pt. While adm, pt will cont to benefit from skilled PT services to increase pt's strength, balance, endurance, safe gait and mobility. At this time, pt will benefit from cont amb with the RW with progression as able. Prior to admission, pt was amb w/out an AD. Pt is appropriate for Level II Moderate Resource intensity when medically stable for dc.    The patient's AM-PAC Basic Mobility Inpatient Short Form Raw Score is 14. A Raw score of less than or equal to 16 suggests the patient may benefit from discharge to post-acute rehabilitation services. Please also refer to the recommendation of the Physical Therapist for safe discharge planning.   Plan   Treatment/Interventions ADL retraining;Functional transfer training;LE strengthening/ROM;Therapeutic exercise;Endurance training;Cognitive reorientation;Patient/family training;Equipment eval/education;Bed mobility;Gait training;Compensatory technique education;Spoke to case management;OT;Family   PT Frequency Other (Comment)  (5x/wk)   Discharge  Recommendation   Rehab Resource Intensity Level, PT II (Moderate Resource Intensity)   AM-PAC Basic Mobility Inpatient   Turning in Flat Bed Without Bedrails 3   Lying on Back to Sitting on Edge of Flat Bed Without Bedrails 3   Moving Bed to Chair 2   Standing Up From Chair Using Arms 3   Walk in Room 2   Climb 3-5 Stairs With Railing 1   Basic Mobility Inpatient Raw Score 14   Basic Mobility Standardized Score 35.55   Highest Level Of Mobility   -HLM Goal 4: Move to chair/commode   -HLM Achieved 6: Walk 10 steps or more   Education   Education Provided Mobility training;Assistive device   Patient Explanation/teachback used;Reinforcement needed   End of Consult   Patient Position at End of Consult Bedside chair;Bed/Chair alarm activated;All needs within reach   Licensure   [place] License Number  Lidia Adler, PT [id number]

## 2024-01-25 NOTE — PROGRESS NOTES
[facility]  Progress Note  Name: Juan Antonio Nicholson I  MRN: [id number]  Unit/Bed#: [address] [place] [address] I Date of Admission: 1/19/2024   Date of Service: 1/25/2024 I Hospital Day: 6      Assessment & Plan:    * Acute on chronic respiratory failure with hypoxia and hypercapnia (HCC)  Assessment & Plan  Secondary to COPD exacerbation and concomitant Influenza; severe disease severe pulmonary disease at baseline  Has required BiPAP for respiratory support since transport with EMS, when taken off BiPAP in the ER became significantly hypoxic.  Initially required BiPAP on the floor, but has since been stabilized on high flow nasal cannula oxygen  Now off BiPAP and stable on high flow -> further weaned down to 5-6 L currently  IV Solu-Medrol now transitioned to a Prednisone tapering course  Continue Pulmicort/Perforomist and nebulizer treatments  Patient continues to refuse BiPAP nightly  Tamiflu x 5 days completed  Goals of care: DNR/DNI  Plan for discharge to skilled rehab pending placement     Aspiration into airway  Assessment & Plan  Witnessed aspiration with breakfast 1/21  Appreciate speech therapy input and video barium swallow -> diet modified to a mechanical soft with thin liquids currently     Influenza  Assessment & Plan  Patient presents with acute on chronic respiratory failure  Tested positive for influenza A in the emergency room  Completed 5-day course of Tamiflu  Droplet and contact precautions     Stage 3 chronic kidney disease (HCC)  Assessment & Plan  Renal function at baseline with creatinine around 1.1-1.2 -> currently stable around baseline  Monitor renal function and urine output - limit/avoid nephrotoxins and hypotension as possible     Alzheimer's dementia without behavioral disturbance (HCC)  Assessment & Plan  Continue home Namenda  Supportive care  Fall and delirium precautions     Heart failure 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)  Assessment & Plan  Not in acute exacerbation  Continue home Lasix  40 mg Monday/Wednesday/Friday  Continue Coreg/Digoxin/Zestril  Fluid/sodium restriction  Monitor/replete serum potassium/magnesium deficiencies if present     PAF (paroxysmal atrial fibrillation) (HCC)  Assessment & Plan  Continue Coreg/Digoxin  Continue Coumadin for anticoagulation with a target INR of 2-3     Type 2 diabetes mellitus (HCC)  Assessment & Plan  Hold oral hypoglycemics while hospitalized  Recent A1c of 8.8 in December 2023  Basal insulin with additional SSI coverage per Accu-Cheks  Insulin sliding scale for correctional coverage  Carbohydrate consistent diet  Hypoglycemia protocol  Monitor for steroid-induced hyperglycemia       DVT Prophylaxis:  Coumadin    AM-PAC Basic Mobility:  Basic Mobility Inpatient Raw Score: 14  -[facility] Achieved: 6: Walk 10 steps or more  -HL Goal: 4: Move to chair/commode    Patient Centered Rounds:  I have performed bedside rounds and discussed plan of care with nursing today.  Discussions with Specialists or Other Care Team Provider:  see above assessments if applicable    Education and Discussions with Family / Patient: Patient at bedside agreeable to skilled rehab - wife has given preferences for rehab facility    Time Spent for Care:  35 minutes. More than 50% of total time spent on counseling and coordination of care, on one or more of the following: performing physical exam; counseling and coordination of care, obtaining or reviewing history, documenting in the medical record, reviewing/ordering tests/medications/procedures, and communicating with other healthcare professionals.    Current Length of Stay: 6 day(s)  Current Patient Status: Inpatient   Certification Statement:  Patient will continue to require additional hospital stay due to assessments as noted above.    Code Status: Level 3 - DNAR and DNI        Subjective:     Seen and examined earlier today.  Sitting upright in chair with legs reclined.  Denies worsening shortness of breath at this  time.        Objective:     Vitals:   Temp (24hrs), Av.5 °F (36.4 °C), Min:97.5 °F (36.4 °C), Max:97.5 °F (36.4 °C)    Temp:  [97.5 °F (36.4 °C)] 97.5 °F (36.4 °C)  HR:  [88-99] 88  Resp:  [16-20] 20  BP: (109-132)/(70-74) 132/74  SpO2:  [88 %-100 %] 94 %  Body mass index is 17.43 kg/m².     Input and Output Summary (last 24 hours):       Intake/Output Summary (Last 24 hours) at 2024 1700  Last data filed at 2024 1830  Gross per 24 hour   Intake 180 ml   Output --   Net 180 ml       Physical Exam:     GENERAL Remains weak and fatigued   HEAD   Normocephalic - atraumatic   EYES Nonicteric   MOUTH   Mucosa moist   NECK   Supple - full range of motion   CARDIAC Rate controlled   PULMONARY Improving bibasilar breath sounds with trace expiratory wheezes - nonlabored respirations on nasal cannula oxygen   ABDOMEN   Soft - nontender/nondistended - active bowel sounds   MUSCULOSKELETAL   Motor strength/range of motion deconditioned   NEUROLOGIC Cognitive impairment noted   SKIN   Chronic wrinkles/blemishes    PSYCHIATRIC   Mood/affect stable         Additional Data:     Labs & Recent Cultures:    Results from last 7 days   Lab Units 24  0454   WBC Thousand/uL 13.72*   HEMOGLOBIN g/dL 11.9*   HEMATOCRIT % 35.9*   PLATELETS Thousands/uL 261   NEUTROS PCT % 82*   LYMPHS PCT % 7*   MONOS PCT % 10   EOS PCT % 0     Results from last 7 days   Lab Units 24  0454 24  0357 24  1044   POTASSIUM mmol/L 4.2   < > 4.0   CHLORIDE mmol/L 98   < > 102   CO2 mmol/L 35*   < > 34*   BUN mg/dL 29*   < > 17   CREATININE mg/dL 1.16   < > 1.14   CALCIUM mg/dL 8.4   < > 9.3   ALK PHOS U/L  --   --  67   ALT U/L  --   --  16   AST U/L  --   --  18    < > = values in this interval not displayed.     Results from last 7 days   Lab Units 24  0454   INR  2.17*     Results from last 7 days   Lab Units 24  1600 24  1138 24  0754 24  2058 24  1617 24  1115 24  0716  01/23/24  2017 01/23/24  1612 01/23/24  1109 01/23/24  0723 01/22/24  2052   POC GLUCOSE mg/dl 273* 203* 116 254* 259* 199* 123 327* 220* 91 117 237*         Results from last 7 days   Lab Units 01/19/24  1136 01/19/24  1044   LACTIC ACID mmol/L 0.8  --    PROCALCITONIN ng/ml  --  0.08         Results from last 7 days   Lab Units 01/19/24  1044   BLOOD CULTURE  No Growth After 5 Days.  No Growth After 5 Days.         Lines/Drains:  Invasive Devices       Peripheral Intravenous Line  Duration             Peripheral IV 01/22/24 Dorsal (posterior);Left Forearm 2 days                      Last 24 Hours Medication List:   Current Facility-Administered Medications   Medication Dose Route Frequency Provider Last Rate    acetaminophen  650 mg Oral Q6H PRN Jean Pierre Bailey, DO      budesonide  0.5 mg Nebulization Q12H Sonia Varner DO      carvedilol  6.25 mg Oral BID Jean Pierre Bailey, DO      digoxin  125 mcg Oral Daily Jean Pierre Bailey, DO      famotidine  20 mg Oral HS Jean Pierre Bailey, DO      formoterol  20 mcg Nebulization Q12H Sonia Varner, DO      furosemide  40 mg Oral Once per day on Monday Wednesday Friday Jean Pierre Bailey, DO      insulin glargine  8 Units Subcutaneous HS Jean Pierre Bailey, DO      insulin lispro  1-5 Units Subcutaneous TID AC Jean Pierre Bailey, DO      insulin lispro  1-5 Units Subcutaneous HS Jean Pierre Bailey, DO      ipratropium  0.5 mg Nebulization TID Sonia Varner DO      levalbuterol  1.25 mg Nebulization TID Sonia Varner DO      lisinopril  10 mg Oral Daily Jean Pierre Bailey, DO      melatonin  3 mg Oral HS Bobby Antony MD      memantine  10 mg Oral BID Jean Pierre Bailey, DO      OLANZapine  5 mg Intramuscular Q6H PRN Bobby Antony MD      ondansetron  4 mg Intravenous Q6H PRN Jean Pierre Bailey, DO      pantoprazole  40 mg Oral Daily Jean Pierre Bailey, DO      pravastatin  80 mg  Oral Daily With Dinner Jean Pierre Bailey, DO      predniSONE  20 mg Oral Daily Anthony Liang MD      Followed by    [START ON 1/27/2024] predniSONE  10 mg Oral Daily Anthony Liang MD      QUEtiapine  25 mg Oral HS Bobby Antony MD      tamsulosin  0.4 mg Oral Daily With Dinner Jean Pierre Bailey,       warfarin  2 mg Oral Daily (warfarin) Jean Pierre Bailey, DO DAYAN HAGEN MD   Hospitalist - [facility]        ** Please Note: This note is constructed using a voice recognition dictation system.  An occasional wrong word/phrase or “sound-a-like” substitution may have been picked up by dictation device due to the inherent limitations of voice recognition software.  Read the chart carefully and recognize, using reasonable context, where substitutions may have occurred.**

## 2024-01-25 NOTE — TELEPHONE ENCOUNTER
Fax [phone]  They would like the office notes from 7/13/21  [place] Skye (name was unclear) called from "[employer]? (not clear on message) He was asking to speak with Dr Gianna Norton regarding this patient  He is asking for us to please fax that office notes to the fax provided [facility]    We need to call this number to inquire where he is calling from? no weight-bearing restrictions

## 2024-01-26 NOTE — NURSING NOTE
Report given to Phoebe at [facility] at 1400. Pt left with transport at 1430.

## 2024-01-26 NOTE — PLAN OF CARE
Problem: RESPIRATORY - ADULT  Goal: Achieves optimal ventilation and oxygenation  Description: INTERVENTIONS:  - Assess for changes in respiratory status  - Assess for changes in mentation and behavior  - Position to facilitate oxygenation and minimize respiratory effort  - Oxygen administered by appropriate delivery if ordered  - Initiate smoking cessation education as indicated  - Encourage broncho-pulmonary hygiene including cough, deep breathe, Incentive Spirometry  - Assess the need for suctioning and aspirate as needed  - Assess and instruct to report SOB or any respiratory difficulty  - Respiratory Therapy support as indicated  Outcome: Progressing     Problem: PAIN - ADULT  Goal: Verbalizes/displays adequate comfort level or baseline comfort level  Description: Interventions:  - Encourage patient to monitor pain and request assistance  - Assess pain using appropriate pain scale  - Administer analgesics based on type and severity of pain and evaluate response  - Implement non-pharmacological measures as appropriate and evaluate response  - Consider cultural and social influences on pain and pain management  - Notify physician/advanced practitioner if interventions unsuccessful or patient reports new pain  Outcome: Progressing     Problem: INFECTION - ADULT  Goal: Absence or prevention of progression during hospitalization  Description: INTERVENTIONS:  - Assess and monitor for signs and symptoms of infection  - Monitor lab/diagnostic results  - Monitor all insertion sites, i.e. indwelling lines, tubes, and drains  - Monitor endotracheal if appropriate and nasal secretions for changes in amount and color  - Macy appropriate cooling/warming therapies per order  - Administer medications as ordered  - Instruct and encourage patient and family to use good hand hygiene technique  - Identify and instruct in appropriate isolation precautions for identified infection/condition  Outcome: Progressing     Problem:  SAFETY ADULT  Goal: Patient will remain free of falls  Description: INTERVENTIONS:  - Educate patient/family on patient safety including physical limitations  - Instruct patient to call for assistance with activity   - Consult OT/PT to assist with strengthening/mobility   - Keep Call bell within reach  - Keep bed low and locked with side rails adjusted as appropriate  - Keep care items and personal belongings within reach  - Initiate and maintain comfort rounds  - Make Fall Risk Sign visible to staff  - Offer Toileting every 2 Hours, in advance of need  - Initiate/Maintain bed alarm  - Obtain necessary fall risk management equipment: n/a  - Apply yellow socks and bracelet for high fall risk patients  - Consider moving patient to room near nurses station  Outcome: Progressing  Goal: Maintain or return to baseline ADL function  Description: INTERVENTIONS:  -  Assess patient's ability to carry out ADLs; assess patient's baseline for ADL function and identify physical deficits which impact ability to perform ADLs (bathing, care of mouth/teeth, toileting, grooming, dressing, etc.)  - Assess/evaluate cause of self-care deficits   - Assess range of motion  - Assess patient's mobility; develop plan if impaired  - Assess patient's need for assistive devices and provide as appropriate  - Encourage maximum independence but intervene and supervise when necessary  - Involve family in performance of ADLs  - Assess for home care needs following discharge   - Consider OT consult to assist with ADL evaluation and planning for discharge  - Provide patient education as appropriate  Outcome: Progressing  Goal: Maintains/Returns to pre admission functional level  Description: INTERVENTIONS:  - Perform AM-PAC 6 Click Basic Mobility/ Daily Activity assessment daily.  - Set and communicate daily mobility goal to care team and patient/family/caregiver.   - Collaborate with rehabilitation services on mobility goals if consulted  - Perform  Range of Motion 3 times a day.  - Reposition patient every 2 hours.  - Dangle patient 3 times a day  - Stand patient 3 times a day  - Ambulate patient 3 times a day  - Out of bed to chair 3 times a day   - Out of bed for meals 3 times a day  - Out of bed for toileting  - Record patient progress and toleration of activity level   Outcome: Progressing     Problem: DISCHARGE PLANNING  Goal: Discharge to home or other facility with appropriate resources  Description: INTERVENTIONS:  - Identify barriers to discharge w/patient and caregiver  - Arrange for needed discharge resources and transportation as appropriate  - Identify discharge learning needs (meds, wound care, etc.)  - Arrange for interpretive services to assist at discharge as needed  - Refer to Case Management Department for coordinating discharge planning if the patient needs post-hospital services based on physician/advanced practitioner order or complex needs related to functional status, cognitive ability, or social support system  Outcome: Progressing     Problem: Knowledge Deficit  Goal: Patient/family/caregiver demonstrates understanding of disease process, treatment plan, medications, and discharge instructions  Description: Complete learning assessment and assess knowledge base.  Interventions:  - Provide teaching at level of understanding  - Provide teaching via preferred learning methods  Outcome: Progressing     Problem: Prexisting or High Potential for Compromised Skin Integrity  Goal: Skin integrity is maintained or improved  Description: INTERVENTIONS:  - Identify patients at risk for skin breakdown  - Assess and monitor skin integrity  - Assess and monitor nutrition and hydration status  - Monitor labs   - Assess for incontinence   - Turn and reposition patient  - Assist with mobility/ambulation  - Relieve pressure over bony prominences  - Avoid friction and shearing  - Provide appropriate hygiene as needed including keeping skin clean and  dry  - Evaluate need for skin moisturizer/barrier cream  - Collaborate with interdisciplinary team   - Patient/family teaching  - Consider wound care consult   Outcome: Progressing     Problem: Nutrition/Hydration-ADULT  Goal: Nutrient/Hydration intake appropriate for improving, restoring or maintaining nutritional needs  Description: Monitor and assess patient's nutrition/hydration status for malnutrition. Collaborate with interdisciplinary team and initiate plan and interventions as ordered.  Monitor patient's weight and dietary intake as ordered or per policy. Utilize nutrition screening tool and intervene as necessary. Determine patient's food preferences and provide high-protein, high-caloric foods as appropriate.     INTERVENTIONS:  - Monitor oral intake, urinary output, labs, and treatment plans  - Assess nutrition and hydration status and recommend course of action  - Evaluate amount of meals eaten  - Assist patient with eating if necessary   - Allow adequate time for meals  - Recommend/ encourage appropriate diets, oral nutritional supplements, and vitamin/mineral supplements  - Order, calculate, and assess calorie counts as needed  - Assess need for intravenous fluids  - Provide specific nutrition/hydration education as appropriate  - Include patient/family/caregiver in decisions related to nutrition  Outcome: Progressing

## 2024-01-26 NOTE — PROGRESS NOTES
Pastoral Care Progress Note    2024  Patient: Juan Antonio Nicholson : 1940  Admission Date & Time: 2024 1026  MRN: [id number] [facility]: [id number]        Brief visit with patient and family. Patient described preparing to be discharged to rehabilitation. Celebrated the improvement and prayed.               Chaplaincy Interventions Utilized:   Empowerment: Normalized experience of patient/family    Relationship Building: Listened empathically    Ritual: Provided prayer    Chaplaincy Outcomes Achieved:  Expressed gratitude

## 2024-01-26 NOTE — CASE MANAGEMENT
Case Management Discharge Planning Note    Patient name Juan Antonio Nicholson  Location [address] [address]/[address] [place] [address]-* MRN [id number]  : 1940 Date 2024       Current Admission Date: 2024  Current Admission Diagnosis:Acute on chronic respiratory failure with hypoxia and hypercapnia ([facility])   Patient Active Problem List    Diagnosis Date Noted    Aspiration into airway 2024    Influenza 2024    Stage 3a chronic kidney disease ([facility]) 2021    Chronic systolic congestive heart failure ([facility]) 2021    Alzheimer's dementia without behavioral disturbance ([facility]) 2021    Physical deconditioning 2020    Coagulation defect, unspecified ([facility]) 2020    Acute on chronic respiratory failure with hypoxia and hypercapnia ([facility]) 2020    Abscess of lower lobe of left lung with pneumonia ([facility]) 2020    Severe protein-calorie malnutrition ([facility]) 2020    Diverticulosis 2019    Cholelithiases 2019    Nephrolithiasis 2019    COPD with acute exacerbation ([facility]) 2019    Non-recurrent unilateral inguinal hernia without obstruction or gangrene 2018    Pain in both feet 2018    Peripheral arteriosclerosis ([facility]) 2018    Arteriosclerosis of coronary artery 2018    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    Chronic hypoxemic respiratory failure ([facility]) 11/10/2017    Bilateral carotid bruits 2017    Heart failure, left, 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% ([facility]) 2017    Lung nodule 2017    Dilated cardiomyopathy ([facility]) 2017    Type 2 diabetes mellitus with diabetic polyneuropathy, without long-term current use of insulin ([facility])     Severe left ventricular systolic dysfunction 2017    Hypoxia 10/24/2013    PAF (paroxysmal atrial fibrillation) ([facility]) 2013    Hypertensive heart disease with congestive heart failure ([facility]) 2013      LOS (days): 7  Geometric Mean LOS (GMLOS) (days): 3.6  Days to GMLOS:-3.1     OBJECTIVE:  Risk of Unplanned  Readmission Score: 28.38         Current admission status: Inpatient   Preferred Pharmacy:   [facility] - [place], [place] - [address]  [address] [address]  Phone: [phone] Fax: [phone]    [facility] - [place], [place] - [address]  [address]  [facility] [address]  Phone: [phone] Fax: [phone]    Primary Care Provider: Frank Lombardi, DO    Primary Insurance: [employer]  Secondary Insurance:     DISCHARGE DETAILS:                                                                                                               Facility Insurance Auth Number: [id number]

## 2024-01-26 NOTE — DISCHARGE SUMMARY
Discharge Summary - [facility] Internal Medicine  Patient: Juan Antonio May 83 y.o. male   MRN: [id number]  PCP: Frank Lombardi, DO  Unit/Bed#: [address] [place] [address] Encounter: [id number]            Discharging Physician / Practitioner: Kelli Coombs MD  PCP: Frank Lombardi, DO  Admission Date:   Admission Orders (From admission, onward)       Ordered        01/19/24 1517  INPATIENT ADMISSION  Once                          Discharge Date: 01/26/24      Reason for Admission:     Discharge Diagnoses:     Principal Problem:    Acute on chronic respiratory failure with hypoxia and hypercapnia (HCC)    Active Problems:    Type 2 diabetes mellitus with diabetic polyneuropathy, without long-term current use of insulin (HCC)    PAF (paroxysmal atrial fibrillation) (HCC)    Heart failure, left, 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)    Alzheimer's dementia without behavioral disturbance (HCC)    Stage 3a chronic kidney disease (HCC)    Influenza    Aspiration into airway      Consultations During Hospital Stay:  Pulmonology      Hospital Course:     * Acute on chronic respiratory failure with hypoxia and hypercapnia (HCC)  Assessment & Plan  Secondary to COPD exacerbation and concomitant Influenza; severe disease severe pulmonary disease at baseline  Has required BiPAP for respiratory support since transport with EMS, when taken off BiPAP in the ER became significantly hypoxic.  Initially required BiPAP on the floor, but has since been stabilized on high flow nasal cannula oxygen  Now off BiPAP and stable on high flow -> further weaned down to 5-6 L currently at rest  IV Solu-Medrol now transitioned to a Prednisone tapering course  Continue Pulmicort/Perforomist and nebulizer treatments  Patient continues to refuse BiPAP nightly  Tamiflu x 5 days completed  Goals of care: DNR/DNI  Plan for discharge to skilled rehab today     Aspiration into airway  Assessment & Plan  Witnessed aspiration with breakfast 1/21  Appreciate speech therapy input and  "video barium swallow -> diet modified to a mechanical soft with thin liquids currently     Influenza  Assessment & Plan  Patient presents with acute on chronic respiratory failure  Tested positive for influenza A in the emergency room  Completed 5-day course of Tamiflu  Droplet and contact precautions now lifted     Stage 3 chronic kidney disease ([facility])  Assessment & Plan  Renal function at baseline with creatinine around 1.1-1.2 -> currently stable around baseline  Monitor renal function and urine output - limit/avoid nephrotoxins and hypotension as possible     Alzheimer's dementia without behavioral disturbance ([facility])  Assessment & Plan  Continue home Namenda  Supportive care  Fall and delirium precautions     Heart failure 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% ([facility])  Assessment & Plan  Not in acute exacerbation  Continue home Lasix 40 mg Monday/Wednesday/Friday  Continue Coreg/Digoxin/Zestril  Fluid/sodium restriction  Monitor/replete serum potassium/magnesium deficiencies if present     PAF (paroxysmal atrial fibrillation) ([facility])  Assessment & Plan  Continue Coreg/Digoxin  Continue Coumadin for anticoagulation with a target INR of 2-3     Type 2 diabetes mellitus ([facility])  Assessment & Plan  Hold oral hypoglycemics while hospitalized -> resume home regimen on discharge  Recent A1c of 8.8 in December 2023  Basal insulin with additional SSI coverage per Accu-Cheks implemented during hospital course  Carbohydrate consistent diet  Monitor for steroid-induced hyperglycemia       Condition at Discharge: fair       Discharge Day Visit / Exam:     Vitals:  Blood Pressure: 118/72 (01/26/24 0749)  Pulse: 99 (01/26/24 0929)  Temperature: 97.5 °F (36.4 °C) (01/25/24 2203)  Temp Source: Axillary (01/24/24 0754)  Respirations: 20 (01/25/24 2203)  Height: 5' 11\" (180.3 cm) (01/19/24 2008)  Weight - Scale: 56.7 kg (125 lb) (01/19/24 2008)  SpO2: 98 % (01/26/24 1314)      Physical exam:  I had a face-to-face encounter with the patient on day of " discharge.      Discussion with Patient and/or Family:  The patient has been advised to return to the ER immediately if any symptoms recur or worsen.       Discharge instructions/Information to Patient and/or Family:   See after visit summary for information provided to patient and/or family.        Provisions for Follow-Up Care:  See after visit summary for information related to follow-up care and any pertinent home health orders.        Disposition:   Skilled rehab facility      Discharge Medications:  See after visit summary for reconciled discharge medications provided to patient and/or family.        Discharge Statement:  I spent 38 minutes discharging the patient. This time was spent on the day of discharge. I had direct contact with the patient on the day of discharge. Greater than 50% of the total time was spent examining patient, answering all patient questions, arranging and discussing plan of care with patient as well as directly providing post-discharge instructions.  Additional time then spent on discharge activities.           DAYAN HAGEN MD   Hospitalist - [facility] Internal Medicine        ** Please Note: This note is constructed using a voice recognition dictation system.  An occasional wrong word/phrase or “sound-a-like” substitution may have been picked up by dictation device due to the inherent limitations of voice recognition software.  Read the chart carefully and recognize, using reasonable context, where substitutions may have occurred.**

## 2024-01-26 NOTE — CASE MANAGEMENT
Case Management Discharge Planning Note    Patient name Juan Antonio Nicholson  Location [address] [address]/4 [place] [address]-* MRN [id number]  : 1940 Date 2024       Current Admission Date: 2024  Current Admission Diagnosis:Acute on chronic respiratory failure with hypoxia and hypercapnia ([facility])   Patient Active Problem List    Diagnosis Date Noted    Aspiration into airway 2024    Influenza 2024    Stage 3a chronic kidney disease ([facility]) 2021    Chronic systolic congestive heart failure ([facility]) 2021    Alzheimer's dementia without behavioral disturbance ([facility]) 2021    Physical deconditioning 2020    Coagulation defect, unspecified ([facility]) 2020    Acute on chronic respiratory failure with hypoxia and hypercapnia ([facility]) 2020    Abscess of lower lobe of left lung with pneumonia ([facility]) 2020    Severe protein-calorie malnutrition ([facility]) 2020    Diverticulosis 2019    Cholelithiases 2019    Nephrolithiasis 2019    COPD with acute exacerbation ([facility]) 2019    Non-recurrent unilateral inguinal hernia without obstruction or gangrene 2018    Pain in both feet 2018    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    Arteriosclerosis of coronary artery 2018    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    Chronic hypoxemic respiratory failure ([facility]) 11/10/2017    Bilateral carotid bruits 2017    Heart failure, left, 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% ([facility]) 2017    Lung nodule 2017    Dilated cardiomyopathy ([facility]) 2017    Type 2 diabetes mellitus with diabetic polyneuropathy, without long-term current use of insulin ([facility])     Severe left ventricular systolic dysfunction 2017    Hypoxia 10/24/2013    PAF (paroxysmal atrial fibrillation) ([facility]) 2013    Hypertensive heart disease with congestive heart failure ([facility]) 2013      LOS (days): 7  Geometric Mean LOS (GMLOS) (days): 3.6  Days to GMLOS:-3.3     OBJECTIVE:  Risk of Unplanned  Readmission Score: 28.44       Current admission status: Inpatient   Preferred Pharmacy:   [facility] - [place], [place] - [address]  [address] [address]  Phone: [phone] Fax: [phone]    [facility] - [place], [place] - [address]  [address]  [facility] [address]  Phone: [phone] Fax: [phone]    Primary Care Provider: Frank Lombardi, DO    Primary Insurance: [employer]  Secondary Insurance:     DISCHARGE DETAILS:    Discharge planning discussed with:: Wife Neeru WALL contacted family/caregiver?: Yes  Were Treatment Team discharge recommendations reviewed with patient/caregiver?: Yes  Did patient/caregiver verbalize understanding of patient care needs?: N/A- going to facility  Were patient/caregiver advised of the risks associated with not following Treatment Team discharge recommendations?: Yes    Contacts  Patient Contacts: Neeru Nicholson (spouse)  Relationship to Patient:: Family  Contact Method: In Person  Reason/Outcome: Emergency Contact, Discharge Planning    Requested Home Health Care         Is the patient interested in HHC at discharge?: No    DME Referral Provided  Referral made for DME?: No    Other Referral/Resources/Interventions Provided:  Interventions: Short Term Rehab, Transportation    Would you like to participate in our [facility] service program?  : No - Declined    Treatment Team Recommendation: Short Term Rehab  Discharge Destination Plan:: Short Term Rehab  Transport at Discharge : [facility]  Dispatcher Contacted: Yes  Number/Name of Dispatcher: [facility] via [employer]  Transported by (Company and Unit #): [facility]  ETA of Transport (Date): 01/26/24  ETA of Transport (Time): 1430         IMM Given (Date):: 01/25/24        Accepting Facility Name, City & State : [facility]  Receiving Facility/Agency Phone Number: [phone]     Patient has been medically cleared for discharge per  attending.  Plan is for discharge to [facility] and insurance authorization has been approved per the  [facility].  Authorization details were forwarded to [facility] via [employer] message.  SW spoke with wife Neeru at bedside to notify her of approval and plan for discharge.     [facility] transport was requested in [employer] and pickup is scheduled for 1430.  Nursing, attending and facility were notified of pickup time.  Medical necessity form placed in label book.

## 2024-01-26 NOTE — CASE MANAGEMENT
[facility] has received approved authorization from insurance:   [employer]  Called in by Rep: Shwetha FRANCISCO#  [phone]  Authorization received for: [facility]  Facility: [facility]    Authorization #: [id number]   Start of Care: 1/26  Next Review Date: 1/30  Continued Stay Care Coordinator: Juliette FRANCISCO#: [phone]  Submit next review to: [phone]   Care Manager notified: Travis Case Management email group

## 2024-01-26 NOTE — PLAN OF CARE
Problem: RESPIRATORY - ADULT  Goal: Achieves optimal ventilation and oxygenation  Description: INTERVENTIONS:  - Assess for changes in respiratory status  - Assess for changes in mentation and behavior  - Position to facilitate oxygenation and minimize respiratory effort  - Oxygen administered by appropriate delivery if ordered  - Initiate smoking cessation education as indicated  - Encourage broncho-pulmonary hygiene including cough, deep breathe, Incentive Spirometry  - Assess the need for suctioning and aspirate as needed  - Assess and instruct to report SOB or any respiratory difficulty  - Respiratory Therapy support as indicated  Outcome: Progressing     Problem: PAIN - ADULT  Goal: Verbalizes/displays adequate comfort level or baseline comfort level  Description: Interventions:  - Encourage patient to monitor pain and request assistance  - Assess pain using appropriate pain scale  - Administer analgesics based on type and severity of pain and evaluate response  - Implement non-pharmacological measures as appropriate and evaluate response  - Consider cultural and social influences on pain and pain management  - Notify physician/advanced practitioner if interventions unsuccessful or patient reports new pain  Outcome: Progressing     Problem: INFECTION - ADULT  Goal: Absence or prevention of progression during hospitalization  Description: INTERVENTIONS:  - Assess and monitor for signs and symptoms of infection  - Monitor lab/diagnostic results  - Monitor all insertion sites, i.e. indwelling lines, tubes, and drains  - Monitor endotracheal if appropriate and nasal secretions for changes in amount and color  - [place] appropriate cooling/warming therapies per order  - Administer medications as ordered  - Instruct and encourage patient and family to use good hand hygiene technique  - Identify and instruct in appropriate isolation precautions for identified infection/condition  Outcome: Progressing     Problem:  SAFETY ADULT  Goal: Patient will remain free of falls  Description: INTERVENTIONS:  - Educate patient/family on patient safety including physical limitations  - Instruct patient to call for assistance with activity   - Consult OT/PT to assist with strengthening/mobility   - Keep Call bell within reach  - Keep bed low and locked with side rails adjusted as appropriate  - Keep care items and personal belongings within reach  - Initiate and maintain comfort rounds  - Make Fall Risk Sign visible to staff  - Offer Toileting every 2 Hours, in advance of need  - Initiate/Maintain bed alarm  - Obtain necessary fall risk management equipment: n/a  - Apply yellow socks and bracelet for high fall risk patients  - Consider moving patient to room near nurses station  Outcome: Progressing  Goal: Maintain or return to baseline ADL function  Description: INTERVENTIONS:  -  Assess patient's ability to carry out ADLs; assess patient's baseline for ADL function and identify physical deficits which impact ability to perform ADLs (bathing, care of mouth/teeth, toileting, grooming, dressing, etc.)  - Assess/evaluate cause of self-care deficits   - Assess range of motion  - Assess patient's mobility; develop plan if impaired  - Assess patient's need for assistive devices and provide as appropriate  - Encourage maximum independence but intervene and supervise when necessary  - Involve family in performance of ADLs  - Assess for home care needs following discharge   - Consider OT consult to assist with ADL evaluation and planning for discharge  - Provide patient education as appropriate  Outcome: Progressing  Goal: Maintains/Returns to pre admission functional level  Description: INTERVENTIONS:  - Perform AM-PAC 6 Click Basic Mobility/ Daily Activity assessment daily.  - Set and communicate daily mobility goal to care team and patient/family/caregiver.   - Collaborate with rehabilitation services on mobility goals if consulted  - Perform  Range of Motion 3 times a day.  - Reposition patient every 2 hours.  - Dangle patient 3 times a day  - Stand patient 3 times a day  - Ambulate patient 3 times a day  - Out of bed to chair 3 times a day   - Out of bed for meals 3 times a day  - Out of bed for toileting  - Record patient progress and toleration of activity level   Outcome: Progressing     Problem: DISCHARGE PLANNING  Goal: Discharge to home or other facility with appropriate resources  Description: INTERVENTIONS:  - Identify barriers to discharge w/patient and caregiver  - Arrange for needed discharge resources and transportation as appropriate  - Identify discharge learning needs (meds, wound care, etc.)  - Arrange for interpretive services to assist at discharge as needed  - Refer to Case Management Department for coordinating discharge planning if the patient needs post-hospital services based on physician/advanced practitioner order or complex needs related to functional status, cognitive ability, or social support system  Outcome: Progressing     Problem: Knowledge Deficit  Goal: Patient/family/caregiver demonstrates understanding of disease process, treatment plan, medications, and discharge instructions  Description: Complete learning assessment and assess knowledge base.  Interventions:  - Provide teaching at level of understanding  - Provide teaching via preferred learning methods  Outcome: Progressing     Problem: Prexisting or High Potential for Compromised Skin Integrity  Goal: Skin integrity is maintained or improved  Description: INTERVENTIONS:  - Identify patients at risk for skin breakdown  - Assess and monitor skin integrity  - Assess and monitor nutrition and hydration status  - Monitor labs   - Assess for incontinence   - Turn and reposition patient  - Assist with mobility/ambulation  - Relieve pressure over bony prominences  - Avoid friction and shearing  - Provide appropriate hygiene as needed including keeping skin clean and  dry  - Evaluate need for skin moisturizer/barrier cream  - Collaborate with interdisciplinary team   - Patient/family teaching  - Consider wound care consult   Outcome: Progressing     Problem: Nutrition/Hydration-ADULT  Goal: Nutrient/Hydration intake appropriate for improving, restoring or maintaining nutritional needs  Description: Monitor and assess patient's nutrition/hydration status for malnutrition. Collaborate with interdisciplinary team and initiate plan and interventions as ordered.  Monitor patient's weight and dietary intake as ordered or per policy. Utilize nutrition screening tool and intervene as necessary. Determine patient's food preferences and provide high-protein, high-caloric foods as appropriate.     INTERVENTIONS:  - Monitor oral intake, urinary output, labs, and treatment plans  - Assess nutrition and hydration status and recommend course of action  - Evaluate amount of meals eaten  - Assist patient with eating if necessary   - Allow adequate time for meals  - Recommend/ encourage appropriate diets, oral nutritional supplements, and vitamin/mineral supplements  - Order, calculate, and assess calorie counts as needed  - Assess need for intravenous fluids  - Provide specific nutrition/hydration education as appropriate  - Include patient/family/caregiver in decisions related to nutrition  Outcome: Progressing

## 2024-01-26 NOTE — [place] UNIVERSAL TRANSFER FORM
"[place] UNIVERSAL TRANSFER FORM  (ALL ITEMS MUST BE COMPLETED)    1. TRANSFER FROM: [facility]      TRANSFER TO: [facility]    2. DATE OF TRANSFER: 1/26/2024                        TIME OF TRANSFER: 1430    3. PATIENT NAME: Juan Antonio Nicholson,        YOB: 1940                             GENDER: male    4. LANGUAGE:   English    5. PHYSICIAN NAME:  Kelli Coombs MD                   PHONE: [phone]    6. CODE STATUS: Level 3 - DNAR and DNI        Out of Hospital DNR Attached: No    7. :                                      :  Extended Emergency Contact Information  Primary Emergency Contact: Neeru Nicholson  Address: [address] [place] [address] of [place]  Home Phone: [phone]  Mobile Phone: [phone]  Relation: Spouse           Health Care Representative/Proxy:  Yes           Legal Guardian:  No             NAME OF:           HEALTH CARE REPRESENTATIVE/PROXY:                                         OR           LEGAL GUARDIAN, IF NOT :                                               PHONE:  (Day)           (Night)                        (Cell)    8. REASON FOR TRANSFER: (Must include brief medical history and recent changes in physical function or cognition.) respiratory failure with hypoxia and hypercapnia, alert and oriented to person and place. Hx of type 2 diabetes, atrial fibrillation, alzheimer's, influenza, and COPD            V/S: /72   Pulse 99   Temp 97.5 °F (36.4 °C)   Resp 20   Ht 5' 11\" (1.803 m)   Wt 56.7 kg (125 lb)   SpO2 97%   BMI 17.43 kg/m²           PAIN: None    9. PRIMARY DIAGNOSIS: Acute on chronic respiratory failure with hypoxia and hypercapnia (HCC)      Secondary Diagnosis:         Pacemaker: Yes      Internal Defib: Yes          Mental Health Diagnosis (if Applicable):    10. RESTRAINTS: No     11. " RESPIRATORY NEEDS: Oxygen Device nasal cannula, and Flow rate: 5 liters    12. ISOLATION/PRECAUTION: None    13. ALLERGY: Patient has no known allergies.    14. SENSORY:       Vision Glasses, Hearing Poor, and Speech Clear    15. SKIN CONDITION: No Wounds    16. DIET: Mechanically Altered Diet    17. IV ACCESS: None    18. PERSONAL ITEMS SENT WITH PATIENT: Glasses    19. ATTACHED DOCUMENTS: MUST ATTACH CURRENT MEDICATION INFORMATION Face Sheet and Discharge Summary    20. AT RISK ALERTS:Falls        HARM TO: N/A    21. WEIGHT BEARING STATUS:         Left Leg: Limited        Right Leg: Limited    22. MENTAL STATUS:Alert, Oriented, and Otheralert to person and place    23. FUNCTION:        Walk: With Help        Transfer: With Help        Toilet: With Help        Feed: Self    24. IMMUNIZATIONS/SCREENING:     Immunization History   Administered Date(s) Administered    COVID-19 MODERNA VACC 0.5 ML IM 02/11/2021, 03/11/2021    INFLUENZA 09/15/2020    Influenza Split High Dose Preservative Free IM 09/04/2014, 09/07/2017    Influenza, high dose seasonal 0.7 mL 09/28/2018    Influenza, seasonal, injectable 10/16/2003    Pneumococcal Conjugate 13-Valent 06/19/2020    Pneumococcal Polysaccharide PPV23 01/01/2001, 06/15/2006    influenza, trivalent, adjuvanted 09/19/2019       25. BOWEL: Continent    26. BLADDER: Continent    27. SENDING FACILITY CONTACT: Katie HUERTA                  Title: RN        Unit: [facility]        Phone: [phone]          REC'G FACILITY CONTACT (if known):        Title:        Unit:         Phone:         FORM PREFILLED BY (if applicable)       Title:       Unit:        Phone:         FORM COMPLETED BY Katie Bateman RN      Title: [facility]      Phone: [phone]

## 2024-01-28 NOTE — ASSESSMENT & PLAN NOTE
Wt Readings from Last 3 Encounters:   01/28/24 56 kg (123 lb 7.3 oz)   01/19/24 56.7 kg (125 lb)   01/08/24 56.2 kg (124 lb)       EF 30-40%  No evidence of volume overload currently

## 2024-01-28 NOTE — ASSESSMENT & PLAN NOTE
Severe COPD at baseline with FEV1 19% of predicted   Recently admitted with Flu and respiratory failure. Required BiPAP/HFNC and was transitioned to NC and discharged, presents back 2 days later with worsening shortness of breath  ?superimposed bacterial infection vs COPD exacerbation or combination  Continue HFNC  Continue Cefepime/vanco for possible infection  Goals of care conversation as patient has significant work of breathing

## 2024-01-28 NOTE — ASSESSMENT & PLAN NOTE
Lab Results   Component Value Date    HGBA1C 8.8 (H) 12/12/2023       Recent Labs     01/25/24  1600 01/25/24  2112 01/26/24  0722 01/26/24  1120   POCGLU 273* 186* 116 233*       Blood Sugar Average: Last 72 hrs:    SSI

## 2024-01-28 NOTE — ACP (ADVANCE CARE PLANNING)
Critical Care Advanced Care Planning Note  Juan Antonio Nicholson 83 y.o. male MRN: [id number]  Unit/Bed#: ICU 05 Encounter: [id number]    Juan Antonio Nicholson is a 83 y.o. male requiring critical care evaluation and advanced care planning. The patient has chronic comorbidities, including but not limited to severe COPD, combined heart failure, which is now further complicated by the following acute conditions: COPD exacerbation, possible pnuemonia, recent hospitalization and deconditioning.  Due to the severity of the patient's acute condition and/or the extent of chronic conditions, additional conversations pertaining to advanced care planning were required. Today's discussion, which was held in a face-to-face meeting, included  spouse , and it was established that all stake holders understood the rationale for the advanced care planning. The patient was unable to participate in the discussion due to dementia.    Summary of Discussion:  Spouse presented to bedside after earlier conversation alerting her to his admission from nursing home for shortness of breath. Patient on HFNC with increased work of breathing but improved from earlier. Spouse discussed he has been declining since December admission - worsening dementia symptoms, not eating, and has been hospitalized multiple times. She understands he has severe COPD and he would not any aggressive measures. She feels at this time, they have exhausted medical management and she would like to transition to comfort measures. Comfort care transition explained and all questions answered.      Total time spent, (15) minutes (1115 to 1130).      CODE STATUS: COMFORT CARE - Level 4  POA:    POLST:      SIGNATURE: Yisel Valadez PA-C  DATE: January 28, 2024  TIME: 12:23 PM

## 2024-01-28 NOTE — ASSESSMENT & PLAN NOTE
Malnutrition Findings:       Nutrition consult when appropriate                          BMI Findings:           Body mass index is 17.22 kg/m².
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
## 2024-01-29 PROBLEM — Z51.5 HOSPICE CARE: Status: ACTIVE | Noted: 2024-01-01

## 2024-01-29 NOTE — PROGRESS NOTES
Spiritual Care Progress Note    2024  Patient: Juan Antonio Nicholson : 1940  Admission Date & Time: 2024 0515  MRN: [id number] CSN: [id number]     visited patient per CM and RN referral. Patient resting comfortably, patient's wife at bedside.  introduced self & role, facilitated life review, provided preliminary grief counseling, normalized family's experience, and provided prayer and bereavement resources. Patient's wife shared a lack of support as pt's son is , daughter is estranged, and additional family members have passed away or with limited health/mobility. Pt's wife thanked  for support. Spiritual care remains available.             Chaplaincy Interventions Utilized:   Empowerment: Normalized experience of patient/family and Provided chaplaincy education    Exploration: Explored spiritual needs & resources and Facilitated life review    Collaboration: Consulted with interdisciplinary team    Relationship Building: Cultivated a relationship of care and support and Listened empathically    Ritual: Provided prayer and Provided [employer] resources      Chaplaincy Outcomes Achieved:  Catharsis, Expressed gratitude, and Spiritual resources utilized      Spiritual Coping Strategies Utilized:   Spiritual practices and Spiritual comfort     24 1100   Clinical Encounter Type   Visited With Patient and family together   Routine Visit Introduction   Referral From Nurse;   Judaism Encounters   Judaism Needs Prayer;Judaism articles   Family Spiritual Encounters   Family Coping Accepting;Sadness
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
## 2024-01-29 NOTE — ASSESSMENT & PLAN NOTE
Pt presented with decreased intake per wife  Total protein 5.7 and albumin 3.1      BMI Findings:         Body mass index is 17.22 kg/m².

## 2024-01-29 NOTE — ASSESSMENT & PLAN NOTE
Wt Readings from Last 3 Encounters:   01/28/24 56 kg (123 lb 7.3 oz)   01/19/24 56.7 kg (125 lb)   01/08/24 56.2 kg (124 lb)     EF 30-40%   No evidence of volume overload currently - continue I/Os and daily weights????

## 2024-01-29 NOTE — ASSESSMENT & PLAN NOTE
Now transition to comfort measures/inpatient hospice in the setting of declining hypercapnic and hypoxic respiratory failure from recent influenza infection and underlying COPD, with refusal to adhere to BiPAP at night   Complicated by underlying history of progressive dementia  Comfort care orders placed for pain, air hunger, and anxiety, along with constipation

## 2024-01-29 NOTE — [place] UNIVERSAL TRANSFER FORM
"[place] UNIVERSAL TRANSFER FORM  (ALL ITEMS MUST BE COMPLETED)    1. TRANSFER FROM: [facility]      TRANSFER TO: hospice    2. DATE OF TRANSFER: 1/29/2024                        TIME OF TRANSFER: 1330    3. PATIENT NAME: Juan Antonio Nicholson,        YOB: 1940                             GENDER: male    4. LANGUAGE:   English    5. PHYSICIAN NAME:  No att. providers found                   PHONE: [phone]    6. CODE STATUS: Prior        Out of Hospital DNR Attached: Yes    7. :                                      :  Extended Emergency Contact Information  Primary Emergency Contact: Neeru Nicholson  Address: [address] [place] [address] of [facility]  Home Phone: [phone]  Mobile Phone: [phone]  Relation: Spouse           Health Care Representative/Proxy:  Yes           Legal Guardian:  No             NAME OF:           HEALTH CARE REPRESENTATIVE/PROXY:                                         OR           LEGAL GUARDIAN, IF NOT :                                               PHONE:  (Day)           (Night)                        (Cell)    8. REASON FOR TRANSFER: (Must include brief medical history and recent changes in physical function or cognition.) hospice            V/S: /57 (BP Location: Left arm)   Pulse 101   Temp 98.5 °F (36.9 °C) (Temporal)   Resp 15   Ht 5' 11\" (1.803 m)   Wt 56 kg (123 lb 7.3 oz)   SpO2 (!) 81%   BMI 17.22 kg/m²           PAIN: None    9. PRIMARY DIAGNOSIS: Acute on chronic respiratory failure with hypoxia and hypercapnia (HCC)      Secondary Diagnosis:         Pacemaker: Yes      Internal Defib: Yes          Mental Health Diagnosis (if Applicable):    10. RESTRAINTS: No     11. RESPIRATORY NEEDS: Oxygen Device nc,    12. ISOLATION/PRECAUTION: None    13. ALLERGY: Patient has no known allergies.    14. SENSORY:    "    Speech Difficult    15. SKIN CONDITION: Yes:  Pressure    16. DIET: Special (describe)pleasure feeds    17. IV ACCESS: Saline Lock    18. PERSONAL ITEMS SENT WITH PATIENT: None    19. ATTACHED DOCUMENTS: MUST ATTACH CURRENT MEDICATION INFORMATION Face Sheet, MAR, Medication Reconciliation, Labs, Code Status, Discharge Summary, and HX/PE    20. AT RISK ALERTS:Falls and Pressure Ulcer        HARM TO: N/A    21. WEIGHT BEARING STATUS:         Left Leg: Limited        Right Leg: Limited    22. MENTAL STATUS:Unresponsive    23. FUNCTION:        Walk: Not Able        Transfer: Not Able        Toilet: Not Able        Feed: Not Able    24. IMMUNIZATIONS/SCREENING:     Immunization History   Administered Date(s) Administered    COVID-19 MODERNA VACC 0.5 ML IM 02/11/2021, 03/11/2021    INFLUENZA 09/15/2020    Influenza Split High Dose Preservative Free IM 09/04/2014, 09/07/2017    Influenza, high dose seasonal 0.7 mL 09/28/2018    Influenza, seasonal, injectable 10/16/2003    Pneumococcal Conjugate 13-Valent 06/19/2020    Pneumococcal Polysaccharide PPV23 01/01/2001, 06/15/2006    influenza, trivalent, adjuvanted 09/19/2019       25. BOWEL: Incontinent     26. BLADDER: Commentstexas catheter    27. SENDING FACILITY CONTACT: Bonnie Damon RN                  Title: RN        Unit: icu        Phone: [phone]          REC'G FACILITY CONTACT (if known):        Title:        Unit:         Phone:         FORM PREFILLED BY (if applicable)       Title:       Unit:        Phone:         FORM COMPLETED BY Bonnie Damon RN      Title: [facility]      Phone: [phone]

## 2024-01-29 NOTE — DISCHARGE SUMMARY
Discharge Summary - [facility] Internal Medicine  Patient: Juan Antonio Nicholson 83 y.o. male   MRN: [id number]  PCP: Frank Lombardi, DO  Unit/Bed#: ICU 05 Encounter: [id number]            Discharging Physician / Practitioner: Kelli Coombs MD  PCP: Frank Lombardi, DO  Admission Date:   Admission Orders (From admission, onward)       Ordered        01/28/24 0729  INPATIENT ADMISSION  Once                          Discharge Date: 01/29/24      Reason for Admission:  Shortness of breath      Discharge Diagnoses:     Principal Problem:    Acute on chronic respiratory failure with hypoxia and hypercapnia (HCC)    Active Problems:    Type 2 diabetes mellitus with diabetic polyneuropathy, without long-term current use of insulin (HCC)    PAF (paroxysmal atrial fibrillation) (HCC)    Heart failure, left, 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 male patient who originally presented to the hospital on 1/28/2024 due to shortness of breath with acute on chronic hypoxic and hypercapnic respiratory failure.  Of note, he was just recently hospitalized and discharged with a bout of COPD exacerbation and influenza status post Tamiflu treatment.  Unfortunately, he continued to remain noncompliant to BiPAP which led to further altered mentation and air hunger with shortness of breath.  Due to his inability to recover and persistent altered mentation, wife agreed to transition patient over to comfort care, and ultimately hospice.       Condition at Discharge:  Guarded      Discharge Day Visit / Exam:     Vitals:  Blood Pressure: 121/57 (01/28/24 2000)  Pulse: 101 (01/28/24 2000)  Temperature: 98.5 °F (36.9 °C) (01/28/24 2000)  Temp Source: Temporal (01/28/24 2000)  Respirations: 15 (01/28/24  "2000)  Height: 5' 11\" (180.3 cm) (01/28/24 0915)  Weight - Scale: 56 kg (123 lb 7.3 oz) (01/28/24 0915)  SpO2: (!) 81 % (01/28/24 2000)      Physical exam:  I had a face-to-face encounter with the patient on day of discharge.      Discussion with Patient and/or Family:  The patient has been advised to return to the ER immediately if any symptoms recur or worsen.       Discharge instructions/Information to Patient and/or Family:   See after visit summary for information provided to patient and/or family.        Provisions for Follow-Up Care:  See after visit summary for information related to follow-up care and any pertinent home health orders.        Disposition:   Inpatient hospice      Discharge Medications:  See after visit summary for reconciled discharge medications provided to patient and/or family.        Discharge Statement:  I spent 38 minutes discharging the patient. This time was spent on the day of discharge. I had direct contact with the patient on the day of discharge. Greater than 50% of the total time was spent examining patient, answering all patient questions, arranging and discussing plan of care with patient as well as directly providing post-discharge instructions.  Additional time then spent on discharge activities.           DAYAN HAGEN MD   Hospitalist - [facility] Internal Medicine        ** Please Note: This note is constructed using a voice recognition dictation system.  An occasional wrong word/phrase or “sound-a-like” substitution may have been picked up by dictation device due to the inherent limitations of voice recognition software.  Read the chart carefully and recognize, using reasonable context, where substitutions may have occurred.**       "

## 2024-01-29 NOTE — ASSESSMENT & PLAN NOTE
Severe COPD at baseline, last admitted for an exacerbation less than one week ago.   Co2 35, pt improves with BiPAP but does not want it on, pt now on HFNC

## 2024-01-29 NOTE — ASSESSMENT & PLAN NOTE
Lab Results   Component Value Date    HGBA1C 8.8 (H) 12/12/2023       Recent Labs     01/26/24  1120   POCGLU 233*     Comfort care in place

## 2024-01-29 NOTE — CASE MANAGEMENT
Case Management Assessment & Discharge Planning Note    Patient name Juan Antonio Rodríguez ICU 05/ICU 05 MRN [id number]  : 1940 Date 2024       Current Admission Date: 2024  Current Admission Diagnosis:Acute on chronic respiratory failure with hypoxia and hypercapnia ([facility])   Patient Active Problem List    Diagnosis Date Noted    Hospice care 2024    Aspiration into airway 2024    Influenza 2024    Stage 3a chronic kidney disease (HCC) 2021    Chronic systolic congestive heart failure ([facility]) 2021    Alzheimer's dementia without behavioral disturbance ([facility]) 2021    Physical deconditioning 2020    Coagulation defect, unspecified ([facility]) 2020    Acute on chronic respiratory failure with hypoxia and hypercapnia ([facility]) 2020    Abscess of lower lobe of left lung with pneumonia ([facility]) 2020    Severe protein-calorie malnutrition ([facility]) 2020    Diverticulosis 2019    Cholelithiases 2019    Nephrolithiasis 2019    COPD with acute exacerbation ([facility]) 2019    Non-recurrent unilateral inguinal hernia without obstruction or gangrene 2018    Pain in both feet 2018    Peripheral arteriosclerosis ([facility]) 2018    Arteriosclerosis of coronary artery 2018    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    Chronic hypoxemic respiratory failure ([facility]) 11/10/2017    Bilateral carotid bruits 2017    Heart failure, left, 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% ([facility]) 2017    Lung nodule 2017    Dilated cardiomyopathy ([facility]) 2017    Type 2 diabetes mellitus with diabetic polyneuropathy, without long-term current use of insulin ([facility])     Severe left ventricular systolic dysfunction 2017    Hypoxia 10/24/2013    PAF (paroxysmal atrial fibrillation) ([facility]) 2013    Hypertensive heart disease with congestive heart failure ([facility]) 2013      LOS (days): 1  Geometric Mean LOS (GMLOS) (days):   Days to GMLOS:      OBJECTIVE:  PATIENT READMITTED TO HOSPITAL  Risk of Unplanned Readmission Score: 27.83       Current admission status: Inpatient     Preferred Pharmacy:   [facility] - [place], [place] - [address]  [address] [address]  Phone: [phone] Fax: [phone]    [id number] - [place], [place] - [address]  [address]  [facility] [address]  Phone: [phone] Fax: [phone]    Primary Care Provider: Frank Lombardi, DO    Primary Insurance: [email or web address]  Secondary Insurance:     ASSESSMENT:  Active Health Care Proxies       Neeru Nicholson Health Care Representative - Spouse   Primary Phone: [phone] (Home)  Mobile Phone: [phone]                  Readmission Root Cause  30 Day Readmission: Yes  Who directed you to return to the hospital?: Other (comment) (SNF)  Did you understand whom to contact if you had questions or problems?: Yes  Did you get your prescriptions before you left the hospital?: No  Reason:: Not preferred pharmacy  Were you able to get your prescriptions filled when you left the hospital?: Yes  Did you take your medications as prescribed?: Yes  Were you able to get to your follow-up appointments?: No  Reason:: Readmitted prior to appointment  During previous admission, was a post-acute recommendation made?: Yes  What post-acute resources were offered?: STR  Patient was readmitted due to: Acute on chronic respiratory failure  Action Plan: Patient transitioned to comfort care, hospice referral placed    Patient Information  Admitted from:: Facility ([facility])  Mental Status: Comatose  During Assessment patient was accompanied by: Spouse  Assessment information provided by:: Spouse  Primary Caregiver: Spouse  Caregiver's Name:: Neeru Nicholson (spouse)  Caregiver's Relationship to Patient:: Family Member  Caregiver's Telephone Number:: [phone]  Support Systems: Spouse/significant other  [facility]  Residence: [place]  What city do you live in?: [place]      Housing Stability: Low Risk  (1/29/2024)    Housing Stability Vital Sign     Unable to Pay for Housing in the Last Year: No     Number of Places Lived in the Last Year: 1     Unstable Housing in the Last Year: No   Food Insecurity: No Food Insecurity (1/29/2024)    Hunger Vital Sign     Worried About Running Out of Food in the Last Year: Never true     Ran Out of Food in the Last Year: Never true   Transportation Needs: No Transportation Needs (1/29/2024)    PRAPARE - Transportation     Lack of Transportation (Medical): No     Lack of Transportation (Non-Medical): No   Utilities: Not At Risk (1/29/2024)    [facility] Utilities     Threatened with loss of utilities: No       DISCHARGE DETAILS:    Discharge planning discussed with:: Wife Neeru Nicholson  Freedom of Choice: Yes    Comments - Freedom of Choice: SW spoke with wife Neeru at bedside and she is in agreement with a hospice referral to [facility].  Referral placed in [facility] and liaison Avani notified of referral.  SW also requested  support via TT message to  on duty.  WILLIAM will continue to follow.      CM contacted family/caregiver?: Yes  Were Treatment Team discharge recommendations reviewed with patient/caregiver?: Yes  Did patient/caregiver verbalize understanding of patient care needs?: Yes  Were patient/caregiver advised of the risks associated with not following Treatment Team discharge recommendations?: Yes    Contacts  Patient Contacts: Neeru Nicholson (spouse)  Relationship to Patient:: Family  Contact Method: In Person  Reason/Outcome: Emergency Contact, Discharge Planning    Requested Home Health Care         Is the patient interested in HHC at discharge?: No    DME Referral Provided  Referral made for DME?: No    Other Referral/Resources/Interventions Provided:  Interventions: Hospice    Would you like to participate in our [facility] service program?  : No -  Declined    Treatment Team Recommendation: Hospice  Discharge Destination Plan:: Hospice         IMM Given (Date):: 01/28/24

## 2024-01-29 NOTE — ASSESSMENT & PLAN NOTE
Pt has history of worsening dementia and 2 hospital admission since late December. Wife noted that patient has been declining in his health and cognition recently and feels all care options have been exhausted and would like to transition to comfort care.   Consult palliative, start morphine and ativan as needed.
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
## 2024-01-29 NOTE — ASSESSMENT & PLAN NOTE
Severe COPD at baseline with FEV1 19% of predicted   Recently admitted with Flu and respiratory failure. Required BiPAP/HFNC and was transitioned to NC and discharged, presents back 2 days later with worsening shortness of breath  WBC 20.56 with 83% neutrophils, procalcitonin was WNL   ?superimposed bacterial infection vs COPD exacerbation or combination  Goals of care discusses with wife, will transition to comfort care.   Discontinue antibiotics  Continue HFNC

## 2024-01-30 NOTE — DISCHARGE SUMMARY
Discharge Summary - Juan Antonio May 83 y.o. male MRN: [id number]    Unit/Bed#: ICU 05 Encounter: [id number] PCP: Frank Lombardi, DO    Admission Date: 2024    Admitting Diagnosis: Respiratory distress [R06.03]      Procedures Performed: No orders of the defined types were placed in this encounter.      Summary of Hospital Course: Patient was admitted under inpatient hospice due to acute on chronic respiratory failure with hypoxia and hypercapnia in the setting of severe COPD and recent influenza infection.  Patient was noncompliant with BiPAP at rehab and came back to the hospital with altered mental status and worsening oxygen requirement.  Patient's wife decided to transition him to comfort measures.  Hospice was consulted and patient qualified for inpatient hospice    Significant Findings, Care, Treatment and Services Provided: N/A    Complications: N/A    Disposition:      Final Diagnosis: Acute on chronic respiratory failure with hypoxia and hypercapnia    Medical Problems       Resolved Problems  Date Reviewed: 2024   None           Date, Time and Cause of Death    Date of Death: 24  Time of Death:  1:37 AM  Preliminary Cause of Death: Acute on chronic respiratory failure with hypoxia and hypercapnia (HCC)  Entered by: Dr. Driscoll[MR1.1]       Attribution       MR1.1 Louise Americo,  24 01:43            Death Note:    INPATIENT DEATH NOTE  Juan Antonio May 83 y.o. male MRN: [id number]  Unit/Bed#: ICU 05 Encounter: [id number]    Date, Time and Cause of Death    Date of Death: 24  Time of Death:  1:37 AM  Preliminary Cause of Death: Acute on chronic respiratory failure with hypoxia and hypercapnia (HCC)  Entered by: Dr. Driscoll[MR1.1]       Attribution       MR1.1 Louise Driscoll,  24 01:43                 PHYSICAL EXAM:  Spontaneous respirations absent, Breath sounds absent, Carotid pulse absent, Heart sounds absent, Pupillary light reflex absent, and Corneal blink reflex  absent    Medical Examiner notification criteria:  NONE APPLICABLE   Medical Examiner's office notified?:  No, does not meet ME notification criteria   Medical Examiner accepted case?:  NA  Name of Medical Examiner: NA    Family Notification  Was the family notified?: Yes  Date Notified: 24  Time Notified: 137  Notified by: Dr. Driscoll  Name of Family Notified of Death: Neeru May   Relationship to Patient: Spouse  Family Notification Route: At bedside  Was the family told to contact a  home?: Yes  Name of  Home:: Dinesh's  home    Autopsy Options:  Post-mortem examination declined by next of kin    Primary Service Attending Physician notified?:  yes    Physician/Resident responsible for completing Discharge Summary:  Dr. Driscoll
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
## 2024-02-02 LAB
BACTERIA BLD CULT: NORMAL
BACTERIA BLD CULT: NORMAL

## (undated) DEVICE — SCD SEQUENTIAL COMPRESSION COMFORT SLEEVE MEDIUM KNEE LENGTH: Brand: KENDALL SCD

## (undated) DEVICE — GLOVE INDICATOR PI UNDERGLOVE SZ 8.5 BLUE

## (undated) DEVICE — SUT PDS II 4-0 PS-2 R/C 18 IN Z513G

## (undated) DEVICE — 3M™ STERI-STRIP™ REINFORCED ADHESIVE SKIN CLOSURES, R1546, 1/4 IN X 4 IN (6 MM X 100 MM), 10 STRIPS/ENVELOPE: Brand: 3M™ STERI-STRIP™

## (undated) DEVICE — BASIC DOUBLE BASIN 2-LF: Brand: MEDLINE INDUSTRIES, INC.

## (undated) DEVICE — STRL PENROSE DRAIN 18" X 1/2": Brand: CARDINAL HEALTH

## (undated) DEVICE — CHLORAPREP HI-LITE 26ML ORANGE

## (undated) DEVICE — 3M™ STERI-STRIP™ REINFORCED ADHESIVE SKIN CLOSURES, R1541, 1/4 IN X 3 IN (6 MM X 75 MM), 3 STRIPS/ENVELOPE: Brand: 3M™ STERI-STRIP™

## (undated) DEVICE — SUT PLAIN 3-0 PS-1 27 IN 1640H

## (undated) DEVICE — ANTIBACTERIAL VIOLET BRAIDED (POLYGLACTIN 910), SYNTHETIC ABSORBABLE SUTURE: Brand: COATED VICRYL

## (undated) DEVICE — SUT PDS II 2-0 CT-1 27 IN Z339H

## (undated) DEVICE — SUT CHROMIC 0 CT 36 IN 914H

## (undated) DEVICE — GLOVE SRG BIOGEL 8

## (undated) DEVICE — TIBURON LAPAROTOMY DRAPE: Brand: CONVERTORS

## (undated) DEVICE — SUT CHROMIC 2-0 18 IN SG13T

## (undated) DEVICE — SPONGE: SPECIALTY K-DISS XR  100/CS: Brand: MEDICAL ACTION INDUSTRIES

## (undated) DEVICE — 3M™ TEGADERM™ TRANSPARENT FILM DRESSING FRAME STYLE, 1626W, 4 IN X 4-3/4 IN (10 CM X 12 CM), 50/CT 4CT/CASE: Brand: 3M™ TEGADERM™

## (undated) DEVICE — ASTOUND IMPERVIOUS SURGICAL GOWN: Brand: CONVERTORS

## (undated) DEVICE — SUT PLAIN 3-0 CT-1 27 IN 842H

## (undated) DEVICE — SUT CHROMIC 0 18 IN SG14T

## (undated) DEVICE — PACK GENERAL LF

## (undated) DEVICE — STERILE ICS MINOR PACK: Brand: CARDINAL HEALTH